# Patient Record
Sex: FEMALE | Race: BLACK OR AFRICAN AMERICAN | NOT HISPANIC OR LATINO | Employment: OTHER | URBAN - METROPOLITAN AREA
[De-identification: names, ages, dates, MRNs, and addresses within clinical notes are randomized per-mention and may not be internally consistent; named-entity substitution may affect disease eponyms.]

---

## 2017-01-12 ENCOUNTER — HOSPITAL ENCOUNTER (EMERGENCY)
Facility: CLINIC | Age: 67
Discharge: HOME | End: 2017-01-12
Attending: EMERGENCY MEDICINE | Admitting: EMERGENCY MEDICINE
Payer: MEDICARE

## 2017-01-12 ENCOUNTER — APPOINTMENT (OUTPATIENT)
Dept: CT IMAGING | Facility: CLINIC | Age: 67
End: 2017-01-12
Attending: EMERGENCY MEDICINE
Payer: MEDICARE

## 2017-01-12 ENCOUNTER — APPOINTMENT (OUTPATIENT)
Dept: GENERAL RADIOLOGY | Facility: CLINIC | Age: 67
End: 2017-01-12
Attending: EMERGENCY MEDICINE
Payer: MEDICARE

## 2017-01-12 VITALS
BODY MASS INDEX: 36.29 KG/M2 | SYSTOLIC BLOOD PRESSURE: 158 MMHG | HEIGHT: 59 IN | WEIGHT: 180 LBS | RESPIRATION RATE: 18 BRPM | HEART RATE: 91 BPM | DIASTOLIC BLOOD PRESSURE: 105 MMHG | OXYGEN SATURATION: 98 % | TEMPERATURE: 98.9 F

## 2017-01-12 DIAGNOSIS — R09.02 HYPOXIA: ICD-10-CM

## 2017-01-12 DIAGNOSIS — S73.101A SPRAIN OF RIGHT HIP, INITIAL ENCOUNTER: ICD-10-CM

## 2017-01-12 LAB
ANION GAP SERPL CALCULATED.3IONS-SCNC: 10 MMOL/L (ref 3–14)
BASOPHILS # BLD AUTO: 0 10E9/L (ref 0–0.2)
BASOPHILS NFR BLD AUTO: 0.3 %
BUN SERPL-MCNC: 19 MG/DL (ref 7–30)
CALCIUM SERPL-MCNC: 9.9 MG/DL (ref 8.5–10.1)
CHLORIDE SERPL-SCNC: 106 MMOL/L (ref 94–109)
CO2 SERPL-SCNC: 27 MMOL/L (ref 20–32)
CREAT BLD-MCNC: 0.9 MG/DL (ref 0.52–1.04)
CREAT SERPL-MCNC: 0.8 MG/DL (ref 0.52–1.04)
DIFFERENTIAL METHOD BLD: NORMAL
EOSINOPHIL # BLD AUTO: 0.1 10E9/L (ref 0–0.7)
EOSINOPHIL NFR BLD AUTO: 0.8 %
ERYTHROCYTE [DISTWIDTH] IN BLOOD BY AUTOMATED COUNT: 12.7 % (ref 10–15)
GFR SERPL CREATININE-BSD FRML MDRD: 62 ML/MIN/1.7M2
GFR SERPL CREATININE-BSD FRML MDRD: 71 ML/MIN/1.7M2
GLUCOSE SERPL-MCNC: 128 MG/DL (ref 70–99)
HCT VFR BLD AUTO: 43 % (ref 35–47)
HGB BLD-MCNC: 13.6 G/DL (ref 11.7–15.7)
IMM GRANULOCYTES # BLD: 0 10E9/L (ref 0–0.4)
IMM GRANULOCYTES NFR BLD: 0.4 %
LYMPHOCYTES # BLD AUTO: 2 10E9/L (ref 0.8–5.3)
LYMPHOCYTES NFR BLD AUTO: 20.2 %
MCH RBC QN AUTO: 29.6 PG (ref 26.5–33)
MCHC RBC AUTO-ENTMCNC: 31.6 G/DL (ref 31.5–36.5)
MCV RBC AUTO: 94 FL (ref 78–100)
MONOCYTES # BLD AUTO: 0.7 10E9/L (ref 0–1.3)
MONOCYTES NFR BLD AUTO: 6.8 %
NEUTROPHILS # BLD AUTO: 7.1 10E9/L (ref 1.6–8.3)
NEUTROPHILS NFR BLD AUTO: 71.5 %
NRBC # BLD AUTO: 0 10*3/UL
NRBC BLD AUTO-RTO: 0 /100
PLATELET # BLD AUTO: 210 10E9/L (ref 150–450)
POTASSIUM SERPL-SCNC: 4.4 MMOL/L (ref 3.4–5.3)
RBC # BLD AUTO: 4.59 10E12/L (ref 3.8–5.2)
SODIUM SERPL-SCNC: 143 MMOL/L (ref 133–144)
TROPONIN I BLD-MCNC: 0.01 UG/L (ref 0–0.1)
TROPONIN I SERPL-MCNC: NORMAL UG/L (ref 0–0.04)
WBC # BLD AUTO: 10 10E9/L (ref 4–11)

## 2017-01-12 PROCEDURE — 82565 ASSAY OF CREATININE: CPT

## 2017-01-12 PROCEDURE — 74177 CT ABD & PELVIS W/CONTRAST: CPT

## 2017-01-12 PROCEDURE — 71260 CT THORAX DX C+: CPT

## 2017-01-12 PROCEDURE — 85025 COMPLETE CBC W/AUTO DIFF WBC: CPT | Performed by: EMERGENCY MEDICINE

## 2017-01-12 PROCEDURE — 99285 EMERGENCY DEPT VISIT HI MDM: CPT | Mod: 25

## 2017-01-12 PROCEDURE — 84484 ASSAY OF TROPONIN QUANT: CPT | Performed by: EMERGENCY MEDICINE

## 2017-01-12 PROCEDURE — 25500064 ZZH RX 255 OP 636: Performed by: EMERGENCY MEDICINE

## 2017-01-12 PROCEDURE — 84484 ASSAY OF TROPONIN QUANT: CPT

## 2017-01-12 PROCEDURE — 25000125 ZZHC RX 250: Performed by: EMERGENCY MEDICINE

## 2017-01-12 PROCEDURE — 96374 THER/PROPH/DIAG INJ IV PUSH: CPT | Mod: 59

## 2017-01-12 PROCEDURE — 93005 ELECTROCARDIOGRAM TRACING: CPT

## 2017-01-12 PROCEDURE — 80048 BASIC METABOLIC PNL TOTAL CA: CPT | Performed by: EMERGENCY MEDICINE

## 2017-01-12 PROCEDURE — 25000128 H RX IP 250 OP 636: Performed by: EMERGENCY MEDICINE

## 2017-01-12 PROCEDURE — 73502 X-RAY EXAM HIP UNI 2-3 VIEWS: CPT

## 2017-01-12 RX ORDER — HYDROCODONE BITARTRATE AND ACETAMINOPHEN 5; 325 MG/1; MG/1
1-2 TABLET ORAL EVERY 4 HOURS PRN
Qty: 15 TABLET | Refills: 0 | Status: SHIPPED | OUTPATIENT
Start: 2017-01-12 | End: 2021-07-02

## 2017-01-12 RX ORDER — IOPAMIDOL 755 MG/ML
500 INJECTION, SOLUTION INTRAVASCULAR ONCE
Status: COMPLETED | OUTPATIENT
Start: 2017-01-12 | End: 2017-01-12

## 2017-01-12 RX ORDER — LIDOCAINE 40 MG/G
CREAM TOPICAL
Status: DISCONTINUED | OUTPATIENT
Start: 2017-01-12 | End: 2017-01-12 | Stop reason: HOSPADM

## 2017-01-12 RX ORDER — HYDROMORPHONE HYDROCHLORIDE 1 MG/ML
0.5 INJECTION, SOLUTION INTRAMUSCULAR; INTRAVENOUS; SUBCUTANEOUS
Status: DISCONTINUED | OUTPATIENT
Start: 2017-01-12 | End: 2017-01-12 | Stop reason: HOSPADM

## 2017-01-12 RX ORDER — IBUPROFEN 600 MG/1
600 TABLET, FILM COATED ORAL EVERY 6 HOURS PRN
Status: DISCONTINUED | OUTPATIENT
Start: 2017-01-12 | End: 2017-01-12 | Stop reason: HOSPADM

## 2017-01-12 RX ORDER — CYCLOBENZAPRINE HCL 10 MG
10 TABLET ORAL 3 TIMES DAILY PRN
Qty: 20 TABLET | Refills: 0 | Status: SHIPPED | OUTPATIENT
Start: 2017-01-12 | End: 2017-01-18

## 2017-01-12 RX ADMIN — SODIUM CHLORIDE 64 ML: 9 INJECTION, SOLUTION INTRAVENOUS at 19:06

## 2017-01-12 RX ADMIN — HYDROMORPHONE HYDROCHLORIDE 0.5 MG: 1 INJECTION, SOLUTION INTRAMUSCULAR; INTRAVENOUS; SUBCUTANEOUS at 18:26

## 2017-01-12 RX ADMIN — IOPAMIDOL 91 ML: 755 INJECTION, SOLUTION INTRAVENOUS at 19:05

## 2017-01-12 ASSESSMENT — ENCOUNTER SYMPTOMS
ABDOMINAL PAIN: 0
ARTHRALGIAS: 1
BACK PAIN: 0

## 2017-01-12 NOTE — ED AVS SNAPSHOT
[facility]    [address] [place] [address]    Phone:  [phone]    Fax:  [phone]                                       Pilo Quintana   MRN: [id number]    Department:  [facility]   Date of Visit:  1/12/2017           Patient Information     Date Of Birth          1950        Your diagnoses for this visit were:     Sprain of right hip, initial encounter     Hypoxia resolved       You were seen by Jermaine Davis MD.      Follow-up Information     Follow up with Chela Griffith MD In 2 days.    Specialty:  Internal Medicine    Contact information:    [facility]  [address] [place] [address] [phone]          Discharge Instructions         Hip Strain    You have a strain of the muscles around the hip joint. A muscle strain is a stretching or tearing of muscle fibers. This causes pain, especially when you move that muscle. There may also be some swelling and bruising.  Home care    Stay off the injured leg as much as possible until you can walk on it without pain. If you have a lot of pain with walking, crutches or a walker may be prescribed. These can be rented or purchased at many pharmacies and surgical or orthopedic supply stores. Follow your healthcare provider's advice regarding when to begin putting weight on that leg.    Apply an ice pack over the injured area for 15 to 20 minutes every 3 to 6 hours. You should do this for the first 24 to 48 hours. You can make an ice pack by filling a plastic bag that seals at the top with ice cubes and then wrapping it with a thin towel. Be careful not to injure your skin with the ice treatments. Ice should never be applied directly to skin. Continue the use of ice packs for relief of pain and swelling as needed. After 48 hours, apply heat (warm shower or warm bath) for 15 to 20 minutes several times a day, or alternate ice and heat.    You  may use over-the-counter pain medicine to control pain, unless another pain medicine was prescribed. If you have chronic liver or kidney disease or ever had a stomach ulcer or GI bleeding, talk with your healthcare provider before using these medicines.    If you play sports, you may resume these activities when you are able to hop and run on the injured leg without pain.  Follow-up care  Follow up with your healthcare provider, or as advised. If your symptoms do not begin to get better after a week, more tests may be needed.  If X-rays were taken, you will be told of any new findings that may affect your care.  When to seek medical advice  Call your healthcare provider right away if any of these occur:    Increased swelling or  bruising    Increased pain    Losing the ability to put weight on the injured side    7427-8737 [employer]. [address] [place], [place] [address]. All rights reserved. This information is not intended as a substitute for professional medical care. Always follow your healthcare professional's instructions.          24 Hour Appointment Hotline       To make an appointment at [facility], call [phone] ([phone]). If you don't have a family doctor or clinic, we will help you find one. [facility] clinics are conveniently located to serve the needs of you and your family.             Review of your medicines      START taking        Dose / Directions Last dose taken    cyclobenzaprine 10 MG tablet   Commonly known as:  FLEXERIL   Dose:  10 mg   Quantity:  20 tablet        Take 1 tablet (10 mg) by mouth 3 times daily as needed for muscle spasms   Refills:  0          Our records show that you are taking the medicines listed below. If these are incorrect, please call your family doctor or clinic.        Dose / Directions Last dose taken    ACTOS PO   Dose:  30 mg        Take 30 mg by mouth daily   Refills:  0        * albuterol (2.5 MG/3ML) 0.083% neb solution    Dose:  1 vial        Take 1 vial by nebulization every 4 hours as needed for shortness of breath / dyspnea or wheezing   Refills:  0        * albuterol 108 (90 BASE) MCG/ACT Inhaler   Commonly known as:  PROAIR HFA/PROVENTIL HFA/VENTOLIN HFA   Dose:  2 puff        Inhale 2 puffs into the lungs every 4 hours as needed for shortness of breath / dyspnea or wheezing   Refills:  0        ASPIRIN PO   Dose:  81 mg        Take 81 mg by mouth   Refills:  0        fluticasone 110 MCG/ACT Inhaler   Commonly known as:  FLOVENT HFA   Dose:  2 puff        Inhale 2 puffs into the lungs 2 times daily   Refills:  0        GLIPIZIDE PO   Dose:  2.5 mg        Take 2.5 mg by mouth daily   Refills:  0        HYDROcodone-acetaminophen 5-325 MG per tablet   Commonly known as:  NORCO   Dose:  1-2 tablet   Quantity:  15 tablet        Take 1-2 tablets by mouth every 4 hours as needed for moderate to severe pain   Refills:  0        ibuprofen 600 MG tablet   Commonly known as:  ADVIL/MOTRIN   Dose:  600 mg   Quantity:  30 tablet        Take 1 tablet (600 mg) by mouth every 6 hours as needed for moderate pain   Refills:  0        losartan-hydrochlorothiazide 100-25 MG per tablet   Commonly known as:  HYZAAR   Dose:  1 tablet        Take 1 tablet by mouth daily   Refills:  0        SULINDAC PO   Dose:  200 mg        Take 200 mg by mouth 2 times daily   Refills:  0        ZOCOR PO   Dose:  40 mg        Take 40 mg by mouth At Bedtime   Refills:  0        * Notice:  This list has 2 medication(s) that are the same as other medications prescribed for you. Read the directions carefully, and ask your doctor or other care provider to review them with you.            Prescriptions were sent or printed at these locations (2 Prescriptions)                   Other Prescriptions                Printed at Department/Unit printer (2 of 2)         HYDROcodone-acetaminophen (NORCO) 5-325 MG per tablet               cyclobenzaprine (FLEXERIL) 10 MG tablet                 Procedures and tests performed during your visit     Basic metabolic panel    CBC with platelets differential    CT Chest/Abdomen/Pelvis w Contrast    Cardiac Continuous Monitoring    Creatinine POCT    EKG 12-lead, tracing only    Peripheral IV catheter    Troponin I    Troponin POCT    XR Pelvis w Hip Right G/E 2 Views      Orders Needing Specimen Collection     None      Pending Results     Date and Time Order Name Status Description    1/12/2017 1723 CT Chest/Abdomen/Pelvis w Contrast Preliminary             Pending Culture Results     No orders found from 1/11/2017 to 1/13/2017.       Test Results from your hospital stay           1/12/2017  6:02 PM - Interface, Radiant Ib      Narrative     PELVIS AND HIP RIGHT TWO VIEWS 1/12/2017 4:54 PM     COMPARISON: None    HISTORY: Pain.        Impression     IMPRESSION: Evaluation is mildly limited by patient body habitus. No  definite fractures noted. Right hip joint alignment appears normal.    CLIFF DEVINE MD         1/12/2017  5:43 PM - Interface, Flexilab Results      Component Results     Component Value Ref Range & Units Status    WBC 10.0 4.0 - 11.0 10e9/L Final    RBC Count 4.59 3.8 - 5.2 10e12/L Final    Hemoglobin 13.6 11.7 - 15.7 g/dL Final    Hematocrit 43.0 35.0 - 47.0 % Final    MCV 94 78 - 100 fl Final    MCH 29.6 26.5 - 33.0 pg Final    MCHC 31.6 31.5 - 36.5 g/dL Final    RDW 12.7 10.0 - 15.0 % Final    Platelet Count 210 150 - 450 10e9/L Final    Diff Method Automated Method  Final    % Neutrophils 71.5 % Final    % Lymphocytes 20.2 % Final    % Monocytes 6.8 % Final    % Eosinophils 0.8 % Final    % Basophils 0.3 % Final    % Immature Granulocytes 0.4 % Final    Nucleated RBCs 0 0 /100 Final    Absolute Neutrophil 7.1 1.6 - 8.3 10e9/L Final    Absolute Lymphocytes 2.0 0.8 - 5.3 10e9/L Final    Absolute Monocytes 0.7 0.0 - 1.3 10e9/L Final    Absolute Eosinophils 0.1 0.0 - 0.7 10e9/L Final    Absolute Basophils 0.0 0.0 - 0.2 10e9/L Final     Abs Immature Granulocytes 0.0 0 - 0.4 10e9/L Final    Absolute Nucleated RBC 0.0  Final         1/12/2017  5:57 PM - Interface, Flexilab Results      Component Results     Component Value Ref Range & Units Status    Sodium 143 133 - 144 mmol/L Final    Potassium 4.4 3.4 - 5.3 mmol/L Final    Chloride 106 94 - 109 mmol/L Final    Carbon Dioxide 27 20 - 32 mmol/L Final    Anion Gap 10 3 - 14 mmol/L Final    Glucose 128 (H) 70 - 99 mg/dL Final    Urea Nitrogen 19 7 - 30 mg/dL Final    Creatinine 0.80 0.52 - 1.04 mg/dL Final    GFR Estimate 71 >60 mL/min/1.7m2 Final    Non  GFR Calc    GFR Estimate If Black 86 >60 mL/min/1.7m2 Final    African American GFR Calc    Calcium 9.9 8.5 - 10.1 mg/dL Final         1/12/2017  5:57 PM - Interface, Flexilab Results      Component Results     Component Value Ref Range & Units Status    Troponin I ES  0.000 - 0.045 ug/L Final    <0.015  The 99th percentile for upper reference range is 0.045 ug/L.  Troponin values in   the range of 0.045 - 0.120 ug/L may be associated with risks of adverse   clinical events.           1/12/2017  7:55 PM - Interface, Radiant Ib      Narrative     CT CHEST/ABDOMEN/PELVIS WITH CONTRAST   1/12/2017 7:16 PM     HISTORY: Hypoxia. Right hip pain.    TECHNIQUE: 91mL Isovue-370 IV were administered. After contrast  administration, volumetric helical sections were acquired from the  thoracic inlet to the ischial tuberosities. Coronal images were also  reconstructed. Radiation dose for this scan was reduced using  automated exposure control, adjustment of the mA and/or kV according  to patient size, or iterative reconstruction technique.    COMPARISON: Outside chest CT performed 6/26/2015. Outside CT of the  abdomen performed 10/15/2015.    FINDINGS:    Chest: Mild scarring and/or atelectasis at the left lung base.  Indeterminate 0.5 cm left lower lobe pulmonary nodule (series 3 image  33) is unchanged. No pleural or pericardial effusions. No  enlarged  lymph nodes are identified in the chest. Atherosclerotic calcification  of the thoracic aorta and coronary arteries. Small hiatal hernia.    Abdomen and Pelvis: The liver, gallbladder, spleen, adrenal glands,  and pancreas are unremarkable. Indeterminate mass in the lower pole of  the right kidney posteriorly measures 2.7 x 2.7 cm, increased in size  since the previous exam. The kidneys are otherwise unremarkable. No  hydronephrosis. Mild atherosclerotic aortoiliac calcification. No  bowel obstruction. Scattered colonic diverticula, without convincing  evidence for diverticulitis. Unremarkable appendix. No free fluid in  the pelvis. No enlarged lymph nodes are identified in the abdomen or  pelvis. Degenerative changes are noted in the lumbar spine. No  aggressive-appearing bone lesions. No cause for right hip pain is  identified.        Impression     IMPRESSION:   1. Indeterminate 2.7 cm right renal lesion has increased in size, and  neoplasm cannot be excluded.   2. Indeterminate 0.5 cm left lower lobe pulmonary nodule is unchanged  dating back to 6/5/2015.   3. Scattered colonic diverticulosis, without convincing evidence for  diverticulitis.                 1/12/2017  5:46 PM - Interface, Flexilab Results      Component Results     Component Value Ref Range & Units Status    Troponin I 0.01 0.00 - 0.10 ug/L Final               1/12/2017  6:01 PM - Interface, Flexilab Results      Component Results     Component Value Ref Range & Units Status    Creatinine 0.9 0.52 - 1.04 mg/dL Final    GFR Estimate 62 >60 mL/min/1.7m2 Final    GFR Estimate If Black 76 >60 mL/min/1.7m2 Final                Clinical Quality Measure: Blood Pressure Screening     Your blood pressure was checked while you were in the emergency department today. The last reading we obtained was  BP: (!) 159/99 mmHg . Please read the guidelines below about what these numbers mean and what you should do about them.  If your systolic blood  "pressure (the top number) is less than 120 and your diastolic blood pressure (the bottom number) is less than 80, then your blood pressure is normal. There is nothing more that you need to do about it.  If your systolic blood pressure (the top number) is 120-139 or your diastolic blood pressure (the bottom number) is 80-89, your blood pressure may be higher than it should be. You should have your blood pressure rechecked within a year by a primary care provider.  If your systolic blood pressure (the top number) is 140 or greater or your diastolic blood pressure (the bottom number) is 90 or greater, you may have high blood pressure. High blood pressure is treatable, but if left untreated over time it can put you at risk for heart attack, stroke, or kidney failure. You should have your blood pressure rechecked by a primary care provider within the next 4 weeks.  If your provider in the emergency department today gave you specific instructions to follow-up with your doctor or provider even sooner than that, you should follow that instruction and not wait for up to 4 weeks for your follow-up visit.        Thank you for choosing [facility]       Thank you for choosing [facility] for your care. Our goal is always to provide you with excellent care. Hearing back from our patients is one way we can continue to improve our services. Please take a few minutes to complete the written survey that you may receive in the mail after you visit with us. Thank you!        [employer] Information     [employer] lets you send messages to your doctor, view your test results, renew your prescriptions, schedule appointments and more. To sign up, go to [email or web address] . Click on \"Log in\" on the left side of the screen, which will take you to the Welcome page. Then click on \"Sign up Now\" on the right side of the page.     You will be asked to enter the access code listed below, as well as some personal information. Please follow the " directions to create your username and password.     Your access code is: [email or web address]  Expires: 2017  8:08 PM     Your access code will  in 90 days. If you need help or a new code, please call your [facility] clinic or [phone].        Care EveryWhere ID     This is your Care EveryWhere ID. This could be used by other organizations to access your [facility] medical records  [id number]        After Visit Summary       This is your record. Keep this with you and show to your community pharmacist(s) and doctor(s) at your next visit.

## 2017-01-12 NOTE — ED PROVIDER NOTES
History     Chief Complaint:  Hip Pain      HPI The history is obtained through interpretations provided by the patient's son.     Pilo Quintana is a 66 year old female, accompanied by her son and daughter-in-law, with a history of diabetes and hypertension who presents for evaluation of hip pain. The patient had a visit two days ago on 1/10/2017 with Chela Griffith at [facility] for a routine follow up on previously seen pulmonary nodules. There is no clinic note from that visit yet. They ordered a CT scan of the patient's chest that was actually completed yesterday on the 11th. The result reveals: 1. Stable limited CT of the chest including stable bibasilar nodules. If patient is considered high risk (smoker), additional 18-24 month follow-up is recommended. If patient is low risk, no further follow-up is recommended. She had labs including a sodium of 146, potassium 3.9, chloride 103, bicarb 32, hemoglobin A1c of 7.1, creatinine of 0.8, as well as routine lipid laps and urine chemistries. On the 11th after laying down for the CT, the patient started to develop pain in her right hip that is worse with any movement and weight bearing. She did not fall, strike her head, or otherwise have any trauma to her hip. Her pain has been constant since onset, and she currently rates it at a severity of 7/10. She has not had any fever, rash, abdominal pain, back pain, or any other symptoms in association with her current pain.     Allergies:  NKDA     Medications:    ibuprofen (ADVIL,MOTRIN) 600 MG tablet  ASPIRIN PO  albuterol (2.5 MG/3ML) 0.083% nebulizer solution  albuterol (PROAIR HFA, PROVENTIL HFA, VENTOLIN HFA) 108 (90 BASE) MCG/ACT inhaler  fluticasone (FLOVENT HFA) 110 MCG/ACT inhaler  GLIPIZIDE PO  losartan-hydrochlorothiazide (HYZAAR) 100-25 MG per tablet  Pioglitazone HCl (ACTOS PO)  Simvastatin (ZOCOR PO)  Diclofenac gel  Glucotrol    SULINDAC PO    Past Medical History:    Hypertension  Diabetes  "  Asthma  Postmenopausal bleeding  Thickened endometrium  Unspecified cerebral artery occlusion with cerebral infarction  Renal mass  Morbid obesity     Past Surgical History:    Dilation and curettage, operative hysteroscopy with morcellator, combined     Family History:    History reviewed. No pertinent family history.     Social History:  Tobacco use:    Never smoker  Alcohol use:    Negative  Marital status:       Accompanied to ED by:  Son and daughter-in-law     Review of Systems   Gastrointestinal: Negative for abdominal pain.   Musculoskeletal: Positive for arthralgias (right hip) and gait problem. Negative for back pain.   Skin: Negative for rash.   All other systems reviewed and are negative.    Physical Exam   First Vitals:  BP: 151/87 mmHg  Pulse: 91  Temp: 98.9  F (37.2  C)  Resp: 18  Height: 149.9 cm (4' 11\")  Weight: 81.647 kg (180 lb)  SpO2: 97 %      Physical Exam  Constitutional:  Appears well-developed and well-nourished. Alert. Conversant with her children.  Seems to understand English but doesn't speak it directly to me. Non toxic.  HENT:   Head: Atraumatic.   Nose: Nose normal.  Mouth/Throat: Oral mucosa is clear and moist. no trismus. Pharynx normal. Tonsils symmetric. No tonsillar enlargement, erythema, or exudate.  Eyes: Conjunctivae normal. EOM normal. Pupils equal, round, and reactive to light. No scleral icterus.   Neck: Normal range of motion. Neck supple. No tracheal deviation present.   Cardiovascular: Normal rate, regular rhythm. No gallop. No friction rub. No murmur heard. Symmetric radial and dorsalis pedis artery pulses   Pulmonary/Chest: Effort normal. No stridor. No respiratory distress. No wheezes. No rales. No rhonchi . No tenderness.   Subsequently nurses notified me that she was hypoxic with sats in the low 80s high 70s.  I reevaluated the patient she was still having no signs of respiratory distress.  Repeat pulse oximetry while I was in the room with good waveform " she'll sats % on room air.  Abdominal: Soft. Bowel sounds normal. No distension. No mass. No tenderness. No rebound. No guarding.   Musculoskeletal: upper extremities:Normal range of motion. No edema. No tenderness. No deformity  RLE: Tender over lateral iliac crest and lateral trochanter.  No tenderness over the inguinal ligament. No tenderness anteriorly over the quadriceps muscle.  No posterior tenderness over the buttocks, SI joint, sacrum or lumbar spine.  Range of motion the right hip is limited by pain to about 30 of flexion.  Pain limits abduction.  Normal flexion of the knee.  No swelling of the calf.  No femur, knee, leg, ankle, foot tenderness.  LLE:  No tenderness over the hip.  Normal range of motion in the hip and femur and thigh are nontender.  Knee normal.  Leg normal.  Ankles normal.  Foot normal.   Lymph: No cervical adenopathy.   Neurological: Alert and oriented to person, place, and time. Normal strength. 5 over 5 bilaterally in the upper extremities.  5 over 5 in both lower extremities.  She is able to ambulate, with a limp favoring her right leg. CN II-VII intact. No sensory deficit, including normal light touch sensation bilaterally in the  L3, L4, L5, S1 dermatomes. GCS eye subscore is 4. GCS verbal subscore is 5. GCS motor subscore is 6. Normal coordination   Skin: Skin is warm and dry. No rash noted. No pallor. Normal capillary refill.  Psychiatric:  Normal mood. Normal affect.       Emergency Department Course   ECG (17:16:12):  Indication: Screening for cardiovascular disease.   Rate 91 bpm. HI interval 136 ms. QRS duration 84 ms. QT/QTc 368/427 ms. P-R-T axes -11 -30 7.   Interpretation: Normal sinus rhythm, Left axis deviation, Abnormal ECG  Agree with computer interpretation. Yes   Interpreted at 1719 by Dr. Davis.      Imaging:  Radiographic findings were communicated with the patient and family who voiced understanding of the findings.    XR Pelvis w Hip, Right:  IMPRESSION:  Evaluation is mildly limited by patient body habitus. No definite fractures noted. Right hip joint alignment appears normal.  Per radiology.     CT Chest/Abdomen/Pelvis w Contrast:  IMPRESSION:   1. Indeterminate 2.7 cm right renal lesion has increased in size, and neoplasm cannot be excluded.   2. Indeterminate 0.5 cm left lower lobe pulmonary nodule is unchanged dating back to 6/5/2015.   3. Scattered colonic diverticulosis, without convincing evidence for diverticulitis.   Preliminary report per radiology.     Laboratory:  CBC: WNL (WBC 10.0, HGB 13.6, )  BMP: Glucose 128 high, o/w WNL (Creatinine 0.80)  Creatinine POCT 1744: Creatinine 0.9, GFR estimate 76  Troponin POCT 1728: 0.01     Troponin I 1720: <0.015    Interventions:  1826 Dilaudid 0.5 mg IV   The patient's symptoms were partially improved with parenteral narcotics.    Emergency Department Course:  Nursing notes and vitals reviewed.  1616: I performed an exam of the patient as documented above.    1715: I updated and reassessed the patient. She is laying in bed on 4 L oxygen.     2003: I updated and reassessed the patient.     I personally reviewed the laboratory results with the Patient and son and answered all related questions prior to discharge.      Findings and plan explained to the Patient and son. Patient discharged home with instructions regarding supportive care, medications, and reasons to return. The importance of close follow-up was reviewed. The patient was prescribed Flexeril and Norco.      Impression & Plan      Medical Decision Making:  Pilo Quintana is a pleasant 66 year old female who came to the ER today with her adult son who interpreted for her. She is here predominantly for pain in her right hip which began yesterday when she was trying to move onto a CT scanner at [facility] for a routine follow up CT for a lung nodule. Results of that CT scan show no evidence of growth compared to previous imaging. In terms of  her hip, differential would include hip sprain or contusion versus fracture of the proximal femur or the pelvic girdle. Clinical exam showed no obvious evidence for fracture or deformity but was quite tender and fracture could not be ruled out. Radiographs were negative but limited by body habitus. She had ongoing hip pain, so we did do CT scan of her abdomen/pelvis, which is negative for any fracture or bony mets. At this point, we suspect that it is likely a hip sprain. She is otherwise neurovascularly intact in the leg. No evidence for ischemia, lumbar radiculopathy. No exam evidence for shingles or cellulitis.     While she was here in the ER, her nurse noted her to be hypoxic on room air. She was briefly placed on nasal cannula. When I came to evaluate her, we took her off oxygen and she was satting in the high 90s on room air. Unclear whether this was an accurate measurement by the machine. The nurse said that she clearly had a good waveform that correlated with her pulse. Workup for this hypoxia was undertaken. At this point, we do not see any evidence for acute coronary syndrome. CT scan of her chest with contrast is negative for PE, pneumonia, pneumothorax, pulmonary edema. Her hypoxia was not related to pain meds. At this point, we have observed the patient for a couple more hours and she has not had any dysrhythmia or recurrence of hypoxia or any difficulty breathing. Etiology of this brief hypoxic spell is at this time not known. She was not apneic or hypoventilating or having any other signs of respiratory distress. I recommended outpatient follow up. Return to the ER if she does develop any chest pain, trouble breathing, or with worsening pain in her hip, redness or swelling of her leg, or any other concerns.     Diagnosis:    ICD-10-CM    1. Sprain of right hip, initial encounter S73.101A    2. Hypoxia R09.02     resolved     Disposition:  Discharged to home with Flexeril and Norco.     Discharge  Medications:  New Prescriptions    CYCLOBENZAPRINE (FLEXERIL) 10 MG TABLET    Take 1 tablet (10 mg) by mouth 3 times daily as needed for muscle spasms    HYDROCODONE-ACETAMINOPHEN (NORCO) 5-325 MG PER TABLET    Take 1-2 tablets by mouth every 4 hours as needed for moderate to severe pain       Ayden LEAHY, am serving as a scribe at 4:16 PM on 1/12/2017 to document services personally performed by Dr. Davis, based on my observations and the provider's statements to me.    [facility] EMERGENCY DEPARTMENT        Jermaine Davis MD  01/13/17 0044

## 2017-01-12 NOTE — ED NOTES
ABCs intact. Pt c/o R hip pain after MRI of chest. Pt has been more slow walking to pain. Denies injury. Pt called nurse line and was told to come to the ER for further evaluation. AxOx4, no slurred speech per family    Pt's home meds: see epic

## 2017-01-12 NOTE — ED AVS SNAPSHOT
[facility] Emergency Department    [address]    [facility] [address]    Phone:  [phone]    Fax:  [phone]                                       Pilo Quintana   MRN: [id number]    Department:  [facility] Emergency Department   Date of Visit:  1/12/2017           After Visit Summary Signature Page     I have received my discharge instructions, and my questions have been answered. I have discussed any challenges I see with this plan with the nurse or doctor.    ..........................................................................................................................................  Patient/Patient Representative Signature      ..........................................................................................................................................  Patient Representative Print Name and Relationship to Patient    ..................................................               ................................................  Date                                            Time    ..........................................................................................................................................  Reviewed by Signature/Title    ...................................................              ..............................................  Date                                                            Time

## 2017-01-13 LAB — INTERPRETATION ECG - MUSE: NORMAL

## 2017-01-13 NOTE — DISCHARGE INSTRUCTIONS
Hip Strain    You have a strain of the muscles around the hip joint. A muscle strain is a stretching or tearing of muscle fibers. This causes pain, especially when you move that muscle. There may also be some swelling and bruising.  Home care    Stay off the injured leg as much as possible until you can walk on it without pain. If you have a lot of pain with walking, crutches or a walker may be prescribed. These can be rented or purchased at many pharmacies and surgical or orthopedic supply stores. Follow your healthcare provider's advice regarding when to begin putting weight on that leg.    Apply an ice pack over the injured area for 15 to 20 minutes every 3 to 6 hours. You should do this for the first 24 to 48 hours. You can make an ice pack by filling a plastic bag that seals at the top with ice cubes and then wrapping it with a thin towel. Be careful not to injure your skin with the ice treatments. Ice should never be applied directly to skin. Continue the use of ice packs for relief of pain and swelling as needed. After 48 hours, apply heat (warm shower or warm bath) for 15 to 20 minutes several times a day, or alternate ice and heat.    You may use over-the-counter pain medicine to control pain, unless another pain medicine was prescribed. If you have chronic liver or kidney disease or ever had a stomach ulcer or GI bleeding, talk with your healthcare provider before using these medicines.    If you play sports, you may resume these activities when you are able to hop and run on the injured leg without pain.  Follow-up care  Follow up with your healthcare provider, or as advised. If your symptoms do not begin to get better after a week, more tests may be needed.  If X-rays were taken, you will be told of any new findings that may affect your care.  When to seek medical advice  Call your healthcare provider right away if any of these occur:    Increased swelling or  bruising    Increased pain    Losing the  ability to put weight on the injured side    [id number] [employer]. [address] [place] [address], [place] [address]. All rights reserved. This information is not intended as a substitute for professional medical care. Always follow your healthcare professional's instructions.

## 2017-03-01 DIAGNOSIS — N28.89 KIDNEY MASS: Primary | ICD-10-CM

## 2017-04-10 ENCOUNTER — PRE VISIT (OUTPATIENT)
Dept: UROLOGY | Facility: CLINIC | Age: 67
End: 2017-04-10

## 2017-04-14 ENCOUNTER — OFFICE VISIT (OUTPATIENT)
Dept: UROLOGY | Facility: CLINIC | Age: 67
End: 2017-04-14

## 2017-04-14 VITALS
HEIGHT: 59 IN | WEIGHT: 179 LBS | BODY MASS INDEX: 36.08 KG/M2 | DIASTOLIC BLOOD PRESSURE: 91 MMHG | HEART RATE: 68 BPM | SYSTOLIC BLOOD PRESSURE: 144 MMHG

## 2017-04-14 DIAGNOSIS — N28.89 RENAL MASS: Primary | ICD-10-CM

## 2017-04-14 ASSESSMENT — PAIN SCALES - GENERAL: PAINLEVEL: NO PAIN (0)

## 2017-04-14 NOTE — NURSING NOTE
Chief Complaint   Patient presents with     RECHECK     1 year return kidney mass    Mario Diop LPN

## 2017-04-14 NOTE — PROGRESS NOTES
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  I emphasized that we need follow-up in 2 year with renal ultrasound.  She is going back to [facility] and will do the follow-up there.  _________________________________________________________________    CHIEF COMPLAINT   It was my pleasure to see Pilo Quintana who is a 65 year old female for follow-up of renal oncocytoma.     HPI  She is doing well.  She denies flank pain or gross hematuria.    Right kidney mass size  10/15/15 30mm  8/13/14 22mm    Collected: 1/22/2016  Received: 1/22/2016  Reported: 1/25/2016 16:50  Ordering Phy(s): DHAVAL RUIZ    SPECIMEN(S):  Right renal mass biopsy, CT guided    FINAL DIAGNOSIS:  Right kidney, renal mass, CT guided core biopsy:  - Oncocytic renal neoplasm, favor oncocytoma      RADIOLOGIC IMAGING  Ultrasound Renal, 4/14/2017 2:34 PM      COMPARISON: CT 1/12/2017, 10/15/2015     HISTORY: Renal mass, CT biopsy 1/22/2016 found this mass to be  oncocytoma     FINDINGS:     Right kidney: Measures 7.3 cm in length. No significant change in the  echogenic right lower pole vascular mass measuring approximately 2.4 x  2 4 x 2.6 cm since CT 10/15/2015. No hydronephrosis.     Left kidney: Measures 9.2 cm in length. Parenchyma is of normal  thickness and echogenicity. No focal mass. No hydronephrosis.      Bladder: was not be well visualized         IMPRESSION:  No significant change in the 2.6 cm echogenic right lower pole  vascular mass since CT 10/15/2015.       CT CHEST/ABDOMEN/PELVIS WITH CONTRAST 1/12/2017 7:16 PM      HISTORY: Hypoxia. Right hip pain.     TECHNIQUE: 91mL Isovue-370 IV were administered. After contrast  administration, volumetric helical sections were acquired from the  thoracic inlet to the ischial tuberosities. Coronal images were also  reconstructed. Radiation dose for this scan was reduced using  automated exposure control, adjustment of the mA and/or kV according  to patient size, or iterative  reconstruction technique.     COMPARISON: Outside chest CT performed 6/26/2015. Outside CT of the  abdomen performed 10/15/2015.     FINDINGS:   Chest: Mild scarring and/or atelectasis at the left lung base.  Indeterminate 0.5 cm left lower lobe pulmonary nodule (series 3 image  33) is unchanged. No pleural or pericardial effusions. No enlarged  lymph nodes are identified in the chest. Atherosclerotic calcification  of the thoracic aorta and coronary arteries. Small hiatal hernia.     Abdomen and Pelvis: The liver, gallbladder, spleen, adrenal glands,  and pancreas are unremarkable. Indeterminate mass in the lower pole of  the right kidney posteriorly measures 2.7 x 2.7 cm, increased in size  since the previous exam. This lesion appears to enhance. The kidneys  are otherwise unremarkable. No hydronephrosis. Mild atherosclerotic  aortoiliac calcification. No bowel obstruction. Scattered colonic  diverticula, without convincing evidence for diverticulitis.  Unremarkable appendix. No free fluid in the pelvis. No enlarged lymph  nodes are identified in the abdomen or pelvis. Degenerative changes  are noted in the lumbar spine. No aggressive-appearing bone lesions.  No cause for right hip pain is identified.         IMPRESSION:   1. Indeterminate 2.7 cm right renal lesion has increased in size, and  neoplasm cannot be excluded.   2. Indeterminate 0.5 cm left lower lobe pulmonary nodule is unchanged  dating back to 6/5/2015.   3. Scattered colonic diverticulosis, without convincing evidence for  diverticulitis.     I reviewed the recent radiologic imaging and reports described above.      Patient Active Problem List    Diagnosis Date Noted     Renal mass      Priority: Medium     Morbid obesity (H)      Priority: Medium     Endometrial polyp 09/25/2014     Priority: Medium     PMB (postmenopausal bleeding) 09/16/2014     Priority: Medium     Thickened endometrium 09/16/2014     Priority: Medium     Past Medical History:    Diagnosis Date     Diabetes (H)      Hypertension      Morbid obesity (H)      PMB (postmenopausal bleeding) 9/16/2014     Renal mass      Thickened endometrium 9/16/2014     Uncomplicated asthma      Unspecified cerebral artery occlusion with cerebral infarction 1998    was in [place], no residual at present     Past Surgical History:   Procedure Laterality Date     DILATION AND CURETTAGE, OPERATIVE HYSTEROSCOPY WITH MORCELLATOR, COMBINED N/A 9/25/2014    Procedure: COMBINED DILATION AND CURETTAGE, OPERATIVE HYSTEROSCOPY WITH MORCELLATOR;  Surgeon: Silverio Christy MD;  Location:  OR     Current Outpatient Prescriptions   Medication Sig Dispense Refill     HYDROcodone-acetaminophen (NORCO) 5-325 MG per tablet Take 1-2 tablets by mouth every 4 hours as needed for moderate to severe pain 15 tablet 0     ibuprofen (ADVIL,MOTRIN) 600 MG tablet Take 1 tablet (600 mg) by mouth every 6 hours as needed for moderate pain 30 tablet 0     ASPIRIN PO Take 81 mg by mouth       albuterol (2.5 MG/3ML) 0.083% nebulizer solution Take 1 vial by nebulization every 4 hours as needed for shortness of breath / dyspnea or wheezing       albuterol (PROAIR HFA, PROVENTIL HFA, VENTOLIN HFA) 108 (90 BASE) MCG/ACT inhaler Inhale 2 puffs into the lungs every 4 hours as needed for shortness of breath / dyspnea or wheezing       fluticasone (FLOVENT HFA) 110 MCG/ACT inhaler Inhale 2 puffs into the lungs 2 times daily       GLIPIZIDE PO Take 2.5 mg by mouth daily       losartan-hydrochlorothiazide (HYZAAR) 100-25 MG per tablet Take 1 tablet by mouth daily       Pioglitazone HCl (ACTOS PO) Take 30 mg by mouth daily       Simvastatin (ZOCOR PO) Take 40 mg by mouth At Bedtime       SULINDAC PO Take 200 mg by mouth 2 times daily        SOCIAL HISTORY: She  reports that she has never smoked. She has never used smokeless tobacco. She reports that she does not drink alcohol or use illicit drugs.    PHYSICAL EXAM  Vitals:    04/14/17 1448   BP: (!)  "144/91   Pulse: 68   Weight: 81.2 kg (179 lb)   Height: 1.499 m (4' 11\")     Constitutional: Alert, no acute distress  Psychiatric: Normal mood and affect  Abdomen: soft non tender non distended       CC  Patient Care Team:  Chela Griffith MD as PCP - General (Internal Medicine)  Reza Mon MD as MD (Urology)  CHELA GRIFFITH    Copy to patient  FABIOLA FREEMAN  [address] [facility] [id number]      "

## 2017-04-14 NOTE — LETTER
4/14/2017       RE: Pilo Quintana  [address] [place] [address]     Dear Colleague,    Thank you for referring your patient, Pilo Quintana, to the [facility] UROLOGY AND INST FOR PROSTATE AND UROLOGIC CANCERS at [facility]. Please see a copy of my visit note below.    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  I emphasized that we need follow-up in 2 year with renal ultrasound.  She is going back to [facility] and will do the follow-up there.  _________________________________________________________________    CHIEF COMPLAINT   It was my pleasure to see Pilo Quintana who is a 65 year old female for follow-up of renal oncocytoma.     HPI  She is doing well.  She denies flank pain or gross hematuria.    Right kidney mass size  10/15/15 30mm  8/13/14 22mm    Collected: 1/22/2016  Received: 1/22/2016  Reported: 1/25/2016 16:50  Ordering Phy(s): DHAVAL RUIZ    SPECIMEN(S):  Right renal mass biopsy, CT guided    FINAL DIAGNOSIS:  Right kidney, renal mass, CT guided core biopsy:  - Oncocytic renal neoplasm, favor oncocytoma      RADIOLOGIC IMAGING  Ultrasound Renal, 4/14/2017 2:34 PM      COMPARISON: CT 1/12/2017, 10/15/2015     HISTORY: Renal mass, CT biopsy 1/22/2016 found this mass to be  oncocytoma     FINDINGS:     Right kidney: Measures 7.3 cm in length. No significant change in the  echogenic right lower pole vascular mass measuring approximately 2.4 x  2 4 x 2.6 cm since CT 10/15/2015. No hydronephrosis.     Left kidney: Measures 9.2 cm in length. Parenchyma is of normal  thickness and echogenicity. No focal mass. No hydronephrosis.      Bladder: was not be well visualized         IMPRESSION:  No significant change in the 2.6 cm echogenic right lower pole  vascular mass since CT 10/15/2015.       CT CHEST/ABDOMEN/PELVIS WITH CONTRAST 1/12/2017 7:16 PM      HISTORY: Hypoxia. Right hip pain.     TECHNIQUE: 91mL Isovue-370 IV were administered.  After contrast  administration, volumetric helical sections were acquired from the  thoracic inlet to the ischial tuberosities. Coronal images were also  reconstructed. Radiation dose for this scan was reduced using  automated exposure control, adjustment of the mA and/or kV according  to patient size, or iterative reconstruction technique.     COMPARISON: Outside chest CT performed 6/26/2015. Outside CT of the  abdomen performed 10/15/2015.     FINDINGS:   Chest: Mild scarring and/or atelectasis at the left lung base.  Indeterminate 0.5 cm left lower lobe pulmonary nodule (series 3 image  33) is unchanged. No pleural or pericardial effusions. No enlarged  lymph nodes are identified in the chest. Atherosclerotic calcification  of the thoracic aorta and coronary arteries. Small hiatal hernia.     Abdomen and Pelvis: The liver, gallbladder, spleen, adrenal glands,  and pancreas are unremarkable. Indeterminate mass in the lower pole of  the right kidney posteriorly measures 2.7 x 2.7 cm, increased in size  since the previous exam. This lesion appears to enhance. The kidneys  are otherwise unremarkable. No hydronephrosis. Mild atherosclerotic  aortoiliac calcification. No bowel obstruction. Scattered colonic  diverticula, without convincing evidence for diverticulitis.  Unremarkable appendix. No free fluid in the pelvis. No enlarged lymph  nodes are identified in the abdomen or pelvis. Degenerative changes  are noted in the lumbar spine. No aggressive-appearing bone lesions.  No cause for right hip pain is identified.         IMPRESSION:   1. Indeterminate 2.7 cm right renal lesion has increased in size, and  neoplasm cannot be excluded.   2. Indeterminate 0.5 cm left lower lobe pulmonary nodule is unchanged  dating back to 6/5/2015.   3. Scattered colonic diverticulosis, without convincing evidence for  diverticulitis.     I reviewed the recent radiologic imaging and reports described above.      Patient Active Problem  List    Diagnosis Date Noted     Renal mass      Priority: Medium     Morbid obesity (H)      Priority: Medium     Endometrial polyp 09/25/2014     Priority: Medium     PMB (postmenopausal bleeding) 09/16/2014     Priority: Medium     Thickened endometrium 09/16/2014     Priority: Medium     Past Medical History:   Diagnosis Date     Diabetes (H)      Hypertension      Morbid obesity (H)      PMB (postmenopausal bleeding) 9/16/2014     Renal mass      Thickened endometrium 9/16/2014     Uncomplicated asthma      Unspecified cerebral artery occlusion with cerebral infarction 1998    was in [place], no residual at present     Past Surgical History:   Procedure Laterality Date     DILATION AND CURETTAGE, OPERATIVE HYSTEROSCOPY WITH MORCELLATOR, COMBINED N/A 9/25/2014    Procedure: COMBINED DILATION AND CURETTAGE, OPERATIVE HYSTEROSCOPY WITH MORCELLATOR;  Surgeon: Silverio Christy MD;  Location:  OR     Current Outpatient Prescriptions   Medication Sig Dispense Refill     HYDROcodone-acetaminophen (NORCO) 5-325 MG per tablet Take 1-2 tablets by mouth every 4 hours as needed for moderate to severe pain 15 tablet 0     ibuprofen (ADVIL,MOTRIN) 600 MG tablet Take 1 tablet (600 mg) by mouth every 6 hours as needed for moderate pain 30 tablet 0     ASPIRIN PO Take 81 mg by mouth       albuterol (2.5 MG/3ML) 0.083% nebulizer solution Take 1 vial by nebulization every 4 hours as needed for shortness of breath / dyspnea or wheezing       albuterol (PROAIR HFA, PROVENTIL HFA, VENTOLIN HFA) 108 (90 BASE) MCG/ACT inhaler Inhale 2 puffs into the lungs every 4 hours as needed for shortness of breath / dyspnea or wheezing       fluticasone (FLOVENT HFA) 110 MCG/ACT inhaler Inhale 2 puffs into the lungs 2 times daily       GLIPIZIDE PO Take 2.5 mg by mouth daily       losartan-hydrochlorothiazide (HYZAAR) 100-25 MG per tablet Take 1 tablet by mouth daily       Pioglitazone HCl (ACTOS PO) Take 30 mg by mouth daily       Simvastatin  "(ZOCOR PO) Take 40 mg by mouth At Bedtime       SULINDAC PO Take 200 mg by mouth 2 times daily        SOCIAL HISTORY: She  reports that she has never smoked. She has never used smokeless tobacco. She reports that she does not drink alcohol or use illicit drugs.    PHYSICAL EXAM  Vitals:    04/14/17 1448   BP: (!) 144/91   Pulse: 68   Weight: 81.2 kg (179 lb)   Height: 1.499 m (4' 11\")     Constitutional: Alert, no acute distress  Psychiatric: Normal mood and affect  Abdomen: soft non tender non distended       CC  Patient Care Team:  Chela Griffith MD as PCP - General (Internal Medicine)  Reza Mon MD as MD (Urology)  CHELA GRIFFITH    Copy to patient  FABIOLA FREEMAN  [address] [facility] [id number]      "

## 2017-04-14 NOTE — MR AVS SNAPSHOT
"              After Visit Summary   2017    Pilo Quintana    MRN: [id number]           Patient Information     Date Of Birth          1950        Visit Information        Provider Department      2017 3:05 PM Reza Mon MD; [facility] Urologic Cancers        Today's Diagnoses     Renal mass    -  1       Follow-ups after your visit        Who to contact     Please call your clinic at [phone] to:    Ask questions about your health    Make or cancel appointments    Discuss your medicines    Learn about your test results    Speak to your doctor   If you have compliments or concerns about an experience at your clinic, or if you wish to file a complaint, please contact [facility] Patient Relations at [phone] or email us at [email or web address]         Additional Information About Your Visit        MyChart Information     [employer] is an electronic gateway that provides easy, online access to your medical records. With [employer], you can request a clinic appointment, read your test results, renew a prescription or communicate with your care team.     To sign up for [employer] visit the website at [email or web address]   You will be asked to enter the access code listed below, as well as some personal information. Please follow the directions to create your username and password.     Your access code is: [email or web address]  Expires: 2017  3:27 PM     Your access code will  in 90 days. If you need help or a new code, please contact your [facility] or call [phone] for assistance.        Care EveryWhere ID     This is your Care EveryWhere ID. This could be used by other organizations to access your [facility] medical records  [id number]        Your Vitals Were     Pulse Height BMI (Body Mass Index)             68 1.499 m (4' 11\") 36.15 kg/m2          Blood " Pressure from Last 3 Encounters:   04/14/17 (!) 144/91   01/12/17 (!) 158/105   07/13/16 (!) 142/93    Weight from Last 3 Encounters:   04/14/17 81.2 kg (179 lb)   01/12/17 81.6 kg (180 lb)   02/12/16 86 kg (189 lb 9.6 oz)              Today, you had the following     No orders found for display       Primary Care Provider Office Phone # Fax #    Chela Nicole Griffith [phone]       [facility] [address] [place] [address]        Thank you!     Thank you for choosing [facility] UROLOGY AND [facility] FOR PROSTATE AND UROLOGIC CANCERS  for your care. Our goal is always to provide you with excellent care. Hearing back from our patients is one way we can continue to improve our services. Please take a few minutes to complete the written survey that you may receive in the mail after your visit with us. Thank you!             Your Updated Medication List - Protect others around you: Learn how to safely use, store and throw away your medicines at www.disposemymeds.org.          This list is accurate as of: 4/14/17  3:27 PM.  Always use your most recent med list.                   Brand Name Dispense Instructions for use    ACTOS PO      Take 30 mg by mouth daily       * albuterol (2.5 MG/3ML) 0.083% neb solution      Take 1 vial by nebulization every 4 hours as needed for shortness of breath / dyspnea or wheezing       * albuterol 108 (90 BASE) MCG/ACT Inhaler    PROAIR HFA/PROVENTIL HFA/VENTOLIN HFA     Inhale 2 puffs into the lungs every 4 hours as needed for shortness of breath / dyspnea or wheezing       ASPIRIN PO      Take 81 mg by mouth       fluticasone 110 MCG/ACT Inhaler    FLOVENT HFA     Inhale 2 puffs into the lungs 2 times daily       GLIPIZIDE PO      Take 2.5 mg by mouth daily       HYDROcodone-acetaminophen 5-325 MG per tablet    NORCO    15 tablet    Take 1-2 tablets by mouth every 4 hours as needed for moderate to severe pain       ibuprofen 600 MG tablet    ADVIL/MOTRIN    30  tablet    Take 1 tablet (600 mg) by mouth every 6 hours as needed for moderate pain       losartan-hydrochlorothiazide 100-25 MG per tablet    HYZAAR     Take 1 tablet by mouth daily       SULINDAC PO      Take 200 mg by mouth 2 times daily       ZOCOR PO      Take 40 mg by mouth At Bedtime       * Notice:  This list has 2 medication(s) that are the same as other medications prescribed for you. Read the directions carefully, and ask your doctor or other care provider to review them with you.

## 2017-06-23 ENCOUNTER — HOSPITAL ENCOUNTER (EMERGENCY)
Facility: CLINIC | Age: 67
Discharge: HOME | End: 2017-06-23
Attending: EMERGENCY MEDICINE | Admitting: EMERGENCY MEDICINE
Payer: MEDICARE

## 2017-06-23 VITALS
RESPIRATION RATE: 16 BRPM | OXYGEN SATURATION: 97 % | SYSTOLIC BLOOD PRESSURE: 120 MMHG | WEIGHT: 179.01 LBS | DIASTOLIC BLOOD PRESSURE: 93 MMHG | TEMPERATURE: 98.8 F | HEART RATE: 79 BPM | BODY MASS INDEX: 36.16 KG/M2

## 2017-06-23 DIAGNOSIS — I10 ESSENTIAL HYPERTENSION: ICD-10-CM

## 2017-06-23 PROCEDURE — 99283 EMERGENCY DEPT VISIT LOW MDM: CPT

## 2017-06-23 PROCEDURE — 93005 ELECTROCARDIOGRAM TRACING: CPT

## 2017-06-23 NOTE — ED PROVIDER NOTES
CHIEF COMPLAINT:  Elevated blood pressure.      HISTORY OF PRESENT ILLNESS:  Pilo Quintana is a 59-year-old female who speaks [place] language interpreted by her son; the patient did understand some of the English.  The patient has had a history of elevated blood pressure, was on hydrochlorothiazide, however, had been noted per the family physician to have an elevated calcium and therefore has seen a specialist 3 days ago.  The blood work was approximately a week ago.  The specialists had switched her from hydrochlorothiazide to metoprolol and another blood pressure agent and when rechecking her blood pressure at the local machine in a store she had noted elevated blood pressures of 166/79 3 days ago, 189 systolic yesterday and 136 today.  This was in the danger zone and therefore they presented to the ER.  The patient denies any chest pain, shortness of breath, problems urinating, fevers, chills, does not drink alcohol.  No lightheaded weakness and therefore presented to the ER due to concern for blood pressure and potential complications.  The patient otherwise is asymptomatic.      MEDICATIONS:  Albuterol, aspirin, Flovent, glipizide, metoprolol, simvastatin     ALLERGIES:  No known drug allergies.      PAST MEDICAL HISTORY:  History of thickened endometrium renal mass, asthma, hypertension, diabetes.      SURGICAL HISTORY:  History of D&C.      SOCIAL HISTORY:  Negative for tobacco and alcohol use.      REVIEW OF SYSTEMS:  Unobtainable to patient due to language barrier; however, per her son is all negative.      PHYSICAL EXAMINATION:   GENERAL:  The patient is a pleasant, smiling 67-year-old female who is no respiratory distress.  She does understand some English and will follow commands.   VITAL SIGNS:  Temperature 98.8, heart rate 79, respirations 16, blood pressure 130/80, 95% on room air.     NEURO:  Extraocular motions are intact.  Pupils are round and reactive.  No facial droop, good strength in all  extremities.     HEAD/NECK:  Mucous membranes are moist.   LYMPHATICS:  No appreciable cervical adenopathy.   CARDIOVASCULAR:  Regular rate and rhythm.   RESPIRATORY:  Lungs clear, no wheezes, crackles.   GASTROINTESTINAL:  Abdomen soft, nontender, no pulsatile masses.   SKIN:  Cool, pink and dry.   HEMATOLOGIC:  Positive, no signs of any bleeding.   MUSCULOSKELETAL:  No musculoskeletal tenderness.   NEUROLOGIC:  The patient is not tearful.      LABORATORY AND DIAGNOSTICS:  An EKG was performed which demonstrated normal sinus rhythm, a left anterior fascicular block, nonspecific ST segments, no pathological ST elevation, segments limited to V2 and V3.      EMERGENCY DEPARTMENT COURSE AND DECISION MAKING:  I discussed with the family the fact that she is asymptomatic and her blood pressure is almost completely normal range that therapy is not required.  We discussed that she has recently had blood work done a month ago, we could always recheck those numbers.  However, the family felt comfortable holding off.  She is not complaining of chest pain, shortness of breath or any focal neurologic symptoms to suggest hypertensive emergency.  Blood pressure medication is taking some time to work, we discussed would be adjusted her primary care doctor and she was discharged home in good condition with strict instructions to return if problems.  I made no blood pressure medication adjustment.      PLAN:  Follow up with PMD in 3 days.  Return if any problems.      DIAGNOSIS:  Hypertension.         BASSAM HILL MD             D: 2017 11:21   T: 2017 11:42   MT: [id number]      Name:     FABIOLA FREEMAN   MRN:      [id number]        Account:      [id number]   :      1950           Visit Date:   2017      Document: [id number]       cc: Chela Griffith MD

## 2017-06-23 NOTE — ED NOTES
Patient arrives with family for evaluation of blood pressure. Family reports for the past week it has been going up. She has a follow up appt Tuesday but today BP was 136/89 this morning so didn't feel she could wait.

## 2017-06-23 NOTE — ED AVS SNAPSHOT
[facility] Emergency Department    [address] [place] [address]    Phone:  [phone]    Fax:  [phone]                                       Pilo Quintana   MRN: [id number]    Department:  [facility] Emergency Department   Date of Visit:  6/23/2017           Patient Information     Date Of Birth          1950        Your diagnoses for this visit were:     Essential hypertension        You were seen by Efe Mckay MD.      Follow-up Information     Follow up with Chela Griffith MD. Schedule an appointment as soon as possible for a visit in 3 days.    Specialty:  Internal Medicine    Contact information:    [facility]  [address] [place] [id number] [phone]          Discharge Instructions       Discharge Instructions  Hypertension - High Blood Pressure    During you visit to the Emergency Department, your blood pressure was higher than the recommended blood pressure.  This may be related to stress, pain, medication or other temporary conditions. In these cases, your blood pressure may return to normal on its own. If you have a history of high blood pressure, you may need to have your doctor adjust your medications. Sometimes, your high measurement here may indicate that you have developed high blood pressure that will stay high unless it is treated. Sudden very high blood pressure can cause problems, but usually high blood pressure causes problems over months to years.      Blood pressure is almost never lowered in the Emergency Department, because studies have shown that lowering blood pressure too quickly is much more dangerous than leaving it alone.    You need to follow up with your doctor in 1-3 days to get your blood pressure rechecked.     Return to the Emergency Department if you start to have:    A severe headache.    Chest pain.    Shortness of breath.    Weakness or numbness that affects one part of the  body.    Confusion.    Vision changes.    Significant swelling of legs and/or eyes.    A reaction to any medication started in the Emergency Department.    What can I do to help myself?    Avoid alcohol.    Take any blood pressure medicine that you are prescribed.    Get a good night s sleep.    Lower your salt intake.    Exercise.    Lose weight.    Manage stress.    If blood pressure medication was started in the Emergency Department:    The medicine may not have an immediate effect. The body and brain determine what blood pressure you have. The medicine s job is to retrain the body s  thermostat  to a lower blood pressure.    You will need to follow up with your doctor to see how this medicine is working for you.  If you were given a prescription for medicine here today, be sure to read all of the information (including the package insert) that comes with your prescription.  This will include important information about the medicine, its side effects, and any warnings that you need to know about.  The pharmacist who fills the prescription can provide more information and answer questions you may have about the medicine.  If you have questions or concerns that the pharmacist cannot address, please call or return to the Emergency Department.   Opioid Medication Information    Pain medications are among the most commonly prescribed medicines, so we are including this information for all our patients. If you did not receive pain medication or get a prescription for pain medicine, you can ignore it.     You may have been given a prescription for an opioid (narcotic) pain medicine and/or have received a pain medicine while here in the Emergency Department. These medicines can make you drowsy or impaired. You must not drive, operate dangerous equipment, or engage in any other dangerous activities while taking these medications. If you drive while taking these medications, you could be arrested for DUI, or driving under  the influence. Do not drink any alcohol while you are taking these medications.     Opioid pain medications can cause addiction. If you have a history of chemical dependency of any type, you are at a higher risk of becoming addicted to pain medications.  Only take these prescribed medications to treat your pain when all other options have been tried. Take it for as short a time and as few doses as possible. Store your pain pills in a secure place, as they are frequently stolen and provide a dangerous opportunity for children or visitors in your house to start abusing these powerful medications. We will not replace any lost or stolen medicine.  As soon as your pain is better, you should flush all your remaining medication.     Many prescription pain medications contain Tylenol  (acetaminophen), including Vicodin , Tylenol #3 , Norco , Lortab , and Percocet .  You should not take any extra pills of Tylenol  if you are using these prescription medications or you can get very sick.  Do not ever take more than 3000 mg of acetaminophen in any 24 hour period.    All opioids tend to cause constipation. Drink plenty of water and eat foods that have a lot of fiber, such as fruits, vegetables, prune juice, apple juice and high fiber cereal.  Take a laxative if you don t move your bowels at least every other day. Miralax , Milk of Magnesia, Colace , or Senna  can be used to keep you regular.      Remember that you can always come back to the Emergency Department if you are not able to see your regular doctor in the amount of time listed above, if you get any new symptoms, or if there is anything that worries you.        24 Hour Appointment Hotline       To make an appointment at any [facility], call [phone] ([phone]). If you don't have a family doctor or clinic, we will help you find one. [facility] are conveniently located to serve the needs of you and your family.             Review of your medicines       Our records show that you are taking the medicines listed below. If these are incorrect, please call your family doctor or clinic.        Dose / Directions Last dose taken    ACTOS PO   Dose:  30 mg        Take 30 mg by mouth daily   Refills:  0        * albuterol (2.5 MG/3ML) 0.083% neb solution   Dose:  1 vial        Take 1 vial by nebulization every 4 hours as needed for shortness of breath / dyspnea or wheezing   Refills:  0        * albuterol 108 (90 BASE) MCG/ACT Inhaler   Commonly known as:  PROAIR HFA/PROVENTIL HFA/VENTOLIN HFA   Dose:  2 puff        Inhale 2 puffs into the lungs every 4 hours as needed for shortness of breath / dyspnea or wheezing   Refills:  0        ASPIRIN PO   Dose:  81 mg        Take 81 mg by mouth   Refills:  0        fluticasone 110 MCG/ACT Inhaler   Commonly known as:  FLOVENT HFA   Dose:  2 puff        Inhale 2 puffs into the lungs 2 times daily   Refills:  0        GLIPIZIDE PO   Dose:  2.5 mg        Take 2.5 mg by mouth daily   Refills:  0        HYDROcodone-acetaminophen 5-325 MG per tablet   Commonly known as:  NORCO   Dose:  1-2 tablet   Quantity:  15 tablet        Take 1-2 tablets by mouth every 4 hours as needed for moderate to severe pain   Refills:  0        ibuprofen 600 MG tablet   Commonly known as:  ADVIL/MOTRIN   Dose:  600 mg   Quantity:  30 tablet        Take 1 tablet (600 mg) by mouth every 6 hours as needed for moderate pain   Refills:  0        METOPROLOL SUCCINATE ER PO   Dose:  5 mg        Take 5 mg by mouth   Refills:  0        SULINDAC PO   Dose:  200 mg        Take 200 mg by mouth 2 times daily   Refills:  0        ZOCOR PO   Dose:  40 mg        Take 40 mg by mouth At Bedtime   Refills:  0        * Notice:  This list has 2 medication(s) that are the same as other medications prescribed for you. Read the directions carefully, and ask your doctor or other care provider to review them with you.            Orders Needing Specimen Collection     None       Pending Results     No orders found from 6/21/2017 to 6/24/2017.            Pending Culture Results     No orders found from 6/21/2017 to 6/24/2017.            Pending Results Instructions     If you had any lab results that were not finalized at the time of your Discharge, you can call the ED Lab Result RN at [phone]. You will be contacted by this team for any positive Lab results or changes in treatment. The nurses are available 7 days a week from 10A to 6:30P.  You can leave a message 24 hours per day and they will return your call.        Test Results From Your Hospital Stay               Clinical Quality Measure: Blood Pressure Screening     Your blood pressure was checked while you were in the emergency department today. The last reading we obtained was  BP: (!) 120/93 . Please read the guidelines below about what these numbers mean and what you should do about them.  If your systolic blood pressure (the top number) is less than 120 and your diastolic blood pressure (the bottom number) is less than 80, then your blood pressure is normal. There is nothing more that you need to do about it.  If your systolic blood pressure (the top number) is 120-139 or your diastolic blood pressure (the bottom number) is 80-89, your blood pressure may be higher than it should be. You should have your blood pressure rechecked within a year by a primary care provider.  If your systolic blood pressure (the top number) is 140 or greater or your diastolic blood pressure (the bottom number) is 90 or greater, you may have high blood pressure. High blood pressure is treatable, but if left untreated over time it can put you at risk for heart attack, stroke, or kidney failure. You should have your blood pressure rechecked by a primary care provider within the next 4 weeks.  If your provider in the emergency department today gave you specific instructions to follow-up with your doctor or provider even sooner than that, you should  "follow that instruction and not wait for up to 4 weeks for your follow-up visit.        Thank you for choosing [facility]       Thank you for choosing [facility] for your care. Our goal is always to provide you with excellent care. Hearing back from our patients is one way we can continue to improve our services. Please take a few minutes to complete the written survey that you may receive in the mail after you visit with us. Thank you!        Project [employer] Information     [employer] lets you send messages to your doctor, view your test results, renew your prescriptions, schedule appointments and more. To sign up, go to [email or web address] . Click on \"Log in\" on the left side of the screen, which will take you to the Welcome page. Then click on \"Sign up Now\" on the right side of the page.     You will be asked to enter the access code listed below, as well as some personal information. Please follow the directions to create your username and password.     Your access code is: [email or web address]  Expires: 2017  3:27 PM     Your access code will  in 90 days. If you need help or a new code, please call your [facility] clinic or [phone].        Care EveryWhere ID     This is your Care EveryWhere ID. This could be used by other organizations to access your [facility] medical records  [id number]        Equal Access to Services     SANCHO PIERCE : Mark Diaz, waaxda luqadaha, qaybta kaalmada surendra, ranjan gordon . So [facility] [phone].    ATENCIÓN: Si habla español, tiene a aburto disposición servicios gratuitos de asistencia lingüística. Llame al [phone].    We comply with applicable federal civil rights laws and [place] laws. We do not discriminate on the basis of race, color, national origin, age, disability sex, sexual orientation or gender identity.            After Visit Summary       This is your record. Keep this with you and show to your community pharmacist(s) " and doctor(s) at your next visit.

## 2017-06-23 NOTE — ED AVS SNAPSHOT
[facility] Emergency Department    [address]    [facility] [address]    Phone:  [phone]    Fax:  [phone]                                       Pilo Quintana   MRN: [id number]    Department:  [facility] Emergency Department   Date of Visit:  6/23/2017           After Visit Summary Signature Page     I have received my discharge instructions, and my questions have been answered. I have discussed any challenges I see with this plan with the nurse or doctor.    ..........................................................................................................................................  Patient/Patient Representative Signature      ..........................................................................................................................................  Patient Representative Print Name and Relationship to Patient    ..................................................               ................................................  Date                                            Time    ..........................................................................................................................................  Reviewed by Signature/Title    ...................................................              ..............................................  Date                                                            Time

## 2017-06-24 LAB — INTERPRETATION ECG - MUSE: NORMAL

## 2019-11-10 ENCOUNTER — DOCUMENTATION ONLY (OUTPATIENT)
Dept: CARE COORDINATION | Facility: CLINIC | Age: 69
End: 2019-11-10

## 2020-01-07 ENCOUNTER — PRE VISIT (OUTPATIENT)
Dept: UROLOGY | Facility: CLINIC | Age: 70
End: 2020-01-07

## 2020-01-10 ENCOUNTER — OFFICE VISIT (OUTPATIENT)
Dept: UROLOGY | Facility: CLINIC | Age: 70
End: 2020-01-10
Payer: MEDICARE

## 2020-01-10 VITALS — SYSTOLIC BLOOD PRESSURE: 135 MMHG | DIASTOLIC BLOOD PRESSURE: 84 MMHG | HEART RATE: 72 BPM

## 2020-01-10 DIAGNOSIS — N28.89 RENAL MASS: Primary | ICD-10-CM

## 2020-01-10 PROBLEM — M81.0 AGE-RELATED OSTEOPOROSIS WITHOUT CURRENT PATHOLOGICAL FRACTURE: Status: ACTIVE | Noted: 2019-11-20

## 2020-01-10 RX ORDER — AMLODIPINE BESYLATE 10 MG/1
TABLET ORAL
COMMUNITY
Start: 2019-12-16 | End: 2021-07-11

## 2020-01-10 RX ORDER — LOSARTAN POTASSIUM 100 MG/1
100 TABLET ORAL DAILY
COMMUNITY
Start: 2019-10-02

## 2020-01-10 NOTE — PATIENT INSTRUCTIONS
Schedule appointment for ultrasound.    Follow up with Dr. Mon in 2 years.    It was a pleasure meeting with you today.  Thank you for allowing me and my team the privilege of caring for you today.  YOU are the reason we are here, and I truly hope we provided you with the excellent service you deserve.  Please let us know if there is anything else we can do for you so that we can be sure you are leaving completely satisfied with your care experience.        Cynthia Torres, CMA

## 2020-01-10 NOTE — PROGRESS NOTES
Urology Clinic    Reza Mon MD  Date of Service: 01/10/2020     Name: Pilo Quintana  MRN: [id number]  Age: 69 year old  : 1950  Referring provider: Referred Self     Assessment and Plan:  2.6 cm right renal mass, likely oncocytoma based on biopsy from 2016.   We discussed that there is about a 5% chance of incorrect diagnosis on biopsy. Even if the mass was malignant, we would likely monitor at the current size.     We will obtain renal ultrasound at their earliest convenience. We will contact them with the results.     Follow up in 2 years with renal ultrasound.     ______________________________________________________________________    HPI  Pilo Quintana is a 69 year old female who presents for follow up of renal oncocytoma.    Renal biopsy 2016: Oncocytic renal neoplasm, favor oncocytoma    Today she is doing well. No hematuria or weight loss. She uses water pills.     Review of Systems:   Pertinent items are noted in HPI or as below, remainder of complete ROS is negative.      Physical Exam:   /84   Pulse 72   Constitutional: Alert, no acute distress  Psychiatric: Normal mood and affect  Gastrointestinal: Abdomen soft, non-tender.  : Deferred    Laboratory:   I personally reviewed all applicable laboratory data and went over findings with patient  Significant for:    CBC RESULTS:  Recent Labs   Lab Test 17  1720 16  1200 16  1111 14  1130 14  1110   WBC 10.0 6.0  --   --  5.1   HGB 13.6 13.1 13.2 13.2 12.9    225 219  --  218     BMP RESULTS:  Recent Labs   Lab Test 17  1744 17  1720 16  1200 16  1111 14  1130   NA  --  143 141 142 140   POTASSIUM  --  4.4 3.6 4.0 4.4   CHLORIDE  --  106 101 105 104   CO2  --  27 34* 30 30   ANIONGAP  --  10 6 6 6   GLC  --  128* 288* 94 109*   BUN  --  19 20 24 17   CR  --  0.80 0.80 0.87 0.73   GFRESTIMATED 62 71 72 65 80   GFRESTBLACK 76 86 87 79  >90   GFR Calc     GLENIS  --  9.9 9.8 9.9 9.5     UA RESULTS:   Recent Labs   Lab Test 08/13/14  1100 08/13/14  0925   SG 1.013 Quantity not sufficient   URINEPH 7.5* Quantity not sufficient   NITRITE Negative Quantity not sufficient*   RBCU 6* Quantity not sufficient   WBCU 9* Quantity not sufficient     RADIOLOGIC IMAGING  I reviewed the recent radiologic imaging and reports described below.  MADONNA Mon      Results for orders placed or performed in visit on 04/14/17   Renal US    Narrative    EXAMINATION: Ultrasound Renal, 4/14/2017 2:34 PM     COMPARISON: CT 1/12/2017, 10/15/2015    HISTORY: Renal mass, CT biopsy 1/22/2016 found this mass to be  oncocytoma    FINDINGS:    Right kidney: Measures 7.3 cm in length. No significant change in the  echogenic right lower pole vascular mass measuring approximately 2.4 x  2 4 x 2.6 cm since CT 10/15/2015. No hydronephrosis.    Left kidney: Measures 9.2 cm in length. Parenchyma is of normal  thickness and echogenicity. No focal mass. No hydronephrosis.     Bladder: was not be well visualized      Impression    IMPRESSION:  No significant change in the 2.6 cm echogenic right lower pole  vascular mass since CT 10/15/2015.     I have personally reviewed the examination and initial interpretation  and I agree with the findings.    WOLF GLASS       Scribe Disclosure:  I, Brandie Bagley, am serving as a scribe to document services personally performed by Reza Mon MD at this visit, based upon the provider's statements to me. All documentation has been reviewed by the aforementioned provider prior to being entered into the official medical record.     Ramona Bagley served as the scribe for this patient's visit and documented my history and physical exam.  I performed the history and physical exam.  I have edited and agree with the note.  MARY Mon MD

## 2020-01-10 NOTE — LETTER
1/10/2020       RE: Pilo Quintana  [address]  [place] [address]     Dear Colleague,    Thank you for referring your patient, Pilo Quintana, to the [facility] UROLOGY AND INST FOR PROSTATE AND UROLOGIC CANCERS at [facility]. Please see a copy of my visit note below.      Urology Clinic    Reza Mon MD  Date of Service: 01/10/2020     Name: Pilo Quintana  MRN: [id number]  Age: 69 year old  : 1950  Referring provider: Referred Self     Assessment and Plan:  2.6 cm right renal mass, likely oncocytoma based on biopsy from 2016.   We discussed that there is about a 5% chance of incorrect diagnosis on biopsy. Even if the mass was malignant, we would likely monitor at the current size.     We will obtain renal ultrasound at their earliest convenience. We will contact them with the results.     Follow up in 2 years with renal ultrasound.     ______________________________________________________________________    HPI  Pilo Quintana is a 69 year old female who presents for follow up of renal oncocytoma.    Renal biopsy 2016: Oncocytic renal neoplasm, favor oncocytoma    Today she is doing well. No hematuria or weight loss. She uses water pills.     Review of Systems:   Pertinent items are noted in HPI or as below, remainder of complete ROS is negative.      Physical Exam:   /84   Pulse 72   Constitutional: Alert, no acute distress  Psychiatric: Normal mood and affect  Gastrointestinal: Abdomen soft, non-tender.  : Deferred    Laboratory:   I personally reviewed all applicable laboratory data and went over findings with patient  Significant for:    CBC RESULTS:  Recent Labs   Lab Test 17  1720 16  1200 16  1111 14  1130 14  1110   WBC 10.0 6.0  --   --  5.1   HGB 13.6 13.1 13.2 13.2 12.9    225 219  --  218     BMP RESULTS:  Recent Labs   Lab Test 17  1744 17  1720 16  1200  01/08/16  1111 09/25/14  1130   NA  --  143 141 142 140   POTASSIUM  --  4.4 3.6 4.0 4.4   CHLORIDE  --  106 101 105 104   CO2  --  27 34* 30 30   ANIONGAP  --  10 6 6 6   GLC  --  128* 288* 94 109*   BUN  --  19 20 24 17   CR  --  0.80 0.80 0.87 0.73   GFRESTIMATED 62 71 72 65 80   GFRESTBLACK 76 86 87 79 >90   GFR Calc     GLENIS  --  9.9 9.8 9.9 9.5     UA RESULTS:   Recent Labs   Lab Test 08/13/14  1100 08/13/14  0925   SG 1.013 Quantity not sufficient   URINEPH 7.5* Quantity not sufficient   NITRITE Negative Quantity not sufficient*   RBCU 6* Quantity not sufficient   WBCU 9* Quantity not sufficient     RADIOLOGIC IMAGING  I reviewed the recent radiologic imaging and reports described below.  MADONNA Mon      Results for orders placed or performed in visit on 04/14/17   Renal US    Narrative    EXAMINATION: Ultrasound Renal, 4/14/2017 2:34 PM     COMPARISON: CT 1/12/2017, 10/15/2015    HISTORY: Renal mass, CT biopsy 1/22/2016 found this mass to be  oncocytoma    FINDINGS:    Right kidney: Measures 7.3 cm in length. No significant change in the  echogenic right lower pole vascular mass measuring approximately 2.4 x  2 4 x 2.6 cm since CT 10/15/2015. No hydronephrosis.    Left kidney: Measures 9.2 cm in length. Parenchyma is of normal  thickness and echogenicity. No focal mass. No hydronephrosis.     Bladder: was not be well visualized      Impression    IMPRESSION:  No significant change in the 2.6 cm echogenic right lower pole  vascular mass since CT 10/15/2015.     I have personally reviewed the examination and initial interpretation  and I agree with the findings.    WOLF GLASS       Scribe Disclosure:  I, Brandie Bagley, am serving as a scribe to document services personally performed by Reza Mon MD at this visit, based upon the provider's statements to me. All documentation has been reviewed by the aforementioned provider prior to being entered into the official medical  record.     Ramona Kevyn served as the scribe for this patient's visit and documented my history and physical exam.  I performed the history and physical exam.  I have edited and agree with the note.  MARY Mon MD        Again, thank you for allowing me to participate in the care of your patient.      Sincerely,    Reza Mon MD

## 2020-01-10 NOTE — NURSING NOTE
Chief Complaint   Patient presents with     RECHECK     Renal mass follow up     Cynthia Torres, CMA

## 2020-01-14 ENCOUNTER — HOSPITAL ENCOUNTER (OUTPATIENT)
Dept: ULTRASOUND IMAGING | Facility: CLINIC | Age: 70
Discharge: HOME | End: 2020-01-14
Attending: UROLOGY | Admitting: UROLOGY
Payer: MEDICARE

## 2020-01-14 DIAGNOSIS — N28.89 RENAL MASS: ICD-10-CM

## 2020-01-14 PROCEDURE — 76770 US EXAM ABDO BACK WALL COMP: CPT

## 2020-09-10 ENCOUNTER — HOSPITAL ENCOUNTER (EMERGENCY)
Facility: CLINIC | Age: 70
Discharge: HOME | End: 2020-09-10
Attending: EMERGENCY MEDICINE | Admitting: EMERGENCY MEDICINE
Payer: COMMERCIAL

## 2020-09-10 ENCOUNTER — APPOINTMENT (OUTPATIENT)
Dept: GENERAL RADIOLOGY | Facility: CLINIC | Age: 70
End: 2020-09-10
Attending: EMERGENCY MEDICINE
Payer: COMMERCIAL

## 2020-09-10 VITALS
SYSTOLIC BLOOD PRESSURE: 164 MMHG | BODY MASS INDEX: 35.18 KG/M2 | DIASTOLIC BLOOD PRESSURE: 103 MMHG | OXYGEN SATURATION: 98 % | RESPIRATION RATE: 24 BRPM | HEART RATE: 73 BPM | TEMPERATURE: 98.5 F | WEIGHT: 174.16 LBS

## 2020-09-10 DIAGNOSIS — Z20.822 SUSPECTED COVID-19 VIRUS INFECTION: ICD-10-CM

## 2020-09-10 DIAGNOSIS — R05.9 COUGH: ICD-10-CM

## 2020-09-10 DIAGNOSIS — R09.02 HYPOXIA: ICD-10-CM

## 2020-09-10 LAB
ANION GAP SERPL CALCULATED.3IONS-SCNC: 3 MMOL/L (ref 3–14)
BASE EXCESS BLDV CALC-SCNC: 5 MMOL/L
BASOPHILS # BLD AUTO: 0 10E9/L (ref 0–0.2)
BASOPHILS NFR BLD AUTO: 0.1 %
BUN SERPL-MCNC: 15 MG/DL (ref 7–30)
CALCIUM SERPL-MCNC: 9.3 MG/DL (ref 8.5–10.1)
CHLORIDE SERPL-SCNC: 106 MMOL/L (ref 94–109)
CO2 SERPL-SCNC: 29 MMOL/L (ref 20–32)
CREAT SERPL-MCNC: 0.62 MG/DL (ref 0.52–1.04)
D DIMER PPP FEU-MCNC: 0.5 UG/ML FEU (ref 0–0.5)
DIFFERENTIAL METHOD BLD: NORMAL
EOSINOPHIL # BLD AUTO: 0.2 10E9/L (ref 0–0.7)
EOSINOPHIL NFR BLD AUTO: 2.5 %
ERYTHROCYTE [DISTWIDTH] IN BLOOD BY AUTOMATED COUNT: 13.2 % (ref 10–15)
GFR SERPL CREATININE-BSD FRML MDRD: >90 ML/MIN/{1.73_M2}
GLUCOSE SERPL-MCNC: 275 MG/DL (ref 70–99)
HCO3 BLDV-SCNC: 33 MMOL/L (ref 21–28)
HCT VFR BLD AUTO: 44.2 % (ref 35–47)
HGB BLD-MCNC: 14 G/DL (ref 11.7–15.7)
IMM GRANULOCYTES # BLD: 0 10E9/L (ref 0–0.4)
IMM GRANULOCYTES NFR BLD: 0.4 %
LACTATE BLD-SCNC: 1.3 MMOL/L (ref 0.7–2)
LYMPHOCYTES # BLD AUTO: 2.2 10E9/L (ref 0.8–5.3)
LYMPHOCYTES NFR BLD AUTO: 32.8 %
MCH RBC QN AUTO: 28.4 PG (ref 26.5–33)
MCHC RBC AUTO-ENTMCNC: 31.7 G/DL (ref 31.5–36.5)
MCV RBC AUTO: 90 FL (ref 78–100)
MONOCYTES # BLD AUTO: 0.4 10E9/L (ref 0–1.3)
MONOCYTES NFR BLD AUTO: 5.5 %
NEUTROPHILS # BLD AUTO: 4 10E9/L (ref 1.6–8.3)
NEUTROPHILS NFR BLD AUTO: 58.7 %
NRBC # BLD AUTO: 0 10*3/UL
NRBC BLD AUTO-RTO: 0 /100
O2/TOTAL GAS SETTING VFR VENT: ABNORMAL %
PCO2 BLDV: 64 MM HG (ref 40–50)
PH BLDV: 7.32 PH (ref 7.32–7.43)
PLATELET # BLD AUTO: 258 10E9/L (ref 150–450)
PO2 BLDV: 44 MM HG (ref 25–47)
POTASSIUM SERPL-SCNC: 3.9 MMOL/L (ref 3.4–5.3)
RBC # BLD AUTO: 4.93 10E12/L (ref 3.8–5.2)
SARS-COV-2 RNA SPEC QL NAA+PROBE: NORMAL
SODIUM SERPL-SCNC: 138 MMOL/L (ref 133–144)
SPECIMEN SOURCE: NORMAL
WBC # BLD AUTO: 6.8 10E9/L (ref 4–11)

## 2020-09-10 PROCEDURE — U0003 INFECTIOUS AGENT DETECTION BY NUCLEIC ACID (DNA OR RNA); SEVERE ACUTE RESPIRATORY SYNDROME CORONAVIRUS 2 (SARS-COV-2) (CORONAVIRUS DISEASE [COVID-19]), AMPLIFIED PROBE TECHNIQUE, MAKING USE OF HIGH THROUGHPUT TECHNOLOGIES AS DESCRIBED BY CMS-2020-01-R: HCPCS | Performed by: EMERGENCY MEDICINE

## 2020-09-10 PROCEDURE — 80048 BASIC METABOLIC PNL TOTAL CA: CPT | Performed by: EMERGENCY MEDICINE

## 2020-09-10 PROCEDURE — 87040 BLOOD CULTURE FOR BACTERIA: CPT | Mod: XS | Performed by: EMERGENCY MEDICINE

## 2020-09-10 PROCEDURE — 99285 EMERGENCY DEPT VISIT HI MDM: CPT | Mod: 25

## 2020-09-10 PROCEDURE — 85379 FIBRIN DEGRADATION QUANT: CPT | Performed by: EMERGENCY MEDICINE

## 2020-09-10 PROCEDURE — 71045 X-RAY EXAM CHEST 1 VIEW: CPT

## 2020-09-10 PROCEDURE — 83735 ASSAY OF MAGNESIUM: CPT | Performed by: EMERGENCY MEDICINE

## 2020-09-10 PROCEDURE — 82803 BLOOD GASES ANY COMBINATION: CPT | Performed by: EMERGENCY MEDICINE

## 2020-09-10 PROCEDURE — 93005 ELECTROCARDIOGRAM TRACING: CPT

## 2020-09-10 PROCEDURE — 85025 COMPLETE CBC W/AUTO DIFF WBC: CPT | Performed by: EMERGENCY MEDICINE

## 2020-09-10 PROCEDURE — 83605 ASSAY OF LACTIC ACID: CPT | Performed by: EMERGENCY MEDICINE

## 2020-09-10 PROCEDURE — C9803 HOPD COVID-19 SPEC COLLECT: HCPCS

## 2020-09-10 PROCEDURE — 36415 COLL VENOUS BLD VENIPUNCTURE: CPT | Performed by: EMERGENCY MEDICINE

## 2020-09-10 ASSESSMENT — ENCOUNTER SYMPTOMS
MYALGIAS: 1
SHORTNESS OF BREATH: 1
ARTHRALGIAS: 1
CHILLS: 1
COUGH: 1
VOMITING: 0
SORE THROAT: 0
NAUSEA: 0

## 2020-09-10 NOTE — ED NOTES
[facility]  ED Nurse Handoff Report    Pilo Quintana is a 70 year old female   ED Chief complaint: Shortness of Breath  . ED Diagnosis:   Final diagnoses:   Hypoxia   Suspected COVID-19 virus infection   Cough     Allergies: No Known Allergies    Code Status: Full Code  Activity level - Baseline/Home:  Independent. Activity Level - Current:   Stand by Assist. Lift room needed: No. Bariatric: No   Needed: son interpreting, language not available.  Isolation: No. Infection: Not Applicable.     Vital Signs:   Vitals:    09/10/20 1251 09/10/20 1330   BP: (!) 158/88 (!) 172/92   Pulse: 78 80   Resp: 24 29   Temp: 98.5  F (36.9  C)    TempSrc: Oral    SpO2: 92% 100%   Weight: 79 kg (174 lb 2.6 oz)        Cardiac Rhythm:  ,      Pain level:    Patient confused: No. Patient Falls Risk: Yes.   Elimination Status: Has voided   Patient Report - Initial Complaint: shortness of breath. Focused Assessment:     Pt arrives with son for SOB, cough, chills, and body aches for 2 days. Pt wheezy, appears SOB, SATs 92% on RA, down to 86% when coughing. Pt speaks rare west  launguage Nadja, son here translating.        Tests Performed: labs, imaging Abnormal Results:   Labs Ordered and Resulted from Time of ED Arrival Up to the Time of Departure from the ED   BLOOD GAS VENOUS - Abnormal; Notable for the following components:       Result Value    PCO2 Venous 64 (*)     Bicarbonate Venous 33 (*)     All other components within normal limits   BASIC METABOLIC PANEL - Abnormal; Notable for the following components:    Glucose 275 (*)     All other components within normal limits   CBC WITH PLATELETS DIFFERENTIAL   LACTIC ACID WHOLE BLOOD   COVID-19 VIRUS (CORONAVIRUS) BY PCR   D DIMER QUANTITATIVE   VITAL SIGNS   PULSE OXIMETRY NURSING   CARDIAC CONTINUOUS MONITORING   PERIPHERAL IV CATHETER   NOTIFY PHYSICIAN   BLOOD CULTURE   BLOOD CULTURE        Treatments provided: monitoring  Family Comments: son at  bedside  OBS brochure/video discussed/provided to patient:  N/A  ED Medications:   Medications   sodium chloride (PF) 0.9% PF flush 3 mL (has no administration in time range)   sodium chloride (PF) 0.9% PF flush 3 mL (has no administration in time range)     Drips infusing:  No  For the majority of the shift, the patient's behavior Green. Interventions performed were n/a.    Sepsis treatment initiated: No     Patient tested for COVID 19 prior to admission: YES  RECEIVING UNIT ED HANDOFF REVIEW    Above ED Nurse Handoff Report was reviewed: YES  Reviewed by: Georgia Steele, RN on September 10, 2020 at 5:26 PM   Did you vocera the ED RN:   ED Nurse Name/Phone Number: Jennifer Redman RN,   4:17 PM

## 2020-09-10 NOTE — ED NOTES
Pt signout note    Pending ddimer result to stratify PE risk.  DDimer not elevated. Pt had passed ambulation trial w oximeter. Continue plan discharge home.        ICD-10-CM    1. Hypoxia  R09.02 Symptomatic COVID-19 Virus (Coronavirus) by PCR     Blood culture     Blood culture     D dimer quantitative     SARS-CoV-2 COVID-19 Virus (Coronavirus) RT-PCR     SARS-CoV-2 COVID-19 Virus (Coronavirus) RT-PCR   2. Suspected COVID-19 virus infection  Z20.828    3. Cough  R05      Neftali Blum MD  [facility]     Neftali Blum MD  09/11/20 0037

## 2020-09-10 NOTE — ED PROVIDER NOTES
History     Chief Complaint:  Shortness of Breath      HPI   Pilo Quintana is a 70 year old female with a history of DM type II and HTN who presents for evaluation of shortness of breath.  Patient speaks Nadja and translation is provided by the son.  Patient reports an onset of dry cough with shortness of breath, chills, and body aches since yesterday.  She also reports sharp pain in her knees as well as a rash on her breast for which she is currently using a topical cream for.  She denies nausea, vomiting, congestion, and sore throat.       Allergies:  No known drug allergies    Medications:    Albuterol  Amlodipine  Aspirin  Fluticasone  Glipizide  Norco  Losartan  Metoprolol  Actos  Simvastatin  Sulindac     Past Medical History:    DM type 2  HTN  Morbid obesity  Osteoarthritis  PMB  Asthma  Stroke  HLD  Renal cyst  Thickened endometrium    Past Surgical History:    Dilation and Curettage    Family History:    History reviewed. No pertinent family history.     Social History:  Smoking status: never smoker  Alcohol use: no  Drug use: no  The patient presents to the emergency department with her son.  PCP: Chela Griffith  Marital Status:       Review of Systems   Constitutional: Positive for chills.   HENT: Negative for congestion and sore throat.    Respiratory: Positive for cough and shortness of breath.    Gastrointestinal: Negative for nausea and vomiting.   Musculoskeletal: Positive for arthralgias (Knee pain) and myalgias.   Skin: Positive for rash.   All other systems reviewed and are negative.      Physical Exam     Patient Vitals for the past 24 hrs:   BP Temp Temp src Pulse Resp SpO2 Weight   09/10/20 1600 -- -- -- 65 27 100 % --   09/10/20 1530 (!) 162/102 -- -- 63 24 100 % --   09/10/20 1500 (!) 158/144 -- -- 65 27 100 % --   09/10/20 1430 (!) 144/79 -- -- 67 16 92 % --   09/10/20 1330 (!) 172/92 -- -- 80 29 100 % --   09/10/20 1251 (!) 158/88 98.5  F (36.9  C) Oral 78 24 92 % 79  kg (174 lb 2.6 oz)     Physical Exam  Constitutional: Vital signs reviewed as above.   Head: No external signs of trauma noted.  Eyes: Pupils are equal, round, and reactive to light.   Neck: No JVD noted  Cardiovascular: Normal rate, regular rhythm and normal heart sounds.  No murmur heard. Equal B/L peripheral pulses.  Pulmonary/Chest: Effort normal and breath sounds normal. No respiratory distress. Patient has no wheezes. Patient has no rales.   Gastrointestinal: Soft. There is no tenderness.   Musculoskeletal/Extremities: No edema noted. Normal tone.  Neurological: Patient is alert and oriented to person, place, and time.   Skin: Skin is warm and dry. There is no diaphoresis noted.   Psychiatric: The patient appears calm.      Emergency Department Course   ECG:  ECG Taken at 13:17    Normal sinus rhythm  Left anterior fascicular block  Nonspecific T wave abnormality  Abnormal EKG  Rate: 73. AR: 136. QRS: 86. QTc: 440.  P-R-T Axes:   53   -50   37  No significant change noted from 6/23/2017  Interpreted by me at 1322 on 9/10/2020      Imaging:  Radiographic findings were communicated with the patient and family who voiced understanding of the findings.  XR Chest 1 view, portable:   IMPRESSION: No acute disease. as per radiology.      Laboratory:  CBC: WBC: 6.8, HGB: 14.0, PLT: 258  1516 Lactic acid: 1.3  Blood gas venous: pH 7.32 PCO2 64 (H) PO2 44 Bicarbonate 33 (H) Base Excess 5.0 FlO2 nasal 2%.      BMP: Glucose 275 (H), o/w WNL (Creatinine: 0.62)  Symptomatic COVID-19 PCR: Pending    Interventions:    Medications   sodium chloride (PF) 0.9% PF flush 3 mL (has no administration in time range)   sodium chloride (PF) 0.9% PF flush 3 mL (has no administration in time range)        Emergency Department Course:  Nursing notes and vitals reviewed. (5014) I performed an exam of the patient as documented above.     IV inserted. Medicine administered as documented above. Blood drawn. COVID swab obtained. This was sent  to the lab for further testing, results above.     The patient was sent for a xray while in the emergency department, findings above.     ED Course as of Sep 10 1639   Thu Sep 10, 2020   1613 Rechecked and updated patient on the iPad.  Her son is still here and interprets.  Patient states she feels better.  She has been on supplemental oxygen during her entire ED stay.  She recently did ambulate and had an initial hypoxic saturation but after a little bit of time that oxygen level back up into the mid 90s.      1616 D/W Dr. Villa. Ok for admission. D-dimer pending.      1625 Patient now states she does not want to stay in the hospital.      1634 D/W Dr. Villa - patient does not want to stay in the hospital.      1639 Signed out to Dr. Blum.            Impression & Plan      Medical Decision Making:  This 70-year-old female patient presents the ED due to shortness of breath and hypoxia.  Please see the HPI and exam for specifics.  Patient was noted to have a pulse ox of 86% when she was coughing.  She has been on supplemental oxygen during her ED stay and has remained normal.  She was taken off for an ambulation trial and seemed to do well on room air.  Chest x-ray imaging is normal.  Laboratory studies are notable for a blood gas with a slightly elevated PCO2 but a normal pH.  I initially had offered admission though the patient declines this.  D-dimer testing is pending because I would like to make sure there is no blood clot as a source of her hypoxia.  This will be followed by my partner, Kuwlant Cantor, for disposition pending dimer result.    Diagnosis:    ICD-10-CM    1. Hypoxia  R09.02    2. Suspected COVID-19 virus infection  Z20.828    3. Cough  R05        Disposition:  Signed out to Dr. Blum    Discharge Medications:  New Prescriptions    No medications on file       Lenin Sierra  9/10/2020   [facility] EMERGENCY DEPARTMENT  Scribe Disclosure:  I, Lenin Sierra, am serving as a scribe at 1:31  PM on 9/10/2020 to document services personally performed by Praful Dias DO based on my observations and the provider's statements to me.     Scribe Disclosure:  I, Monika Kc, am serving as a scribe at 3:17 PM on 9/10/2020 to document services personally performed by Praful Dias DO based on my observations and the provider's statements to me.         Praful Dias DO  09/10/20 6145

## 2020-09-10 NOTE — ED AVS SNAPSHOT
[facility] Emergency Department  [address]  [facility] [address]  Phone:  [phone]  Fax:  [phone]                                    Pilo Quintana   MRN: [id number]    Department:  [facility] Emergency Department   Date of Visit:  9/10/2020           After Visit Summary Signature Page    I have received my discharge instructions, and my questions have been answered. I have discussed any challenges I see with this plan with the nurse or doctor.    ..........................................................................................................................................  Patient/Patient Representative Signature      ..........................................................................................................................................  Patient Representative Print Name and Relationship to Patient    ..................................................               ................................................  Date                                   Time    ..........................................................................................................................................  Reviewed by Signature/Title    ...................................................              ..............................................  Date                                               Time          22EPIC Rev 08/18

## 2020-09-10 NOTE — ED TRIAGE NOTES
Pt arrives with son for SOB, cough, chills, and body aches for 2 days. Pt wheezy, appears SOB, SATs 92% on RA, down to 86% when coughing. Pt speaks rare west  launguage Nadja, son here translating.

## 2020-09-10 NOTE — DISCHARGE INSTRUCTIONS
Diagnosis: Shortness of breath, cough, low oxygen.  What do you do next:   Continue your home medications unless we have specifically changed them  Follow up as indicated below    When do you return: If you have worsening shortness of breath, lightheadedness, chest pain, fainting, or any other symptoms that concern you, please return to the ED for reevaluation.    Thank you for allowing us to care for you today.

## 2020-09-11 LAB
LABORATORY COMMENT REPORT: NORMAL
SARS-COV-2 RNA SPEC QL NAA+PROBE: NEGATIVE
SPECIMEN SOURCE: NORMAL

## 2020-09-16 LAB
BACTERIA SPEC CULT: NO GROWTH
BACTERIA SPEC CULT: NO GROWTH
Lab: NORMAL
SPECIMEN SOURCE: NORMAL
SPECIMEN SOURCE: NORMAL

## 2021-07-02 ENCOUNTER — ANCILLARY PROCEDURE (OUTPATIENT)
Dept: GENERAL RADIOLOGY | Facility: CLINIC | Age: 71
End: 2021-07-02
Attending: PHYSICIAN ASSISTANT
Payer: COMMERCIAL

## 2021-07-02 ENCOUNTER — OFFICE VISIT (OUTPATIENT)
Dept: URGENT CARE | Facility: URGENT CARE | Age: 71
End: 2021-07-02
Payer: COMMERCIAL

## 2021-07-02 ENCOUNTER — TELEPHONE (OUTPATIENT)
Dept: PEDIATRICS | Facility: CLINIC | Age: 71
End: 2021-07-02

## 2021-07-02 VITALS
BODY MASS INDEX: 32.11 KG/M2 | OXYGEN SATURATION: 96 % | WEIGHT: 159 LBS | TEMPERATURE: 98.1 F | DIASTOLIC BLOOD PRESSURE: 67 MMHG | SYSTOLIC BLOOD PRESSURE: 118 MMHG | HEART RATE: 77 BPM

## 2021-07-02 DIAGNOSIS — R05.9 COUGH: Primary | ICD-10-CM

## 2021-07-02 DIAGNOSIS — R05.9 COUGH: ICD-10-CM

## 2021-07-02 PROCEDURE — 71046 X-RAY EXAM CHEST 2 VIEWS: CPT | Performed by: RADIOLOGY

## 2021-07-02 PROCEDURE — 99213 OFFICE O/P EST LOW 20 MIN: CPT | Performed by: PHYSICIAN ASSISTANT

## 2021-07-02 NOTE — PROGRESS NOTES
Assessment & Plan     There are no diagnoses linked to this encounter.      No follow-ups on file.    Diagnosis and treatment plan was reviewed with patient and/or family.   We went over any labs or imaging. Discussed worsening symptoms or little to no relief despite treatment plan to follow-up with PCP or return to clinic.  Patient verbalizes understanding. All questions were addressed and answered.     Soco Zhu PA-C  [facility] [place]    CHIEF COMPLAINT:   Chief Complaint   Patient presents with     URI     Cough     dry cough x 1 day     Subjective     Pilo is a 71 year old female who presents to clinic today for evaluation ***      Past Medical History:   Diagnosis Date     Diabetes (H)      Hypertension      Morbid obesity (H)      Osteoarthritis 6/7/2003     PMB (postmenopausal bleeding) 9/16/2014     Renal mass      Thickened endometrium 9/16/2014     Uncomplicated asthma      Unspecified cerebral artery occlusion with cerebral infarction 1998    was in [place], no residual at present     Past Surgical History:   Procedure Laterality Date     DILATION AND CURETTAGE, OPERATIVE HYSTEROSCOPY WITH MORCELLATOR, COMBINED N/A 9/25/2014    Procedure: COMBINED DILATION AND CURETTAGE, OPERATIVE HYSTEROSCOPY WITH MORCELLATOR;  Surgeon: Silverio Christy MD;  Location:  OR     Social History     Tobacco Use     Smoking status: Never Smoker     Smokeless tobacco: Never Used   Substance Use Topics     Alcohol use: No     Current Outpatient Medications   Medication     albuterol (2.5 MG/3ML) 0.083% nebulizer solution     albuterol (PROAIR HFA, PROVENTIL HFA, VENTOLIN HFA) 108 (90 BASE) MCG/ACT inhaler     amLODIPine (NORVASC) 10 MG tablet     ASPIRIN PO     fluticasone (FLOVENT HFA) 110 MCG/ACT inhaler     GLIPIZIDE PO     ibuprofen (ADVIL,MOTRIN) 600 MG tablet     losartan (COZAAR) 100 MG tablet     METOPROLOL SUCCINATE ER PO     Pioglitazone HCl (ACTOS PO)     Simvastatin (ZOCOR PO)      SULINDAC PO     No current facility-administered medications for this visit.      No Known Allergies    10 point ROS of systems were all negative except for pertinent positives noted in my HPI.      Exam: ***  /67 (BP Location: Left arm, Cuff Size: Adult Large)   Pulse 77   Temp 98.1  F (36.7  C) (Tympanic)   Wt 72.1 kg (159 lb)   SpO2 94%   BMI 32.11 kg/m    Constitutional: healthy, alert and no distress  Head: Normocephalic, atraumatic.  Eyes: conjunctiva clear, no drainage  ENT: TMs clear and shiny anju, nasal mucosa pink and moist, throat without tonsillar hypertrophy or erythema  Neck: neck is supple, no cervical lymphadenopathy or nuchal rigidity  Cardiovascular: RRR  Respiratory: CTA bilaterally, no rhonchi or rales  Gastrointestinal: soft and nontender  Skin: no rashes  Neurologic: Speech clear, gait normal. Moves all extremities.    No results found for any visits on 07/02/21.

## 2021-07-02 NOTE — TELEPHONE ENCOUNTER
Received call from pt's son  He is returning a call from     Writer is unable to find documentation of call    Routing to  provider    Thank you  Jose Cleaning RN on 7/2/2021 at 2:16 PM

## 2021-07-02 NOTE — PROGRESS NOTES
Assessment & Plan     1. Cough  Suspect viral URI. No evidence of pneumonia on CXR and lung sounds are clear B/L. VSS. Encouraged fluids and rest. OK to use Mucinex DM for cough.   She continues to have cardiomegaly on chest XR, advised follow-up with PCP for evaluation.   Chest pain or Shortness of breath to ER.   - XR Chest 2 Views; Future      Return in about 1 week (around 7/9/2021) for PCP.    Diagnosis and treatment plan was reviewed with patient and/or family.   We went over any labs or imaging. Discussed worsening symptoms or little to no relief despite treatment plan to follow-up with PCP or return to clinic.  Patient verbalizes understanding. All questions were addressed and answered.     Soco Zhu PA-C  [facility] [place]    CHIEF COMPLAINT:   Chief Complaint   Patient presents with     URI     Cough     dry cough x 1 day     Subjective     Pilo is a 71 year old female who presents to clinic today for evaluation of cough. History is obtained through son who is the . Patient has had cough for one day. Cough is mainly dry, but she feels like phlegm is in her chest. Patient does not have chest pain, but discomfort in the chest when she is coughing. Denies having pleurisy or hemoptysis. She endorses having ankle pain, but has not had leg pain or swelling. Slight fever yesterday, which has resolved. UTD on COVID19 vaccination.      Past Medical History:   Diagnosis Date     Diabetes (H)      Hypertension      Morbid obesity (H)      Osteoarthritis 6/7/2003     PMB (postmenopausal bleeding) 9/16/2014     Renal mass      Thickened endometrium 9/16/2014     Uncomplicated asthma      Unspecified cerebral artery occlusion with cerebral infarction 1998    was in [place], no residual at present     Past Surgical History:   Procedure Laterality Date     DILATION AND CURETTAGE, OPERATIVE HYSTEROSCOPY WITH MORCELLATOR, COMBINED N/A 9/25/2014    Procedure: COMBINED DILATION AND  CURETTAGE, OPERATIVE HYSTEROSCOPY WITH MORCELLATOR;  Surgeon: Silverio Christy MD;  Location:  OR     Social History     Tobacco Use     Smoking status: Never Smoker     Smokeless tobacco: Never Used   Substance Use Topics     Alcohol use: No     Current Outpatient Medications   Medication     albuterol (2.5 MG/3ML) 0.083% nebulizer solution     albuterol (PROAIR HFA, PROVENTIL HFA, VENTOLIN HFA) 108 (90 BASE) MCG/ACT inhaler     amLODIPine (NORVASC) 10 MG tablet     ASPIRIN PO     fluticasone (FLOVENT HFA) 110 MCG/ACT inhaler     GLIPIZIDE PO     ibuprofen (ADVIL,MOTRIN) 600 MG tablet     losartan (COZAAR) 100 MG tablet     METOPROLOL SUCCINATE ER PO     Pioglitazone HCl (ACTOS PO)     Simvastatin (ZOCOR PO)     SULINDAC PO     No current facility-administered medications for this visit.      No Known Allergies    10 point ROS of systems were all negative except for pertinent positives noted in my HPI.      Exam:   /67 (BP Location: Left arm, Cuff Size: Adult Large)   Pulse 77   Temp 98.1  F (36.7  C) (Tympanic)   Wt 72.1 kg (159 lb)   SpO2 96%   BMI 32.11 kg/m    Constitutional: healthy, alert and no distress  Head: Normocephalic, atraumatic.  Eyes: conjunctiva clear, no drainage  ENT: TMs clear and shiny anju, nasal mucosa pink and moist, throat without tonsillar hypertrophy or erythema  Neck: neck is supple, no cervical lymphadenopathy or nuchal rigidity  Cardiovascular: RRR  Respiratory: CTA bilaterally, no rhonchi or rales  Skin: no rashes  Neurologic: Speech clear, gait normal. Moves all extremities.    Results for orders placed or performed in visit on 07/02/21   XR Chest 2 Views     Status: None    Narrative    CHEST TWO VIEWS  July 2, 2021 12:24 PM     HISTORY: 71-year-old woman with history of cough.       Impression    IMPRESSION: Heart size is enlarged. No pleural effusion, pneumothorax,  or abnormal area of consolidation. Minimal bibasilar atelectasis.    JOSR PALOMO MD          SYSTEM  ID:  [id number]

## 2021-07-02 NOTE — PATIENT INSTRUCTIONS
Push fluids and rest  OK to take Mucinex DM for cough  If development of fever, chills, coughing up blood or worsening symptoms please follow-up right away.  To ER for chest pain or difficulty breathing.   I will call you if the radiologist has a different reading of your XRay    Patient Education     Viral Upper Respiratory Illness (Adult)    You have a viral upper respiratory illness (URI), which is another term for the common cold. This illness is contagious during the first few days. It is spread through the air by coughing and sneezing. It may also be spread by direct contact (touching the sick person and then touching your own eyes, nose, or mouth). Frequent handwashing will decrease risk of spread. Most viral illnesses go away within 7 to 10 days with rest and simple home remedies. Sometimes the illness may last for several weeks. Antibiotics will not kill a virus, and they are generally not prescribed for this condition.  Home care    If symptoms are severe, rest at home for the first 2 to 3 days. When you resume activity, don't let yourself get too tired.    Don't smoke. If you need help stopping, talk with your healthcare provider.    Avoid being exposed to cigarette smoke (yours or others ).    You may use acetaminophen or ibuprofen to control pain and fever, unless another medicine was prescribed. If you have chronic liver or kidney disease, have ever had a stomach ulcer or gastrointestinal bleeding, or are taking blood-thinning medicines, talk with your healthcare provider before using these medicines. Aspirin should never be given to anyone under 18 years of age who is ill with a viral infection or fever. It may cause severe liver or brain damage.    Your appetite may be poor, so a light diet is fine. Stay well hydrated by drinking 6 to 8 glasses of fluids per day (water, soft drinks, juices, tea, or soup). Extra fluids will help loosen secretions in the nose and lungs.    Over-the-counter cold  medicines will not shorten the length of time you re sick, but they may be helpful for the following symptoms: cough, sore throat, and nasal and sinus congestion. If you take prescription medicines, ask your healthcare provider or pharmacist which over-the-counter medicines are safe to use. (Note: Don't use decongestants if you have high blood pressure.)  Follow-up care  Follow up with your healthcare provider, or as advised.  When to seek medical advice  Call your healthcare provider right away if any of these occur:    Cough with lots of colored sputum (mucus)    Severe headache; face, neck, or ear pain    Difficulty swallowing due to throat pain    Fever of 100.4 F (38 C) or higher, or as directed by your healthcare provider  Call 911  Call 911 if any of these occur:    Chest pain, shortness of breath, wheezing, or difficulty breathing    Coughing up blood    Very severe pain with swallowing, especially if it goes along with a muffled voice   [employer] last reviewed this educational content on 6/1/2018 2000-2021 [employer]. All rights reserved. This information is not intended as a substitute for professional medical care. Always follow your healthcare professional's instructions.

## 2021-07-11 ENCOUNTER — OFFICE VISIT (OUTPATIENT)
Dept: URGENT CARE | Facility: URGENT CARE | Age: 71
End: 2021-07-11
Payer: COMMERCIAL

## 2021-07-11 VITALS
SYSTOLIC BLOOD PRESSURE: 128 MMHG | WEIGHT: 168.8 LBS | DIASTOLIC BLOOD PRESSURE: 80 MMHG | TEMPERATURE: 96.8 F | BODY MASS INDEX: 34.09 KG/M2 | HEART RATE: 61 BPM | OXYGEN SATURATION: 96 %

## 2021-07-11 DIAGNOSIS — E11.69 TYPE 2 DIABETES MELLITUS WITH OTHER SPECIFIED COMPLICATION, WITHOUT LONG-TERM CURRENT USE OF INSULIN (H): ICD-10-CM

## 2021-07-11 DIAGNOSIS — R53.83 FATIGUE, UNSPECIFIED TYPE: Primary | ICD-10-CM

## 2021-07-11 DIAGNOSIS — G31.9 CEREBRAL DEGENERATION (H): ICD-10-CM

## 2021-07-11 PROCEDURE — 99214 OFFICE O/P EST MOD 30 MIN: CPT | Performed by: PHYSICIAN ASSISTANT

## 2021-07-11 RX ORDER — LOSARTAN POTASSIUM 50 MG/1
TABLET ORAL
COMMUNITY
Start: 2021-04-21 | End: 2021-07-11

## 2021-07-11 RX ORDER — GLIPIZIDE 10 MG/1
10 TABLET, FILM COATED, EXTENDED RELEASE ORAL DAILY
Status: ON HOLD | COMMUNITY
Start: 2021-03-15 | End: 2023-10-29

## 2021-07-11 RX ORDER — ACETAMINOPHEN 500 MG
500-1000 TABLET ORAL EVERY 8 HOURS PRN
COMMUNITY
Start: 2021-04-15

## 2021-07-11 RX ORDER — ALENDRONATE SODIUM 70 MG/1
70 TABLET ORAL
Status: ON HOLD | COMMUNITY
Start: 2021-05-11 | End: 2023-05-12

## 2021-07-11 RX ORDER — FUROSEMIDE 20 MG
20 TABLET ORAL DAILY
Status: ON HOLD | COMMUNITY
Start: 2021-05-08 | End: 2023-10-29

## 2021-07-11 RX ORDER — IBUPROFEN 200 MG
200-400 TABLET ORAL
Status: ON HOLD | COMMUNITY
Start: 2021-04-15 | End: 2023-05-12

## 2021-07-11 RX ORDER — AMLODIPINE BESYLATE 10 MG/1
TABLET ORAL
Status: ON HOLD | COMMUNITY
Start: 2020-12-14 | End: 2023-05-12

## 2021-07-11 RX ORDER — SIMVASTATIN 20 MG
TABLET ORAL
COMMUNITY
Start: 2021-04-21 | End: 2021-07-11

## 2021-07-11 RX ORDER — ACETAMINOPHEN 160 MG
50 TABLET,DISINTEGRATING ORAL DAILY
COMMUNITY
Start: 2021-05-08

## 2021-07-11 RX ORDER — ALBUTEROL SULFATE 90 UG/1
AEROSOL, METERED RESPIRATORY (INHALATION)
COMMUNITY
Start: 2021-05-08 | End: 2024-03-27

## 2021-07-11 NOTE — PATIENT INSTRUCTIONS
"  July 11, 2021 [facility] Visit:     After meeting with you today, I learned from you that you now suspect Pilo's sleeping pattern is normal (given the fact she has been sleeping during day and up at night). You have confirmed her blood sugar is normal (117 here today by your meter) and that she is currently not having any other acute illness symptoms.     We discussed the option of checking kidney, liver, complete blood count, urine and chest x-ray today.     I agree it is reasonable to hold off on this if you feel she has no symptoms and is actually sleeping a \"normal\" amount and is not showing any other signs of weakness or illness.     If anything changes, or if she develops fever, weakness or any signs of weakness/illness, I advise you follow-up immediately for evaluation (to likely include the above testing).       When to seek medical advice  Call your healthcare provider right away for any of the following:    Symptoms get worse    Symptoms don't start getting better within 2 days    Fever of 100.4  F (38  C) or higher, or as directed by your healthcare provider  Call 911  Call 911 for any of these:     Chest, arm, neck, jaw, or upper back pain    Trouble breathing    Numbness or weakness of the face, one arm, or one leg    Slurred speech, confusion, or trouble speaking, walking, or seeing    Blood in vomit or stool (black or red color)    Severe headache    Loss of consciousness    "

## 2021-07-11 NOTE — PROGRESS NOTES
"    ASSESSMENT/PLAN:    (R53.83) Fatigue, unspecified type  (primary encounter diagnosis)    MDM: Increased daytime sleepiness in a 71 year old female with significant past medical history, including  hypertension, hyperlipidemia and cerebral degeneration (please see below for full past medical history). Family has reportedly obtained additional new information they feel likely explains this (alteration of day/night sleep regimen). Due to her personal past medical history of multiple co-morbid conditions, I pursued full review of systems (no red flag signs or symptoms were discovered). Patient and family were offered, but poitely declined, further screening here today. Please see patient discharge summary below.     Plan:         July 11, 2021 [facility] Visit:     After meeting with you today, I learned from you that you now suspect Pilo's sleeping pattern is normal (given the fact she has been sleeping during day and up at night). You have confirmed her blood sugar is normal (117 here today by your meter) and that she is currently not having any other acute illness symptoms.     We discussed the option of checking kidney, liver, complete blood count, urine and chest x-ray today.     I agree it is reasonable to hold off on this if you feel she has no symptoms and is actually sleeping a \"normal\" amount and is not showing any other signs of weakness or illness.     If anything changes, or if she develops fever, weakness or any signs of weakness/illness, I advise you follow-up immediately for evaluation (to likely include the above testing).       When to seek medical advice  Call your healthcare provider right away for any of the following:    Symptoms get worse    Symptoms don't start getting better within 2 days    Fever of 100.4  F (38  C) or higher, or as directed by your healthcare provider  Call 911  Call 911 for any of these:     Chest, arm, neck, jaw, or upper back pain    Trouble " "breathing    Numbness or weakness of the face, one arm, or one leg    Slurred speech, confusion, or trouble speaking, walking, or seeing    Blood in vomit or stool (black or red color)    Severe headache    Loss of consciousness          (G31.9) Cerebral degeneration (H)      (E11.69) Type 2 diabetes mellitus with other specified complication, without long-term current use of insulin (H)      ------------------------------------------------------------------------------------------------------------------      SUBJECTIVE:    Of note: Patient is offered, but declines, a professional . Daughter in law states there are only 2 interpreters in [place] who speak [place]'s dialect and patient reportedly prefers family assistance.     Pilo Quintana is a 71 year old female, with a past medical history that includes diabetes, hypertension, hyperlipidemia and cerebral degeneration (please see below for full past medical history)  who presents to  today, accompanied by her daughter-in-law for family concern for evaluation of patient sleeping a lot more over past week.     HPI: Family members noted she has been \"sleeping most of the day\" over past week. They phoned triage nurse and were advised to come to urgent care for evaluation--prompting today's visit. After nurse triage call, daughter-in-law states they discovered patient has \"flipped\" her sleep wake schedule to match that of her son (who lives with her). Son works nights and sleeps during daytime. Patient has reportedly adopted same sleep/wake schedule. With this new information, family reportedly feels her daytime sleeping makes \"sense\".     Due to her personal past medical history of multiple co-morbid conditions, I pursued full review of systems (please see below)     Illness Exposure: No known Covid-19 or other known illness exposure. No household illness exposure     Covid-19 immunization status: Pfizer x 2    ROS:     CONSTITUTIONAL: No fever, " chills or severe fatigue (still able to feed self, dress self and do basic self cares)  EYES: No sudden onset severe eye pain, sudden loss of vision or sudden, severe, unexplained eye redness  ENT:  No nasal congestion, runny nose or sore throat. No new, sudden loss of taste and smell since onset of new illness symptoms.  RESP: No acute onset cough.. No severe shortness of breath (still able to do all self cares and walk around home without severe shortness of breath). No blue lips, fingers or toes. No coughing up of bright red blood.   CARDIAC:  No fainting. No sudden onset severe chest pain since onset of acute illness symptoms. No sudden onset chest pain with exertion over past several weeks or months.   No sudden onset of severe lower leg swelling   GI: No sudden onset nausea, vomiting or abdominal pain.    SKIN: No sudden onset body hives, rashes or blisters since onset of illness symptoms   MUS/SKEL:No new onset muscle aches or pains  NEURO: No sudden onset body aches or severe one sided body weakness or stroke-like symptoms   No sudden onset severe or unusual headaches since onset of illness symptoms. No severe neck pain or stiffness   RHEUM: No sudden onset hot, red, swollen joints  HEME: No use of chronic, persistent, prescription blood thinners. No personal history of DVT (deep vein blood clots) or PE (pulmonary embolism).   ENDO: Positive for diabetes. Daughter-in-law states they were able to do blood sugar testing while in our lobby today (with a reading of 117)       Past Medical History: Below reviewed.     Past Medical History:   Diagnosis Date     Diabetes (H)      Hypertension      Morbid obesity (H)      Osteoarthritis 6/7/2003     PMB (postmenopausal bleeding) 9/16/2014     Renal mass      Thickened endometrium 9/16/2014     Uncomplicated asthma      Unspecified cerebral artery occlusion with cerebral infarction 1998    was in [place], no residual at present     Patient Active Problem List    Diagnosis     PMB (postmenopausal bleeding)     Thickened endometrium     Endometrial polyp     Renal mass     Morbid obesity (H)     Age-related osteoporosis without current pathological fracture     Cerebral degeneration (H)     Cerebrovascular disease     Essential hypertension     Hyperlipidemia     Mental disorder     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    Social History     Tobacco Use     Smoking status: Never Smoker     Smokeless tobacco: Never Used   Substance Use Topics     Alcohol use: No     Drug use: No       Current Outpatient Medications   Medication     acetaminophen (TYLENOL) 500 MG tablet     albuterol (2.5 MG/3ML) 0.083% nebulizer solution     albuterol (PROAIR HFA/PROVENTIL HFA/VENTOLIN HFA) 108 (90 Base) MCG/ACT inhaler     alendronate (FOSAMAX) 70 MG tablet     amLODIPine (NORVASC) 10 MG tablet     ASPIRIN PO     Cholecalciferol (VITAMIN D3) 50 MCG (2000 UT) CAPS     fluticasone (FLOVENT HFA) 110 MCG/ACT inhaler     furosemide (LASIX) 20 MG tablet     glipiZIDE (GLUCOTROL XL) 5 MG 24 hr tablet     ibuprofen (ADVIL/MOTRIN) 200 MG tablet     losartan (COZAAR) 100 MG tablet     Pioglitazone HCl (ACTOS PO)     Simvastatin (ZOCOR PO)     SULINDAC PO     No current facility-administered medications for this visit.       No Known Allergies        OBJECTIVE:  /80   Pulse 61   Temp 96.8  F (36  C)   Wt 76.6 kg (168 lb 12.8 oz)   SpO2 96%   BMI 34.09 kg/m        General appearance: alert and no apparent distress  Skin color is uniform in color and without rash, hives or blisters    HEENT:   Conjunctiva not injected.  Sclera clear.  Nasal mucosa is unremarkable   Oropharyngeal exam is positive for mild, diffuse, erythema.  Uvula is midline. No plaque, exudate, lesions,  or ulcers.   NECK: Trachea is midline. Neck is supple with full range of motion demonstrated today. No severe  pain or stiffness with full range of motion. ROM. No adenopathy  CARDIAC:NORMAL - regular rate and rhythm without murmur.  RESP: No medical evidence of increased work of breathing at rest. Demonstrates ability to speak in full sentences without pause.No stridor. Clear to auscultation. No  rales, rhonchi, or wheezing. Still moving air well into all listening areas including bases bilaterally today.  ABDOMEN: Abdomen soft, non-tender. Bonormal. No masses, organomegaly  NEURO: Alert and oriented.  Normal speech and mentation.  CN II/XII grossly intact.  Gait within normal limits.   PSYCH:  No acute distress     LAB:     I offered to screen further to look for possible reasons of fatigue/sleepiness by doing  comprehensive metabolic panel, A1C, CBC w Diff, urinalysis and chest x-ray. Family and patient politely declined any of the above

## 2021-09-10 ENCOUNTER — OFFICE VISIT (OUTPATIENT)
Dept: URGENT CARE | Facility: URGENT CARE | Age: 71
End: 2021-09-10
Payer: COMMERCIAL

## 2021-09-10 VITALS
OXYGEN SATURATION: 98 % | DIASTOLIC BLOOD PRESSURE: 89 MMHG | SYSTOLIC BLOOD PRESSURE: 157 MMHG | TEMPERATURE: 98.1 F | BODY MASS INDEX: 34.28 KG/M2 | HEART RATE: 62 BPM | WEIGHT: 169.7 LBS

## 2021-09-10 DIAGNOSIS — H57.89 EYE SWELLING: Primary | ICD-10-CM

## 2021-09-10 DIAGNOSIS — R21 RASH: ICD-10-CM

## 2021-09-10 PROCEDURE — 99214 OFFICE O/P EST MOD 30 MIN: CPT | Performed by: STUDENT IN AN ORGANIZED HEALTH CARE EDUCATION/TRAINING PROGRAM

## 2021-09-10 RX ORDER — TRIAMCINOLONE ACETONIDE 1 MG/G
OINTMENT TOPICAL 2 TIMES DAILY
Qty: 80 G | Refills: 11 | Status: ON HOLD | OUTPATIENT
Start: 2021-09-10 | End: 2023-05-12

## 2021-09-10 RX ORDER — CEPHALEXIN 500 MG/1
500 CAPSULE ORAL EVERY 6 HOURS
Qty: 20 CAPSULE | Refills: 0 | Status: SHIPPED | OUTPATIENT
Start: 2021-09-10 | End: 2021-09-15

## 2021-09-10 NOTE — PROGRESS NOTES
SUBJECTIVE:  Pilo Quintana is an 71 year old female who presents for rash/swelling around L eye and rash on R arm.  Patient has been experiencing swelling and pain around L eye and a bit on R arm for 2 days.  Has not noticed any new blisters.  No significant redness.  No pain with eye movements and no new blurry vision.  No systemic symptoms such as fevers/chills or body aches, N/V/D.    PMH:   has a past medical history of Diabetes (H), Hypertension, Morbid obesity (H), Osteoarthritis (6/7/2003), PMB (postmenopausal bleeding) (9/16/2014), Renal mass, Thickened endometrium (9/16/2014), Uncomplicated asthma, and Unspecified cerebral artery occlusion with cerebral infarction (1998).  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     Multiple pulmonary nodules     Osteoarthritis     Renal oncocytoma of right kidney     Simple renal cyst     Social maladjustment     Type 2 diabetes mellitus (H)     Wheezing     Social History     Socioeconomic History     Marital status:      Spouse name: None     Number of children: None     Years of education: None     Highest education level: None   Occupational History     None   Tobacco Use     Smoking status: Never Smoker     Smokeless tobacco: Never Used   Substance and Sexual Activity     Alcohol use: No     Drug use: No     Sexual activity: None   Other Topics Concern     None   Social History Narrative     None     Social Determinants of Health     Financial Resource Strain:      Difficulty of Paying Living Expenses:    Food Insecurity:      Worried About Running Out of Food in the Last Year:      Ran Out of Food  in the Last Year:    Transportation Needs:      Lack of Transportation (Medical):      Lack of Transportation (Non-Medical):    Physical Activity:      Days of Exercise per Week:      Minutes of Exercise per Session:    Stress:      Feeling of Stress :    Social Connections:      Frequency of Communication with Friends and Family:      Frequency of Social Gatherings with Friends and Family:      Attends [employer] Services:      Active Member of Clubs or Organizations:      Attends Club or Organization Meetings:      Marital Status:    Intimate Partner Violence:      Fear of Current or Ex-Partner:      Emotionally Abused:      Physically Abused:      Sexually Abused:      No family history on file.    ALLERGIES:  Patient has no known allergies.    Current Outpatient Medications   Medication     acetaminophen (TYLENOL) 500 MG tablet     albuterol (2.5 MG/3ML) 0.083% nebulizer solution     albuterol (PROAIR HFA/PROVENTIL HFA/VENTOLIN HFA) 108 (90 Base) MCG/ACT inhaler     alendronate (FOSAMAX) 70 MG tablet     amLODIPine (NORVASC) 10 MG tablet     ASPIRIN PO     cephALEXin (KEFLEX) 500 MG capsule     Cholecalciferol (VITAMIN D3) 50 MCG (2000 UT) CAPS     fluticasone (FLOVENT HFA) 110 MCG/ACT inhaler     furosemide (LASIX) 20 MG tablet     glipiZIDE (GLUCOTROL XL) 5 MG 24 hr tablet     ibuprofen (ADVIL/MOTRIN) 200 MG tablet     ketotifen (ZADITOR) 0.025 % ophthalmic solution     losartan (COZAAR) 100 MG tablet     Pioglitazone HCl (ACTOS PO)     Simvastatin (ZOCOR PO)     SULINDAC PO     triamcinolone (KENALOG) 0.1 % external ointment     No current facility-administered medications for this visit.         ROS:  ROS is done and is negative for general/constitutional, eye, ENT, Respiratory, cardiovascular, GI, , Skin, musculoskeletal except as noted elsewhere.  All other review of systems negative except as noted elsewhere.    OBJECTIVE:  BP (!) 157/89   Pulse 62   Temp 98.1  F (36.7  C)   Wt 77 kg (169 lb 11.2 oz)    SpO2 98%   BMI 34.28 kg/m    GENERAL APPEARANCE: Alert, in no acute distress.  EYES: Conjunctivae clear.  EOM intact and not painful.  L eye has mild swelling surrounding and the skin around the orbit is tender.  No obvious new lesions or vesicles.  She has one lesion near the bridge of her nose that is baseline per daughter.  EARS: External ears normal.  NOSE: Normal, no drainage.  OROPHARYNX: MMM.  NECK: Supple, symmetrical.  RESP: no increased effort.  Cv: good capillary refill.  ABDOMEN: nondistended.  SKIN: No ulcers, lesions or rash except as above.  Small spot on R arm that is slightly tender with small scabbed over lesion.  MUSCULOSKELETAL: No gross deformities.  NEURO: No gross deficits, CN 2-12 grossly intact.    RESULTS  No results found for any visits on 09/10/21.  No results found for this or any previous visit (from the past 48 hour(s)).    ASSESSMENT/PLAN:  (H57.89) Eye swelling  (primary encounter diagnosis)  Comment: Patient's presentation seems consistent with preseptal cellulitis around her L eye.  Will treat with 5 days Keflex and recommended close follow-up with PCP.  No evidence of postseptal orbital cellulitis.  No indication for hospitalization at this time.  No vision changes or pain in the eye itself so doubt acute angle-closure glaucoma.  Plan: cephALEXin (KEFLEX) 500 MG capsule, ketotifen         (ZADITOR) 0.025 % ophthalmic solution          (R21) Rash  Comment: Will prescribe triamcinolone to help treat patient's rash and for refill.  Plan: triamcinolone (KENALOG) 0.1 % external ointment          PPE worn: N95, goggles.    See [facility] for orders, medications, letters, patient instructions    Efe Wright MD

## 2022-04-18 ENCOUNTER — OFFICE VISIT (OUTPATIENT)
Dept: URGENT CARE | Facility: URGENT CARE | Age: 72
End: 2022-04-18
Payer: COMMERCIAL

## 2022-04-18 VITALS
OXYGEN SATURATION: 98 % | BODY MASS INDEX: 32.72 KG/M2 | WEIGHT: 162 LBS | DIASTOLIC BLOOD PRESSURE: 88 MMHG | HEART RATE: 56 BPM | TEMPERATURE: 98.1 F | SYSTOLIC BLOOD PRESSURE: 168 MMHG

## 2022-04-18 DIAGNOSIS — I10 BENIGN ESSENTIAL HYPERTENSION: Primary | ICD-10-CM

## 2022-04-18 PROCEDURE — 99213 OFFICE O/P EST LOW 20 MIN: CPT | Performed by: FAMILY MEDICINE

## 2022-04-18 NOTE — PATIENT INSTRUCTIONS
If blood pressure > 190 or diastolic > 105 then seek medical attention      Continue to check blood pressure 2 to 3 times a day        Continue medications as prescribed      Keep appointment for April 26th with your Doctor

## 2022-04-18 NOTE — PROGRESS NOTES
Assessment & Plan     Benign essential hypertension    Blood pressure is elevated and not quite a goal but is acceptable at this time with low concern for hypertensive urgency/crisis/emergency. No evidence of stroke-like symptoms or end organ damage.     Absent of new sxs as described below.     Continue current regimen of metoprolol, cozaar, amlodipine.     Per previous records had been on thiazide diuretic but there was mention of elevated calcium levels.     See AVS summary for additional recommendations reviewed with patient during this visit.       Darrick Keene MD   [facility]    Delores Daigle is a 71 year old female who presents to clinic today for the following health issues:  Chief Complaint   Patient presents with     Urgent Care     High blood presser for the last few days  called [facility] and they told him to come to urgent care BP got up to 188      HPI    No headaches or visual changes    No body numbness or weakness  They did not come with a med list today. Believes they were recently placed on a new BP med. Son has been trying to reduce her salt intake over the last few months. Son recognizes amlodipine, losartan, and recently prescribed metoprolol    She does not get dizzy on her medications    Was advised last Friday to go to urgent care due to elevated BP she declined to go which leads them to this visit    At home BP has been between 150-177 systolic. This morning was 156 systolic later before clinic visit today 177    Accompanied by her son Smita    Has appt on 4/26/22     Her son organizes her medications using a bottle system after poor compliance with using pill box he feels confident that this has led to good adherence.     Patient Active Problem List    Diagnosis Date Noted     Age-related osteoporosis without current pathological fracture 11/20/2019     Priority: Medium     Lumbar spine, DXA 11/2019.       Renal mass      Priority: Medium     Morbid obesity (H)       Priority: Medium     Multiple pulmonary nodules 06/08/2015     Priority: Medium     Overview:   Stable on follow-up imaging. No further testing needed. Last scan 1/11/17.       Endometrial polyp 09/25/2014     Priority: Medium     PMB (postmenopausal bleeding) 09/16/2014     Priority: Medium     Thickened endometrium 09/16/2014     Priority: Medium     Renal oncocytoma of right kidney 08/14/2014     Priority: Medium     Biopsied at [facility] 1/2016.       Simple renal cyst 08/14/2014     Priority: Medium     Hyperlipidemia 03/22/2013     Priority: Medium     Wheezing 09/14/2012     Priority: Medium     Mental disorder 04/07/2006     Priority: Medium     Overview:   LW Onset:  [id number]       Cerebrovascular disease 04/05/2006     Priority: Medium     Overview:   LW Onset:  [id number]  CVA 1992; mild memory loss attributed to this.  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 01/04/2006     Priority: Medium     Mild memory loss following stroke       Type 2 diabetes mellitus (H) 09/16/2005     Priority: Medium     Overview:   LW Onset:  [id number]  Diagnosed 2005  ; DM Type2       Essential hypertension 06/07/2003     Priority: Medium     Overview:   LW Modifier:  s/p stroke  Hypertension       Osteoarthritis 06/07/2003     Priority: Medium     DJD         Current Outpatient Medications   Medication     acetaminophen (TYLENOL) 500 MG tablet     albuterol (2.5 MG/3ML) 0.083% nebulizer solution     albuterol (PROAIR HFA/PROVENTIL HFA/VENTOLIN HFA) 108 (90 Base) MCG/ACT inhaler     alendronate (FOSAMAX) 70 MG tablet     amLODIPine (NORVASC) 10 MG tablet     ASPIRIN PO     Cholecalciferol (VITAMIN D3) 50 MCG (2000 UT) CAPS     fluticasone (FLOVENT HFA) 110 MCG/ACT inhaler     furosemide (LASIX) 20 MG  tablet     glipiZIDE (GLUCOTROL XL) 5 MG 24 hr tablet     ibuprofen (ADVIL/MOTRIN) 200 MG tablet     ketotifen (ZADITOR) 0.025 % ophthalmic solution     losartan (COZAAR) 100 MG tablet     Pioglitazone HCl (ACTOS PO)     Simvastatin (ZOCOR PO)     SULINDAC PO     triamcinolone (KENALOG) 0.1 % external ointment     No current facility-administered medications for this visit.         Objective    BP (!) 168/88 (BP Location: Right arm, Patient Position: Sitting, Cuff Size: Adult Regular)   Pulse 56   Temp 98.1  F (36.7  C)   Wt 73.5 kg (162 lb)   SpO2 98%   BMI 32.72 kg/m    Physical Exam   CV: RRR no m/r/g  Pulm: clear bilaterally    No results found for any visits on 04/18/22.            The use of Dragon/My COI dictation services may have been used to construct the content in this note; any grammatical or spelling errors are non-intentional. Please contact the author of this note directly if you are in need of any clarification.

## 2022-10-08 ENCOUNTER — OFFICE VISIT (OUTPATIENT)
Dept: URGENT CARE | Facility: URGENT CARE | Age: 72
End: 2022-10-08
Payer: COMMERCIAL

## 2022-10-08 VITALS
SYSTOLIC BLOOD PRESSURE: 146 MMHG | HEART RATE: 64 BPM | DIASTOLIC BLOOD PRESSURE: 78 MMHG | OXYGEN SATURATION: 99 % | TEMPERATURE: 98.8 F

## 2022-10-08 DIAGNOSIS — R31.9 HEMATURIA, UNSPECIFIED TYPE: Primary | ICD-10-CM

## 2022-10-08 LAB
ALBUMIN UR-MCNC: 100 MG/DL
APPEARANCE UR: CLEAR
BILIRUB UR QL STRIP: NEGATIVE
COLOR UR AUTO: YELLOW
GLUCOSE UR STRIP-MCNC: 250 MG/DL
HGB UR QL STRIP: ABNORMAL
KETONES UR STRIP-MCNC: NEGATIVE MG/DL
LEUKOCYTE ESTERASE UR QL STRIP: NEGATIVE
NITRATE UR QL: NEGATIVE
PH UR STRIP: 6.5 [PH] (ref 5–7)
RBC #/AREA URNS AUTO: ABNORMAL /HPF
SP GR UR STRIP: 1.01 (ref 1–1.03)
SQUAMOUS #/AREA URNS AUTO: ABNORMAL /LPF
UROBILINOGEN UR STRIP-ACNC: 0.2 E.U./DL
WBC #/AREA URNS AUTO: ABNORMAL /HPF

## 2022-10-08 PROCEDURE — 81001 URINALYSIS AUTO W/SCOPE: CPT

## 2022-10-08 PROCEDURE — 99213 OFFICE O/P EST LOW 20 MIN: CPT | Performed by: INTERNAL MEDICINE

## 2022-10-08 RX ORDER — AZITHROMYCIN 250 MG/1
TABLET, FILM COATED ORAL
COMMUNITY
Start: 2022-10-04 | End: 2022-10-09

## 2022-10-08 NOTE — PROGRESS NOTES
Assessment & Plan     Hematuria, unspecified type  Spots of blood on toilet paper with an essentially negative UA for hematuria is indicative of minor external superficial bleeding.  Reassurance to the patient.  Monitor.    - UA reflex to Microscopic and Culture  - Urine Microscopic    Ernie Cornell MD  [facility] MARCELLO Daigle is a 72 year old accompanied by her son, presenting for the following health issues:  Urgent Care (Hematuria. No pain anywhere. )      HPI   Chief complaint of some blood after urinating. Recently seen for some chest discomfort and treated for pneumonia. Seen by her PCP recently.  The blood is noted as just being small spots on toilet paper after wiping.  She denies dysuria, urgency, frequency. Has been having some increased bowel frequency as a side effect of antibiotic.  Otherwise, at baseline in terms of her health.         Objective    BP (!) 146/78   Pulse 64   Temp 98.8  F (37.1  C) (Oral)   SpO2 99%   There is no height or weight on file to calculate BMI.  Physical Exam   GENERAL APPEARANCE: alert and no distress    Results for orders placed or performed in visit on 10/08/22 (from the past 24 hour(s))   UA reflex to Microscopic and Culture    Specimen: Urine, Midstream   Result Value Ref Range    Color Urine Yellow Colorless, Straw, Light Yellow, Yellow    Appearance Urine Clear Clear    Glucose Urine 250  (A) Negative mg/dL    Bilirubin Urine Negative Negative    Ketones Urine Negative Negative mg/dL    Specific Gravity Urine 1.015 1.003 - 1.035    Blood Urine Trace (A) Negative    pH Urine 6.5 5.0 - 7.0    Protein Albumin Urine 100  (A) Negative mg/dL    Urobilinogen Urine 0.2 0.2, 1.0 E.U./dL    Nitrite Urine Negative Negative    Leukocyte Esterase Urine Negative Negative   Urine Microscopic   Result Value Ref Range    RBC Urine 0-2 0-2 /HPF /HPF    WBC Urine 0-5 0-5 /HPF /HPF    Squamous Epithelials Urine Few (A) None Seen /LPF     Narrative    Urine Culture not indicated

## 2022-10-08 NOTE — PATIENT INSTRUCTIONS
Today's urine test is reassuring that we do not see significant amounts of blood in the urine.  The spotting of blood on the toilet paper is very common for an external irritation in the vaginal area.  This could even be related to the more frequent and loose bowel movements from antibiotics.       Monitor this for worsening. There is a good chance that it will clear up on its own.

## 2023-01-26 ENCOUNTER — ANCILLARY PROCEDURE (OUTPATIENT)
Dept: GENERAL RADIOLOGY | Facility: CLINIC | Age: 73
End: 2023-01-26
Attending: FAMILY MEDICINE
Payer: COMMERCIAL

## 2023-01-26 ENCOUNTER — OFFICE VISIT (OUTPATIENT)
Dept: URGENT CARE | Facility: URGENT CARE | Age: 73
End: 2023-01-26
Payer: COMMERCIAL

## 2023-01-26 VITALS
OXYGEN SATURATION: 95 % | TEMPERATURE: 99.9 F | RESPIRATION RATE: 16 BRPM | DIASTOLIC BLOOD PRESSURE: 96 MMHG | HEART RATE: 88 BPM | SYSTOLIC BLOOD PRESSURE: 176 MMHG

## 2023-01-26 DIAGNOSIS — J22 LOWER RESPIRATORY TRACT INFECTION: ICD-10-CM

## 2023-01-26 DIAGNOSIS — R05.1 ACUTE COUGH: Primary | ICD-10-CM

## 2023-01-26 DIAGNOSIS — R50.9 FEVER, UNSPECIFIED FEVER CAUSE: ICD-10-CM

## 2023-01-26 LAB
FLUAV AG SPEC QL IA: NEGATIVE
FLUBV AG SPEC QL IA: NEGATIVE

## 2023-01-26 PROCEDURE — 87804 INFLUENZA ASSAY W/OPTIC: CPT

## 2023-01-26 PROCEDURE — U0003 INFECTIOUS AGENT DETECTION BY NUCLEIC ACID (DNA OR RNA); SEVERE ACUTE RESPIRATORY SYNDROME CORONAVIRUS 2 (SARS-COV-2) (CORONAVIRUS DISEASE [COVID-19]), AMPLIFIED PROBE TECHNIQUE, MAKING USE OF HIGH THROUGHPUT TECHNOLOGIES AS DESCRIBED BY CMS-2020-01-R: HCPCS | Performed by: FAMILY MEDICINE

## 2023-01-26 PROCEDURE — U0005 INFEC AGEN DETEC AMPLI PROBE: HCPCS | Performed by: FAMILY MEDICINE

## 2023-01-26 PROCEDURE — 71046 X-RAY EXAM CHEST 2 VIEWS: CPT | Mod: TC | Performed by: RADIOLOGY

## 2023-01-26 PROCEDURE — 99214 OFFICE O/P EST MOD 30 MIN: CPT | Mod: CS | Performed by: FAMILY MEDICINE

## 2023-01-26 RX ORDER — AZITHROMYCIN 250 MG/1
TABLET, FILM COATED ORAL
Qty: 6 TABLET | Refills: 0 | Status: SHIPPED | OUTPATIENT
Start: 2023-01-26 | End: 2023-01-31

## 2023-01-26 RX ORDER — BENZONATATE 100 MG/1
100 CAPSULE ORAL 3 TIMES DAILY PRN
Qty: 30 CAPSULE | Refills: 0 | Status: ON HOLD | OUTPATIENT
Start: 2023-01-26 | End: 2023-05-12

## 2023-01-26 NOTE — PATIENT INSTRUCTIONS
Take full course of antibiotic -Zpak for bronchitis  Okay to take tessalon perles to help with cough  Continue with albuterol inhaler to help with cough, wheezing or shortness of breath    Okay for tylenol for discomfort    We will contact you if the COVID test is positive

## 2023-01-26 NOTE — PROGRESS NOTES
SUBJECTIVE:   Here with son who interpreted    Pilo Quintana is a 72 year old female presenting with a chief complaint of cough.  Had fever.  Endorsed SOB.  Uses albuterol inhaler and does help  Onset of symptoms was 3 day(s) ago.  Course of illness is worsening.    Severity moderate  Current and Associated symptoms: cough, fatigue, fever  Treatment measures tried include: inhaler, Fluids and Rest.  Predisposing factors include HX of asthma, DM, HTN.    Completed COVID vaccinations, boosted    No one else is sick    Past Medical History:   Diagnosis Date     Diabetes (H)      Hypertension      Morbid obesity (H)      Osteoarthritis 6/7/2003     PMB (postmenopausal bleeding) 9/16/2014     Renal mass      Thickened endometrium 9/16/2014     Uncomplicated asthma      Unspecified cerebral artery occlusion with cerebral infarction 1998    was in [place], no residual at present     Current Outpatient Medications   Medication Sig Dispense Refill     acetaminophen (TYLENOL) 500 MG tablet Take 1,000 mg by mouth       albuterol (2.5 MG/3ML) 0.083% nebulizer solution Take 1 vial by nebulization every 4 hours as needed for shortness of breath / dyspnea or wheezing       albuterol (PROAIR HFA/PROVENTIL HFA/VENTOLIN HFA) 108 (90 Base) MCG/ACT inhaler INHALE 2 PUFFS BY MOUTH EVERY 4 HOURS AS NEEDED FOR WHEEZING       alendronate (FOSAMAX) 70 MG tablet Take 70 mg by mouth       amLODIPine (NORVASC) 10 MG tablet Take 1 tablet by mouth once daily       ASPIRIN PO Take 81 mg by mouth       Cholecalciferol (VITAMIN D3) 50 MCG (2000 UT) CAPS TAKE 1 CAPSULE BY MOUTH ONCE DAILY       fluticasone (FLOVENT HFA) 110 MCG/ACT inhaler Inhale 2 puffs into the lungs 2 times daily       furosemide (LASIX) 20 MG tablet Take 20 mg by mouth daily       glipiZIDE (GLUCOTROL XL) 5 MG 24 hr tablet Take 5 mg by mouth daily       ibuprofen (ADVIL/MOTRIN) 200 MG tablet Take 200-400 mg by mouth       ketotifen (ZADITOR) 0.025 % ophthalmic solution  Place 1 drop into both eyes 2 times daily 10 mL 11     losartan (COZAAR) 100 MG tablet Take 100 mg by mouth       Pioglitazone HCl (ACTOS PO) Take 30 mg by mouth daily       Simvastatin (ZOCOR PO) Take 40 mg by mouth At Bedtime       SULINDAC PO Take 200 mg by mouth 2 times daily       triamcinolone (KENALOG) 0.1 % external ointment Apply topically 2 times daily 80 g 11     Social History     Tobacco Use     Smoking status: Never     Smokeless tobacco: Never   Substance Use Topics     Alcohol use: No       ROS:  Review of systems negative except as stated above.    OBJECTIVE:  BP (!) 176/96 (BP Location: Right arm)   Pulse 88   Temp 99.9  F (37.7  C) (Oral)   Resp 16   SpO2 95%   GENERAL APPEARANCE: healthy, alert and no distress  EYES: EOMI,  PERRL, conjunctiva clear  RESP: lungs coarse with few scattered rhonchi, no crackles or wheezes  CV: regular rates and rhythm  PSYCH: mentation appears normal and affect normal/bright    CXR - no acute infiltrate, no pleural effusion, no pneumothorax personally viewed by me      Results for orders placed or performed in visit on 01/26/23   Influenza A & B Antigen - Clinic Collect     Status: Normal    Specimen: Nasopharyngeal; Swab   Result Value Ref Range    Influenza A antigen Negative Negative    Influenza B antigen Negative Negative    Narrative    Test results must be correlated with clinical data. If necessary, results should be confirmed by a molecular assay or viral culture.       ASSESSMENT/PLAN:  (R05.1) Acute cough  (primary encounter diagnosis)  Plan: Symptomatic COVID-19 Virus (Coronavirus) by         PCR, Influenza A & B Antigen - Clinic Collect,         XR Chest 2 Views, benzonatate (TESSALON) 100 MG        capsule            (R50.9) Fever, unspecified fever cause  Plan: Symptomatic COVID-19 Virus (Coronavirus) by         PCR, Influenza A & B Antigen - Clinic Collect,         XR Chest 2 Views            (J22) Lower respiratory tract infection  Plan:  azithromycin (ZITHROMAX) 250 MG tablet            Reassurance given, patient is not in acute respiratory distress and discussed symptomatic treatment with tylenol, ibuprofen, plenty of fluids and rest.  RX tessalon perles given to help with cough, encourage to continue with albuterol inhaler use.  Due to high risk co-morbid medical diagnosis, empiric coverage for bacterial etiology for lower respiratory tract infection with RX Zpak given.  COVID screen obtained as symptoms overlap with COVID infection, quarantine while awaiting result.    Follow up with primary provider if no improvement of symptoms in 1-2 weeks    Duong Richmond MD  January 26, 2023 4:28 PM

## 2023-01-27 ENCOUNTER — TELEPHONE (OUTPATIENT)
Dept: NURSING | Facility: CLINIC | Age: 73
End: 2023-01-27
Payer: COMMERCIAL

## 2023-01-27 LAB — SARS-COV-2 RNA RESP QL NAA+PROBE: POSITIVE

## 2023-01-27 NOTE — TELEPHONE ENCOUNTER
Patient classified as COVID treatment eligible by Epic high risk algorithm:  Yes    Coronavirus (COVID-19) Notification    Reason for call  Notify of POSITIVE COVID-19 lab result, assess symptoms,  review [facility] recommendations    Lab Result   Lab test for 2019-nCoV rRt-PCR or SARS-COV-2 PCR  Oropharyngeal AND/OR nasopharyngeal swabs were POSITIVE for 2019-nCoV RNA [OR] SARS-COV-2 RNA (COVID-19) RNA     We have been unable to reach patient by phone at this time to notify of their Positive COVID-19 result.    Left voicemail message requesting a call back to [phone] [facility] for results. ( Services did not have Mano  so left voicemail in English)       A Positive COVID-19 letter will be sent via [employer] or the mail.    Tierra Friend

## 2023-02-03 ENCOUNTER — TELEPHONE (OUTPATIENT)
Dept: INTERNAL MEDICINE | Facility: CLINIC | Age: 73
End: 2023-02-03
Payer: COMMERCIAL

## 2023-02-03 NOTE — TELEPHONE ENCOUNTER
Received call from pt's son  He is returning a call    Advised to son that pt has Covid  Son states she is feeling much better    Jose Cleaning RN on 2/3/2023 at 12:56 PM

## 2023-04-04 ENCOUNTER — OFFICE VISIT (OUTPATIENT)
Dept: URGENT CARE | Facility: URGENT CARE | Age: 73
End: 2023-04-04
Payer: COMMERCIAL

## 2023-04-04 ENCOUNTER — ANCILLARY PROCEDURE (OUTPATIENT)
Dept: GENERAL RADIOLOGY | Facility: CLINIC | Age: 73
End: 2023-04-04
Attending: FAMILY MEDICINE
Payer: COMMERCIAL

## 2023-04-04 VITALS
TEMPERATURE: 98 F | OXYGEN SATURATION: 97 % | WEIGHT: 161.8 LBS | DIASTOLIC BLOOD PRESSURE: 84 MMHG | SYSTOLIC BLOOD PRESSURE: 142 MMHG | RESPIRATION RATE: 20 BRPM | BODY MASS INDEX: 32.68 KG/M2 | HEART RATE: 80 BPM

## 2023-04-04 DIAGNOSIS — I51.7 CARDIOMEGALY: ICD-10-CM

## 2023-04-04 DIAGNOSIS — R05.1 ACUTE COUGH: ICD-10-CM

## 2023-04-04 DIAGNOSIS — I10 HYPERTENSION, UNSPECIFIED TYPE: ICD-10-CM

## 2023-04-04 DIAGNOSIS — R05.1 ACUTE COUGH: Primary | ICD-10-CM

## 2023-04-04 DIAGNOSIS — J98.01 ACUTE BRONCHOSPASM: ICD-10-CM

## 2023-04-04 PROCEDURE — 99214 OFFICE O/P EST MOD 30 MIN: CPT | Mod: CS | Performed by: FAMILY MEDICINE

## 2023-04-04 PROCEDURE — 71046 X-RAY EXAM CHEST 2 VIEWS: CPT | Mod: TC | Performed by: RADIOLOGY

## 2023-04-04 PROCEDURE — U0003 INFECTIOUS AGENT DETECTION BY NUCLEIC ACID (DNA OR RNA); SEVERE ACUTE RESPIRATORY SYNDROME CORONAVIRUS 2 (SARS-COV-2) (CORONAVIRUS DISEASE [COVID-19]), AMPLIFIED PROBE TECHNIQUE, MAKING USE OF HIGH THROUGHPUT TECHNOLOGIES AS DESCRIBED BY CMS-2020-01-R: HCPCS | Performed by: FAMILY MEDICINE

## 2023-04-04 PROCEDURE — U0005 INFEC AGEN DETEC AMPLI PROBE: HCPCS | Performed by: FAMILY MEDICINE

## 2023-04-04 NOTE — PROGRESS NOTES
ICD-10-CM    1. Acute cough  R05.1 Symptomatic COVID-19 Virus (Coronavirus) by PCR Nose     XR Chest 2 Views     BNP-N terminal pro      2. Cardiomegaly  I51.7 BNP-N terminal pro     Basic metabolic panel  (Ca, Cl, CO2, Creat, Gluc, K, Na, BUN)     CBC with platelets      3. Acute bronchospasm  J98.01 BNP-N terminal pro      4. Hypertension, unspecified type  I10        ? Etiology of her cardiomegaly suggested on cxr. The echo from about 5 years ago was normal. bnp normal at that time. Will recheck bnp today along with bmp and cbc. rec follow up with her regular clinic to consider echo. Use the albuterol for the cough. No wheezing aon exam today but obviously winded with light exertion of going to the bathroom. The bp was near goal on recheck.   -------------------------------  Pilo Quintana with presents with 1/2 days symptoms including non productive cough, wheezing when she got up today. No fever nor other uri symptoms. They used lime and robitussin and symptoms bettter. The patient has a history of wheezing and using albutoerll regularly but did not the AM. cxr a few weeks ago suggested cardiomegaly. The patient has a history of diabetes, htn. She reports no chest pain.     The patient has a history of covid 3 months ago. Seen and given pred and zpack about a month ago. Doing well since.   Exposures--none      Current Outpatient Medications   Medication Sig Dispense Refill     acetaminophen (TYLENOL) 500 MG tablet Take 1,000 mg by mouth       albuterol (2.5 MG/3ML) 0.083% nebulizer solution Take 1 vial by nebulization every 4 hours as needed for shortness of breath / dyspnea or wheezing       albuterol (PROAIR HFA/PROVENTIL HFA/VENTOLIN HFA) 108 (90 Base) MCG/ACT inhaler INHALE 2 PUFFS BY MOUTH EVERY 4 HOURS AS NEEDED FOR WHEEZING       amLODIPine (NORVASC) 10 MG tablet Take 1 tablet by mouth once daily       ASPIRIN PO Take 81 mg by mouth       benzonatate (TESSALON) 100 MG capsule Take 1 capsule (100  mg) by mouth 3 times daily as needed for cough 30 capsule 0     Cholecalciferol (VITAMIN D3) 50 MCG (2000 UT) CAPS TAKE 1 CAPSULE BY MOUTH ONCE DAILY       fluticasone (FLOVENT HFA) 110 MCG/ACT inhaler Inhale 2 puffs into the lungs 2 times daily       furosemide (LASIX) 20 MG tablet Take 20 mg by mouth daily       glipiZIDE (GLUCOTROL XL) 5 MG 24 hr tablet Take 5 mg by mouth daily       ibuprofen (ADVIL/MOTRIN) 200 MG tablet Take 200-400 mg by mouth       ketotifen (ZADITOR) 0.025 % ophthalmic solution Place 1 drop into both eyes 2 times daily 10 mL 11     losartan (COZAAR) 100 MG tablet Take 100 mg by mouth       Pioglitazone HCl (ACTOS PO) Take 30 mg by mouth daily       Simvastatin (ZOCOR PO) Take 40 mg by mouth At Bedtime       SULINDAC PO Take 200 mg by mouth 2 times daily       triamcinolone (KENALOG) 0.1 % external ointment Apply topically 2 times daily 80 g 11     alendronate (FOSAMAX) 70 MG tablet Take 70 mg by mouth         ROS otherwise negative for resp., ID,  HEENT symptoms.    Objective: BP (!) 142/84   Pulse 80   Temp 98  F (36.7  C) (Tympanic)   Resp 20   Wt 73.4 kg (161 lb 12.8 oz)   SpO2 97%   BMI 32.68 kg/m    Exam:  GENERAL APPEARANCE: healthy, alert and no distress  EYES: Eyes grossly normal to inspection  HENT: nose and mouth without ulcers or lesions  NECK: no adenopathy, no asymmetry, masses, or scars and thyroid normal to palpation  RESP: lungs clear to auscultation - no rales, rhonchi or wheezes  CV: regular rates and rhythm, no murmur    Cxr. Per my interpretation. Cardiomegaly. No infiltrates.

## 2023-04-04 NOTE — PATIENT INSTRUCTIONS
I would recommend using the albuterol for the wheezing but she needs to follow up with her doctor where they might do a echo test on her heart.

## 2023-04-05 LAB — SARS-COV-2 RNA RESP QL NAA+PROBE: NEGATIVE

## 2023-04-22 ENCOUNTER — APPOINTMENT (OUTPATIENT)
Dept: GENERAL RADIOLOGY | Facility: CLINIC | Age: 73
End: 2023-04-22
Attending: EMERGENCY MEDICINE
Payer: COMMERCIAL

## 2023-04-22 ENCOUNTER — APPOINTMENT (OUTPATIENT)
Dept: CT IMAGING | Facility: CLINIC | Age: 73
End: 2023-04-22
Attending: EMERGENCY MEDICINE
Payer: COMMERCIAL

## 2023-04-22 ENCOUNTER — HOSPITAL ENCOUNTER (EMERGENCY)
Facility: CLINIC | Age: 73
Discharge: HOME | End: 2023-04-22
Attending: EMERGENCY MEDICINE | Admitting: EMERGENCY MEDICINE
Payer: COMMERCIAL

## 2023-04-22 VITALS
SYSTOLIC BLOOD PRESSURE: 147 MMHG | OXYGEN SATURATION: 96 % | RESPIRATION RATE: 30 BRPM | DIASTOLIC BLOOD PRESSURE: 81 MMHG | TEMPERATURE: 98.5 F | HEART RATE: 74 BPM

## 2023-04-22 DIAGNOSIS — S20.211A CONTUSION OF RIGHT CHEST WALL, INITIAL ENCOUNTER: ICD-10-CM

## 2023-04-22 DIAGNOSIS — S59.901A INJURY OF RIGHT ELBOW, INITIAL ENCOUNTER: ICD-10-CM
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
## 2023-04-22 LAB
ALBUMIN UR-MCNC: 70 MG/DL
ANION GAP SERPL CALCULATED.3IONS-SCNC: 12 MMOL/L (ref 7–15)
APPEARANCE UR: CLEAR
BASOPHILS # BLD AUTO: 0 10E3/UL (ref 0–0.2)
BASOPHILS NFR BLD AUTO: 0 %
BILIRUB UR QL STRIP: NEGATIVE
BUN SERPL-MCNC: 12.1 MG/DL (ref 8–23)
CALCIUM SERPL-MCNC: 9.8 MG/DL (ref 8.8–10.2)
CHLORIDE SERPL-SCNC: 95 MMOL/L (ref 98–107)
COLOR UR AUTO: YELLOW
CREAT SERPL-MCNC: 0.79 MG/DL (ref 0.51–0.95)
DEPRECATED HCO3 PLAS-SCNC: 31 MMOL/L (ref 22–29)
EOSINOPHIL # BLD AUTO: 0.1 10E3/UL (ref 0–0.7)
EOSINOPHIL NFR BLD AUTO: 1 %
ERYTHROCYTE [DISTWIDTH] IN BLOOD BY AUTOMATED COUNT: 12.4 % (ref 10–15)
FLUAV RNA SPEC QL NAA+PROBE: NEGATIVE
FLUBV RNA RESP QL NAA+PROBE: NEGATIVE
GFR SERPL CREATININE-BSD FRML MDRD: 79 ML/MIN/1.73M2
GLUCOSE SERPL-MCNC: 355 MG/DL (ref 70–99)
GLUCOSE UR STRIP-MCNC: >=1000 MG/DL
HCT VFR BLD AUTO: 44.6 % (ref 35–47)
HGB BLD-MCNC: 14.4 G/DL (ref 11.7–15.7)
HGB UR QL STRIP: NEGATIVE
HYALINE CASTS: 19 /LPF
IMM GRANULOCYTES # BLD: 0 10E3/UL
IMM GRANULOCYTES NFR BLD: 0 %
KETONES UR STRIP-MCNC: NEGATIVE MG/DL
LEUKOCYTE ESTERASE UR QL STRIP: ABNORMAL
LYMPHOCYTES # BLD AUTO: 1 10E3/UL (ref 0.8–5.3)
LYMPHOCYTES NFR BLD AUTO: 10 %
MCH RBC QN AUTO: 29.9 PG (ref 26.5–33)
MCHC RBC AUTO-ENTMCNC: 32.3 G/DL (ref 31.5–36.5)
MCV RBC AUTO: 93 FL (ref 78–100)
MONOCYTES # BLD AUTO: 0.5 10E3/UL (ref 0–1.3)
MONOCYTES NFR BLD AUTO: 5 %
MUCOUS THREADS #/AREA URNS LPF: PRESENT /LPF
NEUTROPHILS # BLD AUTO: 8.5 10E3/UL (ref 1.6–8.3)
NEUTROPHILS NFR BLD AUTO: 84 %
NITRATE UR QL: NEGATIVE
NRBC # BLD AUTO: 0 10E3/UL
NRBC BLD AUTO-RTO: 0 /100
PH UR STRIP: 5.5 [PH] (ref 5–7)
PLATELET # BLD AUTO: 234 10E3/UL (ref 150–450)
POTASSIUM SERPL-SCNC: 4.9 MMOL/L (ref 3.4–5.3)
RBC # BLD AUTO: 4.81 10E6/UL (ref 3.8–5.2)
RBC URINE: 2 /HPF
RSV RNA SPEC NAA+PROBE: NEGATIVE
SARS-COV-2 RNA RESP QL NAA+PROBE: NEGATIVE
SODIUM SERPL-SCNC: 138 MMOL/L (ref 136–145)
SP GR UR STRIP: 1.02 (ref 1–1.03)
SQUAMOUS EPITHELIAL: 4 /HPF
TROPONIN T SERPL HS-MCNC: 9 NG/L
UROBILINOGEN UR STRIP-MCNC: NORMAL MG/DL
WBC # BLD AUTO: 10.1 10E3/UL (ref 4–11)
WBC URINE: 4 /HPF

## 2023-04-22 PROCEDURE — 80048 BASIC METABOLIC PNL TOTAL CA: CPT | Performed by: EMERGENCY MEDICINE

## 2023-04-22 PROCEDURE — 99285 EMERGENCY DEPT VISIT HI MDM: CPT | Mod: CS,25

## 2023-04-22 PROCEDURE — 71045 X-RAY EXAM CHEST 1 VIEW: CPT

## 2023-04-22 PROCEDURE — 84484 ASSAY OF TROPONIN QUANT: CPT | Performed by: EMERGENCY MEDICINE

## 2023-04-22 PROCEDURE — C9803 HOPD COVID-19 SPEC COLLECT: HCPCS

## 2023-04-22 PROCEDURE — 87637 SARSCOV2&INF A&B&RSV AMP PRB: CPT | Performed by: EMERGENCY MEDICINE

## 2023-04-22 PROCEDURE — 36415 COLL VENOUS BLD VENIPUNCTURE: CPT | Performed by: EMERGENCY MEDICINE

## 2023-04-22 PROCEDURE — 73030 X-RAY EXAM OF SHOULDER: CPT | Mod: RT

## 2023-04-22 PROCEDURE — 250N000013 HC RX MED GY IP 250 OP 250 PS 637: Performed by: EMERGENCY MEDICINE

## 2023-04-22 PROCEDURE — 71250 CT THORAX DX C-: CPT

## 2023-04-22 PROCEDURE — 81001 URINALYSIS AUTO W/SCOPE: CPT | Performed by: EMERGENCY MEDICINE

## 2023-04-22 PROCEDURE — 85004 AUTOMATED DIFF WBC COUNT: CPT | Performed by: EMERGENCY MEDICINE

## 2023-04-22 PROCEDURE — 93005 ELECTROCARDIOGRAM TRACING: CPT

## 2023-04-22 PROCEDURE — 73080 X-RAY EXAM OF ELBOW: CPT | Mod: RT

## 2023-04-22 RX ORDER — ACETAMINOPHEN 325 MG/1
650 TABLET ORAL ONCE
Status: COMPLETED | OUTPATIENT
Start: 2023-04-22 | End: 2023-04-22

## 2023-04-22 RX ORDER — LIDOCAINE 4 G/G
1 PATCH TOPICAL ONCE
Status: DISCONTINUED | OUTPATIENT
Start: 2023-04-22 | End: 2023-04-23 | Stop reason: HOSPADM

## 2023-04-22 RX ORDER — HYDROMORPHONE HYDROCHLORIDE 1 MG/ML
0.25 INJECTION, SOLUTION INTRAMUSCULAR; INTRAVENOUS; SUBCUTANEOUS ONCE
Status: COMPLETED | OUTPATIENT
Start: 2023-04-22 | End: 2023-04-22

## 2023-04-22 RX ORDER — OXYCODONE HYDROCHLORIDE 5 MG/1
5 TABLET ORAL
Status: DISCONTINUED | OUTPATIENT
Start: 2023-04-22 | End: 2023-04-23 | Stop reason: HOSPADM

## 2023-04-22 RX ADMIN — ACETAMINOPHEN 650 MG: 325 TABLET ORAL at 19:09

## 2023-04-22 ASSESSMENT — ACTIVITIES OF DAILY LIVING (ADL)
ADLS_ACUITY_SCORE: 35

## 2023-04-22 ASSESSMENT — ENCOUNTER SYMPTOMS
WHEEZING: 1
ABDOMINAL PAIN: 0
MYALGIAS: 1
BACK PAIN: 0
SHORTNESS OF BREATH: 1
NECK PAIN: 0
COUGH: 0

## 2023-04-22 NOTE — ED PROVIDER NOTES
History     Chief Complaint:  Fall     The history is provided by a relative and the patient. No  was used (son translates for patient).      Pilo Quintana is a 72 year old female with a history of type 2 diabetes, hypertension, CVA, memory loss, and asthma who presents to the ED after a fall.     Patient's son states she slipped on her urine in the bathroom today and landed on the right side of her body. He reports this was an unwitnessed fall. Since the fall, patient has been having shortness of breath, wheezing, and pain on the right side of her body. He states patient has a history of asthma and uses an inhaler. Patient reports she did not hit her head and has no neck, abdominal, or spinal pain. He reports that patient walks with a walker, but since the fall hasn't been walking. He reports no cough or congestion for the patient in the last couple days. He also reports no use of anticoagulants for the patient. Son states patient has an upcoming appointment with her provider.     Son notes patient has chronic memory loss.     Independent Historian:   Son provides supplemental history.     Review of External Notes: 4/13/23 primary care visit note. Patient had recent cough thought to be from viral illness. On her CXR, her heart showed mild cardiomegaly so ECHO will be scheduled at later date. Her cough improved at that visit.      ROS:  Review of Systems   HENT: Negative for congestion.    Respiratory: Positive for shortness of breath and wheezing. Negative for cough.    Gastrointestinal: Negative for abdominal pain.   Musculoskeletal: Positive for myalgias (R side of body). Negative for back pain and neck pain.   All other systems reviewed and are negative.    Allergies:  No known drug allergies      Medications:    Fosamax  Norvasc  Aspirin 81 mg   Flovent inhaler  Glucotrol XL  Cozaar  Actos  Zocor  Sulindac  Vitamin D3  Toprol XL    Past Medical History:    Type 2 diabetes without use  of insulin   Hypertension   Morbid obesity    Osteoarthritis   PMB (postmenopausal bleeding)   Simple endometrial hyperplasia without atypia   Uncomplicated asthma   Unspecified cerebral artery occlusion with cerebral infarction   Vitamin D deficiency   Hyperlipidemia  Primary hyperparathyroidism  Osteoporosis   Multiple lung nodules  Renal oncocytoma of right kidney  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  DJD  Cataract   Carpal tunnel syndrome   Cerebral degeneration   Social maladjustment    Past Surgical History:    D&C with hysteroscopy and morcellator   Right renal biopsy    Social History:  Patient presents to the ED via private vehicle with her male relative  PCP: Chela Griffith     Physical Exam     Patient Vitals for the past 24 hrs:   BP Temp Temp src Pulse Resp SpO2   04/22/23 2208 -- -- -- -- -- 96 %   04/22/23 2207 -- -- -- -- -- 97 %   04/22/23 2206 -- -- -- -- -- 96 %   04/22/23 2205 -- -- -- -- -- 97 %   04/22/23 2204 -- -- -- -- -- 96 %   04/22/23 2203 -- -- -- -- -- 95 %   04/22/23 2202 -- -- -- -- -- 97 %   04/22/23 2201 -- -- -- -- -- 96 %   04/22/23 2130 -- -- -- -- -- 95 %   04/22/23 2127 -- -- -- -- -- 97 %   04/22/23 2126 (!) 147/81 -- -- 74 -- --   04/22/23 1711 (!) 162/78 98.5  F (36.9  C) Oral 73 30 95 %      Physical Exam  Gen: alert  HEENT: PERRL, oropharynx clear, no intraoral laceration or dental trauma, no mandibular tenderness, no trismus  Ears: TM's normal bilaterally  Neck: Full AROM, no paraspinous tenderness, no midline tenderness  CV: RRR, no murmurs, 2+ pulses in all extremities  Chest wall: no crepitus, no tenderness  Pulm: breath sounds equal, lungs clear, slight bilateral expiratory wheezing  Abd: Soft, no tenderness  Back: no thoracic midline tenderness, no lumbar midline tenderness, no paraspinous tenderness  RUE: right flank and ribs tenderness, full AROM  LUE: no tenderness, full AROM  RLE: no tenderness, full AROM  LLE: no tenderness, full AROM  Skin:  no laceration  Neuro: GCS 15, moves all extremities without focal weakness, sensation grossly intact over all distal extremities, no facial droop, PERRL, EOMI    Emergency Department Course   ECG  ECG taken at 1849, ECG read at 1919  Normal sinus rhythm with sinus arrhythmia   Left anterior fascicular block   Nonspecific ST and T wave abnormality   Rate 69 bpm. OK interval 128 ms. QRS duration 80 ms. QT/QTc 388/415 ms. P-R-T axes 33 -45 28.     Imaging:  Elbow XR, G/E 3 views, right   Final Result   IMPRESSION: No definite acute fracture is identified. There is normal joint alignment. Severe elbow joint degenerative changes. Question a small elbow joint effusion.      XR Shoulder Right G/E 3 Views   Final Result   IMPRESSION: No acute fracture or malalignment. There is normal glenohumeral joint spacing. Mild acromioclavicular joint degenerative changes.      CT Chest Abdomen Pelvis w/o Contrast   Final Result   IMPRESSION:    1.  No acute findings in the chest, abdomen or pelvis.   2.  Mild bronchial wall thickening, lung hyperinflation and excess dynamic airway collapse are all likely chronic findings.   3.  Mild generalized cardiac enlargement and severe calcified atheromatous plaque throughout the LAD and right coronary arteries.   4.  Enlargement central pulmonary arteries compatible with some degree of chronic pulmonary arterial hypertension.   5.  Cholelithiasis.   6.  Slight progression of benign indolent bilateral elastofibroma dorsi.      XR Chest Port 1 View   Final Result   IMPRESSION: Heart size magnified in AP projection with normal vascularity. Stable rounded opacity right parahilar region. No focal consolidation, pneumothorax nor pleural effusion.               Report per radiology    Laboratory:  Labs Ordered and Resulted from Time of ED Arrival to Time of ED Departure   BASIC METABOLIC PANEL - Abnormal       Result Value    Sodium 138      Potassium 4.9      Chloride 95 (*)     Carbon Dioxide (CO2)  31 (*)     Anion Gap 12      Urea Nitrogen 12.1      Creatinine 0.79      Calcium 9.8      Glucose 355 (*)     GFR Estimate 79     CBC WITH PLATELETS AND DIFFERENTIAL - Abnormal    WBC Count 10.1      RBC Count 4.81      Hemoglobin 14.4      Hematocrit 44.6      MCV 93      MCH 29.9      MCHC 32.3      RDW 12.4      Platelet Count 234      % Neutrophils 84      % Lymphocytes 10      % Monocytes 5      % Eosinophils 1      % Basophils 0      % Immature Granulocytes 0      NRBCs per 100 WBC 0      Absolute Neutrophils 8.5 (*)     Absolute Lymphocytes 1.0      Absolute Monocytes 0.5      Absolute Eosinophils 0.1      Absolute Basophils 0.0      Absolute Immature Granulocytes 0.0      Absolute NRBCs 0.0     ROUTINE UA WITH MICROSCOPIC - Abnormal    Color Urine Yellow      Appearance Urine Clear      Glucose Urine >=1000 (*)     Bilirubin Urine Negative      Ketones Urine Negative      Specific Gravity Urine 1.021      Blood Urine Negative      pH Urine 5.5      Protein Albumin Urine 70 (*)     Urobilinogen Urine Normal      Nitrite Urine Negative      Leukocyte Esterase Urine Small (*)     Mucus Urine Present (*)     RBC Urine 2      WBC Urine 4      Squamous Epithelials Urine 4 (*)     Hyaline Casts Urine 19 (*)    TROPONIN T, HIGH SENSITIVITY - Normal    Troponin T, High Sensitivity 9     INFLUENZA A/B, RSV, & SARS-COV2 PCR - Normal    Influenza A PCR Negative      Influenza B PCR Negative      RSV PCR Negative      SARS CoV2 PCR Negative        Emergency Department Course & Assessments:     Interventions:  Medications   HYDROmorphone (PF) (DILAUDID) injection 0.25 mg (0.25 mg Intravenous Not Given 4/22/23 1939)   acetaminophen (TYLENOL) tablet 650 mg (650 mg Oral $Given 4/22/23 1909)      Independent Interpretation (X-rays, CTs, rhythm strip):  No pneumothorax, rib fracture, or pleural effusion seen on CXR.     Assessments/Consultations/Discussion of Management or Tests:   ED Course as of 04/23/23 0033   Sat Apr 22,  2023 1711 I obtained history and examined the patient as noted above.    2142 I rechecked the patient and explained findings.      Social Determinants of Health affecting care:   None    Disposition:  The patient was discharged to home.     Impression & Plan      Medical Decision Making:  Chest wall contusion-chest x-ray negative.  Patient significant ongoing pain therefore CT chest abdomen pelvis was obtained.  This was fortunately negative.  Patient feeling improved after medications.  She is able to ambulate independently.  Denies hitting her head.  C-spine clinically cleared.    Patient also with right arm pain.  She notes pain mostly just distal to the elbow.  She has good range of motion of the elbow.  Possible minimal effusion on the elbow x-ray.  Considered occult radial head fracture.  Discussed this with patient.  She is able to use her walker while here.  I offered sling to use as needed.  Follow-up with primary care in 1 week.    Patient with some mild wheezing upon arrival.  She notes that this is common for her and took her own inhaler with resolution of her symptoms.  She has not had any recent fever cough congestion or other symptoms of illness.  COVID-negative UA negative.    Diagnosis:    ICD-10-CM    1. Fall, initial encounter  W19.XXXA       2. Contusion of right chest wall, initial encounter  S20.211A       3. Injury of right elbow, initial encounter  S59.901A          Scribe Disclosure:  I, Tomeka Don, am serving as a scribe at 5:29 PM on 4/22/2023 to document services personally performed by Lubna Carver MD based on my observations and the provider's statements to me.     4/22/2023   Lubna Carver MD Trussell, Kristi Jo Schneider, MD  04/23/23 2883

## 2023-04-22 NOTE — ED TRIAGE NOTES
Patient presents to the ED with shortness of breath following a fall. Son reports patient slipped and fell in the bathroom, landing on the right side. Denies head injury. States that since the fall patient has been short of breath with wheezing. Denies a history of asthma.

## 2023-04-22 NOTE — LETTER
April 22, 2023      To Whom It May Concern:      Please excuse Balwinder today.  He was in the emergency department with a family member who needed assistance.    Sincerely,        Lubna Carver MD

## 2023-04-23 NOTE — DISCHARGE INSTRUCTIONS
Take tylenol 1000mg 4x per day.  Do not take more than 4000mg tylenol in 24 hours.  Use salonpas over the counter lidocaine patches to the right chest wall as directed as needed.  Wear sling as needed to right arm.  Recheck right arm primary care in 1 week    Discharge Instructions  Chest Injury    You have been seen today because of a chest injury.  You may have contusion (bruise) of the chest or a rib fracture (broken bone).  Rib fractures can be hard to see on x-ray, so we cannot always be sure whether your rib is broken or bruised. Fortunately, the treatment of these injuries is usually the same, and includes pain control and preventing complications.    Generally, every Emergency Department visit should have a follow-up clinic visit with either a primary or a specialty clinic/provider. Please follow-up as instructed by your emergency provider today.    Return to the Emergency Department if:  You become short of breath.  You develop a fever over 101.5 F.  You pass out or become very weak or pale.  You have abdominal (belly) pain that is new or increasing.  You cough up blood.  You have new symptoms or anything that worries you.    Follow-up with your provider:  As directed by your provider today.  If you are not improved in two weeks.  If you need more pain medicine, since we do not refill pain pills through the Emergency Department.    Home care instructions:  Chest injuries can be painful.  You may take an over-the-counter pain medication such as Tylenol  (acetaminophen), Advil  (ibuprofen), Motrin  (ibuprofen) or Aleve  (naproxen).  Applying ice packs to the painful area can help your pain.   Holding a pillow against your chest can help with pain when you need to move or cough.  You may need to rest and avoid lifting particularly in the first few days after your injury.  Prevention of pneumonia (lung infection) is also a part of managing chest injuries.  Because it can hurt to take deep breaths, you could  develop collapsed areas of lung that can develop infection.  To prevent this, you need to take ten very deep breaths every hour while you are awake. Sometimes you will be given a device called an incentive spirometer to help with this. You also need to make yourself cough every hour.  Rib belts or binders are not generally recommended, since they may increase the risk of pneumonia. If you do use one, use it for only short periods of time.   If you were given a prescription for medicine here today, be sure to read all of the information (including the package insert) that comes with your prescription.  This will include important information about the medicine, its side effects, and any warnings that you need to know about.  The pharmacist who fills the prescription can provide more information and answer questions you may have about the medicine.  If you have questions or concerns that the pharmacist cannot address, please call or return to the Emergency Department.   Remember that you can always come back to the Emergency Department if you are not able to see your regular provider in the amount of time listed above, if you get any new symptoms, or if there is anything that worries you.

## 2023-04-24 LAB
ATRIAL RATE - MUSE: 69 BPM
DIASTOLIC BLOOD PRESSURE - MUSE: NORMAL MMHG
INTERPRETATION ECG - MUSE: NORMAL
P AXIS - MUSE: 33 DEGREES
PR INTERVAL - MUSE: 128 MS
QRS DURATION - MUSE: 80 MS
QT - MUSE: 388 MS
QTC - MUSE: 415 MS
R AXIS - MUSE: -45 DEGREES
SYSTOLIC BLOOD PRESSURE - MUSE: NORMAL MMHG
T AXIS - MUSE: 28 DEGREES
VENTRICULAR RATE- MUSE: 69 BPM

## 2023-05-11 ENCOUNTER — APPOINTMENT (OUTPATIENT)
Dept: CT IMAGING | Facility: CLINIC | Age: 73
DRG: 065 | End: 2023-05-11
Attending: PHYSICIAN ASSISTANT
Payer: COMMERCIAL

## 2023-05-11 ENCOUNTER — HOSPITAL ENCOUNTER (INPATIENT)
Facility: CLINIC | Age: 73
LOS: 4 days | Discharge: HOME HEALTH | DRG: 065 | End: 2023-05-15
Attending: INTERNAL MEDICINE | Admitting: INTERNAL MEDICINE
Payer: COMMERCIAL

## 2023-05-11 ENCOUNTER — HOSPITAL ENCOUNTER (EMERGENCY)
Facility: CLINIC | Age: 73
Discharge: OTHER INSTITUTION | End: 2023-05-11
Attending: EMERGENCY MEDICINE | Admitting: EMERGENCY MEDICINE
Payer: COMMERCIAL

## 2023-05-11 ENCOUNTER — APPOINTMENT (OUTPATIENT)
Dept: CT IMAGING | Facility: CLINIC | Age: 73
End: 2023-05-11
Attending: EMERGENCY MEDICINE
Payer: COMMERCIAL

## 2023-05-11 ENCOUNTER — APPOINTMENT (OUTPATIENT)
Dept: MRI IMAGING | Facility: CLINIC | Age: 73
End: 2023-05-11
Attending: NURSE PRACTITIONER
Payer: COMMERCIAL

## 2023-05-11 VITALS
HEART RATE: 67 BPM | TEMPERATURE: 97.4 F | OXYGEN SATURATION: 97 % | SYSTOLIC BLOOD PRESSURE: 148 MMHG | RESPIRATION RATE: 18 BRPM | DIASTOLIC BLOOD PRESSURE: 72 MMHG

## 2023-05-11 DIAGNOSIS — R53.1 RIGHT SIDED WEAKNESS: ICD-10-CM

## 2023-05-11 DIAGNOSIS — I61.3 PONTINE HEMORRHAGE (H): ICD-10-CM

## 2023-05-11 DIAGNOSIS — I67.1 CEREBRAL ANEURYSM, NONRUPTURED: ICD-10-CM

## 2023-05-11 DIAGNOSIS — I61.9 RIGHT-SIDED NONTRAUMATIC INTRACEREBRAL HEMORRHAGE, UNSPECIFIED CEREBRAL LOCATION (H): Primary | ICD-10-CM

## 2023-05-11 LAB
ANION GAP SERPL CALCULATED.3IONS-SCNC: 10 MMOL/L (ref 7–15)
APTT PPP: 25 SECONDS (ref 22–38)
BUN SERPL-MCNC: 12.8 MG/DL (ref 8–23)
CALCIUM SERPL-MCNC: 9.9 MG/DL (ref 8.8–10.2)
CHLORIDE SERPL-SCNC: 100 MMOL/L (ref 98–107)
CREAT SERPL-MCNC: 0.73 MG/DL (ref 0.51–0.95)
DEPRECATED HCO3 PLAS-SCNC: 32 MMOL/L (ref 22–29)
ERYTHROCYTE [DISTWIDTH] IN BLOOD BY AUTOMATED COUNT: 12.5 % (ref 10–15)
GFR SERPL CREATININE-BSD FRML MDRD: 86 ML/MIN/1.73M2
GLUCOSE BLDC GLUCOMTR-MCNC: 106 MG/DL (ref 70–99)
GLUCOSE BLDC GLUCOMTR-MCNC: 118 MG/DL (ref 70–99)
GLUCOSE BLDC GLUCOMTR-MCNC: 280 MG/DL (ref 70–99)
GLUCOSE SERPL-MCNC: 271 MG/DL (ref 70–99)
HBA1C MFR BLD: 7.8 %
HCT VFR BLD AUTO: 43.5 % (ref 35–47)
HGB BLD-MCNC: 13.7 G/DL (ref 11.7–15.7)
HOLD SPECIMEN: NORMAL
INR PPP: 1 (ref 0.85–1.15)
MCH RBC QN AUTO: 29.4 PG (ref 26.5–33)
MCHC RBC AUTO-ENTMCNC: 31.5 G/DL (ref 31.5–36.5)
MCV RBC AUTO: 93 FL (ref 78–100)
PLATELET # BLD AUTO: 238 10E3/UL (ref 150–450)
POTASSIUM SERPL-SCNC: 4.5 MMOL/L (ref 3.4–5.3)
RADIOLOGIST FLAGS: ABNORMAL
RBC # BLD AUTO: 4.66 10E6/UL (ref 3.8–5.2)
SODIUM SERPL-SCNC: 142 MMOL/L (ref 136–145)
TROPONIN T SERPL HS-MCNC: 10 NG/L
TROPONIN T SERPL HS-MCNC: 15 NG/L
WBC # BLD AUTO: 7.9 10E3/UL (ref 4–11)

## 2023-05-11 PROCEDURE — 258N000003 HC RX IP 258 OP 636: Performed by: EMERGENCY MEDICINE

## 2023-05-11 PROCEDURE — 96376 TX/PRO/DX INJ SAME DRUG ADON: CPT

## 2023-05-11 PROCEDURE — 250N000011 HC RX IP 250 OP 636: Performed by: EMERGENCY MEDICINE

## 2023-05-11 PROCEDURE — 84484 ASSAY OF TROPONIN QUANT: CPT | Performed by: EMERGENCY MEDICINE

## 2023-05-11 PROCEDURE — 82962 GLUCOSE BLOOD TEST: CPT

## 2023-05-11 PROCEDURE — 83036 HEMOGLOBIN GLYCOSYLATED A1C: CPT | Performed by: INTERNAL MEDICINE

## 2023-05-11 PROCEDURE — 96375 TX/PRO/DX INJ NEW DRUG ADDON: CPT

## 2023-05-11 PROCEDURE — 36415 COLL VENOUS BLD VENIPUNCTURE: CPT | Performed by: EMERGENCY MEDICINE

## 2023-05-11 PROCEDURE — 99292 CRITICAL CARE ADDL 30 MIN: CPT

## 2023-05-11 PROCEDURE — 96361 HYDRATE IV INFUSION ADD-ON: CPT

## 2023-05-11 PROCEDURE — A9585 GADOBUTROL INJECTION: HCPCS | Performed by: EMERGENCY MEDICINE

## 2023-05-11 PROCEDURE — 70450 CT HEAD/BRAIN W/O DYE: CPT | Mod: XE

## 2023-05-11 PROCEDURE — 36415 COLL VENOUS BLD VENIPUNCTURE: CPT | Performed by: INTERNAL MEDICINE

## 2023-05-11 PROCEDURE — 96374 THER/PROPH/DIAG INJ IV PUSH: CPT | Mod: 59

## 2023-05-11 PROCEDURE — 70450 CT HEAD/BRAIN W/O DYE: CPT

## 2023-05-11 PROCEDURE — 70553 MRI BRAIN STEM W/O & W/DYE: CPT

## 2023-05-11 PROCEDURE — 70496 CT ANGIOGRAPHY HEAD: CPT

## 2023-05-11 PROCEDURE — 70498 CT ANGIOGRAPHY NECK: CPT

## 2023-05-11 PROCEDURE — 99223 1ST HOSP IP/OBS HIGH 75: CPT | Performed by: PHYSICIAN ASSISTANT

## 2023-05-11 PROCEDURE — 84484 ASSAY OF TROPONIN QUANT: CPT | Performed by: PHYSICIAN ASSISTANT

## 2023-05-11 PROCEDURE — G0425 INPT/ED TELECONSULT30: HCPCS | Mod: G0 | Performed by: NURSE PRACTITIONER

## 2023-05-11 PROCEDURE — 80048 BASIC METABOLIC PNL TOTAL CA: CPT | Performed by: EMERGENCY MEDICINE

## 2023-05-11 PROCEDURE — 96365 THER/PROPH/DIAG IV INF INIT: CPT

## 2023-05-11 PROCEDURE — 255N000002 HC RX 255 OP 636: Performed by: EMERGENCY MEDICINE

## 2023-05-11 PROCEDURE — 93005 ELECTROCARDIOGRAM TRACING: CPT

## 2023-05-11 PROCEDURE — 99291 CRITICAL CARE FIRST HOUR: CPT | Mod: 25

## 2023-05-11 PROCEDURE — 200N000001 HC R&B ICU

## 2023-05-11 PROCEDURE — 85610 PROTHROMBIN TIME: CPT | Performed by: EMERGENCY MEDICINE

## 2023-05-11 PROCEDURE — 85730 THROMBOPLASTIN TIME PARTIAL: CPT | Performed by: EMERGENCY MEDICINE

## 2023-05-11 PROCEDURE — 99222 1ST HOSP IP/OBS MODERATE 55: CPT | Performed by: PHYSICIAN ASSISTANT

## 2023-05-11 PROCEDURE — 85027 COMPLETE CBC AUTOMATED: CPT | Performed by: EMERGENCY MEDICINE

## 2023-05-11 RX ORDER — NICOTINE POLACRILEX 4 MG
15-30 LOZENGE BUCCAL
Status: DISCONTINUED | OUTPATIENT
Start: 2023-05-11 | End: 2023-05-11

## 2023-05-11 RX ORDER — PROCHLORPERAZINE MALEATE 5 MG
5 TABLET ORAL EVERY 6 HOURS PRN
Status: DISCONTINUED | OUTPATIENT
Start: 2023-05-11 | End: 2023-05-15 | Stop reason: HOSPADM

## 2023-05-11 RX ORDER — DEXTROSE MONOHYDRATE 25 G/50ML
25-50 INJECTION, SOLUTION INTRAVENOUS
Status: DISCONTINUED | OUTPATIENT
Start: 2023-05-11 | End: 2023-05-11

## 2023-05-11 RX ORDER — IOPAMIDOL 755 MG/ML
500 INJECTION, SOLUTION INTRAVASCULAR ONCE
Status: COMPLETED | OUTPATIENT
Start: 2023-05-11 | End: 2023-05-11

## 2023-05-11 RX ORDER — ONDANSETRON 4 MG/1
4 TABLET, ORALLY DISINTEGRATING ORAL EVERY 6 HOURS PRN
Status: DISCONTINUED | OUTPATIENT
Start: 2023-05-11 | End: 2023-05-15 | Stop reason: HOSPADM

## 2023-05-11 RX ORDER — POLYETHYLENE GLYCOL 3350 17 G/17G
17 POWDER, FOR SOLUTION ORAL DAILY PRN
Status: DISCONTINUED | OUTPATIENT
Start: 2023-05-11 | End: 2023-05-15 | Stop reason: HOSPADM

## 2023-05-11 RX ORDER — NICOTINE POLACRILEX 4 MG
15-30 LOZENGE BUCCAL
Status: DISCONTINUED | OUTPATIENT
Start: 2023-05-11 | End: 2023-05-12

## 2023-05-11 RX ORDER — HYDRALAZINE HYDROCHLORIDE 20 MG/ML
10-20 INJECTION INTRAMUSCULAR; INTRAVENOUS
Status: DISCONTINUED | OUTPATIENT
Start: 2023-05-11 | End: 2023-05-11

## 2023-05-11 RX ORDER — GADOBUTROL 604.72 MG/ML
10 INJECTION INTRAVENOUS ONCE
Status: COMPLETED | OUTPATIENT
Start: 2023-05-11 | End: 2023-05-11

## 2023-05-11 RX ORDER — ONDANSETRON 2 MG/ML
4 INJECTION INTRAMUSCULAR; INTRAVENOUS EVERY 6 HOURS PRN
Status: DISCONTINUED | OUTPATIENT
Start: 2023-05-11 | End: 2023-05-15 | Stop reason: HOSPADM

## 2023-05-11 RX ORDER — PROCHLORPERAZINE 25 MG
12.5 SUPPOSITORY, RECTAL RECTAL EVERY 12 HOURS PRN
Status: DISCONTINUED | OUTPATIENT
Start: 2023-05-11 | End: 2023-05-15 | Stop reason: HOSPADM

## 2023-05-11 RX ORDER — HYDRALAZINE HYDROCHLORIDE 20 MG/ML
10-20 INJECTION INTRAMUSCULAR; INTRAVENOUS
Status: DISCONTINUED | OUTPATIENT
Start: 2023-05-11 | End: 2023-05-15 | Stop reason: HOSPADM

## 2023-05-11 RX ORDER — AMOXICILLIN 250 MG
1 CAPSULE ORAL 2 TIMES DAILY PRN
Status: DISCONTINUED | OUTPATIENT
Start: 2023-05-11 | End: 2023-05-15 | Stop reason: HOSPADM

## 2023-05-11 RX ORDER — HYDRALAZINE HYDROCHLORIDE 20 MG/ML
10 INJECTION INTRAMUSCULAR; INTRAVENOUS ONCE
Status: COMPLETED | OUTPATIENT
Start: 2023-05-11 | End: 2023-05-11

## 2023-05-11 RX ORDER — GADOBUTROL 604.72 MG/ML
7 INJECTION INTRAVENOUS ONCE
Status: DISCONTINUED | OUTPATIENT
Start: 2023-05-11 | End: 2023-05-11 | Stop reason: HOSPADM

## 2023-05-11 RX ORDER — LABETALOL HYDROCHLORIDE 5 MG/ML
10-20 INJECTION, SOLUTION INTRAVENOUS EVERY 10 MIN PRN
Status: DISCONTINUED | OUTPATIENT
Start: 2023-05-11 | End: 2023-05-11

## 2023-05-11 RX ORDER — LABETALOL HYDROCHLORIDE 5 MG/ML
10-20 INJECTION, SOLUTION INTRAVENOUS EVERY 10 MIN PRN
Status: DISCONTINUED | OUTPATIENT
Start: 2023-05-11 | End: 2023-05-15 | Stop reason: HOSPADM

## 2023-05-11 RX ORDER — DEXTROSE MONOHYDRATE 25 G/50ML
25-50 INJECTION, SOLUTION INTRAVENOUS
Status: DISCONTINUED | OUTPATIENT
Start: 2023-05-11 | End: 2023-05-12

## 2023-05-11 RX ORDER — ALBUTEROL SULFATE 0.83 MG/ML
2.5 SOLUTION RESPIRATORY (INHALATION)
Status: DISCONTINUED | OUTPATIENT
Start: 2023-05-11 | End: 2023-05-15 | Stop reason: HOSPADM

## 2023-05-11 RX ORDER — HYDRALAZINE HYDROCHLORIDE 20 MG/ML
20 INJECTION INTRAMUSCULAR; INTRAVENOUS ONCE
Status: COMPLETED | OUTPATIENT
Start: 2023-05-11 | End: 2023-05-11

## 2023-05-11 RX ORDER — AMOXICILLIN 250 MG
2 CAPSULE ORAL 2 TIMES DAILY PRN
Status: DISCONTINUED | OUTPATIENT
Start: 2023-05-11 | End: 2023-05-15 | Stop reason: HOSPADM

## 2023-05-11 RX ADMIN — GADOBUTROL 10 ML: 604.72 INJECTION INTRAVENOUS at 12:52

## 2023-05-11 RX ADMIN — HYDRALAZINE HYDROCHLORIDE 20 MG: 20 INJECTION INTRAMUSCULAR; INTRAVENOUS at 12:22

## 2023-05-11 RX ADMIN — HYDRALAZINE HYDROCHLORIDE 10 MG: 20 INJECTION INTRAMUSCULAR; INTRAVENOUS at 11:51

## 2023-05-11 RX ADMIN — NICARDIPINE HYDROCHLORIDE 0.5 MG/HR: 0.2 INJECTION INTRAVENOUS at 13:48

## 2023-05-11 RX ADMIN — IOPAMIDOL 75 ML: 755 INJECTION, SOLUTION INTRAVENOUS at 10:47

## 2023-05-11 RX ADMIN — SODIUM CHLORIDE 100 ML: 9 INJECTION, SOLUTION INTRAVENOUS at 10:47

## 2023-05-11 ASSESSMENT — ACTIVITIES OF DAILY LIVING (ADL)
ADLS_ACUITY_SCORE: 43
ADLS_ACUITY_SCORE: 43
ADLS_ACUITY_SCORE: 35
ADLS_ACUITY_SCORE: 43
ADLS_ACUITY_SCORE: 35
ADLS_ACUITY_SCORE: 35

## 2023-05-11 ASSESSMENT — ENCOUNTER SYMPTOMS
WEAKNESS: 1
SPEECH DIFFICULTY: 1
FATIGUE: 1

## 2023-05-11 NOTE — LETTER
May 11, 2023      To Whom It May Concern:      Pilo Quintana was seen in our Emergency Department today, 05/11/23. Please excuse her son, Balwinder Menezes  I expect her condition to improve over the next *** days.  She may return to work/school when improved.    Sincerely,        Shaggy oWod MD

## 2023-05-11 NOTE — PLAN OF CARE
Pt arrived via EMS from [facility] @ 1730.  Nicardipine infusing. Pt alert, following commands. Slight weakness to R side. C/O mild headache. Language barrier makes assessments difficult, son arrived at bedside to help translate as there was no  who translates pt's language. Purewick in place, voiding spontaneously. Plan for CT now. Q1h neuro checks. Monitor BP.

## 2023-05-11 NOTE — CONSULTS
"      [facility]    Stroke Consult Note    Reason for Consult: Stroke Code     Chief Complaint: Fatigue      HPI  Pilo Quintana is a 72 year old female with past medical history significant for HTN, DM, obesity, and prior stroke (no records, occurred in [place]). She was brought to the ED for evaluation of right sided weakness. Her son reports that he last saw her well around 0800, prior to her going to shower. She seemed to be taking a long time, and her daughter-in-law went to check on her. She found her hunched over, with her hands on the tub, unable to lift her legs out. Son reports that the right leg was weaker than the left and her face seemed \"puffy\". Her son also notes that for the past 1-2 months, she has been talking slower and having difficulty with pronunciation. She has significant memory impairment.     On examination, she has weakness to bilateral lower extremities. Her son acted as  and notes no new speech problems. She appeared to have difficulty following his commands. CTH with concern for pontine hemorrhage, not a candidate for TNK.     Imaging Findings  CTH IMPRESSION:  1. New small hyperdense lesion in the anterior central aspect of the  casimiro is not entirely specific, but is most concerning for a small area  of acute parenchymal hemorrhage. MRI of the brain without and with  contrast is recommended for further evaluation.  2. Otherwise unchanged chronic findings, as described. Please see the  body of the report for details.     CTA HEAD:  1. No high-grade stenosis or large vessel occlusion involving the  major intracranial arteries.  2. Mild atherosclerosis involving the carotid siphons.  3. Small (approximately 1.5-2 mm) anterior superiorly directed focal  outpouching of the right middle cerebral artery bifurcation,  concerning for a possible small saccular aneurysm. Recommend  follow-up.     CTA NECK:  1. Mild bilateral carotid bifurcation atherosclerosis " "without  significant stenosis.  2. Patent cervical vertebral arteries without significant stenosis or  evidence for dissection    Intravenous Thrombolysis  Not given due to:   - active bleeding  - minor/isolated/quickly resolving symptoms    Endovascular Treatment  Not initiated due to absence of proximal vessel occlusion    Impression   Pontine hyperdensity concerning for hemorrhage  Right sided weakness, improving. Reported history of prior stroke with right sided deficits. Unclear if this is secondary to new pathology vs recrudescence.     Recommendations  - MRI brain w/wo  - Given concern for hemorrhage, goal SBP <140    Further recommendations pending imaging.     ADDENDUM:     MRI brain confirms acute/subacute pontine hemorrhage and several chronic microhemorrhages.     - Neurosurgery aware  - Transfer to [facility] ICU for q1 hour neurochecks   - SBP <140  - Repeat CTH in 6 hours     Marielle Espino, CNP  Vascular Neurology    To page me or covering stroke neurology team member, click here: [facility]  Choose \"On Call\" tab at top, then select \"NEUROLOGY/ALL SITES\" from middle drop-down box, press Enter, then look for \"stroke\" or \"telestroke\" for your site.    ______________________________________________________    Clinically Significant Risk Factors Present on Admission                # Drug Induced Platelet Defect: home medication list includes an antiplatelet medication   # Hypertension: Noted on problem list               Past Medical History   Past Medical History:   Diagnosis Date     Diabetes (H)      Hypertension      Morbid obesity (H)      Osteoarthritis 6/7/2003     PMB (postmenopausal bleeding) 9/16/2014     Renal mass      Thickened endometrium 9/16/2014     Uncomplicated asthma      Unspecified cerebral artery occlusion with cerebral infarction 1998    was in [place], no residual at present     Past Surgical History   Past Surgical History:   Procedure Laterality Date     DILATION AND CURETTAGE, " OPERATIVE HYSTEROSCOPY WITH MORCELLATOR, COMBINED N/A 9/25/2014    Procedure: COMBINED DILATION AND CURETTAGE, OPERATIVE HYSTEROSCOPY WITH MORCELLATOR;  Surgeon: Silverio Christy MD;  Location:  OR     Medications   Home Meds  Prior to Admission medications    Medication Sig Start Date End Date Taking? Authorizing Provider   acetaminophen (TYLENOL) 500 MG tablet Take 1,000 mg by mouth 4/15/21   Reported, Patient   albuterol (2.5 MG/3ML) 0.083% nebulizer solution Take 1 vial by nebulization every 4 hours as needed for shortness of breath / dyspnea or wheezing    Reported, Patient   albuterol (PROAIR HFA/PROVENTIL HFA/VENTOLIN HFA) 108 (90 Base) MCG/ACT inhaler INHALE 2 PUFFS BY MOUTH EVERY 4 HOURS AS NEEDED FOR WHEEZING 5/8/21   Reported, Patient   alendronate (FOSAMAX) 70 MG tablet Take 70 mg by mouth 5/11/21 5/11/22  Reported, Patient   amLODIPine (NORVASC) 10 MG tablet Take 1 tablet by mouth once daily 12/14/20   Reported, Patient   ASPIRIN PO Take 81 mg by mouth    Reported, Patient   benzonatate (TESSALON) 100 MG capsule Take 1 capsule (100 mg) by mouth 3 times daily as needed for cough 1/26/23   Duong Richmond MD   Cholecalciferol (VITAMIN D3) 50 MCG (2000 UT) CAPS TAKE 1 CAPSULE BY MOUTH ONCE DAILY 5/8/21   Reported, Patient   fluticasone (FLOVENT HFA) 110 MCG/ACT inhaler Inhale 2 puffs into the lungs 2 times daily    Reported, Patient   furosemide (LASIX) 20 MG tablet Take 20 mg by mouth daily 5/8/21   Reported, Patient   glipiZIDE (GLUCOTROL XL) 5 MG 24 hr tablet Take 5 mg by mouth daily 3/15/21   Reported, Patient   ibuprofen (ADVIL/MOTRIN) 200 MG tablet Take 200-400 mg by mouth 4/15/21   Reported, Patient   ketotifen (ZADITOR) 0.025 % ophthalmic solution Place 1 drop into both eyes 2 times daily 9/10/21   Pipo Wright MD   losartan (COZAAR) 100 MG tablet Take 100 mg by mouth 10/2/19   Reported, Patient   Pioglitazone HCl (ACTOS PO) Take 30 mg by mouth daily    Reported, Patient    Simvastatin (ZOCOR PO) Take 40 mg by mouth At Bedtime    Reported, Patient   SULINDAC PO Take 200 mg by mouth 2 times daily    Reported, Patient   triamcinolone (KENALOG) 0.1 % external ointment Apply topically 2 times daily 9/10/21   Pipo Wright MD       Scheduled Meds      Infusion Meds      PRN Meds      Allergies   No Known Allergies  Family History   No family history on file.  Social History   Social History     Tobacco Use     Smoking status: Never     Smokeless tobacco: Never   Substance Use Topics     Alcohol use: No     Drug use: No       Review of Systems   Review of systems not obtained due to patient factors - language barrier       PHYSICAL EXAMINATION  Temp:  [97.4  F (36.3  C)] 97.4  F (36.3  C)  Pulse:  [64-72] 72  Resp:  [22] 22  BP: (150-173)/() 173/101  SpO2:  [97 %-98 %] 97 %     Neuro Exam  Mental Status:  alert, speech normal per son, not able to name month/age (normal per son)  Cranial Nerves:  EOMI with normal smooth pursuit, facial sensation intact and symmetric (tested by nurse), facial movements symmetric, hearing not formally tested but intact to conversation, no dysarthria, tongue protrusion midline, no obvious VF deficit  Motor:  no abnormal movements, difficulty following for examination, no obvious drift in arms, both legs drift to bed  Reflexes:  unable to test (telestroke)  Sensory:  light touch sensation intact and symmetric throughout upper and lower extremities (assessed by nurse)  Coordination:  no obvious ataxia  Station/Gait:  unable to test due to telestroke    Dysphagia Screen  Per Nursing    Stroke Scales    NIHSS  1a. Level of Consciousness 0-->Alert, keenly responsive   1b. LOC Questions 2-->Answers neither question correctly   1c. LOC Commands 1-->Performs one task correctly   2.   Best Gaze 0-->Normal   3.   Visual 0-->No visual loss   4.   Facial Palsy 0-->Normal symmetrical movements   5a. Motor Arm, Left 0-->No drift, limb holds 90  (or 45) degrees for full 10 secs   5b. Motor Arm, Right 0-->No drift, limb holds 90 (or 45) degrees for full 10 secs   6a. Motor Leg, Left 2-->Some effort against gravity, leg falls to bed by 5 secs, but has some effort against gravity   6b. Motor Leg, right 2-->Some effort against gravity, leg falls to bed by 5 secs, but has some effort against gravity   7.   Limb Ataxia 0-->Absent   8.   Sensory 0-->Normal, no sensory loss   9.   Best Language 0-->No aphasia, normal   10. Dysarthria 0-->Normal   11. Extinction and Inattention  0-->No abnormality   Total 7 (05/11/23 1130)       Imaging  I personally reviewed all imaging; relevant findings per HPI.     Lab Results Data   CBC  Recent Labs   Lab 05/11/23  1047   WBC 7.9   RBC 4.66   HGB 13.7   HCT 43.5        Basic Metabolic Panel    Recent Labs   Lab 05/11/23  1047 05/11/23  1027     --    POTASSIUM 4.5  --    CHLORIDE 100  --    CO2 32*  --    BUN 12.8  --    CR 0.73  --    * 280*   GLENIS 9.9  --      Liver Panel  No results for input(s): PROTTOTAL, ALBUMIN, BILITOTAL, ALKPHOS, AST, ALT, BILIDIRECT in the last 168 hours.  INR    Recent Labs   Lab Test 05/11/23  1047 01/22/16  0958   INR 1.00 1.1      Lipid Profile  No lab results found.  A1C  No lab results found.  Troponin    Recent Labs   Lab 05/11/23  1047   CTROPT 10          Stroke Code Data Data   Stroke Code Data  (for stroke code with tele)  Stroke code activated 05/11/23   1036   First stroke provider response 05/11/23   1039   Video start time 05/11/23   1110   Video end time 05/11/23   1130   Last known normal 05/11/23   0800   Time of discovery  (or onset of symptoms)  05/11/23   0900   Head CT read by Stroke Neuro Dr/Provider 05/11/23   1052   Was stroke code de-escalated? No               Telestroke Service Details  Type of service telemedicine diagnostic assessment of acute neurological changes   Reason telemedicine is appropriate patient requires assessment with a specialist for  diagnosis and treatment of neurological symptoms   Mode of transmission secure interactive audio and video communication per [employer]   Originating site (patient location) [facility]    Distant site (provider location) [facility]       I personally examined and evaluated the patient today. At the time of my evaluation and management the patient was in critical condition today due to acute neurologic deficits. I personally managed imaging review, examination. Key decisions made today included advanced imaging. I spent a total of 100 minutes providing critical care services, evaluating the patient, directing care and reviewing laboratory values and radiologic reports.

## 2023-05-11 NOTE — H&P
[facility]  History and Physical - Hospitalist Service       Date of Admission:  5/11/2023  PRIMARY CARE PROVIDER:    Chela Griffith    Assessment & Plan   Pilo Quintana is a 72 year old female admitted on 5/11/2023.    Past medical history significant for HTN, HLP, DM2, Obesity, Uncomplicated asthma, Known lung nodules, History of CVA who was transferred to [facility] due to ICH within the ventral casimiro (corresponding with an acute or subacute hemorrhage and numerous chronic microhemorrhages.      Patient presented to [facility] ED with right sided weakness.  Patient's son reported he noted the patient was in her normal state of health when he returned from work this morning at 7:30AM.  Patient then went to the bathroom (~ 8:05AM) to shower and was noted to be in there for an extended period of time and when asked she would say she was fine.  Family members went into the bathroom to check on her at 8:50AM and she was found leaning over the tub and her right arm over the tub.  She was noted to have a right sided facial droop.  Family mentioned the patient has had some speech difficulties for the past 2 months.      Work-up in the ED included a BMP that revealed a CO2 of 32, glucose of 271 otherwise within normal limits.  CBC with PLT were unremarkable.  INR, PTT and Troponin were all within normal limits.  Head CT w/o contrast revealed a new small hyperdense lesion in the anterior central aspect of the casimiro(concerning for a small area of acute parenchymal hemorrhage and recommending MRI).  Head/Neck CTA revealed mild atherosclerosis involving the carotid siphons and small anterior superiorly directed focal outpouching of the right MCA bifurcation (concerning for small saccular aneurysm).  Stroke Neurology was consulted and advised proceeding with brain MRI and to keep SBP < 140.      Brain MRI was completed and revealed acute/subacute pontine hemorrhage and several chronic  microhemorrhages.  Neurosurgery was contacted and recommended transfer to [facility].      ED notes, Neurology consult note amd Neurosurgery notes were reviewed.      Acute to subacute ICH  - Stroke Neurology consult requested.    --In cotnact with Stroke Neurology (Carolina Castaneda MD)  - Neurosurgery consult requested.    - Neuro checks and vital signs every hour.    - Goral SBP LESS THAN 140.   --Nicardipine infusion started in the ED and continued.    - Repeat head CT ordered.    - Trend Troponin.    - Monitor on telemetry.    - NPO.    - PT/OT/SLP consult requested.    - Hold PTA ASA 81 mg/d.      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    - Neurosurgery consult requested.      HTN  - Holding PTA lasix 20 mg/d, losartan 100 mg/d and amlodipine 10 mg/d.  Resume when diet can be safely advanced.    - Nicardipine infusion.      HLP  - Hold PTA simvastatin 40 mg at bedtime.  Resume when diet can be safely advanced.    - Lipid panel in the morning.      DM2  Hyperglycemia  - Hold PTA glipizide 5 mg/d and Actos 30 mg/d.    - Medium intensity sliding scale.    - Glucose checks every 4 hours while NPO.    - Hypoglycemic protocol.  - Check A1c.      Obesity  Increase in all-cause morbidity and mortality.   - Follow up with PCP regarding ongoing management.       Uncomplicated asthma  - Hold PTA PRN albuterol inhalers.    - PRN Albuterol nebs available.      Known lung nodules  - Follow up with PCP.      History of CVA  - Hold PTA ASA 81 mg/d.    - PTA medications held due to NPO status.      Clinically Significant Risk Factors Present on Admission                  # Hypertension: Noted on problem list                    Diet: NPO for Medical/Clinical Reasons Except for: No Exceptions  DVT Prophylaxis: Pneumatic Compression Devices  Morrison Catheter: Not present  Lines: None     Cardiac Monitoring: None  Code Status: FULL CODE; confirmed with the patient and her son.            Disposition Plan   Inpatient status.  Anticipate greater than 2 evening hospitalization while undergoing Neuro work-up for ICH.       The patient's care was discussed with the Bedside Nurse, Patient and Patient's Family.    The patient has been discussed with Dr. Hanna, who agrees with the assessment and plan at this time.    Jonny Whipple PA-C  [facility]  Securely message with the Vocera Web Console (learn more here)  Text page via [facility] Paging/Directory  ______________________________________________________________________    Chief Complaint   Transfer to [facility] due to ICH within the ventral casimiro (corresponding with an acute or subacute hemorrhage and numerous chronic microhemorrhages.      History is obtained from the patient and EMR.      History of Present Illness   Pilo Quintana is a 72 year old female with a past medical history significant for HTN, HLP, DM2, Obesity, Uncomplicated asthma, Known lung nodules, History of CVA who was transferred to [facility] due to ICH within the ventral casimiro (corresponding with an acute or subacute hemorrhage and numerous chronic microhemorrhages.      Patient presented to [facility] ED with right sided weakness.  Patient's son reported he noted the patient was in her normal state of health when he returned from work this morning at 7:30AM.  Patient then went to the bathroom (~ 8:05AM) to shower and was noted to be in there for an extended period of time and when asked she would say she was fine.  Family members went into the bathroom to check on her at 8:50AM and she was found leaning over the tub and her right arm over the tub.  She was noted to have a right sided facial droop.  Family mentioned the patient has had some speech difficulties for the past 2 months.      Work-up in the ED included a BMP that revealed a CO2 of 32, glucose of 271 otherwise within normal limits.  CBC with PLT were unremarkable.  INR,  PTT and Troponin were all within normal limits.  Head CT w/o contrast revealed a new small hyperdense lesion in the anterior central aspect of the casimiro(concerning for a small area of acute parenchymal hemorrhage and recommending MRI).  Head/Neck CTA revealed mild atherosclerosis involving the carotid siphons and small anterior superiorly directed focal outpouching of the right MCA bifurcation (concerning for small saccular aneurysm).  Stroke Neurology was consulted and advised proceeding with brain MRI and to keep SBP < 140.      Brain MRI was completed and revealed acute/subacute pontine hemorrhage and several chronic microhemorrhages.  Neurosurgery was contacted and recommended transfer to [facility].     Patient was seen in her hospital room.  Patient speaks Alli (unfortuantely not a language that is available through our  services).  The patient's son arrived and acted as an .  We reviewed events that occurred in the morning and led to her presentation to [facility] ED.      Patient was found slumped over the edge of the sink.  She was assisted out of the bathroom by family and was noted to have right sided deficits (noted dragging right leg).  She was seated in the kitchen and ate watermelon and took her morning meds.  She was noted to cough while eating which is unusual.      The son notes that for the last 2 months or so the patient's speech has been slower and has had some changes.      Patient resides in a house with her son, daughter-in-law and grandchildren.  She does not smoke, consume alcohol or use illicit drugs.  She does not use CPAP or supplemental O2.  She does ambulate with the use of a walker.      We discussed CODE STATUS and is FULL CODE.      Past Medical History    I have reviewed this patient's medical history and updated it with pertinent information if needed.   Past Medical History:   Diagnosis Date     Diabetes (H)      Hypertension      Morbid obesity (H)       Osteoarthritis 6/7/2003     PMB (postmenopausal bleeding) 9/16/2014     Renal mass      Thickened endometrium 9/16/2014     Uncomplicated asthma      Unspecified cerebral artery occlusion with cerebral infarction 1998    was in [place], no residual at present   HTN, HLP, DM2, Obesity, Uncomplicated asthma, Known lung nodules, History of CVA    Prior to Admission Medications   Prior to Admission Medications   Prescriptions Last Dose Informant Patient Reported? Taking?   ASPIRIN PO  Daughter Yes No   Sig: Take 81 mg by mouth   Cholecalciferol (VITAMIN D3) 50 MCG (2000 UT) CAPS   Yes No   Sig: TAKE 1 CAPSULE BY MOUTH ONCE DAILY   Pioglitazone HCl (ACTOS PO)  Daughter Yes No   Sig: Take 30 mg by mouth daily   SULINDAC PO  Daughter Yes No   Sig: Take 200 mg by mouth 2 times daily   Simvastatin (ZOCOR PO)  Daughter Yes No   Sig: Take 40 mg by mouth At Bedtime   acetaminophen (TYLENOL) 500 MG tablet   Yes No   Sig: Take 1,000 mg by mouth   albuterol (2.5 MG/3ML) 0.083% nebulizer solution  Daughter Yes No   Sig: Take 1 vial by nebulization every 4 hours as needed for shortness of breath / dyspnea or wheezing   albuterol (PROAIR HFA/PROVENTIL HFA/VENTOLIN HFA) 108 (90 Base) MCG/ACT inhaler   Yes No   Sig: INHALE 2 PUFFS BY MOUTH EVERY 4 HOURS AS NEEDED FOR WHEEZING   alendronate (FOSAMAX) 70 MG tablet   Yes No   Sig: Take 70 mg by mouth   amLODIPine (NORVASC) 10 MG tablet   Yes No   Sig: Take 1 tablet by mouth once daily   benzonatate (TESSALON) 100 MG capsule   No No   Sig: Take 1 capsule (100 mg) by mouth 3 times daily as needed for cough   fluticasone (FLOVENT HFA) 110 MCG/ACT inhaler  Daughter Yes No   Sig: Inhale 2 puffs into the lungs 2 times daily   furosemide (LASIX) 20 MG tablet   Yes No   Sig: Take 20 mg by mouth daily   glipiZIDE (GLUCOTROL XL) 5 MG 24 hr tablet   Yes No   Sig: Take 5 mg by mouth daily   ibuprofen (ADVIL/MOTRIN) 200 MG tablet   Yes No   Sig: Take 200-400 mg by mouth   ketotifen (ZADITOR) 0.025 %  ophthalmic solution   No No   Sig: Place 1 drop into both eyes 2 times daily   losartan (COZAAR) 100 MG tablet   Yes No   Sig: Take 100 mg by mouth   triamcinolone (KENALOG) 0.1 % external ointment   No No   Sig: Apply topically 2 times daily      Facility-Administered Medications: None     Allergies   No Known Allergies    Physical Exam   /67   Pulse 71   Resp 16   SpO2 96%     Constitutional: Awake, alert, cooperative, no apparent distress.    ENT: Normocephalic, without obvious abnormality, atraumatic, oral pharynx with moist mucus membranes, tonsils without erythema or exudates.  Eyes pupils are equal, round and reactive to light; extra occular movements intact.  Normal sclera.    Neck: Supple, symmetrical, trachea midline, no adenopathy.  Pulmonary: No increased work of breathing, fair air exchange, clear to auscultation bilaterally, no crackles or wheezing.  Cardiovascular: Regular rate and rhythm, normal S1 and S2, no S3 or S4, and no murmur noted.  GI: Normal bowel sounds, soft, non-distended, non-tender.  Obese.  Skin/Integumen: Visualized skin appeared clear.  Neuro: Slight right sided facial droop otherwise CN II-XII grossly intact.  Upper and lower extremities strength, coordination and sensation intact bilaterally.    Psych:  Normal affect.  Extremities: No lower extremity edema noted, and calves are non-tender to palpation bilaterally.    : Morrison catheter in place with urine in the bag.      Medical Decision Making       Please see A&P for additional details of medical decision making.  Greater than 75 MINUTES SPENT BY ME on the date of service doing chart review, history, exam, documentation & further activities per the note.         Data   Data reviewed today: I reviewed all medications, new labs and imaging results over the last 24 hours. I personally reviewed no images or EKG's today.      I have personally reviewed the following data over the past 24 hrs:    7.9  \   13.7   / 238      142 100 12.8 /  118 (H)   4.5 32 (H) 0.73 \       Trop: 10 BNP: N/A       INR:  1.00 PTT:  25   D-dimer:  N/A Fibrinogen:  N/A       Imaging results reviewed over the past 24 hrs:   Recent Results (from the past 24 hour(s))   CT Head w/o Contrast   Result Value    Radiologist flags Possible small acute pontine hemorrhage. MRI (AA)    Narrative    CT SCAN OF THE HEAD WITHOUT CONTRAST   5/11/2023 10:51 AM     HISTORY: Right-sided weakness, previous stroke.    TECHNIQUE:  Axial images of the head and coronal reformations without  IV contrast material. Radiation dose for this scan was reduced using  automated exposure control, adjustment of the mA and/or kV according  to patient size, or iterative reconstruction technique.    COMPARISON: CT of the head dated 7/13/2016.    FINDINGS: New small focus of parenchymal hyperdensity in the anterior  central aspect of the casimiro measuring approximately 7-8 mm (series 3  image 8, series 5 image 37). This is not entirely specific, but raises  concern for a possible focus of acute parenchymal hemorrhage. Other  possibilities such as hyperdense parenchymal nodule or vascular lesion  such as a cavernoma are not entirely excluded.    Mild presumed ex vacuo dilatation of the ventricular system appears  similar to the prior examination. Mild to moderate generalized brain  parenchymal volume loss, as before. Moderate to severe extent of  predominantly confluent nonspecific hypoattenuation throughout the  cerebral white matter as well as a lesser degree of patchy  hypoattenuation in the casimiro, presumably due to chronic small vessel  ischemic disease. No significant mass effect/herniation.    The visualized aspects of the paranasal sinuses are clear. Unchanged  chronic soft tissue opacification of the left mastoid and middle ear  cavities, which could be inflammatory. The right mastoid and middle  ear cavities appear clear. The calvarium appears intact.      Impression    IMPRESSION:  1.  New small hyperdense lesion in the anterior central aspect of the  casimiro is not entirely specific, but is concerning for a small area of  acute parenchymal hemorrhage. MRI of the brain without and with  contrast is recommended for further evaluation.  2. Otherwise unchanged chronic findings, as described. Please see the  body of the report for details.    [Critical Result: Possible small acute pontine hemorrhage. MRI  recommended for further characterization.]    Finding was identified on 5/11/2023 10:53 AM.     Dr. Dias was contacted by Dr. Perez on 5/11/2023 11:04 AM and  verbalized understanding of the critical result.     JENNIFER PEREZ MD         SYSTEM ID:  [id number]   CTA Head Neck w Contrast    Narrative    CT ANGIOGRAM OF THE HEAD AND NECK WITH CONTRAST May 11, 2023 10:56 AM     HISTORY: Right-sided weakness. Previous Stroke on right side. Symptoms  since 0900 hours.     TECHNIQUE: CT angiography with an injection of 75mL [id number]-[id number] IV with  scans through the head and neck. Images were transferred to a separate  3-D workstation where multiplanar reformations and 3-D images were  created. Estimates of carotid stenoses are made relative to the distal  internal carotid artery diameters except as noted. Radiation dose for  this scan was reduced using automated exposure control, adjustment of  the mA and/or kV according to patient size, or iterative  reconstruction technique.    COMPARISON: CT head same day.     CT ANGIOGRAM HEAD FINDINGS: There is nonflow-limiting atherosclerosis  involving the carotid siphons bilaterally. No high-grade stenosis or  large vessel occlusion involving the major proximal branches of the  anterior cerebral or middle cerebral arteries. There is a small  anterior superiorly directed focal outpouching at the right middle  cerebral artery M1-M2 bifurcation (series 5 image 306, series 9 image  41) measuring 1.5-2 mm from base to apex, concerning for a possible  small saccular  aneurysm.    The bilateral vertebral arteries are patent. The basilar artery is  patent. The proximal aspects of the posterior cerebral arteries are  patent.    CT ANGIOGRAM NECK FINDINGS: Common origin of the brachiocephalic and  left common carotid arteries, a normal variant. No stenosis at the  origins of the great vessels. Mild atherosclerosis in the aortic arch.      Right carotid artery: The right common and internal carotid arteries  are patent. Mild atherosclerotic disease at the carotid bifurcation  and proximal internal carotid artery without significant stenosis by  NASCET criteria. Tortuous internal carotid artery.    Left carotid artery: The left common and internal carotid arteries are  patent. Mild atherosclerotic disease at the carotid bifurcation and  proximal internal carotid artery without significant stenosis by  NASCET criteria. Tortuous internal carotid artery.    Vertebral arteries: Vertebral arteries are patent without evidence of  dissection. Dominant left and slightly smaller right vertebral  arteries. No significant stenosis.     Other findings: Left-sided calcified palatine tonsillolith.      Impression    IMPRESSION:    CTA HEAD:  1. No high-grade stenosis or large vessel occlusion involving the  major intracranial arteries.  2. Mild atherosclerosis involving the carotid siphons.  3. Small (approximately 1.5-2 mm) anterior superiorly directed focal  outpouching of the right middle cerebral artery bifurcation,  concerning for a possible small saccular aneurysm. Recommend  follow-up.    CTA NECK:  1. Mild bilateral carotid bifurcation atherosclerosis without  significant stenosis.  2. Patent cervical vertebral arteries without significant stenosis or  evidence for dissection.    Findings from the noncontrast head CT and CTA head and neck were  discussed with Dr. Dias by myself at  11:04 AM on 5/11/2023.    JENNIFER WEAVER MD         SYSTEM ID:  [id number]   MR Brain w/o & w Contrast     Narrative    MRI OF THE BRAIN WITHOUT AND WITH CONTRAST  5/11/2023 1:23 PM     HISTORY: Concern for pontine hemorrhage .    COMPARISON: Head CT 5/11/2023.    TECHNIQUE: Axial diffusion-weighted with ADC map, T2-weighted with fat  saturation, T1-weighted and turboFLAIR and coronal T1-weighted images  of the brain were obtained without intravenous contrast.  Following 7  mL Gadavist IV,  axial turboFLAIR and coronal T1-weighted images of  the brain were obtained.     FINDINGS:   INTRACRANIAL CONTENTS: No acute or subacute infarct. Chronic lacunar  infarctions within the basal ganglia and casimiro. Corresponding with the  area of high attenuation within the ventral casimiro on the previous head  CT, there is blooming susceptibility signal loss, consistent with  hemorrhage. No significant surrounding parenchymal edema on T2 imaging  or abnormal enhancement. There are additional scattered  microhemorrhages throughout bilateral cerebral hemispheres and right  cerebellum. Confluent T2 FLAIR hyperintensity throughout the cerebral  white matter consistent with advanced chronic microvascular ischemic  change. Advanced generalized parenchymal volume loss.    On postcontrast imaging, there is focal enhancement within the right  basal ganglia associated with lacunar infarction. This may reflect  enhancement of subacute infarct. No other areas of abnormal  enhancement.    SELLA: No significant abnormality accounting for technique.    OSSEOUS STRUCTURES/SOFT TISSUES: No aggressive osseous lesion  involving the calvarium, skull base, or visualized upper cervical  spine. The major intracranial vascular flow voids are maintained.    ORBITS: No significant abnormality accounting for technique.    SINUSES/MASTOIDS: No significant paranasal sinus mucosal disease. No  significant middle ear or mastoid effusion.       Impression    IMPRESSION:  1.  Focal susceptibility signal loss within the ventral casimiro  corresponding with an acute or subacute  hemorrhage on CT. No  surrounding parenchymal edema.  2.  Numerous chronic microhemorrhages within the cerebral hemispheres,  casimiro, and right cerebellum.  3.  Advanced chronic microvascular ischemic change and generalized  volume loss.  4.  Focal enhancement within the right basal ganglia likely relating  to subacute to chronic infarction.    LISA BARRIOS MD         SYSTEM ID:  [id number]

## 2023-05-11 NOTE — ED TRIAGE NOTES
Patient was in the restroom leaning against the shower according to son and appeared to have right sided weakness.  Patient has had previous stroke on the right side.  This occurred at 0900

## 2023-05-11 NOTE — LETTER
May 11, 2023      To Whom It May Concern:      Pilo Quintana was seen in our Emergency Department today, 05/11/23. Please excuse her son, Balwinder Menezes, from work the next 1-3 days to be with his mother in the hospital.     Sincerely,        Shaggy Wood MD

## 2023-05-11 NOTE — CONSULTS
"  [facility]    Stroke Telephone Note    I was called by Luis Manuel Hanna on 05/11/23 regarding patient Pilo Quintana. The patient is a 72 year old female who presents with a PMhx of HTN, DM, and prior stroke who presents with right sided weakness. She was LKN at 0800 prior to getting a shower. She was found hunched over with right sided weakness. Has a anterior casimiro hemorrhage.     Imaging Findings   IMPRESSION:  1.  Focal susceptibility signal loss within the ventral casimiro  corresponding with an acute or subacute hemorrhage on CT. No  surrounding parenchymal edema.  2.  Numerous chronic microhemorrhages within the cerebral hemispheres,  casimiro, and right cerebellum.  3.  Advanced chronic microvascular ischemic change and generalized  volume loss.  4.  Focal enhancement within the right basal ganglia likely relating  to subacute to chronic infarction.    Impression  Non-traumatic intracerebral hemorrhage of casimiro-     Recommendations   Acute Hemorrhagic Stroke Recommendations  - Neurochecks and Vital Signs every Q1h  - Systolic BP Goal: < 140  - Head of bed elevated  - Telemetry, EKG  - Imaging: repeat head Ct now  - Bedside Glucose Monitoring  - A1c, Troponin x 3  - PT/OT/SLP  - Stroke Education  - Euthermia, Euglycemia  - stat head Ct for any neuro changes  - NSYG consult, appreciate recs        My recommendations are based on the information provided over the phone by Pilo Quintana's in-person providers. They are not intended to replace the clinical judgment of her in-person providers. I was not requested to personally see or examine the patient at this time.    The Stroke Staff is Dr. Ortega  .    Carolina Castaneda MD  Vascular Neurology Fellow    To page me or covering stroke neurology team member, click here: [email or web address]  Choose \"On Call\" tab at top, then select \"NEUROLOGY/ALL SITES\" from middle drop-down box, press Enter, then look for \"stroke\" or \"telestroke\" for your " site.

## 2023-05-11 NOTE — ED NOTES
Consulted with provider and charge regarding pt BP. Will give additional dose of hydralazine (higher dose than previous) and start nicardipine when pt is back from MRI scan. Pt unable to have proper monitoring in MRI for nicardipine drip.

## 2023-05-11 NOTE — ED NOTES
Report given to LEIGH Fontanez at [facility]. Pt unable to sign. Called son for telephone consent for transfer. Son explained pt is unable to read or write and consented to pt transfer.

## 2023-05-11 NOTE — PROGRESS NOTES
[facility] Neurosurgery  Telephone Note    Contacted by Dr. Wood at [facility] ED.     72F with history of prior stroke in 1992, presented with sudden onset right facial droop and right sided weakness. Family noticed symptoms while patient was in the shower around 9am today. She was brought to the ED for further evaluation. Patient also had a fall several weeks ago and speech issues for about 5 weeks. Per Dr. Wood, facial droop has improved, but patient has continued right sided weakness. Aspirin listed in PTA med list.     Imaging shows a new small hyperdense lesion in the anterior central aspect of the casimiro, concerning for a small area of acute parenchymal hemorrhage. MRI brain in process.     CT SCAN OF THE HEAD WITHOUT CONTRAST   5/11/2023 10:51 AM   IMPRESSION:  1. New small hyperdense lesion in the anterior central aspect of the  casimiro is not entirely specific, but is concerning for a small area of  acute parenchymal hemorrhage. MRI of the brain without and with  contrast is recommended for further evaluation.  2. Otherwise unchanged chronic findings, as described. Please see the  body of the report for details.    Plan:   - Transfer to [facility] or higher level care   - Admission through Hospitalist team   - Repeat head CT at 6 hours    - Hold any blood thinners   - SBP less than 140  - Continue neuro checks   - Neurology following, appreciate assistance     Discussed with Dr. Ambreen Lewis, CNP  [facility] Neurosurgery  [address] [address]  Tel [phone]  Pager [phone]

## 2023-05-11 NOTE — ED NOTES
Called pt family member to update on departure from ED to [facility] ICU. Called [facility] ICU to give ETA of arrival. Gave report to EMS.

## 2023-05-11 NOTE — ED PROVIDER NOTES
History     Chief Complaint:  Weakness    The history is provided by the patient (the son). A  was used (the son).      Pt speaks [place] language (dialect from [facility]     Pilo Quintana is a 72 year old female with a history of type 2 diabetes, remote stroke, and hypertension who presents with her son for right sided weakness. Son reports that he got home around 0730 from work and greeted the patient, and she was in her normal state. Patient then went to the bathroom to shower around 0805. She was in the bathroom for a while, even though her son would call and check up on her outside the door, which she would reply she was fine. Then around 0850, patient's son had his wife check up on his mother in the bathroom, which his wife found patient's leaning over the tub and her right arm over the tub. Patient also had a right sided facial droop. Son also reported that for the last two months, patient has had speech difficulties.     Independent Historian:   Son provides additional history as above. Also reports facial droop seems improved.    Review of External Notes: Patient was seen here on 04/22/2023 for a fall.  Reviewed pcp note from 5/1/23. No mention of speech changes were noted.     ROS:  Review of Systems   Constitutional: Positive for fatigue.   Neurological: Positive for speech difficulty and weakness.        (+) right sided facial droop   All other systems reviewed and are negative.    Allergies:  Patient denies having any allergies.    Medications:    albuterol   alendronate  amLODIPine  Aspirin   benzonatate   Cholecalciferol  fluticasone   furosemide   glipiZIDE   ketotifen  losartan   Pioglitazone   Simvastatin   Sulindac    Past Medical History:    Type 2 diabetes without use of insulin   Hypertension     Morbid obesity              Osteoarthritis     PMB (postmenopausal bleeding)         Simple endometrial hyperplasia without atypia            Uncomplicated asthma              Unspecified cerebral artery occlusion with cerebral infarction            Vitamin D deficiency   Hyperlipidemia  Primary hyperparathyroidism  Osteoporosis   Multiple lung nodules  Renal oncocytoma of right kidney  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    Social History:  Patient presents with her son who interprets for her. From [facility]. Speaks Mano.   PCP: Chela Griffith     Physical Exam     Patient Vitals for the past 24 hrs:   BP Temp Temp src Pulse Resp SpO2   05/11/23 1230 (!) 158/79 -- -- 65 20 97 %   05/11/23 1225 (!) 142/91 -- -- 58 18 --   05/11/23 1200 (!) 191/108 -- -- 61 29 --   05/11/23 1141 (!) 174/91 -- -- 54 12 --   05/11/23 1055 (!) 173/101 -- -- 72 -- 97 %   05/11/23 1018 (!) 150/92 -- -- -- -- --   05/11/23 1016 -- 97.4  F (36.3  C) Temporal 64 22 98 %      Physical Exam  Nursing note and vitals reviewed.  HENT:   Mouth/Throat: Moist mucous membranes.   Eyes: EOMI, nonicteric sclera  Cardiovascular: Normal rate, regular rhythm, no murmurs, rubs, or gallops  Pulmonary/Chest: Effort normal and breath sounds normal. No respiratory distress. No wheezes. No rales.   Abdominal: Soft. Nontender, nondistended, no guarding or rigidity.   Musculoskeletal: Normal range of motion.   Neurological: Alert.     CN's II-XII intact. PERRL    EOMI without nystagmus.      Sensation intact to light touch. Mild pronator drift on right.     Bilateral lower extremity weakness with difficulty keeping both legs elevated off bed.   Skin: Skin is warm and dry. No rash noted.     Emergency Department Course     Imaging:  MR Brain w/o & w Contrast   Final Result   IMPRESSION:   1.  Focal susceptibility signal loss within the ventral casimiro   corresponding with an acute or  subacute hemorrhage on CT. No   surrounding parenchymal edema.   2.  Numerous chronic microhemorrhages within the cerebral hemispheres,   casimiro, and right cerebellum.   3.  Advanced chronic microvascular ischemic change and generalized   volume loss.   4.  Focal enhancement within the right basal ganglia likely relating   to subacute to chronic infarction.      LISA BARRIOS MD            SYSTEM ID:  [id number]      CTA Head Neck w Contrast   Final Result   IMPRESSION:      CTA HEAD:   1. No high-grade stenosis or large vessel occlusion involving the   major intracranial arteries.   2. Mild atherosclerosis involving the carotid siphons.   3. Small (approximately 1.5-2 mm) anterior superiorly directed focal   outpouching of the right middle cerebral artery bifurcation,   concerning for a possible small saccular aneurysm. Recommend   follow-up.      CTA NECK:   1. Mild bilateral carotid bifurcation atherosclerosis without   significant stenosis.   2. Patent cervical vertebral arteries without significant stenosis or   evidence for dissection.      Findings from the noncontrast head CT and CTA head and neck were   discussed with Dr. Dias by myself at  11:04 AM on 5/11/2023.      JENNIFER PEREZ MD            SYSTEM ID:  [id number]      CT Head w/o Contrast   Final Result   Abnormal   IMPRESSION:   1. New small hyperdense lesion in the anterior central aspect of the   casimiro is not entirely specific, but is concerning for a small area of   acute parenchymal hemorrhage. MRI of the brain without and with   contrast is recommended for further evaluation.   2. Otherwise unchanged chronic findings, as described. Please see the   body of the report for details.      [Critical Result: Possible small acute pontine hemorrhage. MRI   recommended for further characterization.]      Finding was identified on 5/11/2023 10:53 AM.       Dr. Dias was contacted by Dr. Perez on 5/11/2023 11:04 AM and   verbalized understanding of the  critical result.       JENNIFER WEAVER MD            SYSTEM ID:  [id number]         Report per radiology    Laboratory:  Labs Ordered and Resulted from Time of ED Arrival to Time of ED Departure   GLUCOSE BY METER - Abnormal       Result Value    GLUCOSE BY METER POCT 280 (*)    BASIC METABOLIC PANEL - Abnormal    Sodium 142      Potassium 4.5      Chloride 100      Carbon Dioxide (CO2) 32 (*)     Anion Gap 10      Urea Nitrogen 12.8      Creatinine 0.73      Calcium 9.9      Glucose 271 (*)     GFR Estimate 86     CBC WITH PLATELETS - Normal    WBC Count 7.9      RBC Count 4.66      Hemoglobin 13.7      Hematocrit 43.5      MCV 93      MCH 29.4      MCHC 31.5      RDW 12.5      Platelet Count 238     PARTIAL THROMBOPLASTIN TIME - Normal    aPTT 25     INR - Normal    INR 1.00     TROPONIN T, HIGH SENSITIVITY - Normal    Troponin T, High Sensitivity 10     GLUCOSE MONITOR NURSING POCT      Emergency Department Course & Assessments:     Interventions:  Medications   niCARdipine 40 mg in 200 mL NS (CARDENE) infusion (has no administration in time range)   gadobutrol (GADAVIST) injection 7 mL (has no administration in time range)   0.9% sodium chloride BOLUS (0 mLs Intravenous Stopped 5/11/23 1151)   iopamidol (ISOVUE-370) solution 500 mL (75 mLs Intravenous $Given 5/11/23 1047)   hydrALAZINE (APRESOLINE) injection 10 mg (10 mg Intravenous $Given 5/11/23 1151)   hydrALAZINE (APRESOLINE) injection 20 mg (20 mg Intravenous $Given 5/11/23 1222)   gadobutrol (GADAVIST) injection 10 mL (10 mLs Intravenous $Given 5/11/23 1252)      Assessments:  1030 I met with patient after reviewing notes, past charts, and vitals.    Independent Interpretation (X-rays, CTs, rhythm strip):      Consultations/Discussion of Management or Tests:  1041 I consulted with Marielle Espino NP from Stroke Neuro.  1325 I consulted with Estrella Lewis NP from Neuro Surgery.  1343 I consulted with Marielle Espino NP from Stroke Neuro.    Social Determinants  of Health affecting care:   None    Disposition:  The patient was transferred to [facility] via EMS. Dr. Hanna accepted the patient for transfer.     Impression & Plan        Medical Decision Making:  Patient presents with chief complaint right-sided weakness.  Tier 1 code stroke activated based on pronator drift, and report of right-sided facial droop, as well as inability to walk.  Vital signs notable for hypertension.  CT/CTA of the head and neck suggestive of possible pontine hemorrhage.  This was confirmed on follow-up MRI.  Attempted to treat hypertension with as needed hydralazine, but ultimately a nicardipine drip needed to be initiated with good blood pressure control.  Discussed with neurology as well as neurosurgery.  Patient will need repeat head CT about 6 hours after the MRI, as well as every hour neurochecks and neuro critical care.  Bed available at [facility] and I spoke with hospitalist, Dr. Hanna, who accepts patient for transfer.  Patient and son are in agreement with the plan.  All questions answered.    Critical care time: 35 minutes excluding procedures    Diagnosis:    ICD-10-CM    1. 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 Right sided weakness  R53.1            Scribe Disclosure:  Rachna LEAHY, am serving as a scribe at 10:39 AM on 5/11/2023 to document services personally performed by Shaggy Wood MD based on my observations and the provider's statements to me.         Shaggy Wood MD  05/11/23 7057

## 2023-05-12 ENCOUNTER — APPOINTMENT (OUTPATIENT)
Dept: PHYSICAL THERAPY | Facility: CLINIC | Age: 73
DRG: 065 | End: 2023-05-12
Attending: INTERNAL MEDICINE
Payer: COMMERCIAL

## 2023-05-12 ENCOUNTER — APPOINTMENT (OUTPATIENT)
Dept: GENERAL RADIOLOGY | Facility: CLINIC | Age: 73
DRG: 065 | End: 2023-05-12
Attending: INTERNAL MEDICINE
Payer: COMMERCIAL

## 2023-05-12 ENCOUNTER — APPOINTMENT (OUTPATIENT)
Dept: SPEECH THERAPY | Facility: CLINIC | Age: 73
DRG: 065 | End: 2023-05-12
Attending: INTERNAL MEDICINE
Payer: COMMERCIAL

## 2023-05-12 ENCOUNTER — APPOINTMENT (OUTPATIENT)
Dept: OCCUPATIONAL THERAPY | Facility: CLINIC | Age: 73
DRG: 065 | End: 2023-05-12
Attending: INTERNAL MEDICINE
Payer: COMMERCIAL

## 2023-05-12 LAB
ATRIAL RATE - MUSE: 68 BPM
CHOLEST SERPL-MCNC: 179 MG/DL
CRP SERPL-MCNC: 33.32 MG/L
DIASTOLIC BLOOD PRESSURE - MUSE: NORMAL MMHG
ERYTHROCYTE [SEDIMENTATION RATE] IN BLOOD BY WESTERGREN METHOD: 29 MM/HR (ref 0–30)
GLUCOSE BLDC GLUCOMTR-MCNC: 105 MG/DL (ref 70–99)
GLUCOSE BLDC GLUCOMTR-MCNC: 110 MG/DL (ref 70–99)
GLUCOSE BLDC GLUCOMTR-MCNC: 113 MG/DL (ref 70–99)
GLUCOSE BLDC GLUCOMTR-MCNC: 149 MG/DL (ref 70–99)
GLUCOSE BLDC GLUCOMTR-MCNC: 177 MG/DL (ref 70–99)
GLUCOSE BLDC GLUCOMTR-MCNC: 269 MG/DL (ref 70–99)
HDLC SERPL-MCNC: 52 MG/DL
HOLD SPECIMEN: NORMAL
INTERPRETATION ECG - MUSE: NORMAL
LDLC SERPL CALC-MCNC: 101 MG/DL
NONHDLC SERPL-MCNC: 127 MG/DL
P AXIS - MUSE: 45 DEGREES
PR INTERVAL - MUSE: 136 MS
QRS DURATION - MUSE: 82 MS
QT - MUSE: 416 MS
QTC - MUSE: 442 MS
R AXIS - MUSE: -27 DEGREES
SYSTOLIC BLOOD PRESSURE - MUSE: NORMAL MMHG
T AXIS - MUSE: 30 DEGREES
TRIGL SERPL-MCNC: 132 MG/DL
VENTRICULAR RATE- MUSE: 68 BPM

## 2023-05-12 PROCEDURE — 92610 EVALUATE SWALLOWING FUNCTION: CPT | Mod: GN

## 2023-05-12 PROCEDURE — 97535 SELF CARE MNGMENT TRAINING: CPT | Mod: GO

## 2023-05-12 PROCEDURE — 97530 THERAPEUTIC ACTIVITIES: CPT | Mod: GP

## 2023-05-12 PROCEDURE — 92611 MOTION FLUOROSCOPY/SWALLOW: CPT | Mod: GN

## 2023-05-12 PROCEDURE — 250N000011 HC RX IP 250 OP 636: Performed by: STUDENT IN AN ORGANIZED HEALTH CARE EDUCATION/TRAINING PROGRAM

## 2023-05-12 PROCEDURE — 80061 LIPID PANEL: CPT | Performed by: PHYSICIAN ASSISTANT

## 2023-05-12 PROCEDURE — 86140 C-REACTIVE PROTEIN: CPT | Performed by: PSYCHIATRY & NEUROLOGY

## 2023-05-12 PROCEDURE — 250N000013 HC RX MED GY IP 250 OP 250 PS 637: Performed by: PHYSICIAN ASSISTANT

## 2023-05-12 PROCEDURE — 99291 CRITICAL CARE FIRST HOUR: CPT | Mod: FS | Performed by: PHYSICIAN ASSISTANT

## 2023-05-12 PROCEDURE — 250N000011 HC RX IP 250 OP 636: Performed by: PHYSICIAN ASSISTANT

## 2023-05-12 PROCEDURE — 87040 BLOOD CULTURE FOR BACTERIA: CPT | Performed by: PSYCHIATRY & NEUROLOGY

## 2023-05-12 PROCEDURE — 36415 COLL VENOUS BLD VENIPUNCTURE: CPT | Performed by: PSYCHIATRY & NEUROLOGY

## 2023-05-12 PROCEDURE — 74230 X-RAY XM SWLNG FUNCJ C+: CPT

## 2023-05-12 PROCEDURE — 99292 CRITICAL CARE ADDL 30 MIN: CPT | Performed by: PHYSICIAN ASSISTANT

## 2023-05-12 PROCEDURE — 97162 PT EVAL MOD COMPLEX 30 MIN: CPT | Mod: GP

## 2023-05-12 PROCEDURE — 99233 SBSQ HOSP IP/OBS HIGH 50: CPT | Performed by: STUDENT IN AN ORGANIZED HEALTH CARE EDUCATION/TRAINING PROGRAM

## 2023-05-12 PROCEDURE — 250N000013 HC RX MED GY IP 250 OP 250 PS 637: Performed by: STUDENT IN AN ORGANIZED HEALTH CARE EDUCATION/TRAINING PROGRAM

## 2023-05-12 PROCEDURE — 36415 COLL VENOUS BLD VENIPUNCTURE: CPT | Performed by: PHYSICIAN ASSISTANT

## 2023-05-12 PROCEDURE — 85652 RBC SED RATE AUTOMATED: CPT | Performed by: PSYCHIATRY & NEUROLOGY

## 2023-05-12 PROCEDURE — 120N000013 HC R&B IMCU

## 2023-05-12 PROCEDURE — 82962 GLUCOSE BLOOD TEST: CPT

## 2023-05-12 PROCEDURE — 97166 OT EVAL MOD COMPLEX 45 MIN: CPT | Mod: GO

## 2023-05-12 RX ORDER — TRIAMCINOLONE ACETONIDE 1 MG/G
OINTMENT TOPICAL 2 TIMES DAILY PRN
COMMUNITY

## 2023-05-12 RX ORDER — AMLODIPINE BESYLATE 10 MG/1
10 TABLET ORAL DAILY
Status: DISCONTINUED | OUTPATIENT
Start: 2023-05-12 | End: 2023-05-12

## 2023-05-12 RX ORDER — SIMVASTATIN 20 MG
20 TABLET ORAL AT BEDTIME
Status: ON HOLD | COMMUNITY
End: 2023-05-15

## 2023-05-12 RX ORDER — FLUTICASONE PROPIONATE 44 UG/1
2 AEROSOL, METERED RESPIRATORY (INHALATION) 2 TIMES DAILY
COMMUNITY
End: 2023-10-23 | Stop reason: ALTCHOICE

## 2023-05-12 RX ORDER — NICOTINE POLACRILEX 4 MG
15-30 LOZENGE BUCCAL
Status: DISCONTINUED | OUTPATIENT
Start: 2023-05-12 | End: 2023-05-15 | Stop reason: HOSPADM

## 2023-05-12 RX ORDER — LOSARTAN POTASSIUM 50 MG/1
100 TABLET ORAL DAILY
Status: DISCONTINUED | OUTPATIENT
Start: 2023-05-12 | End: 2023-05-13

## 2023-05-12 RX ORDER — DEXTROSE MONOHYDRATE 25 G/50ML
25-50 INJECTION, SOLUTION INTRAVENOUS
Status: DISCONTINUED | OUTPATIENT
Start: 2023-05-12 | End: 2023-05-15 | Stop reason: HOSPADM

## 2023-05-12 RX ORDER — NITROGLYCERIN 0.4 MG/1
0.4 TABLET SUBLINGUAL EVERY 5 MIN PRN
Status: DISCONTINUED | OUTPATIENT
Start: 2023-05-12 | End: 2023-05-15 | Stop reason: HOSPADM

## 2023-05-12 RX ORDER — ATORVASTATIN CALCIUM 40 MG/1
40 TABLET, FILM COATED ORAL EVERY EVENING
Status: DISCONTINUED | OUTPATIENT
Start: 2023-05-12 | End: 2023-05-15 | Stop reason: HOSPADM

## 2023-05-12 RX ORDER — METOPROLOL SUCCINATE 50 MG/1
50 TABLET, EXTENDED RELEASE ORAL DAILY
COMMUNITY

## 2023-05-12 RX ORDER — VITAMIN B COMPLEX
25 TABLET ORAL DAILY
COMMUNITY
End: 2023-10-23

## 2023-05-12 RX ORDER — SIMVASTATIN 40 MG
40 TABLET ORAL AT BEDTIME
Status: DISCONTINUED | OUTPATIENT
Start: 2023-05-12 | End: 2023-05-12

## 2023-05-12 RX ORDER — LIDOCAINE 40 MG/G
CREAM TOPICAL
Status: DISCONTINUED | OUTPATIENT
Start: 2023-05-12 | End: 2023-05-15 | Stop reason: HOSPADM

## 2023-05-12 RX ADMIN — ATORVASTATIN CALCIUM 40 MG: 40 TABLET, FILM COATED ORAL at 21:03

## 2023-05-12 RX ADMIN — HYDRALAZINE HYDROCHLORIDE 10 MG: 20 INJECTION INTRAMUSCULAR; INTRAVENOUS at 09:22

## 2023-05-12 RX ADMIN — LOSARTAN POTASSIUM 100 MG: 50 TABLET, FILM COATED ORAL at 18:22

## 2023-05-12 RX ADMIN — HYDRALAZINE HYDROCHLORIDE 10 MG: 20 INJECTION INTRAMUSCULAR; INTRAVENOUS at 17:07

## 2023-05-12 RX ADMIN — HYDRALAZINE HYDROCHLORIDE 10 MG: 20 INJECTION INTRAMUSCULAR; INTRAVENOUS at 04:59

## 2023-05-12 ASSESSMENT — ACTIVITIES OF DAILY LIVING (ADL)
ADLS_ACUITY_SCORE: 53
ADLS_ACUITY_SCORE: 47
ADLS_ACUITY_SCORE: 53
ADLS_ACUITY_SCORE: 43
ADLS_ACUITY_SCORE: 53
ADLS_ACUITY_SCORE: 43

## 2023-05-12 NOTE — PLAN OF CARE
Goal Outcome Evaluation:  Alert. Oriented to self and to hospital. Slight R facial droop. BLE weakness. BUE equally strong. Follows most commands. NIH = 8. Denies pain. Elevated BP, received prn hydralazine x1 and PO antihypertensive medication restarted this evening. Fair appetite on soft and bite sized diet with thin liquids, eating wit assistance. Up to chair with assist of 2, belt, walker. Speaks some English, son also assisting with language barrier. Family at bedside. Scoring green on aggression screening tool.

## 2023-05-12 NOTE — PROGRESS NOTES
"Clinical Swallow Evaluation (CSE):     05/12/23 0901   Appointment Info   Signing Clinician's Name / Credentials (SLP) Tanya Ocasio MS CCC-SLP   General Information   Onset of Illness/Injury or Date of Surgery 05/11/23   Referring Physician Dr. Hanna   Patient/Family Therapy Goal Statement (SLP) To eat/drink   Pertinent History of Current Problem   Per H&P \"Past medical history significant for HTN, HLP, DM2, Obesity, Uncomplicated asthma, Known lung nodules, History of CVA who was transferred to [facility] due to ICH within the ventral casimiro (corresponding with an acute or subacute hemorrhage and numerous chronic microhemorrhages.\"     General Observations   Pt fully alert, upright. Son present and assisting in interpretation as language not available via  services. Son reports pt with some word finding difficulties. Son reports significant improvemnets in R sided symmetry/strength today compared to yesterday --- pt was able to brush teeth, self-feed (cup, tsp) using R UE.         Present   (Son, see above)   Pain Assessment   Patient Currently in Pain No   Type of Evaluation   Type of Evaluation Swallow Evaluation   Oral Motor   Oral Musculature anomalies present   Structural Abnormalities none present   Mucosal Quality good   Dentition (Oral Motor)   Dentition (Oral Motor) natural dentition;some missing teeth   Facial Symmetry (Oral Motor)   Facial Symmetry (Oral Motor) right side impairment  (very minimal)   Comment, Facial Symmetry (Oral Motor) Pt reports no numbness or tingling today, ? son describing tingling yesterday   Lip Function (Oral Motor)   Lip Range of Motion (Oral Motor) WNL   Tongue Function (Oral Motor)   Tongue ROM (Oral Motor) lateralization is impaired  (minimal reduced R)   Jaw Function (Oral Motor)   Jaw Function (Oral Motor) WNL   Cough/Swallow/Gag Reflex (Oral Motor)   Soft Palate/Velum (Oral Motor) WNL   Volitional Throat Clear/Cough (Oral Motor) WNL "   Volitional Swallow (Oral Motor) WNL   Vocal Quality/Secretion Management (Oral Motor)   Vocal Quality (Oral Motor) WNL   Secretion Management (Oral Motor)   (periodic congested cough, per son cough for ~1.5 - 2 weeks)   General Swallowing Observations   Past History of Dysphagia Son reports pt coughing directly following all PO yesterday that was trialed before hospital. At baseline regular/thin.   Respiratory Support (General Swallowing Observations) none   Current Diet/Method of Nutritional Intake (General Swallowing Observations, NIS) NPO   Swallowing Evaluation Clinical swallow evaluation   Clinical Swallow Evaluation   Feeding Assistance set up only required   Additional evaluation(s) completed today Yes;Recommended   Rationale for completing additional evaluation casimiro = high risk for pharyngeal dysphagia, apsiration; signs/sx potential aspiration at bedside   Clinical Swallow Evaluation Textures Trialed thin liquids;mildly thick liquids;pureed   Clinical Swallow Eval: Thin Liquid Texture Trial   Mode of Presentation, Thin Liquids cup;self-fed;spoon   Volume of Liquid or Food Presented ice chips x5, cup sips x3   Oral Phase of Swallow premature pharyngeal entry   Pharyngeal Phase of Swallow coughing/choking   Diagnostic Statement WFL tolerance ice chips, cough on 3/3 cup sips   Clinical Swallow Eval: Mildly Thick Liquids   Mode of Presentation cup;self-fed   Volume Presented x6 sips   Oral Phase premature pharyngeal entry   Pharyngeal Phase coughing/choking   Diagnostic Statement delayed cough on 2/6 sips   Clinical Swallow Evaluation: Puree Solid Texture Trial   Mode of Presentation, Puree spoon;self-fed   Volume of Puree Presented x3 bites   Oral Phase, Puree WFL   Pharyngeal Phase, Puree intact   Diagnostic Statement no overt signs/sx aspiration noted   Esophageal Phase of Swallow   Patient reports or presents with symptoms of esophageal dysphagia No   Swallowing Recommendations   Diet Consistency  Recommendations NPO;ice chips only  (until VFSS)   Medication Administration Recommendations, Swallowing (SLP) if essential meds prior to VFSS: rec crush with puree   Instrumental Assessment Recommendations VFSS (videofluoroscopic swallowing study)   General Therapy Interventions   Planned Therapy Interventions Dysphagia Treatment   Clinical Impression   Criteria for Skilled Therapeutic Interventions Met (SLP Eval) Yes, treatment indicated   SLP Diagnosis minimal oral, suspected pharyngeal dysphagia   Risks & Benefits of therapy have been explained evaluation/treatment results reviewed;care plan/treatment goals reviewed;risks/benefits reviewed;current/potential barriers reviewed;participants voiced agreement with care plan;participants included;patient;son   Clinical Impression Comments   Clinical swallow evaluation completed. Oromotor eval notable for very minimal R sided asymmetry/weakness, improving significantly since prior day per son. Clinical trials of ice chips, thin liquids, mildly thick liquids, puree solids completed. WFL labial seal and oral containment noted. WFL mastication with ice chips. No oral residuals noted across observed consistencies. No percievable delay in pharyngeal swallow with notable laryngeal elevation to palpation. Potential signs/sx aspiration (cough on 3/3 thin liquid cup sips, 2/6 mildly thick liquid cup sips) - given casimiro involvement, higher chance for pharyngeal dysphagia, aspiration (+silent aspiration) risk. Educated pt/son on same, recommendations for VFSS and they verbalized agreement with further assessment until PO initiation, scheduled for 1300.     SLP Total Evaluation Time   Eval: oral/pharyngeal swallow function, clinical swallow Minutes (56560) 26   SLP Goals   Therapy Frequency (SLP Eval) daily   SLP Predicted Duration/Target Date for Goal Attainment 05/19/23   SLP Goals Swallow   SLP: Safely tolerate diet without signs/symptoms of aspiration Regular diet;Thin  liquids;With use of swallow precautions;Independently   SLP Discharge Planning   SLP Plan VFSS   SLP Discharge Recommendation   (TBD pending VFSS results)   SLP Rationale for DC Rec TBD pending VFSS results   SLP Brief overview of current status  NPO except for ice chips until VFSS at 1300   Total Session Time   Total Session Time (sum of timed and untimed services) 26

## 2023-05-12 NOTE — PROGRESS NOTES
[facility]    Neurosurgery  Daily Note    Assessment & Plan   72-year-old right-handed female with a past medical history of cerebral artery occlusion and cerebral infarction, type 2 diabetes, hypertension, morbid obesity, osteoporosis, endometrial hyperplasia, asthma, hyperlipidemia, hyperparathyroidism, osteoporosis, multiple lung nodules, renal oncocytoma of right kidney, cataracts, carpal tunnel syndrome and cerebral degeneration, who presented to [facility] Emergency Room 5/11/23 with right-sided weakness with imaging findings revealing a pontine hemorrhage.     AM ROUNDS- rpt imaging stable. Neuro intact.    Plan:  -rpt imaging per neurology   -no surgical intervention recommended  -our team will sign off with plans for follow up in 1 month with head CT   -Dr. Crook has reviewed all history, imaging and in agreement with plans   -page or call with questions     Mare CHAVEZ [facility] Neurosurgery  [address] [address]    Tel [phone]  Pager [phone]    Principal Problem:    ICH (intracerebral hemorrhage) (H)      Mare Cabrera PA-C    Interval History   Stable     Physical Exam       BP: 130/59 Pulse: (!) 49   Resp: 26 SpO2: 100 % O2 Device: Nasal cannula Oxygen Delivery: 2 LPM  Vitals:    05/12/23 0600   Weight: 164 lb 14.5 oz (74.8 kg)     Vital Signs with Ranges  Pulse:  [48-84] 49  Resp:  [11-48] 26  BP: ()/() 130/59  SpO2:  [85 %-100 %] 100 %  I/O last 3 completed shifts:  In: 76.29 [I.V.:76.29]  Out: 250 [Urine:250]    Awake, alert, oriented  II-XII grossly intact  MOON symmetrically   Negative clonus   Negative drift       Medications     niCARdipine Stopped (05/12/23 0040)        insulin aspart  1-6 Units Subcutaneous Q4H       Plans discussed with Dr. Crook who was in agreement with plans    Mare CHAVEZ [facility] Neurosurgery  [address]  [address] [address]    Tel [phone]  Pager [phone]

## 2023-05-12 NOTE — PROGRESS NOTES
[facility]    Medicine Progress Note - Hospitalist Service    Date of Admission:  5/11/2023    Assessment & Plan   Pilo Quintana is a 72 year old female admitted on 5/11/2023.     Past medical history significant for HTN, HLP, DM2, Obesity, Uncomplicated asthma, Known lung nodules, History of CVA who was transferred to [facility] due to ICH within the ventral casimiro (corresponding with an acute or subacute hemorrhage and numerous chronic microhemorrhages.       Patient presented to [facility] ED with right sided weakness.  Patient's son reported he noted the patient was in her normal state of health when he returned from work this morning at 7:30AM.  Patient then went to the bathroom (~ 8:05AM) to shower and was noted to be in there for an extended period of time and when asked she would say she was fine.  Family members went into the bathroom to check on her at 8:50AM and she was found leaning over the tub and her right arm over the tub.  She was noted to have a right sided facial droop.  Family mentioned the patient has had some speech difficulties for the past 2 months.       Work-up in the ED included a BMP that revealed a CO2 of 32, glucose of 271 otherwise within normal limits.  CBC with PLT were unremarkable.  INR, PTT and Troponin were all within normal limits.  Head CT w/o contrast revealed a new small hyperdense lesion in the anterior central aspect of the casimiro(concerning for a small area of acute parenchymal hemorrhage and recommending MRI).  Head/Neck CTA revealed mild atherosclerosis involving the carotid siphons and small anterior superiorly directed focal outpouching of the right MCA bifurcation (concerning for small saccular aneurysm).  Stroke Neurology was consulted and advised proceeding with brain MRI and to keep SBP < 140.       Brain MRI was completed and revealed acute/subacute pontine hemorrhage and several chronic microhemorrhages.  Neurosurgery was contacted  and recommended transfer to [facility].       Acute to subacute ICH  - Stroke Neurology consult appreciated  - Neurosurgery consult requested.    - Neuro checks and vital signs every hour.    - Goral SBP LESS THAN 140.  - resume PTA antihypertensives  - Monitor on telemetry.    - PT/OT/SLP consult requested.    - Hold PTA ASA 81 mg/d.    - transfer to neuro floor     Incidental possible small saccular aneurysm  - Stroke Neurology consult requested.    - Neurosurgery consult requested.       HTN  - Holding PTA lasix 20 mg/d  - Nicardipine infusion titrated off  - resume PTA amlodipine and losartan       HLP  - Resume PTA simvastatin 40 mg at bedtime       DM2  Hyperglycemia  - Hold PTA glipizide 5 mg/d and Actos 30 mg/d.    - Medium intensity sliding scale.    - Hypoglycemic protocol.  - A1c 7.8% on 5/11/23     Obesity  Increase in all-cause morbidity and mortality.   - Follow up with PCP regarding ongoing management.       Uncomplicated asthma  - Hold PTA PRN albuterol inhalers.    - PRN Albuterol nebs available.       Known lung nodules  - Follow up with PCP.       History of CVA  - Hold PTA ASA 81 mg/d.         Diet: Combination Diet Soft and Bite Sized Diet (level 6); Thin Liquids (level 0)    DVT Prophylaxis: Pneumatic Compression Devices  Morrison Catheter: Not present  Lines: None     Cardiac Monitoring: ACTIVE order. Indication: See H&P  Code Status: Full Code      Clinically Significant Risk Factors Present on Admission                # Drug Induced Platelet Defect: home medication list includes an antiplatelet medication   # Hypertension: Noted on problem list     # DMII: A1C = 7.8 % (Ref range: <5.7 %) within past 6 months             Disposition Plan     Expected Discharge Date: 05/13/2023                  Austin Ferrara MD  Hospitalist Service  [facility]  Securely message with [employer] (more info)  Text page via [facility] [employer] Paging/Directory    ______________________________________________________________________    Interval History   Feels well. No new deficits. Son at bedside and acting as interpretor.     Physical Exam   Vital Signs:     BP: 130/59 Pulse: (!) 49   Resp: 26 SpO2: 100 % O2 Device: Nasal cannula Oxygen Delivery: 2 LPM  Weight: 164 lbs 14.47 oz    Constitutional: Awake, alert, cooperative, no apparent distress  Respiratory: Clear to auscultation bilaterally, no crackles or wheezing  Cardiovascular: Regular rate and rhythm, normal S1 and S2, and no murmur noted  GI: Normal bowel sounds, soft, non-distended, non-tender  Skin/Integumen: No rashes, no cyanosis, no edema  Other:       Medical Decision Making       MANAGEMENT DISCUSSED with the following over the past 24 hours: neurology, NSX, RN   NOTE(S)/MEDICAL RECORDS REVIEWED over the past 24 hours: H&P, ED note, neuro consult, nsx consult  Tests ORDERED & REVIEWED in the past 24 hours:  - CMP  - CBC  - Coags/INR  - A1c  Tests personally interpreted in the past 24 hours:  - CHEST CT showing CVA  - BRAIN MRI showing CVA  SUPPLEMENTAL HISTORY, in addition to the patient's history, over the past 24 hours obtained from:   - child  Medical complexity over the past 24 hours:  - Intensive monitoring for MEDICATION TOXICITY      Data   ------------------------- PAST 24 HR DATA REVIEWED -----------------------------------------------    I have personally reviewed the following data over the past 24 hrs:    Trop: 15 (H) BNP: N/A       TSH: N/A T4: N/A A1C: 7.8 (H)       Imaging results reviewed over the past 24 hrs:   Recent Results (from the past 24 hour(s))   CT Head w/o Contrast    Narrative    EXAM: CT HEAD W/O CONTRAST  LOCATION: [facility]  DATE/TIME: 5/11/2023 6:54 PM CDT    INDICATION: Reassessment of acute ICH  COMPARISON: CT head same day  TECHNIQUE: Routine CT Head without IV contrast. Multiplanar reformats. Dose reduction techniques were  used.    FINDINGS:    INTRACRANIAL CONTENTS: No acute transcortical infarct. Accounting for differences in patient positioning and slice plane, the 5-6 mm hyperdense focus within the ventral casimiro is not significantly changed. No additional new acute intracranial hemorrhage.   No mass effect. Subarachnoid cisterns are patent. Patchy and confluent white matter hypodensities are, while nonspecific, most compatible with chronic microvascular ischemic changes. Unchanged proportional prominence of the ventricles and sulci is   compatible with diffuse cerebral volume loss.    VISUALIZED ORBITS/SINUSES/MASTOIDS: No visible intraorbital abnormality. Paranasal sinuses are clear. Similar left tympanomastoid opacification.    BONES/SOFT TISSUES: No acute abnormality.      Impression    IMPRESSION:  1.  No significant change in size of 5-6 mm hyperdense focus in the ventral casimiro compatible with hemorrhage since comparison same day CT head.

## 2023-05-12 NOTE — CONSULTS
"      [facility]    Neurocritical Care Consult Note    Reason for Consult:  IPH    Chief Complaint: R sided weakness     HPI  Pilo Quintana is a 72 year old female with pertinent past medical history of HTN, HLD, DM2, pulmonary nodules, significant memory decline (more rapidly since November 2022), prior stroke (in [facility] in 1995 or 1996- does not recall if she had an MRI or CT scan but did have imaging performed and was told there was a stroke, does not remember ever being told of prior brain bleeding but possibly told there were \"some spots in the brain\", reportedly no residual symptoms. On PTA Simvastatin 40 mg daily and ASA 81 mg daily.    She presented to the [facility] ED 5/11/23 due to R sided weakness beginning that day and reported slowed speech and difficulty pronouncing words x 1-2 months. CTH concerning for pontine IPH and several chronic microhemorrhages. Transferred to [facility] ICU for close BP control and neuro checks. Weaned off nicardipine gtt overnight.    Reported that no [employer]  available within our system. Her son was able to help translate. SBP >170s at home for quite a while per her son. Describe event as she was showering and took longer than usual so he asked his girlfriend to go check on her. She was leaning over the tub and had been unable to lift R leg. Her face appeared puffy and red and she was diaphoretic. He attempted to help her walk to the bedroom but she was dragging the R leg. He tried to give her something to eat/drink but she began coughing hard. He was able to push her in the walker seat to the car and drove to the ED.    R sided weakness today has significantly improved.    -Smoking screen: never  -Sleep Apnea screen: denies snoring    Stroke Evaluation Summarized    MRI/Head CT MRI:   1.  Focal susceptibility signal loss within the ventral casimiro  corresponding with an acute or subacute hemorrhage on CT. No  surrounding parenchymal " edema.  2.  Numerous chronic microhemorrhages within the cerebral hemispheres,  casimiro, and right cerebellum.  3.  Advanced chronic microvascular ischemic change and generalized  volume loss.  4.  Focal enhancement within the right basal ganglia likely relating  to subacute to chronic infarction.  Repeat CTH: stable  CTH:concerning for pontine IPH and several chronic microhemorrhages   Intracranial Vasculature CTA head:  1.5-2 mm R MCA sacc aneur, mild  carotid siphon athero   Cervical Vasculature CTA neck: mild b/l carotid bifurc athero no sten     Echocardiogram TTE: pending   EKG/Telemetry SR with sinus arrhythmia, nonspecific ST abnormality   Other Testing Not Applicable     LDL  5/12/2023: 101 mg/dL   A1C  5/11/2023: 7.8 %   Troponin 5/11/2023: 15 ng/L       Impression  Ventral casimiro IPH and several chronic microhemorrhages (both cortical and deep), spontaneous, suspected 2/2 uncontrolled hypertension and/or Cerebral amyloid angiopathy (CAA) in setting of progressive memory decline    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, suspected asymptomatic    Recommendations  -Neurosurgery consulted, no plans for surgical intervention  -Neuro checks and vitals every 2 hours okay with transferring out of ICU if able to do every 2 hours on neuro floor  - Inpatient SBP goal <140, titrate scheduled antihypertensives PRN  -avoid antiplatelets/anticoagulants, given her chronic microhemorrhages and potential of CAA, no obvious prior ischemic infarct on imaging and unclear history if her prior stroke was ischemic or hemorrhagic, from stroke perspective there is hesitancy with restarting ASA, would recommend discussion with her cardiologist if there is cardiac indication for ASA then that would be felt appropriate to continue  -would avoid ibuprofen/other NSAIDs at home  -would recommend switching PTA simvastatin to Lipitor 40 mg daily  -elevate HOB >30 degrees  -telemetry  -PT/OT/SPT  -Euthermia, euglycemia, eunatremia  -Stroke  "Education  -Stroke Class per [facility]    Stroke prevention:  -follow-up with PCP for titration to goal LDL 40-70, <40 increases risk of Intracranial hemorrhage  -goal HgbA1c <7% for stroke prevention, needs tighter control of diabetes, follow-up closely with PCP  -long term outpatient blood pressure goal <130/80, recommend home monitoring twice daily in AM and PM, keep log and bring to PCP follow-up    Discussed with vascular neurology attending, Dr. Castillo    Patient Follow-up    -Follow-up with PCP in 1-2 weeks  -Follow-up with neurology team in 6-8 weeks (ordered)  -Follow-up with neuro IR for R MCA aneurysm (not yet ordered)    Thank you for this consult. We will continue to follow.     China Cat PA-C  Vascular Neurology    To page me or covering stroke neurology team member, click here: [facility]  Choose \"On Call\" tab at top, then select \"NEUROLOGY/ALL SITES\" from middle drop-down box, press Enter, then look for \"stroke\" or \"telestroke\" for your site.    _____________________________________________________    Clinically Significant Risk Factors Present on Admission                # Drug Induced Platelet Defect: home medication list includes an antiplatelet medication   # Hypertension: Noted on problem list     # DMII: A1C = 7.8 % (Ref range: <5.7 %) within past 6 months           Past Medical History   Past Medical History:   Diagnosis Date     Diabetes (H)      Hypertension      Morbid obesity (H)      Osteoarthritis 6/7/2003     PMB (postmenopausal bleeding) 9/16/2014     Renal mass      Thickened endometrium 9/16/2014     Uncomplicated asthma      Unspecified cerebral artery occlusion with cerebral infarction 1998    was in [place], no residual at present     Past Surgical History   Past Surgical History:   Procedure Laterality Date     DILATION AND CURETTAGE, OPERATIVE HYSTEROSCOPY WITH MORCELLATOR, COMBINED N/A 9/25/2014    Procedure: COMBINED DILATION AND CURETTAGE, OPERATIVE " HYSTEROSCOPY WITH MORCELLATOR;  Surgeon: Silverio Christy MD;  Location:  OR     Medications   Home Meds  Prior to Admission medications    Medication Sig Start Date End Date Taking? Authorizing Provider   acetaminophen (TYLENOL) 500 MG tablet Take 1,000 mg by mouth 4/15/21   Reported, Patient   albuterol (2.5 MG/3ML) 0.083% nebulizer solution Take 1 vial by nebulization every 4 hours as needed for shortness of breath / dyspnea or wheezing    Reported, Patient   albuterol (PROAIR HFA/PROVENTIL HFA/VENTOLIN HFA) 108 (90 Base) MCG/ACT inhaler INHALE 2 PUFFS BY MOUTH EVERY 4 HOURS AS NEEDED FOR WHEEZING 5/8/21   Reported, Patient   alendronate (FOSAMAX) 70 MG tablet Take 70 mg by mouth 5/11/21 5/11/22  Reported, Patient   amLODIPine (NORVASC) 10 MG tablet Take 1 tablet by mouth once daily 12/14/20   Reported, Patient   ASPIRIN PO Take 81 mg by mouth    Reported, Patient   benzonatate (TESSALON) 100 MG capsule Take 1 capsule (100 mg) by mouth 3 times daily as needed for cough 1/26/23   Duong Richmond MD   Cholecalciferol (VITAMIN D3) 50 MCG (2000 UT) CAPS TAKE 1 CAPSULE BY MOUTH ONCE DAILY 5/8/21   Reported, Patient   fluticasone (FLOVENT HFA) 110 MCG/ACT inhaler Inhale 2 puffs into the lungs 2 times daily    Reported, Patient   furosemide (LASIX) 20 MG tablet Take 20 mg by mouth daily 5/8/21   Reported, Patient   glipiZIDE (GLUCOTROL XL) 5 MG 24 hr tablet Take 5 mg by mouth daily 3/15/21   Reported, Patient   ibuprofen (ADVIL/MOTRIN) 200 MG tablet Take 200-400 mg by mouth 4/15/21   Reported, Patient   ketotifen (ZADITOR) 0.025 % ophthalmic solution Place 1 drop into both eyes 2 times daily 9/10/21   Pipo Wright MD   losartan (COZAAR) 100 MG tablet Take 100 mg by mouth 10/2/19   Reported, Patient   Pioglitazone HCl (ACTOS PO) Take 30 mg by mouth daily    Reported, Patient   Simvastatin (ZOCOR PO) Take 40 mg by mouth At Bedtime    Reported, Patient   SULINDAC PO Take 200 mg by mouth 2 times daily     Reported, Patient   triamcinolone (KENALOG) 0.1 % external ointment Apply topically 2 times daily 9/10/21   Pipo Wright MD       Scheduled Meds    amLODIPine  10 mg Oral Daily     insulin aspart  1-7 Units Subcutaneous TID AC     insulin aspart  1-5 Units Subcutaneous At Bedtime     ketotifen  1 drop Both Eyes BID     losartan  100 mg Oral Daily     simvastatin  40 mg Oral At Bedtime     sodium chloride (PF)  3 mL Intracatheter Q8H       Infusion Meds      PRN Meds  albuterol, glucose **OR** dextrose **OR** glucagon, hydrALAZINE, labetalol, lidocaine 4%, lidocaine (buffered or not buffered), nitroGLYcerin, ondansetron **OR** ondansetron, polyethylene glycol, prochlorperazine **OR** prochlorperazine **OR** prochlorperazine, senna-docusate **OR** senna-docusate, sodium chloride (PF)    Allergies   No Known Allergies  Family History   No family history on file.  Social History   Social History     Tobacco Use     Smoking status: Never     Smokeless tobacco: Never   Substance Use Topics     Alcohol use: No     Drug use: No       Review of Systems   The 10 point Review of Systems is negative other than noted in the HPI or here.        PHYSICAL EXAMINATION   Temp:  [98  F (36.7  C)] 98  F (36.7  C)  Pulse:  [48-84] 75  Resp:  [11-48] 24  BP: ()/() 137/84  SpO2:  [85 %-100 %] 95 %    General Exam  General:  Patient sitting in recliner without any acute distress    HEENT:  normocephalic/atraumatic  Pulmonary:  no respiratory distress     Neuro Exam  Mental Status:  alert, oriented to her nickname only, not her age (although son clarifies that she is actually older than her documented [place] age), does not know the date which is also normal for her, follows commands, speech clear and fluent, naming and repetition normal  Cranial Nerves:  visual fields intact, PERRL, EOMI with normal smooth pursuit, facial sensation intact and symmetric, mild R facial droop, hearing not formally tested but  intact to conversation, no dysarthria, tongue protrusion midline  Motor:  No effort against gravity to b/l LE, no drift to b/l UE but decreased  strength on the R compared to L  Reflexes:  toes down-going  Sensory:  light touch sensation intact and symmetric throughout upper and lower extremities, no extinction on double simultaneous stimulation   Coordination:  normal finger-to-nose b/l, not able to perform heel-to-shin bilaterally due to weakness dysmetria  Station/Gait:  deferred    Dysphagia Screen  Dysarthria or facial droop present - Maintain NPO, consult SLP    Stroke Scales    NIHSS  1a. Level of Consciousness 0-->Alert, keenly responsive   1b. LOC Questions 2-->Answers neither question correctly   1c. LOC Commands 0-->Performs both tasks correctly   2.   Best Gaze 0-->Normal   3.   Visual 0-->No visual loss   4.   Facial Palsy (S) 1-->Minor paralysis (flattened nasolabial fold, asymmetry on smiling) (R)   5a. Motor Arm, Left 0-->No drift, limb holds 90 (or 45) degrees for full 10 secs   5b. Motor Arm, Right 0-->No drift, limb holds 90 (or 45) degrees for full 10 secs   6a. Motor Leg, Left 3-->No effort against gravity, leg falls to bed immediately   6b. Motor Leg, right 3-->No effort against gravity, leg falls to bed immediately   7.   Limb Ataxia 0-->Absent   8.   Sensory 0-->Normal, no sensory loss   9.   Best Language 0-->No aphasia, normal   10. Dysarthria 0-->Normal   11. Extinction and Inattention  0-->No abnormality   Total 9 (05/12/23 1628)       Modified Efraín Score (Pre-morbid)  (S) 4 (significant memory decline, requires help with ADLs and cooking and occasional walker use) - (S) Moderately severe disability.  Unable to attend to own bodily needs without assistance or unable to walk unassisted. (significant memory decline, requires help with ADLs and cooking and occasional walker use)     Imaging  I personally reviewed all imaging; relevant findings per HPI.    Labs Data   CBC  Recent Labs    Lab 05/11/23  1047   WBC 7.9   RBC 4.66   HGB 13.7   HCT 43.5        Basic Metabolic Panel   Recent Labs   Lab 05/12/23  1420 05/12/23  0830 05/12/23  0559 05/11/23  1730 05/11/23  1047   NA  --   --   --   --  142   POTASSIUM  --   --   --   --  4.5   CHLORIDE  --   --   --   --  100   CO2  --   --   --   --  32*   BUN  --   --   --   --  12.8   CR  --   --   --   --  0.73   * 105* 113*   < > 271*   GLENIS  --   --   --   --  9.9    < > = values in this interval not displayed.     Liver Panel  No results for input(s): PROTTOTAL, ALBUMIN, BILITOTAL, ALKPHOS, AST, ALT, BILIDIRECT in the last 168 hours.  INR    Recent Labs   Lab Test 05/11/23  1047 01/22/16  0958   INR 1.00 1.1      Lipid Profile    Recent Labs   Lab Test 05/12/23  0512   CHOL 179   HDL 52   *   TRIG 132     A1C    Recent Labs   Lab Test 05/11/23 2006   A1C 7.8*     Troponin    Recent Labs   Lab 05/11/23 2006 05/11/23  1047   CTROPT 15* 10          Stroke Consult Data Data   This was a non-emergent, non-telestroke consult.  I personally examined and evaluated the patient today. At the time of my evaluation and management the patient was in critical condition today due to [facility]. I personally managed review of chart, medical record, meds, imaging, history, exam, and discussion with attending regarding plan and documentation. I spent a total of 70 minutes providing critical care services, evaluating the patient, directing care and reviewing laboratory values and radiologic reports.

## 2023-05-12 NOTE — PLAN OF CARE
Shift 0700-pt transfer to [facility]: VSS ex. HTN. Neuro: Orientation fluctuates from disoriented to time to A/O x 4; follows simple commands inconsistently--neuro assessments difficult and/or JOLIE certain neuro checks/questions at times w/out interpretor due to language barrier. Pulm: Lungs: clear/diminished in upper lobes bilaterally and diminished in bases bilaterally, on RA. GI/: BS present, no BM; Void: WDL output. Diet: level 6 w/ thin liquids. Access: PIV. Denies pain. Activity: Up to chair w/ assist x 2 w/ gait belt and walker. Family updated at bedside throughout shift    AM Shift:  - Video swallow completed--diet advanced  - Up to chair and commode today--worked w/ PT/OT  - Neuro checks changed to Q2  - Pt transferred to [facility]

## 2023-05-12 NOTE — PROGRESS NOTES
Neuro: No changes in neuro status, Q1 hour checks, please see flowsheet. Neuro assesment made difficult due to language barrier, no  available for patient's language  CV: SR/SB Nicardipine off 0040, PRN Hydralazine given once for systolics above 140  Resp: Coarse lungs, cough, placed on 2L NC while sleeping due to desaturation in to high 70s low 80s  GI/: Voided once in bedside commode, no BM  Skin: No notable skin issues  Pain/Gtts: No drips  Family: Son bedside at beginning of shift, able to translate.

## 2023-05-12 NOTE — PHARMACY-ADMISSION MEDICATION HISTORY
Pharmacist Admission Medication History    Admission medication history is complete. The information provided in this note is only as accurate as the sources available at the time of the update.    Medication reconciliation/reorder completed by provider prior to medication history? Yes    Information Source(s): Family member, Prescription bottles and CareEverywhere/SureScripts via in-person   -reviewed Sure[employer]s   -son at bedside had photos of pill bottles (see pertinent information)     Pertinent Information: Son had photos of all pills except for amlodipine. Photos included prescription bottle for 2000 units vitamin D and OTC product of 1000 units vitamin D. Son verified patient has albuterol and Flovent inhalers and PRN triamcinolone as well. Son thinks patient is taking amlodipine, despite no fill history in the last 12 months. Son states patient is taking 7 pills per day. Based on PTA med list (and son stating patient is taking both vitamin D3 prudcts), total of scheduled PO medications below is 8, not 7.     Changes made to PTA medication list:    Added: metoprolol     Deleted: albuterol neb PRN (no fill history, son states patient using albuterol inhaler only), fosamax 70 mg weekly (no fill history, son verifies patient not taking), aspirin 81 mg daily (son verifies patient not taking), benzonatate 100 mg TID PRN (son verifies patient only taking steroid inhaler for cough), ibuprofen PRN (son states patient taking acetaminophen only), ketotifen 0.025% eye drop 1 drop both eyes BID (no fill history, son verifies patient not taking any Rx eye drops), pioglitazone 30 mg daily (no fill history, son verifies patient not taking), sulindac 200 mg BID (son states patient taking acetaminophen only)    Changed: acetaminophen to PRN, vitamin D 2000 unit and 1000 units per day/ entries, flovent 110 mcg/act --> 44 mcg/act (per fill history), added frequency to losartan dosing, simvastatin 40 mg --> 20 mg at  bedtime, triamcinolone to PRN     ADDENDUM: removed amlodipine from PTA med list; RN called to inform writer that patient's son thinks outpatient provider stopped amlodipine. Care Everywhere notes from 3/2023 indicated dose may have been reduced at that time from 10 mg to 5 mg due to edema, but did not find mention of discontinuing; however, lack of fill history is consistent with patient not taking amlodipine.     Medication Affordability:  Not including over the counter (OTC) medications, was there a time in the past 3 months when you did not take your medications as prescribed because of cost?: No    Allergies reviewed with patient and updates made in EHR: yes    Medication History Completed By: Kelly Shah [facility] 5/12/2023 5:40 PM    Medication Sig Last Dose Taking? Auth Provider   acetaminophen (TYLENOL) 500 MG tablet Take 500-1,000 mg by mouth every 8 hours as needed for mild pain Unknown at PRN Yes Reported, Patient   albuterol (PROAIR HFA/PROVENTIL HFA/VENTOLIN HFA) 108 (90 Base) MCG/ACT inhaler INHALE 2 PUFFS BY MOUTH EVERY 4 HOURS AS NEEDED FOR WHEEZING Unknown at PRN Yes Reported, Patient   Cholecalciferol (VITAMIN D3) 50 MCG (2000 UT) CAPS Take 50 mcg by mouth daily 2000 unit dose is prescribed; takes Rx product in addition to OTC 25 mcg/1000 unit dose Unknown at takes in AM Yes Reported, Patient   fluticasone (FLOVENT HFA) 44 MCG/ACT inhaler Inhale 2 puffs into the lungs 2 times daily Past Week at Unknown time Yes Unknown, Entered By History   furosemide (LASIX) 20 MG tablet Take 20 mg by mouth daily Unknown at takes in AM Yes Reported, Patient   glipiZIDE (GLUCOTROL XL) 5 MG 24 hr tablet Take 5 mg by mouth daily Unknown at takes in AM Yes Reported, Patient   losartan (COZAAR) 100 MG tablet Take 100 mg by mouth daily Unknown at takes in AM Yes Reported, Patient   metoprolol succinate ER (TOPROL XL) 50 MG 24 hr tablet Take 50 mg by mouth daily Unknown at takes in AM Yes Unknown, Entered By History    simvastatin (ZOCOR) 20 MG tablet Take 20 mg by mouth At Bedtime Unknown at takes in PM Yes Unknown, Entered By History   triamcinolone (KENALOG) 0.1 % external ointment Apply topically 2 times daily as needed for irritation Unknown at PRN Yes Unknown, Entered By History   Vitamin D3 (CHOLECALCIFEROL) 25 mcg (1000 units) tablet Take 25 mcg by mouth daily Takes OTC product in addition to prescribed 50 mcg/2000 unit dose Unknown at takes in AM Yes Unknown, Entered By History

## 2023-05-12 NOTE — PROGRESS NOTES
"Video Fluoroscopic Swallow Study (VFSS)     05/12/23 0911   Appointment Info   Signing Clinician's Name / Credentials (SLP) Tanya Ocasio MS CCC-SLP   General Information   Onset of Illness/Injury or Date of Surgery 05/11/23   Referring Physician Dr. Hanna   Patient/Family Therapy Goal Statement (SLP) To eat/drink   Pertinent History of Current Problem Per H&P \"Past medical history significant for HTN, HLP, DM2, Obesity, Uncomplicated asthma, Known lung nodules, History of CVA who was transferred to [facility] due to ICH within the ventral casimiro (corresponding with an acute or subacute hemorrhage and numerous chronic microhemorrhages.\"   General Observations Pt upright in chair for VFSS, son went home and wasn't there to assist with interpretation. Pt able to follow simple 1 step directions in English.   Type of Evaluation   Type of Evaluation Swallow Evaluation   General Swallowing Observations   Swallowing Evaluation Videofluoroscopic swallow study (VFSS)   VFSS Evaluation   Radiologist Dr. Perez   Views Taken left lateral   Physical Location of Procedure [facility], Radiology Dept, Fluoroscopy Suite   VFSS Textures Trialed thin liquids;pureed;solid foods   VFSS Eval: Thin Liquid Texture Trial   Mode of Presentation, Thin Liquid spoon;cup;straw   Order of Presentation 1, 2, 3, 6   Preparatory Phase poor bolus control   Oral Phase, Thin Liquid premature pharyngeal entry   Bolus Location When Swallow Triggered pyriforms   Pharyngeal Phase, Thin Liquid impaired hyolaryngeal excursion;impaired epiglottic movement;impaired tongue base retraction   Rosenbek's Penetration Aspiration Scale: Thin Liquid Trial Results 2 - contrast enters airway, remains above the vocal cords, no residue remains (penetration)   Diagnostic Statement flash laryngeal penetration (functional) with cued consecutive sips of thin liquids, no pen/asp with single sips   VFSS Evaluation: Puree Solid Texture Trial   Mode " of Presentation, Puree spoon   Order of Presentation 4   Preparatory Phase WFL   Oral Phase, Puree WFL   Bolus Location When Swallow Triggered valleculae   Pharyngeal Phase, Puree impaired hyolaryngel excursion;impaired epiglottic movement;impaired tongue base retraction   Rosenbek's Penetration Aspiration Scale: Puree Food Trial Results 1 - no aspiration, contrast does not enter airway   Diagnostic Statement piecemeal swallows, mild reduced BOTR = mild BOT residuals.   VFSS Evaluation: Solid Food Texture Trial   Mode of Presentation, Solid self-fed   Order of Presentation 5   Preparatory Phase prolonged bolus preparation   Oral Phase, Solid WFL   Bolus Location When Swallow Triggered valleculae   Pharyngeal Phase, Solid impaired hyolaryngel excursion;impaired epiglottic movement;impaired tongue base retraction   Rosenbek's Penetration Aspiration Scale: Solid Food Trial Results 1 - no aspiration, contrast does not enter airway   Diagnostic Statement prolonged mastication, piecemeal swallows, mild reduced BOTR = mild BOT residuals.   Esophageal Phase of Swallow   Patient reports or presents with symptoms of esophageal dysphagia No   Swallowing Recommendations   Diet Consistency Recommendations soft & bite-sized (level 6);thin liquids (level 0)   Supervision Level for Intake close supervision needed   Swallowing Maneuver Recommendations alternate food and liquid intake  (every 3-4 bites)   Monitoring/Assistance Required (Eating/Swallowing) check mouth frequently for oral residue/pocketing;cue for finger/lingual sweep if oral pocketing present;stop eating activities when fatigue is present;monitor for cough or change in vocal quality with intake   Recommended Feeding/Eating Techniques (Swallow Eval) maintain upright sitting position for eating;maintain upright posture during/after eating for 30 minutes;minimize distractions during oral intake   Medication Administration Recommendations, Swallowing (SLP) whole as  tolerated   General Therapy Interventions   Planned Therapy Interventions Dysphagia Treatment   Clinical Impression   Criteria for Skilled Therapeutic Interventions Met (SLP Eval) Yes, treatment indicated   SLP Diagnosis mild oropharyngeal dysphagia   Risks & Benefits of therapy have been explained evaluation/treatment results reviewed;care plan/treatment goals reviewed;participants voiced agreement with care plan;participants included;patient   Clinical Impression Comments Video fluoroscopic swallow study completed with thin liquids, puree solids, regular solids. Pt currently presents with mild oropharyngeal dysphagia with mild deficits in the following areas: prolonged mastication (few missing back teeth), piecemeal swallows with solids, premature bolus spillage to the pyriform sinuses with thin liquids, mild reduced base of tongue retraction, mild reduced hyolaryngeal elevation/excursion and reduced epiglottic inversion (with notable posterior pharyngel wall constact). Mild oral/base of tongue residuals with solids; extra swallows + liquid wash effective. Flash laryngeal penetration with cued consecutive sips of thin liquids (judged functional), no other penetration or aspiration across study. Of note, throat clearing and cough x1 during study not related to oropharyngeal swallow mechanism or aspiration.   SLP Total Evaluation Time   Evaluation, videofluoroscopic eval of swallow function Minutes (81665) 12   SLP Goals   Therapy Frequency (SLP Eval) 5 times/wk   SLP Predicted Duration/Target Date for Goal Attainment 05/19/23   SLP Goals Swallow   SLP: Safely tolerate diet without signs/symptoms of aspiration Regular diet;Thin liquids;With use of swallow precautions;Independently   SLP Discharge Planning   SLP Plan meal f/u   SLP Discharge Recommendation home with assist   SLP Rationale for DC Rec anticipate pt will progress quickly to baseline diet, consider HH vs OP SLP for communication deficits if pt does not  return to baseline   SLP Brief overview of current status  Soft/bite sized solids, thin liquids when upright, liquid wash every 3-4 bites for oral/pharyngeal clearance   Total Session Time   Total Session Time (sum of timed and untimed services) 12

## 2023-05-12 NOTE — PROGRESS NOTES
05/12/23 0925   Appointment Info   Signing Clinician's Name / Credentials (OT) Radha Dwyer, OTR/L       Present yes  (son interprets)   Living Environment   People in Home child(mahi), adult   Current Living Arrangements house   Home Accessibility stairs within home   Number of Stairs, Within Home, Primary   (pt has chair lift)   Transportation Anticipated family or friend will provide   Living Environment Comments Pt lives in home w/ son and his family. Pt has chair lift, stays mainly on upper floor. She has a tub shower. Son or his wife is home w/ pt all the time.   Self-Care   Usual Activity Tolerance good   Current Activity Tolerance moderate   Equipment Currently Used at Home walker, standard;shower chair;commode chair  (chair lift)   Fall history within last six months yes   Number of times patient has fallen within last six months 2   Activity/Exercise/Self-Care Comment Pt has had more help recently for dressing/bathing since fall 1 month ago. Prior to that was independent w/ ADL tasks. She typically walks in her home w/out AD but does use a FWW in the community   Instrumental Activities of Daily Living (IADL)   IADL Comments Son manages IADL tasks including home mgmt and meds. Pt does not drive   General Information   Onset of Illness/Injury or Date of Surgery 05/11/23   Referring Physician Luis Manuel Hanna MD   Patient/Family Therapy Goal Statement (OT) get stronger   Additional Occupational Profile Info/Pertinent History of Current Problem The patient is a 72-year-old right-handed female with a past medical history of cerebral artery occlusion and cerebral infarction, type 2 diabetes, hypertension, morbid obesity, osteoporosis, endometrial hyperplasia, asthma, hyperlipidemia, hyperparathyroidism, osteoporosis, multiple lung nodules, renal oncocytoma of right kidney, cataracts, carpal tunnel syndrome and cerebral degeneration, who presented to [facility] this  morning with right-sided weakness. Head CT and brain MRI were performed, showing a small hyperdense lesion in the anterior central aspect of the casimiro. MRI of the brain showed acute or subacute hemorrhage in the casimiro   Existing Precautions/Restrictions fall   Cognitive Status Examination   Orientation Status person;place   Follows Commands follows one-step commands;over 90% accuracy   Memory Deficit moderate deficit  (per son pt is forgetful at baseline)   Cognitive Status Comments Per son, pt has been more forgetful recently (past month). Today she is very pleasant and able to follow commands consistently but is oriented to self and hospital only. Will continue to monitor   Visual Perception   Visual Impairment/Limitations blurry vision  (Pt reports some blurry vision this AM but does not have her glasses. Denies change in vision in past few days.)   Visual Field Deficit   (appears intact)   Visual Motor Impairment   (appears intact)   Sensory   Sensory Quick Adds sensation intact   Pain Assessment   Patient Currently in Pain No   Range of Motion Comprehensive   General Range of Motion no range of motion deficits identified   Strength Comprehensive (MMT)   Comment, General Manual Muscle Testing (MMT) Assessment RUE weaker than L, 4/5.   Coordination   Upper Extremity Coordination Right UE impaired   Fine Motor Coordination slightly impaired d/t weakness. tip to tip and serial opposition delayed   Bed Mobility   Bed Mobility supine-sit   Supine-Sit [place] (Bed Mobility) moderate assist (50% patient effort);verbal cues   Transfers   Transfers sit-stand transfer   Sit-Stand Transfer   Sit-Stand [place] (Transfers) minimum assist (75% patient effort);2 person assist;verbal cues   Assistive Device (Sit-Stand Transfers) walker, standard   Balance   Balance Comments Pt needing Min A while ambulating w/ FWW, slighlty unsteady d/t R weakness   Activities of Daily Living   BADL Assessment/Intervention bathing;upper  body dressing;lower body dressing;grooming;toileting   Bathing Assessment/Intervention   [place] Level (Bathing) moderate assist (50% patient effort)   Comment, (Bathing) per clinical judgement   Upper Body Dressing Assessment/Training   Comment, (Upper Body Dressing) per clinical judgement   [place] Level (Upper Body Dressing) minimum assist (75% patient effort)   Lower Body Dressing Assessment/Training   [place] Level (Lower Body Dressing) dependent (less than 25% patient effort)   Grooming Assessment/Training   Position (Grooming) supported sitting   [place] Level (Grooming) set up   Toileting   Comment, (Toileting) per clinical judgement   [place] Level (Toileting) moderate assist (50% patient effort)   Clinical Impression   Criteria for Skilled Therapeutic Interventions Met (OT) Yes, treatment indicated   OT Diagnosis decreased I/ADL independence   OT Problem List-Impairments impacting ADL problems related to;activity tolerance impaired;balance;cognition;coordination;mobility;range of motion (ROM);strength   Assessment of Occupational Performance 3-5 Performance Deficits   Identified Performance Deficits decreased independence w/ functional mobility, dressing, bathing, toileting   Planned Therapy Interventions (OT) ADL retraining;cognition;neuromuscular re-education;motor coordination training;fine motor coordination training;ROM;strengthening;transfer training;home program guidelines;progressive activity/exercise;risk factor education   Clinical Decision Making Complexity (OT) moderate complexity   Anticipated Equipment Needs Upon Discharge (OT)   (TBD)   Risk & Benefits of therapy have been explained evaluation/treatment results reviewed;care plan/treatment goals reviewed;current/potential barriers reviewed;risks/benefits reviewed;participants voiced agreement with care plan;participants included;patient;son   OT Total Evaluation Time   OT Eval, Moderate Complexity Minutes (29992) 12    OT Goals   Therapy Frequency (OT) Daily   OT Predicted Duration/Target Date for Goal Attainment 05/19/23   OT Goals Upper Body Dressing;Hygiene/Grooming;Lower Body Dressing;Toilet Transfer/Toileting   OT: Hygiene/Grooming modified independent;while standing   OT: Upper Body Dressing Modified independent;including set-up/clothing retrieval   OT: Lower Body Dressing Modified independent;including set-up/clothing retrieval   OT: Toilet Transfer/Toileting Modified independent;toilet transfer;cleaning and garment management;using adaptive equipment   Interventions   Interventions Quick Adds Self-Care/Home Management   Self-Care/Home Management   Self-Care/Home Mgmt/ADL, Compensatory, Meal Prep Minutes (76069) 15   Symptoms Noted During/After Treatment (Meal Preparation/Planning Training) none   Treatment Detail/Skilled Intervention Pt in bed upon arrival, pleasant and agreeable to OT. Son present and interpreting throughout session, very supportive. Pt denies pain this AM, only oriented to self and hospital. Pt needing Mod Ax1 to come to EOB. Once positioned appropriately able to mainatain balance w/ CGA. Pt needing Total A to don socks, unable to reach far enough forward to don herself. Pt stood from EOB w/ Min Ax2 and FWW (bed height high). Pt walked ~8ft to chair w/ Min Ax1, FWW and VC. Some unsteadiness noted d/t R weakness. Pt sat in chair, needs Max Ax2 to scoot back hips. Provided pt w/ wash cloth and she was able to wash face w/ SBA. Encouraged her to try to use RUE for task. Educated pt and son on continued use of RUE to promote NMR and they are agreeable. Pt left up in chair w/ all need met, alarm on and RN updated. VSS throughout on RA.   OT Discharge Planning   OT Plan toileting, standing g/h, dressing, RUE strengthening/NMR   OT Discharge Recommendation (DC Rec) [facility] patient will benefit from intensive, interdisciplinary therapy.  Anticipate will be able to tolerate 3 hours of  therapy per day   OT Rationale for DC Rec Pt functioning below baseline, limited by decreased strength, cognitive deficits and impaired balance. Would benefit from intensive therapy at ARU to progress ADL independence prior to returning home. Pending progress w/ IP therapy while hospitalized, she may be safe to d/c straight home w/ HH therapies as family very involved and supportive.   OT Brief overview of current status Min Ax1 short distance to chair, AOx2 but following commands, RUE slightly weaker than L   Total Session Time   Timed Code Treatment Minutes 15   Total Session Time (sum of timed and untimed services) 27

## 2023-05-12 NOTE — PROGRESS NOTES
05/12/23 1400   Appointment Info   Signing Clinician's Name / Credentials (PT) Hannah Bonilla, PT, DPT       Present no   Language Family not present   Living Environment   People in Home child(mahi), adult   Current Living Arrangements house   Home Accessibility stairs within home  (Stair lift)   Transportation Anticipated family or friend will provide   Living Environment Comments Patient lives in a house with her son and his family.  There is a stair lift to access the upper floor of the home, where patient mainly stays.   Self-Care   Usual Activity Tolerance good   Current Activity Tolerance moderate   Equipment Currently Used at Home walker, rolling;shower chair   Fall history within last six months yes   Number of times patient has fallen within last six months 2   Activity/Exercise/Self-Care Comment Pt has had more help recently for dressing/bathing since fall 1 month ago. Prior to that was independent w/ ADL tasks. She typically walks in her home w/out AD but does use a FWW in the community.   General Information   Onset of Illness/Injury or Date of Surgery 05/11/23   Referring Physician Luis Manuel Hanna MD   Patient/Family Therapy Goals Statement (PT) Patient would like to return to son's home.   Pertinent History of Current Problem (include personal factors and/or comorbidities that impact the POC) 72-year-old right-handed female with a past medical history of cerebral artery occlusion and cerebral infarction, type 2 diabetes, hypertension, morbid obesity, osteoporosis, endometrial hyperplasia, asthma, hyperlipidemia, hyperparathyroidism, osteoporosis, multiple lung nodules, renal oncocytoma of right kidney, cataracts, carpal tunnel syndrome and cerebral degeneration, who presented to [facility] Emergency Room 5/11/23 with right-sided weakness with imaging findings revealing a pontine hemorrhage.   Existing Precautions/Restrictions fall   Cognition   Affect/Mental Status (Cognition)  WFL   Orientation Status (Cognition) unable/difficult to assess  (Language barrier)   Follows Commands (Cognition) follows one-step commands;75-90% accuracy;delayed response/completion;increased processing time needed;repetition of directions required   Pain Assessment   Patient Currently in Pain No   Integumentary/Edema   Integumentary/Edema Comments Age-related skin changes   Posture    Posture Forward head position;Protracted shoulders   Range of Motion (ROM)   Range of Motion ROM is WFL   Strength (Manual Muscle Testing)   Strength (Manual Muscle Testing) Able to perform R SLR;Able to perform L SLR;Deficits observed during functional mobility   Strength Comments Right-sided weakness   Bed Mobility   Comment, (Bed Mobility) Not assessed.   Transfers   Comment, (Transfers) Patient performs sit <> stand with minAx2.   Gait/Stairs (Locomotion)   Comment, (Gait/Stairs) Patient ambulates 5' with FWW and minAx1.   Balance   Balance Comments Impaired dynamic balance   Sensory Examination   Sensory Perception patient reports no sensory changes   Clinical Impression   Criteria for Skilled Therapeutic Intervention Yes, treatment indicated   PT Diagnosis (PT) Impaired functional mobility   Influenced by the following impairments Right-sided weakness, generalized weakness and deconditioning, impaired balance   Functional limitations due to impairments Impaired independence with bed mobility, transfers, and gait   Clinical Presentation (PT Evaluation Complexity) Evolving/Changing   Clinical Presentation Rationale Clinical judgement, PMH, social support   Clinical Decision Making (Complexity) moderate complexity   Planned Therapy Interventions (PT) balance training;bed mobility training;gait training;home exercise program;neuromuscular re-education;patient/family education;postural re-education;strengthening;transfer training;progressive activity/exercise   Anticipated Equipment Needs at Discharge (PT)   (Patient owns a FWW.)    Risk & Benefits of therapy have been explained evaluation/treatment results reviewed;care plan/treatment goals reviewed;risks/benefits reviewed;participants voiced agreement with care plan;participants included;patient   PT Total Evaluation Time   PT Eval, Moderate Complexity Minutes (92532) 8   Physical Therapy Goals   PT Frequency Daily   PT Predicted Duration/Target Date for Goal Attainment 05/17/23   PT Goals Bed Mobility;Transfers;Gait   PT: Bed Mobility Independent;Supine to/from sit;Rolling   PT: Transfers Supervision/stand-by assist;Sit to/from stand;Bed to/from chair;Assistive device   PT: Gait Supervision/stand-by assist;100 feet;Rolling walker   Interventions   Interventions Quick Adds Therapeutic Activity   Therapeutic Activity   Therapeutic Activities: dynamic activities to improve functional performance Minutes (42958) 31   Symptoms Noted During/After Treatment None   Treatment Detail/Skilled Intervention Patient greeted seated in combilizer, returning from video swallow study.  RN approproving session and assisting.  Patient engaged in B LE ankle pumps and LAQs to promote distal fluid return, no family to  so patient benefitting from visual demonstration.  Patient dependently transferred from combilizer to recliner chair with Ax2 via ceiling lift.  Once seated in chair, patient instructed to scoot forward in chair to bring feet to floor.  Patient able to scoot independently but needing increased time.  Patient completes sit > stand from chair with minAx2, repeated cues for safe hand placement as patient wanting to pull with hands from walker.  Patient cued for upright posture in standing.  Patient ambulates 15ft in room with FWW and CGA-minAx1 then requesting to sit on commode.  Patient needing increased time of commode, frequent re-adjustments with B UE on armrests.  Patient with difficulty sitting deep enough on commode, would benefit from shorter commode height.  Patient standing from  commode with Andra.  She returns to recliner chair at end of session, needing cues to not abandon walker when backing up to sitting surface.  Patient left with call light in reach and chair alarm activated.  Vitals stable and RN in room on PT departure.   PT Discharge Planning   PT Plan Progress gait with walker (wc follow), repeated sit <> stands   PT Discharge Recommendation (DC Rec) [facility] patient will benefit from intensive, interdisciplinary therapy.  Anticipate will be able to tolerate 3 hours of therapy per day;home with assist;home with home care physical therapy   PT Rationale for DC Rec Patient presents significant below her PLOF, currently needing Ax1-2 for all mobility and limited to ~15' gait with FWW.  Patient lives with her son and his family.  Recommend ARU to progress safety and independence with functional mobility prior to discharge home.  With continued IP PT, patient may progress to SBA/supervision for transfers and ambulation and be appropriate to return home with family assisting as needed and rolling walker.  HHPT would be indicated to address remaining strength, balance, and endurance deficits.   Total Session Time   Timed Code Treatment Minutes 31   Total Session Time (sum of timed and untimed services) 39

## 2023-05-12 NOTE — CONSULTS
Consult Date: 05/11/2023    IMPRESSION:  A 72-year-old female with imaging findings revealing a pontine hemorrhage.    PLAN:  From the neurosurgical standpoint, we do not plan for any operative neurosurgical intervention. Recommend plan per Neurology.  Neurosurgery will follow.    TYPE OF VISIT:  The neurosurgical service was consulted to see the patient for pontine hyperdensity concerning for hemorrhage.    HISTORY OF PRESENT ILLNESS:  The patient is a 72-year-old right-handed female with a past medical history of cerebral artery occlusion and cerebral infarction, type 2 diabetes, hypertension, morbid obesity, osteoporosis, endometrial hyperplasia, asthma, hyperlipidemia, hyperparathyroidism, osteoporosis, multiple lung nodules, renal oncocytoma of right kidney, cataracts, carpal tunnel syndrome and cerebral degeneration, who presented to [facility] Emergency Room this morning with right-sided weakness.  Her son states that she complained of a headache at home and then this morning when she went into the shower, when she took quite some time to come out, he became concerned and went to check on her.  When he went into the bathroom, she was leaning to the right and then all morning he would sit her down and she would continue to lean to the right.  They thought that she had a right facial droop.  She presented to [facility] Emergency Room, where head CT and brain MRI were performed, showing a small hyperdense lesion in the anterior central aspect of the casimiro.  MRI of the brain showed acute or subacute hemorrhage in the casimiro, and she was transferred to [facility].  Through the use of her son as an , she denies any complaints.  She states her headache has resolved.  She denies any deficits, nausea, vomiting, weakness, or other complaints.    PAST MEDICAL HISTORY:  Diabetes type 2, hypertension, morbid obesity, osteoarthritis, postmenopausal bleeding, endometrial hyperplasia, asthma, cerebral artery  occlusion and cerebral infarction, vitamin D deficiency, hyperlipidemia, hyperparathyroidism, osteoporosis, multiple lung nodules, renal oncocytoma, memory loss, cerebral degeneration, carpal tunnel syndrome and cataracts.    PAST SURGICAL HISTORY:  Right renal biopsy and D and C.    FAMILY HISTORY:  She is here with her son.  She has had 14 children, 8 of them are still living.  She is from [facility] [facility].  They speak Mano.  She lives with her son.  She is right-handed.  She denies cigarette or alcohol use.    MEDICATIONS:  Reviewed per the electronic medical record, which does not list any anticoagulation.    ALLERGIES:  NO KNOWN DRUG ALLERGIES.    PHYSICAL EXAM:  VITAL SIGNS:  Temperature is 97.4, blood pressure is 122/25, pulse is 70, and the respiratory rate is 27.  GENERAL:  She is awake and alert, in no acute distress, and she is pleasant during the exam.  On musculoskeletal testing, she moves all 4 extremities well.  She has a very mild right pronator drift, but otherwise her strength is intact to the upper and lower extremities.  Her sensation is intact.  Her extraocular movements are intact, and her Vinton coma scale is 15 with the use of the  for speech.    IMAGING STUDIES:  Included a head CT without contrast showing a small hyperdense lesion in the anterior central aspect of the casimiro concerning for a small area of acute parenchymal hemorrhage.  CT of the head and neck, no high-grade stenosis is appreciated.  Small anterior superiorly directed focal outpouching of the right middle cerebral artery bifurcation concerning for possible small saccular aneurysm.  MRI of the brain with and without contrast shows focal susceptibility signal loss within the ventral casimiro, corresponding with an acute or subacute hemorrhage on CT.  Numerous chronic hemorrhages within the cerebral hemispheres, casimiro, and right cerebellum.  Repeat head CT this evening at 7:00 p.m. shows no significant change in the 5-6 mm  hyperdense focus in the ventral casimiro compatible with hemorrhage.    LABS:  INR is 1.0, PTT is 25, white count is 7.9, platelets are 238.      TOTAL TIME SPENT WITH THE PATIENT:  70 minutes, including 50 minutes of counseling and coordination of care.  Discussed with Dr. Crook.    Dictated by KUMAR LANTIGUA        D: 2023   T: 2023   MTSonya cabello    Name:     FABIOLA FREEMAN  MRN:      [id number]        Account:      [id number]   :      1950           Consult Date: 2023     Document: [id number]

## 2023-05-13 ENCOUNTER — APPOINTMENT (OUTPATIENT)
Dept: SPEECH THERAPY | Facility: CLINIC | Age: 73
DRG: 065 | End: 2023-05-13
Attending: INTERNAL MEDICINE
Payer: COMMERCIAL

## 2023-05-13 ENCOUNTER — APPOINTMENT (OUTPATIENT)
Dept: OCCUPATIONAL THERAPY | Facility: CLINIC | Age: 73
DRG: 065 | End: 2023-05-13
Attending: INTERNAL MEDICINE
Payer: COMMERCIAL

## 2023-05-13 LAB
ERYTHROCYTE [DISTWIDTH] IN BLOOD BY AUTOMATED COUNT: 13.1 % (ref 10–15)
GLUCOSE BLDC GLUCOMTR-MCNC: 161 MG/DL (ref 70–99)
GLUCOSE BLDC GLUCOMTR-MCNC: 181 MG/DL (ref 70–99)
GLUCOSE BLDC GLUCOMTR-MCNC: 220 MG/DL (ref 70–99)
GLUCOSE BLDC GLUCOMTR-MCNC: 245 MG/DL (ref 70–99)
GLUCOSE BLDC GLUCOMTR-MCNC: 255 MG/DL (ref 70–99)
HCT VFR BLD AUTO: 48.6 % (ref 35–47)
HGB BLD-MCNC: 15.4 G/DL (ref 11.7–15.7)
MCH RBC QN AUTO: 29.4 PG (ref 26.5–33)
MCHC RBC AUTO-ENTMCNC: 31.7 G/DL (ref 31.5–36.5)
MCV RBC AUTO: 93 FL (ref 78–100)
PLATELET # BLD AUTO: 203 10E3/UL (ref 150–450)
RBC # BLD AUTO: 5.24 10E6/UL (ref 3.8–5.2)
WBC # BLD AUTO: 7 10E3/UL (ref 4–11)

## 2023-05-13 PROCEDURE — 250N000013 HC RX MED GY IP 250 OP 250 PS 637: Performed by: PHYSICIAN ASSISTANT

## 2023-05-13 PROCEDURE — 36415 COLL VENOUS BLD VENIPUNCTURE: CPT | Performed by: PHYSICIAN ASSISTANT

## 2023-05-13 PROCEDURE — 85027 COMPLETE CBC AUTOMATED: CPT | Performed by: PHYSICIAN ASSISTANT

## 2023-05-13 PROCEDURE — 99232 SBSQ HOSP IP/OBS MODERATE 35: CPT | Performed by: PHYSICIAN ASSISTANT

## 2023-05-13 PROCEDURE — 99232 SBSQ HOSP IP/OBS MODERATE 35: CPT | Performed by: STUDENT IN AN ORGANIZED HEALTH CARE EDUCATION/TRAINING PROGRAM

## 2023-05-13 PROCEDURE — 97530 THERAPEUTIC ACTIVITIES: CPT | Mod: GO

## 2023-05-13 PROCEDURE — 92526 ORAL FUNCTION THERAPY: CPT | Mod: GN

## 2023-05-13 PROCEDURE — 250N000013 HC RX MED GY IP 250 OP 250 PS 637: Performed by: STUDENT IN AN ORGANIZED HEALTH CARE EDUCATION/TRAINING PROGRAM

## 2023-05-13 PROCEDURE — 120N000001 HC R&B MED SURG/OB

## 2023-05-13 RX ORDER — SIMVASTATIN 20 MG
20 TABLET ORAL AT BEDTIME
Status: DISCONTINUED | OUTPATIENT
Start: 2023-05-13 | End: 2023-05-13

## 2023-05-13 RX ORDER — LOSARTAN POTASSIUM 50 MG/1
50 TABLET ORAL DAILY
Status: DISCONTINUED | OUTPATIENT
Start: 2023-05-14 | End: 2023-05-14

## 2023-05-13 RX ORDER — METOPROLOL SUCCINATE 50 MG/1
50 TABLET, EXTENDED RELEASE ORAL DAILY
Status: DISCONTINUED | OUTPATIENT
Start: 2023-05-13 | End: 2023-05-15 | Stop reason: HOSPADM

## 2023-05-13 RX ADMIN — LOSARTAN POTASSIUM 100 MG: 50 TABLET, FILM COATED ORAL at 09:41

## 2023-05-13 RX ADMIN — METOPROLOL SUCCINATE 50 MG: 50 TABLET, EXTENDED RELEASE ORAL at 08:55

## 2023-05-13 RX ADMIN — FLUTICASONE FUROATE 1 PUFF: 100 POWDER RESPIRATORY (INHALATION) at 09:04

## 2023-05-13 RX ADMIN — ATORVASTATIN CALCIUM 40 MG: 40 TABLET, FILM COATED ORAL at 20:10

## 2023-05-13 ASSESSMENT — ACTIVITIES OF DAILY LIVING (ADL)
ADLS_ACUITY_SCORE: 53
ADLS_ACUITY_SCORE: 49
ADLS_ACUITY_SCORE: 53
ADLS_ACUITY_SCORE: 49
ADLS_ACUITY_SCORE: 53

## 2023-05-13 NOTE — PROGRESS NOTES
"      [facility]    Stroke Progress Note    Interval EventsNo acute events overnight. She still has some mild weakness and sensory deficit to the R side. Memory deficits at baseline. TTE pending.     HPI Summary  Pilo Quintana is a 72 year old female with pertinent past medical history of poorly controlled HTN (SBP >170 for a while at home), HLD, DM2, pulmonary nodules, significant memory decline (more rapidly since November 2022), prior stroke (in [facility] in 1995 or 1996- does not recall if she had an MRI or CT scan but did have imaging performed and was told there was a stroke, does not remember ever being told of prior brain bleeding but told there were \"some spots in the brain\", reportedly no residual symptoms PTA. On PTA Simvastatin 40 mg daily and ASA 81 mg daily.    She presented to the [facility] ED 5/11/23 due to R sided weakness and dysphagia beginning that day (see HPI for further details) and reported slowed speech and difficulty pronouncing words x 1-2 months. CTH concerning for pontine IPH and several chronic microhemorrhages. Transferred to [facility] ICU for close BP control and neuro checks.     -Smoking screen: never  -Sleep Apnea screen: denies snoring    Stroke Evaluation Summarized     MRI/Head CT MRI:   1.  Focal susceptibility signal loss within the ventral casimiro  corresponding with an acute or subacute hemorrhage on CT. No  surrounding parenchymal edema.  2.  Numerous chronic microhemorrhages within the cerebral hemispheres,  casimiro, and right cerebellum.  3.  Advanced chronic microvascular ischemic change and generalized  volume loss.  4.  Focal enhancement within the right basal ganglia likely relating  to subacute to chronic infarction.  Repeat CTH: stable  CTH:concerning for pontine IPH and several chronic microhemorrhages   Intracranial Vasculature CTA head:  1.5-2 mm R MCA sacc aneur, mild  carotid siphon athero   Cervical Vasculature CTA neck: mild b/l carotid " bifurc athero no sten      Echocardiogram TTE: pending   EKG/Telemetry SR with sinus arrhythmia, nonspecific ST abnormality   Other Testing CRP:33.32  ESR: 29  Blood cultures: pending      LDL  5/12/2023: 101 mg/dL   A1C  5/11/2023: 7.8 %   Troponin 5/11/2023: 15 ng/L        Impression   Ventral casimiro IPH and several chronic microhemorrhages (both cortical and deep), spontaneous, suspected 2/2 uncontrolled hypertension and/or Cerebral amyloid angiopathy (CAA) in setting of progressive memory decline, blood cultures pending but low suspicion of endocarditis    Subacute-chronic R basal ganglia ischemic stroke, suspected secondary to small vessel ischemic disease     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, suspected asymptomatic    Plan  -Neurosurgery consulted, no plans for surgical intervention  -Neuro checks and vitals every 4 hours  -TTE pending  - Inpatient SBP goal <140, titrate scheduled PO antihypertensives   -avoid antiplatelets/anticoagulants for now, given her chronic microhemorrhages and potential of CAA, no obvious prior ischemic infarct on imaging and unclear history if her prior stroke was ischemic or hemorrhagic, from stroke perspective there is hesitancy with restarting ASA, would recommend discussion with her cardiologist (noted in chart she has cardiology appointment scheduled for 5/18/23) if there is cardiac indication for ASA then it would be felt appropriate to continue  -would avoid ibuprofen/other NSAIDs at home  -Lipitor 40 mg daily  -elevate HOB >30 degrees  -telemetry  -PT/OT/SPT  -Euthermia, euglycemia, eunatremia  -Stroke Education  -Stroke Class per [facility] ([facility])     Stroke prevention:  -follow-up with PCP for titration to goal LDL 40-70, <40 increases risk of Intracranial hemorrhage  -goal HgbA1c <7% for stroke prevention, needs tighter control of diabetes, follow-up closely with PCP  -long term outpatient blood pressure goal <130/80, recommend home monitoring twice daily in AM and  "PM, keep log and bring to PCP follow-up     Discussed with vascular neurology attending, Dr. Castillo     Patient Follow-up    -Follow-up with PCP in 1-2 weeks  -Follow-up with neurology team in 6-8 weeks (ordered)  -Follow-up with neuro IR for R MCA aneurysm (ordered)  -f/u with neurosurgery as recommended (they recommended repeating CTH in 1 month)    We will continue to follow for results of TTE and blood cultures before making final recommendations.    China Cat PA-C  Vascular Neurology    To page me or covering stroke neurology team member, click here: [facility]  Choose \"On Call\" tab at top, then select \"NEUROLOGY/ALL SITES\" from middle drop-down box, press Enter, then look for \"stroke\" or \"telestroke\" for your site.    ______________________________________________________    Clinically Significant Risk Factors                  # Hypertension: Noted on problem list       # DMII: A1C = 7.8 % (Ref range: <5.7 %) within past 6 months, PRESENT ON ADMISSION             Medications   Scheduled Meds    atorvastatin  40 mg Oral QPM     fluticasone  1 puff Inhalation Daily     insulin aspart  1-7 Units Subcutaneous TID AC     insulin aspart  1-5 Units Subcutaneous At Bedtime     losartan  100 mg Oral Daily     metoprolol succinate ER  50 mg Oral Daily     sodium chloride (PF)  3 mL Intracatheter Q8H       Infusion Meds      PRN Meds  albuterol, glucose **OR** dextrose **OR** glucagon, hydrALAZINE, labetalol, lidocaine 4%, lidocaine (buffered or not buffered), nitroGLYcerin, ondansetron **OR** ondansetron, polyethylene glycol, prochlorperazine **OR** prochlorperazine **OR** prochlorperazine, senna-docusate **OR** senna-docusate, sodium chloride (PF)       PHYSICAL EXAMINATION  Temp:  [98  F (36.7  C)-98.5  F (36.9  C)] 98.5  F (36.9  C)  Pulse:  [68-97] 74  Resp:  [13-38] 18  BP: ()/() 115/69  SpO2:  [93 %-98 %] 95 %     General Exam  General:  Patient sitting in recliner without any acute distress  "   HEENT:  normocephalic/atraumatic  Pulmonary:  no respiratory distress      Neuro Exam  Mental Status:  alert, oriented to her nickname only, not her age (although son clarifies that she is actually older than her documented [place] age), does not know the date which is also normal for her, follows commands, speech clear and fluent, naming and repetition normal  Cranial Nerves:  visual fields intact, PERRL, EOMI with normal smooth pursuit, facial sensation intact and symmetric, mild R facial droop, hearing not formally tested but intact to conversation, no dysarthria, tongue protrusion midline  Motor:  some effort against gravity to b/l LE, no drift to b/l UE but slight decreased  strength on the R compared to L  Reflexes:  toes down-going  Sensory:  mild decreased sensation to R side compared to Left (face/arm/leg), no extinction on double simultaneous stimulation   Coordination:  normal finger-to-nose b/l, not able to perform heel-to-shin bilaterally due to weakness dysmetria  Station/Gait:  deferred    Stroke Scales    NIHSS  1a. Level of Consciousness 0-->Alert, keenly responsive   1b. LOC Questions 2-->Answers neither question correctly   1c. LOC Commands 0-->Performs both tasks correctly   2.   Best Gaze 0-->Normal   3.   Visual 0-->No visual loss   4.   Facial Palsy (S) 1-->Minor paralysis (flattened nasolabial fold, asymmetry on smiling) (R)   5a. Motor Arm, Left 0-->No drift, limb holds 90 (or 45) degrees for full 10 secs   5b. Motor Arm, Right (S) 0-->No drift, limb holds 90 (or 45) degrees for full 10 secs (mild R hand decreased  strength)   6a. Motor Leg, Left 2-->Some effort against gravity, leg falls to bed by 5 secs, but has some effort against gravity   6b. Motor Leg, right 2-->Some effort against gravity, leg falls to bed by 5 secs, but has some effort against gravity   7.   Limb Ataxia 0-->Absent   8.   Sensory 1-->Mild-to-moderate sensory loss, patient feels pinprick is less sharp or is dull  on the affected side, or there is a loss of superficial pain with pinprick, but patient is aware of being touched   9.   Best Language 0-->No aphasia, normal   10. Dysarthria 0-->Normal   11. Extinction and Inattention  0-->No abnormality   Total 8 (05/13/23 1102)       Modified Le[place]an[place] Score (Pre-morbid)  (S) 4 (significant memory decline, requires help with ADLs and cooking and occasional walker use) - (S) Moderately severe disability.  Unable to attend to own bodily needs without assistance or unable to walk unassisted. (significant memory decline, requires help with ADLs and cooking and occasional walker use)  Modified Le[place] Score (Discharge)  4 - Moderately severe disability.  Unable to attend to own bodily needs without assistance or unable to walk unassisted.    Imaging  I personally reviewed all imaging; relevant findings per HPI.     Lab Results Data   CBC  Recent Labs   Lab 05/11/23  1047   WBC 7.9   RBC 4.66   HGB 13.7   HCT 43.5        Basic Metabolic Panel    Recent Labs   Lab 05/13/23  0731 05/13/23  0154 05/12/23  2121 05/11/23  1730 05/11/23  1047   NA  --   --   --   --  142   POTASSIUM  --   --   --   --  4.5   CHLORIDE  --   --   --   --  100   CO2  --   --   --   --  32*   BUN  --   --   --   --  12.8   CR  --   --   --   --  0.73   * 181* 269*   < > 271*   GLENIS  --   --   --   --  9.9    < > = values in this interval not displayed.     Liver Panel  No results for input(s): PROTTOTAL, ALBUMIN, BILITOTAL, ALKPHOS, AST, ALT, BILIDIRECT in the last 168 hours.  INR    Recent Labs   Lab Test 05/11/23  1047 01/22/16  0958   INR 1.00 1.1      Lipid Profile    Recent Labs   Lab Test 05/12/23  0512   CHOL 179   HDL 52   *   TRIG 132     A1C    Recent Labs   Lab Test 05/11/23 2006   A1C 7.8*     Troponin    Recent Labs   Lab 05/11/23 2006 05/11/23  1047   CTROPT 15* 10          Data   I have personally spent a total of 45 minutes providing care today, time spent in reviewing medical  records and reviewing tests, examining the patient and obtaining history, coordination of care, and discussion with the patient and/or family regarding diagnostic results, prognosis, symptom management, risks and benefits of management options, and development of plan of care. Greater than 50% was spent in counseling and coordination of care.

## 2023-05-13 NOTE — PROGRESS NOTES
"Pt here with acute/subacute ICH. A&O to self and \"hospital\". Difficult to assess d/t language barrier, son helpful with interpretation. Follows most simple commands. Neuros with generalized weakness, slight R facial droop. VSS on RA, SBP <140. Tele NSR. Soft/bite sized diet, thin liquids. Takes pills whole. Up with Ax2 GBW. Purewick placed at bedtime. Denies pain. Pt scoring green on the Aggression Stop Light Tool. Plan for echo, follow BC. Discharge to ARU pending workup.    "

## 2023-05-13 NOTE — PLAN OF CARE
Reason for Admission: pontine hemorrhage and chronic microhemorrhages    Cognitive/Mentation: A/Ox JOLIE  Neuros/CMS: Intact ex R droop, R weakness, confused, inconsistent with commands   VS: stable. BP under 140  Tele: SR.  GI: BS active, passing flatus. Continent.  : Incontinent. Purewick overnight  Pulmonary: LS diminished.  Pain: denies.   Skin: scattered bruising   Activity: Assist x 2 with GB and walker.  Diet: soft and bite sized with thin liquids. Takes pills whole.     Therapies recs: ARU  Discharge: pending    Aggression Stoplight Tool: green    End of shift summary: pt very hard to assess. Speaks [place]o but do not have that language on jabber. Pt son is interpretor when here. Needs echo.

## 2023-05-13 NOTE — PROGRESS NOTES
[facility]    Medicine Progress Note - Hospitalist Service    Date of Admission:  5/11/2023    Assessment & Plan   Pilo Quintana is a 72 year old female admitted on 5/11/2023.     Past medical history significant for HTN, HLP, DM2, Obesity, Uncomplicated asthma, Known lung nodules, History of CVA who was transferred to [facility] due to ICH within the ventral casimiro (corresponding with an acute or subacute hemorrhage and numerous chronic microhemorrhages.       Patient presented to [facility] ED with right sided weakness.  Patient's son reported he noted the patient was in her normal state of health when he returned from work this morning at 7:30AM.  Patient then went to the bathroom (~ 8:05AM) to shower and was noted to be in there for an extended period of time and when asked she would say she was fine.  Family members went into the bathroom to check on her at 8:50AM and she was found leaning over the tub and her right arm over the tub.  She was noted to have a right sided facial droop.  Family mentioned the patient has had some speech difficulties for the past 2 months.       Work-up in the ED included a BMP that revealed a CO2 of 32, glucose of 271 otherwise within normal limits.  CBC with PLT were unremarkable.  INR, PTT and Troponin were all within normal limits.  Head CT w/o contrast revealed a new small hyperdense lesion in the anterior central aspect of the casimiro(concerning for a small area of acute parenchymal hemorrhage and recommending MRI).  Head/Neck CTA revealed mild atherosclerosis involving the carotid siphons and small anterior superiorly directed focal outpouching of the right MCA bifurcation (concerning for small saccular aneurysm).  Stroke Neurology was consulted and advised proceeding with brain MRI and to keep SBP < 140.       Brain MRI was completed and revealed acute/subacute pontine hemorrhage and several chronic microhemorrhages.  Neurosurgery was contacted  and recommended transfer to [facility].       Acute to subacute ICH  - Stroke Neurology consult appreciated  - Neurosurgery consult requested   - Neuro checks and vital signs every hour   - Goal SBP LESS THAN 140  - resume PTA antihypertensives  - Monitor on telemetry    - PT/OT/SLP consult requested    - Hold PTA ASA 81 mg/d    - transfer to neuro floor     Incidental possible small saccular aneurysm  - Stroke Neurology consult requested.    - Neurosurgery consult requested.       HTN  - Holding PTA lasix 20 mg/d  - Nicardipine infusion titrated off  - resume PTA amlodipine and losartan       HLP  - Resume PTA simvastatin 40 mg at bedtime       DM2  Hyperglycemia  - Hold PTA glipizide 5 mg/d and Actos 30 mg/d.    - Medium intensity sliding scale.    - Hypoglycemic protocol.  - A1c 7.8% on 5/11/23     Obesity  Increase in all-cause morbidity and mortality.   - Follow up with PCP regarding ongoing management.       Uncomplicated asthma  - Hold PTA PRN albuterol inhalers.    - PRN Albuterol nebs available.       Known lung nodules  - Follow up with PCP.       History of CVA  - Hold PTA ASA 81 mg/d.         Diet: Combination Diet Soft and Bite Sized Diet (level 6); Thin Liquids (level 0)    DVT Prophylaxis: Pneumatic Compression Devices  Morrison Catheter: Not present  Lines: None     Cardiac Monitoring: ACTIVE order. Indication: Stroke, acute (48 hours)  Code Status: Full Code      Clinically Significant Risk Factors                  # Hypertension: Noted on problem list       # DMII: A1C = 7.8 % (Ref range: <5.7 %) within past 6 months, PRESENT ON ADMISSION           Disposition Plan      Expected Discharge Date: 05/15/2023                  Austin Ferrara MD  Hospitalist Service  [facility]  Securely message with [employer] (more info)  Text page via [employer] Paging/Directory   ______________________________________________________________________    Interval History   Feels well. Has no  complaints.  Discussed with son and neuro. Plan ARU in the next few days after TTE.    Physical Exam   Vital Signs: Temp: 97.7  F (36.5  C) Temp src: Axillary BP: 133/82 Pulse: 70   Resp: 23 SpO2: 96 % O2 Device: None (Room air)    Weight: 164 lbs 14.47 oz    Constitutional: Awake, alert, cooperative, no apparent distress  Respiratory: Clear to auscultation bilaterally, no crackles or wheezing  Cardiovascular: Regular rate and rhythm, normal S1 and S2, and no murmur noted  GI: Normal bowel sounds, soft, non-distended, non-tender  Skin/Integumen: No rashes, no cyanosis, no edema  Other:       Medical Decision Making       35 MINUTES SPENT BY ME on the date of service doing chart review, history, exam, documentation & further activities per the note.      Data   ------------------------- PAST 24 HR DATA REVIEWED -----------------------------------------------    I have personally reviewed the following data over the past 24 hrs:    Procal: N/A CRP: 33.32 (H) Lactic Acid: N/A         Imaging results reviewed over the past 24 hrs:   Recent Results (from the past 24 hour(s))   XR Video Swallow with SLP or OT    Narrative    VIDEO SWALLOWING EVALUATION   5/12/2023 1:19 PM     HISTORY: Dysphagia, pontine hemorrhage.    COMPARISON: None.    FLUOROSCOPY TIME: 1.2 minutes.  SPOT IMAGES OR CINE RUNS: 6      Impression    IMPRESSION:  Thin: Premature spillage to the piriform sinuses. Flash penetration  observed with sequential swallows. Otherwise, no penetration or  aspiration.    Mildly thick: Not administered.    Moderately thick: Not administered.    Pudding: No penetration or aspiration. Minimal pharyngeal residue.    Semisolid: Not administered.    Solid: No penetration or aspiration. Minimal pharyngeal residue.    This study only includes the cervical esophagus. Please see separate  report from speech pathology for additional details.    JENNIFER WEAVER MD         SYSTEM ID:  [id number]

## 2023-05-14 ENCOUNTER — APPOINTMENT (OUTPATIENT)
Dept: PHYSICAL THERAPY | Facility: CLINIC | Age: 73
DRG: 065 | End: 2023-05-14
Attending: INTERNAL MEDICINE
Payer: COMMERCIAL

## 2023-05-14 ENCOUNTER — APPOINTMENT (OUTPATIENT)
Dept: OCCUPATIONAL THERAPY | Facility: CLINIC | Age: 73
DRG: 065 | End: 2023-05-14
Attending: INTERNAL MEDICINE
Payer: COMMERCIAL

## 2023-05-14 ENCOUNTER — APPOINTMENT (OUTPATIENT)
Dept: CARDIOLOGY | Facility: CLINIC | Age: 73
DRG: 065 | End: 2023-05-14
Attending: STUDENT IN AN ORGANIZED HEALTH CARE EDUCATION/TRAINING PROGRAM
Payer: COMMERCIAL

## 2023-05-14 LAB
GLUCOSE BLDC GLUCOMTR-MCNC: 148 MG/DL (ref 70–99)
GLUCOSE BLDC GLUCOMTR-MCNC: 181 MG/DL (ref 70–99)
GLUCOSE BLDC GLUCOMTR-MCNC: 190 MG/DL (ref 70–99)
GLUCOSE BLDC GLUCOMTR-MCNC: 193 MG/DL (ref 70–99)
GLUCOSE BLDC GLUCOMTR-MCNC: 221 MG/DL (ref 70–99)
GLUCOSE BLDC GLUCOMTR-MCNC: 356 MG/DL (ref 70–99)
LVEF ECHO: NORMAL

## 2023-05-14 PROCEDURE — 93306 TTE W/DOPPLER COMPLETE: CPT

## 2023-05-14 PROCEDURE — 93306 TTE W/DOPPLER COMPLETE: CPT | Mod: 26 | Performed by: INTERNAL MEDICINE

## 2023-05-14 PROCEDURE — 120N000001 HC R&B MED SURG/OB

## 2023-05-14 PROCEDURE — 250N000013 HC RX MED GY IP 250 OP 250 PS 637: Performed by: PHYSICIAN ASSISTANT

## 2023-05-14 PROCEDURE — 97530 THERAPEUTIC ACTIVITIES: CPT | Mod: GP

## 2023-05-14 PROCEDURE — 99232 SBSQ HOSP IP/OBS MODERATE 35: CPT | Performed by: STUDENT IN AN ORGANIZED HEALTH CARE EDUCATION/TRAINING PROGRAM

## 2023-05-14 PROCEDURE — 97116 GAIT TRAINING THERAPY: CPT | Mod: GP

## 2023-05-14 PROCEDURE — 250N000013 HC RX MED GY IP 250 OP 250 PS 637: Performed by: STUDENT IN AN ORGANIZED HEALTH CARE EDUCATION/TRAINING PROGRAM

## 2023-05-14 PROCEDURE — 99233 SBSQ HOSP IP/OBS HIGH 50: CPT | Performed by: PHYSICIAN ASSISTANT

## 2023-05-14 PROCEDURE — 97535 SELF CARE MNGMENT TRAINING: CPT | Mod: GO | Performed by: OCCUPATIONAL THERAPIST

## 2023-05-14 RX ORDER — LOSARTAN POTASSIUM 100 MG/1
100 TABLET ORAL DAILY
Status: DISCONTINUED | OUTPATIENT
Start: 2023-05-15 | End: 2023-05-15 | Stop reason: HOSPADM

## 2023-05-14 RX ORDER — ASPIRIN 81 MG/1
81 TABLET ORAL DAILY
Status: DISCONTINUED | OUTPATIENT
Start: 2023-05-14 | End: 2023-05-15 | Stop reason: HOSPADM

## 2023-05-14 RX ADMIN — FLUTICASONE FUROATE 1 PUFF: 100 POWDER RESPIRATORY (INHALATION) at 09:31

## 2023-05-14 RX ADMIN — METOPROLOL SUCCINATE 50 MG: 50 TABLET, EXTENDED RELEASE ORAL at 09:28

## 2023-05-14 RX ADMIN — ASPIRIN 81 MG: 81 TABLET, COATED ORAL at 14:39

## 2023-05-14 RX ADMIN — LOSARTAN POTASSIUM 50 MG: 50 TABLET, FILM COATED ORAL at 09:28

## 2023-05-14 RX ADMIN — ATORVASTATIN CALCIUM 40 MG: 40 TABLET, FILM COATED ORAL at 21:04

## 2023-05-14 ASSESSMENT — ACTIVITIES OF DAILY LIVING (ADL)
ADLS_ACUITY_SCORE: 43
ADLS_ACUITY_SCORE: 47
ADLS_ACUITY_SCORE: 47
ADLS_ACUITY_SCORE: 49
ADLS_ACUITY_SCORE: 47
ADLS_ACUITY_SCORE: 47
ADLS_ACUITY_SCORE: 49
ADLS_ACUITY_SCORE: 47
ADLS_ACUITY_SCORE: 49

## 2023-05-14 NOTE — PLAN OF CARE
Pt A/O to self. Calm and cooperative, redirectable and follows commands. Generalized weakness. Pt weak, but MOON. HR NSR, SBP WDL, Afebrile. Purewick for Noc, +b/s. Denies pain.Up with 2, belt, walker to bathroom. no BM.

## 2023-05-14 NOTE — PROGRESS NOTES
"      [facility]    Stroke Progress Note    Interval EventsNo acute events overnight. She has improvement in her R sided weakness today. TTE completed (results below).    HPI Summary  Pilo Quintana is a 72 year old female with pertinent past medical history of poorly controlled HTN (SBP >170 for a while at home), HLD, DM2, pulmonary nodules, significant memory decline (more rapidly since November 2022), prior stroke (in [facility] in 1995 or 1996- does not recall if she had an MRI or CT scan but did have imaging performed and was told there was a stroke, does not remember ever being told of prior brain bleeding but told there were \"some spots in the brain\", reportedly no residual symptoms PTA. On PTA Simvastatin 40 mg daily and ASA 81 mg daily. Significant memory decline, mRS 4.    She presented to the [facility] ED 5/11/23 due to R sided weakness and dysphagia beginning that day (see HPI for further details) and reported slowed speech and difficulty pronouncing words x 1-2 months. CTH concerning for pontine IPH and several chronic microhemorrhages. Transferred to [facility] ICU for close BP control and neuro checks. Transferred out of ICU 5/12/23.    -Smoking screen: never  -Sleep Apnea screen: denies snoring    Stroke Evaluation Summarized     MRI/Head CT MRI:   1.  Focal susceptibility signal loss within the ventral casimiro  corresponding with an acute or subacute hemorrhage on CT. No  surrounding parenchymal edema.  2.  Numerous chronic microhemorrhages within the cerebral hemispheres,  casimiro, and right cerebellum.  3.  Advanced chronic microvascular ischemic change and generalized  volume loss.  4.  Focal enhancement within the right basal ganglia likely relating to subacute to chronic infarction.  Repeat CTH: stable  CTH:concerning for pontine IPH and several chronic microhemorrhages   Intracranial Vasculature CTA head:  1.5-2 mm R MCA sacc aneur, mild  carotid siphon athero   Cervical " Vasculature CTA neck: mild b/l carotid bifurc athero no sten      Echocardiogram TTE: mod-severe LVH, EF 60-65%, mild LVOT obstruction, no wma, RV norm, LA moderately dilated, RA normal, no color doppler evidence of atrial shunt, mild-moderate mitral annular calcification, mild MR, SR   EKG/Telemetry SR with sinus arrhythmia, nonspecific ST abnormality   Other Testing CRP: 33.32  ESR: 29  Blood cultures: NGTD      LDL  5/12/2023: 101 mg/dL   A1C  5/11/2023: 7.8 %   Troponin 5/11/2023: 15 ng/L        Impression   Ventral casimiro IPH and several chronic microhemorrhages (both cortical and deep), spontaneous, suspected 2/2 uncontrolled hypertension and/or Cerebral amyloid angiopathy (CAA) in setting of progressive memory decline, blood cultures pending but low suspicion of endocarditis    Subacute-chronic R basal ganglia ischemic stroke, suspected secondary to small vessel ischemic disease     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, suspected asymptomatic    Plan  -Neurosurgery consulted, no plans for surgical intervention  -Neuro checks and vitals every 4 hours  -7T MRI vessel wall imaging at [facility] in 1 month to help further identify etiology of bleed (ordered)  - Inpatient SBP goal <140, titrate scheduled PO antihypertensives   -given concerns of mitral annular calcification, LVH, and LA dilation on TTE, recommend restarting ASA 81 mg daily today, repeat CTH in AM to ensure stable  -would avoid ibuprofen/other NSAIDs at home  -Lipitor 40 mg daily  -elevate HOB >30 degrees  -telemetry, 30 day heart monitor at discharge to evaluate for Afib (ordered)  -PT/OT/SPT  -Euthermia, euglycemia, eunatremia  -Stroke Education  -Stroke Class per [facility]     Stroke prevention:  -follow-up with PCP for titration to goal LDL 40-70, <40 increases risk of Intracranial hemorrhage  -goal HgbA1c <7% for stroke prevention, needs tighter control of diabetes, follow-up closely with PCP  -long term outpatient blood pressure goal  "<130/80, recommend home monitoring twice daily in AM and PM, keep log and bring to PCP follow-up     Discussed with vascular neurology attending, Dr. Castillo     Patient Follow-up    -Follow-up with PCP in 1-2 weeks  -7T MRI vessel wall imaging at [facility] in 1 month (ordered)  -Follow-up with neurology team in 6-8 weeks (ordered)   -Follow-up with neuro IR for R MCA aneurysm (ordered)  -f/u with neurosurgery as recommended (they recommended repeating CTH in 1 month)    We will follow peripherally for results of CTH in AM. If stable then we will sign off. Please contact us with any concerns.    China Cat PA-C  Vascular Neurology    To page me or covering stroke neurology team member, click here: [facility]COM  Choose \"On Call\" tab at top, then select \"NEUROLOGY/ALL SITES\" from middle drop-down box, press Enter, then look for \"stroke\" or \"telestroke\" for your site.    ______________________________________________________    Clinically Significant Risk Factors                  # Hypertension: Noted on problem list       # DMII: A1C = 7.8 % (Ref range: <5.7 %) within past 6 months, PRESENT ON ADMISSION             Medications   Scheduled Meds    aspirin  81 mg Oral Daily     atorvastatin  40 mg Oral QPM     fluticasone  1 puff Inhalation Daily     insulin aspart  1-7 Units Subcutaneous TID AC     insulin aspart  1-5 Units Subcutaneous At Bedtime     losartan  50 mg Oral Daily     metoprolol succinate ER  50 mg Oral Daily     sodium chloride (PF)  3 mL Intracatheter Q8H       Infusion Meds      PRN Meds  albuterol, glucose **OR** dextrose **OR** glucagon, hydrALAZINE, labetalol, lidocaine 4%, lidocaine (buffered or not buffered), nitroGLYcerin, ondansetron **OR** ondansetron, polyethylene glycol, prochlorperazine **OR** prochlorperazine **OR** prochlorperazine, senna-docusate **OR** senna-docusate, sodium chloride (PF)       PHYSICAL EXAMINATION  Temp:  [97.4  F (36.3  C)-98.2  F (36.8  C)] 98.2  F (36.8  C)  Pulse:  " [66-77] 66  Resp:  [16-22] 22  BP: ()/(62-73) 155/73  SpO2:  [94 %-100 %] 100 %     General Exam  General:  Patient sitting in recliner without any acute distress    HEENT:  normocephalic/atraumatic  Pulmonary:  no respiratory distress      Neuro Exam  Mental Status:  alert, not oriented to her name today, nor her age (although son clarifies that she is actually older than her documented [place] age), does not know the date which is also normal for her, does not know she is in a hospital, follows simple commands, speech clear and fluent though interpretation, naming and repetition normal  Cranial Nerves:  visual fields intact, PERRL, EOMI with normal smooth pursuit, facial sensation intact and symmetric, no appreciated facial droop today, hearing not formally tested but intact to conversation, no dysarthria, tongue protrusion midline  Motor:  some effort against gravity to b/l LE, no drift to b/l UE and no appreciated decreased  strength on the R today  Reflexes:  toes down-going  Sensory:  light touch sensation reported intact and symmetric b/l, no extinction on double simultaneous stimulation   Coordination:  normal finger-to-nose b/l, not able to perform heel-to-shin bilaterally due to weakness   Station/Gait:  deferred    Stroke Scales    NIHSS  1a. Level of Consciousness 0-->Alert, keenly responsive   1b. LOC Questions 2-->Answers neither question correctly   1c. LOC Commands 0-->Performs both tasks correctly   2.   Best Gaze 0-->Normal   3.   Visual 0-->No visual loss   4.   Facial Palsy 0-->Normal symmetrical movements   5a. Motor Arm, Left 0-->No drift, limb holds 90 (or 45) degrees for full 10 secs   5b. Motor Arm, Right 0-->No drift, limb holds 90 (or 45) degrees for full 10 secs   6a. Motor Leg, Left 2-->Some effort against gravity, leg falls to bed by 5 secs, but has some effort against gravity   6b. Motor Leg, right 2-->Some effort against gravity, leg falls to bed by 5 secs, but has some effort  against gravity   7.   Limb Ataxia 0-->Absent   8.   Sensory 0-->Normal, no sensory loss   9.   Best Language 0-->No aphasia, normal   10. Dysarthria 0-->Normal   11. Extinction and Inattention  0-->No abnormality   Total 6 (05/14/23 1407)       Modified Efraín Score (Pre-morbid)  (S) 4 (significant memory decline, requires help with ADLs and cooking and occasional walker use) - (S) Moderately severe disability.  Unable to attend to own bodily needs without assistance or unable to walk unassisted. (significant memory decline, requires help with ADLs and cooking and occasional walker use)  Modified Efraín Score (Discharge)  4 - Moderately severe disability.  Unable to attend to own bodily needs without assistance or unable to walk unassisted.    Imaging  I personally reviewed all imaging; relevant findings per HPI.     Lab Results Data   CBC  Recent Labs   Lab 05/13/23  1100 05/11/23  1047   WBC 7.0 7.9   RBC 5.24* 4.66   HGB 15.4 13.7   HCT 48.6* 43.5    238     Basic Metabolic Panel    Recent Labs   Lab 05/14/23  0854 05/14/23  0556 05/14/23  0200 05/11/23  1730 05/11/23  1047   NA  --   --   --   --  142   POTASSIUM  --   --   --   --  4.5   CHLORIDE  --   --   --   --  100   CO2  --   --   --   --  32*   BUN  --   --   --   --  12.8   CR  --   --   --   --  0.73   * 181* 148*   < > 271*   GLENIS  --   --   --   --  9.9    < > = values in this interval not displayed.     Liver Panel  No results for input(s): PROTTOTAL, ALBUMIN, BILITOTAL, ALKPHOS, AST, ALT, BILIDIRECT in the last 168 hours.  INR    Recent Labs   Lab Test 05/11/23  1047 01/22/16  0958   INR 1.00 1.1      Lipid Profile    Recent Labs   Lab Test 05/12/23  0512   CHOL 179   HDL 52   *   TRIG 132     A1C    Recent Labs   Lab Test 05/11/23  2006   A1C 7.8*     Troponin    Recent Labs   Lab 05/11/23 2006 05/11/23  1047   CTROPT 15* 10          Data   I have personally spent a total of 35 minutes providing care today, time spent in  reviewing medical records and reviewing tests, examining the patient and obtaining history, coordination of care, and discussion with the patient and/or family regarding diagnostic results, prognosis, symptom management, risks and benefits of management options, and development of plan of care. Greater than 50% was spent in counseling and coordination of care.

## 2023-05-14 NOTE — PLAN OF CARE
Goal Outcome Evaluation:  Alert, oriented to self and at times to place. Calm and cooperative. Follows most commands. Generalized weakness. Mild R facial droop. Decreased sensation RUE and RLE per pt, reacts to touch on both sides. Low BP, asymptomatic and MD notified. Denies pain. Tele reading NSR. Up with 2, belt, walker to chair and commode. Continent of bladder, no BM. Fair appetite on soft and bite sized diet with thin liquids. Scoring green on aggression screening tool.

## 2023-05-15 ENCOUNTER — APPOINTMENT (OUTPATIENT)
Dept: CARDIOLOGY | Facility: CLINIC | Age: 73
DRG: 065 | End: 2023-05-15
Attending: PHYSICIAN ASSISTANT
Payer: COMMERCIAL

## 2023-05-15 ENCOUNTER — APPOINTMENT (OUTPATIENT)
Dept: CT IMAGING | Facility: CLINIC | Age: 73
DRG: 065 | End: 2023-05-15
Attending: PHYSICIAN ASSISTANT
Payer: COMMERCIAL

## 2023-05-15 ENCOUNTER — APPOINTMENT (OUTPATIENT)
Dept: PHYSICAL THERAPY | Facility: CLINIC | Age: 73
DRG: 065 | End: 2023-05-15
Attending: INTERNAL MEDICINE
Payer: COMMERCIAL

## 2023-05-15 ENCOUNTER — APPOINTMENT (OUTPATIENT)
Dept: OCCUPATIONAL THERAPY | Facility: CLINIC | Age: 73
DRG: 065 | End: 2023-05-15
Attending: INTERNAL MEDICINE
Payer: COMMERCIAL

## 2023-05-15 VITALS
SYSTOLIC BLOOD PRESSURE: 140 MMHG | OXYGEN SATURATION: 96 % | RESPIRATION RATE: 18 BRPM | TEMPERATURE: 97.7 F | WEIGHT: 164.9 LBS | HEART RATE: 68 BPM | BODY MASS INDEX: 33.31 KG/M2 | DIASTOLIC BLOOD PRESSURE: 73 MMHG

## 2023-05-15 LAB
GLUCOSE BLDC GLUCOMTR-MCNC: 130 MG/DL (ref 70–99)
GLUCOSE BLDC GLUCOMTR-MCNC: 175 MG/DL (ref 70–99)
GLUCOSE BLDC GLUCOMTR-MCNC: 257 MG/DL (ref 70–99)

## 2023-05-15 PROCEDURE — 97530 THERAPEUTIC ACTIVITIES: CPT | Mod: GP

## 2023-05-15 PROCEDURE — 250N000013 HC RX MED GY IP 250 OP 250 PS 637: Performed by: PHYSICIAN ASSISTANT

## 2023-05-15 PROCEDURE — 99239 HOSP IP/OBS DSCHRG MGMT >30: CPT | Performed by: STUDENT IN AN ORGANIZED HEALTH CARE EDUCATION/TRAINING PROGRAM

## 2023-05-15 PROCEDURE — 93272 ECG/REVIEW INTERPRET ONLY: CPT | Performed by: INTERNAL MEDICINE

## 2023-05-15 PROCEDURE — 70450 CT HEAD/BRAIN W/O DYE: CPT

## 2023-05-15 PROCEDURE — 97535 SELF CARE MNGMENT TRAINING: CPT | Mod: GO

## 2023-05-15 PROCEDURE — 250N000013 HC RX MED GY IP 250 OP 250 PS 637: Performed by: STUDENT IN AN ORGANIZED HEALTH CARE EDUCATION/TRAINING PROGRAM

## 2023-05-15 PROCEDURE — 250N000011 HC RX IP 250 OP 636: Performed by: STUDENT IN AN ORGANIZED HEALTH CARE EDUCATION/TRAINING PROGRAM

## 2023-05-15 PROCEDURE — 93270 REMOTE 30 DAY ECG REV/REPORT: CPT

## 2023-05-15 PROCEDURE — 97116 GAIT TRAINING THERAPY: CPT | Mod: GP

## 2023-05-15 RX ORDER — ATORVASTATIN CALCIUM 40 MG/1
40 TABLET, FILM COATED ORAL EVERY EVENING
Qty: 30 TABLET | Refills: 4 | Status: SHIPPED | OUTPATIENT
Start: 2023-05-15

## 2023-05-15 RX ADMIN — FLUTICASONE FUROATE 1 PUFF: 100 POWDER RESPIRATORY (INHALATION) at 09:27

## 2023-05-15 RX ADMIN — METOPROLOL SUCCINATE 50 MG: 50 TABLET, EXTENDED RELEASE ORAL at 09:27

## 2023-05-15 RX ADMIN — ASPIRIN 81 MG: 81 TABLET, COATED ORAL at 09:27

## 2023-05-15 RX ADMIN — LOSARTAN POTASSIUM 100 MG: 100 TABLET, FILM COATED ORAL at 09:27

## 2023-05-15 RX ADMIN — HYDRALAZINE HYDROCHLORIDE 10 MG: 20 INJECTION INTRAMUSCULAR; INTRAVENOUS at 04:56

## 2023-05-15 ASSESSMENT — ACTIVITIES OF DAILY LIVING (ADL)
ADLS_ACUITY_SCORE: 43
ADLS_ACUITY_SCORE: 46
ADLS_ACUITY_SCORE: 43
ADLS_ACUITY_SCORE: 46
ADLS_ACUITY_SCORE: 43
ADLS_ACUITY_SCORE: 46

## 2023-05-15 NOTE — PLAN OF CARE
Goal Outcome Evaluation:    Patient presented to [facility] with rt sided weakness, transferred here for ICH within the ventral casimiro (corresponding with an acute or subacute hemorrhage and numerous chronic microhemorrhages.    Orientation/Cognitive: A&0 to self, can have simple conversation and respond to simple questions  Mobility Level/Assist Equipment: AX1GB/W  Fall Risk (Y/N): Yes  Behavior Concerns: none  Pain Management: denies pain  Tele/VS/O2: Tele discharge, VSS on RA, prn hydralazine given for B.P , goal B.P is systolic < 140  ABNL Lab/BG: none this shift  Diet: soft bite size, carb count, thin liquids  Bowel/Bladder: incontinent at times, ambulates to bathroom, family requested pure wick at night, pt was confused about use and was holding her urine, pure wick removed  Skin Concerns: none, mepilex protective on coccyx  Drains/Devices: PIV s./l on rt  Tests/Procedures for next shift: CT-pt at CT at change of shift  Anticipated DC date & active delays: TBD

## 2023-05-15 NOTE — PROGRESS NOTES
She was started on ASA 81 mg daily yesterday. Repeat CTH this AM is stable. Her symptoms improved significantly over the weekend and family feels they are able to provide the care she needs so disposition planned for returning home. No further stroke work-up needed so we will sign off. Please contact us with any concerns.

## 2023-05-15 NOTE — PLAN OF CARE
Physical Therapy Discharge Summary    Reason for therapy discharge:    Discharged to home with home therapy.    Progress towards therapy goal(s). See goals on Care Plan in [facility] electronic health record for goal details.  Goals partially met.  Barriers to achieving goals:   discharge from facility.    Therapy recommendation(s):    Continued therapy is recommended.  Rationale/Recommendations:  in order to progress strength, activity tolerance, increase independence and safety with functional mobility including transfers and household distance ambulation. .

## 2023-05-15 NOTE — DISCHARGE SUMMARY
[facility]  Hospitalist Discharge Summary      Date of Admission:  5/11/2023  Date of Discharge:  5/15/2023  Discharging Provider: Austin Ferrara MD  Discharge Service: Hospitalist Service    Discharge Diagnoses   As below    Clinically Significant Risk Factors     # DMII: A1C = 7.8 % (Ref range: <5.7 %) within past 6 months       Follow-ups Needed After Discharge   Follow-up Appointments     Follow-up and recommended labs and tests       Your Neurosurgical follow up appointments have been recommended 1 month   with head CT prior. You may call [phone] to make, confirm or change   your follow-up Neurosurgery appointment dates and/or times.         Follow-up and recommended labs and tests       Follow up with primary care provider, Chela Griffith, within 7 days   for hospital follow- up.  No follow up labs or test are needed.    Follow up with neurosurgery as scheduled    Follow up with neurology as scheduled.             Unresulted Labs Ordered in the Past 30 Days of this Admission     Date and Time Order Name Status Description    5/12/2023  7:15 PM Blood Culture Hand, Right Preliminary     5/12/2023  7:15 PM Blood Culture Hand, Left Preliminary       These results will be followed up by hospitalist discharge pool.     Discharge Disposition   Discharged to home  Condition at discharge: Stable    Hospital Course   Pilo Quintana is a 72 year old female admitted on 5/11/2023.     Past medical history significant for HTN, HLP, DM2, Obesity, Uncomplicated asthma, Known lung nodules, History of CVA who was transferred to [facility] due to ICH within the ventral casimiro. She was managed initially in ICU. After 24 hours of stability she was transfer to neurology floor. There aspirin was resumed, CT head at 24 hours post initiation was stable. She progressed with therapies and was cleared for discharge home with home cares. She will discharge with neuro follow up as  outlined below and home care RN/PT/OT.        Acute to subacute ICH  Incidental possible small saccular aneurysm  * TTE 5/14 shows LV EF 65%, mild LVOT obstruction, mod-severe eccentric LV hypertrophy  * Head CT w/o contrast revealed a new small hyperdense lesion in the anterior central aspect of the casimiro(concerning for a small area of acute parenchymal hemorrhage and recommending MRI).  Head/Neck CTA revealed mild atherosclerosis involving the carotid siphons and small anterior superiorly directed focal outpouching of the right MCA bifurcation (concerning for small saccular aneurysm).  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    - A1c 7.8% on 5/11/23     Obesity  Increase in all-cause morbidity and mortality.   - Follow up with PCP regarding ongoing management.       Uncomplicated asthma  - continue PTA PRN albuterol inhalers.       Known lung nodules  - Follow up with PCP.       History of CVA  - continue PTA ASA 81 mg/d as above       Consultations This Hospital Stay   SPEECH LANGUAGE PATH ADULT IP CONSULT  PHYSICAL THERAPY ADULT IP CONSULT  OCCUPATIONAL THERAPY ADULT IP CONSULT  NEUROLOGY CRITICAL CARE ADULT IP CONSULT  NEUROSURGERY IP CONSULT  NEUROLOGY CRITICAL CARE ADULT IP CONSULT  CARE MANAGEMENT / SOCIAL WORK IP CONSULT  NEUROLOGY CRITICAL CARE  ADULT IP CONSULT  NEUROSURGERY IP CONSULT    Code Status   Full Code    Time Spent on this Encounter   I, Austin Ferrara MD, personally saw the patient today and spent greater than 30 minutes discharging this patient.       Austin Ferrara MD  [facility] NEUROSCIENCE [facility]  [address] [place] [address]  Phone: [phone]  ______________________________________________________________________    Physical Exam   Vital Signs: Temp: 97.7  F (36.5  C) Temp src: Oral BP: (!) 140/73 Pulse: 68   Resp: 18 SpO2: 96 % O2 Device: None (Room air)    Weight: 164 lbs 14.47 oz  Constitutional: Awake, alert, cooperative, no apparent distress  Respiratory: Clear to auscultation bilaterally, no crackles or wheezing  Cardiovascular: Regular rate and rhythm, normal S1 and S2, and no murmur noted  GI: Normal bowel sounds, soft, non-distended, non-tender  Skin/Integumen: No rashes, no cyanosis, no edema       Primary Care Physician   Chela Griffith    Discharge Orders      CT Head w/o contrast*     MRA Brain ([facility]) wo Contrast     MR Brain w/o & w Contrast     Neurosurgery Referral      Home Care Referral      Follow-up and recommended labs and tests     Your Neurosurgical follow up appointments have been recommended 1 month with head CT prior. You may call [phone] to make, confirm or change your follow-up Neurosurgery appointment dates and/or times.     Reason for your hospital stay    Acute intracranial hemorrhage     Follow-up and recommended labs and tests     Follow up with primary care provider, Chela Griffith, within 7 days for hospital follow- up.  No follow up labs or test are needed.    Follow up with neurosurgery as scheduled    Follow up with neurology as scheduled.     Activity    Your activity upon discharge: activity as tolerated     Diet    Follow this diet upon discharge: Orders Placed This Encounter      Combination Diet Soft and Bite Sized Diet (level 6);  Thin Liquids (level 0)     Stroke Hospital Follow Up (for neurologist use only)    [facility] will call you to coordinate care as prescribed by your provider. If you don t hear from a representative within 2 business days, please call [phone].         Significant Results and Procedures   Most Recent 3 CBC's:Recent Labs   Lab Test 05/13/23  1100 05/11/23  1047 04/22/23  1806   WBC 7.0 7.9 10.1   HGB 15.4 13.7 14.4   MCV 93 93 93    238 234     Most Recent 3 BMP's:Recent Labs   Lab Test 05/15/23  1151 05/15/23  0810 05/15/23  0158 05/11/23  1730 05/11/23  1047 05/11/23  1027 04/22/23  1806 09/10/20  1454   0000   NA  --   --   --   --  142  --  138 138  --    POTASSIUM  --   --   --   --  4.5  --  4.9 3.9  --    CHLORIDE  --   --   --   --  100  --  95* 106  --    CO2  --   --   --   --  32*  --  31* 29  --    BUN  --   --   --   --  12.8  --  12.1 15  --    CR  --   --   --   --  0.73  --  0.79 0.62  --    ANIONGAP  --   --   --   --  10  --  12 3  --    GLENIS  --   --   --   --  9.9  --  9.8 9.3  --    * 175* 130*   < > 271*   < > 355* 275*   < >    < > = values in this interval not displayed.   ,   Results for orders placed or performed during the hospital encounter of 05/11/23   CT Head w/o Contrast    Narrative    EXAM: CT HEAD W/O CONTRAST  LOCATION: [facility]  DATE/TIME: 5/11/2023 6:54 PM CDT    INDICATION: Reassessment of acute ICH  COMPARISON: CT head same day  TECHNIQUE: Routine CT Head without IV contrast. Multiplanar reformats. Dose reduction techniques were used.    FINDINGS:    INTRACRANIAL CONTENTS: No acute transcortical infarct. Accounting for differences in patient positioning and slice plane, the 5-6 mm hyperdense focus within the ventral casimiro is not significantly changed. No additional new acute intracranial hemorrhage.   No mass effect. Subarachnoid cisterns are patent. Patchy and confluent white matter hypodensities are, while nonspecific, most  compatible with chronic microvascular ischemic changes. Unchanged proportional prominence of the ventricles and sulci is   compatible with diffuse cerebral volume loss.    VISUALIZED ORBITS/SINUSES/MASTOIDS: No visible intraorbital abnormality. Paranasal sinuses are clear. Similar left tympanomastoid opacification.    BONES/SOFT TISSUES: No acute abnormality.      Impression    IMPRESSION:  1.  No significant change in size of 5-6 mm hyperdense focus in the ventral casimiro compatible with hemorrhage since comparison same day CT head.       XR Video Swallow with SLP or OT    Narrative    VIDEO SWALLOWING EVALUATION   5/12/2023 1:19 PM     HISTORY: Dysphagia, pontine hemorrhage.    COMPARISON: None.    FLUOROSCOPY TIME: 1.2 minutes.  SPOT IMAGES OR CINE RUNS: 6      Impression    IMPRESSION:  Thin: Premature spillage to the piriform sinuses. Flash penetration  observed with sequential swallows. Otherwise, no penetration or  aspiration.    Mildly thick: Not administered.    Moderately thick: Not administered.    Pudding: No penetration or aspiration. Minimal pharyngeal residue.    Semisolid: Not administered.    Solid: No penetration or aspiration. Minimal pharyngeal residue.    This study only includes the cervical esophagus. Please see separate  report from speech pathology for additional details.    JENNIFER WEAVER MD         SYSTEM ID:  [id number]   CT Head w/o Contrast    Narrative    CT SCAN OF THE HEAD WITHOUT CONTRAST May 15, 2023 7:02 AM     HISTORY: Evaluate for bleed stability following initiation of ASA.    TECHNIQUE: Axial images of the head and coronal reformations without  IV contrast material. Radiation dose for this scan was reduced using  automated exposure control, adjustment of the mA and/or kV according  to patient size, or iterative reconstruction technique.    COMPARISON: Several prior comparisons, most recent head CT 5/11/2023.    FINDINGS: Hyperdense 0.6 cm hemorrhage in the ventral casimiro is  not  significantly changed since 2023. Subtle hyperdense focus also  more posteriorly in the casimiro is slightly more conspicuous however this  may be due to differences in technique. Focal hypodensity in the mid  casimiro is unchanged and may be due to previous infarct. No new  intracranial hemorrhage appreciated. No significant midline shift or  herniation. Extensive periventricular white matter hypodensities which  are nonspecific, but most likely related to chronic microvascular  ischemic disease. Chronic lacunar infarct in the anterior left basal  ganglia. Moderate diffuse parenchymal volume loss. Ventricular size is  unchanged without evidence of hydrocephalus.    Left mastoid effusion.      Impression    IMPRESSION: Subcentimeter hemorrhage in the casimiro is not significantly  changed since 2023. No new intracranial hemorrhage appreciated.       CYNTHIA CALLE MD         SYSTEM ID:  [id number]   Echocardiogram Complete     Value    LVEF  60-65%    Narrative    [id number]  [id number]  [id number]  [id number]^WILBER^MAINE^DALIA     [facility]  Echocardiography Laboratory  [address] [place] [address]     Name: FABIOLA FREEMAN  MRN: [id number]  : 1950  Study Date: 2023 11:12 AM  Age: 73 yrs  Gender: Female  Patient Location: [facility]  Reason For Study: Cerebrovascular Incident  Ordering Physician: MAINE MERINO  Referring Physician: AMBER YOON  Performed By: Giancarlo Dumas     BSA: 1.7 m2  Height: 59 in  Weight: 164 lb  HR: 62  BP: 130/59 mmHg  ______________________________________________________________________________  Procedure  Complete Portable Echo Adult.  ______________________________________________________________________________  Interpretation Summary     Normal systolic LVF. EF 65+. Normal RV.  Mild LVOT obstruction.There is moderate to severe eccentric left ventricular  hypertrophy.     The visual ejection fraction is 60-65%.  The left atrium  is moderately dilated.  There is mild to moderate mitral annular calcification.  There is mild (1+) mitral regurgitation.  Moderate (46-55mmHg) pulmonary hypertension is present.  ______________________________________________________________________________  Left Ventricle  There is moderate to severe eccentric left ventricular hypertrophy. Left  ventricular systolic function is normal. The visual ejection fraction is 60-  65%. A mild left ventricular outflow tract (LVOT) obstruction is present. No  regional wall motion abnormalities noted.     Right Ventricle  The right ventricle is normal in structure, function and size.     Atria  The left atrium is moderately dilated. Right atrial size is normal. There is  no color Doppler evidence of an atrial shunt.     Mitral Valve  There is mild to moderate mitral annular calcification. There is mild (1+)  mitral regurgitation.     Tricuspid Valve  The tricuspid valve is normal in structure and function. There is trace  tricuspid regurgitation. The right ventricular systolic pressure is  approximated at 45.3 mmHg plus the right atrial pressure. Moderate (46-55mmHg)  pulmonary hypertension is present.     Aortic Valve  The aortic valve is normal in structure and function.     Pulmonic Valve  The pulmonic valve is not well visualized. There is trace pulmonic valvular  regurgitation.     Vessels  Normal size aorta. Normal size ascending aorta.     Pericardium  There is no pericardial effusion.     Rhythm  Sinus rhythm was noted.  ______________________________________________________________________________  MMode/2D Measurements & Calculations  IVSd: 2.8 cm     LVIDd: 3.1 cm  LVIDs: 2.2 cm  LVPWd: 1.2 cm  FS: 29.4 %  LV mass(C)d: 264.7 grams  LV mass(C)dI: 156.2 grams/m2  Ao root diam: 3.2 cm  LA dimension: 2.8 cm  asc Aorta Diam: 3.4 cm  LA/Ao: 0.88  LVOT diam: 2.0 cm  LVOT area: 3.3 cm2  LA Volume (BP): 71.2 ml  LA Volume Index (BP): 42.1 ml/m2  RWT: 0.78  TAPSE: 1.7 cm      Doppler Measurements & Calculations  MV E max orville: 53.6 cm/sec  MV A max orville: 121.3 cm/sec  MV E/A: 0.44  MV max P.5 mmHg  MV mean P.4 mmHg  MV V2 VTI: 35.4 cm  MV dec slope: 169.0 cm/sec2  MV dec time: 0.32 sec  PA acc time: 0.11 sec  TR max orville: 336.6 cm/sec  TR max P.3 mmHg  E/E' av.0  Lateral E/e': 10.3  Medial E/e': 13.7  RV S Orville: 14.3 cm/sec     ______________________________________________________________________________  Report approved by: Michelle Richey 2023 12:46 PM               Discharge Medications   Current Discharge Medication List      START taking these medications    Details   aspirin (ASA) 81 MG EC tablet Take 1 tablet (81 mg) by mouth daily  Qty: 30 tablet, Refills: 11    Associated Diagnoses: Right-sided nontraumatic intracerebral hemorrhage, unspecified cerebral location (H)      atorvastatin (LIPITOR) 40 MG tablet Take 1 tablet (40 mg) by mouth every evening  Qty: 30 tablet, Refills: 4    Associated Diagnoses: Right-sided nontraumatic intracerebral hemorrhage, unspecified cerebral location (H)         CONTINUE these medications which have NOT CHANGED    Details   acetaminophen (TYLENOL) 500 MG tablet Take 500-1,000 mg by mouth every 8 hours as needed for mild pain      albuterol (PROAIR HFA/PROVENTIL HFA/VENTOLIN HFA) 108 (90 Base) MCG/ACT inhaler INHALE 2 PUFFS BY MOUTH EVERY 4 HOURS AS NEEDED FOR WHEEZING      Cholecalciferol (VITAMIN D3) 50 MCG (2000 UT) CAPS Take 50 mcg by mouth daily 2000 unit dose is prescribed; takes Rx product in addition to OTC 25 mcg/1000 unit dose      fluticasone (FLOVENT HFA) 44 MCG/ACT inhaler Inhale 2 puffs into the lungs 2 times daily      furosemide (LASIX) 20 MG tablet Take 20 mg by mouth daily      glipiZIDE (GLUCOTROL XL) 5 MG 24 hr tablet Take 5 mg by mouth daily      losartan (COZAAR) 100 MG tablet Take 100 mg by mouth daily      metoprolol succinate ER (TOPROL XL) 50 MG 24 hr tablet Take 50 mg by mouth daily       triamcinolone (KENALOG) 0.1 % external ointment Apply topically 2 times daily as needed for irritation      Vitamin D3 (CHOLECALCIFEROL) 25 mcg (1000 units) tablet Take 25 mcg by mouth daily Takes OTC product in addition to prescribed 50 mcg/2000 unit dose         STOP taking these medications       simvastatin (ZOCOR) 20 MG tablet Comments:   Reason for Stopping:             Allergies   No Known Allergies

## 2023-05-15 NOTE — CONSULTS
Care Management Initial Consult    General Information  Assessment completed with: Patient, Family, son Balwinder  Type of CM/SW Visit: Offer D/C Planning    Primary Care Provider verified and updated as needed: Yes   Readmission within the last 30 days:        Reason for Consult: discharge planning  Advance Care Planning:            Communication Assessment  Patient's communication style: spoken language (English or Bilingual)             Cognitive  Cognitive/Neuro/Behavioral: .WDL except  Level of Consciousness: alert  Arousal Level: arouses to voice, opens eyes spontaneously  Orientation: disoriented to, time, situation, place  Mood/Behavior: calm, cooperative  Best Language: 0 - No aphasia  Speech: clear    Living Environment:   People in home: child(mahi), adult     Current living Arrangements: house      Able to return to prior arrangements: yes       Family/Social Support:  Care provided by:    Provides care for:    Marital Status: Single             Description of Support System:           Current Resources:   Patient receiving home care services: No     Community Resources:    Equipment currently used at home: grab bar, toilet, grab bar, tub/shower, other (see comments)  Supplies currently used at home:      Employment/Financial:  Employment Status: retired        Financial Concerns:             Does the patient's insurance plan have a 3 day qualifying hospital stay waiver?  No    Lifestyle & Psychosocial Needs:  Social Determinants of Health     Tobacco Use: Low Risk  (4/4/2023)    Patient History      Smoking Tobacco Use: Never      Smokeless Tobacco Use: Never      Passive Exposure: Not on file   Alcohol Use: Not on file   Financial Resource Strain: Not on file   Food Insecurity: Not on file   Transportation Needs: Not on file   Physical Activity: Not on file   Stress: Not on file   Social Connections: Not on file   Intimate Partner Violence: Not on file   Depression: Not on file   Housing Stability: Not on  file       Functional Status:  Prior to admission patient needed assistance:              Mental Health Status:          Chemical Dependency Status:                Values/Beliefs:  Spiritual, Cultural Beliefs, Zoroastrian Practices, Values that affect care:                 Additional Information:  Met with patient and son Balwinder at bedside to discuss role in discharge planning. Pt speaks a little english, declines interpreters. Son speaks english and interprets for Pt as needed.  Pt lives at home w/ son and daughter in law.  No services in the home.  Son works overnight to be home with patient during the day.  Notes he has been trying to get more help for his mom in the home.  Notes indicate PCP was trying to get HHC but could not find accepting agency.  Pt is working to try to get someone to come in to help with showers/bathing.  Son does not want to pursue PCA at this point as he does not feel his mom would be comfortable with that.  Son/wife assist as needed. House has grab bars and lift chair.  Reviewed recommendation for home with HHC.  Pt/son in agreement with this.  Discussed how CC could have difficulty securing HHC given insurance ([facility]).  Son understands and if not able to get HHC he will work to try to get his mom to OP PT (though notes would prefer HHC as mobility out is difficult)    Referral sent via DOD/[employer] to [facility] via process.     CC to follow for acceptance of HHC  Son will make Follow-up appointments.   Son to transport at discharge.     Care Management Discharge Note    Discharge Date: 05/15/2023       Discharge Disposition: Home, Home Care    Discharge Services:      Discharge DME:      Discharge Transportation: family or friend will provide    Private pay costs discussed: Not applicable    Does the patient's insurance plan have a 3 day qualifying hospital stay waiver?  No    PAS Confirmation Code:    Patient/family educated on Medicare website which has current facility and service quality  ratings:      Education Provided on the Discharge Plan:    Persons Notified of Discharge Plans: Pt/bed  Patient/Family in Agreement with the Plan: yes    Handoff Referral Completed: No    Additional Information:  Pt accepted by [facility].  Per MD Pt is discharge today.  discharge orders faxed via DOD.  Updated Leigh at [facility] intake that patient was discharge today and to call son too coordinate.      Information added to AVS  Bedside RN to review AVS.               Ashly Sun RN

## 2023-05-15 NOTE — PLAN OF CARE
"Speech Language Therapy Discharge Summary    Reason for therapy discharge:    Discharged to home with home therapy.    Progress towards therapy goal(s). See goals on Care Plan in [facility] electronic health record for goal details.  Goals not met.  Barriers to achieving goals:   discharge from facility.    Therapy recommendation(s):    Continued therapy is recommended.  Rationale/Recommendations:  SLP for dysphagia management, ADAT. At time of discharge \"Soft/bite sized solids, thin liquids when upright, liquid wash every 3-4 bites for oral/pharyngeal clearance\".     *Pt not seen by discharging therapist on this date, note written based on previous treating therapist's notes and recommendations           "

## 2023-05-15 NOTE — PLAN OF CARE
Goal Outcome Evaluation:  Alert, oriented to self only. Slight R facial droop. Generalized weakness. Fair appetite on soft and bite sized diet with thin liquids. Takes pills whole with water. VSS except for SBP elevated this evening above 140. No pain. Up with 1, belt, walker. Ambulated in hallway. Continent of bladder. Calm and cooperative. Son at bedside. Scoring green on aggression screening tool.

## 2023-05-15 NOTE — PROGRESS NOTES
Patient discharged at 3:59 PM to home with home care.  IV was discontinued. Pain at time of discharge was 0/10. Belongings returned to patient.  Discharge instructions and medications reviewed with patient and son.  Patient and son verbalized understanding and all questions were answered.  Prescriptions given to patient.  At time of discharge, patient condition was stable and left the unit via WC escorted by JEAN.

## 2023-05-16 ENCOUNTER — PATIENT OUTREACH (OUTPATIENT)
Dept: CARE COORDINATION | Facility: CLINIC | Age: 73
End: 2023-05-16
Payer: COMMERCIAL

## 2023-05-16 NOTE — PROGRESS NOTES
[facility] Care Resource Center    Background: Transitional Care Management program identified per system criteria and reviewed by [facility] Resource Center team for possible outreach.    Assessment: Upon chart review, CCR Team member will not proceed with patient outreach related to this episode of Transitional Care Management program due to reason below:    Per chart review patient speaks language Man[place].  RN attempted to call language line but this is not a language offered.  Unable to obtain . No consent on file to speak to family.  Unable to outreach to patient due to inability to obtain an .     Plan: Transitional Care Management episode addressed appropriately per reason noted above.      Libertad Starr RN  [facility]    *Connected Care Resource Team does NOT follow patient ongoing. Referrals are identified based on internal discharge reports and the outreach is to ensure patient has an understanding of their discharge instructions.

## 2023-05-16 NOTE — PLAN OF CARE
Occupational Therapy Discharge Summary    Reason for therapy discharge:    Discharged to home with home therapy.    Progress towards therapy goal(s). See goals on Care Plan in [facility] electronic health record for goal details.  Goals partially met.  Barriers to achieving goals:   discharge from facility.    Therapy recommendation(s):    Continued therapy is recommended.  Rationale/Recommendations:  HHOT to progress saftey and independence with I/ADLs in the home environment. .

## 2023-05-18 LAB
BACTERIA BLD CULT: NO GROWTH
BACTERIA BLD CULT: NO GROWTH

## 2023-05-22 ENCOUNTER — APPOINTMENT (OUTPATIENT)
Dept: MRI IMAGING | Facility: CLINIC | Age: 73
End: 2023-05-22
Attending: EMERGENCY MEDICINE
Payer: COMMERCIAL

## 2023-05-22 ENCOUNTER — APPOINTMENT (OUTPATIENT)
Dept: CT IMAGING | Facility: CLINIC | Age: 73
End: 2023-05-22
Attending: EMERGENCY MEDICINE
Payer: COMMERCIAL

## 2023-05-22 ENCOUNTER — HOSPITAL ENCOUNTER (EMERGENCY)
Facility: CLINIC | Age: 73
Discharge: HOME | End: 2023-05-22
Attending: EMERGENCY MEDICINE | Admitting: EMERGENCY MEDICINE
Payer: COMMERCIAL

## 2023-05-22 VITALS
DIASTOLIC BLOOD PRESSURE: 112 MMHG | HEART RATE: 56 BPM | TEMPERATURE: 97.5 F | SYSTOLIC BLOOD PRESSURE: 173 MMHG | WEIGHT: 164 LBS | OXYGEN SATURATION: 98 % | BODY MASS INDEX: 33.12 KG/M2 | RESPIRATION RATE: 22 BRPM

## 2023-05-22 DIAGNOSIS — I10 ESSENTIAL HYPERTENSION: ICD-10-CM

## 2023-05-22 DIAGNOSIS — H53.8 BLURRED VISION: ICD-10-CM

## 2023-05-22 DIAGNOSIS — R51.9 ACUTE NONINTRACTABLE HEADACHE, UNSPECIFIED HEADACHE TYPE: ICD-10-CM

## 2023-05-22 LAB
ANION GAP SERPL CALCULATED.3IONS-SCNC: 8 MMOL/L (ref 7–15)
APTT PPP: 28 SECONDS (ref 22–38)
BASOPHILS # BLD AUTO: 0 10E3/UL (ref 0–0.2)
BASOPHILS NFR BLD AUTO: 0 %
BUN SERPL-MCNC: 18.7 MG/DL (ref 8–23)
CALCIUM SERPL-MCNC: 10.1 MG/DL (ref 8.8–10.2)
CHLORIDE SERPL-SCNC: 103 MMOL/L (ref 98–107)
CREAT SERPL-MCNC: 0.76 MG/DL (ref 0.51–0.95)
DEPRECATED HCO3 PLAS-SCNC: 31 MMOL/L (ref 22–29)
EOSINOPHIL # BLD AUTO: 0.2 10E3/UL (ref 0–0.7)
EOSINOPHIL NFR BLD AUTO: 3 %
ERYTHROCYTE [DISTWIDTH] IN BLOOD BY AUTOMATED COUNT: 12.5 % (ref 10–15)
GFR SERPL CREATININE-BSD FRML MDRD: 82 ML/MIN/1.73M2
GLUCOSE SERPL-MCNC: 116 MG/DL (ref 70–99)
HCT VFR BLD AUTO: 43.2 % (ref 35–47)
HGB BLD-MCNC: 13.6 G/DL (ref 11.7–15.7)
IMM GRANULOCYTES # BLD: 0 10E3/UL
IMM GRANULOCYTES NFR BLD: 0 %
INR PPP: 1.09 (ref 0.85–1.15)
LYMPHOCYTES # BLD AUTO: 2 10E3/UL (ref 0.8–5.3)
LYMPHOCYTES NFR BLD AUTO: 26 %
MCH RBC QN AUTO: 29.3 PG (ref 26.5–33)
MCHC RBC AUTO-ENTMCNC: 31.5 G/DL (ref 31.5–36.5)
MCV RBC AUTO: 93 FL (ref 78–100)
MONOCYTES # BLD AUTO: 0.6 10E3/UL (ref 0–1.3)
MONOCYTES NFR BLD AUTO: 7 %
NEUTROPHILS # BLD AUTO: 4.8 10E3/UL (ref 1.6–8.3)
NEUTROPHILS NFR BLD AUTO: 64 %
NRBC # BLD AUTO: 0 10E3/UL
NRBC BLD AUTO-RTO: 0 /100
PLATELET # BLD AUTO: 236 10E3/UL (ref 150–450)
POTASSIUM SERPL-SCNC: 4 MMOL/L (ref 3.4–5.3)
RBC # BLD AUTO: 4.64 10E6/UL (ref 3.8–5.2)
SODIUM SERPL-SCNC: 142 MMOL/L (ref 136–145)
TROPONIN T SERPL HS-MCNC: 10 NG/L
WBC # BLD AUTO: 7.5 10E3/UL (ref 4–11)

## 2023-05-22 PROCEDURE — 70553 MRI BRAIN STEM W/O & W/DYE: CPT

## 2023-05-22 PROCEDURE — 93005 ELECTROCARDIOGRAM TRACING: CPT | Mod: RTG

## 2023-05-22 PROCEDURE — 250N000011 HC RX IP 250 OP 636: Performed by: EMERGENCY MEDICINE

## 2023-05-22 PROCEDURE — 70498 CT ANGIOGRAPHY NECK: CPT

## 2023-05-22 PROCEDURE — A9585 GADOBUTROL INJECTION: HCPCS | Performed by: EMERGENCY MEDICINE

## 2023-05-22 PROCEDURE — 99285 EMERGENCY DEPT VISIT HI MDM: CPT | Mod: 25

## 2023-05-22 PROCEDURE — 85730 THROMBOPLASTIN TIME PARTIAL: CPT | Performed by: EMERGENCY MEDICINE

## 2023-05-22 PROCEDURE — 85610 PROTHROMBIN TIME: CPT | Performed by: EMERGENCY MEDICINE

## 2023-05-22 PROCEDURE — 85025 COMPLETE CBC W/AUTO DIFF WBC: CPT | Performed by: EMERGENCY MEDICINE

## 2023-05-22 PROCEDURE — 36415 COLL VENOUS BLD VENIPUNCTURE: CPT | Performed by: EMERGENCY MEDICINE

## 2023-05-22 PROCEDURE — 255N000002 HC RX 255 OP 636: Performed by: EMERGENCY MEDICINE

## 2023-05-22 PROCEDURE — 70450 CT HEAD/BRAIN W/O DYE: CPT

## 2023-05-22 PROCEDURE — 70496 CT ANGIOGRAPHY HEAD: CPT

## 2023-05-22 PROCEDURE — 250N000009 HC RX 250: Performed by: EMERGENCY MEDICINE

## 2023-05-22 PROCEDURE — 80048 BASIC METABOLIC PNL TOTAL CA: CPT | Performed by: EMERGENCY MEDICINE

## 2023-05-22 PROCEDURE — 84484 ASSAY OF TROPONIN QUANT: CPT | Performed by: EMERGENCY MEDICINE

## 2023-05-22 RX ORDER — ACETAMINOPHEN 500 MG
1000 TABLET ORAL ONCE
Status: DISCONTINUED | OUTPATIENT
Start: 2023-05-22 | End: 2023-05-23 | Stop reason: HOSPADM

## 2023-05-22 RX ORDER — IOPAMIDOL 755 MG/ML
500 INJECTION, SOLUTION INTRAVASCULAR ONCE
Status: COMPLETED | OUTPATIENT
Start: 2023-05-22 | End: 2023-05-22

## 2023-05-22 RX ORDER — GADOBUTROL 604.72 MG/ML
7 INJECTION INTRAVENOUS ONCE
Status: COMPLETED | OUTPATIENT
Start: 2023-05-22 | End: 2023-05-22

## 2023-05-22 RX ADMIN — IOPAMIDOL 75 ML: 755 INJECTION, SOLUTION INTRAVENOUS at 18:36

## 2023-05-22 RX ADMIN — GADOBUTROL 7 ML: 604.72 INJECTION INTRAVENOUS at 21:37

## 2023-05-22 RX ADMIN — SODIUM CHLORIDE 80 ML: 9 INJECTION, SOLUTION INTRAVENOUS at 18:36

## 2023-05-22 ASSESSMENT — ACTIVITIES OF DAILY LIVING (ADL)
ADLS_ACUITY_SCORE: 35
ADLS_ACUITY_SCORE: 35

## 2023-05-22 NOTE — CONSULTS
[facility]    Stroke Telephone Note    I was called by  on 05/22/23 regarding patient Pilo Quintana. The patient is a 73 year old female who was hospitalized recently and discharge Monday for acute stroke, noted sharp right sided head pain at 1700 today. She also has some blurry vision and double vision.   She has some residual right face droop and right sided deficits. She had some transient blurred vision and diplopia. Her vision changes have resolved but has right sided headache and has her bilateral lower extremity weakness and RUE ataxia and RUE pronator drift with CN 7 palsy.     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 Yes 05/22/23 1901          Imaging Findings                                                                       IMPRESSION:  1.  Interval decrease in density of the known parenchymal hemorrhage in the ventral casimiro near the midline, now with a subacute appearance.     2.  No acute intracranial hemorrhage or superimposed acute intracranial abnormality.     3.  Unchanged ventricular size and configuration. Ventricular prominence appears disproportionate to the background mild diffuse parenchymal volume loss. In the appropriate clinical setting, a component of normal pressure hydrocephalus could be   considered.       Intravenous Thrombolysis  Not given due to:   - history of intracranial hemorrhage    Endovascular Treatment  Not initiated due to absence of proximal vessel occlusion    Impression  # Headache- presents with headache and diplopia- symptoms started at 1700, unclear if her vision changes were binocular or  "monocular. If binocular then would due to a central process that could be a TIA or unmasking of her prior ashley stroke. The treatment would remain the same if her symptoms were TIA vs unmasking. She is currently on aspirin and Lipitor.     #Prior Ashley hemorrhage- due to HTN vs CAA     Recommendations       MRI brain WO coronal DWI thin cuts  If no acute stroke on MRI and no binocular diplopia then no further work-up needed    My recommendations are based on the information provided over the phone by Pilo Quintana's in-person providers. They are not intended to replace the clinical judgment of her in-person providers. I was not requested to personally see or examine the patient at this time.    The Stroke Staff is Dr. Mckeon.    Carolina Castaneda MD  Vascular Neurology Fellow    To page me or covering stroke neurology team member, click here: AMCOM  Choose \"On Call\" tab at top, then select \"NEUROLOGY/ALL SITES\" from middle drop-down box, press Enter, then look for \"stroke\" or \"telestroke\" for your site.        "

## 2023-05-22 NOTE — ED TRIAGE NOTES
Pt had recent stroke, hospitalized in ICU and discharged last Monday. Pt reports onset of sharp pains on the right side of head around 1700 today. Pt states pain is still there but not as severe as time of onset. Denies worst HA of life. Pt also endorses some mild blurry vision and double vision. Pt has right sided weakness from recent stroke. ABCs intact.

## 2023-05-23 ENCOUNTER — PRE VISIT (OUTPATIENT)
Dept: NEUROSURGERY | Facility: CLINIC | Age: 73
End: 2023-05-23

## 2023-05-23 ENCOUNTER — OFFICE VISIT (OUTPATIENT)
Dept: NEUROSURGERY | Facility: CLINIC | Age: 73
End: 2023-05-23
Attending: PHYSICIAN ASSISTANT
Payer: COMMERCIAL

## 2023-05-23 VITALS
BODY MASS INDEX: 33.1 KG/M2 | DIASTOLIC BLOOD PRESSURE: 102 MMHG | HEART RATE: 52 BPM | SYSTOLIC BLOOD PRESSURE: 193 MMHG | OXYGEN SATURATION: 94 % | WEIGHT: 163.9 LBS

## 2023-05-23 DIAGNOSIS — E66.01 MORBID OBESITY (H): ICD-10-CM

## 2023-05-23 DIAGNOSIS — E21.0 PRIMARY HYPERPARATHYROIDISM (H): Primary | ICD-10-CM

## 2023-05-23 DIAGNOSIS — G31.9 CEREBRAL DEGENERATION (H): ICD-10-CM

## 2023-05-23 DIAGNOSIS — E11.69 TYPE 2 DIABETES MELLITUS WITH OTHER SPECIFIED COMPLICATION, WITHOUT LONG-TERM CURRENT USE OF INSULIN (H): ICD-10-CM

## 2023-05-23 DIAGNOSIS — I67.1 CEREBRAL ANEURYSM, NONRUPTURED: ICD-10-CM

## 2023-05-23 LAB
ATRIAL RATE - MUSE: 57 BPM
DIASTOLIC BLOOD PRESSURE - MUSE: NORMAL MMHG
INTERPRETATION ECG - MUSE: NORMAL
P AXIS - MUSE: -3 DEGREES
PR INTERVAL - MUSE: 142 MS
QRS DURATION - MUSE: 88 MS
QT - MUSE: 420 MS
QTC - MUSE: 408 MS
R AXIS - MUSE: -13 DEGREES
SYSTOLIC BLOOD PRESSURE - MUSE: NORMAL MMHG
T AXIS - MUSE: 39 DEGREES
VENTRICULAR RATE- MUSE: 57 BPM

## 2023-05-23 PROCEDURE — 99203 OFFICE O/P NEW LOW 30 MIN: CPT | Mod: GC | Performed by: RADIOLOGY

## 2023-05-23 ASSESSMENT — PAIN SCALES - GENERAL: PAINLEVEL: NO PAIN (0)

## 2023-05-23 NOTE — PROGRESS NOTES
"[facility] NEUROSURGERY CLINIC [address]  3RD FLOOR  [facility] [address]  Phone: [phone]  Fax: [phone]    Neuro-interventional clinic progress note:    Reason for clinic visit: Right MCA aneurysm      History of present Illness: Pilo Quintana is a \"73\" year old (more likely mid 80s per family) female patient with history of HTN, DMII, and stroke who presents to Neuro IR clinic for evaluation of a 1x2 mm right MCA bifurcation aneurysm. This was incidentally diagnosed in the process of evaluation for right hemiparesis and new diagnosis of a pontine hemorrhage on 5/11/23. She also presented to the ED last night for sharp left parietal headache, and imaging was stable. She was quite hypertensive there and again in clinic this morning, despite taking her BP meds as prescribed. Her son says she gets worked up, worrying about her medical condition and the care she now requires, and that due to her forgetfulness, her multiple children who care for him, but primarily he, performs q30 minute checks on her and reminds her to relax and let them care for her.      Past Medical History:  Past Medical History:   Diagnosis Date     Diabetes (H)      Hypertension      Morbid obesity (H)      Osteoarthritis 6/7/2003     PMB (postmenopausal bleeding) 9/16/2014     Renal mass      Thickened endometrium 9/16/2014     Uncomplicated asthma      Unspecified cerebral artery occlusion with cerebral infarction 1998    was in [place], no residual at present       Past Surgical History:  Past Surgical History:   Procedure Laterality Date     DILATION AND CURETTAGE, OPERATIVE HYSTEROSCOPY WITH MORCELLATOR, COMBINED N/A 9/25/2014    Procedure: COMBINED DILATION AND CURETTAGE, OPERATIVE HYSTEROSCOPY WITH MORCELLATOR;  Surgeon: Silverio Christy MD;  Location:  OR       Social History:  Social History     Socioeconomic History     " Marital status:      Spouse name: Not on file     Number of children: 8     Years of education: 0     Highest education level: Not on file   Occupational History     Not on file   Tobacco Use     Smoking status: Never     Smokeless tobacco: Never   Vaping Use     Vaping status: Not on file   Substance and Sexual Activity     Alcohol use: No     Drug use: No     Sexual activity: Not on file   Other Topics Concern     Not on file   Social History Narrative     Not on file     Social Determinants of Health     Financial Resource Strain: Not on file   Food Insecurity: Not on file   Transportation Needs: Not on file   Physical Activity: Not on file   Stress: Not on file   Social Connections: Not on file   Intimate Partner Violence: Not on file   Housing Stability: Not on file       Family History:  No family history on file.    Home Medications:  Current Outpatient Medications   Medication     albuterol (PROAIR HFA/PROVENTIL HFA/VENTOLIN HFA) 108 (90 Base) MCG/ACT inhaler     aspirin (ASA) 81 MG EC tablet     atorvastatin (LIPITOR) 40 MG tablet     fluticasone (FLOVENT HFA) 44 MCG/ACT inhaler     furosemide (LASIX) 20 MG tablet     glipiZIDE (GLUCOTROL XL) 5 MG 24 hr tablet     losartan (COZAAR) 100 MG tablet     metoprolol succinate ER (TOPROL XL) 50 MG 24 hr tablet     triamcinolone (KENALOG) 0.1 % external ointment     Vitamin D3 (CHOLECALCIFEROL) 25 mcg (1000 units) tablet     acetaminophen (TYLENOL) 500 MG tablet     Cholecalciferol (VITAMIN D3) 50 MCG (2000 UT) CAPS     No current facility-administered medications for this visit.       Allergies:  No Known Allergies    Physical Examination:  BP (!) 193/102 (BP Location: Right arm, Patient Position: Sitting, Cuff Size: Adult Regular)   Pulse 52   Wt 74.3 kg (163 lb 14.4 oz)   SpO2 94%   BMI 33.10 kg/m    General: Awake, alert, no acute distress  CVS: Regular rate & rhythm  RS: CTAB  Abdomen: Soft, non-tender  Neurological:  Awake, alert, inattentive, does  no respond verbally, but nods to her son's statements in a Andorran dialect.  Cranial Nerves: PERRLA, VFF, EOMI, Facial sensations intact, Face symmetric, hearing normal B/L, palate elevates to midline, shoulder shrug strong B/L, tongue movements intact   Motor: Tone normal. Strength 5/5 throughout  Sensations: Intact B/L to light touch  Cerebellar signs: FTN/HST intact B/L  Gait: Slow, hesitant, with 4WW    Laboratory findings:  Reviewed    Imaging findings:  MRI, MRA, CTAs from 5/11 and 5/22 reviewed    Impression and Plan:  Right MCA aneurysm- CTA head in 1 year  Goal normotension, <130/80 or lower if tolerated for overall reduced cardiovascular risk. PCP to titrate enteral meds to achieve.      Patient seen and discussed with the attending, Dr. Lucia.    Chiquis Mcintosh MD  Endovascular Surgical Neuroradiology Fellow, PGY-6  [phone]

## 2023-05-23 NOTE — LETTER
"5/23/2023       RE: Pilo Quintana  [address] [place] [address]       Dear Colleague,    Thank you for referring your patient, Pilo Quintana, to the [facility] NEUROSURGERY CLINIC [place] at [facility]. Please see a copy of my visit note below.                                                                         [facility] NEUROSURGERY CLINIC [place]  [address]  3RD FLOOR  [facility] [address]  Phone: [phone]  Fax: [phone]    Neuro-interventional clinic progress note:    Reason for clinic visit: Right MCA aneurysm      History of present Illness: Pilo Quintana is a \"73\" year old (more likely mid 80s per family) female patient with history of HTN, DMII, and stroke who presents to Neuro IR clinic for evaluation of a 1x2 mm right MCA bifurcation aneurysm. This was incidentally diagnosed in the process of evaluation for right hemiparesis and new diagnosis of a pontine hemorrhage on 5/11/23. She also presented to the ED last night for sharp left parietal headache, and imaging was stable. She was quite hypertensive there and again in clinic this morning, despite taking her BP meds as prescribed. Her son says she gets worked up, worrying about her medical condition and the care she now requires, and that due to her forgetfulness, her multiple children who care for him, but primarily he, performs q30 minute checks on her and reminds her to relax and let them care for her.      Past Medical History:  Past Medical History:   Diagnosis Date    Diabetes (H)     Hypertension     Morbid obesity (H)     Osteoarthritis 6/7/2003    PMB (postmenopausal bleeding) 9/16/2014    Renal mass     Thickened endometrium 9/16/2014    Uncomplicated asthma     Unspecified cerebral artery occlusion with cerebral infarction 1998    was in [place], no residual at present       Past Surgical History:  Past Surgical History:   Procedure " Laterality Date    DILATION AND CURETTAGE, OPERATIVE HYSTEROSCOPY WITH MORCELLATOR, COMBINED N/A 9/25/2014    Procedure: COMBINED DILATION AND CURETTAGE, OPERATIVE HYSTEROSCOPY WITH MORCELLATOR;  Surgeon: Silverio Christy MD;  Location:  OR       Social History:  Social History     Socioeconomic History    Marital status:      Spouse name: Not on file    Number of children: 8    Years of education: 0    Highest education level: Not on file   Occupational History    Not on file   Tobacco Use    Smoking status: Never    Smokeless tobacco: Never   Vaping Use    Vaping status: Not on file   Substance and Sexual Activity    Alcohol use: No    Drug use: No    Sexual activity: Not on file   Other Topics Concern    Not on file   Social History Narrative    Not on file     Social Determinants of Health     Financial Resource Strain: Not on file   Food Insecurity: Not on file   Transportation Needs: Not on file   Physical Activity: Not on file   Stress: Not on file   Social Connections: Not on file   Intimate Partner Violence: Not on file   Housing Stability: Not on file       Family History:  No family history on file.    Home Medications:  Current Outpatient Medications   Medication    albuterol (PROAIR HFA/PROVENTIL HFA/VENTOLIN HFA) 108 (90 Base) MCG/ACT inhaler    aspirin (ASA) 81 MG EC tablet    atorvastatin (LIPITOR) 40 MG tablet    fluticasone (FLOVENT HFA) 44 MCG/ACT inhaler    furosemide (LASIX) 20 MG tablet    glipiZIDE (GLUCOTROL XL) 5 MG 24 hr tablet    losartan (COZAAR) 100 MG tablet    metoprolol succinate ER (TOPROL XL) 50 MG 24 hr tablet    triamcinolone (KENALOG) 0.1 % external ointment    Vitamin D3 (CHOLECALCIFEROL) 25 mcg (1000 units) tablet    acetaminophen (TYLENOL) 500 MG tablet    Cholecalciferol (VITAMIN D3) 50 MCG (2000 UT) CAPS     No current facility-administered medications for this visit.       Allergies:  No Known Allergies    Physical Examination:  BP (!) 193/102 (BP Location: Right  arm, Patient Position: Sitting, Cuff Size: Adult Regular)   Pulse 52   Wt 74.3 kg (163 lb 14.4 oz)   SpO2 94%   BMI 33.10 kg/m    General: Awake, alert, no acute distress  CVS: Regular rate & rhythm  RS: CTAB  Abdomen: Soft, non-tender  Neurological:  Awake, alert, inattentive, does no respond verbally, but nods to her son's statements in a Turkish dialect.  Cranial Nerves: PERRLA, VFF, EOMI, Facial sensations intact, Face symmetric, hearing normal B/L, palate elevates to midline, shoulder shrug strong B/L, tongue movements intact   Motor: Tone normal. Strength 5/5 throughout  Sensations: Intact B/L to light touch  Cerebellar signs: FTN/HST intact B/L  Gait: Slow, hesitant, with 4WW    Laboratory findings:  Reviewed    Imaging findings:  MRI, MRA, CTAs from 5/11 and 5/22 reviewed    Impression and Plan:  Right MCA aneurysm- CTA head in 1 year  Goal normotension, <130/80 or lower if tolerated for overall reduced cardiovascular risk. PCP to titrate enteral meds to achieve.      Patient seen and discussed with the attending, Dr. Lucia.      I have personally seen and evaluated the patient on May 23, 2023  and I agree with the note by Dr. Mcintosh                 On May 25, 2023          Again, thank you for allowing me to participate in the care of your patient.      Sincerely,    Gio Lucia MD

## 2023-05-23 NOTE — DISCHARGE INSTRUCTIONS
Discharge Instructions  Hypertension - High Blood Pressure    During you visit to the Emergency Department, your blood pressure was higher than the recommended blood pressure.  This may be related to stress, pain, medication or other temporary conditions. In these cases, your blood pressure may return to normal on its own. If you have a history of high blood pressure, you may need to have your provider adjust your medications. Sometimes, your high measurement here may indicate that you have developed high blood pressure that will stay high unless it is treated. As a general rule, high blood pressure causes problems over years rather than days, weeks, or months. So, while it is important to treat blood pressure, it is rarely important to treat blood pressure immediately. Occasionally we will begin a medication in the Emergency Department; more often we will recommend close follow-up for medications with a primary doctor/clinic.    Generally, every Emergency Department visit should have a follow-up clinic visit with either a primary or a specialty clinic/provider. Please follow-up as instructed by your emergency provider today.    Return to the Emergency Department if you start to have:  A severe headache.  Chest pain.  Shortness of breath.  Weakness or numbness that affects one part of the body.  Confusion.  Vision changes.  Significant swelling of legs and/or eyes.  A reaction to any medication started in the Emergency Department.    What can I do to help myself?  Avoid alcohol.  Take any blood pressure medicine that you are prescribed.  Get a good night s sleep.  Lower your salt intake.  Exercise.  Lose weight.  Manage stress.  See your doctor regularly    If blood pressure medication was started in the Emergency Department:  The medicine may not have an immediate effect. The body and brain determine what blood pressure you have. The medicine s job is to retrain the body s  thermostat  to a lower blood  pressure.  You will need to follow up with your provider to see how this medicine is working for you.  If you were given a prescription for medicine here today, be sure to read all of the information (including the package insert) that comes with your prescription.  This will include important information about the medicine, its side effects, and any warnings that you need to know about.  The pharmacist who fills the prescription can provide more information and answer questions you may have about the medicine.  If you have questions or concerns that the pharmacist cannot address, please call or return to the Emergency Department.   Remember that you can always come back to the Emergency Department if you are not able to see your regular provider in the amount of time listed above, if you get any new symptoms, or if there is anything that worries you.      Discharge Instructions  Headache    You were seen today for a headache. Headaches may be caused by many different things such as muscle tension, sinus inflammation, anxiety and stress, having too little sleep, too much alcohol, some medical conditions or injury. You may have a migraine, which is caused by changes in the blood vessels in your head.  At this time your provider does not find that your headache is a sign of anything dangerous or life-threatening.  However, sometimes the signs of serious illness do not show up right away.      Generally, every Emergency Department visit should have a follow-up clinic visit with either a primary or a specialty clinic/provider. Please follow-up as instructed by your emergency provider today.    Return to the Emergency Department if:  You get a new fever of 100.4 F or higher.  Your headache gets much worse.  You get a stiff neck with your headache.  You get a new headache that is significantly different or worse than headaches you have had before.  You are vomiting (throwing up) and cannot keep food or water down.  You  have blurry or double vision or other problems with your eyes.  You have a new weakness on one side of your body.  You have difficulty with balance which is new.  You or your family thinks you are confused.  You have a seizure.    What can I do to help myself?  Pain medications - You may take a pain medication such as Tylenol  (acetaminophen), Advil , Motrin  (ibuprofen) or Aleve  (naproxen).  Take a pain reliever as soon as you notice symptoms.  Starting medications as soon as you start to have symptoms may lessen the amount of pain you have.  Relaxing in a quiet, dark room may help.  Get enough sleep and eat meals regularly.  You may need to watch for certain foods or other things which may trigger your headaches.  Keeping a journal of your headaches and possible triggers may help you and your primary provider to identify things which you should avoid which may be causing your headaches.  If you were given a prescription for medicine here today, be sure to read all of the information (including the package insert) that comes with your prescription.  This will include important information about the medicine, its side effects, and any warnings that you need to know about.  The pharmacist who fills the prescription can provide more information and answer questions you may have about the medicine.  If you have questions or concerns that the pharmacist cannot address, please call or return to the Emergency Department.   Remember that you can always come back to the Emergency Department if you are not able to see your regular provider in the amount of time listed above, if you get any new symptoms, or if there is anything that worries you.

## 2023-05-23 NOTE — PATIENT INSTRUCTIONS
Follow-up with your primary care provider as soon as possible regarding elevated blood pressure. We will send your primary care provider a message as well.     Follow-up with Dr. Lucia in 1 year with a CTA prior to your appointment.    Stroke & Endovascular RN Care Coordinators:    Ksenia Jeong, RN, BSN  Milly Fry, RN, CNRN, SCRN    If you have any questions please contact the RN Care Coordinators at [phone], option 1.     After business hours call the  at [phone] and have the Neuro-Interventional Fellow paged.    Thank you for choosing [facility] for your health care needs.

## 2023-05-25 NOTE — PROGRESS NOTES
I have personally seen and evaluated the patient on May 23, 2023  and I agree with the note by Dr. Mcintosh                 On May 25, 2023

## 2023-05-26 NOTE — ED PROVIDER NOTES
History     Chief Complaint:  Headache    HPI Son is assisting with interpretation for patient    Pilo Quintana is a 73 year old female presents with headache and blurred vision.  Patient was recently hospitalized and discharged for an intracranial hemorrhage.  She reports mild baseline right-sided weakness since that event.  Review of the EMR reports mild right upper extremity weakness and right cranial nerve VII weakness.  Patient was in her normal state of health up until 1700 today.  She developed the gradual onset of sharp right-sided headache.  She had a brief moment which she describes as less than 10 seconds of blurred vision and diplopia.  She is uncertain whether this was monocular or binocular.  She reports that the vision changes have completely resolved but has mild residual headache.  She denies fever, eye pain, vomiting or novel numbness/weakness.  Son believes that she is back at her baseline other than the complaints of the headache.      Independent Historian:    Son as above    Review of External Notes:  Reviewed discharge summary from 5/15/2023 in which patient was noted to have mild right upper extremity weakness and right cranial nerve VII weakness.    Medications:    acetaminophen (TYLENOL) 500 MG tablet  albuterol (PROAIR HFA/PROVENTIL HFA/VENTOLIN HFA) 108 (90 Base) MCG/ACT inhaler  aspirin (ASA) 81 MG EC tablet  atorvastatin (LIPITOR) 40 MG tablet  Cholecalciferol (VITAMIN D3) 50 MCG (2000 UT) CAPS  fluticasone (FLOVENT HFA) 44 MCG/ACT inhaler  furosemide (LASIX) 20 MG tablet  glipiZIDE (GLUCOTROL XL) 5 MG 24 hr tablet  losartan (COZAAR) 100 MG tablet  metoprolol succinate ER (TOPROL XL) 50 MG 24 hr tablet  triamcinolone (KENALOG) 0.1 % external ointment  Vitamin D3 (CHOLECALCIFEROL) 25 mcg (1000 units) tablet        Past Medical History:    Past Medical History:   Diagnosis Date     Diabetes (H)      Hypertension      Morbid obesity (H)      Osteoarthritis 6/7/2003     PMB  (postmenopausal bleeding) 9/16/2014     Renal mass      Thickened endometrium 9/16/2014     Uncomplicated asthma      Unspecified cerebral artery occlusion with cerebral infarction 1998       Past Surgical History:    Past Surgical History:   Procedure Laterality Date     DILATION AND CURETTAGE, OPERATIVE HYSTEROSCOPY WITH MORCELLATOR, COMBINED N/A 9/25/2014    Procedure: COMBINED DILATION AND CURETTAGE, OPERATIVE HYSTEROSCOPY WITH MORCELLATOR;  Surgeon: Silverio Christy MD;  Location: [facility] OR          Physical Exam   No data found.     Physical Exam    HEENT:   Temporal arteries are non-tender.      Oropharynx is moist, without lesions or trismus.  Eyes:   PERRL.  EOMs intact.      Visual fields intact    No corneal clouding.   NECK:   Supple, no meningismus.       Negative Brudzinski's sign.  CV:    Regular rate and rhythm.    No murmurs, rubs or gallops.  PULM:   Clear to auscultation bilateral.      No respiratory distress.      No stridor or wheezing.  ABD:  Soft, non-tender, non-distended.      No rebound or guarding.  MSK:    No gross deformity to all four extremities.      No significant joint effusions.  LYMPH:  No cervical lymphadenopathy.  NEURO:  A & O x 3    Mild right CN VII weakness otherwise CN II-XII intact, speech is clear with no aphasia.      Finger to nose within normal limits.  No pronator drift.      Strength is 4+/5 in RUE otherwise 5/5 in LUE and lower extremities.  Sensation is intact.      Normal muscular tone, no tremor.  SKIN:   Warm, dry and intact.    PSYCH:   Mood is good and affect is appropriate.      Emergency Department Course     Imaging:  MR Brain w/o & w Contrast   Final Result   IMPRESSION:   1.  Motion degraded exam. No definite acute intracranial process.   2.  Redemonstrated subacute infarct within the ventral casimiro with numerous chronic microhemorrhages in the bilateral cerebral hemispheres, casimiro, and right cerebellum. Findings are nonspecific and may reflect hypertensive  microhemorrhages and/or cerebral    amyloid angiopathy.   3.  Advanced volume loss and chronic microvascular ischemic disease. Ventriculomegaly is disproportionate to the degree of sulcal widening, which can be seen with normal pressure hydrocephalus.   4.  Redemonstrated enhancement in the right basal ganglia, related to a subacute to chronic infarct.      CTA Head Neck with Contrast   Final Result   IMPRESSION:    HEAD CTA:   1.  No high-grade intracranial stenosis.      2.  1.5 to 2 mm laterally projecting small saccular aneurysm arising from the right MCA bifurcation is unchanged. No new aneurysm elsewhere.      NECK CTA:   1.  No high-grade stenosis of the neck vessels by NASCET criteria.      Findings and impression discussed with Dr. Marroquin by phone at 1909 hours on 05/22/2023.      CT Head w/o Contrast   Final Result   IMPRESSION:   1.  Interval decrease in density of the known parenchymal hemorrhage in the ventral casimiro near the midline, now with a subacute appearance.      2.  No acute intracranial hemorrhage or superimposed acute intracranial abnormality.      3.  Unchanged ventricular size and configuration. Ventricular prominence appears disproportionate to the background mild diffuse parenchymal volume loss. In the appropriate clinical setting, a component of normal pressure hydrocephalus could be    considered.      4.  Unchanged chronic ischemic changes as above.        Report per radiology    Laboratory:  Labs Ordered and Resulted from Time of ED Arrival to Time of ED Departure   BASIC METABOLIC PANEL - Abnormal       Result Value    Sodium 142      Potassium 4.0      Chloride 103      Carbon Dioxide (CO2) 31 (*)     Anion Gap 8      Urea Nitrogen 18.7      Creatinine 0.76      Calcium 10.1      Glucose 116 (*)     GFR Estimate 82     INR - Normal    INR 1.09     PARTIAL THROMBOPLASTIN TIME - Normal    aPTT 28     TROPONIN T, HIGH SENSITIVITY - Normal    Troponin T, High Sensitivity 10     CBC WITH  PLATELETS AND DIFFERENTIAL    WBC Count 7.5      RBC Count 4.64      Hemoglobin 13.6      Hematocrit 43.2      MCV 93      MCH 29.3      MCHC 31.5      RDW 12.5      Platelet Count 236      % Neutrophils 64      % Lymphocytes 26      % Monocytes 7      % Eosinophils 3      % Basophils 0      % Immature Granulocytes 0      NRBCs per 100 WBC 0      Absolute Neutrophils 4.8      Absolute Lymphocytes 2.0      Absolute Monocytes 0.6      Absolute Eosinophils 0.2      Absolute Basophils 0.0      Absolute Immature Granulocytes 0.0      Absolute NRBCs 0.0          Emergency Department Course & Assessments:    Interventions:  Medications   iopamidol (ISOVUE-370) solution 500 mL (75 mLs Intravenous $Given 23)   CT scan flush (80 mLs Intravenous $Given 23)   gadobutrol (GADAVIST) injection 7 mL (7 mLs Intravenous $Given 23)          Independent Interpretation (X-rays, CTs, rhythm strip):  I independently reviewed noncontrast head CT in which there is no large intracranial hemorrhage    Consultations/Discussion of Management or Tests:  Stroke neurology    Social Determinants of Health affecting care:  None    Disposition:  The patient was discharged to home.     Impression & Plan        Medical Decision Makin-year-old female who was recently hospitalized for intracranial hemorrhage presents with sharp right-sided headache and transient blurred vision/diplopia for less than 20 seconds.  Vision has returned to baseline.  Noncontrast head CT and CTA of the head and neck were unremarkable for acute pathology.  MRI was performed to rule out atypical stroke which is negative for acute pathology.  Given the very brief duration of vision changes, I do not believe this represents TIA.  Given duration of symptoms and timing of CT scan as well as lack of intracranial aneurysm on CTA, no indication for lumbar puncture to evaluate for occult subarachnoid hemorrhage.  This may simply represent migraine  headache.  No sinister pathology noted.  Patient safe for discharge home and will closely follow-up with PCP including blood pressure recheck.  Diagnosis:    ICD-10-CM    1. Acute nonintractable headache, unspecified headache type  R51.9       2. Blurred vision  H53.8       3. Essential hypertension  I10            Discharge Medications:  Discharge Medication List as of 5/22/2023 10:47 PM         Cirilo Street MD, MD  05/25/23 [id number]

## 2023-09-12 ENCOUNTER — APPOINTMENT (OUTPATIENT)
Dept: CT IMAGING | Facility: CLINIC | Age: 73
End: 2023-09-12
Attending: EMERGENCY MEDICINE
Payer: COMMERCIAL

## 2023-09-12 ENCOUNTER — HOSPITAL ENCOUNTER (EMERGENCY)
Facility: CLINIC | Age: 73
Discharge: HOME | End: 2023-09-12
Admitting: EMERGENCY MEDICINE
Payer: COMMERCIAL

## 2023-09-12 VITALS
SYSTOLIC BLOOD PRESSURE: 158 MMHG | RESPIRATION RATE: 24 BRPM | HEART RATE: 60 BPM | DIASTOLIC BLOOD PRESSURE: 93 MMHG | OXYGEN SATURATION: 96 % | TEMPERATURE: 96.9 F

## 2023-09-12 LAB
ANION GAP SERPL CALCULATED.3IONS-SCNC: 8 MMOL/L (ref 7–15)
ATRIAL RATE - MUSE: 65 BPM
BASOPHILS # BLD AUTO: 0 10E3/UL (ref 0–0.2)
BASOPHILS NFR BLD AUTO: 0 %
BUN SERPL-MCNC: 14.7 MG/DL (ref 8–23)
CALCIUM SERPL-MCNC: 9.8 MG/DL (ref 8.8–10.2)
CHLORIDE SERPL-SCNC: 98 MMOL/L (ref 98–107)
CREAT SERPL-MCNC: 0.79 MG/DL (ref 0.51–0.95)
DEPRECATED HCO3 PLAS-SCNC: 32 MMOL/L (ref 22–29)
DIASTOLIC BLOOD PRESSURE - MUSE: NORMAL MMHG
EGFRCR SERPLBLD CKD-EPI 2021: 78 ML/MIN/1.73M2
EOSINOPHIL # BLD AUTO: 0.1 10E3/UL (ref 0–0.7)
EOSINOPHIL NFR BLD AUTO: 2 %
ERYTHROCYTE [DISTWIDTH] IN BLOOD BY AUTOMATED COUNT: 12.8 % (ref 10–15)
FLUAV RNA SPEC QL NAA+PROBE: NEGATIVE
FLUBV RNA RESP QL NAA+PROBE: NEGATIVE
GLUCOSE SERPL-MCNC: 498 MG/DL (ref 70–99)
HCT VFR BLD AUTO: 46.4 % (ref 35–47)
HGB BLD-MCNC: 14.4 G/DL (ref 11.7–15.7)
HOLD SPECIMEN: NORMAL
HOLD SPECIMEN: NORMAL
IMM GRANULOCYTES # BLD: 0 10E3/UL
IMM GRANULOCYTES NFR BLD: 0 %
INTERPRETATION ECG - MUSE: NORMAL
LYMPHOCYTES # BLD AUTO: 1.4 10E3/UL (ref 0.8–5.3)
LYMPHOCYTES NFR BLD AUTO: 25 %
MCH RBC QN AUTO: 29 PG (ref 26.5–33)
MCHC RBC AUTO-ENTMCNC: 31 G/DL (ref 31.5–36.5)
MCV RBC AUTO: 94 FL (ref 78–100)
MONOCYTES # BLD AUTO: 0.3 10E3/UL (ref 0–1.3)
MONOCYTES NFR BLD AUTO: 6 %
NEUTROPHILS # BLD AUTO: 3.7 10E3/UL (ref 1.6–8.3)
NEUTROPHILS NFR BLD AUTO: 67 %
NRBC # BLD AUTO: 0 10E3/UL
NRBC BLD AUTO-RTO: 0 /100
P AXIS - MUSE: 2 DEGREES
PLATELET # BLD AUTO: 222 10E3/UL (ref 150–450)
POTASSIUM SERPL-SCNC: 4.1 MMOL/L (ref 3.4–5.3)
PR INTERVAL - MUSE: 136 MS
QRS DURATION - MUSE: 86 MS
QT - MUSE: 398 MS
QTC - MUSE: 413 MS
R AXIS - MUSE: -36 DEGREES
RBC # BLD AUTO: 4.96 10E6/UL (ref 3.8–5.2)
RSV RNA SPEC NAA+PROBE: NEGATIVE
SARS-COV-2 RNA RESP QL NAA+PROBE: NEGATIVE
SODIUM SERPL-SCNC: 138 MMOL/L (ref 136–145)
SYSTOLIC BLOOD PRESSURE - MUSE: NORMAL MMHG
T AXIS - MUSE: 15 DEGREES
TROPONIN T SERPL HS-MCNC: 8 NG/L
VENTRICULAR RATE- MUSE: 65 BPM
WBC # BLD AUTO: 5.5 10E3/UL (ref 4–11)

## 2023-09-12 PROCEDURE — 250N000011 HC RX IP 250 OP 636: Performed by: EMERGENCY MEDICINE

## 2023-09-12 PROCEDURE — 99281 EMR DPT VST MAYX REQ PHY/QHP: CPT

## 2023-09-12 PROCEDURE — 36415 COLL VENOUS BLD VENIPUNCTURE: CPT | Performed by: EMERGENCY MEDICINE

## 2023-09-12 PROCEDURE — 250N000009 HC RX 250: Performed by: EMERGENCY MEDICINE

## 2023-09-12 PROCEDURE — 71275 CT ANGIOGRAPHY CHEST: CPT

## 2023-09-12 PROCEDURE — 85025 COMPLETE CBC W/AUTO DIFF WBC: CPT | Performed by: EMERGENCY MEDICINE

## 2023-09-12 PROCEDURE — 80048 BASIC METABOLIC PNL TOTAL CA: CPT | Performed by: EMERGENCY MEDICINE

## 2023-09-12 PROCEDURE — 87637 SARSCOV2&INF A&B&RSV AMP PRB: CPT | Performed by: EMERGENCY MEDICINE

## 2023-09-12 PROCEDURE — 93005 ELECTROCARDIOGRAM TRACING: CPT

## 2023-09-12 PROCEDURE — 84484 ASSAY OF TROPONIN QUANT: CPT | Performed by: EMERGENCY MEDICINE

## 2023-09-12 RX ORDER — IOPAMIDOL 755 MG/ML
500 INJECTION, SOLUTION INTRAVASCULAR ONCE
Status: COMPLETED | OUTPATIENT
Start: 2023-09-12 | End: 2023-09-12

## 2023-09-12 RX ADMIN — IOPAMIDOL 68 ML: 755 INJECTION, SOLUTION INTRAVENOUS at 14:22

## 2023-09-12 RX ADMIN — SODIUM CHLORIDE 100 ML: 9 INJECTION, SOLUTION INTRAVENOUS at 14:22

## 2023-09-12 ASSESSMENT — ACTIVITIES OF DAILY LIVING (ADL)
ADLS_ACUITY_SCORE: 35
ADLS_ACUITY_SCORE: 35

## 2023-09-12 NOTE — ED TRIAGE NOTES
Pt presents with right sided chest pain and SOB as well as low oxygen at home.Per son it was 85-90% around 10:30. He then rechecked it 20 minutes ago and it was 96-99%. Symptoms started this morning. Had a cough overnight.

## 2023-09-12 NOTE — ED NOTES
Tele-PIT/Intake Evaluation      Video-Visit Details    Type of service:  Video Visit    Video Start Time (time video started): 1:52 PM  Video End Time (time video stopped): 1:57 PM   Originating Location (pt. Location):  [facility]  Distant Location (provider location):  [facility]  Mode of Communication:  Video Conference via [employer]  Patient verbally consented to [employer] televisit.    History:  74yo F presents with 4 day hx of shortness of breath. Has been seen by primary. Had negative CXR. Developed right-sided chest pain this morning while getting ready to leave the house. O2 sat was 85-87% this AM. Had cough overnight last night, but not today.     Exam:    Patient Vitals for the past 24 hrs:   BP Temp Pulse Resp SpO2   09/12/23 1159 (!) 158/93 96.9  F (36.1  C) 60 24 96 %     General: sitting up in triage chair. Son at bedside doing all the speaking.   CV: Rate normal based on triage vitals.   Resp: Speaking without difficulty. No visual respiratory distress.   Skin: Visible skin on video without evident lesions.   Neuro: Speech clear without dysarthria. Face symmetric.      Appropriate interventions for symptom management were initiated if applicable.  Appropriate diagnostic tests were initiated if indicated.      I briefly evaluated the patient and developed an initial plan of care. I discussed this plan and explained that this brief interaction does not constitute a full evaluation. Patient/family understands that they should wait to be fully evaluated and discuss any test results with another clinician prior to leaving the hospital.       Shaggy Wood MD  09/12/23 [id number]

## 2023-09-18 ENCOUNTER — HOSPITAL ENCOUNTER (EMERGENCY)
Facility: CLINIC | Age: 73
Discharge: HOME | End: 2023-09-18
Attending: STUDENT IN AN ORGANIZED HEALTH CARE EDUCATION/TRAINING PROGRAM | Admitting: STUDENT IN AN ORGANIZED HEALTH CARE EDUCATION/TRAINING PROGRAM
Payer: COMMERCIAL

## 2023-09-18 ENCOUNTER — APPOINTMENT (OUTPATIENT)
Dept: CT IMAGING | Facility: CLINIC | Age: 73
End: 2023-09-18
Attending: EMERGENCY MEDICINE
Payer: COMMERCIAL

## 2023-09-18 ENCOUNTER — APPOINTMENT (OUTPATIENT)
Dept: ULTRASOUND IMAGING | Facility: CLINIC | Age: 73
End: 2023-09-18
Attending: EMERGENCY MEDICINE
Payer: COMMERCIAL

## 2023-09-18 VITALS
SYSTOLIC BLOOD PRESSURE: 150 MMHG | BODY MASS INDEX: 32.25 KG/M2 | HEIGHT: 59 IN | TEMPERATURE: 98.8 F | DIASTOLIC BLOOD PRESSURE: 78 MMHG | WEIGHT: 160 LBS | OXYGEN SATURATION: 99 % | HEART RATE: 62 BPM | RESPIRATION RATE: 18 BRPM

## 2023-09-18 DIAGNOSIS — E04.1 THYROID NODULE: ICD-10-CM

## 2023-09-18 DIAGNOSIS — M79.621 PAIN OF RIGHT UPPER ARM: ICD-10-CM

## 2023-09-18 LAB
ALBUMIN SERPL BCG-MCNC: 4.1 G/DL (ref 3.5–5.2)
ALP SERPL-CCNC: 107 U/L (ref 35–104)
ALT SERPL W P-5'-P-CCNC: 26 U/L (ref 0–50)
ANION GAP SERPL CALCULATED.3IONS-SCNC: 11 MMOL/L (ref 7–15)
AST SERPL W P-5'-P-CCNC: 28 U/L (ref 0–45)
BASOPHILS # BLD AUTO: 0 10E3/UL (ref 0–0.2)
BASOPHILS NFR BLD AUTO: 1 %
BILIRUB SERPL-MCNC: 0.4 MG/DL
BUN SERPL-MCNC: 11 MG/DL (ref 8–23)
CALCIUM SERPL-MCNC: 10.3 MG/DL (ref 8.8–10.2)
CHLORIDE SERPL-SCNC: 99 MMOL/L (ref 98–107)
CREAT SERPL-MCNC: 0.51 MG/DL (ref 0.51–0.95)
DEPRECATED HCO3 PLAS-SCNC: 31 MMOL/L (ref 22–29)
EGFRCR SERPLBLD CKD-EPI 2021: >90 ML/MIN/1.73M2
EOSINOPHIL # BLD AUTO: 0.3 10E3/UL (ref 0–0.7)
EOSINOPHIL NFR BLD AUTO: 3 %
ERYTHROCYTE [DISTWIDTH] IN BLOOD BY AUTOMATED COUNT: 12.7 % (ref 10–15)
GLUCOSE SERPL-MCNC: 126 MG/DL (ref 70–99)
HCT VFR BLD AUTO: 46.2 % (ref 35–47)
HGB BLD-MCNC: 15 G/DL (ref 11.7–15.7)
IMM GRANULOCYTES # BLD: 0.2 10E3/UL
IMM GRANULOCYTES NFR BLD: 2 %
LYMPHOCYTES # BLD AUTO: 2.8 10E3/UL (ref 0.8–5.3)
LYMPHOCYTES NFR BLD AUTO: 34 %
MCH RBC QN AUTO: 29.1 PG (ref 26.5–33)
MCHC RBC AUTO-ENTMCNC: 32.5 G/DL (ref 31.5–36.5)
MCV RBC AUTO: 90 FL (ref 78–100)
MONOCYTES # BLD AUTO: 0.9 10E3/UL (ref 0–1.3)
MONOCYTES NFR BLD AUTO: 11 %
NEUTROPHILS # BLD AUTO: 4.1 10E3/UL (ref 1.6–8.3)
NEUTROPHILS NFR BLD AUTO: 49 %
NRBC # BLD AUTO: 0 10E3/UL
NRBC BLD AUTO-RTO: 0 /100
NT-PROBNP SERPL-MCNC: 91 PG/ML (ref 0–900)
PLATELET # BLD AUTO: 227 10E3/UL (ref 150–450)
POTASSIUM SERPL-SCNC: 5 MMOL/L (ref 3.4–5.3)
PROT SERPL-MCNC: 7.4 G/DL (ref 6.4–8.3)
RBC # BLD AUTO: 5.15 10E6/UL (ref 3.8–5.2)
SODIUM SERPL-SCNC: 141 MMOL/L (ref 136–145)
TROPONIN T SERPL HS-MCNC: 8 NG/L
WBC # BLD AUTO: 8.3 10E3/UL (ref 4–11)

## 2023-09-18 PROCEDURE — 250N000013 HC RX MED GY IP 250 OP 250 PS 637: Performed by: STUDENT IN AN ORGANIZED HEALTH CARE EDUCATION/TRAINING PROGRAM

## 2023-09-18 PROCEDURE — 99284 EMERGENCY DEPT VISIT MOD MDM: CPT | Mod: 25

## 2023-09-18 PROCEDURE — 84484 ASSAY OF TROPONIN QUANT: CPT | Performed by: EMERGENCY MEDICINE

## 2023-09-18 PROCEDURE — 72125 CT NECK SPINE W/O DYE: CPT

## 2023-09-18 PROCEDURE — 80053 COMPREHEN METABOLIC PANEL: CPT | Performed by: EMERGENCY MEDICINE

## 2023-09-18 PROCEDURE — 36416 COLLJ CAPILLARY BLOOD SPEC: CPT | Performed by: EMERGENCY MEDICINE

## 2023-09-18 PROCEDURE — 83880 ASSAY OF NATRIURETIC PEPTIDE: CPT | Performed by: EMERGENCY MEDICINE

## 2023-09-18 PROCEDURE — 85025 COMPLETE CBC W/AUTO DIFF WBC: CPT | Performed by: EMERGENCY MEDICINE

## 2023-09-18 PROCEDURE — 93971 EXTREMITY STUDY: CPT | Mod: RT

## 2023-09-18 RX ORDER — ACETAMINOPHEN 325 MG/1
975 TABLET ORAL ONCE
Status: COMPLETED | OUTPATIENT
Start: 2023-09-18 | End: 2023-09-18

## 2023-09-18 RX ADMIN — ACETAMINOPHEN 975 MG: 325 TABLET, FILM COATED ORAL at 16:42

## 2023-09-18 ASSESSMENT — ACTIVITIES OF DAILY LIVING (ADL)
ADLS_ACUITY_SCORE: 35
ADLS_ACUITY_SCORE: 35
ADLS_ACUITY_SCORE: 33

## 2023-09-18 NOTE — ED TRIAGE NOTES
Pt also seems to be short of breath, but she was seen in ED about a week ago for some shortness of breath and pain in chest. Per the son, Hanh, it is better. Pt speaks memie, son is interp. At pts request.     Triage Assessment       Row Name 09/18/23 1004       Triage Assessment (Adult)    Airway WDL WDL       Respiratory WDL    Respiratory WDL WDL       Skin Circulation/Temperature WDL    Skin Circulation/Temperature WDL WDL       Cardiac WDL    Cardiac WDL WDL       Peripheral/Neurovascular WDL    Peripheral Neurovascular WDL WDL       Cognitive/Neuro/Behavioral WDL    Cognitive/Neuro/Behavioral WDL WDL

## 2023-09-18 NOTE — ED NOTES
Rapid Assessment Note    History:   Pilo Quintana is a 73 year old female who presents with constant sharp pain in her right shoulder onset this morning. Pilo's son denies neck trauma, recent falls, or heavy lifting. He explains that she was seen here for a stroke approximately five months ago, which affected her right side. He states that she began physical therapy one month ago. He also states that she visited the ED four days ago for chest pain and shortness of breath. He states that her oxygen saturation fluctuates regularly. Baseline R sided weakness from [facility]    Exam:   General:  Alert, interactive  Cardiovascular:  Well perfused  Lungs:  No respiratory distress, no accessory muscle use  Neuro:  R baseline weakness  Skin:  Warm, dry  Psych:  Normal affect      Plan of Care:   I evaluated the patient and developed an initial plan of care. I discussed this plan and explained that I, or one of my partners, would be returning to complete the evaluation.     I, Rosanne Lucia, am serving as a scribe to document services personally performed by Duncan Christy MD, based on my observations and the provider's statements to me.    9/18/2023  [facility]    aluated and discuss any test results with another clinician prior to leaving the hospital.     Duncan Christy MD  09/18/23 [id number]

## 2023-09-18 NOTE — ED TRIAGE NOTES
Pt had a stroke 5 months ago, has right sided deficit from that. Today, c/o pain in right shoulder/arm pain.     Triage Assessment       Row Name 09/18/23 1004       Triage Assessment (Adult)    Airway WDL WDL       Respiratory WDL    Respiratory WDL WDL       Skin Circulation/Temperature WDL    Skin Circulation/Temperature WDL WDL       Cardiac WDL    Cardiac WDL WDL       Peripheral/Neurovascular WDL    Peripheral Neurovascular WDL WDL       Cognitive/Neuro/Behavioral WDL    Cognitive/Neuro/Behavioral WDL WDL

## 2023-09-18 NOTE — ED PROVIDER NOTES
History     Chief Complaint:  Arm Pain     The history is provided by the patient and a relative. The history is limited by a language barrier (M[place], a [place] dialect). A  was used (The Son).     Pilo Quintana is a 73 year old female with a history of hypertension, stroke, type 2 diabetes mellitus, and morbid obesity who presents with right arm pain. She was previously seen at this ED 5 days ago for chest pain and shortness of breath, and had imaging done at the time which came back negative for PE (see below).  However, please note that she left prior to a full evaluation.  Her symptoms initially resolved afterwards. Then earlier this morning, she started having right upper arm pain, over her tricep and bicep. Her son called a nursing hotline, and was advised to go to the ED. She still has some pain at bedside, but it has improved when compared to this morning. She has some weakness in the same arm, but her son notes that she has had a right sided deficit with right arm and leg weakness since her stroke a couple months ago. She is attending PT. Her son denies fever, and he checks her temperature daily.     Son also notes that her actual age is significantly older than her age on her chart, as when she immigrated, they did not hold her prostate.  He estimates that she is in her mid 80s rather than her mid 70s.     CT Chest Pulmonary Embolism w/ Contrast from [facility] on 9/12/2023:    IMPRESSION:  1.  No evidence of pulmonary embolus.  2.  Enlarged central pulmonary arteries may indicate pulmonary  arterial hypertension.  3.  No acute findings or significant change.      Independent Historian:   The Son    Review of External Notes:   I reviewed the discharge note from 5/25 following pontine hemorrhage.      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   Renal oncocytoma of R kidney   Type 2 DM   Primary hyperparathyroidism   DJD     Past Surgical History:    D & C    R renal bx   B/L cataract extraction     Physical Exam   Patient Vitals for the past 24 hrs:   BP Temp Temp src Pulse Resp SpO2 Height Weight   09/18/23 1709 (!) 150/78 -- -- 62 18 99 % -- --   09/18/23 1650 -- 98.8  F (37.1  C) Oral -- -- -- -- --   09/18/23 1605 -- -- -- -- -- 94 % -- --   09/18/23 1600 (!) 152/84 -- -- 54 -- 95 % -- --   09/18/23 1558 (!) 155/116 -- -- -- -- 93 % -- --   09/18/23 1557 -- -- -- -- -- 92 % -- --   09/18/23 1556 -- -- -- -- -- 95 % -- --   09/18/23 1550 (!) 155/116 -- -- -- -- -- -- --   09/18/23 1002 (!) 164/118 97.2  F (36.2  C) Temporal 61 20 93 % 1.499 m (4' 11\") 72.6 kg (160 lb)        Physical Exam  Vitals: Reviewed, as above.  Notable for hypertension.  General: Alert and oriented Resting on bed.  Skin: Warm and well-perfused. No rashes, lesions, or erythema.   HEENT:   Head: Normocephalic, atraumatic. Facial features symmetric.   Eyes: Conjunctiva pink, sclera white. EOMs grossly intact.   Ears: Auricles without lesion, erythema, or edema.   Nose: Symmetric with no discharge.  Mouth and throat: Lips are moist. Buccal mucosa is pink and moist without lesions. Oropharyngeal mucosa is pink and moist with no erythema, edema, or exudate. Uvula is midline.  Neck: Supple with no lymphadenopathy. Full ROM.   Pulmonary: Chest wall expansion symmetric with no increased work of breathing. Lungs clear to auscultation bilaterally.  No wheezing.  Cardiovascular: Heart RRR with no murmurs. 2+ radial pulses bilaterally. No peripheral edema.  Abdominal: No hernias or distension. Bowel sounds present and physiologic. Abdomen is soft and nontender to light and deep " palpation in all 4 quadrants with no guarding or rebound. No masses or organomegaly.   Musculoskeletal: Moves all extremities spontaneously. No midline spinal tenderness. Right shoulder is nontender to palpation.  Full range of motion of the right upper extremity.  Pain with internal rotation of the right shoulder.    Neuro: Patient is alert and oriented to person place time.  Speech fluent with normal cognition.  RUE strength 5/5: , wrist flexion/extension  4/5: Elbow flexion/extension, shoulder flexion/extension  LUE strength 5/5: , elbow flexion/extension, wrist flexion/extension, shoulder flexion/extension  Psych: Affect appropriate.  Answers questions appropriately. Patient appears calm.      Emergency Department Course   Imaging:  Cervical spine CT w/o contrast  Final Result  IMPRESSION:  1. No evidence of fracture.  2. Mild degenerative change.  3. No high-grade stenoses.  GIN BURGER MD     US Upper Extremity Venous Duplex Right  Final Result  IMPRESSION:   1. Negative for right upper extremity deep vein thrombosis.   2. Small right shoulder joint versus bursal effusion.  MARIO ELLISON MD      Report per radiology    Laboratory:  Labs Ordered and Resulted from Time of ED Arrival to Time of ED Departure   COMPREHENSIVE METABOLIC PANEL - Abnormal       Result Value    Sodium 141      Potassium 5.0      Chloride 99      Carbon Dioxide (CO2) 31 (*)     Anion Gap 11      Urea Nitrogen 11.0      Creatinine 0.51      Calcium 10.3 (*)     Glucose 126 (*)     Alkaline Phosphatase 107 (*)     AST 28      ALT 26      Protein Total 7.4      Albumin 4.1      Bilirubin Total 0.4      GFR Estimate >90     TROPONIN T, HIGH SENSITIVITY - Normal    Troponin T, High Sensitivity 8     NT PROBNP INPATIENT - Normal    N terminal Pro BNP Inpatient 91     CBC WITH PLATELETS AND DIFFERENTIAL    WBC Count 8.3      RBC Count 5.15      Hemoglobin 15.0      Hematocrit 46.2      MCV 90      MCH 29.1      MCHC 32.5      RDW  12.7      Platelet Count 227      % Neutrophils 49      % Lymphocytes 34      % Monocytes 11      % Eosinophils 3      % Basophils 1      % Immature Granulocytes 2      NRBCs per 100 WBC 0      Absolute Neutrophils 4.1      Absolute Lymphocytes 2.8      Absolute Monocytes 0.9      Absolute Eosinophils 0.3      Absolute Basophils 0.0      Absolute Immature Granulocytes 0.2      Absolute NRBCs 0.0        Emergency Department Course & Assessments:    Interventions:  Medications   acetaminophen (TYLENOL) tablet 975 mg (975 mg Oral $Given 9/18/23 1642)        Assessments:  1510 I obtained history and examined the patient as noted above.  1649 I rechecked the patient and explained findings.  Patient was resting comfortably, eating a bag of chips.  Ranging her right arm spontaneously.    Independent Interpretation (X-rays, CTs, rhythm strip):  None    Consultations/Discussion of Management or Tests:  None     Social Determinants of Health affecting care:   None    Disposition:  The patient was discharged to home.     Impression & Plan    Medical Decision Making:  Pilo Quintana is a 73 year old female with a history of hypertension, stroke, type 2 diabetes mellitus, and morbid obesity who presents with right arm pain. Please see HPI and exam for details.  Differential includes ACS equivalent, musculoskeletal strain, DVT, fracture, dislocation, radicular pain, spinal epidural abscess, septic joint among others.  Patient has a reassuring physical exam with stable vitals.  She is afebrile, with no signs of acute infection.  CT PE study from 6 days ago was reviewed, which is negative for PE.  This does reveal signs that could be consistent with pulmonary hypertension.  However, the patient is not exhibiting any symptoms of heart failure, and she is not hypoxic or in respiratory distress.  BNP is negative.  High-sensitivity troponin is 8, and after greater than 6 hours of symptoms, the patient can be ruled out for  myocardial ischemia.  She also does not have any chest pain or exertional symptoms to suggest ACS.  Ultrasound obtained from triage is negative for DVT.  CT of the C-spine obtained from triage is also negative for acute pathology.  This does incidentally reveal a thyroid nodule. I advised the patient of this incidental finding, and I instructed her to follow-up with her primary care provider for consideration of an ultrasound.  Pain in her right upper arm over her tricep and bicep is reproducible on exam with motions of her right arm, and I strongly suspect a musculoskeletal etiology of her symptoms today.  At this time, however, there is no emergent etiology identified for her symptoms, and she certainly is suitable for continued outpatient follow-up with her PCP with consideration of PT.  Her son appears very reliable to obtain an appointment for follow-up.  Return precautions were discussed in detail, including difficulty breathing or swallowing, chest pain, shortness of breath, fevers, intractable pain, or other new concerns.  Patient and her son are comfortable with the plan.       Diagnosis:    ICD-10-CM    1. Pain of right upper arm  M79.621       2. Thyroid nodule  E04.1     seen on CT 9/18/2023             Scribe Disclosure:  I, Can Mock, am serving as a scribe at 3:18 PM on 9/18/2023 to document services personally performed by Shari Mercedes PA-C based on my observations and the provider's statements to me.   9/18/2023   Shari Mercedes PA-C Sells, Jenna, PA-C  09/18/23 [id number]

## 2023-10-23 ENCOUNTER — APPOINTMENT (OUTPATIENT)
Dept: GENERAL RADIOLOGY | Facility: CLINIC | Age: 73
DRG: 190 | End: 2023-10-23
Attending: EMERGENCY MEDICINE
Payer: COMMERCIAL

## 2023-10-23 ENCOUNTER — HOSPITAL ENCOUNTER (INPATIENT)
Facility: CLINIC | Age: 73
LOS: 6 days | Discharge: HOME HEALTH | DRG: 190 | End: 2023-10-29
Attending: EMERGENCY MEDICINE | Admitting: INTERNAL MEDICINE
Payer: COMMERCIAL

## 2023-10-23 DIAGNOSIS — R09.02 HYPOXIA: ICD-10-CM

## 2023-10-23 DIAGNOSIS — R06.02 SHORTNESS OF BREATH: ICD-10-CM

## 2023-10-23 LAB
ANION GAP SERPL CALCULATED.3IONS-SCNC: 10 MMOL/L (ref 7–15)
BASE EXCESS BLDV CALC-SCNC: 6.9 MMOL/L (ref -7.7–1.9)
BASO+EOS+MONOS # BLD AUTO: ABNORMAL 10*3/UL
BASO+EOS+MONOS NFR BLD AUTO: ABNORMAL %
BASOPHILS # BLD AUTO: 0 10E3/UL (ref 0–0.2)
BASOPHILS NFR BLD AUTO: 0 %
BUN SERPL-MCNC: 16.9 MG/DL (ref 8–23)
CALCIUM SERPL-MCNC: 10 MG/DL (ref 8.8–10.2)
CHLORIDE SERPL-SCNC: 102 MMOL/L (ref 98–107)
CREAT SERPL-MCNC: 0.6 MG/DL (ref 0.51–0.95)
DEPRECATED HCO3 PLAS-SCNC: 32 MMOL/L (ref 22–29)
EGFRCR SERPLBLD CKD-EPI 2021: >90 ML/MIN/1.73M2
EOSINOPHIL # BLD AUTO: 0.2 10E3/UL (ref 0–0.7)
EOSINOPHIL NFR BLD AUTO: 3 %
ERYTHROCYTE [DISTWIDTH] IN BLOOD BY AUTOMATED COUNT: 13.3 % (ref 10–15)
FLUAV RNA SPEC QL NAA+PROBE: NEGATIVE
FLUBV RNA RESP QL NAA+PROBE: NEGATIVE
GLUCOSE BLDC GLUCOMTR-MCNC: 182 MG/DL (ref 70–99)
GLUCOSE BLDC GLUCOMTR-MCNC: 309 MG/DL (ref 70–99)
GLUCOSE BLDC GLUCOMTR-MCNC: 385 MG/DL (ref 70–99)
GLUCOSE SERPL-MCNC: 115 MG/DL (ref 70–99)
HBA1C MFR BLD: 9 %
HCO3 BLDV-SCNC: 36 MMOL/L (ref 21–28)
HCT VFR BLD AUTO: 44.3 % (ref 35–47)
HGB BLD-MCNC: 13.8 G/DL (ref 11.7–15.7)
HOLD SPECIMEN: NORMAL
HOLD SPECIMEN: NORMAL
IMM GRANULOCYTES # BLD: 0 10E3/UL
IMM GRANULOCYTES NFR BLD: 0 %
LYMPHOCYTES # BLD AUTO: 2.2 10E3/UL (ref 0.8–5.3)
LYMPHOCYTES NFR BLD AUTO: 30 %
MAGNESIUM SERPL-MCNC: 1.6 MG/DL (ref 1.7–2.3)
MCH RBC QN AUTO: 29.6 PG (ref 26.5–33)
MCHC RBC AUTO-ENTMCNC: 31.2 G/DL (ref 31.5–36.5)
MCV RBC AUTO: 95 FL (ref 78–100)
MONOCYTES # BLD AUTO: 0.4 10E3/UL (ref 0–1.3)
MONOCYTES NFR BLD AUTO: 6 %
NEUTROPHILS # BLD AUTO: 4.3 10E3/UL (ref 1.6–8.3)
NEUTROPHILS NFR BLD AUTO: 61 %
NRBC # BLD AUTO: 0 10E3/UL
NRBC BLD AUTO-RTO: 0 /100
NT-PROBNP SERPL-MCNC: 151 PG/ML (ref 0–900)
O2/TOTAL GAS SETTING VFR VENT: 10 %
PCO2 BLDV: 73 MM HG (ref 40–50)
PH BLDV: 7.3 [PH] (ref 7.32–7.43)
PLATELET # BLD AUTO: 268 10E3/UL (ref 150–450)
PO2 BLDV: 40 MM HG (ref 25–47)
POTASSIUM SERPL-SCNC: 4.1 MMOL/L (ref 3.4–5.3)
RBC # BLD AUTO: 4.67 10E6/UL (ref 3.8–5.2)
RSV RNA SPEC NAA+PROBE: NEGATIVE
SARS-COV-2 RNA RESP QL NAA+PROBE: NEGATIVE
SODIUM SERPL-SCNC: 144 MMOL/L (ref 135–145)
TROPONIN T SERPL HS-MCNC: 15 NG/L
WBC # BLD AUTO: 7.2 10E3/UL (ref 4–11)

## 2023-10-23 PROCEDURE — 250N000009 HC RX 250: Performed by: NURSE PRACTITIONER

## 2023-10-23 PROCEDURE — 71045 X-RAY EXAM CHEST 1 VIEW: CPT

## 2023-10-23 PROCEDURE — 84484 ASSAY OF TROPONIN QUANT: CPT | Performed by: EMERGENCY MEDICINE

## 2023-10-23 PROCEDURE — 96375 TX/PRO/DX INJ NEW DRUG ADDON: CPT

## 2023-10-23 PROCEDURE — 99223 1ST HOSP IP/OBS HIGH 75: CPT | Mod: AI | Performed by: NURSE PRACTITIONER

## 2023-10-23 PROCEDURE — 85025 COMPLETE CBC W/AUTO DIFF WBC: CPT | Performed by: EMERGENCY MEDICINE

## 2023-10-23 PROCEDURE — 83735 ASSAY OF MAGNESIUM: CPT | Performed by: NURSE PRACTITIONER

## 2023-10-23 PROCEDURE — 250N000013 HC RX MED GY IP 250 OP 250 PS 637: Performed by: NURSE PRACTITIONER

## 2023-10-23 PROCEDURE — 250N000012 HC RX MED GY IP 250 OP 636 PS 637: Performed by: NURSE PRACTITIONER

## 2023-10-23 PROCEDURE — 120N000001 HC R&B MED SURG/OB

## 2023-10-23 PROCEDURE — 250N000011 HC RX IP 250 OP 636: Mod: JZ | Performed by: EMERGENCY MEDICINE

## 2023-10-23 PROCEDURE — 87637 SARSCOV2&INF A&B&RSV AMP PRB: CPT | Performed by: EMERGENCY MEDICINE

## 2023-10-23 PROCEDURE — 82962 GLUCOSE BLOOD TEST: CPT

## 2023-10-23 PROCEDURE — 250N000009 HC RX 250

## 2023-10-23 PROCEDURE — 96365 THER/PROPH/DIAG IV INF INIT: CPT | Mod: 59

## 2023-10-23 PROCEDURE — 93005 ELECTROCARDIOGRAM TRACING: CPT

## 2023-10-23 PROCEDURE — 36415 COLL VENOUS BLD VENIPUNCTURE: CPT | Performed by: EMERGENCY MEDICINE

## 2023-10-23 PROCEDURE — 80048 BASIC METABOLIC PNL TOTAL CA: CPT | Performed by: EMERGENCY MEDICINE

## 2023-10-23 PROCEDURE — 999N000157 HC STATISTIC RCP TIME EA 10 MIN

## 2023-10-23 PROCEDURE — 94640 AIRWAY INHALATION TREATMENT: CPT

## 2023-10-23 PROCEDURE — 82803 BLOOD GASES ANY COMBINATION: CPT | Performed by: EMERGENCY MEDICINE

## 2023-10-23 PROCEDURE — 93308 TTE F-UP OR LMTD: CPT

## 2023-10-23 PROCEDURE — 94640 AIRWAY INHALATION TREATMENT: CPT | Mod: 76

## 2023-10-23 PROCEDURE — 99285 EMERGENCY DEPT VISIT HI MDM: CPT | Mod: 25

## 2023-10-23 PROCEDURE — 999N000156 HC STATISTIC RCP CONSULT EA 30 MIN

## 2023-10-23 PROCEDURE — 250N000009 HC RX 250: Performed by: EMERGENCY MEDICINE

## 2023-10-23 PROCEDURE — 250N000011 HC RX IP 250 OP 636: Performed by: NURSE PRACTITIONER

## 2023-10-23 PROCEDURE — 83036 HEMOGLOBIN GLYCOSYLATED A1C: CPT | Performed by: NURSE PRACTITIONER

## 2023-10-23 PROCEDURE — 83880 ASSAY OF NATRIURETIC PEPTIDE: CPT | Performed by: EMERGENCY MEDICINE

## 2023-10-23 RX ORDER — IPRATROPIUM BROMIDE AND ALBUTEROL SULFATE 2.5; .5 MG/3ML; MG/3ML
SOLUTION RESPIRATORY (INHALATION)
Status: COMPLETED
Start: 2023-10-23 | End: 2023-10-23

## 2023-10-23 RX ORDER — FUROSEMIDE 20 MG
20 TABLET ORAL DAILY
Status: DISCONTINUED | OUTPATIENT
Start: 2023-10-23 | End: 2023-10-26

## 2023-10-23 RX ORDER — METHYLPREDNISOLONE SODIUM SUCCINATE 40 MG/ML
40 INJECTION, POWDER, LYOPHILIZED, FOR SOLUTION INTRAMUSCULAR; INTRAVENOUS EVERY 8 HOURS
Qty: 4 ML | Refills: 0 | Status: COMPLETED | OUTPATIENT
Start: 2023-10-23 | End: 2023-10-24

## 2023-10-23 RX ORDER — DEXTROSE MONOHYDRATE 25 G/50ML
25-50 INJECTION, SOLUTION INTRAVENOUS
Status: DISCONTINUED | OUTPATIENT
Start: 2023-10-23 | End: 2023-10-29 | Stop reason: HOSPADM

## 2023-10-23 RX ORDER — ASPIRIN 81 MG/1
81 TABLET ORAL DAILY
Status: DISCONTINUED | OUTPATIENT
Start: 2023-10-23 | End: 2023-10-29 | Stop reason: HOSPADM

## 2023-10-23 RX ORDER — IPRATROPIUM BROMIDE AND ALBUTEROL SULFATE 2.5; .5 MG/3ML; MG/3ML
3 SOLUTION RESPIRATORY (INHALATION) ONCE
Status: COMPLETED | OUTPATIENT
Start: 2023-10-23 | End: 2023-10-23

## 2023-10-23 RX ORDER — METHYLPREDNISOLONE SODIUM SUCCINATE 125 MG/2ML
125 INJECTION, POWDER, LYOPHILIZED, FOR SOLUTION INTRAMUSCULAR; INTRAVENOUS ONCE
Status: COMPLETED | OUTPATIENT
Start: 2023-10-23 | End: 2023-10-23

## 2023-10-23 RX ORDER — ACETAMINOPHEN 325 MG/1
650 TABLET ORAL EVERY 6 HOURS PRN
Status: DISCONTINUED | OUTPATIENT
Start: 2023-10-23 | End: 2023-10-29 | Stop reason: HOSPADM

## 2023-10-23 RX ORDER — FLUTICASONE FUROATE AND VILANTEROL 200; 25 UG/1; UG/1
1 POWDER RESPIRATORY (INHALATION) DAILY
Status: DISCONTINUED | OUTPATIENT
Start: 2023-10-23 | End: 2023-10-29 | Stop reason: HOSPADM

## 2023-10-23 RX ORDER — ATORVASTATIN CALCIUM 40 MG/1
40 TABLET, FILM COATED ORAL EVERY EVENING
Status: DISCONTINUED | OUTPATIENT
Start: 2023-10-23 | End: 2023-10-29 | Stop reason: HOSPADM

## 2023-10-23 RX ORDER — METFORMIN HCL 500 MG
500 TABLET, EXTENDED RELEASE 24 HR ORAL 2 TIMES DAILY WITH MEALS
Status: DISCONTINUED | OUTPATIENT
Start: 2023-10-23 | End: 2023-10-29 | Stop reason: HOSPADM

## 2023-10-23 RX ORDER — MAGNESIUM SULFATE HEPTAHYDRATE 40 MG/ML
2 INJECTION, SOLUTION INTRAVENOUS ONCE
Status: COMPLETED | OUTPATIENT
Start: 2023-10-23 | End: 2023-10-23

## 2023-10-23 RX ORDER — METOPROLOL SUCCINATE 50 MG/1
50 TABLET, EXTENDED RELEASE ORAL DAILY
Status: DISCONTINUED | OUTPATIENT
Start: 2023-10-23 | End: 2023-10-29 | Stop reason: HOSPADM

## 2023-10-23 RX ORDER — PROCHLORPERAZINE MALEATE 5 MG
5 TABLET ORAL EVERY 6 HOURS PRN
Status: DISCONTINUED | OUTPATIENT
Start: 2023-10-23 | End: 2023-10-29 | Stop reason: HOSPADM

## 2023-10-23 RX ORDER — PREDNISONE 20 MG/1
40 TABLET ORAL
Status: DISCONTINUED | OUTPATIENT
Start: 2023-10-25 | End: 2023-10-29 | Stop reason: HOSPADM

## 2023-10-23 RX ORDER — AMOXICILLIN 250 MG
2 CAPSULE ORAL 2 TIMES DAILY PRN
Status: DISCONTINUED | OUTPATIENT
Start: 2023-10-23 | End: 2023-10-29 | Stop reason: HOSPADM

## 2023-10-23 RX ORDER — NICOTINE POLACRILEX 4 MG
15-30 LOZENGE BUCCAL
Status: DISCONTINUED | OUTPATIENT
Start: 2023-10-23 | End: 2023-10-29 | Stop reason: HOSPADM

## 2023-10-23 RX ORDER — AMOXICILLIN 250 MG
1 CAPSULE ORAL 2 TIMES DAILY PRN
Status: DISCONTINUED | OUTPATIENT
Start: 2023-10-23 | End: 2023-10-29 | Stop reason: HOSPADM

## 2023-10-23 RX ORDER — AMLODIPINE BESYLATE 5 MG/1
5 TABLET ORAL DAILY
COMMUNITY
Start: 2023-09-21

## 2023-10-23 RX ORDER — IPRATROPIUM BROMIDE AND ALBUTEROL SULFATE 2.5; .5 MG/3ML; MG/3ML
3 SOLUTION RESPIRATORY (INHALATION)
Status: DISCONTINUED | OUTPATIENT
Start: 2023-10-23 | End: 2023-10-26

## 2023-10-23 RX ORDER — BUDESONIDE AND FORMOTEROL FUMARATE DIHYDRATE 160; 4.5 UG/1; UG/1
2 AEROSOL RESPIRATORY (INHALATION)
Status: ON HOLD | COMMUNITY
End: 2023-10-29

## 2023-10-23 RX ORDER — ONDANSETRON 4 MG/1
4 TABLET, ORALLY DISINTEGRATING ORAL EVERY 6 HOURS PRN
Status: DISCONTINUED | OUTPATIENT
Start: 2023-10-23 | End: 2023-10-29 | Stop reason: HOSPADM

## 2023-10-23 RX ORDER — GLIPIZIDE 10 MG/1
10 TABLET, FILM COATED, EXTENDED RELEASE ORAL DAILY
Status: DISCONTINUED | OUTPATIENT
Start: 2023-10-24 | End: 2023-10-29 | Stop reason: HOSPADM

## 2023-10-23 RX ORDER — ACETAMINOPHEN 650 MG/1
650 SUPPOSITORY RECTAL EVERY 6 HOURS PRN
Status: DISCONTINUED | OUTPATIENT
Start: 2023-10-23 | End: 2023-10-29 | Stop reason: HOSPADM

## 2023-10-23 RX ORDER — ALBUTEROL SULFATE 0.83 MG/ML
1 SOLUTION RESPIRATORY (INHALATION) EVERY 6 HOURS PRN
COMMUNITY
Start: 2023-10-16 | End: 2024-06-30

## 2023-10-23 RX ORDER — LOSARTAN POTASSIUM 100 MG/1
100 TABLET ORAL DAILY
Status: DISCONTINUED | OUTPATIENT
Start: 2023-10-23 | End: 2023-10-29 | Stop reason: HOSPADM

## 2023-10-23 RX ORDER — METFORMIN HCL 500 MG
500 TABLET, EXTENDED RELEASE 24 HR ORAL 2 TIMES DAILY WITH MEALS
COMMUNITY
Start: 2023-10-02 | End: 2024-10-01

## 2023-10-23 RX ORDER — AMLODIPINE BESYLATE 2.5 MG/1
2.5 TABLET ORAL DAILY
Status: DISCONTINUED | OUTPATIENT
Start: 2023-10-23 | End: 2023-10-27

## 2023-10-23 RX ORDER — PROCHLORPERAZINE 25 MG
12.5 SUPPOSITORY, RECTAL RECTAL EVERY 12 HOURS PRN
Status: DISCONTINUED | OUTPATIENT
Start: 2023-10-23 | End: 2023-10-29 | Stop reason: HOSPADM

## 2023-10-23 RX ORDER — ONDANSETRON 2 MG/ML
4 INJECTION INTRAMUSCULAR; INTRAVENOUS EVERY 6 HOURS PRN
Status: DISCONTINUED | OUTPATIENT
Start: 2023-10-23 | End: 2023-10-29 | Stop reason: HOSPADM

## 2023-10-23 RX ORDER — ALBUTEROL SULFATE 0.83 MG/ML
3 SOLUTION RESPIRATORY (INHALATION)
Status: DISCONTINUED | OUTPATIENT
Start: 2023-10-23 | End: 2023-10-29 | Stop reason: HOSPADM

## 2023-10-23 RX ADMIN — AMLODIPINE BESYLATE 2.5 MG: 2.5 TABLET ORAL at 17:36

## 2023-10-23 RX ADMIN — IPRATROPIUM BROMIDE AND ALBUTEROL SULFATE 3 ML: .5; 3 SOLUTION RESPIRATORY (INHALATION) at 08:02

## 2023-10-23 RX ADMIN — IPRATROPIUM BROMIDE AND ALBUTEROL SULFATE 3 ML: .5; 3 SOLUTION RESPIRATORY (INHALATION) at 20:15

## 2023-10-23 RX ADMIN — IPRATROPIUM BROMIDE AND ALBUTEROL SULFATE 3 ML: .5; 3 SOLUTION RESPIRATORY (INHALATION) at 07:57

## 2023-10-23 RX ADMIN — IPRATROPIUM BROMIDE AND ALBUTEROL SULFATE 3 ML: 2.5; .5 SOLUTION RESPIRATORY (INHALATION) at 07:57

## 2023-10-23 RX ADMIN — IPRATROPIUM BROMIDE AND ALBUTEROL SULFATE 3 ML: .5; 3 SOLUTION RESPIRATORY (INHALATION) at 08:13

## 2023-10-23 RX ADMIN — INSULIN ASPART 5 UNITS: 100 INJECTION, SOLUTION INTRAVENOUS; SUBCUTANEOUS at 19:44

## 2023-10-23 RX ADMIN — FLUTICASONE FUROATE AND VILANTEROL TRIFENATATE 1 PUFF: 200; 25 POWDER RESPIRATORY (INHALATION) at 17:36

## 2023-10-23 RX ADMIN — IPRATROPIUM BROMIDE AND ALBUTEROL SULFATE 3 ML: .5; 3 SOLUTION RESPIRATORY (INHALATION) at 13:01

## 2023-10-23 RX ADMIN — MAGNESIUM SULFATE HEPTAHYDRATE 2 G: 2 INJECTION, SOLUTION INTRAVENOUS at 08:21

## 2023-10-23 RX ADMIN — ATORVASTATIN CALCIUM 40 MG: 40 TABLET, FILM COATED ORAL at 19:42

## 2023-10-23 RX ADMIN — LOSARTAN POTASSIUM 100 MG: 100 TABLET, FILM COATED ORAL at 17:36

## 2023-10-23 RX ADMIN — FUROSEMIDE 20 MG: 20 TABLET ORAL at 17:36

## 2023-10-23 RX ADMIN — IPRATROPIUM BROMIDE AND ALBUTEROL SULFATE 3 ML: .5; 3 SOLUTION RESPIRATORY (INHALATION) at 16:15

## 2023-10-23 RX ADMIN — METOPROLOL SUCCINATE 50 MG: 50 TABLET, EXTENDED RELEASE ORAL at 17:36

## 2023-10-23 RX ADMIN — METFORMIN HYDROCHLORIDE 500 MG: 500 TABLET, EXTENDED RELEASE ORAL at 17:36

## 2023-10-23 RX ADMIN — METHYLPREDNISOLONE SODIUM SUCCINATE 125 MG: 125 INJECTION, POWDER, FOR SOLUTION INTRAMUSCULAR; INTRAVENOUS at 08:14

## 2023-10-23 RX ADMIN — ASPIRIN 81 MG CHEWABLE TABLET 81 MG: 81 TABLET CHEWABLE at 17:36

## 2023-10-23 RX ADMIN — METHYLPREDNISOLONE SODIUM SUCCINATE 40 MG: 40 INJECTION, POWDER, FOR SOLUTION INTRAMUSCULAR; INTRAVENOUS at 17:36

## 2023-10-23 ASSESSMENT — ACTIVITIES OF DAILY LIVING (ADL)
ADLS_ACUITY_SCORE: 37
ADLS_ACUITY_SCORE: 35
ADLS_ACUITY_SCORE: 28
ADLS_ACUITY_SCORE: 37
ADLS_ACUITY_SCORE: 37
ADLS_ACUITY_SCORE: 28
ADLS_ACUITY_SCORE: 37
ADLS_ACUITY_SCORE: 28

## 2023-10-23 NOTE — H&P
[facility]    History and Physical - Hospitalist Service       Date of Admission:  10/23/2023    Assessment & Plan      Pilo Quintana is a 73 year old female admitted on 10/23/2023 with history of dementia, HTN, type II diabetes mellitus, CVA, cardiomegaly, and asthma who presents to the ED with shortness of breath and hypoxia. History is given by the patient's son, Balwinder, as she is non-English speaking. Son reports that she has been short of breath the last few days and he has been checking her O2 sats the last few mornings, finding them to be in the high 80s and low 90s. He then noticed that the patient was wheezing after she woke up this morning. He checked her O2 sats and found it to be 71% at this time. En route to the ED her O2 sats were fluctuating around 84-85%. She has been using her inhaler without significant relief. He also says she developed a cough yesterday, which he gave her Robitussin for. She has been complaining of chest pain as well, though her son says her complaints are not always accurate due to her history of dementia. Son denies fevers, headache, nausea, vomiting, or constipation. The patient does live with her son.     Workup in the ED revealed elevated BP as well as hypoxia, with SPO2 values in the 70s and mid 80s concerning for reactive airway disease/asthma exacerbation. Workup was negative for influenza/RSV/COVID. CBC, BMP, troponin, and BNP were also unremarkable. VBG showed slight respiratory acidosis with metabolic compensation with a pH of 7.30 and PCO2 of 73 . Echo, chest x-ray, and EKG unremarkable. Patient was given DuoNeb therapy x3, Solu-Medrol, and magnesium and placed on supplemental oxygen weaned down to 3 L per oxy mask.    Acute mixed hypoxic/hypercarbic respiratory failure  Asthma exacerbation with hypoxia   -- Patient does not have formal diagnosis of asthma, but has been seen by PCP for wheezing. She typically uses Flovent BID, albuterol  nebulizer a couple of times per day, and albuterol inhaler as needed. This morning, the wheezing and shortness of breath was worse than her baseline, with no improvement with use of her inhaler. Per son, patient's family history is positive for asthma in one daughter.   -- She does have clinical signs of asthma; recommend PFTs at some point to establish formal diagnosis.  -- On presentation to the ED SP02 values were in the 70s and mid 80s, has now improved to 99%  -- Continue 3L O2 nasal cannula, weaning as tolerated   -- DuoNeb q4hr  -- Albuterol nebulizer q2hr PRN  -- Methylprednisolone IM 40mg q8hr for 1-2 days, transitioning to prednisone 40mg daily.  May benefit from a taper.     Type II diabetes mellitus with albuminuria  -- Appears to be well controlled, patient follows with her PCP who she saw earlier this month  -- Most recent A1C on 08/10/2023 = 8.1.  Correctional scale coverage.   -- Continue glipizide XL 10mg daily  -- Continue metformin XR 500mg twice daily   -- Repeat CBC and BMP in the morning     HTN  -- Continue Losartan 100mg daily  -- Continue metoprolol 50mg daily  -- Continue furosemide 20mg daily  -- Continue amlodipine 2.5mg daily     Hyperlipidemia  -- Continue atorvastatin     Hx of CVA with residual right-sided weakness  Dementia  Language barrier (son translates)  -- Hospitalized on 05/11/2023 following intracranial hemorrhage of the ventral casimiro. During workup, there was an incidental finding of a 1x2mm right MCA bifurcation aneurysm. Patient followed up with neurology for this on 05/23/2023 and plans to repeat CTA head in 1 year.   -- Continue aspirin 81mg daily         Diet: Moderate Consistent Carb (60 g CHO per Meal) Diet  DVT Prophylaxis: Pneumatic Compression Devices  Morrison Catheter: Not present  Lines: None     Cardiac Monitoring: ACTIVE order. Indication: acute respiratory failure.  Code Status: Full CodeFull code    Clinically Significant Risk Factors Present on Admission         "    # Hypomagnesemia: Lowest Mg = 1.6 mg/dL in last 2 days, will replace as needed     # Drug Induced Platelet Defect: home medication list includes an antiplatelet medication   # Hypertension: Noted on problem list   # Acute Respiratory Failure: Documented O2 saturation < 91%.  Continue supplemental oxygen as needed    # DMII: A1C = 9.0 % (Ref range: <5.7 %) within past 6 months               Disposition Plan   Per clinical course     Expected Discharge Date: 10/24/2023                  CHAO Jones CNP   Hospitalist Service  [facility]  Securely message with [employer] (more info)  Text page via [facility] Paging/Directory     ______________________________________________________________________    \"I was present with the student who participated in the service and in the documentation of the note. I have verified the history and personally performed the physical exam and medical decision-making. I agree with the assessment and plan of care as documented in the note.\"     Improved but still with bronchospasticity.   HTN -- didn't take morning medications.   No tripod or significant increased WOB but does remain hypoxic.     BP (!) 144/102   Pulse 72   Temp 98.1  F (36.7  C) (Oral)   Resp 14   SpO2 97%    Axox3.  FC.  Family translates (speaks a language that originates from [place]).  BBS.  Mild expiratory wheezing.   RRR. S1S2.  SNTND NABS.  MOON. No significant edema  Axox3 per family. Follow commands.    MDM: as noted.  CBC and BMP reassuring.  VBG with mild respiratory acidosis with metabolic compensation.  BNP WNL  HSTnT WNL    A/P: As above.   Plan: As above.    CHAO Jon CNP, APRN CNP      Chief Complaint   Shortness of Breath    History is obtained from the patient's son (Balwinder) as she is non-English speaking.    History of Present Illness   Pilo Quintana is a 73 year old female with history of dementia, HTN, type II " diabetes mellitus, CVA, cardiomegaly, and asthma who presents to the ED with shortness of breath and hypoxia. History is given by the patient's son, Balwinder, as she is non-English speaking. Son reports that she has been short of breath the last few days and he has been checking her O2 sats the last few mornings, finding them to be in the high 80s and low 90s. He then noticed that the patient was wheezing after she woke up this morning. He checked her O2 sats and found it to be 71% at this time. En route to the ED her O2 sats were fluctuating around 84-85%. She has been using her inhaler without significant relief and last used her inhaler prior to arrival this morning. He also says she developed a cough yesterday, which he gave her Robitussin for. She has been complaining of chest pain as well, though her son says her complaints are not always accurate due to her history of dementia. Son denies fevers, headache, nausea, vomiting, or constipation. The patient does live with her son. Of note, patient's son, daughter-in-law, and grandson have had a cough recently.        Past Medical History    Past Medical History:   Diagnosis Date    Diabetes (H)     Hypertension     Morbid obesity (H)     Osteoarthritis 6/7/2003    PMB (postmenopausal bleeding) 9/16/2014    Renal mass     Thickened endometrium 9/16/2014    Uncomplicated asthma     Unspecified cerebral artery occlusion with cerebral infarction 1998    was in [place], no residual at present       Past Surgical History   Past Surgical History:   Procedure Laterality Date    DILATION AND CURETTAGE, OPERATIVE HYSTEROSCOPY WITH MORCELLATOR, COMBINED N/A 9/25/2014    Procedure: COMBINED DILATION AND CURETTAGE, OPERATIVE HYSTEROSCOPY WITH MORCELLATOR;  Surgeon: Silverio Christy MD;  Location:  OR       Prior to Admission Medications   Prior to Admission Medications   Prescriptions Last Dose Informant Patient Reported? Taking?   Cholecalciferol (VITAMIN D3) 50 MCG (2000 UT)  CAPS 10/22/2023 at AM Son Yes Yes   Sig: Take 50 mcg by mouth daily   acetaminophen (TYLENOL) 500 MG tablet Unknown Son Yes Yes   Sig: Take 500-1,000 mg by mouth every 8 hours as needed for mild pain   albuterol (PROAIR HFA/PROVENTIL HFA/VENTOLIN HFA) 108 (90 Base) MCG/ACT inhaler 10/23/2023 at x1 Son Yes Yes   Sig: INHALE 2 PUFFS BY MOUTH EVERY 4 HOURS AS NEEDED FOR WHEEZING   albuterol (PROVENTIL) (2.5 MG/3ML) 0.083% neb solution 10/23/2023 at x1  Yes Yes   Sig: Take 1 vial by nebulization every 6 hours as needed for shortness of breath, wheezing or cough   amLODIPine (NORVASC) 2.5 MG tablet 10/22/2023 at AM  Yes Yes   Sig: Take 1 tablet by mouth daily   aspirin (ASA) 81 MG EC tablet 10/22/2023 at AM  No Yes   Sig: Take 1 tablet (81 mg) by mouth daily   atorvastatin (LIPITOR) 40 MG tablet 10/22/2023 at PM  No Yes   Sig: Take 1 tablet (40 mg) by mouth every evening   budesonide-formoterol (SYMBICORT) 160-4.5 MCG/ACT Inhaler 10/23/2023 at 2 puffs  Yes Yes   Sig: Inhale 2 puffs into the lungs two times daily   furosemide (LASIX) 20 MG tablet 10/22/2023 at AM Son Yes Yes   Sig: Take 20 mg by mouth daily   glipiZIDE (GLUCOTROL XL) 10 MG 24 hr tablet 10/22/2023 at AM Son Yes Yes   Sig: Take 10 mg by mouth daily   losartan (COZAAR) 100 MG tablet 10/22/2023 at AM Son Yes Yes   Sig: Take 100 mg by mouth daily   metFORMIN (GLUCOPHAGE XR) 500 MG 24 hr tablet 10/22/2023 at AM  Yes Yes   Sig: Take 500 mg by mouth 2 times daily (with meals)   metoprolol succinate ER (TOPROL XL) 50 MG 24 hr tablet 10/22/2023 at AM Son Yes Yes   Sig: Take 50 mg by mouth daily   triamcinolone (KENALOG) 0.1 % external ointment Unknown Son Yes Yes   Sig: Apply topically 2 times daily as needed for irritation      Facility-Administered Medications: None        Review of Systems    The 10 point Review of Systems is negative other than noted in the HPI or here.     Social History   I have reviewed this patient's social history and updated it with  pertinent information if needed.  Social History     Tobacco Use    Smoking status: Never    Smokeless tobacco: Never   Substance Use Topics    Alcohol use: No    Drug use: No       Family History         Allergies   No Known Allergies     Physical Exam   Vital Signs: Temp: 98.1  F (36.7  C) Temp src: Oral BP: (!) 144/102 Pulse: 72   Resp: 14 SpO2: 97 % O2 Device: Oxymask Oxygen Delivery: 3 LPM  Weight: 0 lbs 0 oz    GEN:   Alert, oriented x 3, appears comfortable, NAD.  NECK:   Supple ,no mass or thyromegaly   HEENT:  Normocephalic/atraumatic, no scleral icterus, no nasal discharge, mouth moist.  CV:   Regular rate and rhythm, no murmur or JVD.  S1 + S2 noted, no S3 or S4.  LUNGS:   Clear to auscultation bilaterally without rales/rhonchi/wheezing/retractions.  Symmetric chest rise on inhalation noted.  ABD:  Active bowel sounds, soft, non-distended.  Tender to palpation over the midline lower abdomen. No rebound/guarding/rigidity.  EXT:  No edema.  No cyanosis.  No joint synovitis noted.  SKIN:   Dry to touch, no exanthems noted in the visualized areas.  Neurologic: Grossly intact,non focal.   Neuropsychiatric:  General: normal, calm and normal eye contact  Level of consciousness: alert / normal  Affect: normal  Orientation: oriented to self, place, time and situation     Medical Decision Making       60 MINUTES SPENT BY ME on the date of service doing chart review, history, exam, documentation & further activities per the note.      Data   Imaging results reviewed over the past 24 hrs:   Recent Results (from the past 24 hour(s))   POC US ECHO LIMITED    Impression     Limited Bedside ED Cardiac Ultrasound Procedure Note:    PROCEDURE: PERFORMED BY: Dr. Mario Orozco MD  INDICATIONS/SYMPTOM:  Shortness of Breath  PROBE: Cardiac phased array probe  BODY LOCATION: Chest  FINDINGS:   The ultrasound was performed utilizing the parasternal long axis and apical 4 chamber views.  Cardiac contractility:   Present  Gross estimation of cardiac kinesis: normal  Pericardial Effusion:  None  RV:LV ratio: LV > RV    INTERPRETATION:    Chamber size and motion were grossly normal with LV > RV, normal cardiac kinesis.  No pericardial effusion was found.    IMAGE DOCUMENTATION: Images were archived to PACs system.         XR Chest Port 1 View    Narrative    CHEST ONE VIEW  10/23/2023 8:08 AM     HISTORY: Shortness of breath.    COMPARISON: April 22, 2023      Impression    IMPRESSION: No acute disease.    JENNIFER LAYNE MD         SYSTEM ID:  [id number]     ECG  ECG taken at 0847, ECG read at 0850  Normal sinus rhythm  Left axis deviation   Nonspecific ST and T wave abnormality  Abnormal ECG   No significant change as compared to prior, dated 9/12/23.  Rate 69 bpm. DE interval 134 ms. QRS duration 80 ms. QT/QTc 414/443 ms. P-R-T axes 15 -33 15.     Most Recent 3 CBC's:  Recent Labs   Lab Test 10/23/23  0806 09/18/23  1537 09/12/23  1221   WBC 7.2 8.3 5.5   HGB 13.8 15.0 14.4   MCV 95 90 94    227 222     Most Recent 3 BMP's:  Recent Labs   Lab Test 10/23/23  1139 10/23/23  0806 09/18/23  1537 09/12/23  1221   NA  --  144 141 138   POTASSIUM  --  4.1 5.0 4.1   CHLORIDE  --  102 99 98   CO2  --  32* 31* 32*   BUN  --  16.9 11.0 14.7   CR  --  0.60 0.51 0.79   ANIONGAP  --  10 11 8   GLENIS  --  10.0 10.3* 9.8   * 115* 126* 498*     Most Recent 3 BNP's:  Recent Labs   Lab Test 10/23/23  0806 09/18/23  1537   NTBNPI 151 91     Most Recent Hemoglobin A1c:  Recent Labs   Lab Test 10/23/23  0806   A1C 9.0*     Influenza A and B = negative  RSV = negative  COVID = negative    Troponin T = 15    Blood gas venous  pH = 7.30  pCO2 = 73  P02 = 40  HCO3 = 36

## 2023-10-23 NOTE — ED NOTES
[facility]  ED Nurse Handoff Report    ED Chief complaint: Shortness of Breath  . ED Diagnosis:   Final diagnoses:   Shortness of breath   Hypoxia       Allergies: No Known Allergies    Code Status: Full Code    Activity level - Baseline/Home:  assist of 1.  Activity Level - Current:   assist of 1.   Lift room needed: No.   Bariatric: No   Needed: Yes   Isolation: No.   Infection: Not Applicable.     Respiratory status: Nasal cannula    Vital Signs (within 30 minutes):   Vitals:    10/23/23 0845 10/23/23 0850 10/23/23 0900 10/23/23 0914   BP: (!) 169/94  (!) 156/107    Pulse: 70 69 65 70   Resp: (!) 41 26  30   Temp:       TempSrc:       SpO2: 100% 100% 100% 100%       Cardiac Rhythm:  ,      Pain level:    Patient confused: No.   Patient Falls Risk: nonskid shoes/slippers when out of bed and patient and family education.   Elimination Status: Has voided     Patient Report - Initial Complaint: SOB, Hypoxia   Focused Assessment: HPI   Pilo Quintana is a 73 year old female with history of dementia, HTN, type II diabetes mellitus, CVA, cardiomegaly, and asthma who presents to the ED with shortness of breath and hypoxia. Patient's son reports that she has been short of breath the last few days and he has been checking her O2 sats the last few mornings, finding them to be in the high 80s and low 90s. He then noticed that the patient was wheezing after she woke up this morning. He checked her O2 sats and found it to be 71% at this time. En route to the ED her O2 sats were fluctuating around 84-85%. She has been using her inhaler without significant relief and last had her inhaler prior to arrival this morning. He also says she developed a cough yesterday, which she has been given Robitussin for. She has been complaining of chest pain as well, though her son says her complaints are not always accurate due to her history of dementia. No fevers. Patient did not take any at home COVID tests  prior to arrival. Of note, patient's son, daughter-in-law, and grandson have also had a cough recently.     Abnormal Results:   Labs Ordered and Resulted from Time of ED Arrival to Time of ED Departure   BASIC METABOLIC PANEL - Abnormal       Result Value    Sodium 144      Potassium 4.1      Chloride 102      Carbon Dioxide (CO2) 32 (*)     Anion Gap 10      Urea Nitrogen 16.9      Creatinine 0.60      GFR Estimate >90      Calcium 10.0      Glucose 115 (*)    TROPONIN T, HIGH SENSITIVITY - Abnormal    Troponin T, High Sensitivity 15 (*)    BLOOD GAS VENOUS - Abnormal    pH Venous 7.30 (*)     pCO2 Venous 73 (*)     pO2 Venous 40      Bicarbonate Venous 36 (*)     Base Excess/Deficit 6.9 (*)     FIO2 10     CBC WITH PLATELETS AND DIFFERENTIAL - Abnormal    WBC Count 7.2      RBC Count 4.67      Hemoglobin 13.8      Hematocrit 44.3      MCV 95      MCH 29.6      MCHC 31.2 (*)     RDW 13.3      Platelet Count 268      % Neutrophils 61      % Lymphocytes 30      % Monocytes 6      Mids % (Monos, Eos, Basos)        % Eosinophils 3      % Basophils 0      % Immature Granulocytes 0      NRBCs per 100 WBC 0      Absolute Neutrophils 4.3      Absolute Lymphocytes 2.2      Absolute Monocytes 0.4      Mids Abs (Monos, Eos, Basos)        Absolute Eosinophils 0.2      Absolute Basophils 0.0      Absolute Immature Granulocytes 0.0      Absolute NRBCs 0.0     NT PROBNP INPATIENT - Normal    N terminal Pro BNP Inpatient 151     INFLUENZA A/B, RSV, & SARS-COV2 PCR - Normal    Influenza A PCR Negative      Influenza B PCR Negative      RSV PCR Negative      SARS CoV2 PCR Negative          XR Chest Port 1 View   Final Result   IMPRESSION: No acute disease.      JENNIFER LAYNE MD            SYSTEM ID:  [id number]      POC US ECHO LIMITED   Final Result       Limited Bedside ED Cardiac Ultrasound Procedure Note:      PROCEDURE: PERFORMED BY: Dr. Mario Orozco MD   INDICATIONS/SYMPTOM:  Shortness of Breath   PROBE: Cardiac phased  array probe   BODY LOCATION: Chest   FINDINGS:    The ultrasound was performed utilizing the parasternal long axis and apical 4 chamber views.   Cardiac contractility:  Present   Gross estimation of cardiac kinesis: normal   Pericardial Effusion:  None   RV:LV ratio: LV > RV      INTERPRETATION:    Chamber size and motion were grossly normal with LV > RV, normal cardiac kinesis.  No pericardial effusion was found.     IMAGE DOCUMENTATION: Images were archived to PACs system.                  Treatments provided: Duo-Neb X3, Magnesium, Solumedrol.   Family Comments: Son at bedside.   OBS brochure/video discussed/provided to patient:  N/A  ED Medications:   Medications   ipratropium - albuterol 0.5 mg/2.5 mg/3 mL (DUONEB) neb solution 3 mL (3 mLs Nebulization $Given 10/23/23 0813)   ipratropium - albuterol 0.5 mg/2.5 mg/3 mL (DUONEB) neb solution 3 mL (3 mLs Nebulization $Given 10/23/23 0802)   ipratropium - albuterol 0.5 mg/2.5 mg/3 mL (DUONEB) neb solution 3 mL (3 mLs Nebulization $Given 10/23/23 0757)   methylPREDNISolone sodium succinate (solu-MEDROL) injection 125 mg (125 mg Intravenous $Given 10/23/23 0814)   magnesium sulfate 2 g in 50 mL sterile water intermittent infusion (0 g Intravenous Stopped 10/23/23 0919)       Drips infusing:  No  For the majority of the shift this patient was Green.   Interventions performed were N/A.    Sepsis treatment initiated: No    Cares/treatment/interventions/medications to be completed following ED care: Maintain O2 sat >90%. Wean to RA as tolerated. Resume home medications. RT treatments.     ED Nurse Name: Beth Cruz RN  9:21 AM   RECEIVING UNIT ED HANDOFF REVIEW    Above ED Nurse Handoff Report was reviewed: Yes  Reviewed by: Rosana Tran RN on October 23, 2023 at 1:33 PM

## 2023-10-23 NOTE — PHARMACY-ADMISSION MEDICATION HISTORY
Pharmacist Admission Medication History    Admission medication history is complete. The information provided in this note is only as accurate as the sources available at the time of the update.    Information Source(s): Family member, Clinic records, and CareEverywhere/SureScripts via in-person (son)    Pertinent Information: None    Changes made to PTA medication list:  Added: Albuterol neb, Amlodipine, Metformin, Symbicort  Deleted: Flovent  Changed: None      Allergies reviewed with patient and updates made in EHR: yes    Medication History Completed By: Lv Nelson RP 10/23/2023 10:14 AM    PTA Med List   Medication Sig Last Dose    acetaminophen (TYLENOL) 500 MG tablet Take 500-1,000 mg by mouth every 8 hours as needed for mild pain Unknown    albuterol (PROAIR HFA/PROVENTIL HFA/VENTOLIN HFA) 108 (90 Base) MCG/ACT inhaler INHALE 2 PUFFS BY MOUTH EVERY 4 HOURS AS NEEDED FOR WHEEZING 10/23/2023 at x1    albuterol (PROVENTIL) (2.5 MG/3ML) 0.083% neb solution Take 1 vial by nebulization every 6 hours as needed for shortness of breath, wheezing or cough 10/23/2023 at x1    amLODIPine (NORVASC) 2.5 MG tablet Take 1 tablet by mouth daily 10/22/2023 at AM    aspirin (ASA) 81 MG EC tablet Take 1 tablet (81 mg) by mouth daily 10/22/2023 at AM    atorvastatin (LIPITOR) 40 MG tablet Take 1 tablet (40 mg) by mouth every evening 10/22/2023 at PM    budesonide-formoterol (SYMBICORT) 160-4.5 MCG/ACT Inhaler Inhale 2 puffs into the lungs two times daily 10/23/2023 at 2 puffs    Cholecalciferol (VITAMIN D3) 50 MCG (2000 UT) CAPS Take 50 mcg by mouth daily 10/22/2023 at AM    furosemide (LASIX) 20 MG tablet Take 20 mg by mouth daily 10/22/2023 at AM    glipiZIDE (GLUCOTROL XL) 10 MG 24 hr tablet Take 10 mg by mouth daily 10/22/2023 at AM    losartan (COZAAR) 100 MG tablet Take 100 mg by mouth daily 10/22/2023 at AM    metFORMIN (GLUCOPHAGE XR) 500 MG 24 hr tablet Take 500 mg by mouth 2 times daily (with meals) 10/22/2023  at AM    metoprolol succinate ER (TOPROL XL) 50 MG 24 hr tablet Take 50 mg by mouth daily 10/22/2023 at AM    triamcinolone (KENALOG) 0.1 % external ointment Apply topically 2 times daily as needed for irritation Unknown

## 2023-10-23 NOTE — ED PROVIDER NOTES
History     Chief Complaint:  Shortness of Breath     HPI   Pilo Quintana is a 73 year old female with history of dementia, HTN, type II diabetes mellitus, CVA, cardiomegaly, and asthma who presents to the ED with shortness of breath and hypoxia. Patient's son reports that she has been short of breath the last few days and he has been checking her O2 sats the last few mornings, finding them to be in the high 80s and low 90s. He then noticed that the patient was wheezing after she woke up this morning. He checked her O2 sats and found it to be 71% at this time. En route to the ED her O2 sats were fluctuating around 84-85%. She has been using her inhaler without significant relief and last had her inhaler prior to arrival this morning. He also says she developed a cough yesterday, which she has been given Robitussin for. She has been complaining of chest pain as well, though her son says her complaints are not always accurate due to her history of dementia. No fevers. Patient did not take any at home COVID tests prior to arrival. Of note, patient's son, daughter-in-law, and grandson have also had a cough recently.     Independent Historian:   Son - They report additional history as noted above.    Review of External Notes:   I reviewed a nurse triage telephone call from this morning. The patient's son called and said he noticed O2 sats of 80-83%. They were recommended to present to the ED at this time.      I also reviewed an office visit note from 9/21/23 where the patient was evaluated for wheezing and placed on azithromycin and prednisone.     Medications:    Pro-air  Aspirin 81 mg  Lipitor   Flovent  Lasix   Glucotrol   Cozaar  Toprol   Zocor   Norvasc   Glucophage   Symbicort   Proventil   Cozaar     Past Medical History:    Type II diabetes mellitus   HTN  Morbid obesity   OA  Thickened endometrium   Uncomplicated asthma   CVA  Age-related osteoporosis   Cerebral degeneration   Mild memory loss following  organic brain damage  Multiple pulmonary nodules   Renal oncocytoma of right kidney   Intracerebral hemorrhage   Primary hyperparathyroidism   Thyroid nodule   Dementia   Cardiomegaly   Hemiplegia of right dominant side as late effect of cerebral infarction   Social maladjustment  DJD  Carpal tunnel syndrome     Past Surgical History:    D & C, operative hysteroscopy with morcellator, combined    Cataract removal, bilateral     Physical Exam   Patient Vitals for the past 24 hrs:   BP Temp Temp src Pulse Resp SpO2   10/23/23 1016 -- -- -- 72 14 97 %   10/23/23 1008 -- -- -- 66 19 98 %   10/23/23 1000 (!) 144/102 -- -- 70 (!) 51 98 %   10/23/23 0930 (!) 148/88 -- -- 64 -- 100 %   10/23/23 0914 -- -- -- 70 30 100 %   10/23/23 0900 (!) 156/107 -- -- 65 -- 100 %   10/23/23 0850 -- -- -- 69 26 100 %   10/23/23 0845 (!) 169/94 -- -- 70 (!) 41 100 %   10/23/23 0836 (!) 168/97 -- -- 68 -- 100 %   10/23/23 0825 -- -- -- 66 -- 100 %   10/23/23 0810 (!) 120/104 -- -- 68 (!) 37 97 %   10/23/23 0757 (!) 183/95 -- -- 68 -- 99 %   10/23/23 0756 -- -- -- -- -- (!) 74 %   10/23/23 0755 -- -- -- -- -- (!) 75 %   10/23/23 0748 (!) 157/91 98.1  F (36.7  C) Oral 73 20 91 %        Physical Exam  General:              Well-nourished              Speaking in full sentences  Eyes:              Conjunctiva without injection or scleral icterus  ENT:              Moist mucous membranes              Nares patent              Pinnae normal  Neck:              Full ROM              No stiffness appreciated  Resp:              Decreased air movement throughout              Expiratory wheezing noted  CV:                    Normal rate, regular rhythm              S1 and S2 present              No murmur, gallop or rub  GI:              BS present              Abdomen soft without distention              Non-tender to light and deep palpation              No guarding or rebound tenderness  Skin:              Warm, dry, well perfused              No  rashes or open wounds on exposed skin  MSK:              Moves all extremities              No focal deformities or swelling  Neuro:              Alert              Answers questions appropriately              Moves all extremities equally  Psych:              Normal affect, normal mood    Emergency Department Course   ECG  ECG taken at 0847, ECG read at 0850  Normal sinus rhythm  Left axis deviation   Nonspecific ST and T wave abnormality  Abnormal ECG   No significant change as compared to prior, dated 9/12/23.  Rate 69 bpm. NH interval 134 ms. QRS duration 80 ms. QT/QTc 414/443 ms. P-R-T axes 15 -33 15.     Imaging:  XR Chest Port 1 View   Final Result   IMPRESSION: No acute disease.      JENNIFER LAYNE MD            SYSTEM ID:  [id number]      POC US ECHO LIMITED   Final Result       Limited Bedside ED Cardiac Ultrasound Procedure Note:      PROCEDURE: PERFORMED BY: Dr. Mario Orozco MD   INDICATIONS/SYMPTOM:  Shortness of Breath   PROBE: Cardiac phased array probe   BODY LOCATION: Chest   FINDINGS:    The ultrasound was performed utilizing the parasternal long axis and apical 4 chamber views.   Cardiac contractility:  Present   Gross estimation of cardiac kinesis: normal   Pericardial Effusion:  None   RV:LV ratio: LV > RV      INTERPRETATION:    Chamber size and motion were grossly normal with LV > RV, normal cardiac kinesis.  No pericardial effusion was found.     IMAGE DOCUMENTATION: Images were archived to PACs system.                     Laboratory:  Labs Ordered and Resulted from Time of ED Arrival to Time of ED Departure   BASIC METABOLIC PANEL - Abnormal       Result Value    Sodium 144      Potassium 4.1      Chloride 102      Carbon Dioxide (CO2) 32 (*)     Anion Gap 10      Urea Nitrogen 16.9      Creatinine 0.60      GFR Estimate >90      Calcium 10.0      Glucose 115 (*)    TROPONIN T, HIGH SENSITIVITY - Abnormal    Troponin T, High Sensitivity 15 (*)    BLOOD GAS VENOUS - Abnormal    pH Venous  7.30 (*)     pCO2 Venous 73 (*)     pO2 Venous 40      Bicarbonate Venous 36 (*)     Base Excess/Deficit 6.9 (*)     FIO2 10     CBC WITH PLATELETS AND DIFFERENTIAL - Abnormal    WBC Count 7.2      RBC Count 4.67      Hemoglobin 13.8      Hematocrit 44.3      MCV 95      MCH 29.6      MCHC 31.2 (*)     RDW 13.3      Platelet Count 268      % Neutrophils 61      % Lymphocytes 30      % Monocytes 6      Mids % (Monos, Eos, Basos)        % Eosinophils 3      % Basophils 0      % Immature Granulocytes 0      NRBCs per 100 WBC 0      Absolute Neutrophils 4.3      Absolute Lymphocytes 2.2      Absolute Monocytes 0.4      Mids Abs (Monos, Eos, Basos)        Absolute Eosinophils 0.2      Absolute Basophils 0.0      Absolute Immature Granulocytes 0.0      Absolute NRBCs 0.0     MAGNESIUM - Abnormal    Magnesium 1.6 (*)    NT PROBNP INPATIENT - Normal    N terminal Pro BNP Inpatient 151     INFLUENZA A/B, RSV, & SARS-COV2 PCR - Normal    Influenza A PCR Negative      Influenza B PCR Negative      RSV PCR Negative      SARS CoV2 PCR Negative     GLUCOSE MONITOR NURSING POCT   HEMOGLOBIN A1C   GLUCOSE MONITOR NURSING POCT   GLUCOSE MONITOR NURSING POCT   GLUCOSE MONITOR NURSING POCT      Emergency Department Course & Assessments:    Interventions:  Medications   ipratropium - albuterol 0.5 mg/2.5 mg/3 mL (DUONEB) neb solution 3 mL (3 mLs Nebulization $Given 10/23/23 0813)   ipratropium - albuterol 0.5 mg/2.5 mg/3 mL (DUONEB) neb solution 3 mL (3 mLs Nebulization $Given 10/23/23 0802)   ipratropium - albuterol 0.5 mg/2.5 mg/3 mL (DUONEB) neb solution 3 mL (3 mLs Nebulization $Given 10/23/23 0757)   methylPREDNISolone sodium succinate (solu-MEDROL) injection 125 mg (125 mg Intravenous $Given 10/23/23 0814)   magnesium sulfate 2 g in 50 mL sterile water intermittent infusion (0 g Intravenous Stopped 10/23/23 0919)        Assessments:  0752 I obtained history and examined the patient as noted above.  0800 Beside ultrasound  performed.   0840 I rechecked the patient and explained findings. She is feeling much better and looks well.     Independent Interpretation (X-rays, CTs, rhythm strip):  I independently reviewed the patient's chest X-ray and see no acute infiltrates.     Consultations/Discussion of Management or Tests:  0908 I spoke with Parag Gonzalez NP accepting for Dr. Ramírez, hospitalist.     Social Determinants of Health affecting care:   None    Disposition:  The patient was admitted to the hospital under the care of Dr. Ramírez.     Impression & Plan    CMS Diagnoses: None    Medical Decision Making:  Pilo Quintana is a 73-year-old female with a PMH significant for previous CVA, as well as asthma, presenting to the ED accompanied by son for evaluation of shortness of breath.  VS on presentation reveal elevated BP as well as hypoxia, with SPO2 values in the 70s and mid 80s.  Her current evaluation is concerning for reactive airway disease/exam exacerbation.  I did utilize bedside ultrasound, which is negative for pericardial effusion or RV dilatation.  She was immediately provided DuoNeb therapy x3, Solu-Medrol, and magnesium and placed on supplemental oxygen.  Her work of breathing on reassessment is markedly improved and she was able to be weaned down to 3 L per oxy mask.  Chest x-ray is negative for pneumothorax or acute infiltrate.  I considered pulmonary embolism, though given objective pulmonary findings and response to bronchodilator therapy, feel this to be unlikely.  Her laboratory evaluation reveals mild respiratory acidosis with a VBG demonstrating pH of 7.30 and PCO2 of 73.  EKG demonstrates sinus rhythm, TWI in V2, unchanged compared with previous.  High-sensitivity troponin also mildly elevated at 15, though suspicion for ACS based on current presentation and evaluation felt to be unlikely related to ACS, and suspect mild troponin elevation secondary to type II demand ischemia.  This can be followed as inpatient..   Given patient's hypoxia on presentation, we will plan hospital admission for further supportive treatment and care.  Son updated at bedside and in agreement with outlined plan of care.  Questions answered prior to admission.       Diagnosis:    ICD-10-CM    1. Shortness of breath  R06.02       2. Hypoxia  R09.02          Scribe Disclosure:  ARMOND, Lucretia Ogalado, am serving as a scribe at 8:28 AM on 10/23/2023 to document services personally performed by Mario Orozco MD based on my observations and the provider's statements to me.   10/23/2023   Mario Orozco MD Roach, Brian Donald, MD  10/23/23 1128       Mario Orozco MD  10/23/23 0878

## 2023-10-23 NOTE — PROGRESS NOTES
"[facility]     Date: 10/23/23  Admission Dx: Hypoxia and Asthma  Pulmonary History: Asthma  Home Nebulizer/MDI Use: Albuterol neb Q6prn, Alb MDI q4prn, Symbicort BID  Home Oxygen: none on file  Acuity Level (RCAT flow sheet): Level 3  Aerosol Therapy initiated: Duoneb Q 4W/A and Albuterol Q2prn  Pulmonary Hygiene initiated: deep breathing and good cough techniques.  Volume Expansion initiated: IS  Current Oxygen Requirements: 2L NC  Current SpO2: 97%  Re-evaluation date: 10/26/23    Patient Education: Education was performed with the patient in regards to indications/benefits and possible side effects of bronchodilators. Will continue to do education with patient.     See \"RT Assessments\" flow sheet for patient assessment scoring and Acuity Level Details.     Fabiola Molina, RT, RT  10/23/2023 6:15 PM          "

## 2023-10-24 LAB
ANION GAP SERPL CALCULATED.3IONS-SCNC: 11 MMOL/L (ref 7–15)
ATRIAL RATE - MUSE: 69 BPM
BUN SERPL-MCNC: 31 MG/DL (ref 8–23)
CALCIUM SERPL-MCNC: 9.8 MG/DL (ref 8.8–10.2)
CHLORIDE SERPL-SCNC: 102 MMOL/L (ref 98–107)
CREAT SERPL-MCNC: 0.61 MG/DL (ref 0.51–0.95)
DEPRECATED HCO3 PLAS-SCNC: 30 MMOL/L (ref 22–29)
DIASTOLIC BLOOD PRESSURE - MUSE: NORMAL MMHG
EGFRCR SERPLBLD CKD-EPI 2021: >90 ML/MIN/1.73M2
ERYTHROCYTE [DISTWIDTH] IN BLOOD BY AUTOMATED COUNT: 13.2 % (ref 10–15)
GLUCOSE BLDC GLUCOMTR-MCNC: 258 MG/DL (ref 70–99)
GLUCOSE BLDC GLUCOMTR-MCNC: 265 MG/DL (ref 70–99)
GLUCOSE BLDC GLUCOMTR-MCNC: 272 MG/DL (ref 70–99)
GLUCOSE BLDC GLUCOMTR-MCNC: 276 MG/DL (ref 70–99)
GLUCOSE BLDC GLUCOMTR-MCNC: 334 MG/DL (ref 70–99)
GLUCOSE SERPL-MCNC: 273 MG/DL (ref 70–99)
HCT VFR BLD AUTO: 41.8 % (ref 35–47)
HGB BLD-MCNC: 13.2 G/DL (ref 11.7–15.7)
INTERPRETATION ECG - MUSE: NORMAL
MAGNESIUM SERPL-MCNC: 2 MG/DL (ref 1.7–2.3)
MAGNESIUM SERPL-MCNC: 2.1 MG/DL (ref 1.7–2.3)
MCH RBC QN AUTO: 29.3 PG (ref 26.5–33)
MCHC RBC AUTO-ENTMCNC: 31.6 G/DL (ref 31.5–36.5)
MCV RBC AUTO: 93 FL (ref 78–100)
P AXIS - MUSE: 15 DEGREES
PLATELET # BLD AUTO: 258 10E3/UL (ref 150–450)
POTASSIUM SERPL-SCNC: 4.2 MMOL/L (ref 3.4–5.3)
POTASSIUM SERPL-SCNC: 4.2 MMOL/L (ref 3.4–5.3)
PR INTERVAL - MUSE: 134 MS
QRS DURATION - MUSE: 80 MS
QT - MUSE: 414 MS
QTC - MUSE: 443 MS
R AXIS - MUSE: -33 DEGREES
RBC # BLD AUTO: 4.51 10E6/UL (ref 3.8–5.2)
SODIUM SERPL-SCNC: 143 MMOL/L (ref 135–145)
SYSTOLIC BLOOD PRESSURE - MUSE: NORMAL MMHG
T AXIS - MUSE: 15 DEGREES
VENTRICULAR RATE- MUSE: 69 BPM
WBC # BLD AUTO: 5.3 10E3/UL (ref 4–11)

## 2023-10-24 PROCEDURE — 36415 COLL VENOUS BLD VENIPUNCTURE: CPT | Performed by: HOSPITALIST

## 2023-10-24 PROCEDURE — 83735 ASSAY OF MAGNESIUM: CPT | Performed by: HOSPITALIST

## 2023-10-24 PROCEDURE — 94640 AIRWAY INHALATION TREATMENT: CPT

## 2023-10-24 PROCEDURE — 999N000157 HC STATISTIC RCP TIME EA 10 MIN

## 2023-10-24 PROCEDURE — 250N000009 HC RX 250: Performed by: NURSE PRACTITIONER

## 2023-10-24 PROCEDURE — 85027 COMPLETE CBC AUTOMATED: CPT | Performed by: NURSE PRACTITIONER

## 2023-10-24 PROCEDURE — 120N000001 HC R&B MED SURG/OB

## 2023-10-24 PROCEDURE — 83735 ASSAY OF MAGNESIUM: CPT | Performed by: STUDENT IN AN ORGANIZED HEALTH CARE EDUCATION/TRAINING PROGRAM

## 2023-10-24 PROCEDURE — 250N000011 HC RX IP 250 OP 636: Performed by: NURSE PRACTITIONER

## 2023-10-24 PROCEDURE — 36415 COLL VENOUS BLD VENIPUNCTURE: CPT | Performed by: NURSE PRACTITIONER

## 2023-10-24 PROCEDURE — 36415 COLL VENOUS BLD VENIPUNCTURE: CPT | Performed by: STUDENT IN AN ORGANIZED HEALTH CARE EDUCATION/TRAINING PROGRAM

## 2023-10-24 PROCEDURE — 94640 AIRWAY INHALATION TREATMENT: CPT | Mod: 76

## 2023-10-24 PROCEDURE — 84132 ASSAY OF SERUM POTASSIUM: CPT | Performed by: STUDENT IN AN ORGANIZED HEALTH CARE EDUCATION/TRAINING PROGRAM

## 2023-10-24 PROCEDURE — 80048 BASIC METABOLIC PNL TOTAL CA: CPT | Performed by: NURSE PRACTITIONER

## 2023-10-24 PROCEDURE — 99233 SBSQ HOSP IP/OBS HIGH 50: CPT | Performed by: HOSPITALIST

## 2023-10-24 PROCEDURE — 250N000013 HC RX MED GY IP 250 OP 250 PS 637: Performed by: NURSE PRACTITIONER

## 2023-10-24 RX ADMIN — METHYLPREDNISOLONE SODIUM SUCCINATE 40 MG: 40 INJECTION, POWDER, FOR SOLUTION INTRAMUSCULAR; INTRAVENOUS at 16:50

## 2023-10-24 RX ADMIN — METHYLPREDNISOLONE SODIUM SUCCINATE 40 MG: 40 INJECTION, POWDER, FOR SOLUTION INTRAMUSCULAR; INTRAVENOUS at 08:59

## 2023-10-24 RX ADMIN — METFORMIN HYDROCHLORIDE 500 MG: 500 TABLET, EXTENDED RELEASE ORAL at 08:58

## 2023-10-24 RX ADMIN — IPRATROPIUM BROMIDE AND ALBUTEROL SULFATE 3 ML: .5; 3 SOLUTION RESPIRATORY (INHALATION) at 11:46

## 2023-10-24 RX ADMIN — IPRATROPIUM BROMIDE AND ALBUTEROL SULFATE 3 ML: .5; 3 SOLUTION RESPIRATORY (INHALATION) at 08:04

## 2023-10-24 RX ADMIN — METHYLPREDNISOLONE SODIUM SUCCINATE 40 MG: 40 INJECTION, POWDER, FOR SOLUTION INTRAMUSCULAR; INTRAVENOUS at 00:50

## 2023-10-24 RX ADMIN — IPRATROPIUM BROMIDE AND ALBUTEROL SULFATE 3 ML: .5; 3 SOLUTION RESPIRATORY (INHALATION) at 16:08

## 2023-10-24 RX ADMIN — IPRATROPIUM BROMIDE AND ALBUTEROL SULFATE 3 ML: .5; 3 SOLUTION RESPIRATORY (INHALATION) at 19:27

## 2023-10-24 RX ADMIN — ATORVASTATIN CALCIUM 40 MG: 40 TABLET, FILM COATED ORAL at 20:22

## 2023-10-24 RX ADMIN — INSULIN ASPART 3 UNITS: 100 INJECTION, SOLUTION INTRAVENOUS; SUBCUTANEOUS at 09:00

## 2023-10-24 RX ADMIN — FLUTICASONE FUROATE AND VILANTEROL TRIFENATATE 1 PUFF: 200; 25 POWDER RESPIRATORY (INHALATION) at 08:04

## 2023-10-24 RX ADMIN — INSULIN ASPART 4 UNITS: 100 INJECTION, SOLUTION INTRAVENOUS; SUBCUTANEOUS at 14:31

## 2023-10-24 RX ADMIN — LOSARTAN POTASSIUM 100 MG: 100 TABLET, FILM COATED ORAL at 08:58

## 2023-10-24 RX ADMIN — ASPIRIN 81 MG CHEWABLE TABLET 81 MG: 81 TABLET CHEWABLE at 08:59

## 2023-10-24 RX ADMIN — METFORMIN HYDROCHLORIDE 500 MG: 500 TABLET, EXTENDED RELEASE ORAL at 18:47

## 2023-10-24 RX ADMIN — GLIPIZIDE 10 MG: 10 TABLET, EXTENDED RELEASE ORAL at 08:58

## 2023-10-24 RX ADMIN — FUROSEMIDE 20 MG: 20 TABLET ORAL at 08:59

## 2023-10-24 RX ADMIN — AMLODIPINE BESYLATE 2.5 MG: 2.5 TABLET ORAL at 08:59

## 2023-10-24 RX ADMIN — METOPROLOL SUCCINATE 50 MG: 50 TABLET, EXTENDED RELEASE ORAL at 08:59

## 2023-10-24 ASSESSMENT — ACTIVITIES OF DAILY LIVING (ADL)
ADLS_ACUITY_SCORE: 28
ADLS_ACUITY_SCORE: 30
ADLS_ACUITY_SCORE: 28
ADLS_ACUITY_SCORE: 36
ADLS_ACUITY_SCORE: 28
ADLS_ACUITY_SCORE: 36
ADLS_ACUITY_SCORE: 28
ADLS_ACUITY_SCORE: 28
ADLS_ACUITY_SCORE: 36
ADLS_ACUITY_SCORE: 28

## 2023-10-24 NOTE — PROVIDER NOTIFICATION
Paged cross cover, tele called to report pt having new episodes of junctional rhythm periodically

## 2023-10-24 NOTE — PLAN OF CARE
Goal Outcome Evaluation:      Plan of Care Reviewed With: patient        Vitals: /69 (BP Location: Right arm)   Pulse 69   Temp 97.7  F (36.5  C) (Oral)   Resp 20   Ht 1.524 m (5')   Wt 73.8 kg (162 lb 11.2 oz)   SpO2 95%   BMI 31.78 kg/m    Cardiac: hypertensive   Telemetry: SR, occasional bigemini PVCs  Respiratory: WNL - weaned O2 today   Neuro: A/Ox4 - Mano speaking - son to translate   GI/: WNL  Skin: WNL  LDAs: peripheral - SL  Diet: Mod carb  Activity: 1 assist, GB/walker   Pain: denies   Plan: monitor O2, encourage activity, ACHS checks

## 2023-10-24 NOTE — PLAN OF CARE
Goal Outcome Evaluation:    Pertinent assessments: Admit to unit, family at bedside translating. PT Mano speaking, unable to find on  services. Denies pain or nausea. On 2L NC. 's, sliding scale given. Ate well, up to bathroom with SBA walker. Purewick in place at night.     Major Shift Events: NA    Treatment Plan: Pain and symptom management. Wean O2.    Bedside Nurse: Rosana Tran RN

## 2023-10-24 NOTE — PROGRESS NOTES
[facility]    Hospitalist Progress Note    Date of Service (when I saw the patient): 10/24/2023  Provider:  Mars Ribera MD   Text Page  7am - 6PM       Assessment & Plan   Pilo Quintana is a 73 year old female admitted on 10/23/2023 with history of dementia, HTN, type II diabetes mellitus, CVA, cardiomegaly, and asthma who presents to the ED with shortness of breath and hypoxia. History is given by the patient's son, Balwinder, as she is non-English speaking. Son reports that she has been short of breath the last few days and he has been checking her O2 sats the last few mornings, finding them to be in the high 80s and low 90s. He then noticed that the patient was wheezing after she woke up this morning. He checked her O2 sats and found it to be 71% at this time. En route to the ED her O2 sats were fluctuating around 84-85%. She has been using her inhaler without significant relief. He also says she developed a cough yesterday, which he gave her Robitussin for. She has been complaining of chest pain as well, though her son says her complaints are not always accurate due to her history of dementia. Son denies fevers, headache, nausea, vomiting, or constipation. The patient does live with her son.      Workup in the ED revealed elevated BP as well as hypoxia, with SPO2 values in the 70s and mid 80s concerning for reactive airway disease/asthma exacerbation. Workup was negative for influenza/RSV/COVID. CBC, BMP, troponin, and BNP were also unremarkable. VBG showed slight respiratory acidosis with metabolic compensation with a pH of 7.30 and PCO2 of 73 . Echo, chest x-ray, and EKG unremarkable. Patient was given DuoNeb therapy x3, Solu-Medrol, and magnesium and placed on supplemental oxygen weaned down to 3 L per oxy mask.     Acute mixed hypoxic/hypercarbic respiratory failure  Asthma exacerbation with hypoxia   -- Patient does not have formal diagnosis of asthma, but has been seen by PCP for  wheezing. She typically uses Flovent BID, albuterol nebulizer a couple of times per day, and albuterol inhaler as needed. This morning, the wheezing and shortness of breath was worse than her baseline, with no improvement with use of her inhaler. Per son, patient's family history is positive for asthma in one daughter.   -- She does have clinical signs of asthma; recommend PFTs outpatient at some point to establish formal diagnosis.  -- On presentation to the ED SP02 values were in the 70s and mid 80s,  improved to 99%, weaned off, now RA  -- DuoNeb q4hr  -- Albuterol nebulizer q2hr PRN  -- Transitioning to prednisone taper.      Type II diabetes mellitus with albuminuria  -- Appears to be well controlled, patient follows with her PCP who she saw earlier this month  -- Most recent A1C on 08/10/2023 = 8.1.  Correctional scale coverage.   -- Continue glipizide XL 10mg daily  -- Continue metformin XR 500mg twice daily   -- Repeat CBC and BMP in the morning      HTN  -- Continue Losartan 100mg daily  -- Continue metoprolol 50mg daily  -- Continue furosemide 20mg daily  -- Continue amlodipine 2.5mg daily      Hyperlipidemia  -- Continue atorvastatin      Hx of CVA with residual right-sided weakness  Dementia  Language barrier (son translates)  -- Hospitalized on 05/11/2023 following intracranial hemorrhage of the ventral casimiro. During workup, there was an incidental finding of a 1x2mm right MCA bifurcation aneurysm. Patient followed up with neurology for this on 05/23/2023 and plans to repeat CTA head in 1 year.   -- Continue aspirin 81mg daily        Clinically Significant Risk Factors Present on Admission []Expand by Default          # Hypomagnesemia: Lowest Mg = 1.6 mg/dL in last 2 days, will replace as needed     # Drug Induced Platelet Defect: home medication list includes an antiplatelet medication   # Hypertension: Noted on problem list   # Acute Respiratory Failure: Documented O2 saturation < 91%.  Continue  supplemental oxygen as needed    # DMII: A1C = 9.0 % (Ref range: <5.7 %) within past 6 months                Diet: Moderate Consistent Carb (60 g CHO per Meal) Diet     Morrison Catheter: Not present  Lines: None     Cardiac Monitoring: ACTIVE order. Indication: acute respiratory failure.       DVT Prophylaxis: Pneumatic Compression Devices  Code Status: Full Code    Disposition: Expected discharge in 24 - 48 hours once stability allows.    Interval History   Since last night patient is having improvement, oxygen the visit is decreased is not significantly, at the moment of my visit that she is going out of and breathing room air for a trial.  We will continue close follow-up, she may resume oxygen if she fails.  She in general feels better, she is not an English speaker but understand and can answer with monosyllables and short phrases.    -Data reviewed today: I reviewed all new labs and imaging results over the last 24 hours. I personally reviewed the EKG tracing showing NSR .    Physical Exam   Temp: 97.7  F (36.5  C) Temp src: Oral BP: 138/69 Pulse: 69   Resp: 20 SpO2: 95 % O2 Device: None (Room air) Oxygen Delivery: 2 LPM  Vitals:    10/23/23 1425 10/24/23 0623   Weight: 73 kg (160 lb 15 oz) 73.8 kg (162 lb 11.2 oz)     Vital Signs with Ranges  Temp:  [97.7  F (36.5  C)-98  F (36.7  C)] 97.7  F (36.5  C)  Pulse:  [54-73] 69  Resp:  [18-20] 20  BP: (138-159)/(65-76) 138/69  SpO2:  [90 %-98 %] 95 %  I/O last 3 completed shifts:  In: -   Out: 250 [Urine:250]    GEN:  Alert, cooperative, appears comfortable, NAD.  HEENT:  Normocephalic/atraumatic, no scleral icterus, no nasal discharge, mouth moist.  CV:  Regular rate and rhythm, no murmur or JVD.  S1 + S2 noted, no S3 or S4.  LUNGS:  Clear to auscultation bilaterally without rales/rhonchi/wheezing/retractions.  Symmetric chest rise on inhalation noted.  ABD:  Active bowel sounds, soft, non-tender/non-distended.  No rebound/guarding/rigidity.  EXT:  No edema or  cyanosis.  No joint synovitis noted.  SKIN:  Dry to touch, no exanthems noted in the visualized areas.       Medications      amLODIPine  2.5 mg Oral Daily    aspirin  81 mg Oral Daily    atorvastatin  40 mg Oral QPM    fluticasone-vilanterol  1 puff Inhalation Daily    furosemide  20 mg Oral Daily    glipiZIDE  10 mg Oral Daily    insulin aspart  1-7 Units Subcutaneous TID AC    insulin aspart  1-5 Units Subcutaneous At Bedtime    ipratropium - albuterol 0.5 mg/2.5 mg/3 mL  3 mL Nebulization Q4H While awake    losartan  100 mg Oral Daily    metFORMIN  500 mg Oral BID w/meals    methylPREDNISolone  40 mg Intravenous Q8H    Followed by    [START ON 10/25/2023] predniSONE  40 mg Oral Daily with breakfast    metoprolol succinate ER  50 mg Oral Daily       Data   Recent Labs   Lab 10/24/23  1431 10/24/23  0900 10/24/23  0801 10/23/23  1139 10/23/23  0806   WBC  --   --  5.3  --  7.2   HGB  --   --  13.2  --  13.8   MCV  --   --  93  --  95   PLT  --   --  258  --  268   NA  --   --  143  --  144   POTASSIUM  --   --  4.2  --  4.1   CHLORIDE  --   --  102  --  102   CO2  --   --  30*  --  32*   BUN  --   --  31.0*  --  16.9   CR  --   --  0.61  --  0.60   ANIONGAP  --   --  11  --  10   GLENIS  --   --  9.8  --  10.0   * 258* 273*   < > 115*    < > = values in this interval not displayed.       No results found for this or any previous visit (from the past 24 hour(s)).      Securely message with the Vocera Web Console (learn more here)  Text page via [employer] Paging/Directory        Disclaimer: This note consists of symbols derived from keyboarding, dictation and/or voice recognition software. As a result, there may be errors in the script that have gone undetected. Please consider this when interpreting information found in this chart.

## 2023-10-25 ENCOUNTER — APPOINTMENT (OUTPATIENT)
Dept: CT IMAGING | Facility: CLINIC | Age: 73
DRG: 190 | End: 2023-10-25
Attending: INTERNAL MEDICINE
Payer: COMMERCIAL

## 2023-10-25 LAB
BASE EXCESS BLDV CALC-SCNC: 3.1 MMOL/L (ref -7.7–1.9)
GLUCOSE BLDC GLUCOMTR-MCNC: 130 MG/DL (ref 70–99)
GLUCOSE BLDC GLUCOMTR-MCNC: 191 MG/DL (ref 70–99)
GLUCOSE BLDC GLUCOMTR-MCNC: 192 MG/DL (ref 70–99)
GLUCOSE BLDC GLUCOMTR-MCNC: 260 MG/DL (ref 70–99)
GLUCOSE BLDC GLUCOMTR-MCNC: 344 MG/DL (ref 70–99)
HCO3 BLDV-SCNC: 32 MMOL/L (ref 21–28)
MAGNESIUM SERPL-MCNC: 2.2 MG/DL (ref 1.7–2.3)
O2/TOTAL GAS SETTING VFR VENT: 2 %
PCO2 BLDV: 68 MM HG (ref 40–50)
PH BLDV: 7.28 [PH] (ref 7.32–7.43)
PO2 BLDV: 22 MM HG (ref 25–47)
POTASSIUM SERPL-SCNC: 4.8 MMOL/L (ref 3.4–5.3)

## 2023-10-25 PROCEDURE — 84132 ASSAY OF SERUM POTASSIUM: CPT | Performed by: HOSPITALIST

## 2023-10-25 PROCEDURE — 250N000013 HC RX MED GY IP 250 OP 250 PS 637: Performed by: NURSE PRACTITIONER

## 2023-10-25 PROCEDURE — 999N000157 HC STATISTIC RCP TIME EA 10 MIN

## 2023-10-25 PROCEDURE — 94640 AIRWAY INHALATION TREATMENT: CPT

## 2023-10-25 PROCEDURE — 83735 ASSAY OF MAGNESIUM: CPT | Performed by: HOSPITALIST

## 2023-10-25 PROCEDURE — 250N000012 HC RX MED GY IP 250 OP 636 PS 637: Performed by: INTERNAL MEDICINE

## 2023-10-25 PROCEDURE — 250N000011 HC RX IP 250 OP 636: Performed by: INTERNAL MEDICINE

## 2023-10-25 PROCEDURE — 82803 BLOOD GASES ANY COMBINATION: CPT | Performed by: INTERNAL MEDICINE

## 2023-10-25 PROCEDURE — 250N000011 HC RX IP 250 OP 636: Mod: JZ | Performed by: INTERNAL MEDICINE

## 2023-10-25 PROCEDURE — 71275 CT ANGIOGRAPHY CHEST: CPT

## 2023-10-25 PROCEDURE — 250N000012 HC RX MED GY IP 250 OP 636 PS 637: Performed by: NURSE PRACTITIONER

## 2023-10-25 PROCEDURE — 250N000009 HC RX 250: Performed by: INTERNAL MEDICINE

## 2023-10-25 PROCEDURE — 120N000001 HC R&B MED SURG/OB

## 2023-10-25 PROCEDURE — 36415 COLL VENOUS BLD VENIPUNCTURE: CPT | Performed by: HOSPITALIST

## 2023-10-25 PROCEDURE — 36415 COLL VENOUS BLD VENIPUNCTURE: CPT | Performed by: INTERNAL MEDICINE

## 2023-10-25 PROCEDURE — 94640 AIRWAY INHALATION TREATMENT: CPT | Mod: 76

## 2023-10-25 PROCEDURE — 250N000009 HC RX 250: Performed by: NURSE PRACTITIONER

## 2023-10-25 RX ORDER — FUROSEMIDE 10 MG/ML
40 INJECTION INTRAMUSCULAR; INTRAVENOUS ONCE
Status: COMPLETED | OUTPATIENT
Start: 2023-10-25 | End: 2023-10-25

## 2023-10-25 RX ORDER — IOPAMIDOL 755 MG/ML
500 INJECTION, SOLUTION INTRAVASCULAR ONCE
Status: COMPLETED | OUTPATIENT
Start: 2023-10-25 | End: 2023-10-25

## 2023-10-25 RX ADMIN — IPRATROPIUM BROMIDE AND ALBUTEROL SULFATE 3 ML: .5; 3 SOLUTION RESPIRATORY (INHALATION) at 19:33

## 2023-10-25 RX ADMIN — ATORVASTATIN CALCIUM 40 MG: 40 TABLET, FILM COATED ORAL at 21:04

## 2023-10-25 RX ADMIN — METFORMIN HYDROCHLORIDE 500 MG: 500 TABLET, EXTENDED RELEASE ORAL at 17:19

## 2023-10-25 RX ADMIN — FUROSEMIDE 40 MG: 10 INJECTION, SOLUTION INTRAMUSCULAR; INTRAVENOUS at 17:15

## 2023-10-25 RX ADMIN — INSULIN ASPART 5 UNITS: 100 INJECTION, SOLUTION INTRAVENOUS; SUBCUTANEOUS at 18:04

## 2023-10-25 RX ADMIN — INSULIN GLARGINE 10 UNITS: 100 INJECTION, SOLUTION SUBCUTANEOUS at 22:32

## 2023-10-25 RX ADMIN — IPRATROPIUM BROMIDE AND ALBUTEROL SULFATE 3 ML: .5; 3 SOLUTION RESPIRATORY (INHALATION) at 15:56

## 2023-10-25 RX ADMIN — PREDNISONE 40 MG: 20 TABLET ORAL at 09:12

## 2023-10-25 RX ADMIN — GLIPIZIDE 10 MG: 10 TABLET, EXTENDED RELEASE ORAL at 09:12

## 2023-10-25 RX ADMIN — SODIUM CHLORIDE 88 ML: 9 INJECTION, SOLUTION INTRAVENOUS at 11:19

## 2023-10-25 RX ADMIN — METFORMIN HYDROCHLORIDE 500 MG: 500 TABLET, EXTENDED RELEASE ORAL at 09:12

## 2023-10-25 RX ADMIN — IOPAMIDOL 67 ML: 755 INJECTION, SOLUTION INTRAVENOUS at 11:19

## 2023-10-25 RX ADMIN — IPRATROPIUM BROMIDE AND ALBUTEROL SULFATE 3 ML: .5; 3 SOLUTION RESPIRATORY (INHALATION) at 11:43

## 2023-10-25 RX ADMIN — ASPIRIN 81 MG CHEWABLE TABLET 81 MG: 81 TABLET CHEWABLE at 09:12

## 2023-10-25 RX ADMIN — METOPROLOL SUCCINATE 50 MG: 50 TABLET, EXTENDED RELEASE ORAL at 09:12

## 2023-10-25 RX ADMIN — FUROSEMIDE 20 MG: 20 TABLET ORAL at 09:12

## 2023-10-25 RX ADMIN — LOSARTAN POTASSIUM 100 MG: 100 TABLET, FILM COATED ORAL at 09:12

## 2023-10-25 RX ADMIN — AMLODIPINE BESYLATE 2.5 MG: 2.5 TABLET ORAL at 09:12

## 2023-10-25 RX ADMIN — INSULIN ASPART 2 UNITS: 100 INJECTION, SOLUTION INTRAVENOUS; SUBCUTANEOUS at 13:13

## 2023-10-25 RX ADMIN — FLUTICASONE FUROATE AND VILANTEROL TRIFENATATE 1 PUFF: 200; 25 POWDER RESPIRATORY (INHALATION) at 07:41

## 2023-10-25 RX ADMIN — IPRATROPIUM BROMIDE AND ALBUTEROL SULFATE 3 ML: .5; 3 SOLUTION RESPIRATORY (INHALATION) at 07:41

## 2023-10-25 ASSESSMENT — ACTIVITIES OF DAILY LIVING (ADL)
ADLS_ACUITY_SCORE: 33
ADLS_ACUITY_SCORE: 36
ADLS_ACUITY_SCORE: 33
ADLS_ACUITY_SCORE: 36
ADLS_ACUITY_SCORE: 33
ADLS_ACUITY_SCORE: 35
ADLS_ACUITY_SCORE: 36
ADLS_ACUITY_SCORE: 36
ADLS_ACUITY_SCORE: 35
ADLS_ACUITY_SCORE: 36

## 2023-10-25 NOTE — PLAN OF CARE
Goal Outcome Evaluation:      Plan of Care Reviewed With: patient, child    Overall Patient Progress: no changeOverall Patient Progress: no change     Pertinent assessments: Assumed cares [phone]. VSS on RA with cont. Pulse ox until bedtime, desatting to upper 80s in sleep, placed on 2L NC. Pt alert, minimal English, Mano speaking. Son at bedside to interpret.Pt is Alert to self, knows she is in hospital but confused about details and time.  Denies SOB, pain. PIV saline locked. Up A1 with walker/gb to BR x1. Incontient x1. Purewick in place for overnight. Mod carb diet, well tolerated with good appetite. /265. Tele SR but tele tech called with new junctional rhythm episodes, cross cover paged, no new orders.     Major Shift Events: junction rhythm    Treatment Plan: Symptom management. Wean O2. Encourage activity. Monitor BG.    Bedside Nurse: REGLA GRAVES RN

## 2023-10-25 NOTE — PLAN OF CARE
Goal Outcome Evaluation:  Plan of Care Reviewed With: patient, child    Pertinent assessments: Alert but disoriented to time & place. Forgetful per son. Had 10 sec of PSVT HR of 180 per tele tech. MD notified.  Chest CT ordered. O2 titrated down to 1L. sats 96-97%. SOB. Dyspenia on exertion. LS diminished.  Denies  pain or discomfort.  On Mod carb diet & tolerating well. Had BM x1 this shift. BG was 130 and 191. Coverage given.  Lantus added at HS. Assist x2 with gait belt & walker. PIV is SL. Tele D/C'd.  Son at bedside   Major Shift Events: Chest CT scan, Tele D/C'd & Lantus at HS   Treatment Plan: Wean oxygen as able, Prednisone, Encourage activity, Blood glucose monitoring

## 2023-10-25 NOTE — PROGRESS NOTES
[facility] [place]  Hospitalist Progress Note  Armando Osorio M.D., M.B.A.   10/25/2023    Reason for Stay/active problem list    Acute hypoxic and hypercapnic respiratory failure  Suspect chronic respiratory failure due to pulmonary hypertension  Shortness of breath and hypoxia secondary to suspected asthma exacerbation         Assessment and Plan:        Summary of Stay: Pilo Quintana is a 73 year old female admitted on 10/23/2023 with history of dementia, HTN, type II diabetes mellitus, CVA, cardiomegaly, and asthma who presents to the ED with shortness of breath and hypoxia. Son reports that she has been short of breath the last few days and he has been checking her O2 sats the last few mornings, finding them to be in the high 80s and low 90s. He then noticed that the patient was wheezing after she woke up this morning. He checked her O2 sats and found it to be 71% at this time. En route to the ED her O2 sats were fluctuating around 84-85%. She has been using her inhaler without significant relief.      Workup in the ED revealed elevated BP as well as hypoxia, with SPO2 values in the 70s and mid 80s concerning for reactive airway disease/asthma exacerbation. Workup was negative for influenza/RSV/COVID. CBC, BMP, troponin, and BNP were also unremarkable. VBG showed slight respiratory acidosis with metabolic compensation with a pH of 7.30 and PCO2 of 73 . Echo, chest x-ray, and EKG unremarkable. Patient was given DuoNeb therapy x3, Solu-Medrol, and magnesium and placed on supplemental oxygen weaned down to 3 L per oxy mask.      Problem List with Assessment and Plan:    Acute mixed hypoxic/hypercarbic respiratory failure  Asthma exacerbation with hypoxia   -- Patient does not have formal diagnosis of asthma, but has been seen by PCP for wheezing. She typically uses Flovent BID, albuterol nebulizer a couple of times per day, and albuterol inhaler as needed.   -- She does have clinical signs of  asthma; recommend PFTs outpatient at some point to establish formal diagnosis.  -- On presentation to the ED SP02 values were in the 70s and mid 80s,  improved to 99%,   -- Patient was admitted and treated with scheduled DuoNeb, as needed albuterol nebulizer, supplemental oxygen and steroid.  -- Currently she is requiring 1 L of supplemental oxygen.  CT scan of the chest showed no evidence of acute infiltrate but evidence of pulmonary hypertension.  Suspect her presentation is combination of asthma and pulm hypertension.  Continue supportive care, supplemental oxygen, steroid, monitor vitals.       Type II diabetes mellitus with albuminuria  -- Appears to be well controlled, patient follows with her PCP who she saw earlier this month  -- Most recent A1C on 08/10/2023 = 8.1.    -- Continued  glipizide XL 10mg daily  -- Continued metformin XR 500mg twice daily   -- Treated with sliding scale insulin.  -- Currently blood glucose remained uncontrolled.  Will initiate 10 units of Lantus at bedside, monitor blood glucose.  Continue above meds       HTN  -- Continued  Losartan 100mg daily  -- Continued metoprolol 50mg daily  -- Continued furosemide 20mg daily  -- Continued amlodipine 2.5mg daily      Hyperlipidemia  -- Continued atorvastatin      Hx of CVA with residual right-sided weakness  Dementia  Language barrier (son translates)  -- Hospitalized on 05/11/2023 following intracranial hemorrhage of the ventral casimiro. During workup, there was an incidental finding of a 1x2mm right MCA bifurcation aneurysm. Patient followed up with neurology for this on 05/23/2023 and plans to repeat CTA head in 1 year.   -- Continued aspirin 81mg daily      Moderate pulm hypertension: Continued supplemental oxygen, home medications     Clinically Significant Risk Factors Present on Admission         # Hypomagnesemia: Lowest Mg = 1.6 mg/dL in last 2 days, will replace as needed     # Drug Induced Platelet Defect: home medication list  includes an antiplatelet medication   # Hypertension: Noted on problem list   # Acute Respiratory Failure: Documented O2 saturation < 91%.  Continue supplemental oxygen as needed    # DMII: A1C = 9.0 % (Ref range: <5.7 %) within past 6 months                  Plan for today:  Chest CT obtained  Supplemental oxygen, wean off as able      VTE Prophylaxis: Pneumatic Compression Devices  Code Status: Full Code  Diet: Moderate Consistent Carb (60 g CHO per Meal) Diet    Morrison Catheter: Not present    Family updated today: Yes      Disposition: May discharge in the next 2 days pending improvement and resolution of hypoxia        Interval History (Subjective):        Patient is seen and examined by me today and medical record reviewed.Overnight events noted and care discussed with nursing staff.  Patient care assumed by me this morning.  Medical records reviewed and CT scan of the chest obtained due to increased shortness of breath and concern for underlying pulmonary infection or PE.  No fever or chills.  She is on 1 L of supplemental oxygen.                  Physical Exam:        Last Vital Signs:  BP (!) 143/85 (BP Location: Left arm)   Pulse 63   Temp 97.7  F (36.5  C) (Oral)   Resp 16   Ht 1.524 m (5')   Wt 73.8 kg (162 lb 11.2 oz)   SpO2 92%   BMI 31.78 kg/m      I/O last 3 completed shifts:  In: 720 [P.O.:720]  Out: -     Wt Readings from Last 5 Encounters:   10/24/23 73.8 kg (162 lb 11.2 oz)   09/18/23 72.6 kg (160 lb)   05/23/23 74.3 kg (163 lb 14.4 oz)   05/22/23 74.4 kg (164 lb)   05/12/23 74.8 kg (164 lb 14.5 oz)        Constitutional: Awake, alert, cooperative, no apparent distress     Respiratory: Clear to auscultation bilaterally, no crackles or wheezing   Cardiovascular: Regular rate and rhythm, normal S1 and S2, and no murmur noted   Abdomen: Normal bowel sounds, soft, non-distended, non-tender   Skin: No new rashes, no cyanosis, dry to touch   Neuro: Alert with  no new focal weakness   Extremities:  "No edema   Other(s):        All other systems: Negative          Medications:        All current medications were reviewed with changes reflected in problem list.         Data:      All new lab and imaging data was reviewed.      Data reviewed today: I reviewed all new labs and imaging results over the last 24 hours. I personally reviewed       Recent Labs   Lab 10/24/23  0801 10/23/23  0806   WBC 5.3 7.2   HGB 13.2 13.8   HCT 41.8 44.3   MCV 93 95    268     No results for input(s): \"CULT\" in the last 168 hours.  Recent Labs   Lab 10/25/23  1157 10/25/23  0803 10/25/23  0737 10/25/23  0216 10/24/23  2115 10/24/23  1932 10/24/23  1431 10/24/23  1058 10/24/23  0900 10/24/23  0801 10/23/23  1139 10/23/23  0806   NA  --   --   --   --   --   --   --   --   --  143  --  144   POTASSIUM  --   --  4.8  --   --  4.2  --   --   --  4.2  --  4.1   CHLORIDE  --   --   --   --   --   --   --   --   --  102  --  102   CO2  --   --   --   --   --   --   --   --   --  30*  --  32*   ANIONGAP  --   --   --   --   --   --   --   --   --  11  --  10   * 130*  --  192*   < >  --    < >  --    < > 273*   < > 115*   BUN  --   --   --   --   --   --   --   --   --  31.0*  --  16.9   CR  --   --   --   --   --   --   --   --   --  0.61  --  0.60   GFRESTIMATED  --   --   --   --   --   --   --   --   --  >90  --  >90   GLENIS  --   --   --   --   --   --   --   --   --  9.8  --  10.0   MAG  --   --  2.2  --   --  2.0  --  2.1  --   --   --  1.6*    < > = values in this interval not displayed.       Recent Labs   Lab 10/25/23  1157 10/25/23  0803 10/25/23  0216 10/24/23  2115 10/24/23  1845   * 130* 192* 265* 272*       No results for input(s): \"INR\" in the last 168 hours.      No results for input(s): \"TROPONIN\", \"TROPI\", \"TROPR\" in the last 168 hours.    Invalid input(s): \"TROP\", \"TROPONINIES\"    Recent Results (from the past 48 hour(s))   CT Chest Pulmonary Embolism w Contrast    Narrative    CT CHEST PULMONARY " EMBOLISM WITH CONTRAST 10/25/2023 11:33 AM    CLINICAL HISTORY: Shortness of breath. Female sex; Not pregnant; No  prior imaging in the last 24 hours; Pulmonary Embolism Rule-Out  Criteria (PERC) score > 0; Revised [place] Score (RGS) not >= 11; No  D-dimer result available; D-dimer not ordered.    TECHNIQUE: CT angiogram chest during arterial phase injection IV  contrast. 2D and 3D MIP reconstructions were performed by the CT  technologist. Dose reduction techniques were used.   CONTRAST: 67mL Isovue-370    COMPARISON: 9/12/2023    FINDINGS:  ANGIOGRAM CHEST: Central pulmonary arteries are enlarged, similar to  previous. No evidence of pulmonary embolus. Thoracic aorta is negative  for dissection.    LUNGS AND PLEURA: Discoid atelectasis in the left lung base. Lungs are  otherwise clear. No pleural effusion.    MEDIASTINUM/AXILLAE: Generalized cardiomegaly. Moderate coronary  artery calcification. No lymphadenopathy.    UPPER ABDOMEN: Normal.    MUSCULOSKELETAL: Normal.      Impression    IMPRESSION:  1.  No evidence of pulmonary embolus.  2.  Enlarged central pulmonary arteries are unchanged from previous  and may represent pulmonary arterial hypertension.        COVID Status:  COVID-19 PCR Results          1/26/2023    14:22 4/4/2023    12:02 4/22/2023    20:05 9/12/2023    12:05 10/23/2023    08:04   COVID-19 PCR Results   SARS CoV2 PCR Positive  Negative  Negative  Negative  Negative      COVID-19 Antibody Results, Testing for Immunity           No data to display                 Disclaimer: This note consists of symbols derived from keyboarding, dictation and/or voice recognition software. As a result, there may be errors in the script that have gone undetected. Please consider this when interpreting information found in this chart.

## 2023-10-25 NOTE — PLAN OF CARE
End of Shift Summary  For vital signs and complete assessments, please see documentation flowsheets.     Pertinent assessments: Afebrile. Pt requiring 2L oxygen, sating in mid 90s. LS diminished. BS hypoactive. Pt denies pain and nausea. Mod CHO diet, BG was 192. Ax1 with Belt and Walker. PIV - SL. Tele monitoring - SB, HR 50s. Confused but alert, alarm on bed for safety. Slept on/off.    Major Shift Events: none     Treatment Plan: Wean oxygen as able, Prednisone, Encourage activity, Hopeful discharge home soon.

## 2023-10-26 LAB
ALLEN'S TEST: YES
BASE EXCESS BLDA CALC-SCNC: 5.6 MMOL/L (ref -9–1.8)
GLUCOSE BLDC GLUCOMTR-MCNC: 100 MG/DL (ref 70–99)
GLUCOSE BLDC GLUCOMTR-MCNC: 113 MG/DL (ref 70–99)
GLUCOSE BLDC GLUCOMTR-MCNC: 114 MG/DL (ref 70–99)
GLUCOSE BLDC GLUCOMTR-MCNC: 141 MG/DL (ref 70–99)
GLUCOSE BLDC GLUCOMTR-MCNC: 71 MG/DL (ref 70–99)
GLUCOSE BLDC GLUCOMTR-MCNC: 80 MG/DL (ref 70–99)
HCO3 BLD-SCNC: 33 MMOL/L (ref 21–28)
MAGNESIUM SERPL-MCNC: 1.9 MG/DL (ref 1.7–2.3)
O2/TOTAL GAS SETTING VFR VENT: 25 %
OXYHGB MFR BLD: 92 % (ref 92–100)
PCO2 BLD: 58 MM HG (ref 35–45)
PH BLD: 7.36 [PH] (ref 7.35–7.45)
PO2 BLD: 66 MM HG (ref 80–105)
POTASSIUM SERPL-SCNC: 4.2 MMOL/L (ref 3.4–5.3)

## 2023-10-26 PROCEDURE — 999N000156 HC STATISTIC RCP CONSULT EA 30 MIN

## 2023-10-26 PROCEDURE — 250N000009 HC RX 250: Performed by: INTERNAL MEDICINE

## 2023-10-26 PROCEDURE — 250N000013 HC RX MED GY IP 250 OP 250 PS 637: Performed by: NURSE PRACTITIONER

## 2023-10-26 PROCEDURE — 36600 WITHDRAWAL OF ARTERIAL BLOOD: CPT

## 2023-10-26 PROCEDURE — 82805 BLOOD GASES W/O2 SATURATION: CPT | Performed by: INTERNAL MEDICINE

## 2023-10-26 PROCEDURE — 84132 ASSAY OF SERUM POTASSIUM: CPT | Performed by: INTERNAL MEDICINE

## 2023-10-26 PROCEDURE — 36415 COLL VENOUS BLD VENIPUNCTURE: CPT | Performed by: INTERNAL MEDICINE

## 2023-10-26 PROCEDURE — 250N000012 HC RX MED GY IP 250 OP 636 PS 637: Performed by: NURSE PRACTITIONER

## 2023-10-26 PROCEDURE — 94640 AIRWAY INHALATION TREATMENT: CPT | Mod: 76

## 2023-10-26 PROCEDURE — 999N000157 HC STATISTIC RCP TIME EA 10 MIN

## 2023-10-26 PROCEDURE — 94660 CPAP INITIATION&MGMT: CPT

## 2023-10-26 PROCEDURE — 94640 AIRWAY INHALATION TREATMENT: CPT

## 2023-10-26 PROCEDURE — 120N000001 HC R&B MED SURG/OB

## 2023-10-26 PROCEDURE — 250N000011 HC RX IP 250 OP 636: Mod: JZ | Performed by: INTERNAL MEDICINE

## 2023-10-26 PROCEDURE — 83735 ASSAY OF MAGNESIUM: CPT | Performed by: INTERNAL MEDICINE

## 2023-10-26 PROCEDURE — 99233 SBSQ HOSP IP/OBS HIGH 50: CPT | Performed by: INTERNAL MEDICINE

## 2023-10-26 RX ORDER — FUROSEMIDE 10 MG/ML
20 INJECTION INTRAMUSCULAR; INTRAVENOUS ONCE
Status: COMPLETED | OUTPATIENT
Start: 2023-10-26 | End: 2023-10-26

## 2023-10-26 RX ORDER — FUROSEMIDE 40 MG
40 TABLET ORAL DAILY
Status: DISCONTINUED | OUTPATIENT
Start: 2023-10-27 | End: 2023-10-29 | Stop reason: HOSPADM

## 2023-10-26 RX ORDER — CARBOXYMETHYLCELLULOSE SODIUM 5 MG/ML
1 SOLUTION/ DROPS OPHTHALMIC
Status: DISCONTINUED | OUTPATIENT
Start: 2023-10-26 | End: 2023-10-29 | Stop reason: HOSPADM

## 2023-10-26 RX ORDER — IPRATROPIUM BROMIDE AND ALBUTEROL SULFATE 2.5; .5 MG/3ML; MG/3ML
3 SOLUTION RESPIRATORY (INHALATION)
Status: DISCONTINUED | OUTPATIENT
Start: 2023-10-26 | End: 2023-10-29 | Stop reason: HOSPADM

## 2023-10-26 RX ADMIN — IPRATROPIUM BROMIDE AND ALBUTEROL SULFATE 3 ML: .5; 3 SOLUTION RESPIRATORY (INHALATION) at 19:39

## 2023-10-26 RX ADMIN — IPRATROPIUM BROMIDE AND ALBUTEROL SULFATE 3 ML: .5; 3 SOLUTION RESPIRATORY (INHALATION) at 07:25

## 2023-10-26 RX ADMIN — IPRATROPIUM BROMIDE AND ALBUTEROL SULFATE 3 ML: .5; 3 SOLUTION RESPIRATORY (INHALATION) at 11:26

## 2023-10-26 RX ADMIN — FUROSEMIDE 20 MG: 20 TABLET ORAL at 08:46

## 2023-10-26 RX ADMIN — FLUTICASONE FUROATE AND VILANTEROL TRIFENATATE 1 PUFF: 200; 25 POWDER RESPIRATORY (INHALATION) at 07:25

## 2023-10-26 RX ADMIN — ASPIRIN 81 MG CHEWABLE TABLET 81 MG: 81 TABLET CHEWABLE at 08:46

## 2023-10-26 RX ADMIN — GLIPIZIDE 10 MG: 10 TABLET, EXTENDED RELEASE ORAL at 08:46

## 2023-10-26 RX ADMIN — IPRATROPIUM BROMIDE AND ALBUTEROL SULFATE 3 ML: .5; 3 SOLUTION RESPIRATORY (INHALATION) at 16:34

## 2023-10-26 RX ADMIN — PREDNISONE 40 MG: 20 TABLET ORAL at 08:46

## 2023-10-26 RX ADMIN — LOSARTAN POTASSIUM 100 MG: 100 TABLET, FILM COATED ORAL at 08:46

## 2023-10-26 RX ADMIN — METFORMIN HYDROCHLORIDE 500 MG: 500 TABLET, EXTENDED RELEASE ORAL at 08:46

## 2023-10-26 RX ADMIN — METOPROLOL SUCCINATE 50 MG: 50 TABLET, EXTENDED RELEASE ORAL at 08:46

## 2023-10-26 RX ADMIN — FUROSEMIDE 20 MG: 10 INJECTION, SOLUTION INTRAMUSCULAR; INTRAVENOUS at 12:22

## 2023-10-26 RX ADMIN — AMLODIPINE BESYLATE 2.5 MG: 2.5 TABLET ORAL at 08:46

## 2023-10-26 ASSESSMENT — ACTIVITIES OF DAILY LIVING (ADL)
ADLS_ACUITY_SCORE: 31
ADLS_ACUITY_SCORE: 31
ADLS_ACUITY_SCORE: 29
ADLS_ACUITY_SCORE: 34
ADLS_ACUITY_SCORE: 36
ADLS_ACUITY_SCORE: 36
ADLS_ACUITY_SCORE: 29
ADLS_ACUITY_SCORE: 31
ADLS_ACUITY_SCORE: 36
ADLS_ACUITY_SCORE: 29
ADLS_ACUITY_SCORE: 36
ADLS_ACUITY_SCORE: 36

## 2023-10-26 NOTE — PROGRESS NOTES
"[facility]AT     Date: 10/26/23  Admission Dx: Possible Asthma  Pulmonary History Question of asthma  Home Nebulizer/MDI Use: MDI and neb as needed  Home Oxygen: None  Acuity Level (RCAT flow sheet): 4  Aerosol Therapy initiated: Duoneb QID      Pulmonary Hygiene initiated: NA      Volume Expansion initiated: IS      Current Oxygen Requirements: 2L NC  Current SpO2: 99%    Re-evaluation date: 10/29/23    Patient Education: Completed      See \"RT Assessments\" flow sheet for patient assessment scoring and Acuity Level Details.     Pipo Worthington, RT on 10/26/2023 at 1:51 AM      "

## 2023-10-26 NOTE — PLAN OF CARE
Goal Outcome Evaluation:  Plan of Care Reviewed With: patient, child    Pertinent assessments:  Alert but disoriented to time & place per son. VSS. On BIBAP. Afebrile. Breathing is shallow. LS diminished with  intermittent wheezes. Denies pain. Placed on NPO. BG were 114 & 100. No coverage needed. Assist x2 with gait belt and walker to [facility]. PIV  is SL . IV lasix given. Blood arterial gas done. External catheter in place.    Major Shift Events:  IV lasix, BiBAP, blood arterial gas, NPO    Treatment Plan: Encourage activity, Prednisone, diuretic, BiBAP, supplemental  oxygen, Discharge TBD.

## 2023-10-26 NOTE — PLAN OF CARE
End of Shift Summary  For vital signs and complete assessments, please see documentation flowsheets.     Pertinent assessments: Afebrile. Pt continues to require 2L oxygen, sating in mid 90s. LS diminished with expiratory wheezes noted. Pt denies pain and nausea. Mod CHO diet, BG was 80, gave juice with improvement. Ax1 with Belt and Walker to BS. PIV - SL Confused, alarm on bed for safety. Slept well.    Major Shift Events: none     Treatment Plan: Encourage activity, Prednisone, Continue to wean oxygen, Discharge TBD.

## 2023-10-26 NOTE — PROGRESS NOTES
Respiratory Therapy Note        ABG was drawn by myself from her right radial artery at 12:54.  Pressure was held until bleeding stopped.  No complications noted.  ABG was drawn on BIPAP 12/5 25%.  SpO2 at time of draw was 94%.    October 26, 2023 12:59 PM  Bret Villa

## 2023-10-26 NOTE — PROGRESS NOTES
[facility]  Hospitalist Progress Note  Armando Osorio M.D., M.B.A.   10/26/2023    Reason for Stay/active problem list    Acute hypoxic and hypercapnic respiratory failure  Suspect chronic respiratory failure due to pulmonary hypertension  Shortness of breath and hypoxia secondary to suspected asthma exacerbation         Assessment and Plan:        Summary of Stay: Pilo Quintana is a 73 year old female admitted on 10/23/2023 with history of dementia, HTN, type II diabetes mellitus, CVA, cardiomegaly, and asthma who presents to the ED with shortness of breath and hypoxia. Son reports that she has been short of breath the last few days and he has been checking her O2 sats the last few mornings, finding them to be in the high 80s and low 90s. He then noticed that the patient was wheezing after she woke up this morning. He checked her O2 sats and found it to be 71% at this time. En route to the ED her O2 sats were fluctuating around 84-85%. She has been using her inhaler without significant relief.      Workup in the ED revealed elevated BP as well as hypoxia, with SPO2 values in the 70s and mid 80s concerning for reactive airway disease/asthma exacerbation. Workup was negative for influenza/RSV/COVID. CBC, BMP, troponin, and BNP were also unremarkable. VBG showed slight respiratory acidosis with metabolic compensation with a pH of 7.30 and PCO2 of 73 . Echo, chest x-ray, and EKG unremarkable. Patient was given DuoNeb therapy x3, Solu-Medrol, and magnesium and placed on supplemental oxygen weaned down to 3 L per oxy mask.      Problem List with Assessment and Plan:    Acute mixed hypoxic/hypercarbic respiratory failure  Asthma exacerbation with hypoxia   -- Patient does not have formal diagnosis of asthma, but has been seen by PCP for wheezing. She typically uses Flovent BID, albuterol nebulizer a couple of times per day, and albuterol inhaler as needed.   -- She does have clinical signs of  asthma; recommend PFTs outpatient at some point to establish formal diagnosis.  -- On presentation to the ED SP02 values were in the 70s and mid 80s,  improved to 99%,   -- Patient was admitted and treated with scheduled DuoNeb, as needed albuterol nebulizer, supplemental oxygen and steroid.  -- Currently she is requiring 2 L of supplemental oxygen.  CT scan of the chest showed no evidence of acute infiltrate but evidence of pulmonary hypertension.  Suspect her presentation is combination of asthma and pulm hypertension.  Echocardiogram showed evidence of moderate pulmonary hypertension with preserved EF.  Mild outflow obstruction noted as well.  Patient has increased work of breathing and respiratory acidosis on VBG.  Will treat with BiPAP as needed for increased work of breathing.  We will get pulmonary consult to assist with further recommendations.  Patient may need obstructive sleep apnea evaluation as an outpatient    Type II diabetes mellitus with albuminuria  -- Appears to be well controlled, patient follows with her PCP who she saw earlier this month  -- Most recent A1C on 08/10/2023 = 8.1.    -- Continued  glipizide XL 10mg daily  -- Continued metformin XR 500mg twice daily   -- Treated with sliding scale insulin.  -- Currently blood glucose remained controlled.  Will continue 10 units of Lantus , monitor blood glucose.  Continue above meds       HTN  -- Continued  Losartan 100mg daily  -- Continued metoprolol 50mg daily  -- Continued furosemide 20mg daily  -- Continued amlodipine 2.5mg daily    --Currently blood pressure is not well controlled.  Continue appropriate medication.  Hydralazine as needed for systolic blood pressure over 180.  We will increase furosemide to 40 mg a day    Hyperlipidemia  -- Continued atorvastatin      Hx of CVA with residual right-sided weakness  Dementia  Language barrier (son translates)  -- Hospitalized on 05/11/2023 following intracranial hemorrhage of the ventral casimiro.  During workup, there was an incidental finding of a 1x2mm right MCA bifurcation aneurysm. Patient followed up with neurology for this on 05/23/2023 and plans to repeat CTA head in 1 year.   -- Continued aspirin 81mg daily      Moderate pulm hypertension: Continued supplemental oxygen, home medications     Clinically Significant Risk Factors Present on Admission         # Hypomagnesemia: Lowest Mg = 1.6 mg/dL in last 2 days, will replace as needed     # Drug Induced Platelet Defect: home medication list includes an antiplatelet medication   # Hypertension: Noted on problem list   # Acute Respiratory Failure: Documented O2 saturation < 91%.  Continue supplemental oxygen as needed    # DMII: A1C = 9.0 % (Ref range: <5.7 %) within past 6 months                  Plan for today:  Chest CT obtained  Supplemental oxygen, wean off as able      VTE Prophylaxis: Pneumatic Compression Devices  Code Status: Full Code  Diet: NPO for Medical/Clinical Reasons Except for: Meds, Ice Chips    Morrison Catheter: Not present    Family updated today: Yes      Disposition: May discharge in the next 2 days pending improvement and resolution of hypoxia        Interval History (Subjective):        Patient is seen and examined by me today and medical record reviewed.Overnight events noted and care discussed with nursing staff.Has been short of breath and increased of WOB.   No fever .   I spoke with son and his wife.                     Physical Exam:        Last Vital Signs:  BP (!) 177/89 (BP Location: Left arm)   Pulse 67   Temp 97.7  F (36.5  C) (Oral)   Resp 20   Ht 1.524 m (5')   Wt 74.1 kg (163 lb 5.8 oz)   SpO2 98%   BMI 31.90 kg/m      I/O last 3 completed shifts:  In: 544 [P.O.:544]  Out: 900 [Urine:900]    Wt Readings from Last 5 Encounters:   10/26/23 74.1 kg (163 lb 5.8 oz)   09/18/23 72.6 kg (160 lb)   05/23/23 74.3 kg (163 lb 14.4 oz)   05/22/23 74.4 kg (164 lb)   05/12/23 74.8 kg (164 lb 14.5 oz)        Constitutional:  "Awake, alert, cooperative, no apparent distress     Respiratory: Clear to auscultation bilaterally, no crackles or wheezing   Cardiovascular: Regular rate and rhythm, normal S1 and S2, and no murmur noted   Abdomen: Normal bowel sounds, soft, non-distended, non-tender   Skin: No new rashes, no cyanosis, dry to touch   Neuro: Alert with  no new focal weakness   Extremities: No edema   Other(s):        All other systems: Negative          Medications:        All current medications were reviewed with changes reflected in problem list.         Data:      All new lab and imaging data was reviewed.      Data reviewed today: I reviewed all new labs and imaging results over the last 24 hours. I personally reviewed       Recent Labs   Lab 10/24/23  0801 10/23/23  0806   WBC 5.3 7.2   HGB 13.2 13.8   HCT 41.8 44.3   MCV 93 95    268     No results for input(s): \"CULT\" in the last 168 hours.  Recent Labs   Lab 10/26/23  0753 10/26/23  0650 10/26/23  0605 10/26/23  0409 10/25/23  0803 10/25/23  0737 10/24/23  2115 10/24/23  1932 10/24/23  0900 10/24/23  0801 10/23/23  1139 10/23/23  0806   NA  --   --   --   --   --   --   --   --   --  143  --  144   POTASSIUM  --  4.2  --   --   --  4.8  --  4.2  --  4.2  --  4.1   CHLORIDE  --   --   --   --   --   --   --   --   --  102  --  102   CO2  --   --   --   --   --   --   --   --   --  30*  --  32*   ANIONGAP  --   --   --   --   --   --   --   --   --  11  --  10   *  --  71 80   < >  --    < >  --    < > 273*   < > 115*   BUN  --   --   --   --   --   --   --   --   --  31.0*  --  16.9   CR  --   --   --   --   --   --   --   --   --  0.61  --  0.60   GFRESTIMATED  --   --   --   --   --   --   --   --   --  >90  --  >90   GLENIS  --   --   --   --   --   --   --   --   --  9.8  --  10.0   MAG  --  1.9  --   --   --  2.2  --  2.0   < >  --   --  1.6*    < > = values in this interval not displayed.       Recent Labs   Lab 10/26/23  0753 10/26/23  0605 10/26/23  0409 " "10/25/23  2106 10/25/23  1803   * 71 80 260* 344*       No results for input(s): \"INR\" in the last 168 hours.      No results for input(s): \"TROPONIN\", \"TROPI\", \"TROPR\" in the last 168 hours.    Invalid input(s): \"TROP\", \"TROPONINIES\"    Recent Results (from the past 48 hour(s))   CT Chest Pulmonary Embolism w Contrast    Narrative    CT CHEST PULMONARY EMBOLISM WITH CONTRAST 10/25/2023 11:33 AM    CLINICAL HISTORY: Shortness of breath. Female sex; Not pregnant; No  prior imaging in the last 24 hours; Pulmonary Embolism Rule-Out  Criteria (PERC) score > 0; Revised [employer] Score (RGS) not >= 11; No  D-dimer result available; D-dimer not ordered.    TECHNIQUE: CT angiogram chest during arterial phase injection IV  contrast. 2D and 3D MIP reconstructions were performed by the CT  technologist. Dose reduction techniques were used.   CONTRAST: 67mL Isovue-370    COMPARISON: 9/12/2023    FINDINGS:  ANGIOGRAM CHEST: Central pulmonary arteries are enlarged, similar to  previous. No evidence of pulmonary embolus. Thoracic aorta is negative  for dissection.    LUNGS AND PLEURA: Discoid atelectasis in the left lung base. Lungs are  otherwise clear. No pleural effusion.    MEDIASTINUM/AXILLAE: Generalized cardiomegaly. Moderate coronary  artery calcification. No lymphadenopathy.    UPPER ABDOMEN: Normal.    MUSCULOSKELETAL: Normal.      Impression    IMPRESSION:  1.  No evidence of pulmonary embolus.  2.  Enlarged central pulmonary arteries are unchanged from previous  and may represent pulmonary arterial hypertension.        COVID Status:  COVID-19 PCR Results          1/26/2023    14:22 4/4/2023    12:02 4/22/2023    20:05 9/12/2023    12:05 10/23/2023    08:04   COVID-19 PCR Results   SARS CoV2 PCR Positive  Negative  Negative  Negative  Negative      COVID-19 Antibody Results, Testing for Immunity           No data to display                 Disclaimer: This note consists of symbols derived from keyboarding, dictation " and/or voice recognition software. As a result, there may be errors in the script that have gone undetected. Please consider this when interpreting information found in this chart.

## 2023-10-26 NOTE — PROGRESS NOTES
Respiratory Therapy Note        A BiPAP of  12/5 @ 25% was applied to the pt via medium full face mask per MD order.  It seems Pt may have had increased WOB,and dropping SpO2.  Found her on 1 Lpm NC SpO2 93%.  Pulse ox signal is not consistantly strong.  VBG from yesterday showed 7.28/68/22/32.  The bridge of the nose looks good and remains intact. Pt is tolerating it well. Will continue to monitor and assess the pt's current respiratory status and needs.    October 26, 2023 12:03 PM  Bret Villa, RT

## 2023-10-26 NOTE — PLAN OF CARE
Assessments:   Alert but disoriented to time & place. O2 drops to high 80s while sleeping, bumped to 4L then eventually weaned to 2Lpm/nc, sats on high 90s. Intermittent wheeze and crackles, dyspnea on exertion with slight increase work of breathing, MD notified. LS diminished. Denies pain.  and 260. Ax1-2 with gait belt & walker.    Major Shift Events:   >IV lasix x1 -  external catheter and bedside commode placed for overnight.  >VBG lab draw- MD notified    Treatment Plan: Wean oxygen as able, Prednisone, Encourage activity, Hopeful discharge home soon.    Bedside Nurse: Ramana Patino RN

## 2023-10-27 LAB
ALBUMIN SERPL BCG-MCNC: 3.7 G/DL (ref 3.5–5.2)
ALP SERPL-CCNC: 68 U/L (ref 35–104)
ALT SERPL W P-5'-P-CCNC: 18 U/L (ref 0–50)
ANION GAP SERPL CALCULATED.3IONS-SCNC: 8 MMOL/L (ref 7–15)
AST SERPL W P-5'-P-CCNC: 22 U/L (ref 0–45)
BASE EXCESS BLDV CALC-SCNC: 10.1 MMOL/L (ref -7.7–1.9)
BILIRUB SERPL-MCNC: 0.3 MG/DL
BUN SERPL-MCNC: 34.9 MG/DL (ref 8–23)
CALCIUM SERPL-MCNC: 9.9 MG/DL (ref 8.8–10.2)
CHLORIDE SERPL-SCNC: 103 MMOL/L (ref 98–107)
CREAT SERPL-MCNC: 0.68 MG/DL (ref 0.51–0.95)
DEPRECATED HCO3 PLAS-SCNC: 35 MMOL/L (ref 22–29)
EGFRCR SERPLBLD CKD-EPI 2021: >90 ML/MIN/1.73M2
ERYTHROCYTE [DISTWIDTH] IN BLOOD BY AUTOMATED COUNT: 13.7 % (ref 10–15)
GLUCOSE BLDC GLUCOMTR-MCNC: 133 MG/DL (ref 70–99)
GLUCOSE BLDC GLUCOMTR-MCNC: 193 MG/DL (ref 70–99)
GLUCOSE BLDC GLUCOMTR-MCNC: 257 MG/DL (ref 70–99)
GLUCOSE BLDC GLUCOMTR-MCNC: 350 MG/DL (ref 70–99)
GLUCOSE BLDC GLUCOMTR-MCNC: 53 MG/DL (ref 70–99)
GLUCOSE BLDC GLUCOMTR-MCNC: 57 MG/DL (ref 70–99)
GLUCOSE BLDC GLUCOMTR-MCNC: 99 MG/DL (ref 70–99)
GLUCOSE SERPL-MCNC: 55 MG/DL (ref 70–99)
HCO3 BLDV-SCNC: 39 MMOL/L (ref 21–28)
HCT VFR BLD AUTO: 41.1 % (ref 35–47)
HGB BLD-MCNC: 12.8 G/DL (ref 11.7–15.7)
MAGNESIUM SERPL-MCNC: 1.9 MG/DL (ref 1.7–2.3)
MCH RBC QN AUTO: 29.3 PG (ref 26.5–33)
MCHC RBC AUTO-ENTMCNC: 31.1 G/DL (ref 31.5–36.5)
MCV RBC AUTO: 94 FL (ref 78–100)
O2/TOTAL GAS SETTING VFR VENT: 30 %
PCO2 BLDV: 71 MM HG (ref 40–50)
PH BLDV: 7.35 [PH] (ref 7.32–7.43)
PLATELET # BLD AUTO: 257 10E3/UL (ref 150–450)
PO2 BLDV: 33 MM HG (ref 25–47)
POTASSIUM SERPL-SCNC: 3.6 MMOL/L (ref 3.4–5.3)
PROT SERPL-MCNC: 6.3 G/DL (ref 6.4–8.3)
RBC # BLD AUTO: 4.37 10E6/UL (ref 3.8–5.2)
SODIUM SERPL-SCNC: 146 MMOL/L (ref 135–145)
WBC # BLD AUTO: 6.9 10E3/UL (ref 4–11)

## 2023-10-27 PROCEDURE — 83735 ASSAY OF MAGNESIUM: CPT | Performed by: INTERNAL MEDICINE

## 2023-10-27 PROCEDURE — 80053 COMPREHEN METABOLIC PANEL: CPT | Performed by: INTERNAL MEDICINE

## 2023-10-27 PROCEDURE — 250N000013 HC RX MED GY IP 250 OP 250 PS 637: Performed by: NURSE PRACTITIONER

## 2023-10-27 PROCEDURE — 999N000157 HC STATISTIC RCP TIME EA 10 MIN

## 2023-10-27 PROCEDURE — 82803 BLOOD GASES ANY COMBINATION: CPT | Performed by: INTERNAL MEDICINE

## 2023-10-27 PROCEDURE — 120N000001 HC R&B MED SURG/OB

## 2023-10-27 PROCEDURE — 250N000009 HC RX 250: Performed by: INTERNAL MEDICINE

## 2023-10-27 PROCEDURE — 36415 COLL VENOUS BLD VENIPUNCTURE: CPT | Performed by: INTERNAL MEDICINE

## 2023-10-27 PROCEDURE — 85027 COMPLETE CBC AUTOMATED: CPT | Performed by: INTERNAL MEDICINE

## 2023-10-27 PROCEDURE — 99222 1ST HOSP IP/OBS MODERATE 55: CPT | Performed by: INTERNAL MEDICINE

## 2023-10-27 PROCEDURE — 258N000001 HC RX 258: Performed by: NURSE PRACTITIONER

## 2023-10-27 PROCEDURE — 94660 CPAP INITIATION&MGMT: CPT

## 2023-10-27 PROCEDURE — 94640 AIRWAY INHALATION TREATMENT: CPT | Mod: 76

## 2023-10-27 PROCEDURE — 250N000012 HC RX MED GY IP 250 OP 636 PS 637: Performed by: NURSE PRACTITIONER

## 2023-10-27 PROCEDURE — 94640 AIRWAY INHALATION TREATMENT: CPT

## 2023-10-27 PROCEDURE — 99233 SBSQ HOSP IP/OBS HIGH 50: CPT | Performed by: INTERNAL MEDICINE

## 2023-10-27 PROCEDURE — 5A09357 ASSISTANCE WITH RESPIRATORY VENTILATION, LESS THAN 24 CONSECUTIVE HOURS, CONTINUOUS POSITIVE AIRWAY PRESSURE: ICD-10-PCS | Performed by: INTERNAL MEDICINE

## 2023-10-27 PROCEDURE — 250N000013 HC RX MED GY IP 250 OP 250 PS 637: Performed by: INTERNAL MEDICINE

## 2023-10-27 RX ORDER — AMLODIPINE BESYLATE 5 MG/1
5 TABLET ORAL DAILY
Status: DISCONTINUED | OUTPATIENT
Start: 2023-10-28 | End: 2023-10-29 | Stop reason: HOSPADM

## 2023-10-27 RX ORDER — AMLODIPINE BESYLATE 2.5 MG/1
2.5 TABLET ORAL ONCE
Status: COMPLETED | OUTPATIENT
Start: 2023-10-27 | End: 2023-10-27

## 2023-10-27 RX ORDER — BUDESONIDE AND FORMOTEROL FUMARATE DIHYDRATE 160; 4.5 UG/1; UG/1
AEROSOL RESPIRATORY (INHALATION)
Qty: 20.4 G | Refills: 11 | Status: CANCELLED | OUTPATIENT
Start: 2023-10-27

## 2023-10-27 RX ADMIN — METOPROLOL SUCCINATE 50 MG: 50 TABLET, EXTENDED RELEASE ORAL at 08:48

## 2023-10-27 RX ADMIN — IPRATROPIUM BROMIDE AND ALBUTEROL SULFATE 3 ML: .5; 3 SOLUTION RESPIRATORY (INHALATION) at 19:18

## 2023-10-27 RX ADMIN — IPRATROPIUM BROMIDE AND ALBUTEROL SULFATE 3 ML: .5; 3 SOLUTION RESPIRATORY (INHALATION) at 11:39

## 2023-10-27 RX ADMIN — AMLODIPINE BESYLATE 2.5 MG: 2.5 TABLET ORAL at 12:31

## 2023-10-27 RX ADMIN — FUROSEMIDE 40 MG: 40 TABLET ORAL at 08:48

## 2023-10-27 RX ADMIN — ATORVASTATIN CALCIUM 40 MG: 40 TABLET, FILM COATED ORAL at 20:11

## 2023-10-27 RX ADMIN — IPRATROPIUM BROMIDE AND ALBUTEROL SULFATE 3 ML: .5; 3 SOLUTION RESPIRATORY (INHALATION) at 15:35

## 2023-10-27 RX ADMIN — FLUTICASONE FUROATE AND VILANTEROL TRIFENATATE 1 PUFF: 200; 25 POWDER RESPIRATORY (INHALATION) at 07:37

## 2023-10-27 RX ADMIN — PREDNISONE 40 MG: 20 TABLET ORAL at 08:48

## 2023-10-27 RX ADMIN — METFORMIN HYDROCHLORIDE 500 MG: 500 TABLET, EXTENDED RELEASE ORAL at 17:54

## 2023-10-27 RX ADMIN — DEXTROSE MONOHYDRATE 50 ML: 25 INJECTION, SOLUTION INTRAVENOUS at 02:01

## 2023-10-27 RX ADMIN — ASPIRIN 81 MG CHEWABLE TABLET 81 MG: 81 TABLET CHEWABLE at 08:48

## 2023-10-27 RX ADMIN — LOSARTAN POTASSIUM 100 MG: 100 TABLET, FILM COATED ORAL at 08:49

## 2023-10-27 RX ADMIN — IPRATROPIUM BROMIDE AND ALBUTEROL SULFATE 3 ML: .5; 3 SOLUTION RESPIRATORY (INHALATION) at 07:37

## 2023-10-27 RX ADMIN — INSULIN ASPART 5 UNITS: 100 INJECTION, SOLUTION INTRAVENOUS; SUBCUTANEOUS at 17:54

## 2023-10-27 RX ADMIN — DEXTROSE MONOHYDRATE 25 ML: 25 INJECTION, SOLUTION INTRAVENOUS at 08:22

## 2023-10-27 RX ADMIN — AMLODIPINE BESYLATE 2.5 MG: 2.5 TABLET ORAL at 08:49

## 2023-10-27 ASSESSMENT — ENCOUNTER SYMPTOMS
WHEEZING: 1
CHILLS: 0
WEIGHT LOSS: 0
FEVER: 0
COUGH: 1
SPUTUM PRODUCTION: 1
HEMOPTYSIS: 0
SHORTNESS OF BREATH: 1

## 2023-10-27 ASSESSMENT — ACTIVITIES OF DAILY LIVING (ADL)
ADLS_ACUITY_SCORE: 40
DEPENDENT_IADLS:: CLEANING;COOKING;LAUNDRY;SHOPPING;MEAL PREPARATION;MEDICATION MANAGEMENT;MONEY MANAGEMENT;TRANSPORTATION
ADLS_ACUITY_SCORE: 40

## 2023-10-27 NOTE — PROGRESS NOTES
[facility]  Hospitalist Progress Note  Armando Osorio M.D., M.B.A.   10/27/2023    Reason for Stay/active problem list    Acute hypoxic and hypercapnic respiratory failure  Suspect chronic respiratory failure due to pulmonary hypertension  Shortness of breath and hypoxia secondary to suspected asthma exacerbation         Assessment and Plan:        Summary of Stay: Pilo Quintana is a 73 year old female admitted on 10/23/2023 with history of dementia, HTN, type II diabetes mellitus, CVA, cardiomegaly, and asthma who presents to the ED with shortness of breath and hypoxia. Son reports that she has been short of breath the last few days and he has been checking her O2 sats the last few mornings, finding them to be in the high 80s and low 90s. He then noticed that the patient was wheezing after she woke up this morning. He checked her O2 sats and found it to be 71% at this time. En route to the ED her O2 sats were fluctuating around 84-85%. She has been using her inhaler without significant relief.      Workup in the ED revealed elevated BP as well as hypoxia, with SPO2 values in the 70s and mid 80s concerning for reactive airway disease/asthma exacerbation. Workup was negative for influenza/RSV/COVID. CBC, BMP, troponin, and BNP were also unremarkable. VBG showed slight respiratory acidosis with metabolic compensation with a pH of 7.30 and PCO2 of 73 . Echo, chest x-ray, and EKG unremarkable. Patient was given DuoNeb therapy x3, Solu-Medrol, and magnesium and placed on supplemental oxygen weaned down to 3 L per oxy mask.      Problem List with Assessment and Plan:    Acute mixed hypoxic/hypercarbic respiratory failure  Asthma exacerbation with hypoxia   -- Patient does not have formal diagnosis of asthma, but has been seen by PCP for wheezing. She typically uses Flovent BID, albuterol nebulizer a couple of times per day, and albuterol inhaler as needed.   -- She does have clinical signs of  asthma; recommend PFTs outpatient at some point to establish formal diagnosis.  -- On presentation to the ED SP02 values were in the 70s and mid 80s,  improved to 99%,   -- Patient was admitted and treated with scheduled DuoNeb, as needed albuterol nebulizer, supplemental oxygen and steroid.  -- Currently she is requiring 2 L of supplemental oxygen.  CT scan of the chest showed no evidence of acute infiltrate but evidence of pulmonary hypertension.  Suspect her presentation is combination of asthma , MAGALIS and pulm hypertension.  Echocardiogram showed evidence of moderate pulmonary hypertension with preserved EF.  Mild outflow obstruction noted as well.  Patient has increased work of breathing and respiratory acidosis on VBG.  She was treated with BiPAP yesterday and her gases improved.  She is off BiPAP now, on 2 L of oxygen.  Pulmonary consult pending for further assessment and recommendation        Type II diabetes mellitus with albuminuria  -- Appears to be well controlled, patient follows with her PCP who she saw earlier this month  -- Most recent A1C on 08/10/2023 = 8.1.    -- Continued  glipizide XL 10mg daily  -- Continued metformin XR 500mg twice daily   -- Treated with sliding scale insulin.  -- She was started on 10 units of Lantus at bedtime and her blood sugar was low earlier this morning.  We will discontinue Lantus.  Continue sliding scale insulin, monitor blood sugar      HTN  -- Continued  Losartan 100mg daily  -- Continued metoprolol 50mg daily  -- Continued furosemide at 40 mg daily  -- Continued amlodipine at 5 mg daily    -- Furosemide and amlodipine increased.  Continue to monitor    Hyperlipidemia  -- Continued atorvastatin      Hx of CVA with residual right-sided weakness  Dementia  Language barrier (son translates)  -- Hospitalized on 05/11/2023 following intracranial hemorrhage of the ventral casimiro. During workup, there was an incidental finding of a 1x2mm right MCA bifurcation aneurysm.  Patient followed up with neurology for this on 05/23/2023 and plans to repeat CTA head in 1 year.   -- Continued aspirin 81mg daily      Moderate pulm hypertension: Continued supplemental oxygen, home medications     Clinically Significant Risk Factors Present on Admission         # Hypomagnesemia: Lowest Mg = 1.6 mg/dL in last 2 days, will replace as needed     # Drug Induced Platelet Defect: home medication list includes an antiplatelet medication   # Hypertension: Noted on problem list   # Acute Respiratory Failure: Documented O2 saturation < 91%.  Continue supplemental oxygen as needed    # DMII: A1C = 9.0 % (Ref range: <5.7 %) within past 6 months                  VTE Prophylaxis: Pneumatic Compression Devices  Code Status: Full Code  Diet: NPO for Medical/Clinical Reasons Except for: Meds, Ice Chips    Morrison Catheter: Not present    Family updated today: Yes      Disposition: Patient is improving.  May discharge over the weekend.  Pulmonary input is pending      Interval History (Subjective):        Patient is seen and examined by me today and medical record reviewed.Overnight events noted and care discussed with nursing staff.shortness of breath improved.  She was on BiPAP overnight and currently off BiPAP and on 2 L of oxygen.  Denies any chest pain shortness of breath.  Multiple family members at bedside.                  Physical Exam:        Last Vital Signs:  BP (!) 149/68 (BP Location: Left arm)   Pulse 66   Temp 97.7  F (36.5  C) (Oral)   Resp 18   Ht 1.524 m (5')   Wt 76.2 kg (167 lb 15.9 oz)   SpO2 95%   BMI 32.81 kg/m      I/O last 3 completed shifts:  In: -   Out: 500 [Urine:500]    Wt Readings from Last 5 Encounters:   10/27/23 76.2 kg (167 lb 15.9 oz)   09/18/23 72.6 kg (160 lb)   05/23/23 74.3 kg (163 lb 14.4 oz)   05/22/23 74.4 kg (164 lb)   05/12/23 74.8 kg (164 lb 14.5 oz)        Constitutional: Awake, alert, cooperative, no apparent distress     Respiratory: Clear to auscultation  "bilaterally, no crackles or wheezing   Cardiovascular: Regular rate and rhythm, normal S1 and S2, and no murmur noted   Abdomen: Normal bowel sounds, soft, non-distended, non-tender   Skin: No new rashes, no cyanosis, dry to touch   Neuro: Alert with  no new focal weakness   Extremities: No edema   Other(s):        All other systems: Negative          Medications:        All current medications were reviewed with changes reflected in problem list.         Data:      All new lab and imaging data was reviewed.      Data reviewed today: I reviewed all new labs and imaging results over the last 24 hours. I personally reviewed       Recent Labs   Lab 10/27/23  0743 10/24/23  0801 10/23/23  0806   WBC 6.9 5.3 7.2   HGB 12.8 13.2 13.8   HCT 41.1 41.8 44.3   MCV 94 93 95    258 268     No results for input(s): \"CULT\" in the last 168 hours.  Recent Labs   Lab 10/27/23  0843 10/27/23  0755 10/27/23  0743 10/26/23  0753 10/26/23  0650 10/25/23  0803 10/25/23  0737 10/24/23  0900 10/24/23  0801 10/23/23  1139 10/23/23  0806   NA  --   --  146*  --   --   --   --   --  143  --  144   POTASSIUM  --   --  3.6  --  4.2  --  4.8   < > 4.2  --  4.1   CHLORIDE  --   --  103  --   --   --   --   --  102  --  102   CO2  --   --  35*  --   --   --   --   --  30*  --  32*   ANIONGAP  --   --  8  --   --   --   --   --  11  --  10   * 53* 55*   < >  --    < >  --    < > 273*   < > 115*   BUN  --   --  34.9*  --   --   --   --   --  31.0*  --  16.9   CR  --   --  0.68  --   --   --   --   --  0.61  --  0.60   GFRESTIMATED  --   --  >90  --   --   --   --   --  >90  --  >90   GLENIS  --   --  9.9  --   --   --   --   --  9.8  --  10.0   MAG  --   --  1.9  --  1.9  --  2.2   < >  --   --  1.6*   PROTTOTAL  --   --  6.3*  --   --   --   --   --   --   --   --    ALBUMIN  --   --  3.7  --   --   --   --   --   --   --   --    BILITOTAL  --   --  0.3  --   --   --   --   --   --   --   --    ALKPHOS  --   --  68  --   --   --   --   " "--   --   --   --    AST  --   --  22  --   --   --   --   --   --   --   --    ALT  --   --  18  --   --   --   --   --   --   --   --     < > = values in this interval not displayed.       Recent Labs   Lab 10/27/23  0843 10/27/23  0755 10/27/23  0743 10/27/23  0221 10/27/23  0159   * 53* 55* 193* 57*       No results for input(s): \"INR\" in the last 168 hours.      No results for input(s): \"TROPONIN\", \"TROPI\", \"TROPR\" in the last 168 hours.    Invalid input(s): \"TROP\", \"TROPONINIES\"    Recent Results (from the past 48 hour(s))   CT Chest Pulmonary Embolism w Contrast    Narrative    CT CHEST PULMONARY EMBOLISM WITH CONTRAST 10/25/2023 11:33 AM    CLINICAL HISTORY: Shortness of breath. Female sex; Not pregnant; No  prior imaging in the last 24 hours; Pulmonary Embolism Rule-Out  Criteria (PERC) score > 0; Revised Culebra Score (RGS) not >= 11; No  D-dimer result available; D-dimer not ordered.    TECHNIQUE: CT angiogram chest during arterial phase injection IV  contrast. 2D and 3D MIP reconstructions were performed by the CT  technologist. Dose reduction techniques were used.   CONTRAST: 67mL Isovue-370    COMPARISON: 9/12/2023    FINDINGS:  ANGIOGRAM CHEST: Central pulmonary arteries are enlarged, similar to  previous. No evidence of pulmonary embolus. Thoracic aorta is negative  for dissection.    LUNGS AND PLEURA: Discoid atelectasis in the left lung base. Lungs are  otherwise clear. No pleural effusion.    MEDIASTINUM/AXILLAE: Generalized cardiomegaly. Moderate coronary  artery calcification. No lymphadenopathy.    UPPER ABDOMEN: Normal.    MUSCULOSKELETAL: Normal.      Impression    IMPRESSION:  1.  No evidence of pulmonary embolus.  2.  Enlarged central pulmonary arteries are unchanged from previous  and may represent pulmonary arterial hypertension.        COVID Status:  COVID-19 PCR Results          1/26/2023    14:22 4/4/2023    12:02 4/22/2023    20:05 9/12/2023    12:05 10/23/2023    08:04 "   COVID-19 PCR Results   SARS CoV2 PCR Positive  Negative  Negative  Negative  Negative      COVID-19 Antibody Results, Testing for Immunity           No data to display                 Disclaimer: This note consists of symbols derived from keyboarding, dictation and/or voice recognition software. As a result, there may be errors in the script that have gone undetected. Please consider this when interpreting information found in this chart.

## 2023-10-27 NOTE — PLAN OF CARE
To Do:  End of Shift Summary  For vital signs and complete assessments, please see documentation flowsheets.     Pertinent assessments: Alert but disoriented to time & place per son. VSS. On BIBAP. RT trialled Pt. to 2L NC or 2 hours. Afebrile. LS diminished. Denies pain. NPO. Last . No coverage needed. Assist x2 with gait belt and walker to [facility]. PIV - SL. Purewick in place.     Major Shift Events: NC 2L for 2 hours with sats above 91%.     Treatment Plan: Encourage activity, Prednisone, diuretic, BiBAP, supplemental  oxygen, Discharge TBD.    Bedside: Alvarado Weldon RN

## 2023-10-27 NOTE — PLAN OF CARE
End of Shift Summary  For vital signs and complete assessments, please see documentation flowsheets.     Pertinent assessments: Afebrile. Pt wore BiPAP during the night, did have to increase oxygen need. LS diminished. BS hypoactive. Pt denies pain and nausea. NPO with ice/meds. Ax2 with Belt and Walker to BSC, using purewick tonight. PIV - SL. Tele monitoring - SR. Confused, son at bedside; alarm on bed for safety. Slept well.     Major Shift Events: 0200 BG was 57. Have 50 mg of D50, recheck was 193.     Treatment Plan: Wean oxygen as able, Encourage activity, Discharge TBD.

## 2023-10-27 NOTE — PROGRESS NOTES
Patient was placed on BiPAP 12/6 25% for the night. Patient tolerating well. Pt was given break from [id number] with no signs of increased WOB, maintaining saturations. Placed on BiPAP for sleep. RT to continue to follow throughout hospital stay.    Jon Sofia, RT on 10/26/2023 at 11:22 PM

## 2023-10-27 NOTE — CONSULTS
Care Management Initial Consult    General Information  Assessment completed with: Patient, Children, Son Balwinder  Type of CM/SW Visit: Initial Assessment    Primary Care Provider verified and updated as needed: Yes   Readmission within the last 30 days: no previous admission in last 30 days      Reason for Consult: discharge planning  Advance Care Planning:            Communication Assessment  Patient's communication style: spoken language (non-English)    Hearing Difficulty or Deaf: no   Wear Glasses or Blind: no    Cognitive  Cognitive/Neuro/Behavioral: .WDL except  Level of Consciousness: confused, alert  Arousal Level: opens eyes spontaneously  Orientation: time, situation, disoriented x 4  Mood/Behavior: calm, cooperative  Best Language: 0 - No aphasia  Speech: clear    Living Environment:   People in home: child(mahi), adult     Current living Arrangements: house      Able to return to prior arrangements: yes       Family/Social Support:  Care provided by: self, child(mahi)  Provides care for: no one, unable/limited ability to care for self  Marital Status:   Children          Description of Support System: Supportive, Involved    Support Assessment: Adequate family and caregiver support    Current Resources:   Patient receiving home care services: No     Community Resources: None  Equipment currently used at home: walker, rolling  Supplies currently used at home: Incontinence Supplies    Employment/Financial:  Employment Status:          Financial Concerns:             Does the patient's insurance plan have a 3 day qualifying hospital stay waiver?  Yes     Which insurance plan 3 day waiver is available? Alternative insurance waiver    Will the waiver be used for post-acute placement? No    Lifestyle & Psychosocial Needs:  Social Determinants of Health     Food Insecurity: Not on file   Depression: Not at risk (5/23/2023)    PHQ-2     PHQ-2 Score: 0   Housing Stability: Not on file   Tobacco Use: Low Risk  " (5/23/2023)    Patient History     Smoking Tobacco Use: Never     Smokeless Tobacco Use: Never     Passive Exposure: Not on file   Financial Resource Strain: Not on file   Alcohol Use: Not on file   Transportation Needs: Not on file   Physical Activity: Not on file   Interpersonal Safety: Not on file   Stress: Not on file   Social Connections: Not on file       Functional Status:  Prior to admission patient needed assistance:   Dependent ADLs:: Incontinence, Transfers, Grooming, Eating, Dressing, Bathing  Dependent IADLs:: Cleaning, Cooking, Laundry, Shopping, Meal Preparation, Medication Management, Money Management, Transportation  Assesssment of Functional Status: Not at baseline with ADL Functioning    Mental Health Status:          Chemical Dependency Status:                Values/Beliefs:  Spiritual, Cultural Beliefs, Jain Practices, Values that affect care:                 Additional Information:    Pt admitted with Shortness of Breath/Hypoxia, noted to have unplanned readmission risk of 21%.  SW met with pt/son at the bedside due to elevated risk score. Pt son reports that pt lives at home with him and is total cares at baseline. Pt son reports that he had quit his job to care for pt full time. Pt has a lift chair in the home and a WC. Pt son reports that the plan is to discharge home and explains that he \"would do anything for his mom\". Pt son reports that pt does not use O2 at baseline or a BIPAP. Pt son reports that they have everything that they need at home but will need assist with WC transport home. Pt son is aware and agreeable to the cost of [facility] WC. Pt son reports 0 stairs to enter the home. Pt confirms the home address as [address] [place] [address] [place].     SCOTT Osorio, GENEVIEVE  Inpatient Care Coordination  Emergency Room /Karuna Heredia [facility]     "

## 2023-10-27 NOTE — PLAN OF CARE
Goal Outcome Evaluation:      Plan of Care Reviewed With: patient           To Do:  End of Shift Summary  For vital signs and complete assessments, please see documentation flowsheets.     Pertinent assessments: pt oriented to self, VSS, Ls dim, on 2L 02 @96%, denies pain, nausea and SOB, BS audible, no BM, on MOD carb diet,tolerating well,using bedside commode with assist 1x. son at the bedside. Tele discontinue   Major Shift Events: BG was 53 @ 755, gave dextrose 25 ml recheck was 133     Treatment Plan: Wean oxygen as able, Encourage activity, Discharge TBD.

## 2023-10-28 ENCOUNTER — APPOINTMENT (OUTPATIENT)
Dept: PHYSICAL THERAPY | Facility: CLINIC | Age: 73
DRG: 190 | End: 2023-10-28
Attending: INTERNAL MEDICINE
Payer: COMMERCIAL

## 2023-10-28 LAB
ANION GAP SERPL CALCULATED.3IONS-SCNC: 10 MMOL/L (ref 7–15)
BUN SERPL-MCNC: 45.2 MG/DL (ref 8–23)
CALCIUM SERPL-MCNC: 10 MG/DL (ref 8.8–10.2)
CHLORIDE SERPL-SCNC: 102 MMOL/L (ref 98–107)
CREAT SERPL-MCNC: 0.82 MG/DL (ref 0.51–0.95)
DEPRECATED HCO3 PLAS-SCNC: 32 MMOL/L (ref 22–29)
EGFRCR SERPLBLD CKD-EPI 2021: 75 ML/MIN/1.73M2
GLUCOSE BLDC GLUCOMTR-MCNC: 126 MG/DL (ref 70–99)
GLUCOSE BLDC GLUCOMTR-MCNC: 132 MG/DL (ref 70–99)
GLUCOSE BLDC GLUCOMTR-MCNC: 278 MG/DL (ref 70–99)
GLUCOSE BLDC GLUCOMTR-MCNC: 304 MG/DL (ref 70–99)
GLUCOSE BLDC GLUCOMTR-MCNC: 326 MG/DL (ref 70–99)
GLUCOSE BLDC GLUCOMTR-MCNC: 460 MG/DL (ref 70–99)
GLUCOSE BLDC GLUCOMTR-MCNC: 79 MG/DL (ref 70–99)
GLUCOSE SERPL-MCNC: 124 MG/DL (ref 70–99)
MAGNESIUM SERPL-MCNC: 1.8 MG/DL (ref 1.7–2.3)
POTASSIUM SERPL-SCNC: 3.9 MMOL/L (ref 3.4–5.3)
SODIUM SERPL-SCNC: 144 MMOL/L (ref 135–145)

## 2023-10-28 PROCEDURE — 94660 CPAP INITIATION&MGMT: CPT

## 2023-10-28 PROCEDURE — 250N000013 HC RX MED GY IP 250 OP 250 PS 637: Performed by: INTERNAL MEDICINE

## 2023-10-28 PROCEDURE — 36415 COLL VENOUS BLD VENIPUNCTURE: CPT | Performed by: INTERNAL MEDICINE

## 2023-10-28 PROCEDURE — 97161 PT EVAL LOW COMPLEX 20 MIN: CPT | Mod: GP

## 2023-10-28 PROCEDURE — 97530 THERAPEUTIC ACTIVITIES: CPT | Mod: GP

## 2023-10-28 PROCEDURE — 97116 GAIT TRAINING THERAPY: CPT | Mod: GP

## 2023-10-28 PROCEDURE — 80048 BASIC METABOLIC PNL TOTAL CA: CPT | Performed by: INTERNAL MEDICINE

## 2023-10-28 PROCEDURE — 250N000013 HC RX MED GY IP 250 OP 250 PS 637: Performed by: NURSE PRACTITIONER

## 2023-10-28 PROCEDURE — 250N000012 HC RX MED GY IP 250 OP 636 PS 637: Performed by: NURSE PRACTITIONER

## 2023-10-28 PROCEDURE — 250N000009 HC RX 250: Performed by: INTERNAL MEDICINE

## 2023-10-28 PROCEDURE — 99233 SBSQ HOSP IP/OBS HIGH 50: CPT | Performed by: INTERNAL MEDICINE

## 2023-10-28 PROCEDURE — 94640 AIRWAY INHALATION TREATMENT: CPT | Mod: 76

## 2023-10-28 PROCEDURE — 999N000157 HC STATISTIC RCP TIME EA 10 MIN

## 2023-10-28 PROCEDURE — 999N000111 HC STATISTIC OT IP EVAL DEFER: Performed by: REHABILITATION PRACTITIONER

## 2023-10-28 PROCEDURE — 120N000001 HC R&B MED SURG/OB

## 2023-10-28 PROCEDURE — 83735 ASSAY OF MAGNESIUM: CPT | Performed by: INTERNAL MEDICINE

## 2023-10-28 RX ADMIN — METOPROLOL SUCCINATE 50 MG: 50 TABLET, EXTENDED RELEASE ORAL at 09:10

## 2023-10-28 RX ADMIN — FUROSEMIDE 40 MG: 40 TABLET ORAL at 09:10

## 2023-10-28 RX ADMIN — GLIPIZIDE 10 MG: 10 TABLET, EXTENDED RELEASE ORAL at 09:10

## 2023-10-28 RX ADMIN — IPRATROPIUM BROMIDE AND ALBUTEROL SULFATE 3 ML: .5; 3 SOLUTION RESPIRATORY (INHALATION) at 11:16

## 2023-10-28 RX ADMIN — PREDNISONE 40 MG: 20 TABLET ORAL at 09:09

## 2023-10-28 RX ADMIN — AMLODIPINE BESYLATE 5 MG: 5 TABLET ORAL at 09:09

## 2023-10-28 RX ADMIN — IPRATROPIUM BROMIDE AND ALBUTEROL SULFATE 3 ML: .5; 3 SOLUTION RESPIRATORY (INHALATION) at 07:38

## 2023-10-28 RX ADMIN — METFORMIN HYDROCHLORIDE 500 MG: 500 TABLET, EXTENDED RELEASE ORAL at 18:38

## 2023-10-28 RX ADMIN — ASPIRIN 81 MG CHEWABLE TABLET 81 MG: 81 TABLET CHEWABLE at 09:09

## 2023-10-28 RX ADMIN — IPRATROPIUM BROMIDE AND ALBUTEROL SULFATE 3 ML: .5; 3 SOLUTION RESPIRATORY (INHALATION) at 19:15

## 2023-10-28 RX ADMIN — LOSARTAN POTASSIUM 100 MG: 100 TABLET, FILM COATED ORAL at 09:09

## 2023-10-28 RX ADMIN — METFORMIN HYDROCHLORIDE 500 MG: 500 TABLET, EXTENDED RELEASE ORAL at 09:10

## 2023-10-28 RX ADMIN — INSULIN ASPART 7 UNITS: 100 INJECTION, SOLUTION INTRAVENOUS; SUBCUTANEOUS at 18:23

## 2023-10-28 RX ADMIN — ATORVASTATIN CALCIUM 40 MG: 40 TABLET, FILM COATED ORAL at 21:52

## 2023-10-28 RX ADMIN — INSULIN ASPART 4 UNITS: 100 INJECTION, SOLUTION INTRAVENOUS; SUBCUTANEOUS at 13:38

## 2023-10-28 RX ADMIN — ACETAMINOPHEN 650 MG: 325 TABLET, FILM COATED ORAL at 11:44

## 2023-10-28 ASSESSMENT — ACTIVITIES OF DAILY LIVING (ADL)
ADLS_ACUITY_SCORE: 40
ADLS_ACUITY_SCORE: 40
ADLS_ACUITY_SCORE: 39
ADLS_ACUITY_SCORE: 42
ADLS_ACUITY_SCORE: 40
ADLS_ACUITY_SCORE: 39
ADLS_ACUITY_SCORE: 40
ADLS_ACUITY_SCORE: 44
ADLS_ACUITY_SCORE: 40
ADLS_ACUITY_SCORE: 39
ADLS_ACUITY_SCORE: 40
ADLS_ACUITY_SCORE: 44

## 2023-10-28 NOTE — CONSULTS
Pulmonary Consult  Pilo Quintana MRN: [id number]  1950  Date of Admission:10/23/2023  Primary care provider: Chela Griffith  ___________________________________    Pilo Quintana MRN# [id number]   YOB: 1950 Age: 73 year old   Date of Admission: 10/23/2023     Reason for consult: acute on chronic resp failure , pulm htn ? MAGALIS          Assessment and Recommendations:     ## Probable asthma with acute exacerbation  ## Probable pulmonary hypertension  ## Acute hypoxic respiratory failure  ## Possible MAGALIS    History of episodes of wheezing and coughing previously treated with steroids and prednisone.  She is a never smoker.  Had been using Flovent inhaler, then at some point was changed to Symbicort.  Was seen in outpatient clinic on 9/21 for persistent coughing and wheezing.  Treated with prednisone and azithromycin with dramatic improvement, however symptoms returned after she completed these medications.  Her history is consistent with asthma, however she has not had PFTs to definitively confirm this.    Additionally she reports worsening lower extremity edema prior to admission though her BNP was within normal limits.    The patient and the son report that her breathing is frequently worse at night which could be consistent with asthma, but is also concerning for sleep disordered breathing, particularly in the setting of possible pulmonary hypertension and with her body habitus.    Echocardiogram from May revealed moderate pulmonary hypertension, and multiple CT scans have revealed enlarged pulmonary arteries consistent with pulm hypertension.    In summary I believe her current respiratory exacerbation is multifactorial from asthma exacerbation in the setting of probable pulmonary hypertension and possible pulmonary edema.    In our system pulmonary hypertension is managed by cardiology.  I recommend discussing her case with their  "service to determine if inpatient evaluation is warranted or if this is something that can be followed up as an outpatient.      -Given the improvement in her oxygenation and current lack of wheezing, could try either discontinuing steroids altogether or decreasing to 20 mg/day  -Continue BiPAP overnight  -Discuss pulmonary hypertension with cardiology  -Referral to sleep medicine for MAGALIS evaluation on discharge  -Referral to establish care with pulmonology on discharge    -At discharge change Symbicort order to follow Smart guidelines (2 puffs twice a day plus an additional 1 puff per hour as needed for dyspnea and wheezing up to a maximum of 12 puffs/day)    I have pended this order in the discharge orders, but if this does not show up please order it by searching \"smart\" and selecting \"160 Smart therapy age 12+\"    Mohsen Sweet M.D.  Pulmonary & Critical Care  Pager: Click Here to page      I spent 70 minutes dedicated to this care so far today excluding procedures, including review of medical records, review of imaging (results & images), time with patient and time in documentation.      Pulmonary will continue to follow. We are in house at [facility] on Monday, Wednesday, and Friday. For assistance on other days, please page the on-call pulmonologist through [facility] or the .             HPI:     Pilo Quintana is a 73 year old female with HO diabetes, hypertension, morbid obesity, CVA, possible asthma, and probable pulmonary hypertension admitted 10/23/2023 for dyspnea/hypoxia.    She was seen by her primary care provider on 9/21 for persistent wheezing despite using her Flovent.  She was treated with a Z-Francisco and prednisone 40 mg x 5 days with good results, however approximately a week after completing this therapy the wheezing returned.  This slowly progressed with occasional productive cough until the day of admission when the patient's son noted her to be having worsening breathing and checked her " oxygen saturations.  Her sats were in the low 70s prompting him to present to emergency care.  In the ER she was noted to have wheezing and hypoxia and was admitted for treatment with nebulized medications and prednisone.  She also noted that her legs had swollen prior to admission.    Since being here she feels that she has gotten much better, her wheezing has resolved and she only rarely coughs.           Past Medical History:     Past Medical History:   Diagnosis Date    Diabetes (H)     Hypertension     Morbid obesity (H)     Osteoarthritis 6/7/2003    PMB (postmenopausal bleeding) 9/16/2014    Renal mass     Thickened endometrium 9/16/2014    Uncomplicated asthma     Unspecified cerebral artery occlusion with cerebral infarction 1998    was in [place], no residual at present              Past Surgical History:      Past Surgical History:   Procedure Laterality Date    DILATION AND CURETTAGE, OPERATIVE HYSTEROSCOPY WITH MORCELLATOR, COMBINED N/A 9/25/2014    Procedure: COMBINED DILATION AND CURETTAGE, OPERATIVE HYSTEROSCOPY WITH MORCELLATOR;  Surgeon: Silverio Christy MD;  Location:  OR              Social History:     Social History     Socioeconomic History    Marital status:      Spouse name: Not on file    Number of children: 8    Years of education: 0    Highest education level: Not on file   Occupational History    Not on file   Tobacco Use    Smoking status: Never    Smokeless tobacco: Never   Substance and Sexual Activity    Alcohol use: No    Drug use: No    Sexual activity: Not on file   Other Topics Concern    Not on file   Social History Narrative    Not on file     Social Determinants of Health     Financial Resource Strain: Not on file   Food Insecurity: Not on file   Transportation Needs: Not on file   Physical Activity: Not on file   Stress: Not on file   Social Connections: Not on file   Interpersonal Safety: Not on file   Housing Stability: Not on file               Family History:    Multiple family numbers with asthma including daughter who reportedly  from an asthma attack         Immunizations:     Immunization History   Administered Date(s) Administered    COVID-19 Bivalent 18+ (Moderna) 2022    COVID-19 MONOVALENT 12+ (Pfizer) 2021, 2021, 2021    COVID-19 Monovalent 18+ (Moderna) 04/15/2022    Influenza Vaccine 65+ (FLUAD) 2020, 10/07/2021, 2022, 2023              Allergies:   No Known Allergies             Medications:     Current Facility-Administered Medications   Medication    acetaminophen (TYLENOL) tablet 650 mg    Or    acetaminophen (TYLENOL) Suppository 650 mg    albuterol (PROVENTIL) neb solution 2.5 mg    [START ON 10/28/2023] amLODIPine (NORVASC) tablet 5 mg    aspirin EC tablet 81 mg    atorvastatin (LIPITOR) tablet 40 mg    carboxymethylcellulose PF (REFRESH PLUS) 0.5 % ophthalmic solution 1 drop    glucose gel 15-30 g    Or    dextrose 50 % injection 25-50 mL    Or    glucagon injection 1 mg    fluticasone-vilanterol (BREO ELLIPTA) 200-25 MCG/ACT inhaler 1 puff    furosemide (LASIX) tablet 40 mg    glipiZIDE (GLUCOTROL XL) 24 hr tablet 10 mg    insulin aspart (NovoLOG) injection (RAPID ACTING)    insulin aspart (NovoLOG) injection (RAPID ACTING)    ipratropium - albuterol 0.5 mg/2.5 mg/3 mL (DUONEB) neb solution 3 mL    losartan (COZAAR) tablet 100 mg    melatonin tablet 1 mg    metFORMIN (GLUCOPHAGE XR) 24 hr tablet 500 mg    metoprolol succinate ER (TOPROL XL) 24 hr tablet 50 mg    No lozenges or gum should be given while patient on BIPAP/AVAPS/AVAPS AE    ondansetron (ZOFRAN ODT) ODT tab 4 mg    Or    ondansetron (ZOFRAN) injection 4 mg    Patient may continue current oral medications    predniSONE (DELTASONE) tablet 40 mg    prochlorperazine (COMPAZINE) injection 5 mg    Or    prochlorperazine (COMPAZINE) tablet 5 mg    Or    prochlorperazine (COMPAZINE) suppository 12.5 mg    senna-docusate (SENOKOT-S/PERICOLACE) 8.6-50 MG  per tablet 1 tablet    Or    senna-docusate (SENOKOT-S/PERICOLACE) 8.6-50 MG per tablet 2 tablet               Review of Systems:     Review of Systems   Constitutional:  Negative for chills, fever, malaise/fatigue and weight loss.   Respiratory:  Positive for cough, sputum production, shortness of breath and wheezing. Negative for hemoptysis.    Cardiovascular:  Positive for leg swelling.              Exam:   /67   Pulse 63   Temp 97.7  F (36.5  C) (Oral)   Resp 20   Ht 1.524 m (5')   Wt 76.2 kg (167 lb 15.9 oz)   SpO2 96%   BMI 32.81 kg/m      Vitals:    10/24/23 0623 10/26/23 0639 10/27/23 0535   Weight: 73.8 kg (162 lb 11.2 oz) 74.1 kg (163 lb 5.8 oz) 76.2 kg (167 lb 15.9 oz)         Physical Exam  Vitals and nursing note reviewed.   Constitutional:       Appearance: Normal appearance. She is obese.   Cardiovascular:      Rate and Rhythm: Normal rate and regular rhythm.      Heart sounds: Murmur heard.   Pulmonary:      Effort: Pulmonary effort is normal.      Breath sounds: Normal breath sounds. No wheezing, rhonchi or rales.   Musculoskeletal:      Right lower leg: No edema.      Left lower leg: No edema.   Neurological:      Mental Status: She is alert.                Data:   ROUTINE ICU LABS (Last four results)  CMP  Recent Labs   Lab 10/27/23  1735 10/27/23  1142 10/27/23  0843 10/27/23  0755 10/27/23  0743 10/26/23  0753 10/26/23  0650 10/25/23  0803 10/25/23  0737 10/24/23  2115 10/24/23  1932 10/24/23  0900 10/24/23  0801 10/23/23  1139 10/23/23  0806   NA  --   --   --   --  146*  --   --   --   --   --   --   --  143  --  144   POTASSIUM  --   --   --   --  3.6  --  4.2  --  4.8  --  4.2  --  4.2  --  4.1   CHLORIDE  --   --   --   --  103  --   --   --   --   --   --   --  102  --  102   CO2  --   --   --   --  35*  --   --   --   --   --   --   --  30*  --  32*   ANIONGAP  --   --   --   --  8  --   --   --   --   --   --   --  11  --  10   * 99 133* 53* 55*   < >  --    < >  --    " < >  --    < > 273*   < > 115*   BUN  --   --   --   --  34.9*  --   --   --   --   --   --   --  31.0*  --  16.9   CR  --   --   --   --  0.68  --   --   --   --   --   --   --  0.61  --  0.60   GFRESTIMATED  --   --   --   --  >90  --   --   --   --   --   --   --  >90  --  >90   GLENIS  --   --   --   --  9.9  --   --   --   --   --   --   --  9.8  --  10.0   MAG  --   --   --   --  1.9  --  1.9  --  2.2  --  2.0   < >  --   --  1.6*   PROTTOTAL  --   --   --   --  6.3*  --   --   --   --   --   --   --   --   --   --    ALBUMIN  --   --   --   --  3.7  --   --   --   --   --   --   --   --   --   --    BILITOTAL  --   --   --   --  0.3  --   --   --   --   --   --   --   --   --   --    ALKPHOS  --   --   --   --  68  --   --   --   --   --   --   --   --   --   --    AST  --   --   --   --  22  --   --   --   --   --   --   --   --   --   --    ALT  --   --   --   --  18  --   --   --   --   --   --   --   --   --   --     < > = values in this interval not displayed.     CBC  Recent Labs   Lab 10/27/23  0743 10/24/23  0801 10/23/23  0806   WBC 6.9 5.3 7.2   RBC 4.37 4.51 4.67   HGB 12.8 13.2 13.8   HCT 41.1 41.8 44.3   MCV 94 93 95   MCH 29.3 29.3 29.6   MCHC 31.1* 31.6 31.2*   RDW 13.7 13.2 13.3    258 268     INFLAMMATIONNo lab results found in last 7 days.    Invalid input(s): \"ESR\"    INRNo lab results found in last 7 days.  Arterial Blood Gas  Recent Labs   Lab 10/27/23  0743 10/26/23  1258 10/25/23  1714 10/23/23  0806   PH  --  7.36  --   --    PCO2  --  58*  --   --    PO2  --  66*  --   --    HCO3  --  33*  --   --    O2PER 30 25 2 10       ALL CULTURESNo results for input(s): \"CULT\" in the last 168 hours.           Imaging:     CT chest 10/25/2023  1.  No evidence of pulmonary embolus.  2.  Enlarged central pulmonary arteries are unchanged from previous  and may represent pulmonary arterial hypertension.     Echo 5/11/2023  Normal systolic LVF. EF 65+. Normal RV.  Mild LVOT obstruction.There is " moderate to severe eccentric left ventricular  hypertrophy.     The visual ejection fraction is 60-65%.  The left atrium is moderately dilated.  There is mild to moderate mitral annular calcification.  There is mild (1+) mitral regurgitation.  Moderate (46-55mmHg) pulmonary hypertension is present.      The above note was dictated using voice recognition software and may include typographical errors. Please contact the author for any clarifications.

## 2023-10-28 NOTE — PROGRESS NOTES
"OT: Orders received. Chart reviewed and discussed with care team.  OT not indicated as pt at baseline has assistance with all ADLs and IADLs per PT, pt uses walker for short distance gait, otherwise manual w/c with assitance to propel. Pt owns commode, uses either bathroom near bedroom of commode. Per PT note \"Pt at baseline has assistance from family (son and daughter-in-law) all ADLs and IADLs. Lives in house, chair lift for stairs. Pt is below baseline, decreased strength noted and needing Ax1-2 for mobility on this date. Son reports feeling this is managable by family within the home setting. Recommend ongoing HH PT given ongoing weakness. (Pt states they were going to OP PT, but HH PT may be more appropriate given level of weakness and decreased activity tolerance currently)\". Defer discharge recommendations to care team and strengthening to IP PT.  Will complete orders.    "

## 2023-10-28 NOTE — PROVIDER NOTIFICATION
[facility]ia [employer] [facility] Patient  this evening, administered 7units of Novolog, please advise if further action needed     1842 Provider Response: Wait to see what repeat glucose is on next scheduled check

## 2023-10-28 NOTE — PROGRESS NOTES
[facility]  Hospitalist Progress Note  Armando Osorio M.D., M.B.A.   10/28/2023    Reason for Stay/active problem list    Acute hypoxic and hypercapnic respiratory failure  Suspect chronic respiratory failure due to pulmonary hypertension  Shortness of breath and hypoxia secondary to suspected asthma exacerbation         Assessment and Plan:        Summary of Stay: Pilo Quintana is a 73 year old female admitted on 10/23/2023 with history of dementia, HTN, type II diabetes mellitus, CVA, cardiomegaly, and asthma who presents to the ED with shortness of breath and hypoxia. Son reports that she has been short of breath the last few days and he has been checking her O2 sats the last few mornings, finding them to be in the high 80s and low 90s. He then noticed that the patient was wheezing after she woke up this morning. He checked her O2 sats and found it to be 71% at this time. En route to the ED her O2 sats were fluctuating around 84-85%. She has been using her inhaler without significant relief.      Workup in the ED revealed elevated BP as well as hypoxia, with SPO2 values in the 70s and mid 80s concerning for reactive airway disease/asthma exacerbation. Workup was negative for influenza/RSV/COVID. CBC, BMP, troponin, and BNP were also unremarkable. VBG showed slight respiratory acidosis with metabolic compensation with a pH of 7.30 and PCO2 of 73 . Echo, chest x-ray, and EKG unremarkable. Patient was given DuoNeb therapy x3, Solu-Medrol, and magnesium and placed on supplemental oxygen weaned down to 3 L per oxy mask.      Problem List with Assessment and Plan:    Acute mixed hypoxic/hypercarbic respiratory failure  Asthma exacerbation with hypoxia   -- Patient does not have formal diagnosis of asthma, but has been seen by PCP for wheezing. She typically uses Flovent BID, albuterol nebulizer a couple of times per day, and albuterol inhaler as needed.   -- She does have clinical signs of  asthma; recommend PFTs outpatient at some point to establish formal diagnosis.  -- On presentation to the ED SP02 values were in the 70s and mid 80s,  improved to 99%,   -- Patient was admitted and treated with scheduled DuoNeb, as needed albuterol nebulizer, supplemental oxygen and steroid.  -- Currently she is requiring 2 L of supplemental oxygen.  CT scan of the chest showed no evidence of acute infiltrate but evidence of pulmonary hypertension.  Suspect her presentation is combination of asthma , MAGALIS and pulm hypertension.  Echocardiogram showed evidence of moderate pulmonary hypertension with preserved EF.  Mild outflow obstruction noted as well.  Patient has increased work of breathing and respiratory acidosis on VBG.  She was treated with BiPAP with improvement of her symptoms and gases.  Patient required BiPAP overnight and currently on 2 L of oxygen.  Pulmonary medicine input appreciated.  Plan to stay off BiPAP tonight for possible discharge home with or without oxygen tomorrow    Type II diabetes mellitus with albuminuria  -- Appears to be well controlled, patient follows with her PCP who she saw earlier this month  -- Most recent A1C on 08/10/2023 = 8.1.    -- Continued  glipizide XL 10mg daily  -- Continued metformin XR 500mg twice daily   -- Treated with sliding scale insulin.  -- She was started on 10 units of Lantus at bedtime and her blood sugar was low and Lantus was discontinued.  Continue sliding scale insulin for now, continue metformin if needs titration on discharge    HTN  -- Continued  Losartan 100mg daily  -- Continued metoprolol 50mg daily  -- Continued furosemide at 40 mg daily  -- Continued amlodipine at 5 mg daily    -- Furosemide and amlodipine increased.  Continue to monitor    Hyperlipidemia  -- Continued atorvastatin      Hx of CVA with residual right-sided weakness  Dementia  Language barrier (son translates)  -- Hospitalized on 05/11/2023 following intracranial hemorrhage of the  ventral casimiro. During workup, there was an incidental finding of a 1x2mm right MCA bifurcation aneurysm. Patient followed up with neurology for this on 05/23/2023 and plans to repeat CTA head in 1 year.   -- Continued aspirin 81mg daily      Moderate pulm hypertension: Continued supplemental oxygen, home medications     Clinically Significant Risk Factors Present on Admission         # Hypomagnesemia: Lowest Mg = 1.6 mg/dL in last 2 days, will replace as needed     # Drug Induced Platelet Defect: home medication list includes an antiplatelet medication   # Hypertension: Noted on problem list   # Acute Respiratory Failure: Documented O2 saturation < 91%.  Continue supplemental oxygen as needed    # DMII: A1C = 9.0 % (Ref range: <5.7 %) within past 6 months                  VTE Prophylaxis: Pneumatic Compression Devices  Code Status: Full Code  Diet: Moderate Consistent Carb (60 g CHO per Meal) Diet    Morrison Catheter: Not present    Family updated today: Yes      Disposition: May discharge home tomorrow if she remains stable.  She denies home oxygen need evaluation in the morning.  No BiPAP tonight      Interval History (Subjective):        Patient is seen and examined by me today and medical record reviewed.Overnight events noted and care discussed with nursing staff.shortness of breath improved.  She required BiPAP overnight which is most likely due to her sleep apnea.  She is doing well this morning without any complaint.  Spoke with bedside nurse and patient's son                Physical Exam:        Last Vital Signs:  /65 (BP Location: Left arm)   Pulse 65   Temp 98  F (36.7  C) (Oral)   Resp 18   Ht 1.524 m (5')   Wt 74.3 kg (163 lb 11.2 oz)   SpO2 100%   BMI 31.97 kg/m      No intake/output data recorded.    Wt Readings from Last 5 Encounters:   10/28/23 74.3 kg (163 lb 11.2 oz)   09/18/23 72.6 kg (160 lb)   05/23/23 74.3 kg (163 lb 14.4 oz)   05/22/23 74.4 kg (164 lb)   05/12/23 74.8 kg (164 lb  "14.5 oz)        Constitutional: Awake, alert, cooperative, no apparent distress     Respiratory: Clear to auscultation bilaterally, no crackles or wheezing   Cardiovascular: Regular rate and rhythm, normal S1 and S2, and no murmur noted   Abdomen: Normal bowel sounds, soft, non-distended, non-tender   Skin: No new rashes, no cyanosis, dry to touch   Neuro: Alert with  no new focal weakness   Extremities: No edema   Other(s):        All other systems: Negative          Medications:        All current medications were reviewed with changes reflected in problem list.         Data:      All new lab and imaging data was reviewed.      Data reviewed today: I reviewed all new labs and imaging results over the last 24 hours. I personally reviewed       Recent Labs   Lab 10/27/23  0743 10/24/23  0801 10/23/23  0806   WBC 6.9 5.3 7.2   HGB 12.8 13.2 13.8   HCT 41.1 41.8 44.3   MCV 94 93 95    258 268     No results for input(s): \"CULT\" in the last 168 hours.  Recent Labs   Lab 10/28/23  1331 10/28/23  1149 10/28/23  0724 10/27/23  0755 10/27/23  0743 10/26/23  0753 10/26/23  0650 10/24/23  0900 10/24/23  0801   NA  --   --  144  --  146*  --   --   --  143   POTASSIUM  --   --  3.9  --  3.6  --  4.2   < > 4.2   CHLORIDE  --   --  102  --  103  --   --   --  102   CO2  --   --  32*  --  35*  --   --   --  30*   ANIONGAP  --   --  10  --  8  --   --   --  11   * 278* 124*   < > 55*   < >  --    < > 273*   BUN  --   --  45.2*  --  34.9*  --   --   --  31.0*   CR  --   --  0.82  --  0.68  --   --   --  0.61   GFRESTIMATED  --   --  75  --  >90  --   --   --  >90   GLENIS  --   --  10.0  --  9.9  --   --   --  9.8   MAG  --   --  1.8  --  1.9  --  1.9   < >  --    PROTTOTAL  --   --   --   --  6.3*  --   --   --   --    ALBUMIN  --   --   --   --  3.7  --   --   --   --    BILITOTAL  --   --   --   --  0.3  --   --   --   --    ALKPHOS  --   --   --   --  68  --   --   --   --    AST  --   --   --   --  22  --   --   -- " "  --    ALT  --   --   --   --  18  --   --   --   --     < > = values in this interval not displayed.       Recent Labs   Lab 10/28/23  1331 10/28/23  1149 10/28/23  0724 10/28/23  0708 10/28/23  0538   * 278* 124* 132* 79       No results for input(s): \"INR\" in the last 168 hours.      No results for input(s): \"TROPONIN\", \"TROPI\", \"TROPR\" in the last 168 hours.    Invalid input(s): \"TROP\", \"TROPONINIES\"    Recent Results (from the past 48 hour(s))   CT Chest Pulmonary Embolism w Contrast    Narrative    CT CHEST PULMONARY EMBOLISM WITH CONTRAST 10/25/2023 11:33 AM    CLINICAL HISTORY: Shortness of breath. Female sex; Not pregnant; No  prior imaging in the last 24 hours; Pulmonary Embolism Rule-Out  Criteria (PERC) score > 0; Revised Barnwell Score (RGS) not >= 11; No  D-dimer result available; D-dimer not ordered.    TECHNIQUE: CT angiogram chest during arterial phase injection IV  contrast. 2D and 3D MIP reconstructions were performed by the CT  technologist. Dose reduction techniques were used.   CONTRAST: 67mL Isovue-370    COMPARISON: 9/12/2023    FINDINGS:  ANGIOGRAM CHEST: Central pulmonary arteries are enlarged, similar to  previous. No evidence of pulmonary embolus. Thoracic aorta is negative  for dissection.    LUNGS AND PLEURA: Discoid atelectasis in the left lung base. Lungs are  otherwise clear. No pleural effusion.    MEDIASTINUM/AXILLAE: Generalized cardiomegaly. Moderate coronary  artery calcification. No lymphadenopathy.    UPPER ABDOMEN: Normal.    MUSCULOSKELETAL: Normal.      Impression    IMPRESSION:  1.  No evidence of pulmonary embolus.  2.  Enlarged central pulmonary arteries are unchanged from previous  and may represent pulmonary arterial hypertension.        COVID Status:  COVID-19 PCR Results          1/26/2023    14:22 4/4/2023    12:02 4/22/2023    20:05 9/12/2023    12:05 10/23/2023    08:04   COVID-19 PCR Results   SARS CoV2 PCR Positive  Negative  Negative  Negative  Negative  "     COVID-19 Antibody Results, Testing for Immunity           No data to display                 Disclaimer: This note consists of symbols derived from keyboarding, dictation and/or voice recognition software. As a result, there may be errors in the script that have gone undetected. Please consider this when interpreting information found in this chart.

## 2023-10-28 NOTE — PROGRESS NOTES
"   10/28/23 1003   Appointment Info   Signing Clinician's Name / Credentials (PT) Palomomanuel Alaniz DPT   Living Environment   People in Home child(mahi), adult   Current Living Arrangements house   Home Accessibility no concerns  (chair lift for all stairs.)   Living Environment Comments Pt lives in house with son and son's wife. Pt has assistance with all ADLs and IADLs, uses walker for short distance gait, otherwise w/c rides.   Self-Care   Usual Activity Tolerance fair   Current Activity Tolerance fair   Equipment Currently Used at Home walker, rolling;wheelchair, manual   Fall history within last six months no   Activity/Exercise/Self-Care Comment Pt at baseline has assistance with all ADLs and IADLs, uses walker for short distance gait, otherwise manual w/c with assitance to propel. Pt owns commode, uses either bathroom near bedroom of commode.   General Information   Onset of Illness/Injury or Date of Surgery 10/23/23   Referring Physician Armando Osorio MD   Patient/Family Therapy Goals Statement (PT) return home.   Pertinent History of Current Problem (include personal factors and/or comorbidities that impact the POC) Pt is 72 yo female who, per chart, \" admitted on 10/23/2023 with history of dementia, HTN, type II diabetes mellitus, CVA, cardiomegaly, and asthma who presents to the ED with shortness of breath and hypoxia. Son reports that she has been short of breath the last few days and he has been checking her O2 sats the last few mornings, finding them to be in the high 80s and low 90s. He then noticed that the patient was wheezing after she woke up this morning. He checked her O2 sats and found it to be 71% at this time. En route to the ED her O2 sats were fluctuating around 84-85%. She has been using her inhaler without significant relief. \"   Existing Precautions/Restrictions fall   General Observations Pt on 2L of O2 via NC upon PT arrival.   Cognition   Affect/Mental Status (Cognition) WFL "   Orientation Status (Cognition) unable/difficult to assess  (language barrier.)   Pain Assessment   Patient Currently in Pain No   Posture    Posture Forward head position   Range of Motion (ROM)   Range of Motion ROM is WFL   Strength (Manual Muscle Testing)   Strength (Manual Muscle Testing) Deficits observed during functional mobility   Bed Mobility   Bed Mobility supine-sit   Comment, (Bed Mobility) modA, son providing pt/mother for assistance.   Transfers   Transfers sit-stand transfer   Comment, (Transfers) FWW and modAx2, pt with UEs on walker to stand.   Gait/Stairs (Locomotion)   Comment, (Gait/Stairs) Pt ambulated 10' with FWW and CGAx2, then CGAx1, very slow gait speed with decreased R step length.   Balance   Balance Comments requires walker and Ax1-2 for mobility, no overt LOB during session, very slow moving   Sensory Examination   Sensory Perception WFL   Clinical Impression   Criteria for Skilled Therapeutic Intervention Yes, treatment indicated   PT Diagnosis (PT) impaired functional mobility   Influenced by the following impairments decreased strength, activity tolerance.   Functional limitations due to impairments difficulty with bed mobility, transfers, and ambulation   Clinical Presentation (PT Evaluation Complexity) stable   Clinical Presentation Rationale clinical judgement   Clinical Decision Making (Complexity) low complexity   Planned Therapy Interventions (PT) balance training;bed mobility training;gait training;home exercise program;neuromuscular re-education;ROM (range of motion);stair training;strengthening;stretching;transfer training;progressive activity/exercise   Risk & Benefits of therapy have been explained evaluation/treatment results reviewed;care plan/treatment goals reviewed;risks/benefits reviewed;current/potential barriers reviewed;participants voiced agreement with care plan;participants included;patient   PT Total Evaluation Time   PT Eval, Low Complexity Minutes (52015)  10   Physical Therapy Goals   PT Frequency Daily   PT Predicted Duration/Target Date for Goal Attainment 11/04/23   PT Goals Bed Mobility;Transfers;Gait   PT: Bed Mobility Minimal assist;Supine to/from sit   PT: Transfers Moderate assist;Sit to/from stand;Assistive device   PT: Gait Minimal assist;Rolling walker;50 feet   PT Discharge Planning   PT Plan progress IND with bed mob, repeat STS for increased IND. gait in room (increase dist as able).   PT Discharge Recommendation (DC Rec) home with assist;home with home care physical therapy   PT Rationale for DC Rec Pt at baseline has assistance from family (son and daughter-in-law) all ADLs and IADLs. Lives in house, chair lift for stairs. Pt is below baseline, decreased strength noted and needing Ax1-2 for mobility on this date. Son reports feeling this is managable by family within the home setting. Recommend ongoing HH PT given ongoing weakness. (Pt states they were going to OP PT, but HH PT may be more appropriate given level of weakness and decreased activity tolerance currently).   PT Brief overview of current status Ax1-2 for all mob, gait FWW ~20'   PT Equipment Needed at Discharge walker, rolling  (walker owned, in pt's room already)   Total Session Time   Total Session Time (sum of timed and untimed services) 10

## 2023-10-28 NOTE — PROGRESS NOTES
Patient was placed on BiPAP 12/6 25% for the night. Patient tolerating well. No signs of skin breakdown. RT to continue to follow throughout hospital stay.    Jon Sofia, RT

## 2023-10-28 NOTE — PLAN OF CARE
/76  Pulse 61   Temp 97.6  F  Resp 18   SpO2 98%     Patient is alert but confused due to dementia. Non english speaking, son stayed with patient all night. Patient is assist of 2 with gait belt and walker with transfers, has a pure wick in place, able to swallow well. On BiPAP during the night. Patient didn't urinate at night, bladder scan showed 250 mL. Plan is for patient to go home with son at discharge. Patient is full cares but son is ready to take care of her.

## 2023-10-28 NOTE — CARE PLAN
To Do:  End of Shift Summary  For vital signs and complete assessments, please see documentation flowsheets.     Pertinent assessments: Pt A&O, non-english speaking. VS stable. Patient c/o pain in Right shoulder. Pain managed with PRN tylenol. CMS: Patient denies any numbness or tingling. Ambulation: Assist of 2 with gait belt and walker. Tolerates moderate carb diet. O2 at 1L via nasal canula. Plan: Possible discharge on Monday to home with son. Check labs in AM.     Son at bedside.      Major Shift Events: none    Treatment Plan: Bipap to be off tonight. See Provider's note.   Wound Care

## 2023-10-28 NOTE — PLAN OF CARE
To Do:  End of Shift Summary  For vital signs and complete assessments, please see documentation flowsheets.     Pertinent assessments: Pt oriented to self, on 2L O2. Denies pain. Afebrile this shift. Tolerating mod carb diet well. VSS. Assist of 2 with walker, gb to pivot to bedside commode. PIV in L arm is SL.  and 257. BiPAP placed for the night. Son at bedside.    Major Shift Events: none  Treatment Plan: Pulmonology, SW, Respiratory therapy, wean oxygen as able  Bedside Nurse: Eli Felton RN     Goal Outcome Evaluation:      Plan of Care Reviewed With: patient    Overall Patient Progress: no changeOverall Patient Progress: no change

## 2023-10-29 VITALS
HEART RATE: 61 BPM | DIASTOLIC BLOOD PRESSURE: 73 MMHG | TEMPERATURE: 97.7 F | RESPIRATION RATE: 16 BRPM | OXYGEN SATURATION: 97 % | BODY MASS INDEX: 32.68 KG/M2 | SYSTOLIC BLOOD PRESSURE: 117 MMHG | HEIGHT: 60 IN | WEIGHT: 166.45 LBS

## 2023-10-29 LAB
ANION GAP SERPL CALCULATED.3IONS-SCNC: 9 MMOL/L (ref 7–15)
BASE EXCESS BLDV CALC-SCNC: 6.5 MMOL/L (ref -7.7–1.9)
BUN SERPL-MCNC: 47 MG/DL (ref 8–23)
CA-I BLD-MCNC: 2.8 MG/DL (ref 4.4–5.2)
CALCIUM SERPL-MCNC: 10.3 MG/DL (ref 8.8–10.2)
CHLORIDE SERPL-SCNC: 103 MMOL/L (ref 98–107)
CREAT SERPL-MCNC: 0.81 MG/DL (ref 0.51–0.95)
DEPRECATED HCO3 PLAS-SCNC: 33 MMOL/L (ref 22–29)
EGFRCR SERPLBLD CKD-EPI 2021: 76 ML/MIN/1.73M2
ERYTHROCYTE [DISTWIDTH] IN BLOOD BY AUTOMATED COUNT: 14 % (ref 10–15)
GLUCOSE BLDC GLUCOMTR-MCNC: 120 MG/DL (ref 70–99)
GLUCOSE BLDC GLUCOMTR-MCNC: 134 MG/DL (ref 70–99)
GLUCOSE BLDC GLUCOMTR-MCNC: 346 MG/DL (ref 70–99)
GLUCOSE BLDC GLUCOMTR-MCNC: 44 MG/DL (ref 70–99)
GLUCOSE BLDC GLUCOMTR-MCNC: 45 MG/DL (ref 70–99)
GLUCOSE BLDC GLUCOMTR-MCNC: 71 MG/DL (ref 70–99)
GLUCOSE BLDC GLUCOMTR-MCNC: 99 MG/DL (ref 70–99)
GLUCOSE SERPL-MCNC: 44 MG/DL (ref 70–99)
HCO3 BLDV-SCNC: 36 MMOL/L (ref 21–28)
HCT VFR BLD AUTO: 41.6 % (ref 35–47)
HGB BLD-MCNC: 12.9 G/DL (ref 11.7–15.7)
MAGNESIUM SERPL-MCNC: 1.9 MG/DL (ref 1.7–2.3)
MCH RBC QN AUTO: 29.1 PG (ref 26.5–33)
MCHC RBC AUTO-ENTMCNC: 31 G/DL (ref 31.5–36.5)
MCV RBC AUTO: 94 FL (ref 78–100)
O2/TOTAL GAS SETTING VFR VENT: 30 %
PCO2 BLDV: 77 MM HG (ref 40–50)
PH BLDV: 7.28 [PH] (ref 7.32–7.43)
PLATELET # BLD AUTO: 262 10E3/UL (ref 150–450)
PO2 BLDV: 55 MM HG (ref 25–47)
POTASSIUM SERPL-SCNC: 4 MMOL/L (ref 3.4–5.3)
RBC # BLD AUTO: 4.43 10E6/UL (ref 3.8–5.2)
SODIUM SERPL-SCNC: 145 MMOL/L (ref 135–145)
WBC # BLD AUTO: 7.8 10E3/UL (ref 4–11)

## 2023-10-29 PROCEDURE — 250N000012 HC RX MED GY IP 250 OP 636 PS 637: Performed by: NURSE PRACTITIONER

## 2023-10-29 PROCEDURE — 94640 AIRWAY INHALATION TREATMENT: CPT

## 2023-10-29 PROCEDURE — 80048 BASIC METABOLIC PNL TOTAL CA: CPT | Performed by: INTERNAL MEDICINE

## 2023-10-29 PROCEDURE — 250N000009 HC RX 250: Performed by: INTERNAL MEDICINE

## 2023-10-29 PROCEDURE — 99239 HOSP IP/OBS DSCHRG MGMT >30: CPT | Performed by: INTERNAL MEDICINE

## 2023-10-29 PROCEDURE — 85014 HEMATOCRIT: CPT | Performed by: INTERNAL MEDICINE

## 2023-10-29 PROCEDURE — 250N000013 HC RX MED GY IP 250 OP 250 PS 637: Performed by: INTERNAL MEDICINE

## 2023-10-29 PROCEDURE — 250N000013 HC RX MED GY IP 250 OP 250 PS 637: Performed by: NURSE PRACTITIONER

## 2023-10-29 PROCEDURE — 83735 ASSAY OF MAGNESIUM: CPT | Performed by: INTERNAL MEDICINE

## 2023-10-29 PROCEDURE — 82803 BLOOD GASES ANY COMBINATION: CPT | Performed by: INTERNAL MEDICINE

## 2023-10-29 PROCEDURE — 94640 AIRWAY INHALATION TREATMENT: CPT | Mod: 76

## 2023-10-29 PROCEDURE — 36415 COLL VENOUS BLD VENIPUNCTURE: CPT | Performed by: INTERNAL MEDICINE

## 2023-10-29 PROCEDURE — 999N000157 HC STATISTIC RCP TIME EA 10 MIN

## 2023-10-29 RX ORDER — BUDESONIDE AND FORMOTEROL FUMARATE DIHYDRATE 160; 4.5 UG/1; UG/1
AEROSOL RESPIRATORY (INHALATION)
Qty: 20.4 G | Refills: 11 | Status: SHIPPED | OUTPATIENT
Start: 2023-10-29 | End: 2024-07-07

## 2023-10-29 RX ORDER — PREDNISONE 10 MG/1
TABLET ORAL
Qty: 20 TABLET | Refills: 0 | Status: SHIPPED | OUTPATIENT
Start: 2023-10-29 | End: 2024-02-01

## 2023-10-29 RX ORDER — FUROSEMIDE 40 MG
40 TABLET ORAL DAILY
COMMUNITY
Start: 2023-10-29

## 2023-10-29 RX ADMIN — DEXTROSE 15 G: 15 GEL ORAL at 08:42

## 2023-10-29 RX ADMIN — AMLODIPINE BESYLATE 5 MG: 5 TABLET ORAL at 08:18

## 2023-10-29 RX ADMIN — LOSARTAN POTASSIUM 100 MG: 100 TABLET, FILM COATED ORAL at 08:18

## 2023-10-29 RX ADMIN — FLUTICASONE FUROATE AND VILANTEROL TRIFENATATE 1 PUFF: 200; 25 POWDER RESPIRATORY (INHALATION) at 08:31

## 2023-10-29 RX ADMIN — ASPIRIN 81 MG CHEWABLE TABLET 81 MG: 81 TABLET CHEWABLE at 08:18

## 2023-10-29 RX ADMIN — FUROSEMIDE 40 MG: 40 TABLET ORAL at 08:18

## 2023-10-29 RX ADMIN — IPRATROPIUM BROMIDE AND ALBUTEROL SULFATE 3 ML: .5; 3 SOLUTION RESPIRATORY (INHALATION) at 07:46

## 2023-10-29 RX ADMIN — IPRATROPIUM BROMIDE AND ALBUTEROL SULFATE 3 ML: .5; 3 SOLUTION RESPIRATORY (INHALATION) at 11:38

## 2023-10-29 RX ADMIN — INSULIN ASPART 5 UNITS: 100 INJECTION, SOLUTION INTRAVENOUS; SUBCUTANEOUS at 13:28

## 2023-10-29 RX ADMIN — PREDNISONE 40 MG: 20 TABLET ORAL at 08:18

## 2023-10-29 RX ADMIN — METOPROLOL SUCCINATE 50 MG: 50 TABLET, EXTENDED RELEASE ORAL at 08:18

## 2023-10-29 ASSESSMENT — ACTIVITIES OF DAILY LIVING (ADL)
ADLS_ACUITY_SCORE: 39
ADLS_ACUITY_SCORE: 38
ADLS_ACUITY_SCORE: 39
ADLS_ACUITY_SCORE: 38
ADLS_ACUITY_SCORE: 39
ADLS_ACUITY_SCORE: 38
ADLS_ACUITY_SCORE: 39

## 2023-10-29 NOTE — PROGRESS NOTES
Pt Alert and orientated. Son at bedside. Pt does not speak english. Home O2 was delivered to pt room. Went over discharge instructions and O2 machine with pt Son. Ready for discharge.

## 2023-10-29 NOTE — PROGRESS NOTES
Vitals: Patient need 02 at rest and ambulation, all other vitals wnl   Neuro: Hx:dementia, appears at baseline   GI: no Bm for writer  : incontinent of urine   Activity: x2 with walker and gb  Diet: diabetic diet, tolerating well   Pain: denies   Plan: discharge pending home 02 delivery

## 2023-10-29 NOTE — DISCHARGE SUMMARY
Physician Discharge Summary           [facility]  Hospitalist Discharge Summary-[facility]    Name: Pilo Quintana    MRN: [id number]     YOB: 1950    Age: 73 year old                                                     Primary care provider: Chela Griffith    Admit date:  10/23/2023    Discharge date and time: 10/29/2023    Discharge Physician: Armando Osorio M.D., M.B.A.       Primary Discharge Diagnosis      Acute hypoxic and hypercapnic respiratory failure  Suspect chronic respiratory failure due to pulmonary hypertension, MAGALIS and underlying Asthma/COPD   Shortness of breath and hypoxia secondary to suspected asthma /COPD exacerbation     Secondary Diagnosis /chronic medical conditions     Past Medical History:   Diagnosis Date    Diabetes (H)     Hypertension     Morbid obesity (H)     Osteoarthritis 6/7/2003    PMB (postmenopausal bleeding) 9/16/2014    Renal mass     Thickened endometrium 9/16/2014    Uncomplicated asthma     Unspecified cerebral artery occlusion with cerebral infarction 1998    was in [place], no residual at present     Past Surgical History:  Past Surgical History:   Procedure Laterality Date    DILATION AND CURETTAGE, OPERATIVE HYSTEROSCOPY WITH MORCELLATOR, COMBINED N/A 9/25/2014    Procedure: COMBINED DILATION AND CURETTAGE, OPERATIVE HYSTEROSCOPY WITH MORCELLATOR;  Surgeon: Silverio Christy MD;  Location:  OR           Brief Summary of Hospital stay :       Please refer to  Admission H&P note  and subsequent progress notes in EMR for full details of patient care.    Reason for Hospitalization(C/C,HPI and brief patient summary):sob       Significant findings(Primary diagnosis )Procedures and treatments provided(Hospital course ,consults, procedures):Please see below for details     Pilo Quintana is a 73 year old female admitted on 10/23/2023 with history of dementia, HTN, type II diabetes mellitus, CVA, cardiomegaly, and asthma who presents to  the ED with shortness of breath and hypoxia. Son reports that she has been short of breath the last few days and he has been checking her O2 sats the last few mornings, finding them to be in the high 80s and low 90s. He then noticed that the patient was wheezing after she woke up this morning. He checked her O2 sats and found it to be 71% at this time. En route to the ED her O2 sats were fluctuating around 84-85%. She has been using her inhaler without significant relief.      Workup in the ED revealed elevated BP as well as hypoxia, with SPO2 values in the 70s and mid 80s concerning for reactive airway disease/asthma exacerbation. Workup was negative for influenza/RSV/COVID. CBC, BMP, troponin, and BNP were also unremarkable. VBG showed slight respiratory acidosis with metabolic compensation with a pH of 7.30 and PCO2 of 73 . Echo, chest x-ray, and EKG unremarkable. Patient was given DuoNeb therapy x3, Solu-Medrol, and magnesium and placed on supplemental oxygen weaned down to 3 L per oxy mask.      Problem list (medical problems addressed during hospital stay):    Acute mixed hypoxic/hypercarbic respiratory failure  Asthma exacerbation with hypoxia   -- Patient does not have formal diagnosis of asthma, but has been seen by PCP for wheezing. She typically uses Flovent BID, albuterol nebulizer a couple of times per day, and albuterol inhaler as needed.   -- She does have clinical signs of asthma; recommend PFTs outpatient at some point to establish formal diagnosis.  -- On presentation to the ED SP02 values were in the 70s and mid 80s,  improved to 99%,   -- Patient was admitted and treated with scheduled DuoNeb, as needed albuterol nebulizer, supplemental oxygen and steroid.  -- Currently she is requiring 2 L of supplemental oxygen.  CT scan of the chest showed no evidence of acute infiltrate but evidence of pulmonary hypertension.  Suspect her presentation is combination of asthma , MAGALIS and pulm hypertension.   Echocardiogram showed evidence of moderate pulmonary hypertension with preserved EF.  Mild outflow obstruction noted as well.  Patient has increased work of breathing and respiratory acidosis on VBG.  She was treated with BiPAP with improvement of her symptoms and gases.    -- She was seen by pulmonary medicine and was recommended follow out patient for sleep study and follow up   -- she required prescription of home oxygen      Type II diabetes mellitus with albuminuria, Hypoglycemia episodes   -- Appears to be well controlled, patient follows with her PCP who she saw earlier this month  -- Most recent A1C on 08/10/2023 = 8.1.    -- Continued  glipizide XL 10mg daily  -- Continued metformin XR 500mg twice daily   -- Treated with sliding scale insulin.  -- She was started on 10 units of Lantus at bedtime and her blood sugar was low and Lantus was discontinued.  She was on  sliding scale insulin    --Due to risk of hypoglycemia , she glipizide was discontinued and plan to continue metformin and follow with PCP     HTN  -- Continued  Losartan 100mg daily  -- Continued metoprolol 50mg daily  -- Continued furosemide at 40 mg daily  -- Continued amlodipine at 5 mg daily    -- Furosemide and amlodipine increased.      Hyperlipidemia  -- Continued atorvastatin      Hx of CVA with residual right-sided weakness  Dementia  Language barrier (son translates)  -- Hospitalized on 05/11/2023 following intracranial hemorrhage of the ventral casimiro. During workup, there was an incidental finding of a 1x2mm right MCA bifurcation aneurysm. Patient followed up with neurology for this on 05/23/2023 and plans to repeat CTA head in 1 year.   -- Continued aspirin 81mg daily      Moderate pulm hypertension: Continued supplemental oxygen, home medications     Clinically Significant Risk Factors Present on Admission          # Hypomagnesemia: Lowest Mg = 1.6 mg/dL in last 2 days, will replace as needed     # Drug Induced Platelet Defect: home  medication list includes an antiplatelet medication   # Hypertension: Noted on problem list   # Acute Respiratory Failure: Documented O2 saturation < 91%.  Continue supplemental oxygen as needed    # DMII: A1C = 9.0 % (Ref range: <5.7 %) within past 6 months                            Consultations during hospital stay:       RESPIRATORY CARE IP CONSULT  RESPIRATORY CARE IP CONSULT  PULMONARY IP CONSULT  CARE MANAGEMENT / SOCIAL WORK IP CONSULT  PHYSICAL THERAPY ADULT IP CONSULT  OCCUPATIONAL THERAPY ADULT IP CONSULT  OCCUPATIONAL THERAPY ADULT IP CONSULT  SOCIAL WORK IP CONSULT      Patient discharge Condition:     stable    BP (!) 142/73 (BP Location: Left arm)   Pulse 61   Temp 97.1  F (36.2  C) (Axillary)   Resp 18   Ht 1.524 m (5')   Wt 75.5 kg (166 lb 7.2 oz)   SpO2 95%   BMI 32.51 kg/m         Discharge Instructions:       Patient/family instructions: Written discharge instruction given to patient/family    Discharge Medications:       Review of your medicines        START taking        Dose / Directions   predniSONE 10 MG tablet  Commonly known as: DELTASONE      4 tabs daily for 2 days, then 3 tabs daily for 2 days, then 2 tabs daily for 2 days, then 1 tab daily for 2 days, then stop  Quantity: 20 tablet  Refills: 0            CONTINUE these medicines which may have CHANGED, or have new prescriptions. If we are uncertain of the size of tablets/capsules you have at home, strength may be listed as something that might have changed.        Dose / Directions   budesonide-formoterol 160-4.5 MCG/ACT Inhaler  Commonly known as: SYMBICORT  This may have changed:   how much to take  how to take this  when to take this  additional instructions      Inhale 2 puffs once daily plus 1-2 puffs as needed. May use up to 12 puffs per day.  Quantity: 20.4 g  Refills: 11     furosemide 20 MG tablet  Commonly known as: LASIX  This may have changed: how much to take      Dose: 40 mg  Take 2 tablets (40 mg) by mouth  daily  Refills: 0            CONTINUE these medicines which have NOT CHANGED        Dose / Directions   acetaminophen 500 MG tablet  Commonly known as: TYLENOL      Dose: 500-1,000 mg  Take 500-1,000 mg by mouth every 8 hours as needed for mild pain  Refills: 0     * albuterol 108 (90 Base) MCG/ACT inhaler  Commonly known as: PROAIR HFA/PROVENTIL HFA/VENTOLIN HFA      INHALE 2 PUFFS BY MOUTH EVERY 4 HOURS AS NEEDED FOR WHEEZING  Refills: 0     * albuterol (2.5 MG/3ML) 0.083% neb solution  Commonly known as: PROVENTIL      Dose: 1 vial  Take 1 vial by nebulization every 6 hours as needed for shortness of breath, wheezing or cough  Refills: 0     amLODIPine 2.5 MG tablet  Commonly known as: NORVASC      Dose: 1 tablet  Take 1 tablet by mouth daily  Refills: 0     aspirin 81 MG EC tablet  Commonly known as: ASA  Used for: Right-sided nontraumatic intracerebral hemorrhage, unspecified cerebral location (H)      Dose: 81 mg  Take 1 tablet (81 mg) by mouth daily  Quantity: 30 tablet  Refills: 11     atorvastatin 40 MG tablet  Commonly known as: LIPITOR  Used for: Right-sided nontraumatic intracerebral hemorrhage, unspecified cerebral location (H)      Dose: 40 mg  Take 1 tablet (40 mg) by mouth every evening  Quantity: 30 tablet  Refills: 4     losartan 100 MG tablet  Commonly known as: COZAAR      Dose: 100 mg  Take 100 mg by mouth daily  Refills: 0     metFORMIN 500 MG 24 hr tablet  Commonly known as: GLUCOPHAGE XR      Dose: 500 mg  Take 500 mg by mouth 2 times daily (with meals)  Refills: 0     metoprolol succinate ER 50 MG 24 hr tablet  Commonly known as: TOPROL XL      Dose: 50 mg  Take 50 mg by mouth daily  Refills: 0     triamcinolone 0.1 % external ointment  Commonly known as: KENALOG      Apply topically 2 times daily as needed for irritation  Refills: 0     vitamin D3 50 MCG (2000 UT) Caps      Dose: 50 mcg  Take 50 mcg by mouth daily  Refills: 0           * This list has 2 medication(s) that are the same as  other medications prescribed for you. Read the directions carefully, and ask your doctor or other care provider to review them with you.                STOP taking      glipiZIDE 10 MG 24 hr tablet  Commonly known as: GLUCOTROL XL                  Where to get your medicines        These medications were sent to [id number] - [place], [place] - [address]  [address], [place] [address]      Phone: [phone]   budesonide-formoterol 160-4.5 MCG/ACT Inhaler  predniSONE 10 MG tablet          Discharge diet:Orders Placed This Encounter      Moderate Consistent Carb (60 g CHO per Meal) Diet      Diet    diabetic diet      Discharge activity:Activity as tolerated      Discharge follow-up:    Follow up with primary care provider in 7 days or earlier if symptoms return or gets worse.    Follow up with consultant as instructed  with pulmonary medicine  and cardiology       Other instructions:    We discussed with patient/family about detail discharge instructions as well as discharge medications above including potential risks,side effects and benefits.Patient/family understood benefits and potential serious side effects of taking these medications and need to follow up with PCP if the patient develops complications.  Patient is also advised to see a doctor immediately for severe symptoms.        Major procedure performed/  Significant Diagnostic Studies:       Results for orders placed or performed during the hospital encounter of 10/23/23   XR Chest Port 1 View    Narrative    CHEST ONE VIEW  10/23/2023 8:08 AM     HISTORY: Shortness of breath.    COMPARISON: April 22, 2023      Impression    IMPRESSION: No acute disease.    JENNIFER LAYNE MD         SYSTEM ID:  [id number]   POC US ECHO LIMITED    Impression     Limited Bedside ED Cardiac Ultrasound Procedure Note:    PROCEDURE: PERFORMED BY: Dr. Mario Orozco MD  INDICATIONS/SYMPTOM:  Shortness of Breath  PROBE: Cardiac phased array  probe  BODY LOCATION: Chest  FINDINGS:   The ultrasound was performed utilizing the parasternal long axis and apical 4 chamber views.  Cardiac contractility:  Present  Gross estimation of cardiac kinesis: normal  Pericardial Effusion:  None  RV:LV ratio: LV > RV    INTERPRETATION:    Chamber size and motion were grossly normal with LV > RV, normal cardiac kinesis.  No pericardial effusion was found.    IMAGE DOCUMENTATION: Images were archived to PACs system.         CT Chest Pulmonary Embolism w Contrast    Narrative    CT CHEST PULMONARY EMBOLISM WITH CONTRAST 10/25/2023 11:33 AM    CLINICAL HISTORY: Shortness of breath. Female sex; Not pregnant; No  prior imaging in the last 24 hours; Pulmonary Embolism Rule-Out  Criteria (PERC) score > 0; Revised Grainger Score (RGS) not >= 11; No  D-dimer result available; D-dimer not ordered.    TECHNIQUE: CT angiogram chest during arterial phase injection IV  contrast. 2D and 3D MIP reconstructions were performed by the CT  technologist. Dose reduction techniques were used.   CONTRAST: 67mL Isovue-370    COMPARISON: 9/12/2023    FINDINGS:  ANGIOGRAM CHEST: Central pulmonary arteries are enlarged, similar to  previous. No evidence of pulmonary embolus. Thoracic aorta is negative  for dissection.    LUNGS AND PLEURA: Discoid atelectasis in the left lung base. Lungs are  otherwise clear. No pleural effusion.    MEDIASTINUM/AXILLAE: Generalized cardiomegaly. Moderate coronary  artery calcification. No lymphadenopathy.    UPPER ABDOMEN: Normal.    MUSCULOSKELETAL: Normal.      Impression    IMPRESSION:  1.  No evidence of pulmonary embolus.  2.  Enlarged central pulmonary arteries are unchanged from previous  and may represent pulmonary arterial hypertension.     BRENT PEREZ MD         SYSTEM ID:  [id number]       Recent Labs   Lab 10/29/23  0637 10/27/23  0743 10/24/23  0801   WBC 7.8 6.9 5.3   HGB 12.9 12.8 13.2   HCT 41.6 41.1 41.8   MCV 94 94 93    257 258     No  "results for input(s): \"CULT\" in the last 168 hours.  Recent Labs   Lab 10/29/23  1250 10/29/23  0913 10/29/23  0855 10/29/23  0806 10/29/23  0637 10/28/23  1149 10/28/23  0724 10/27/23  0755 10/27/23  0743   NA  --   --   --   --  145  --  144  --  146*   POTASSIUM  --   --   --   --  4.0  --  3.9  --  3.6   CHLORIDE  --   --   --   --  103  --  102  --  103   CO2  --   --   --   --  33*  --  32*  --  35*   ANIONGAP  --   --   --   --  9  --  10  --  8   * 120* 99   < > 44*   < > 124*   < > 55*   BUN  --   --   --   --  47.0*  --  45.2*  --  34.9*   CR  --   --   --   --  0.81  --  0.82  --  0.68   GFRESTIMATED  --   --   --   --  76  --  75  --  >90   GLENIS  --   --   --   --  10.3*  --  10.0  --  9.9   MAG  --   --   --   --  1.9  --  1.8  --  1.9   PROTTOTAL  --   --   --   --   --   --   --   --  6.3*   ALBUMIN  --   --   --   --   --   --   --   --  3.7   BILITOTAL  --   --   --   --   --   --   --   --  0.3   ALKPHOS  --   --   --   --   --   --   --   --  68   AST  --   --   --   --   --   --   --   --  22   ALT  --   --   --   --   --   --   --   --  18    < > = values in this interval not displayed.       Recent Labs   Lab 10/29/23  1250 10/29/23  0913 10/29/23  0855 10/29/23  0839 10/29/23  0823   * 120* 99 71 45*         Pending Results:       Unresulted Labs Ordered in the Past 30 Days of this Admission       No orders found from 9/23/2023 to 10/24/2023.               Patient Allergies:       No Known Allergies      Disposition:     Disposition: home    Discharge needs: home care service          I saw and evaluated the patient on day of discharge and  discharge instructions reviewed  and  all the patient's questions and concerns addressed. Over 30 minutes spent on discharge and coordination of discharge process for this patient.      Disclaimer: This note consists of symbols derived from keyboarding, dictation and/or voice recognition software. As a result, there may be errors in the script " that have gone undetected. Please consider this when interpreting information found in this chart

## 2023-10-29 NOTE — PROGRESS NOTES
Patient has been assessed for Home Oxygen needs. Oxygen readings:    *Pulse oximetry (SpO2) = 85% on room air at rest while awake.    *SpO2 improved to 95% on 1 liters/minute at rest.    *SpO2 = 86% on room air during activity/with exercise.    *SpO2 improved to 95% on 2 liters/minute during activity/with exercise.

## 2023-10-29 NOTE — PROGRESS NOTES
Patient was seen and examined   Discharge summary to follow   She needs home oxygen     Patient has been assessed for Home Oxygen needs. Oxygen readings:    *Pulse oximetry (SpO2) = 85% on room air at rest while awake.    *SpO2 improved to 95% on 1 liters/minute at rest.    *SpO2 = 86% on room air during activity/with exercise.    *SpO2 improved to 95% on 2 liters/minute during activity/with exercise.

## 2023-10-29 NOTE — PROGRESS NOTES
Care Management Discharge Note    Discharge Date: 10/29/2023       Discharge Disposition: Home, Home care    Discharge Services: None    Discharge DME:      Discharge Transportation: agency    Private pay costs discussed: Not applicable    PAS Confirmation Code:    Patient/family educated on Medicare website which has current facility and service quality ratings: no    Education Provided on the Discharge Plan:  yes  Persons Notified of Discharge Plans: son  Patient/Family in Agreement with the Plan:yes      Handoff Referral Completed: Yes    Additional Information:  Medically ready for discharge today. PT/OT recommendations are for home therapies. Discussed with patients bety Britt and they would like referrals sent for PT/OT/RN. [facility]. Explained we may not have an accepting agency before she discharges today but they will be notified if we are able to secure an agency.  Will monitor for messages regarding acceptance.  Plan to discharge home with family and new home oxygen.    Addendum:   [facility] has accepted patient for RN/PT/OT. AVS updated with contact information.    Dixie Camacho RN BSN OCN  Care Coordinator  [facility]  [phone]

## 2023-10-29 NOTE — PROGRESS NOTES
I certify that this patient, Pilo Quintana has been under my care (or a nurse practitioner or physican's assistant working with me). This is the face-to-face encounter for oxygen medical necessity.      At the time of this encounter supplemental oxygen is reasonable and necessary and is expected to improve the patient's condition in a home setting.       Patient has continued oxygen desaturation due to Chronic Respiratory Failure with Hypoxia J96.11.    If portability is ordered, is the patient mobile within the home? yes          Patient was seen and examined   Discharge summary to follow   She needs home oxygen      Patient has been assessed for Home Oxygen needs. Oxygen readings:     *Pulse oximetry (SpO2) = 85% on room air at rest while awake.     *SpO2 improved to 95% on 1 liters/minute at rest.     *SpO2 = 86% on room air during activity/with exercise.     *SpO2 improved to 95% on 2 liters/minute during activity/with exercise.

## 2023-10-29 NOTE — PROGRESS NOTES
Patient received nebs as ordered. Per MD note and son at bedside, patient to stay off of BiPAP tonight on oxygen. BiPAP still in room if needed. RT to be notified if BiPAP is required.     Pt remained off BiPAP all night.

## 2023-10-29 NOTE — PLAN OF CARE
Goal Outcome Evaluation:         Pertinent assessments: Pt A&O, non-english speaking. VS stable. Denies pain. CMS: Patient denies any numbness or tingling. Ambulation: Assist of 2 with gait belt and walker. Tolerates moderate carb diet. BG was 304, 134 overnight. Plan: Possible discharge on Monday to home with son.      Major Shift Events: none    Treatment Plan: Bipap to be off night per Provider's note. Plan to ween off of oxygen currently on 1 L NC. VBG's to be drawn 10/29/23 AM.

## 2023-10-29 NOTE — DISCHARGE INSTRUCTIONS
Your home care referral was sent to [facility]  If you haven't heard from them within the next 24-48 hours,  Please call them at [phone]

## 2023-10-29 NOTE — PROGRESS NOTES
Patients blood sugar was 44 with labs this am. 1 full glass of apple juice given. Blood sugar 45 on recheck. 1 full glass of OJ given. Blood sugar 71 on recheck. Gel given.

## 2023-10-30 ENCOUNTER — PATIENT OUTREACH (OUTPATIENT)
Dept: CARE COORDINATION | Facility: CLINIC | Age: 73
End: 2023-10-30
Payer: COMMERCIAL

## 2023-10-30 NOTE — PROGRESS NOTES
[facility]    Background: Transitional Care Management program identified per system criteria and reviewed by [facility] team for possible outreach.    Assessment: Upon chart review, [facility] Team member will not proceed with patient outreach related to this episode of Transitional Care Management program due to reason below:    Upon review of CareEverywhere-   Patient has a follow up appointment with an appropriate provider for hospital discharge through [facility]    Plan: Transitional Care Management episode addressed appropriately per reason noted above.      Jeannette Sevilla RN  [facility], [facility]    *Connected Care Resource Team does NOT follow patient ongoing. Referrals are identified based on internal discharge reports and the outreach is to ensure patient has an understanding of their discharge instructions.

## 2023-10-30 NOTE — PLAN OF CARE
"Physical Therapy Discharge Summary    Reason for therapy discharge:    Discharged to home with home therapy.    Progress towards therapy goal(s). See goals on Care Plan in [facility] electronic health record for goal details.  Goals partially met.  Barriers to achieving goals:   discharge from facility.    Therapy recommendation(s):    Continued therapy is recommended.  Rationale/Recommendations:  Per prior PT recommendation, \"Pt at baseline has assistance from family (son and daughter-in-law) all ADLs and IADLs. Lives in house, chair lift for stairs. Pt is below baseline, decreased strength noted and needing Ax1-2 for mobility on this date. Son reports feeling this is managable by family within the home setting. Recommend ongoing HH PT given ongoing weakness. (Pt states they were going to OP PT, but HH PT may be more appropriate given level of weakness and decreased activity tolerance currently).\".      "

## 2023-11-05 ENCOUNTER — APPOINTMENT (OUTPATIENT)
Dept: GENERAL RADIOLOGY | Facility: CLINIC | Age: 73
End: 2023-11-05
Attending: EMERGENCY MEDICINE
Payer: COMMERCIAL

## 2023-11-05 ENCOUNTER — HOSPITAL ENCOUNTER (EMERGENCY)
Facility: CLINIC | Age: 73
Discharge: HOME | End: 2023-11-05
Attending: EMERGENCY MEDICINE | Admitting: EMERGENCY MEDICINE
Payer: COMMERCIAL

## 2023-11-05 VITALS
SYSTOLIC BLOOD PRESSURE: 142 MMHG | OXYGEN SATURATION: 99 % | TEMPERATURE: 100 F | HEART RATE: 64 BPM | RESPIRATION RATE: 17 BRPM | DIASTOLIC BLOOD PRESSURE: 95 MMHG

## 2023-11-05 DIAGNOSIS — R00.2 PALPITATIONS: ICD-10-CM

## 2023-11-05 LAB
ANION GAP SERPL CALCULATED.3IONS-SCNC: 11 MMOL/L (ref 7–15)
BASE EXCESS BLDV CALC-SCNC: 15.3 MMOL/L (ref -7.7–1.9)
BASOPHILS # BLD AUTO: 0 10E3/UL (ref 0–0.2)
BASOPHILS NFR BLD AUTO: 0 %
BUN SERPL-MCNC: 17.9 MG/DL (ref 8–23)
CALCIUM SERPL-MCNC: 9.9 MG/DL (ref 8.8–10.2)
CHLORIDE SERPL-SCNC: 90 MMOL/L (ref 98–107)
CREAT SERPL-MCNC: 0.63 MG/DL (ref 0.51–0.95)
D DIMER PPP FEU-MCNC: 0.49 UG/ML FEU (ref 0–0.5)
DEPRECATED HCO3 PLAS-SCNC: 38 MMOL/L (ref 22–29)
EGFRCR SERPLBLD CKD-EPI 2021: >90 ML/MIN/1.73M2
EOSINOPHIL # BLD AUTO: 0 10E3/UL (ref 0–0.7)
EOSINOPHIL NFR BLD AUTO: 0 %
ERYTHROCYTE [DISTWIDTH] IN BLOOD BY AUTOMATED COUNT: 13.2 % (ref 10–15)
FLUAV RNA SPEC QL NAA+PROBE: NEGATIVE
FLUBV RNA RESP QL NAA+PROBE: NEGATIVE
GLUCOSE SERPL-MCNC: 375 MG/DL (ref 70–99)
HCO3 BLDV-SCNC: 44 MMOL/L (ref 21–28)
HCT VFR BLD AUTO: 44.2 % (ref 35–47)
HGB BLD-MCNC: 14.1 G/DL (ref 11.7–15.7)
HOLD SPECIMEN: NORMAL
IMM GRANULOCYTES # BLD: 0 10E3/UL
IMM GRANULOCYTES NFR BLD: 0 %
LYMPHOCYTES # BLD AUTO: 1 10E3/UL (ref 0.8–5.3)
LYMPHOCYTES NFR BLD AUTO: 10 %
MCH RBC QN AUTO: 29.5 PG (ref 26.5–33)
MCHC RBC AUTO-ENTMCNC: 31.9 G/DL (ref 31.5–36.5)
MCV RBC AUTO: 93 FL (ref 78–100)
MONOCYTES # BLD AUTO: 0.3 10E3/UL (ref 0–1.3)
MONOCYTES NFR BLD AUTO: 3 %
NEUTROPHILS # BLD AUTO: 8.3 10E3/UL (ref 1.6–8.3)
NEUTROPHILS NFR BLD AUTO: 87 %
NRBC # BLD AUTO: 0 10E3/UL
NRBC BLD AUTO-RTO: 0 /100
O2/TOTAL GAS SETTING VFR VENT: 2 %
OXYHGB MFR BLDV: 56 % (ref 70–75)
PCO2 BLDV: 69 MM HG (ref 40–50)
PH BLDV: 7.41 [PH] (ref 7.32–7.43)
PLATELET # BLD AUTO: 219 10E3/UL (ref 150–450)
PO2 BLDV: 30 MM HG (ref 25–47)
POTASSIUM SERPL-SCNC: 4.2 MMOL/L (ref 3.4–5.3)
RBC # BLD AUTO: 4.78 10E6/UL (ref 3.8–5.2)
RSV RNA SPEC NAA+PROBE: NEGATIVE
SARS-COV-2 RNA RESP QL NAA+PROBE: NEGATIVE
SODIUM SERPL-SCNC: 139 MMOL/L (ref 135–145)
TROPONIN T SERPL HS-MCNC: 12 NG/L
WBC # BLD AUTO: 9.6 10E3/UL (ref 4–11)

## 2023-11-05 PROCEDURE — 85379 FIBRIN DEGRADATION QUANT: CPT | Performed by: EMERGENCY MEDICINE

## 2023-11-05 PROCEDURE — 93005 ELECTROCARDIOGRAM TRACING: CPT

## 2023-11-05 PROCEDURE — 84484 ASSAY OF TROPONIN QUANT: CPT | Performed by: EMERGENCY MEDICINE

## 2023-11-05 PROCEDURE — 71046 X-RAY EXAM CHEST 2 VIEWS: CPT

## 2023-11-05 PROCEDURE — 82805 BLOOD GASES W/O2 SATURATION: CPT | Performed by: EMERGENCY MEDICINE

## 2023-11-05 PROCEDURE — 99285 EMERGENCY DEPT VISIT HI MDM: CPT | Mod: 25

## 2023-11-05 PROCEDURE — 36415 COLL VENOUS BLD VENIPUNCTURE: CPT | Performed by: EMERGENCY MEDICINE

## 2023-11-05 PROCEDURE — 87637 SARSCOV2&INF A&B&RSV AMP PRB: CPT | Performed by: EMERGENCY MEDICINE

## 2023-11-05 PROCEDURE — 80048 BASIC METABOLIC PNL TOTAL CA: CPT | Performed by: EMERGENCY MEDICINE

## 2023-11-05 PROCEDURE — 85025 COMPLETE CBC W/AUTO DIFF WBC: CPT | Performed by: EMERGENCY MEDICINE

## 2023-11-05 ASSESSMENT — ACTIVITIES OF DAILY LIVING (ADL)
ADLS_ACUITY_SCORE: 35
ADLS_ACUITY_SCORE: 35

## 2023-11-05 NOTE — ED PROVIDER NOTES
History     Chief Complaint:  Chest Pain       HPI     Pilo Quintana is a 73 year old female presents with palpitations.  Patient's son is interpreting for the patient.  Patient was in her normal state of health today up until around noon when she developed a sense that her heart was racing.  She denied any chest pain in opposition to triage nurse note.  She felt mildly short of breath but has baseline dyspnea.  She has been on 2 L O2 since hospital discharge.  She was discharged from the hospital on 10/29/2023 for respiratory problems.  She was not on oxygen prior to this admission.      Independent Historian:   Son reports that she otherwise appears well today and has not been visibly short of breath.    Review of External Notes:   I reviewed the discharge summary from 10/29. COPD/asthma exacerbation. Improved with nebs, steroids, and oxygen.    Medications:    Albuterol  Proventil  Norvasc  ASA  Lipitor  Symbicort  Lasix   Cozaar  Glucophage  Toprol  Deltasone    Past Medical History:    Diabetes  Hypertension  Morbid obesity  Osteoarthritis  PMB  Renal mass  Thickened endometrium  Uncomplicated asthma  Cerebral artery occlusion    Past Surgical History:    Dilation and curettage, operative hysteroscopy combined    Physical Exam   Patient Vitals for the past 24 hrs:   BP Temp Temp src Pulse Resp SpO2   11/05/23 1830 (!) 142/95 -- -- 64 17 99 %   11/05/23 1700 (!) 148/98 -- -- -- -- 99 %   11/05/23 1600 (!) 155/101 100  F (37.8  C) Oral 79 20 100 %        Physical Exam      HEENT:    Oropharynx is moist  Eyes:    Conjunctiva normal  Neck:     Supple, no meningismus.     CV:     Regular rate and rhythm.      No murmurs, rubs or gallops.       No unilateral leg swelling.       2+ radial pulses bilateral.    PULM:    Clear to auscultation bilateral.       No respiratory distress.      Good air exchange.     No rales or wheezing.     No stridor.  ABD:    Soft, non-tender, non-distended.       No pulsatile masses.        No rebound, guarding or rigidity.  MSK:     No gross deformity to all four extremities.   LYMPH:   No cervical lymphadenopathy.  NEURO:   Alert. Good muscle tone, no atrophy.  Skin:    Warm, dry and intact.    Psych:    Mood is good and affect is appropriate.      Emergency Department Course   ECG  ECG results from 11/05/23   EKG 12-lead, tracing only     Value    Systolic Blood Pressure     Diastolic Blood Pressure     Ventricular Rate 76    Atrial Rate 76    SC Interval 130    QRS Duration 86        QTc 420    P Axis 22    R AXIS -31    T Axis 16    Interpretation ECG      Sinus rhythm with occasional Premature ventricular complexes  Left axis deviation  Abnormal ECG  When compared with ECG of 23-OCT-2023 08:47,  Premature ventricular complexes are now Present  Interpreted by Cirilo Marroquin MD at 1659.         Imaging:  Chest XR,  PA & LAT   Final Result   IMPRESSION: Patient rotation of the right. Cardiac enlargement. Normal pulmonary vascularity. No pulmonary infiltrates. Aortic calcification. Degenerative changes in the spine and shoulders.             Laboratory:  Labs Ordered and Resulted from Time of ED Arrival to Time of ED Departure   BASIC METABOLIC PANEL - Abnormal       Result Value    Sodium 139      Potassium 4.2      Chloride 90 (*)     Carbon Dioxide (CO2) 38 (*)     Anion Gap 11      Urea Nitrogen 17.9      Creatinine 0.63      GFR Estimate >90      Calcium 9.9      Glucose 375 (*)    BLOOD GAS VENOUS WITH OXYHEMOGLOBIN - Abnormal    pH Venous 7.41      pCO2 Venous 69 (*)     pO2 Venous 30      Bicarbonate Venous 44 (*)     FIO2 2      Oxyhemoglobin Venous 56 (*)     Base Excess/Deficit 15.3 (*)    TROPONIN T, HIGH SENSITIVITY - Normal    Troponin T, High Sensitivity 12     D DIMER QUANTITATIVE - Normal    D-Dimer Quantitative 0.49     INFLUENZA A/B, RSV, & SARS-COV2 PCR - Normal    Influenza A PCR Negative      Influenza B PCR Negative      RSV PCR Negative      SARS CoV2 PCR  Negative     CBC WITH PLATELETS AND DIFFERENTIAL    WBC Count 9.6      RBC Count 4.78      Hemoglobin 14.1      Hematocrit 44.2      MCV 93      MCH 29.5      MCHC 31.9      RDW 13.2      Platelet Count 219      % Neutrophils 87      % Lymphocytes 10      % Monocytes 3      % Eosinophils 0      % Basophils 0      % Immature Granulocytes 0      NRBCs per 100 WBC 0      Absolute Neutrophils 8.3      Absolute Lymphocytes 1.0      Absolute Monocytes 0.3      Absolute Eosinophils 0.0      Absolute Basophils 0.0      Absolute Immature Granulocytes 0.0      Absolute NRBCs 0.0          Emergency Department Course & Assessments:         Interventions:  Medications - No data to display     Assessments:   I obtained history and examined the patient as noted above.   I rechecked and updated the patient.   The patient is comfortable with plan for discharge.    Independent Interpretation (X-rays, CTs, rhythm strip):  I independently reviewed chest x-ray which shows no pneumothorax, focal pneumonia or pulmonary edema    Consultations/Discussion of Management or Tests:  None        Social Determinants of Health affecting care:   None    Disposition:  The patient was discharged to home.     Impression & Plan      Medical Decision Makin-year-old female presents with primary complaints of palpitations.  EKG is without dysrhythmia although did have PVCs.  She was evaluated for atypical ischemia.  No ischemic changes on EKG.  Troponin within normal limits.  No indication for serial enzymes based on duration of symptoms.  No evidence of acute bronchospasm.  Low suspicion for PE.  D-dimer within normal limits thus no indication for CT scan of the chest.  Viral testing negative as she had a mildly elevated temperature 100.0.  Chest x-ray unrevealing.  It is unclear whether the palpitations were related to the PVCs.  Low suspicion for transient dysrhythmia as patient reported palpitations during evaluation in which there is  no dysrhythmia on cardiac monitor EKG.  Patient safer discharge home with close follow-up with PCP.    Diagnosis:    ICD-10-CM    1. Palpitations  R00.2            Discharge Medications:  New Prescriptions    No medications on file          Scribe Disclosure:  I, Angélica Tran, am serving as a scribe at 4:54 PM on 11/5/2023 to document services personally performed by Cirilo Marroquin MD based on my observations and the provider's statements to me.   11/5/2023   Cirilo Marroquin MD Matthews, Jeremiah R, MD  11/05/23 2053

## 2023-11-05 NOTE — ED TRIAGE NOTES
Patient C/O chest pain with occasional SOB starting this morning. Feels like her heart is racing in her chest. Patient discharged from hospital a week ago for breathing related difficulties. Patient resting in chair, VSS. ABCs intact. Son at patients side for translations.      Triage Assessment (Adult)       Row Name 11/05/23 1602          Triage Assessment    Airway WDL WDL        Respiratory WDL    Respiratory WDL X  occasional SOB        Skin Circulation/Temperature WDL    Skin Circulation/Temperature WDL WDL        Cardiac WDL    Cardiac WDL WDL        Peripheral/Neurovascular WDL    Peripheral Neurovascular WDL WDL        Cognitive/Neuro/Behavioral WDL    Cognitive/Neuro/Behavioral WDL WDL

## 2023-11-06 LAB
ATRIAL RATE - MUSE: 76 BPM
DIASTOLIC BLOOD PRESSURE - MUSE: NORMAL MMHG
INTERPRETATION ECG - MUSE: NORMAL
P AXIS - MUSE: 22 DEGREES
PR INTERVAL - MUSE: 130 MS
QRS DURATION - MUSE: 86 MS
QT - MUSE: 374 MS
QTC - MUSE: 420 MS
R AXIS - MUSE: -31 DEGREES
SYSTOLIC BLOOD PRESSURE - MUSE: NORMAL MMHG
T AXIS - MUSE: 16 DEGREES
VENTRICULAR RATE- MUSE: 76 BPM

## 2023-12-05 ENCOUNTER — APPOINTMENT (OUTPATIENT)
Dept: GENERAL RADIOLOGY | Facility: CLINIC | Age: 73
End: 2023-12-05
Attending: EMERGENCY MEDICINE
Payer: COMMERCIAL

## 2023-12-05 ENCOUNTER — HOSPITAL ENCOUNTER (EMERGENCY)
Facility: CLINIC | Age: 73
Discharge: HOME | End: 2023-12-06
Attending: EMERGENCY MEDICINE | Admitting: EMERGENCY MEDICINE
Payer: COMMERCIAL

## 2023-12-05 DIAGNOSIS — R55 SYNCOPE, UNSPECIFIED SYNCOPE TYPE: ICD-10-CM

## 2023-12-05 LAB
ALBUMIN SERPL BCG-MCNC: 3.6 G/DL (ref 3.5–5.2)
ALP SERPL-CCNC: 79 U/L (ref 40–150)
ALT SERPL W P-5'-P-CCNC: 22 U/L (ref 0–50)
ANION GAP SERPL CALCULATED.3IONS-SCNC: 12 MMOL/L (ref 7–15)
AST SERPL W P-5'-P-CCNC: 30 U/L (ref 0–45)
AST SERPL W P-5'-P-CCNC: ABNORMAL U/L
BASOPHILS # BLD AUTO: 0 10E3/UL (ref 0–0.2)
BASOPHILS NFR BLD AUTO: 0 %
BILIRUB SERPL-MCNC: 0.2 MG/DL
BUN SERPL-MCNC: 19.7 MG/DL (ref 8–23)
CALCIUM SERPL-MCNC: 9.7 MG/DL (ref 8.8–10.2)
CHLORIDE SERPL-SCNC: 94 MMOL/L (ref 98–107)
CREAT SERPL-MCNC: 0.71 MG/DL (ref 0.51–0.95)
DEPRECATED HCO3 PLAS-SCNC: 33 MMOL/L (ref 22–29)
EGFRCR SERPLBLD CKD-EPI 2021: 89 ML/MIN/1.73M2
EOSINOPHIL # BLD AUTO: 0.1 10E3/UL (ref 0–0.7)
EOSINOPHIL NFR BLD AUTO: 1 %
ERYTHROCYTE [DISTWIDTH] IN BLOOD BY AUTOMATED COUNT: 13.4 % (ref 10–15)
GLUCOSE SERPL-MCNC: 385 MG/DL (ref 70–99)
HCT VFR BLD AUTO: 38 % (ref 35–47)
HGB BLD-MCNC: 11.8 G/DL (ref 11.7–15.7)
HOLD SPECIMEN: NORMAL
IMM GRANULOCYTES # BLD: 0 10E3/UL
IMM GRANULOCYTES NFR BLD: 0 %
LYMPHOCYTES # BLD AUTO: 0.7 10E3/UL (ref 0.8–5.3)
LYMPHOCYTES NFR BLD AUTO: 10 %
MCH RBC QN AUTO: 29.1 PG (ref 26.5–33)
MCHC RBC AUTO-ENTMCNC: 31.1 G/DL (ref 31.5–36.5)
MCV RBC AUTO: 94 FL (ref 78–100)
MONOCYTES # BLD AUTO: 0.5 10E3/UL (ref 0–1.3)
MONOCYTES NFR BLD AUTO: 7 %
NEUTROPHILS # BLD AUTO: 5.9 10E3/UL (ref 1.6–8.3)
NEUTROPHILS NFR BLD AUTO: 82 %
NRBC # BLD AUTO: 0 10E3/UL
NRBC BLD AUTO-RTO: 0 /100
NT-PROBNP SERPL-MCNC: 175 PG/ML (ref 0–900)
PLATELET # BLD AUTO: 225 10E3/UL (ref 150–450)
POTASSIUM SERPL-SCNC: 5.6 MMOL/L (ref 3.4–5.3)
PROT SERPL-MCNC: 6.9 G/DL (ref 6.4–8.3)
RBC # BLD AUTO: 4.06 10E6/UL (ref 3.8–5.2)
SODIUM SERPL-SCNC: 139 MMOL/L (ref 135–145)
WBC # BLD AUTO: 7.3 10E3/UL (ref 4–11)

## 2023-12-05 PROCEDURE — 96360 HYDRATION IV INFUSION INIT: CPT

## 2023-12-05 PROCEDURE — 85025 COMPLETE CBC W/AUTO DIFF WBC: CPT | Performed by: EMERGENCY MEDICINE

## 2023-12-05 PROCEDURE — 71046 X-RAY EXAM CHEST 2 VIEWS: CPT

## 2023-12-05 PROCEDURE — 36415 COLL VENOUS BLD VENIPUNCTURE: CPT | Performed by: EMERGENCY MEDICINE

## 2023-12-05 PROCEDURE — 99285 EMERGENCY DEPT VISIT HI MDM: CPT | Mod: 25

## 2023-12-05 PROCEDURE — 93005 ELECTROCARDIOGRAM TRACING: CPT

## 2023-12-05 PROCEDURE — 83880 ASSAY OF NATRIURETIC PEPTIDE: CPT | Performed by: EMERGENCY MEDICINE

## 2023-12-05 PROCEDURE — 84155 ASSAY OF PROTEIN SERUM: CPT | Performed by: EMERGENCY MEDICINE

## 2023-12-05 PROCEDURE — 96361 HYDRATE IV INFUSION ADD-ON: CPT

## 2023-12-05 PROCEDURE — 258N000003 HC RX IP 258 OP 636: Performed by: EMERGENCY MEDICINE

## 2023-12-05 RX ADMIN — SODIUM CHLORIDE 1000 ML: 9 INJECTION, SOLUTION INTRAVENOUS at 22:45

## 2023-12-05 ASSESSMENT — ACTIVITIES OF DAILY LIVING (ADL)
ADLS_ACUITY_SCORE: 35
ADLS_ACUITY_SCORE: 35

## 2023-12-06 VITALS
DIASTOLIC BLOOD PRESSURE: 76 MMHG | TEMPERATURE: 99.5 F | RESPIRATION RATE: 24 BRPM | OXYGEN SATURATION: 98 % | SYSTOLIC BLOOD PRESSURE: 134 MMHG | HEART RATE: 72 BPM

## 2023-12-06 LAB
ALBUMIN UR-MCNC: NEGATIVE MG/DL
ANION GAP SERPL CALCULATED.3IONS-SCNC: 11 MMOL/L (ref 7–15)
ANION GAP SERPL CALCULATED.3IONS-SCNC: 12 MMOL/L (ref 7–15)
APPEARANCE UR: CLEAR
ATRIAL RATE - MUSE: 90 BPM
BILIRUB UR QL STRIP: NEGATIVE
BUN SERPL-MCNC: 15.2 MG/DL (ref 8–23)
BUN SERPL-MCNC: 19.7 MG/DL (ref 8–23)
CALCIUM SERPL-MCNC: 9.4 MG/DL (ref 8.8–10.2)
CALCIUM SERPL-MCNC: 9.7 MG/DL (ref 8.8–10.2)
CHLORIDE SERPL-SCNC: 94 MMOL/L (ref 98–107)
CHLORIDE SERPL-SCNC: 99 MMOL/L (ref 98–107)
COLOR UR AUTO: ABNORMAL
CREAT SERPL-MCNC: 0.56 MG/DL (ref 0.51–0.95)
CREAT SERPL-MCNC: 0.71 MG/DL (ref 0.51–0.95)
DEPRECATED HCO3 PLAS-SCNC: 30 MMOL/L (ref 22–29)
DEPRECATED HCO3 PLAS-SCNC: 33 MMOL/L (ref 22–29)
DIASTOLIC BLOOD PRESSURE - MUSE: NORMAL MMHG
EGFRCR SERPLBLD CKD-EPI 2021: 89 ML/MIN/1.73M2
EGFRCR SERPLBLD CKD-EPI 2021: >90 ML/MIN/1.73M2
GLUCOSE SERPL-MCNC: 208 MG/DL (ref 70–99)
GLUCOSE SERPL-MCNC: 385 MG/DL (ref 70–99)
GLUCOSE UR STRIP-MCNC: 200 MG/DL
HGB UR QL STRIP: NEGATIVE
HYALINE CASTS: 6 /LPF
INTERPRETATION ECG - MUSE: NORMAL
KETONES UR STRIP-MCNC: NEGATIVE MG/DL
LEUKOCYTE ESTERASE UR QL STRIP: NEGATIVE
MUCOUS THREADS #/AREA URNS LPF: PRESENT /LPF
NITRATE UR QL: NEGATIVE
P AXIS - MUSE: 28 DEGREES
PH UR STRIP: 5 [PH] (ref 5–7)
POTASSIUM SERPL-SCNC: 4.8 MMOL/L (ref 3.4–5.3)
POTASSIUM SERPL-SCNC: 5.6 MMOL/L (ref 3.4–5.3)
PR INTERVAL - MUSE: 130 MS
QRS DURATION - MUSE: 72 MS
QT - MUSE: 336 MS
QTC - MUSE: 411 MS
R AXIS - MUSE: -20 DEGREES
RBC URINE: <1 /HPF
SODIUM SERPL-SCNC: 139 MMOL/L (ref 135–145)
SODIUM SERPL-SCNC: 140 MMOL/L (ref 135–145)
SP GR UR STRIP: 1.02 (ref 1–1.03)
SQUAMOUS EPITHELIAL: <1 /HPF
SYSTOLIC BLOOD PRESSURE - MUSE: NORMAL MMHG
T AXIS - MUSE: 43 DEGREES
TROPONIN T SERPL HS-MCNC: 31 NG/L
TROPONIN T SERPL HS-MCNC: 32 NG/L
UROBILINOGEN UR STRIP-MCNC: NORMAL MG/DL
VENTRICULAR RATE- MUSE: 90 BPM
WBC URINE: 1 /HPF

## 2023-12-06 PROCEDURE — 84484 ASSAY OF TROPONIN QUANT: CPT | Mod: 91 | Performed by: EMERGENCY MEDICINE

## 2023-12-06 PROCEDURE — 36415 COLL VENOUS BLD VENIPUNCTURE: CPT | Performed by: EMERGENCY MEDICINE

## 2023-12-06 PROCEDURE — 81001 URINALYSIS AUTO W/SCOPE: CPT | Performed by: EMERGENCY MEDICINE

## 2023-12-06 PROCEDURE — 80048 BASIC METABOLIC PNL TOTAL CA: CPT | Performed by: EMERGENCY MEDICINE

## 2023-12-06 PROCEDURE — 84484 ASSAY OF TROPONIN QUANT: CPT | Performed by: EMERGENCY MEDICINE

## 2023-12-06 ASSESSMENT — ACTIVITIES OF DAILY LIVING (ADL)
ADLS_ACUITY_SCORE: 35

## 2023-12-06 NOTE — ED TRIAGE NOTES
Pt BIBA from home after a syncopal episode. Pt got up to go to the bathroom and became lightheaded, weak, and collapsed. Her son was able to catch her a lower her to the floor. Pt denies LOC. According to her son the pt has not been drinking fluids the last two days. Her lasix dosage was also recently increased.  for EMS. VSS.     Triage Assessment (Adult)       Row Name 12/05/23 1954          Triage Assessment    Airway WDL WDL        Respiratory WDL    Respiratory WDL WDL        Skin Circulation/Temperature WDL    Skin Circulation/Temperature WDL WDL        Cardiac WDL    Cardiac WDL WDL        Peripheral/Neurovascular WDL    Peripheral Neurovascular WDL WDL

## 2023-12-06 NOTE — ED NOTES
Pt up to BSC with assist of 2 people.  Pt had BM and voided.  Pt back to bed with heavy assist of 2.  Straight cath U/A obtained, pt resting in bed, call light in reach.  Son present in room.

## 2023-12-06 NOTE — ED PROVIDER NOTES
History     Chief Complaint:  Syncope       The history is provided by the patient and a relative (Son). A  was used (Son).      Pilo Quintana is a 73 year old female with a history of hypertension, diabetes, and stroke who presents to the ED via EMS for evaluation after a syncopal episode. Patient's son reports that the patient was walking from her chair to the bathroom when she became weak, bend over, and then collapsed. The patient's son caught her so that the patient didn't hit the ground. The patient's daughter in law called 911. Alongside this, patient been eating okay recently, but hasn't been drinking. At dinner tonight, patient ate well, but she didn't drink anything. Additionally, Patient has had a dry cough. At home, she is normally on 2 L of oxygen. Patient hasn't had vomiting, diarrhea, or abdominal pain. Additionally, no fever has been noted as her son takes her temperature most mornings. Patient doesn't have a history of UTIs. Patient hasn't had any recent episodes of low blood sugar. Outside of this, patient has a history of strokes and complains about her right side. Patient gets out of her house with her son, who is accompanying her.     Independent Historian:   Son - They report majority of history    Medications:    acetaminophen (TYLENOL) 500 MG tablet  albuterol (PROAIR HFA/PROVENTIL HFA/VENTOLIN HFA) 108 (90 Base) MCG/ACT inhaler  albuterol (PROVENTIL) (2.5 MG/3ML) 0.083% neb solution  amLODIPine (NORVASC) 2.5 MG tablet  aspirin (ASA) 81 MG EC tablet  atorvastatin (LIPITOR) 40 MG tablet  budesonide-formoterol (SYMBICORT) 160-4.5 MCG/ACT Inhaler  Cholecalciferol (VITAMIN D3) 50 MCG (2000 UT) CAPS  furosemide (LASIX) 20 MG tablet  losartan (COZAAR) 100 MG tablet  metFORMIN (GLUCOPHAGE XR) 500 MG 24 hr tablet  metoprolol succinate ER (TOPROL XL) 50 MG 24 hr tablet  predniSONE (DELTASONE) 10 MG tablet  triamcinolone (KENALOG) 0.1 % external ointment      Past Medical  History:    Past Medical History:   Diagnosis Date    Diabetes (H)     Hypertension     Morbid obesity (H)     Osteoarthritis 6/7/2003    PMB (postmenopausal bleeding) 9/16/2014    Renal mass     Thickened endometrium 9/16/2014    Uncomplicated asthma     Unspecified cerebral artery occlusion with cerebral infarction 1998     Past Surgical History:    Past Surgical History:   Procedure Laterality Date    DILATION AND CURETTAGE, OPERATIVE HYSTEROSCOPY WITH MORCELLATOR, COMBINED N/A 9/25/2014    Procedure: COMBINED DILATION AND CURETTAGE, OPERATIVE HYSTEROSCOPY WITH MORCELLATOR;  Surgeon: Silverio hCristy MD;  Location:  OR     Physical Exam   Patient Vitals for the past 24 hrs:   BP Temp Temp src Pulse Resp SpO2   12/06/23 0315 122/66 -- -- 76 22 98 %   12/05/23 2230 (!) 129/108 -- -- 83 26 96 %   12/05/23 2200 129/84 -- -- 84 (!) 6 92 %   12/05/23 2115 -- -- -- -- -- 99 %   12/05/23 2100 127/81 -- -- 87 22 --   12/05/23 2045 133/81 -- -- 90 12 92 %   12/05/23 2000 -- -- -- -- -- 100 %   12/05/23 1959 -- -- -- -- -- 99 %   12/05/23 1958 130/70 -- -- 89 18 99 %   12/05/23 1953 -- 99.5  F (37.5  C) Oral -- -- --     Physical Exam  General: Patient is awake, alert  Head: The scalp, face, and head appear normal  Eyes: The pupils are equal, round, and reactive to light. Conjunctivae and sclerae are normal  ENT: External acoustic canals are normal. The oropharynx is normal without erythema. Uvula is in the midline  Neck: Normal range of motion.   CV: Regular rate and rhythm.   Resp: Nasal cannula in place.  Patient on 2 L of nasal cannula chronically.  Faint wheezing bilaterally.  No respiratory distress.   GI: Abdomen is soft, no rigidity, guarding, or rebound. No distension. No tenderness to palpation in any quadrant.    MS: Normal tone. Joints grossly normal without effusions. No asymmetric leg swelling, calf or thigh tenderness.    Skin: No rash or lesions noted. Normal capillary refill noted  Neuro: Speech is normal  and fluent. Face is symmetric.  Chronic right-sided weakness from previous stroke.  Psych:  Normal affect.  Appropriate interactions.    Emergency Department Course   ECG  ECG taken at 2035, ECG read at 2228  Sinus rhythm  Normal ECG   Premature ventricular complexes are no longer present as compared to prior, dated 11/5/2023.  Rate 90 bpm. MS interval 130 ms. QRS duration 72 ms. QT/QTc 336/411 ms. P-R-T axes 28 -20 43.     Imaging:  XR Chest 2 Views   Final Result   IMPRESSION: Mild chronic left basilar airspace opacity likely atelectasis/scarring. No new consolidation. No pleural effusion or pneumothorax. Enlarged cardiac silhouette, stable. Normal pulmonary vascularity.        Laboratory:  Labs Ordered and Resulted from Time of ED Arrival to Time of ED Departure   COMPREHENSIVE METABOLIC PANEL - Abnormal       Result Value    Sodium 139      Potassium 5.6 (*)     Carbon Dioxide (CO2) 33 (*)     Anion Gap 12      Urea Nitrogen 19.7      Creatinine 0.71      GFR Estimate 89      Calcium 9.7      Chloride 94 (*)     Glucose 385 (*)     Alkaline Phosphatase 79      AST        ALT 22      Protein Total 6.9      Albumin 3.6      Bilirubin Total 0.2     ROUTINE UA WITH MICROSCOPIC REFLEX TO CULTURE - Abnormal    Color Urine Light Yellow      Appearance Urine Clear      Glucose Urine 200 (*)     Bilirubin Urine Negative      Ketones Urine Negative      Specific Gravity Urine 1.016      Blood Urine Negative      pH Urine 5.0      Protein Albumin Urine Negative      Urobilinogen Urine Normal      Nitrite Urine Negative      Leukocyte Esterase Urine Negative      Mucus Urine Present (*)     RBC Urine <1      WBC Urine 1      Squamous Epithelials Urine <1      Hyaline Casts Urine 6 (*)    CBC WITH PLATELETS AND DIFFERENTIAL - Abnormal    WBC Count 7.3      RBC Count 4.06      Hemoglobin 11.8      Hematocrit 38.0      MCV 94      MCH 29.1      MCHC 31.1 (*)     RDW 13.4      Platelet Count 225      % Neutrophils 82      %  Lymphocytes 10      % Monocytes 7      % Eosinophils 1      % Basophils 0      % Immature Granulocytes 0      NRBCs per 100 WBC 0      Absolute Neutrophils 5.9      Absolute Lymphocytes 0.7 (*)     Absolute Monocytes 0.5      Absolute Eosinophils 0.1      Absolute Basophils 0.0      Absolute Immature Granulocytes 0.0      Absolute NRBCs 0.0     TROPONIN T, HIGH SENSITIVITY - Abnormal    Troponin T, High Sensitivity 32 (*)    BASIC METABOLIC PANEL - Abnormal    Sodium 139      Potassium 5.6 (*)     Chloride 94 (*)     Carbon Dioxide (CO2) 33 (*)     Anion Gap 12      Urea Nitrogen 19.7      Creatinine 0.71      GFR Estimate 89      Calcium 9.7      Glucose 385 (*)    BASIC METABOLIC PANEL - Abnormal    Sodium 140      Potassium 4.8      Chloride 99      Carbon Dioxide (CO2) 30 (*)     Anion Gap 11      Urea Nitrogen 15.2      Creatinine 0.56      GFR Estimate >90      Calcium 9.4      Glucose 208 (*)    TROPONIN T, HIGH SENSITIVITY - Abnormal    Troponin T, High Sensitivity 31 (*)    NT PROBNP INPATIENT - Normal    N terminal Pro BNP Inpatient 175     AST - Normal    AST 30       Emergency Department Course & Assessments:     Interventions:  Medications   sodium chloride 0.9% BOLUS 1,000 mL (0 mLs Intravenous Stopped 12/6/23 0253)      Independent Interpretation (X-rays, CTs, rhythm strip):  Chest x-ray shows evidence of atelectasis but no lobar pneumonia.    Consultations/Discussion of Management or Tests:  ED Course as of 12/06/23 0606   Tue Dec 05, 2023   2228 I obtained history and examined the patient as noted above.       Social Determinants of Health affecting care:   None    Disposition:  The patient was discharged to home.     Impression & Plan    CMS Diagnoses: None    Medical Decision Making:  Patient is a 73-year-old woman with past medical history of type 2 diabetes, previous stroke with residual right-sided weakness, asthma, chronic respiratory failure currently on 2 L of nasal cannula and memory loss  who presents to the emergency department after a syncopal episode.  No significant trauma as the patient caught the patient before she had the ground.  She is otherwise been doing well recently without any significant nausea, vomiting, diarrhea or decreased p.o. intake.  EKG does not show any acute signs of ischemia.  Troponin was mildly elevated but did not display significant rise on repeat.  Given her risk factors and elevated troponin I discussed hospital observation stay for further workup of her syncope.  However patient's son politely declined and would prefer to take care of her at home and have her follow-up closely with her primary care team.  There is no evidence of significant infection or electrolyte abnormality on workup.  The remainder of her workup was reassuring.  Will be discharged home to the care of her son.  She can always return to the emergency department with any new or worsening symptoms.    Diagnosis:    ICD-10-CM    1. Syncope, unspecified syncope type  R55            Scribe Disclosure:  I, Javy Coleman, am serving as a scribe at 1:12 AM on 12/6/2023 to document services personally performed by Efe Kenney MD based on my observations and the provider's statements to me.   12/5/2023   Efe Kenney MD, Christopher Joseph, MD  12/06/23 0608

## 2023-12-06 NOTE — ED NOTES
Report received, assumed care of patient.  Patient resting in bed with son at bedside to translate. Patient denies pain or complaints at present.  Repeat blood work drawn and sent by lab, awaiting result and MD valenzuela.

## 2023-12-16 ENCOUNTER — HOSPITAL ENCOUNTER (EMERGENCY)
Facility: CLINIC | Age: 73
Discharge: HOME | End: 2023-12-16
Attending: EMERGENCY MEDICINE | Admitting: EMERGENCY MEDICINE
Payer: COMMERCIAL

## 2023-12-16 ENCOUNTER — APPOINTMENT (OUTPATIENT)
Dept: GENERAL RADIOLOGY | Facility: CLINIC | Age: 73
End: 2023-12-16
Attending: EMERGENCY MEDICINE
Payer: COMMERCIAL

## 2023-12-16 VITALS
DIASTOLIC BLOOD PRESSURE: 83 MMHG | RESPIRATION RATE: 16 BRPM | SYSTOLIC BLOOD PRESSURE: 136 MMHG | OXYGEN SATURATION: 99 % | HEART RATE: 59 BPM | TEMPERATURE: 97.2 F

## 2023-12-16 DIAGNOSIS — R60.0 BILATERAL LOWER EXTREMITY EDEMA: ICD-10-CM

## 2023-12-16 DIAGNOSIS — R06.02 SHORTNESS OF BREATH: ICD-10-CM

## 2023-12-16 LAB
ALBUMIN SERPL BCG-MCNC: 3.8 G/DL (ref 3.5–5.2)
ALBUMIN SERPL BCG-MCNC: 3.8 G/DL (ref 3.5–5.2)
ALP SERPL-CCNC: 78 U/L (ref 40–150)
ALP SERPL-CCNC: 80 U/L (ref 40–150)
ALT SERPL W P-5'-P-CCNC: 19 U/L (ref 0–50)
ALT SERPL W P-5'-P-CCNC: 20 U/L (ref 0–50)
ANION GAP SERPL CALCULATED.3IONS-SCNC: 11 MMOL/L (ref 7–15)
AST SERPL W P-5'-P-CCNC: 21 U/L (ref 0–45)
AST SERPL W P-5'-P-CCNC: 21 U/L (ref 0–45)
BASOPHILS # BLD AUTO: 0 10E3/UL (ref 0–0.2)
BASOPHILS NFR BLD AUTO: 0 %
BILIRUB DIRECT SERPL-MCNC: <0.2 MG/DL (ref 0–0.3)
BILIRUB DIRECT SERPL-MCNC: <0.2 MG/DL (ref 0–0.3)
BILIRUB SERPL-MCNC: 0.3 MG/DL
BILIRUB SERPL-MCNC: 0.3 MG/DL
BUN SERPL-MCNC: 16.5 MG/DL (ref 8–23)
CALCIUM SERPL-MCNC: 9.9 MG/DL (ref 8.8–10.2)
CHLORIDE SERPL-SCNC: 95 MMOL/L (ref 98–107)
CREAT SERPL-MCNC: 0.74 MG/DL (ref 0.51–0.95)
DEPRECATED HCO3 PLAS-SCNC: 34 MMOL/L (ref 22–29)
EGFRCR SERPLBLD CKD-EPI 2021: 84 ML/MIN/1.73M2
EOSINOPHIL # BLD AUTO: 0.2 10E3/UL (ref 0–0.7)
EOSINOPHIL NFR BLD AUTO: 2 %
ERYTHROCYTE [DISTWIDTH] IN BLOOD BY AUTOMATED COUNT: 13.7 % (ref 10–15)
FLUAV RNA SPEC QL NAA+PROBE: NEGATIVE
FLUBV RNA RESP QL NAA+PROBE: NEGATIVE
GLUCOSE SERPL-MCNC: 380 MG/DL (ref 70–99)
HCT VFR BLD AUTO: 36.8 % (ref 35–47)
HGB BLD-MCNC: 11.5 G/DL (ref 11.7–15.7)
HOLD SPECIMEN: NORMAL
HOLD SPECIMEN: NORMAL
IMM GRANULOCYTES # BLD: 0 10E3/UL
IMM GRANULOCYTES NFR BLD: 0 %
LYMPHOCYTES # BLD AUTO: 1.2 10E3/UL (ref 0.8–5.3)
LYMPHOCYTES NFR BLD AUTO: 17 %
MCH RBC QN AUTO: 29.2 PG (ref 26.5–33)
MCHC RBC AUTO-ENTMCNC: 31.3 G/DL (ref 31.5–36.5)
MCV RBC AUTO: 93 FL (ref 78–100)
MONOCYTES # BLD AUTO: 0.4 10E3/UL (ref 0–1.3)
MONOCYTES NFR BLD AUTO: 5 %
NEUTROPHILS # BLD AUTO: 5.5 10E3/UL (ref 1.6–8.3)
NEUTROPHILS NFR BLD AUTO: 76 %
NRBC # BLD AUTO: 0 10E3/UL
NRBC BLD AUTO-RTO: 0 /100
NT-PROBNP SERPL-MCNC: 67 PG/ML (ref 0–900)
PLATELET # BLD AUTO: 278 10E3/UL (ref 150–450)
POTASSIUM SERPL-SCNC: 4.1 MMOL/L (ref 3.4–5.3)
PROT SERPL-MCNC: 6.5 G/DL (ref 6.4–8.3)
PROT SERPL-MCNC: 7 G/DL (ref 6.4–8.3)
RBC # BLD AUTO: 3.94 10E6/UL (ref 3.8–5.2)
RSV RNA SPEC NAA+PROBE: NEGATIVE
SARS-COV-2 RNA RESP QL NAA+PROBE: NEGATIVE
SODIUM SERPL-SCNC: 140 MMOL/L (ref 135–145)
TROPONIN T SERPL HS-MCNC: 22 NG/L
WBC # BLD AUTO: 7.4 10E3/UL (ref 4–11)

## 2023-12-16 PROCEDURE — 84484 ASSAY OF TROPONIN QUANT: CPT | Performed by: EMERGENCY MEDICINE

## 2023-12-16 PROCEDURE — 85025 COMPLETE CBC W/AUTO DIFF WBC: CPT | Performed by: EMERGENCY MEDICINE

## 2023-12-16 PROCEDURE — 93005 ELECTROCARDIOGRAM TRACING: CPT

## 2023-12-16 PROCEDURE — 36415 COLL VENOUS BLD VENIPUNCTURE: CPT | Performed by: EMERGENCY MEDICINE

## 2023-12-16 PROCEDURE — 87637 SARSCOV2&INF A&B&RSV AMP PRB: CPT | Performed by: EMERGENCY MEDICINE

## 2023-12-16 PROCEDURE — 96374 THER/PROPH/DIAG INJ IV PUSH: CPT

## 2023-12-16 PROCEDURE — 71046 X-RAY EXAM CHEST 2 VIEWS: CPT

## 2023-12-16 PROCEDURE — 83880 ASSAY OF NATRIURETIC PEPTIDE: CPT | Performed by: EMERGENCY MEDICINE

## 2023-12-16 PROCEDURE — 80048 BASIC METABOLIC PNL TOTAL CA: CPT | Performed by: EMERGENCY MEDICINE

## 2023-12-16 PROCEDURE — 82248 BILIRUBIN DIRECT: CPT | Performed by: EMERGENCY MEDICINE

## 2023-12-16 PROCEDURE — 250N000011 HC RX IP 250 OP 636: Mod: JZ | Performed by: EMERGENCY MEDICINE

## 2023-12-16 PROCEDURE — 99285 EMERGENCY DEPT VISIT HI MDM: CPT | Mod: 25

## 2023-12-16 RX ORDER — FUROSEMIDE 20 MG
80 TABLET ORAL EVERY MORNING
Qty: 14 TABLET | Refills: 0 | Status: SHIPPED | OUTPATIENT
Start: 2023-12-16 | End: 2024-04-04

## 2023-12-16 RX ORDER — FUROSEMIDE 10 MG/ML
60 INJECTION INTRAMUSCULAR; INTRAVENOUS ONCE
Status: COMPLETED | OUTPATIENT
Start: 2023-12-16 | End: 2023-12-16

## 2023-12-16 RX ADMIN — FUROSEMIDE 60 MG: 10 INJECTION, SOLUTION INTRAMUSCULAR; INTRAVENOUS at 18:14

## 2023-12-16 ASSESSMENT — ACTIVITIES OF DAILY LIVING (ADL)
ADLS_ACUITY_SCORE: 35
ADLS_ACUITY_SCORE: 35

## 2023-12-16 NOTE — ED PROVIDER NOTES
History     Chief Complaint:  Leg Swelling and Chest Pain     The history is provided by the patient and a relative. The history is limited by a language barrier. A  was used (Son).     Rare dialect, family interprets.     Pilo Quintana is a 73 year old female with history of hypertension, type 2 diabetes, hyperlipidemia, and stroke who presents to the ED via EMS with her son, daughter-in-law, and grandson for evaluation of leg swelling and chest pain. The patient's daughter in law reports that the patient has had persistent bilateral leg swelling. She has had leg swelling before since she is on a water pill, but it is normally on and off.  However now seems to be getting worse recently.  She also states that the patient has had a cough and coughs throughout the night. Patient has not had a COVID test recently, but she did get her vaccine about a week ago. Son states that he normally gives the patient tylenol and baby aspirin in the mornings. Son and daughter in law report that the patient has been eating good and having normal bowel movement, but they have noticed a decrease in urination and decreased movement around the house. Patient states that she has had right sided pain including chest pain ever since her stroke. Recently evaluated for this and followed up with her primary.  Son notes that the patient has a blister on her back that she believes is due to wearing tight pants. Patient is on O2 at home. Denies fever, runny nose, vomiting, or diarrhea.    Independent Historian:    Son and daughter-in-law provide majority of the above history, clarifying with patient as well    Review of External Notes:  I reviewed 12/5 ED note. Syncopal observed in ED. Discussed observation in hospital but family wanted to take her home.      Medications:    Albuterol  Norvasc  Aspirin 81 mg  Lipitor   Symbicort   Lasix  Cozaar  Metformin  Toprol  Prednisone  Zocor  Glucotrol   Hyzaar   Actos     Past  Medical History:    Asthma   Type 2 diabetes   Endometrial polyp   Cerebral degeneration  Cerebrovascular disease  Hyperlipidemia   Mental disorder  Mild memory loss  Multiple pulmonary nodules  Renal oncocytoma of right kidney  Social maladjustment  Intracerebral hemorrhage  Hyperparathyroidism   Thyroid nodule  Hypoxia   Hypertension  Obesity  Osteoarthritis  Renal mass  Thickened endometrium  Cerebral artery occlusion with cerebral infarction  Dementia   Degenerative joint disease  Simple endometrial hyperplasia without atypia   Sleep apnea  Heart failure   Hemiplegia of right dominant side   Stroke   Carpal tunnel syndrome     Past Surgical History:    Dilation and curettage, operative hysteroscopy with morcellator, combined  Right renal biopsy   Bilateral cataract removal     Physical Exam   Patient Vitals for the past 24 hrs:   BP Temp Temp src Pulse Resp SpO2   12/16/23 1929 -- -- -- -- -- 99 %   12/16/23 1928 136/83 -- -- 59 -- --   12/16/23 1830 129/80 -- -- 66 -- 100 %   12/16/23 1815 (!) 128/92 -- -- 70 -- 100 %   12/16/23 1800 116/75 -- -- 62 -- 100 %   12/16/23 1745 115/82 -- -- 60 -- 100 %   12/16/23 1730 118/80 -- -- 71 -- 100 %   12/16/23 1715 121/85 -- -- 66 -- 100 %   12/16/23 1700 114/71 -- -- 68 -- 99 %   12/16/23 1456 121/43 97.2  F (36.2  C) Temporal 79 16 96 %      Physical Exam  Eyes:  Sclera white; Pupils are equal and round  ENT:    External ears and nares normal  CV:  Rate as above with regular rhythm   Resp:  Diffuse crackles throughout lower half of lungs heard anteriorly and posteriorly    Non-labored, no retractions or accessory muscle use  GI:  Abdomen is soft, non-tender, non-distended  MS:  Moves all extremities, pitting edema to bilateral knees  Skin:  Warm and dry, blister on left lower back, no surrounding erythema  Neuro:  Alert, speaking with son, cooperative with exam    Emergency Department Course   ECG  ECG results from 12/16/23   EKG 12-lead, tracing only     Value     Systolic Blood Pressure     Diastolic Blood Pressure     Ventricular Rate 73    Atrial Rate 73    KS Interval 136    QRS Duration 92        QTc 442    P Axis 40    R AXIS -32    T Axis 38    Interpretation ECG      Sinus rhythm  Left axis deviation  Abnormal ECG  When compared with ECG of 05-DEC-2023 20:35,  No significant change was found   Interpreted by me @ 1731      Imaging:  Chest XR,  PA & LAT   Final Result   IMPRESSION: Minimal basilar atelectasis or scarring. Remaining lungs are clear. Mildly enlarged cardiac silhouette. No pulmonary edema.           Report per radiology    Laboratory:  Labs Ordered and Resulted from Time of ED Arrival to Time of ED Departure   BASIC METABOLIC PANEL - Abnormal       Result Value    Sodium 140      Potassium 4.1      Chloride 95 (*)     Carbon Dioxide (CO2) 34 (*)     Anion Gap 11      Urea Nitrogen 16.5      Creatinine 0.74      GFR Estimate 84      Calcium 9.9      Glucose 380 (*)    TROPONIN T, HIGH SENSITIVITY - Abnormal    Troponin T, High Sensitivity 22 (*)    CBC WITH PLATELETS AND DIFFERENTIAL - Abnormal    WBC Count 7.4      RBC Count 3.94      Hemoglobin 11.5 (*)     Hematocrit 36.8      MCV 93      MCH 29.2      MCHC 31.3 (*)     RDW 13.7      Platelet Count 278      % Neutrophils 76      % Lymphocytes 17      % Monocytes 5      % Eosinophils 2      % Basophils 0      % Immature Granulocytes 0      NRBCs per 100 WBC 0      Absolute Neutrophils 5.5      Absolute Lymphocytes 1.2      Absolute Monocytes 0.4      Absolute Eosinophils 0.2      Absolute Basophils 0.0      Absolute Immature Granulocytes 0.0      Absolute NRBCs 0.0     NT PROBNP INPATIENT - Normal    N terminal Pro BNP Inpatient 67     INFLUENZA A/B, RSV, & SARS-COV2 PCR - Normal    Influenza A PCR Negative      Influenza B PCR Negative      RSV PCR Negative      SARS CoV2 PCR Negative     HEPATIC FUNCTION PANEL - Normal    Protein Total 7.0      Albumin 3.8      Bilirubin Total 0.3      Alkaline  Phosphatase 78      AST 21      ALT 19      Bilirubin Direct <0.20     HEPATIC FUNCTION PANEL - Normal    Protein Total 6.5      Albumin 3.8      Bilirubin Total 0.3      Alkaline Phosphatase 80      AST 21      ALT 20      Bilirubin Direct <0.20        Emergency Department Course & Assessments:     Interventions:  Medications   furosemide (LASIX) injection 60 mg (60 mg Intravenous $Given 12/16/23 1814)      Independent Interpretation (X-rays, CTs, rhythm strip):  I independently reviewed the chest XR and see no pleural effusion.     Assessments/Consultations/Discussion of Management or Tests:  ED Course as of 12/16/23 2111   Sat Dec 16, 2023   1720 I obtained history and examined the patient as noted above.      Social Determinants of Health affecting care:  Lack of appropriate formal      Disposition:  The patient was discharged to home.     Impression & Plan    Medical Decision Making:  ECG demonstrates no ischemic changes, pre-excitation, pericarditis or dysrhythmia.  Bilateral edema with pulmonary crackles most concerning for volume overload.  IV furosemide was ordered.  PE is not clinically suspected and was not evaluated for.  High-sensitivity troponin is in a range similar to the ones during her past workup and was not trended as this is a chronic symptom.  On chart review frequently has hyperglycemia.  There is no evidence of associated DKA today based on the lack of an anion gap.  Rapid viral testing was negative.  BNP is not elevated and chest x-ray does not show pulmonary edema or pleural effusion.  Labs are without evidence for hepatic or renal failure.  She has not been in any respiratory distress in the room.  There has been no hypoxia while on her baseline oxygen.  She had an echo earlier this year in May most notable for pulmonary hypertension.  Discussed potential role of admission for diuresis.  First they would like to try increasing outpatient diuretics which is reasonable as well.   They were able to follow-up after their last ED visit and I recommended primary care follow-up again.  Return immediately if symptoms are worsening.    Diagnosis:    ICD-10-CM    1. Bilateral lower extremity edema  R60.0       2. Shortness of breath  R06.02          Discharge Medications:  Discharge Medication List as of 12/16/2023  7:22 PM        START taking these medications    Details   !! furosemide (LASIX) 20 MG tablet Take 4 tablets (80 mg) by mouth every morning, Disp-14 tablet, R-0, E-Prescribe       !! - Potential duplicate medications found. Please discuss with provider.        Scribe Disclosure:  ILEN, am serving as a scribe at 5:35 PM on 12/16/2023 to document services personally performed by Emi Hernandez MD based on my observations and the provider's statements to me.    12/16/2023   Emi Hernandez MD Gosen, Christine Leigh, MD  12/17/23 0058

## 2023-12-16 NOTE — ED TRIAGE NOTES
Patient brought in by caregiver for multiple symptoms.  She reports right leg swelling, chest pain, and SOB.  Per caregiver reports 2L of oxygen at baseline.  He reports oxygen dropped slightly with ambulation today, to 91%.  History of HTN, DM, CVA.  ABCs intact, A&Ox4.     Triage Assessment (Adult)       Row Name 12/16/23 6465          Triage Assessment    Airway WDL WDL        Respiratory WDL    Respiratory WDL X        Skin Circulation/Temperature WDL    Skin Circulation/Temperature WDL WDL        Cardiac WDL    Cardiac WDL X;chest pain        Peripheral/Neurovascular WDL    Peripheral Neurovascular WDL WDL        Cognitive/Neuro/Behavioral WDL    Cognitive/Neuro/Behavioral WDL WDL

## 2023-12-18 LAB
ATRIAL RATE - MUSE: 73 BPM
DIASTOLIC BLOOD PRESSURE - MUSE: NORMAL MMHG
INTERPRETATION ECG - MUSE: NORMAL
P AXIS - MUSE: 40 DEGREES
PR INTERVAL - MUSE: 136 MS
QRS DURATION - MUSE: 92 MS
QT - MUSE: 402 MS
QTC - MUSE: 442 MS
R AXIS - MUSE: -32 DEGREES
SYSTOLIC BLOOD PRESSURE - MUSE: NORMAL MMHG
T AXIS - MUSE: 38 DEGREES
VENTRICULAR RATE- MUSE: 73 BPM

## 2024-02-01 ENCOUNTER — HOSPITAL ENCOUNTER (EMERGENCY)
Facility: CLINIC | Age: 74
Discharge: HOME | End: 2024-02-01
Attending: EMERGENCY MEDICINE | Admitting: EMERGENCY MEDICINE
Payer: COMMERCIAL

## 2024-02-01 ENCOUNTER — APPOINTMENT (OUTPATIENT)
Dept: GENERAL RADIOLOGY | Facility: CLINIC | Age: 74
End: 2024-02-01
Attending: EMERGENCY MEDICINE
Payer: COMMERCIAL

## 2024-02-01 VITALS
OXYGEN SATURATION: 99 % | RESPIRATION RATE: 20 BRPM | TEMPERATURE: 98.1 F | SYSTOLIC BLOOD PRESSURE: 133 MMHG | HEART RATE: 84 BPM | DIASTOLIC BLOOD PRESSURE: 94 MMHG

## 2024-02-01 DIAGNOSIS — J44.1 COPD WITH ACUTE EXACERBATION (H): ICD-10-CM

## 2024-02-01 DIAGNOSIS — J06.9 UPPER RESPIRATORY TRACT INFECTION, UNSPECIFIED TYPE: ICD-10-CM

## 2024-02-01 LAB
ANION GAP SERPL CALCULATED.3IONS-SCNC: 10 MMOL/L (ref 7–15)
ATRIAL RATE - MUSE: 76 BPM
BASOPHILS # BLD AUTO: 0 10E3/UL (ref 0–0.2)
BASOPHILS NFR BLD AUTO: 0 %
BUN SERPL-MCNC: 12.6 MG/DL (ref 8–23)
CALCIUM SERPL-MCNC: 9.9 MG/DL (ref 8.8–10.2)
CHLORIDE SERPL-SCNC: 96 MMOL/L (ref 98–107)
CREAT SERPL-MCNC: 0.61 MG/DL (ref 0.51–0.95)
DEPRECATED HCO3 PLAS-SCNC: 35 MMOL/L (ref 22–29)
DIASTOLIC BLOOD PRESSURE - MUSE: NORMAL MMHG
EGFRCR SERPLBLD CKD-EPI 2021: >90 ML/MIN/1.73M2
EOSINOPHIL # BLD AUTO: 0.2 10E3/UL (ref 0–0.7)
EOSINOPHIL NFR BLD AUTO: 2 %
ERYTHROCYTE [DISTWIDTH] IN BLOOD BY AUTOMATED COUNT: 13.2 % (ref 10–15)
FLUAV RNA SPEC QL NAA+PROBE: NEGATIVE
FLUBV RNA RESP QL NAA+PROBE: NEGATIVE
GLUCOSE SERPL-MCNC: 160 MG/DL (ref 70–99)
HCT VFR BLD AUTO: 38.6 % (ref 35–47)
HGB BLD-MCNC: 11.9 G/DL (ref 11.7–15.7)
IMM GRANULOCYTES # BLD: 0 10E3/UL
IMM GRANULOCYTES NFR BLD: 0 %
INTERPRETATION ECG - MUSE: NORMAL
LYMPHOCYTES # BLD AUTO: 1.7 10E3/UL (ref 0.8–5.3)
LYMPHOCYTES NFR BLD AUTO: 24 %
MCH RBC QN AUTO: 29.5 PG (ref 26.5–33)
MCHC RBC AUTO-ENTMCNC: 30.8 G/DL (ref 31.5–36.5)
MCV RBC AUTO: 96 FL (ref 78–100)
MONOCYTES # BLD AUTO: 0.5 10E3/UL (ref 0–1.3)
MONOCYTES NFR BLD AUTO: 7 %
NEUTROPHILS # BLD AUTO: 4.8 10E3/UL (ref 1.6–8.3)
NEUTROPHILS NFR BLD AUTO: 67 %
NRBC # BLD AUTO: 0 10E3/UL
NRBC BLD AUTO-RTO: 0 /100
P AXIS - MUSE: 13 DEGREES
PLATELET # BLD AUTO: 212 10E3/UL (ref 150–450)
POTASSIUM SERPL-SCNC: 4.1 MMOL/L (ref 3.4–5.3)
PR INTERVAL - MUSE: 150 MS
QRS DURATION - MUSE: 84 MS
QT - MUSE: 382 MS
QTC - MUSE: 429 MS
R AXIS - MUSE: -34 DEGREES
RBC # BLD AUTO: 4.03 10E6/UL (ref 3.8–5.2)
RSV RNA SPEC NAA+PROBE: NEGATIVE
SARS-COV-2 RNA RESP QL NAA+PROBE: NEGATIVE
SODIUM SERPL-SCNC: 141 MMOL/L (ref 135–145)
SYSTOLIC BLOOD PRESSURE - MUSE: NORMAL MMHG
T AXIS - MUSE: 6 DEGREES
VENTRICULAR RATE- MUSE: 76 BPM
WBC # BLD AUTO: 7.2 10E3/UL (ref 4–11)

## 2024-02-01 PROCEDURE — 80048 BASIC METABOLIC PNL TOTAL CA: CPT | Performed by: EMERGENCY MEDICINE

## 2024-02-01 PROCEDURE — 85025 COMPLETE CBC W/AUTO DIFF WBC: CPT | Performed by: EMERGENCY MEDICINE

## 2024-02-01 PROCEDURE — 99285 EMERGENCY DEPT VISIT HI MDM: CPT | Mod: 25

## 2024-02-01 PROCEDURE — 94640 AIRWAY INHALATION TREATMENT: CPT

## 2024-02-01 PROCEDURE — 71046 X-RAY EXAM CHEST 2 VIEWS: CPT

## 2024-02-01 PROCEDURE — 36415 COLL VENOUS BLD VENIPUNCTURE: CPT | Performed by: EMERGENCY MEDICINE

## 2024-02-01 PROCEDURE — 93005 ELECTROCARDIOGRAM TRACING: CPT

## 2024-02-01 PROCEDURE — 87637 SARSCOV2&INF A&B&RSV AMP PRB: CPT | Performed by: EMERGENCY MEDICINE

## 2024-02-01 PROCEDURE — 250N000009 HC RX 250: Performed by: EMERGENCY MEDICINE

## 2024-02-01 RX ORDER — PREDNISONE 20 MG/1
TABLET ORAL
Qty: 10 TABLET | Refills: 0 | Status: SHIPPED | OUTPATIENT
Start: 2024-02-01 | End: 2024-04-04

## 2024-02-01 RX ORDER — IPRATROPIUM BROMIDE AND ALBUTEROL SULFATE 2.5; .5 MG/3ML; MG/3ML
3 SOLUTION RESPIRATORY (INHALATION) ONCE
Status: COMPLETED | OUTPATIENT
Start: 2024-02-01 | End: 2024-02-01

## 2024-02-01 RX ORDER — AZITHROMYCIN 250 MG/1
TABLET, FILM COATED ORAL
Qty: 6 TABLET | Refills: 0 | Status: SHIPPED | OUTPATIENT
Start: 2024-02-01 | End: 2024-02-06

## 2024-02-01 RX ADMIN — IPRATROPIUM BROMIDE AND ALBUTEROL SULFATE 3 ML: .5; 3 SOLUTION RESPIRATORY (INHALATION) at 10:13

## 2024-02-01 ASSESSMENT — ACTIVITIES OF DAILY LIVING (ADL): ADLS_ACUITY_SCORE: 35

## 2024-02-01 NOTE — ED PROVIDER NOTES
History     Chief Complaint:  Shortness of Breath       A  was used.      Pilo Quintana is a 73 year old female with a past medical history of asthma, diabetes, hyperlipidemia, HTN, obesity, heart failure, stroke, and dementia who presents with shortness of breath. The patient developed a heavy cough, shortness of breath, and wheezing yesterday. This morning the patient developed some chills, body aches, and a headache. The patient is having some pain in her abdomen and has had some diarrhea the past few days. Patient denies any abnormal leg swelling. The patient ate and drank well yesterday but did not eat or drink well this morning. The patients whole house is sick right now with coughs and a runny nose. Patient is on 2L of oxygen at baseline and this has not changed since getting sick.      Independent Historian:   Family present and provides additional history.    Review of External Notes:   Discharge summary reviewed from October 29, 2023 when the patient was treated for hypoxia in the setting of suspected pulmonary hypertension, sleep apnea, and asthma/COPD.      Medications:    Albuterol  Norvasc  Aspirin 81 mg  Lipitor   Symbicort   Lasix  Cozaar  Metformin  Toprol  Prednisone  Zocor  Glucotrol   Hyzaar   Actos      Past Medical History:    Asthma   Type 2 diabetes   Endometrial polyp   Cerebral degeneration  Cerebrovascular disease  Hyperlipidemia   Multiple pulmonary nodules  Renal oncocytoma of right kidney  Intracerebral hemorrhage  Hyperparathyroidism   Thyroid nodule  Hypoxia   Hypertension  Obesity  Osteoarthritis  Renal mass  Thickened endometrium  Cerebral artery occlusion with cerebral infarction  Dementia   Degenerative joint disease  Simple endometrial hyperplasia   Sleep apnea  Heart failure   Hemiplegia of right dominant side   Stroke   Carpal tunnel syndrome      Past Surgical History:    Dilation and curettage, operative hysteroscopy with morcellator, combined  Right  renal biopsy   Bilateral cataract removal     Physical Exam   Patient Vitals for the past 24 hrs:   BP Temp Temp src Pulse Resp SpO2   02/01/24 1130 (!) 133/94 -- -- 84 20 99 %   02/01/24 0916 (!) 185/116 98.1  F (36.7  C) Temporal 78 18 100 %      Physical Exam  Constitutional:       General: She is not in acute distress.     Appearance: Normal appearance. She is not diaphoretic.   HENT:      Head: Atraumatic.      Right Ear: Tympanic membrane, ear canal and external ear normal.      Left Ear: Tympanic membrane, ear canal and external ear normal.      Nose:      Comments: Nasal cannula in place     Mouth/Throat:      Mouth: Mucous membranes are moist.      Pharynx: No oropharyngeal exudate or posterior oropharyngeal erythema.   Eyes:      General: No scleral icterus.     Conjunctiva/sclera: Conjunctivae normal.      Pupils: Pupils are equal, round, and reactive to light.   Cardiovascular:      Rate and Rhythm: Normal rate and regular rhythm.      Heart sounds: Normal heart sounds.   Pulmonary:      Effort: No respiratory distress.      Breath sounds: Normal breath sounds.   Abdominal:      General: Abdomen is flat. There is no distension.      Tenderness: There is no abdominal tenderness.   Musculoskeletal:      Cervical back: Neck supple.      Right lower leg: No edema.      Left lower leg: No edema.   Skin:     General: Skin is warm.      Capillary Refill: Capillary refill takes less than 2 seconds.      Findings: No rash.   Neurological:      General: No focal deficit present.      Mental Status: She is alert and oriented to person, place, and time.   Psychiatric:         Mood and Affect: Mood normal.         Behavior: Behavior normal.           Emergency Department Course   ECG  ECG results from 02/01/24   EKG 12-lead, tracing only     Value    Systolic Blood Pressure     Diastolic Blood Pressure     Ventricular Rate 76    Atrial Rate 76    SD Interval 150    QRS Duration 84        QTc 429    P Axis 13     R AXIS -34    T Axis 6    Interpretation ECG      Sinus rhythm  Left axis deviation  Nonspecific ST and T wave abnormality  Abnormal ECG  When compared with ECG of 16-DEC-2023 15:25,  T wave inversion now evident in Inferior leads  Nonspecific T wave abnormality now evident in Anterior leads       Imaging:  XR Chest 2 Views   Final Result   IMPRESSION: Similar enlarged cardiac silhouette. Mild linear bibasilar   opacities are favored as atelectasis. No pleural effusion. No   discernible pneumothorax. No acute displaced fracture.      RAMIREZ GARDNER MD            SYSTEM ID:  [id number]         Laboratory:  Labs Ordered and Resulted from Time of ED Arrival to Time of ED Departure   BASIC METABOLIC PANEL - Abnormal       Result Value    Sodium 141      Potassium 4.1      Chloride 96 (*)     Carbon Dioxide (CO2) 35 (*)     Anion Gap 10      Urea Nitrogen 12.6      Creatinine 0.61      GFR Estimate >90      Calcium 9.9      Glucose 160 (*)    CBC WITH PLATELETS AND DIFFERENTIAL - Abnormal    WBC Count 7.2      RBC Count 4.03      Hemoglobin 11.9      Hematocrit 38.6      MCV 96      MCH 29.5      MCHC 30.8 (*)     RDW 13.2      Platelet Count 212      % Neutrophils 67      % Lymphocytes 24      % Monocytes 7      % Eosinophils 2      % Basophils 0      % Immature Granulocytes 0      NRBCs per 100 WBC 0      Absolute Neutrophils 4.8      Absolute Lymphocytes 1.7      Absolute Monocytes 0.5      Absolute Eosinophils 0.2      Absolute Basophils 0.0      Absolute Immature Granulocytes 0.0      Absolute NRBCs 0.0     INFLUENZA A/B, RSV, & SARS-COV2 PCR - Normal    Influenza A PCR Negative      Influenza B PCR Negative      RSV PCR Negative      SARS CoV2 PCR Negative        Emergency Department Course & Assessments:    Interventions:  Medications   ipratropium - albuterol 0.5 mg/2.5 mg/3 mL (DUONEB) neb solution 3 mL (3 mLs Nebulization $Given 2/1/24 1013)      Assessments:  0940 Obtained the patients history and performed  initial exam  1119 Rechecked the patient and updated her and family on findings    Independent Interpretation (X-rays, CTs, rhythm strip):  Chest x-ray independently interpreted.  No pneumothorax or pulmonary edema.    Social Determinants of Health affecting care:   The patient lives at home with her family who is very involved in her care.    Disposition:  The patient was discharged.     Impression & Plan      Medical Decision Making:  This patient is a 73-year-old who presents to the ED with cough and shortness of breath.  Multiple other family numbers have had URI symptoms.  COVID and influenza swabs are negative.  X-ray questionably with atelectasis.  Given her underlying lung disease she will be treated with azithromycin.  She has nebulizers at home which she will continue to use and does not need refills.  She has an oxygen compressor which she will continue to use.  She has CPAP which she uses at nighttime and she feels is helpful.  She a course of prednisone.  She is to follow in the short-term with her primary care clinic.    The patient does not appear to be volume overloaded and there is no pulmonary edema on the chest x-ray.  No evidence of unstable angina by her history.  EKG does not show ischemic appearing changes.      Diagnosis:    ICD-10-CM    1. COPD with acute exacerbation (H)  J44.1       2. Upper respiratory tract infection, unspecified type  J06.9            Discharge Medications:  Discharge Medication List as of 2/1/2024 11:25 AM        START taking these medications    Details   azithromycin (ZITHROMAX) 250 MG tablet Take 2 tablets (500 mg) by mouth daily for 1 day, THEN 1 tablet (250 mg) daily for 4 days., Disp-6 tablet, R-0, E-Prescribe         5-day burst of prednisone    Scribe Disclosure:  Lv LEAHY, am serving as a scribe at 9:35 AM on 2/1/2024 to document services personally performed by Palomo Crandall MD based on my observations and the provider's statements to me.      2/1/2024   Palomo Crandall MD McRoberts, Sean Edward, MD  02/01/24 1134

## 2024-02-01 NOTE — ED TRIAGE NOTES
Patient reports SOB, wheezing, and cough for a few days.  Patient uses 2L of oxygen at baseline.  History of asthma, DM, HTN.  ABCs intact, A&O     Triage Assessment (Adult)       Row Name 02/01/24 0914          Triage Assessment    Airway WDL WDL        Respiratory WDL    Respiratory WDL X;cough        Skin Circulation/Temperature WDL    Skin Circulation/Temperature WDL WDL        Cardiac WDL    Cardiac WDL WDL        Peripheral/Neurovascular WDL    Peripheral Neurovascular WDL WDL        Cognitive/Neuro/Behavioral WDL    Cognitive/Neuro/Behavioral WDL WDL

## 2024-03-27 ENCOUNTER — HOSPITAL ENCOUNTER (EMERGENCY)
Facility: CLINIC | Age: 74
Discharge: HOME | End: 2024-03-27
Attending: EMERGENCY MEDICINE | Admitting: EMERGENCY MEDICINE
Payer: COMMERCIAL

## 2024-03-27 ENCOUNTER — APPOINTMENT (OUTPATIENT)
Dept: GENERAL RADIOLOGY | Facility: CLINIC | Age: 74
End: 2024-03-27
Attending: EMERGENCY MEDICINE
Payer: COMMERCIAL

## 2024-03-27 VITALS
TEMPERATURE: 98.2 F | HEIGHT: 59 IN | BODY MASS INDEX: 33.26 KG/M2 | RESPIRATION RATE: 17 BRPM | SYSTOLIC BLOOD PRESSURE: 132 MMHG | WEIGHT: 165 LBS | OXYGEN SATURATION: 97 % | DIASTOLIC BLOOD PRESSURE: 86 MMHG | HEART RATE: 75 BPM

## 2024-03-27 DIAGNOSIS — J40 BRONCHITIS: ICD-10-CM

## 2024-03-27 DIAGNOSIS — J44.1 COPD EXACERBATION (H): ICD-10-CM

## 2024-03-27 LAB
ANION GAP SERPL CALCULATED.3IONS-SCNC: 10 MMOL/L (ref 7–15)
ATRIAL RATE - MUSE: 80 BPM
BASE EXCESS BLDV CALC-SCNC: 7.5 MMOL/L (ref -3–3)
BASOPHILS # BLD AUTO: 0 10E3/UL (ref 0–0.2)
BASOPHILS NFR BLD AUTO: 0 %
BUN SERPL-MCNC: 15.5 MG/DL (ref 8–23)
CALCIUM SERPL-MCNC: 9.9 MG/DL (ref 8.8–10.2)
CHLORIDE SERPL-SCNC: 95 MMOL/L (ref 98–107)
CREAT SERPL-MCNC: 0.6 MG/DL (ref 0.51–0.95)
DEPRECATED HCO3 PLAS-SCNC: 35 MMOL/L (ref 22–29)
DIASTOLIC BLOOD PRESSURE - MUSE: NORMAL MMHG
EGFRCR SERPLBLD CKD-EPI 2021: >90 ML/MIN/1.73M2
EOSINOPHIL # BLD AUTO: 0.1 10E3/UL (ref 0–0.7)
EOSINOPHIL NFR BLD AUTO: 2 %
ERYTHROCYTE [DISTWIDTH] IN BLOOD BY AUTOMATED COUNT: 13.3 % (ref 10–15)
FLUAV RNA SPEC QL NAA+PROBE: NEGATIVE
FLUBV RNA RESP QL NAA+PROBE: NEGATIVE
GLUCOSE SERPL-MCNC: 283 MG/DL (ref 70–99)
GROUP A STREP BY PCR: NOT DETECTED
HCO3 BLDV-SCNC: 36 MMOL/L (ref 21–28)
HCT VFR BLD AUTO: 37 % (ref 35–47)
HGB BLD-MCNC: 11.7 G/DL (ref 11.7–15.7)
HOLD SPECIMEN: NORMAL
HOLD SPECIMEN: NORMAL
IMM GRANULOCYTES # BLD: 0 10E3/UL
IMM GRANULOCYTES NFR BLD: 0 %
INTERPRETATION ECG - MUSE: NORMAL
LYMPHOCYTES # BLD AUTO: 1.2 10E3/UL (ref 0.8–5.3)
LYMPHOCYTES NFR BLD AUTO: 16 %
MCH RBC QN AUTO: 29.7 PG (ref 26.5–33)
MCHC RBC AUTO-ENTMCNC: 31.6 G/DL (ref 31.5–36.5)
MCV RBC AUTO: 94 FL (ref 78–100)
MONOCYTES # BLD AUTO: 0.5 10E3/UL (ref 0–1.3)
MONOCYTES NFR BLD AUTO: 7 %
NEUTROPHILS # BLD AUTO: 5.2 10E3/UL (ref 1.6–8.3)
NEUTROPHILS NFR BLD AUTO: 75 %
NRBC # BLD AUTO: 0 10E3/UL
NRBC BLD AUTO-RTO: 0 /100
NT-PROBNP SERPL-MCNC: 86 PG/ML (ref 0–900)
O2/TOTAL GAS SETTING VFR VENT: 2 %
OXYHGB MFR BLDV: 60 % (ref 70–75)
P AXIS - MUSE: 44 DEGREES
PCO2 BLDV: 68 MM HG (ref 40–50)
PH BLDV: 7.34 [PH] (ref 7.32–7.43)
PLATELET # BLD AUTO: 216 10E3/UL (ref 150–450)
PO2 BLDV: 35 MM HG (ref 25–47)
POTASSIUM SERPL-SCNC: 4.4 MMOL/L (ref 3.4–5.3)
PR INTERVAL - MUSE: 130 MS
QRS DURATION - MUSE: 78 MS
QT - MUSE: 386 MS
QTC - MUSE: 445 MS
R AXIS - MUSE: -43 DEGREES
RBC # BLD AUTO: 3.94 10E6/UL (ref 3.8–5.2)
RSV RNA SPEC NAA+PROBE: NEGATIVE
SAO2 % BLDV: 60.5 % (ref 70–75)
SARS-COV-2 RNA RESP QL NAA+PROBE: NEGATIVE
SODIUM SERPL-SCNC: 140 MMOL/L (ref 135–145)
SYSTOLIC BLOOD PRESSURE - MUSE: NORMAL MMHG
T AXIS - MUSE: 44 DEGREES
TROPONIN T SERPL HS-MCNC: 17 NG/L
TROPONIN T SERPL HS-MCNC: 19 NG/L
VENTRICULAR RATE- MUSE: 80 BPM
WBC # BLD AUTO: 7 10E3/UL (ref 4–11)

## 2024-03-27 PROCEDURE — 82805 BLOOD GASES W/O2 SATURATION: CPT | Performed by: EMERGENCY MEDICINE

## 2024-03-27 PROCEDURE — 250N000013 HC RX MED GY IP 250 OP 250 PS 637: Performed by: EMERGENCY MEDICINE

## 2024-03-27 PROCEDURE — 250N000011 HC RX IP 250 OP 636: Performed by: EMERGENCY MEDICINE

## 2024-03-27 PROCEDURE — 96374 THER/PROPH/DIAG INJ IV PUSH: CPT

## 2024-03-27 PROCEDURE — 83880 ASSAY OF NATRIURETIC PEPTIDE: CPT | Performed by: EMERGENCY MEDICINE

## 2024-03-27 PROCEDURE — 36415 COLL VENOUS BLD VENIPUNCTURE: CPT | Performed by: EMERGENCY MEDICINE

## 2024-03-27 PROCEDURE — 87040 BLOOD CULTURE FOR BACTERIA: CPT | Performed by: EMERGENCY MEDICINE

## 2024-03-27 PROCEDURE — 84484 ASSAY OF TROPONIN QUANT: CPT | Performed by: EMERGENCY MEDICINE

## 2024-03-27 PROCEDURE — 80048 BASIC METABOLIC PNL TOTAL CA: CPT | Performed by: EMERGENCY MEDICINE

## 2024-03-27 PROCEDURE — 250N000009 HC RX 250: Performed by: EMERGENCY MEDICINE

## 2024-03-27 PROCEDURE — 85025 COMPLETE CBC W/AUTO DIFF WBC: CPT | Performed by: EMERGENCY MEDICINE

## 2024-03-27 PROCEDURE — 93005 ELECTROCARDIOGRAM TRACING: CPT

## 2024-03-27 PROCEDURE — 87651 STREP A DNA AMP PROBE: CPT | Performed by: EMERGENCY MEDICINE

## 2024-03-27 PROCEDURE — 71046 X-RAY EXAM CHEST 2 VIEWS: CPT

## 2024-03-27 PROCEDURE — 87637 SARSCOV2&INF A&B&RSV AMP PRB: CPT | Performed by: EMERGENCY MEDICINE

## 2024-03-27 PROCEDURE — 99285 EMERGENCY DEPT VISIT HI MDM: CPT | Mod: 25

## 2024-03-27 PROCEDURE — 94640 AIRWAY INHALATION TREATMENT: CPT

## 2024-03-27 RX ORDER — AZITHROMYCIN 250 MG/1
TABLET, FILM COATED ORAL
Qty: 6 TABLET | Refills: 0 | Status: SHIPPED | OUTPATIENT
Start: 2024-03-27 | End: 2024-04-01

## 2024-03-27 RX ORDER — METHYLPREDNISOLONE SODIUM SUCCINATE 125 MG/2ML
125 INJECTION, POWDER, LYOPHILIZED, FOR SOLUTION INTRAMUSCULAR; INTRAVENOUS ONCE
Status: COMPLETED | OUTPATIENT
Start: 2024-03-27 | End: 2024-03-27

## 2024-03-27 RX ORDER — LIDOCAINE 40 MG/G
CREAM TOPICAL
Status: DISCONTINUED | OUTPATIENT
Start: 2024-03-27 | End: 2024-03-27 | Stop reason: HOSPADM

## 2024-03-27 RX ORDER — AZITHROMYCIN 250 MG/1
500 TABLET, FILM COATED ORAL ONCE
Status: COMPLETED | OUTPATIENT
Start: 2024-03-27 | End: 2024-03-27

## 2024-03-27 RX ORDER — PREDNISONE 20 MG/1
TABLET ORAL
Qty: 10 TABLET | Refills: 0 | Status: SHIPPED | OUTPATIENT
Start: 2024-03-27 | End: 2024-04-04

## 2024-03-27 RX ORDER — ALBUTEROL SULFATE 0.83 MG/ML
2.5 SOLUTION RESPIRATORY (INHALATION)
Status: COMPLETED | OUTPATIENT
Start: 2024-03-27 | End: 2024-03-27

## 2024-03-27 RX ORDER — ALBUTEROL SULFATE 90 UG/1
2 AEROSOL, METERED RESPIRATORY (INHALATION) EVERY 4 HOURS PRN
Qty: 18 G | Refills: 0 | Status: SHIPPED | OUTPATIENT
Start: 2024-03-27

## 2024-03-27 RX ADMIN — ALBUTEROL SULFATE 2.5 MG: 2.5 SOLUTION RESPIRATORY (INHALATION) at 09:19

## 2024-03-27 RX ADMIN — ALBUTEROL SULFATE 2.5 MG: 2.5 SOLUTION RESPIRATORY (INHALATION) at 09:26

## 2024-03-27 RX ADMIN — AZITHROMYCIN DIHYDRATE 500 MG: 250 TABLET ORAL at 09:11

## 2024-03-27 RX ADMIN — METHYLPREDNISOLONE SODIUM SUCCINATE 125 MG: 125 INJECTION, POWDER, FOR SOLUTION INTRAMUSCULAR; INTRAVENOUS at 09:14

## 2024-03-27 RX ADMIN — ALBUTEROL SULFATE 2.5 MG: 2.5 SOLUTION RESPIRATORY (INHALATION) at 09:12

## 2024-03-27 ASSESSMENT — ACTIVITIES OF DAILY LIVING (ADL)
ADLS_ACUITY_SCORE: 39
ADLS_ACUITY_SCORE: 37
ADLS_ACUITY_SCORE: 39

## 2024-03-27 NOTE — ED TRIAGE NOTES
Pt comes in after 2 days of coughing.  Per son she has been having some wheezing associated with her coughing.  Pt is on 2LPM O2 at baseline for COPD.  Son is translating,  offered and declined.     Triage Assessment (Adult)       Row Name 03/27/24 0756          Triage Assessment    Airway WDL WDL        Respiratory WDL    Respiratory WDL X;cough     Cough Frequency frequent

## 2024-03-27 NOTE — ED PROVIDER NOTES
History     Chief Complaint:  Shortness of Breath    The history is provided by the patient and a relative.     Pilo Quintana is a 73 year old female with history of type 2 diabetes mellitus, heart failure, hypertension, and hyperlipidemia presenting for evaluation of shortness of breath. Pilo's son reports a mild cough for the past two days that worsened last night, accompanied by some wheezing. He states that she has used CPAP at night for the past two and a half months but did not use it last night secondary to coughing. He adds that she is complaining of pharyngitis. He denies lower extremity edema or fevers. Pilo denies chest pain or headache. Pilo's son adds that his wife currently has a cough. He notes use of 2 L oxygen at baseline.    Independent Historian:   The patient's son supplements the above history and serves as interpretor with the Nadja language.    Review of External Notes:   I reviewed the 2/1/24 ED note for COPD with acute exacerbation, which also notes a history of asthma and pulmonary hypertension. I reviewed the discharge summary from 5/2023.    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  Hyperlipidemia  Multiple pulmonary nodules  Renal oncocytoma of right kidney  Simple renal cyst  ICH  Primary hyperparathyroidism  Thyroid nodule  Heart failure  Hemiplegia of right dominant side as late effect of cerebral infarction  Impaired functional mobility, balance, gait, and endurance  MAGALIS treated with BiPAP    Past Surgical History:    Dilation and  "curettage, operative hysteroscopy with morcellator, combined  Renal biopsy  Cataract removal, right  Cataract removal, left    Physical Exam   Patient Vitals for the past 24 hrs:   BP Temp Temp src Pulse Resp SpO2 Height Weight   03/27/24 0925 132/86 -- -- 75 -- 100 % -- --   03/27/24 0757 134/80 98.2  F (36.8  C) Temporal 79 17 99 % 1.499 m (4' 11\") 74.8 kg (165 lb)     Physical Exam  General: The patient is alert, in no respiratory distress.    HENT: Mucous membranes moist.    Cardiovascular: Regular rate and rhythm. Good pulses in all four extremities. Normal capillary refill and skin turgor.     Respiratory: She is on 2 L oxygen, wheezing, prolonged expirations.    Gastrointestinal: Abdomen soft. No guarding, no rebound. No palpable hernias.     Musculoskeletal: No gross deformity.     Skin: No rashes or petechiae.     Neurologic: She is alert, follows commands.    Lymphatic: No cervical adenopathy. No lower extremity swelling.    Psychiatric: The patient is non-tearful.    Emergency Department Course   ECG  ECG taken at 0842, ECG read at 0853  Normal sinus rhythm  Left axis deviation  Rate 80 bpm. IN interval 130 ms. QRS duration 78 ms. QT/QTc 386/445 ms. P-R-T axes 44 -43 44.     Imaging:  XR Chest 2 Views   Preliminary Result   IMPRESSION: Stable cardiomegaly. Stable left base atelectasis. No new   airspace disease identified.        Laboratory:  Labs Ordered and Resulted from Time of ED Arrival to Time of ED Departure   BASIC METABOLIC PANEL - Abnormal       Result Value    Sodium 140      Potassium 4.4      Chloride 95 (*)     Carbon Dioxide (CO2) 35 (*)     Anion Gap 10      Urea Nitrogen 15.5      Creatinine 0.60      GFR Estimate >90      Calcium 9.9      Glucose 283 (*)    TROPONIN T, HIGH SENSITIVITY - Abnormal    Troponin T, High Sensitivity 17 (*)    BLOOD GAS VENOUS - Abnormal    pH Venous 7.34      pCO2 Venous 68 (*)     pO2 Venous 35      Bicarbonate Venous 36 (*)     Base Excess/Deficit Venous " 7.5 (*)     FIO2 2      Oxyhemoglobin Venous 60 (*)     O2 Sat, Venous 60.5 (*)    TROPONIN T, HIGH SENSITIVITY - Abnormal    Troponin T, High Sensitivity 19 (*)    NT PROBNP INPATIENT - Normal    N terminal Pro BNP Inpatient 86     INFLUENZA A/B, RSV, & SARS-COV2 PCR - Normal    Influenza A PCR Negative      Influenza B PCR Negative      RSV PCR Negative      SARS CoV2 PCR Negative     GROUP A STREPTOCOCCUS PCR THROAT SWAB - Normal    Group A strep by PCR Not Detected     CBC WITH PLATELETS AND DIFFERENTIAL    WBC Count 7.0      RBC Count 3.94      Hemoglobin 11.7      Hematocrit 37.0      MCV 94      MCH 29.7      MCHC 31.6      RDW 13.3      Platelet Count 216      % Neutrophils 75      % Lymphocytes 16      % Monocytes 7      % Eosinophils 2      % Basophils 0      % Immature Granulocytes 0      NRBCs per 100 WBC 0      Absolute Neutrophils 5.2      Absolute Lymphocytes 1.2      Absolute Monocytes 0.5      Absolute Eosinophils 0.1      Absolute Basophils 0.0      Absolute Immature Granulocytes 0.0      Absolute NRBCs 0.0     BLOOD CULTURE   BLOOD CULTURE     Procedures    Emergency Department Course & Assessments:    Interventions:  Medications   lidocaine 1 % 0.1-1 mL (has no administration in time range)   lidocaine (LMX4) cream (has no administration in time range)   sodium chloride (PF) 0.9% PF flush 3 mL (3 mLs Intracatheter Not Given 3/27/24 1011)   sodium chloride (PF) 0.9% PF flush 3 mL (has no administration in time range)   albuterol (PROVENTIL) neb solution 2.5 mg (2.5 mg Nebulization $Given 3/27/24 0926)   methylPREDNISolone sodium succinate (solu-MEDROL) injection 125 mg (125 mg Intravenous $Given 3/27/24 0914)   azithromycin (ZITHROMAX) tablet 500 mg (500 mg Oral $Given 3/27/24 0911)     Assessments:  0823 I obtained history and examined the patient as noted above.  1320 I reassessed the patient she is breathing much more easily.    Independent Interpretation (X-rays, CTs, rhythm strip):  I  reviewed the patient's chest X-ray and note cardiomegaly there is some slight consolidation along the left heart border likely atelectasis.         Social Determinants of Health affecting care:   Healthcare Access/Compliance    Disposition:  The patient was discharged.     Impression & Plan        Medical Decision Making:  The patient's son who lives and cares for her did serve as her .  I reviewed did review the chart however she has a history of COPD asthma as well as CHF.  The patient does not show significant signs of lower extremity edema I did hear wheezing on her respiratory exam.  I felt this could be cardiogenic and I did check both a chest x-ray as well as BNP which was thankfully low.  At this point I feel this is less likely CHF and more likely COPD.  The patient was neb and did improve.  She was not hypoxic.  Her troponin was low had a slight increase but I think that is more related to her respiratory status and maria a ischemia she did not show signs of an NSTEMI.  The patient was not anemic there was no significant arrhythmia.  At this point I feel the patient is improved she does have new productive cough is not positive for COVID or influenza.  The son did say that the patient's daughter-in-law's been sick and this is likely viral mediated she was started on antibiotics due to the new productive cough    Active COPD exacerbation start on steroids and discharged home in good condition with no hypoxia.  She is at baseline of 2 L of oxygen.  The son also not used CPAP last night because of the coughing I feel that may have made her somewhat worse and slightly because the CO2 retention.  The patient does not appear overly sedated and was discharged to close outpatient follow-up.    Diagnosis:    ICD-10-CM    1. COPD exacerbation (H)  J44.1       2. Bronchitis  J40            Discharge Medications:  New Prescriptions    AZITHROMYCIN (ZITHROMAX) 250 MG TABLET    Take 2 tablets (500 mg) by mouth  daily for 1 day, THEN 1 tablet (250 mg) daily for 4 days.    PREDNISONE (DELTASONE) 20 MG TABLET    Take two tablets (= 40mg) each day for 5 (five) days     Scribe Disclosure:  I, Rosanne Lucia, am serving as a scribe at 8:21 AM on 3/27/2024 to document services personally performed by Efe Mckay MD based on my observations and the provider's statements to me.   3/27/2024   Efe Mckay MD Farnan, Christopher M, MD  03/27/24 6053

## 2024-04-01 LAB
BACTERIA BLD CULT: NO GROWTH
BACTERIA BLD CULT: NO GROWTH

## 2024-04-03 ENCOUNTER — HOSPITAL ENCOUNTER (INPATIENT)
Facility: CLINIC | Age: 74
LOS: 4 days | Discharge: HOME HEALTH | DRG: 202 | End: 2024-04-09
Attending: EMERGENCY MEDICINE | Admitting: INTERNAL MEDICINE
Payer: COMMERCIAL

## 2024-04-03 DIAGNOSIS — T38.0X5A STEROID-INDUCED HYPERGLYCEMIA: ICD-10-CM

## 2024-04-03 DIAGNOSIS — E87.20 LACTIC ACIDOSIS: ICD-10-CM

## 2024-04-03 DIAGNOSIS — R73.9 STEROID-INDUCED HYPERGLYCEMIA: ICD-10-CM

## 2024-04-03 DIAGNOSIS — K59.00 CONSTIPATION, UNSPECIFIED CONSTIPATION TYPE: Primary | ICD-10-CM

## 2024-04-03 DIAGNOSIS — J44.1 COPD EXACERBATION (H): ICD-10-CM

## 2024-04-03 LAB — GLUCOSE BLDC GLUCOMTR-MCNC: 523 MG/DL (ref 70–99)

## 2024-04-03 PROCEDURE — 99285 EMERGENCY DEPT VISIT HI MDM: CPT | Mod: 25

## 2024-04-03 PROCEDURE — 82962 GLUCOSE BLOOD TEST: CPT

## 2024-04-03 PROCEDURE — 93005 ELECTROCARDIOGRAM TRACING: CPT

## 2024-04-03 PROCEDURE — 96361 HYDRATE IV INFUSION ADD-ON: CPT

## 2024-04-03 RX ORDER — IPRATROPIUM BROMIDE AND ALBUTEROL SULFATE 2.5; .5 MG/3ML; MG/3ML
3 SOLUTION RESPIRATORY (INHALATION) ONCE
Status: COMPLETED | OUTPATIENT
Start: 2024-04-03 | End: 2024-04-04

## 2024-04-03 ASSESSMENT — COLUMBIA-SUICIDE SEVERITY RATING SCALE - C-SSRS
6. HAVE YOU EVER DONE ANYTHING, STARTED TO DO ANYTHING, OR PREPARED TO DO ANYTHING TO END YOUR LIFE?: NO
1. IN THE PAST MONTH, HAVE YOU WISHED YOU WERE DEAD OR WISHED YOU COULD GO TO SLEEP AND NOT WAKE UP?: NO
2. HAVE YOU ACTUALLY HAD ANY THOUGHTS OF KILLING YOURSELF IN THE PAST MONTH?: NO

## 2024-04-03 ASSESSMENT — ACTIVITIES OF DAILY LIVING (ADL): ADLS_ACUITY_SCORE: 39

## 2024-04-04 ENCOUNTER — APPOINTMENT (OUTPATIENT)
Dept: GENERAL RADIOLOGY | Facility: CLINIC | Age: 74
DRG: 202 | End: 2024-04-04
Attending: EMERGENCY MEDICINE
Payer: COMMERCIAL

## 2024-04-04 ENCOUNTER — APPOINTMENT (OUTPATIENT)
Dept: CARDIOLOGY | Facility: CLINIC | Age: 74
DRG: 202 | End: 2024-04-04
Attending: INTERNAL MEDICINE
Payer: COMMERCIAL

## 2024-04-04 PROBLEM — J44.1 COPD EXACERBATION (H): Status: ACTIVE | Noted: 2024-04-04

## 2024-04-04 PROBLEM — E87.20 LACTIC ACIDOSIS: Status: ACTIVE | Noted: 2024-04-04

## 2024-04-04 PROBLEM — T38.0X5A STEROID-INDUCED HYPERGLYCEMIA: Status: ACTIVE | Noted: 2024-04-04

## 2024-04-04 PROBLEM — R73.9 STEROID-INDUCED HYPERGLYCEMIA: Status: ACTIVE | Noted: 2024-04-04

## 2024-04-04 LAB
ALBUMIN UR-MCNC: NEGATIVE MG/DL
ANION GAP SERPL CALCULATED.3IONS-SCNC: 23 MMOL/L (ref 7–15)
APPEARANCE UR: CLEAR
ATRIAL RATE - MUSE: 94 BPM
B-OH-BUTYR SERPL-SCNC: 0.3 MMOL/L
BASOPHILS # BLD AUTO: 0 10E3/UL (ref 0–0.2)
BASOPHILS NFR BLD AUTO: 0 %
BILIRUB UR QL STRIP: NEGATIVE
BUN SERPL-MCNC: 33.4 MG/DL (ref 8–23)
CALCIUM SERPL-MCNC: 9.8 MG/DL (ref 8.8–10.2)
CHLORIDE SERPL-SCNC: 88 MMOL/L (ref 98–107)
COLOR UR AUTO: ABNORMAL
CREAT SERPL-MCNC: 0.77 MG/DL (ref 0.51–0.95)
D DIMER PPP FEU-MCNC: 0.33 UG/ML FEU (ref 0–0.5)
DEPRECATED HCO3 PLAS-SCNC: 25 MMOL/L (ref 22–29)
DIASTOLIC BLOOD PRESSURE - MUSE: NORMAL MMHG
EGFRCR SERPLBLD CKD-EPI 2021: 80 ML/MIN/1.73M2
EOSINOPHIL # BLD AUTO: 0 10E3/UL (ref 0–0.7)
EOSINOPHIL NFR BLD AUTO: 0 %
ERYTHROCYTE [DISTWIDTH] IN BLOOD BY AUTOMATED COUNT: 13.3 % (ref 10–15)
GLUCOSE BLDC GLUCOMTR-MCNC: 176 MG/DL (ref 70–99)
GLUCOSE BLDC GLUCOMTR-MCNC: 222 MG/DL (ref 70–99)
GLUCOSE BLDC GLUCOMTR-MCNC: 274 MG/DL (ref 70–99)
GLUCOSE BLDC GLUCOMTR-MCNC: 287 MG/DL (ref 70–99)
GLUCOSE BLDC GLUCOMTR-MCNC: 372 MG/DL (ref 70–99)
GLUCOSE SERPL-MCNC: 517 MG/DL (ref 70–99)
GLUCOSE UR STRIP-MCNC: >=1000 MG/DL
HCO3 BLDV-SCNC: 30 MMOL/L (ref 21–28)
HCT VFR BLD AUTO: 34.3 % (ref 35–47)
HGB BLD-MCNC: 11.2 G/DL (ref 11.7–15.7)
HGB UR QL STRIP: NEGATIVE
HYALINE CASTS: 8 /LPF
IMM GRANULOCYTES # BLD: 0 10E3/UL
IMM GRANULOCYTES NFR BLD: 0 %
INTERPRETATION ECG - MUSE: NORMAL
KETONES UR STRIP-MCNC: NEGATIVE MG/DL
LACTATE BLD-SCNC: 8.3 MMOL/L
LACTATE SERPL-SCNC: 1.8 MMOL/L (ref 0.7–2)
LACTATE SERPL-SCNC: 2.1 MMOL/L (ref 0.7–2)
LACTATE SERPL-SCNC: 3.1 MMOL/L (ref 0.7–2)
LACTATE SERPL-SCNC: 3.7 MMOL/L (ref 0.7–2)
LACTATE SERPL-SCNC: 4 MMOL/L (ref 0.7–2)
LEUKOCYTE ESTERASE UR QL STRIP: NEGATIVE
LVEF ECHO: NORMAL
LYMPHOCYTES # BLD AUTO: 0.7 10E3/UL (ref 0.8–5.3)
LYMPHOCYTES NFR BLD AUTO: 10 %
MAGNESIUM SERPL-MCNC: 1.7 MG/DL (ref 1.7–2.3)
MCH RBC QN AUTO: 29.4 PG (ref 26.5–33)
MCHC RBC AUTO-ENTMCNC: 32.7 G/DL (ref 31.5–36.5)
MCV RBC AUTO: 90 FL (ref 78–100)
MONOCYTES # BLD AUTO: 0.2 10E3/UL (ref 0–1.3)
MONOCYTES NFR BLD AUTO: 3 %
MUCOUS THREADS #/AREA URNS LPF: PRESENT /LPF
NEUTROPHILS # BLD AUTO: 6 10E3/UL (ref 1.6–8.3)
NEUTROPHILS NFR BLD AUTO: 87 %
NITRATE UR QL: NEGATIVE
NRBC # BLD AUTO: 0 10E3/UL
NRBC BLD AUTO-RTO: 0 /100
NT-PROBNP SERPL-MCNC: 205 PG/ML (ref 0–900)
P AXIS - MUSE: 45 DEGREES
PCO2 BLDV: 48 MM HG (ref 40–50)
PH BLDV: 7.4 [PH] (ref 7.32–7.43)
PH UR STRIP: 5 [PH] (ref 5–7)
PLATELET # BLD AUTO: 272 10E3/UL (ref 150–450)
PO2 BLDV: 79 MM HG (ref 25–47)
POTASSIUM SERPL-SCNC: 4.5 MMOL/L (ref 3.4–5.3)
PR INTERVAL - MUSE: 138 MS
PROCALCITONIN SERPL IA-MCNC: 0.07 NG/ML
QRS DURATION - MUSE: 78 MS
QT - MUSE: 356 MS
QTC - MUSE: 445 MS
R AXIS - MUSE: -46 DEGREES
RBC # BLD AUTO: 3.81 10E6/UL (ref 3.8–5.2)
RBC URINE: <1 /HPF
SAO2 % BLDV: 95 % (ref 70–75)
SODIUM SERPL-SCNC: 136 MMOL/L (ref 135–145)
SP GR UR STRIP: 1.02 (ref 1–1.03)
SQUAMOUS EPITHELIAL: 2 /HPF
SYSTOLIC BLOOD PRESSURE - MUSE: NORMAL MMHG
T AXIS - MUSE: 45 DEGREES
TROPONIN T SERPL HS-MCNC: 14 NG/L
TROPONIN T SERPL HS-MCNC: 15 NG/L
UROBILINOGEN UR STRIP-MCNC: NORMAL MG/DL
VENTRICULAR RATE- MUSE: 94 BPM
WBC # BLD AUTO: 6.9 10E3/UL (ref 4–11)
WBC URINE: 1 /HPF

## 2024-04-04 PROCEDURE — 999N000208 ECHOCARDIOGRAM COMPLETE

## 2024-04-04 PROCEDURE — 82962 GLUCOSE BLOOD TEST: CPT

## 2024-04-04 PROCEDURE — 999N000157 HC STATISTIC RCP TIME EA 10 MIN

## 2024-04-04 PROCEDURE — 84484 ASSAY OF TROPONIN QUANT: CPT | Performed by: INTERNAL MEDICINE

## 2024-04-04 PROCEDURE — 71046 X-RAY EXAM CHEST 2 VIEWS: CPT

## 2024-04-04 PROCEDURE — 99223 1ST HOSP IP/OBS HIGH 75: CPT | Performed by: INTERNAL MEDICINE

## 2024-04-04 PROCEDURE — 96372 THER/PROPH/DIAG INJ SC/IM: CPT | Performed by: INTERNAL MEDICINE

## 2024-04-04 PROCEDURE — 94640 AIRWAY INHALATION TREATMENT: CPT

## 2024-04-04 PROCEDURE — 250N000011 HC RX IP 250 OP 636: Performed by: EMERGENCY MEDICINE

## 2024-04-04 PROCEDURE — 87040 BLOOD CULTURE FOR BACTERIA: CPT | Performed by: INTERNAL MEDICINE

## 2024-04-04 PROCEDURE — 83605 ASSAY OF LACTIC ACID: CPT | Performed by: INTERNAL MEDICINE

## 2024-04-04 PROCEDURE — 83880 ASSAY OF NATRIURETIC PEPTIDE: CPT | Performed by: INTERNAL MEDICINE

## 2024-04-04 PROCEDURE — 84145 PROCALCITONIN (PCT): CPT | Performed by: EMERGENCY MEDICINE

## 2024-04-04 PROCEDURE — G0378 HOSPITAL OBSERVATION PER HR: HCPCS

## 2024-04-04 PROCEDURE — 94640 AIRWAY INHALATION TREATMENT: CPT | Mod: 76

## 2024-04-04 PROCEDURE — 93306 TTE W/DOPPLER COMPLETE: CPT | Mod: 26 | Performed by: INTERNAL MEDICINE

## 2024-04-04 PROCEDURE — 36416 COLLJ CAPILLARY BLOOD SPEC: CPT | Performed by: INTERNAL MEDICINE

## 2024-04-04 PROCEDURE — 99207 PR NO BILLABLE SERVICE THIS VISIT: CPT | Performed by: INTERNAL MEDICINE

## 2024-04-04 PROCEDURE — 83605 ASSAY OF LACTIC ACID: CPT

## 2024-04-04 PROCEDURE — 96375 TX/PRO/DX INJ NEW DRUG ADDON: CPT

## 2024-04-04 PROCEDURE — 36415 COLL VENOUS BLD VENIPUNCTURE: CPT | Performed by: INTERNAL MEDICINE

## 2024-04-04 PROCEDURE — 96365 THER/PROPH/DIAG IV INF INIT: CPT

## 2024-04-04 PROCEDURE — 83735 ASSAY OF MAGNESIUM: CPT | Performed by: EMERGENCY MEDICINE

## 2024-04-04 PROCEDURE — 250N000009 HC RX 250: Performed by: INTERNAL MEDICINE

## 2024-04-04 PROCEDURE — 82803 BLOOD GASES ANY COMBINATION: CPT

## 2024-04-04 PROCEDURE — 255N000002 HC RX 255 OP 636: Performed by: INTERNAL MEDICINE

## 2024-04-04 PROCEDURE — 999N000156 HC STATISTIC RCP CONSULT EA 30 MIN

## 2024-04-04 PROCEDURE — 250N000011 HC RX IP 250 OP 636: Performed by: INTERNAL MEDICINE

## 2024-04-04 PROCEDURE — 87040 BLOOD CULTURE FOR BACTERIA: CPT | Performed by: EMERGENCY MEDICINE

## 2024-04-04 PROCEDURE — 250N000013 HC RX MED GY IP 250 OP 250 PS 637: Performed by: INTERNAL MEDICINE

## 2024-04-04 PROCEDURE — 258N000003 HC RX IP 258 OP 636: Performed by: EMERGENCY MEDICINE

## 2024-04-04 PROCEDURE — 82010 KETONE BODYS QUAN: CPT | Performed by: EMERGENCY MEDICINE

## 2024-04-04 PROCEDURE — 36415 COLL VENOUS BLD VENIPUNCTURE: CPT

## 2024-04-04 PROCEDURE — 85025 COMPLETE CBC W/AUTO DIFF WBC: CPT | Performed by: EMERGENCY MEDICINE

## 2024-04-04 PROCEDURE — 250N000009 HC RX 250: Performed by: EMERGENCY MEDICINE

## 2024-04-04 PROCEDURE — 250N000012 HC RX MED GY IP 250 OP 636 PS 637: Performed by: INTERNAL MEDICINE

## 2024-04-04 PROCEDURE — 96360 HYDRATION IV INFUSION INIT: CPT

## 2024-04-04 PROCEDURE — 81001 URINALYSIS AUTO W/SCOPE: CPT | Performed by: EMERGENCY MEDICINE

## 2024-04-04 PROCEDURE — 85379 FIBRIN DEGRADATION QUANT: CPT | Performed by: EMERGENCY MEDICINE

## 2024-04-04 PROCEDURE — 36415 COLL VENOUS BLD VENIPUNCTURE: CPT | Performed by: EMERGENCY MEDICINE

## 2024-04-04 PROCEDURE — 80048 BASIC METABOLIC PNL TOTAL CA: CPT | Performed by: EMERGENCY MEDICINE

## 2024-04-04 RX ORDER — PREDNISONE 20 MG/1
TABLET ORAL DAILY
Status: ON HOLD | COMMUNITY
Start: 2024-04-02 | End: 2024-04-09

## 2024-04-04 RX ORDER — ATORVASTATIN CALCIUM 40 MG/1
40 TABLET, FILM COATED ORAL EVERY EVENING
Status: DISCONTINUED | OUTPATIENT
Start: 2024-04-04 | End: 2024-04-09 | Stop reason: HOSPADM

## 2024-04-04 RX ORDER — ACETAMINOPHEN 325 MG/1
650 TABLET ORAL EVERY 4 HOURS PRN
Status: DISCONTINUED | OUTPATIENT
Start: 2024-04-04 | End: 2024-04-09 | Stop reason: HOSPADM

## 2024-04-04 RX ORDER — ASPIRIN 81 MG/1
81 TABLET ORAL DAILY
Status: DISCONTINUED | OUTPATIENT
Start: 2024-04-04 | End: 2024-04-09 | Stop reason: HOSPADM

## 2024-04-04 RX ORDER — CALCIUM CARBONATE 500 MG/1
1000 TABLET, CHEWABLE ORAL 4 TIMES DAILY PRN
Status: DISCONTINUED | OUTPATIENT
Start: 2024-04-04 | End: 2024-04-09 | Stop reason: HOSPADM

## 2024-04-04 RX ORDER — PREDNISONE 20 MG/1
40 TABLET ORAL DAILY
Status: DISCONTINUED | OUTPATIENT
Start: 2024-04-04 | End: 2024-04-07

## 2024-04-04 RX ORDER — ACETAMINOPHEN 650 MG/1
650 SUPPOSITORY RECTAL EVERY 4 HOURS PRN
Status: DISCONTINUED | OUTPATIENT
Start: 2024-04-04 | End: 2024-04-06

## 2024-04-04 RX ORDER — CEFTRIAXONE 1 G/1
1 INJECTION, POWDER, FOR SOLUTION INTRAMUSCULAR; INTRAVENOUS ONCE
Status: COMPLETED | OUTPATIENT
Start: 2024-04-04 | End: 2024-04-04

## 2024-04-04 RX ORDER — VITAMIN B COMPLEX
50 TABLET ORAL DAILY
Status: DISCONTINUED | OUTPATIENT
Start: 2024-04-04 | End: 2024-04-09 | Stop reason: HOSPADM

## 2024-04-04 RX ORDER — AMOXICILLIN 250 MG
1 CAPSULE ORAL 2 TIMES DAILY PRN
Status: DISCONTINUED | OUTPATIENT
Start: 2024-04-04 | End: 2024-04-09 | Stop reason: HOSPADM

## 2024-04-04 RX ORDER — SODIUM CHLORIDE 9 MG/ML
INJECTION, SOLUTION INTRAVENOUS CONTINUOUS
Status: DISCONTINUED | OUTPATIENT
Start: 2024-04-04 | End: 2024-04-04

## 2024-04-04 RX ORDER — AMLODIPINE BESYLATE 5 MG/1
5 TABLET ORAL DAILY
Status: DISCONTINUED | OUTPATIENT
Start: 2024-04-04 | End: 2024-04-09 | Stop reason: HOSPADM

## 2024-04-04 RX ORDER — ENOXAPARIN SODIUM 100 MG/ML
40 INJECTION SUBCUTANEOUS EVERY 24 HOURS
Status: DISCONTINUED | OUTPATIENT
Start: 2024-04-04 | End: 2024-04-09 | Stop reason: HOSPADM

## 2024-04-04 RX ORDER — ALBUTEROL SULFATE 0.83 MG/ML
2.5 SOLUTION RESPIRATORY (INHALATION) EVERY 6 HOURS PRN
Status: DISCONTINUED | OUTPATIENT
Start: 2024-04-04 | End: 2024-04-09 | Stop reason: HOSPADM

## 2024-04-04 RX ORDER — AMOXICILLIN 250 MG
2 CAPSULE ORAL 2 TIMES DAILY PRN
Status: DISCONTINUED | OUTPATIENT
Start: 2024-04-04 | End: 2024-04-09 | Stop reason: HOSPADM

## 2024-04-04 RX ORDER — NICOTINE POLACRILEX 4 MG
15-30 LOZENGE BUCCAL
Status: DISCONTINUED | OUTPATIENT
Start: 2024-04-04 | End: 2024-04-09 | Stop reason: HOSPADM

## 2024-04-04 RX ORDER — ALBUTEROL SULFATE 90 UG/1
2 AEROSOL, METERED RESPIRATORY (INHALATION) EVERY 4 HOURS PRN
Status: DISCONTINUED | OUTPATIENT
Start: 2024-04-04 | End: 2024-04-09 | Stop reason: HOSPADM

## 2024-04-04 RX ORDER — ALBUTEROL SULFATE 0.83 MG/ML
2.5 SOLUTION RESPIRATORY (INHALATION)
Status: DISCONTINUED | OUTPATIENT
Start: 2024-04-04 | End: 2024-04-04

## 2024-04-04 RX ORDER — DEXTROSE MONOHYDRATE 25 G/50ML
25-50 INJECTION, SOLUTION INTRAVENOUS
Status: DISCONTINUED | OUTPATIENT
Start: 2024-04-04 | End: 2024-04-09 | Stop reason: HOSPADM

## 2024-04-04 RX ORDER — LIDOCAINE 40 MG/G
CREAM TOPICAL
Status: DISCONTINUED | OUTPATIENT
Start: 2024-04-04 | End: 2024-04-06

## 2024-04-04 RX ORDER — FUROSEMIDE 10 MG/ML
40 INJECTION INTRAMUSCULAR; INTRAVENOUS DAILY
Status: DISCONTINUED | OUTPATIENT
Start: 2024-04-04 | End: 2024-04-04

## 2024-04-04 RX ORDER — IPRATROPIUM BROMIDE AND ALBUTEROL SULFATE 2.5; .5 MG/3ML; MG/3ML
3 SOLUTION RESPIRATORY (INHALATION)
Status: DISCONTINUED | OUTPATIENT
Start: 2024-04-04 | End: 2024-04-09 | Stop reason: HOSPADM

## 2024-04-04 RX ORDER — BUDESONIDE AND FORMOTEROL FUMARATE DIHYDRATE 160; 4.5 UG/1; UG/1
2 AEROSOL RESPIRATORY (INHALATION) DAILY
Status: DISCONTINUED | OUTPATIENT
Start: 2024-04-04 | End: 2024-04-09 | Stop reason: HOSPADM

## 2024-04-04 RX ADMIN — INSULIN ASPART 2 UNITS: 100 INJECTION, SOLUTION INTRAVENOUS; SUBCUTANEOUS at 11:58

## 2024-04-04 RX ADMIN — PREDNISONE 40 MG: 20 TABLET ORAL at 08:20

## 2024-04-04 RX ADMIN — IPRATROPIUM BROMIDE AND ALBUTEROL SULFATE 3 ML: .5; 3 SOLUTION RESPIRATORY (INHALATION) at 08:19

## 2024-04-04 RX ADMIN — ALBUTEROL SULFATE 2 PUFF: 90 INHALANT RESPIRATORY (INHALATION) at 20:33

## 2024-04-04 RX ADMIN — ALBUTEROL SULFATE 2.5 MG: 2.5 SOLUTION RESPIRATORY (INHALATION) at 16:11

## 2024-04-04 RX ADMIN — INSULIN ASPART 3 UNITS: 100 INJECTION, SOLUTION INTRAVENOUS; SUBCUTANEOUS at 17:20

## 2024-04-04 RX ADMIN — INSULIN ASPART 3 UNITS: 100 INJECTION, SOLUTION INTRAVENOUS; SUBCUTANEOUS at 09:21

## 2024-04-04 RX ADMIN — IPRATROPIUM BROMIDE AND ALBUTEROL SULFATE 3 ML: .5; 3 SOLUTION RESPIRATORY (INHALATION) at 00:09

## 2024-04-04 RX ADMIN — Medication 50 MCG: at 14:27

## 2024-04-04 RX ADMIN — IPRATROPIUM BROMIDE AND ALBUTEROL SULFATE 3 ML: .5; 3 SOLUTION RESPIRATORY (INHALATION) at 12:02

## 2024-04-04 RX ADMIN — SODIUM CHLORIDE 1000 ML: 9 INJECTION, SOLUTION INTRAVENOUS at 00:03

## 2024-04-04 RX ADMIN — CEFTRIAXONE 1 G: 1 INJECTION, POWDER, FOR SOLUTION INTRAMUSCULAR; INTRAVENOUS at 07:47

## 2024-04-04 RX ADMIN — AMLODIPINE BESYLATE 5 MG: 5 TABLET ORAL at 14:27

## 2024-04-04 RX ADMIN — ASPIRIN 81 MG: 81 TABLET, COATED ORAL at 14:27

## 2024-04-04 RX ADMIN — FUROSEMIDE 40 MG: 10 INJECTION, SOLUTION INTRAMUSCULAR; INTRAVENOUS at 08:18

## 2024-04-04 RX ADMIN — IPRATROPIUM BROMIDE AND ALBUTEROL SULFATE 3 ML: .5; 3 SOLUTION RESPIRATORY (INHALATION) at 19:18

## 2024-04-04 RX ADMIN — ATORVASTATIN CALCIUM 40 MG: 40 TABLET, FILM COATED ORAL at 20:27

## 2024-04-04 RX ADMIN — ENOXAPARIN SODIUM 40 MG: 40 INJECTION SUBCUTANEOUS at 08:18

## 2024-04-04 RX ADMIN — SODIUM CHLORIDE 1000 ML: 9 INJECTION, SOLUTION INTRAVENOUS at 01:48

## 2024-04-04 RX ADMIN — HUMAN ALBUMIN MICROSPHERES AND PERFLUTREN 6 ML: 10; .22 INJECTION, SOLUTION INTRAVENOUS at 13:20

## 2024-04-04 RX ADMIN — INSULIN GLARGINE 10 UNITS: 100 INJECTION, SOLUTION SUBCUTANEOUS at 09:22

## 2024-04-04 RX ADMIN — INSULIN GLARGINE 10 UNITS: 100 INJECTION, SOLUTION SUBCUTANEOUS at 20:28

## 2024-04-04 ASSESSMENT — ACTIVITIES OF DAILY LIVING (ADL)
ADLS_ACUITY_SCORE: 39
ADLS_ACUITY_SCORE: 39
ADLS_ACUITY_SCORE: 40
ADLS_ACUITY_SCORE: 39
ADLS_ACUITY_SCORE: 36
ADLS_ACUITY_SCORE: 40
ADLS_ACUITY_SCORE: 36
ADLS_ACUITY_SCORE: 40
ADLS_ACUITY_SCORE: 36
ADLS_ACUITY_SCORE: 41
ADLS_ACUITY_SCORE: 36
ADLS_ACUITY_SCORE: 39
ADLS_ACUITY_SCORE: 36
ADLS_ACUITY_SCORE: 40
ADLS_ACUITY_SCORE: 39
ADLS_ACUITY_SCORE: 41
ADLS_ACUITY_SCORE: 40
ADLS_ACUITY_SCORE: 39
ADLS_ACUITY_SCORE: 40

## 2024-04-04 NOTE — PROGRESS NOTES
Admitted earlier today  H&P reviewed  Hyperglycemia improving  Stable hemodynamics  Presented with marked lactic acidosis  No initial suspicion severe infectious process  Subsequent lactic acid levels still elevated but showing downward trend  Concerns earlier if she is having possible CHF for her shortness of breath  Received IV Lasix  Stop IV fluids  proBNP normal  Echocardiogram pending  -With her underlying severe lactic acidosis I discontinued IV Lasix for now as patient also showing normal proBNP levels  Continued on breathing treatments, recently prescribe corticosteroids for underlying COPD  -Continue to monitor glucose levels, added prandial insulin coverage, insulin sliding scale and continued on Lantus    Bhavya

## 2024-04-04 NOTE — PROVIDER NOTIFICATION
5:08 PM  Message: Please order albuterol (PROAIR HFA/PROVENTIL HFA/VENTOLIN HFA) 108 (90 Base) MCG/ACT inhaler Inhale 2 puffs into the lungs every 4 hours as needed for wheezing  Response: ordered, neb discontinued

## 2024-04-04 NOTE — PLAN OF CARE
[facility]    ED Boarding Nurse Handoff Addendum Report:    Date/time: 4/4/2024, 12:17 PM    Activity Level: assist of 2    Fall Risk: Yes:  nonskid shoes/slippers when out of bed, arm band in place, patient and family education, assistive device/personal items within reach, activity supervised, and mobility aid in reach    Active Infusions: None    Current Meds Due: None    Current care needs: Lunch and carb cover insulin    Oxygen requirements (liters/min and/or FiO2): 2 LPM    Respiratory status: Nasal cannula    Vital signs (within last 30 minutes):    Vitals:    04/04/24 0402 04/04/24 0500 04/04/24 0733 04/04/24 1100   BP: (!) 172/99 (!) 154/93 (!) 146/109 (!) 142/87   BP Location:   Right arm Right arm   Patient Position:    Semi-Valero's   Cuff Size:    Adult Regular   Pulse: 83 80 71    Resp:   20 18   Temp:   98.6  F (37  C)    TempSrc:   Oral    SpO2: 100% 99%  98%       Focused assessment within last 30 minutes:    A&Ox4. VSS. . 2 unit(s) insulin pre meal given. Pt hasn't eaten lunch yet, needs carb cover insulin after lunch. On telemetry. On 2 LPM nasal cannula. Scheduled medications given. Nebulizer given. No further needs at this time.     ED Boarding Nurse name: Nora Millan RN      Goal Outcome Evaluation:      Plan of Care Reviewed With: patient    Overall Patient Progress: improvingOverall Patient Progress: improving

## 2024-04-04 NOTE — ED PROVIDER NOTES
History     Chief Complaint:  Hyperglycemia     The history is provided by a relative.      Pilo Quintana is a 73 year old female with a history of hypertension, hyperlipidemia, ICH, heart failure, and type 2 diabetes who presents to the emergency department for hyperglycemia. The patient's son states that tonight, he checked the patient's blood sugar which was 543 and increased to 555 upon second check. He reports that the patient was seen on 03/27 for COPD exacerbation and bronchitis when she was experiencing a dry cough, and was prescribed prednisone.  She finished the initial course of prednisone and followed up with her primary care provider yesterday and was prescribed another course of prednisone starting at higher dose.  She took the first dose of this increased prednisone today.  Son adds that the patient is still experiencing a dry cough and has also been wheezing. She took an albuterol inhaler at home, prior to emergency department arrival. She has a hx of COPD and is on 2L of O2 at home. She has a hx of type 2 diabetes and takes metformin, denies taking insulin. Denies fever, nausea, vomiting, or diarrhea. Denies abdominal pain. Denies leg swelling. Denies changes to bowel movements or urination. Denies taking any antibiotics recently.    Independent Historian:   The patient's son provided the history as noted above.    Review of External Notes:   I reviewed the ER visit from 3/27/2024 when patient was initially seen for cough and wheezing and was diagnosed with COPD exacerbation and put on prednisone.  I reviewed the office visit from yesterday with the patient was seen in follow-up by her primary care provider and again prescribed prednisone, this time at a higher dose.    Medications:    albuterol (PROAIR HFA/PROVENTIL HFA/VENTOLIN HFA) 108 (90 Base) MCG/ACT inhaler  albuterol (PROVENTIL) (2.5 MG/3ML) 0.083% neb solution  amLODIPine (NORVASC) 2.5 MG tablet  aspirin (ASA) 81 MG EC  tablet  atorvastatin (LIPITOR) 40 MG tablet  budesonide-formoterol (SYMBICORT) 160-4.5 MCG/ACT Inhaler  furosemide (LASIX) 20 MG tablet  losartan (COZAAR) 100 MG tablet  metFORMIN (GLUCOPHAGE XR) 500 MG 24 hr tablet  metoprolol succinate ER (TOPROL XL) 50 MG 24 hr tablet  predniSONE (DELTASONE) 20 MG tablet    Past Medical History:    Hypertension  Type 2 diabetes  Hyperlipidemia  ICH  Renal mass  Asthma  Osteoarthritis  Heart failure    Past Surgical History:    D&C  Cataract removal bilateral     Physical Exam   Patient Vitals for the past 24 hrs:   BP Temp Temp src Pulse Resp SpO2   04/04/24 0500 (!) 154/93 -- -- 80 -- 99 %   04/04/24 0402 (!) 172/99 -- -- 83 -- 100 %   04/04/24 0347 (!) 143/84 -- -- 81 -- 100 %   04/04/24 0332 (!) 146/92 -- -- (!) 48 -- 100 %   04/04/24 0317 (!) 141/82 -- -- 82 -- 100 %   04/04/24 0302 (!) 144/87 -- -- 82 -- 99 %   04/04/24 0247 (!) 142/88 -- -- 86 -- 100 %   04/04/24 0232 (!) 165/98 -- -- 91 -- 98 %   04/04/24 0000 126/81 -- -- 90 (!) 31 100 %   04/03/24 2350 131/80 -- -- 94 17 100 %   04/03/24 2331 135/83 -- -- 101 25 100 %   04/03/24 2316 137/77 -- -- 94 20 100 %   04/03/24 2301 135/88 98.5  F (36.9  C) Oral 96 22 100 %      Physical Exam  Vitals and nursing note reviewed.   Constitutional:       General: She is not in acute distress.     Appearance: She is not ill-appearing.   HENT:      Head: Normocephalic and atraumatic.      Right Ear: External ear normal.      Left Ear: External ear normal.      Nose: Nose normal.      Mouth/Throat:      Mouth: Mucous membranes are moist.   Eyes:      Extraocular Movements: Extraocular movements intact.      Conjunctiva/sclera: Conjunctivae normal.   Cardiovascular:      Rate and Rhythm: Normal rate and regular rhythm.      Heart sounds: No murmur heard.  Pulmonary:      Effort: Pulmonary effort is normal. No respiratory distress.      Breath sounds: Wheezing present. No rhonchi or rales.   Abdominal:      General: Abdomen is flat. Bowel  sounds are normal. There is no distension.      Palpations: Abdomen is soft.      Tenderness: There is no abdominal tenderness. There is no guarding or rebound.   Musculoskeletal:         General: No swelling, deformity or signs of injury.      Cervical back: Normal range of motion and neck supple.   Skin:     General: Skin is warm and dry.      Findings: No rash.   Neurological:      Mental Status: She is alert and oriented to person, place, and time.   Psychiatric:         Behavior: Behavior normal.       Emergency Department Course   ECG  ECG taken at 2321, ECG read at 2338  Normal sinus rhythm  Left anterior fascicular block  Nonspecific ST abnormality   No significant changes as compared to prior, dated 03/27/24.  Rate 94 bpm. NM interval 138 ms. QRS duration 78 ms. QT/QTc 356/445 ms. P-R-T axes 45 -46 45.     Imaging:  XR Chest 2 Views   Final Result   IMPRESSION: Improved platelike atelectasis in the left base demonstrated previously. Both lungs are otherwise clear. No adenopathy or effusion. Mild cardiac enlargement. Normal pulmonary vascularity. Mild degenerative changes both shoulders and the    spine.         Read by radiologist.    Laboratory:  Labs Ordered and Resulted from Time of ED Arrival to Time of ED Departure   GLUCOSE BY METER - Abnormal       Result Value    GLUCOSE BY METER POCT 523 (*)    BASIC METABOLIC PANEL - Abnormal    Sodium 136      Potassium 4.5      Chloride 88 (*)     Carbon Dioxide (CO2) 25      Anion Gap 23 (*)     Urea Nitrogen 33.4 (*)     Creatinine 0.77      GFR Estimate 80      Calcium 9.8      Glucose 517 (*)    ROUTINE UA WITH MICROSCOPIC REFLEX TO CULTURE - Abnormal    Color Urine Light Yellow      Appearance Urine Clear      Glucose Urine >=1000 (*)     Bilirubin Urine Negative      Ketones Urine Negative      Specific Gravity Urine 1.019      Blood Urine Negative      pH Urine 5.0      Protein Albumin Urine Negative      Urobilinogen Urine Normal      Nitrite Urine  Negative      Leukocyte Esterase Urine Negative      Mucus Urine Present (*)     RBC Urine <1      WBC Urine 1      Squamous Epithelials Urine 2 (*)     Hyaline Casts Urine 8 (*)    CBC WITH PLATELETS AND DIFFERENTIAL - Abnormal    WBC Count 6.9      RBC Count 3.81      Hemoglobin 11.2 (*)     Hematocrit 34.3 (*)     MCV 90      MCH 29.4      MCHC 32.7      RDW 13.3      Platelet Count 272      % Neutrophils 87      % Lymphocytes 10      % Monocytes 3      % Eosinophils 0      % Basophils 0      % Immature Granulocytes 0      NRBCs per 100 WBC 0      Absolute Neutrophils 6.0      Absolute Lymphocytes 0.7 (*)     Absolute Monocytes 0.2      Absolute Eosinophils 0.0      Absolute Basophils 0.0      Absolute Immature Granulocytes 0.0      Absolute NRBCs 0.0     ISTAT GASES LACTATE VENOUS POCT - Abnormal    Lactic Acid POCT 8.3 (*)     Bicarbonate Venous POCT 30 (*)     O2 Sat, Venous POCT 95 (*)     pCO2 Venous POCT 48      pH Venous POCT 7.40      pO2 Venous POCT 79 (*)    LACTIC ACID WHOLE BLOOD - Abnormal    Lactic Acid 4.0 (*)    GLUCOSE BY METER - Abnormal    GLUCOSE BY METER POCT 372 (*)    PROCALCITONIN - Normal    Procalcitonin 0.07     MAGNESIUM - Normal    Magnesium 1.7     KETONE BETA-HYDROXYBUTYRATE QUANTITATIVE, RAPID - Normal    Ketone (Beta-Hydroxybutyrate) Quantitative 0.30     D DIMER QUANTITATIVE - Normal    D-Dimer Quantitative 0.33     GLUCOSE MONITOR NURSING POCT   GLUCOSE MONITOR NURSING POCT   GLUCOSE MONITOR NURSING POCT   GLUCOSE MONITOR NURSING POCT   GLUCOSE MONITOR NURSING POCT   NT PROBNP INPATIENT   LACTIC ACID WHOLE BLOOD   BLOOD CULTURE   BLOOD CULTURE     Emergency Department Course & Assessments:    Interventions:  Medications   cefTRIAXone (ROCEPHIN) 1 g vial to attach to  mL bag for ADULTS or NS 50 mL bag for PEDS (has no administration in time range)   lidocaine 1 % 0.1-1 mL (has no administration in time range)   lidocaine (LMX4) cream (has no administration in time range)    sodium chloride (PF) 0.9% PF flush 3 mL (has no administration in time range)   sodium chloride (PF) 0.9% PF flush 3 mL (has no administration in time range)   senna-docusate (SENOKOT-S/PERICOLACE) 8.6-50 MG per tablet 1 tablet (has no administration in time range)     Or   senna-docusate (SENOKOT-S/PERICOLACE) 8.6-50 MG per tablet 2 tablet (has no administration in time range)   calcium carbonate (TUMS) chewable tablet 1,000 mg (has no administration in time range)   glucose gel 15-30 g (has no administration in time range)     Or   dextrose 50 % injection 25-50 mL (has no administration in time range)     Or   glucagon injection 1 mg (has no administration in time range)   enoxaparin ANTICOAGULANT (LOVENOX) injection 40 mg (has no administration in time range)   acetaminophen (TYLENOL) tablet 650 mg (has no administration in time range)     Or   acetaminophen (TYLENOL) Suppository 650 mg (has no administration in time range)   insulin aspart (NovoLOG) injection (RAPID ACTING) (has no administration in time range)   insulin aspart (NovoLOG) injection (RAPID ACTING) (has no administration in time range)   insulin glargine (LANTUS PEN) injection 10 Units (has no administration in time range)   ipratropium - albuterol 0.5 mg/2.5 mg/3 mL (DUONEB) neb solution 3 mL (has no administration in time range)   albuterol (PROVENTIL) neb solution 2.5 mg (has no administration in time range)   furosemide (LASIX) injection 40 mg (has no administration in time range)   predniSONE (DELTASONE) tablet 40 mg (has no administration in time range)   sodium chloride 0.9 % infusion (has no administration in time range)   sodium chloride 0.9% BOLUS 1,000 mL (0 mLs Intravenous Stopped 4/4/24 0103)   ipratropium - albuterol 0.5 mg/2.5 mg/3 mL (DUONEB) neb solution 3 mL (3 mLs Nebulization $Given 4/4/24 0009)   sodium chloride 0.9% BOLUS 1,000 mL (0 mLs Intravenous Stopped 4/4/24 0248)      Assessments:  2310 I obtained history and examined  the patient as noted above.   0130 I rechecked the patient.   0547 I rechecked the patient. I discussed findings and hospitalization with the patient and her son. All questions answered.     Independent Interpretation (X-rays, CTs, rhythm strip):  I reviewed the chest x-ray and see no obvious infiltrate or pneumothorax per my read    Consultations/Discussion of Management or Tests:  0542 I spoke with Dr. Villa, hospitalist, regarding the patient. He accepts admission.    Social Determinants of Health affecting care:   None    Disposition:  The patient was admitted to the hospital under the care of Dr. Villa.     Impression & Plan    CMS Diagnoses: The Lactic acid level is elevated due to dehydration, metformin, albuterol, and hyperglycemia, at this time there is no sign of severe sepsis or septic shock.    Medical Decision Makin-year-old female presenting with hyperglycemia.  I suspect that this is due to her recent steroid use.  She continues to have wheezing and signs of ongoing COPD exacerbation.  She is not in any respiratory distress.  There is no signs of CHF on her exam.  She has not had a fever so low suspicion for pneumonia at this time.  Given her ongoing respiratory symptoms we will check a chest x-ray and EKG.  Regarding the hyperglycemia, we will check labs to ensure no signs of DKA or other electrolyte abnormality and treat with IV fluids and possibly insulin.    0005 patient's lactic acid was noted to be 8.3.  I have a low suspicion for sepsis at this time.  I suspect this may be multifactorial, related to dehydration from her hyperglycemia, metformin use, and from the albuterol that she received prior to the lab draw.  We will give 2 L of IV fluids and repeat her lactic acid.    0542 patient's repeat lactic significant improved but remains high at 4.  I still do not think that the patient has sepsis at this point but we will send cultures and cover with a dose of ceftriaxone.  Given her  persistent hyperglycemia and persistent elevated lactic acid, will plan to admit for observation, additional IV fluids, additional treatment of her hyperglycemia, COPD exacerbation, etc.  I discussed with Darío Villa MD who accepts admission.    Diagnosis:    ICD-10-CM    1. Lactic acidosis  E87.20       2. COPD exacerbation (H)  J44.1       3. Steroid-induced hyperglycemia  R73.9     T38.0X5A         Scribe Disclosure:  I, Ronnie Mckinnon, am serving as a scribe at 11:04 PM on 4/3/2024 to document services personally performed by Errol Tapia MD based on my observations and the provider's statements to me.     4/3/2024   Errol Tapia MD Goodwin, Shaun M, MD  04/04/24 0707

## 2024-04-04 NOTE — PROGRESS NOTES
"                                                            [facility]    Date:4/4/24    Admission Dx: Steroid Induced Hyperglycemia    Pulmonary History: Central Sleep Apnea, COPD, Asthma    Home Nebulizer/MDI Use:Albuterol MDI Q4 prn, Symbicort BID    Home Oxygen: 2 L NC    Acuity Level (RCAT flow sheet): 3    Aerosol Therapy initiated: Duoneb QID, Albuterol Q6 prn    Pulmonary Hygiene initiated: coughing techniques    Volume Expansion initiated: Incentive spirometry    Current Oxygen Requirements: on RA pt's sat's are 95%.  Left pt on 2 L which she wears chronically but does not require continuously.     Current SpO2: 98%    Re-evaluation date: 4/7/24    Patient Education: Will educate pt on the indications and benefits of nebulizer's as well as deep breathing and coughing techniques.    Nataliia Holden, RT on 4/4/2024 at 5:35 PM      See \"RT Assessments\" flow sheet for patient assessment scoring and Acuity Level Details.           "

## 2024-04-04 NOTE — ED NOTES
DATE/TIME OF CALL RECEIVED FROM LAB:  04/04/24 at 5:34 AM   LAB TEST:  Lactic  LAB VALUE:  4.0  PROVIDER NOTIFIED?: Yes  PROVIDER NAME: Dr. Tapia  DATE/TIME LAB VALUE REPORTED TO PROVIDER: 5:34 AM  MECHANISM OF PROVIDER NOTIFICATION: Face-To-Face  PROVIDER RESPONSE: orders received

## 2024-04-04 NOTE — H&P
[facility]    History and Physical - Hospitalist Service       Date of Admission:  4/3/2024    Assessment & Plan      Pilo Quintana is a 73 year old female admitted on 4/3/2024. She has a history of COPD on 2 L of oxygen, dementia, type 2 diabetes, asthma, CVA with some left-sided weakness, central sleep apnea who has been having a cough which has been mildly productive for the past week.  She has been treated with outpatient medications to include prednisone.  She also had some concomitant wheezing.  No fever.  Her symptoms persisted and her prednisone dose was escalated recently.  Her son checked on her today and noticed her sugar to be in the 500s with persistent wheezing and hence he brought her to the hospital.  No ongoing fever.  She does have discomfort in her chest on coughing.  Her shortness of breath is better when she sits up.  She takes a water pill and has been compliant with that.  He does not weigh her regularly.  I am asked to admit her for further evaluation.  She is from [facility] and does not speak any English and the history is obtained from the son who is interpreting.    Marked hyperglycemia likely as a result of steroids without ketoacidosis.  -Subcu insulin.    2.  Acute exacerbation of COPD.  -She has been treated with escalating doses of steroids to no effect.  -Her pro calcitonin is within normal limits.  -I will check a BNP and an echocardiogram.  -Her last echocardiogram was in May 2023 which showed moderate pulmonary hypertension and a normal EF.  -She may require IV diuresis.    3.  Elevated troponin likely due to demand ischemia.  -Will trend it and monitor on telemetry.    4.  Marked lactic acidosis.  -Likely a combination of factors to include metformin and neb treatments.  -Will recheck.    5. Central sleep apnea.  - BiPAP.          Diet:  ADAT.  DVT Prophylaxis: Enoxaparin (Lovenox) SQ  Morrison Catheter: Not present  Lines: None     Cardiac Monitoring:  "None  Code Status:  Full Code.    Clinically Significant Risk Factors Present on Admission             # Anion Gap Metabolic Acidosis: Highest Anion Gap = 23 mmol/L in last 2 days, will monitor and treat as appropriate    # Drug Induced Platelet Defect: home medication list includes an antiplatelet medication   # Hypertension: Noted on problem list     # DMII: A1C = N/A within past 6 months    # Obesity: Estimated body mass index is 33.33 kg/m  as calculated from the following:    Height as of 3/27/24: 1.499 m (4' 11\").    Weight as of 3/27/24: 74.8 kg (165 lb).              Disposition Plan      Expected Discharge Date: 04/05/2024                  Darío Villa MD  Hospitalist Service  [facility]  Securely message with [employer] (more info)  Text page via Dragonplay Paging/Directory     ______________________________________________________________________    Chief Complaint     SOB, hyperglycemia.    History is obtained from the patient and patient's son    History of Present Illness   Pilo Quintana is a 73 year old female who has a history of COPD on 2 L of oxygen, dementia, type 2 diabetes, asthma, CVA with some left-sided weakness, central sleep apnea who has been having a cough which has been mildly productive for the past week.  She has been treated with outpatient medications to include prednisone.  She also had some concomitant wheezing.  No fever.  Her symptoms persisted and her prednisone dose was escalated recently.  Her son checked on her today and noticed her sugar to be in the 500s with persistent wheezing and hence he brought her to the hospital.  No ongoing fever.  She does have discomfort in her chest on coughing.  Her shortness of breath is better when she sits up.  She takes a water pill and has been compliant with that.  He does not weigh her regularly.  I am asked to admit her for further evaluation.      Past Medical History    Past Medical History:   Diagnosis Date    " Diabetes (H)     Hypertension     Morbid obesity (H)     Osteoarthritis 6/7/2003    PMB (postmenopausal bleeding) 9/16/2014    Renal mass     Thickened endometrium 9/16/2014    Uncomplicated asthma     Unspecified cerebral artery occlusion with cerebral infarction 1998    was in [place], no residual at present       Past Surgical History   Past Surgical History:   Procedure Laterality Date    DILATION AND CURETTAGE, OPERATIVE HYSTEROSCOPY WITH MORCELLATOR, COMBINED N/A 9/25/2014    Procedure: COMBINED DILATION AND CURETTAGE, OPERATIVE HYSTEROSCOPY WITH MORCELLATOR;  Surgeon: Silverio Christy MD;  Location:  OR       Prior to Admission Medications   Prior to Admission Medications   Prescriptions Last Dose Informant Patient Reported? Taking?   Cholecalciferol (VITAMIN D3) 50 MCG (2000 UT) CAPS  Son Yes No   Sig: Take 50 mcg by mouth daily   acetaminophen (TYLENOL) 500 MG tablet  Son Yes No   Sig: Take 500-1,000 mg by mouth every 8 hours as needed for mild pain   albuterol (PROAIR HFA/PROVENTIL HFA/VENTOLIN HFA) 108 (90 Base) MCG/ACT inhaler   No No   Sig: Inhale 2 puffs into the lungs every 4 hours as needed for wheezing   albuterol (PROVENTIL) (2.5 MG/3ML) 0.083% neb solution   Yes No   Sig: Take 1 vial by nebulization every 6 hours as needed for shortness of breath, wheezing or cough   amLODIPine (NORVASC) 2.5 MG tablet   Yes No   Sig: Take 1 tablet by mouth daily   aspirin (ASA) 81 MG EC tablet   No No   Sig: Take 1 tablet (81 mg) by mouth daily   atorvastatin (LIPITOR) 40 MG tablet   No No   Sig: Take 1 tablet (40 mg) by mouth every evening   budesonide-formoterol (SYMBICORT) 160-4.5 MCG/ACT Inhaler   No No   Sig: Inhale 2 puffs once daily plus 1-2 puffs as needed. May use up to 12 puffs per day.   furosemide (LASIX) 20 MG tablet   Yes No   Sig: Take 2 tablets (40 mg) by mouth daily   furosemide (LASIX) 20 MG tablet   No No   Sig: Take 4 tablets (80 mg) by mouth every morning   losartan (COZAAR) 100 MG tablet   Son Yes No   Sig: Take 100 mg by mouth daily   metFORMIN (GLUCOPHAGE XR) 500 MG 24 hr tablet   Yes No   Sig: Take 500 mg by mouth 2 times daily (with meals)   metoprolol succinate ER (TOPROL XL) 50 MG 24 hr tablet  Son Yes No   Sig: Take 50 mg by mouth daily   predniSONE (DELTASONE) 20 MG tablet   No No   Sig: Take two tablets daily for 5 days.   predniSONE (DELTASONE) 20 MG tablet   No No   Sig: Take two tablets (= 40mg) each day for 5 (five) days   triamcinolone (KENALOG) 0.1 % external ointment  Son Yes No   Sig: Apply topically 2 times daily as needed for irritation      Facility-Administered Medications: None        Review of Systems    The 10 point Review of Systems is negative other than noted in the HPI or here.     Social History   I have reviewed this patient's social history and updated it with pertinent information if needed.  Social History     Tobacco Use    Smoking status: Never    Smokeless tobacco: Never   Substance Use Topics    Alcohol use: No    Drug use: No         Family History     Unable to obtain due to: dementia      Allergies   No Known Allergies     Physical Exam   Vital Signs: Temp: 98.5  F (36.9  C) Temp src: Oral BP: (!) 172/99 Pulse: 83   Resp: (!) 31 SpO2: 100 % O2 Device: Nasal cannula Oxygen Delivery: 2 LPM  Weight: 0 lbs 0 oz    Gen - Alert, awake, son interprets. On oxygen via NC.  Lungs - + exp wheezing B.  Heart - RR,S1+S2 nml, no m/g/r.  Abd - soft, NT, ND, + BS.  Ext - trace edema.    EKG shows normal sinus rhythm at 94 bpm with a left anterior fascicular block and nonspecific ST-T wave changes.    Medical Decision Making       80 MINUTES SPENT BY ME on the date of service doing chart review, history, exam, documentation & further activities per the note.      Data     I have personally reviewed the following data over the past 24 hrs:    6.9  \   11.2 (L)   / 272     136 88 (L) 33.4 (H) /  372 (H)   4.5 25 0.77 \     Procal: 0.07 CRP: N/A Lactic Acid: 4.0 (HH)          Imaging results reviewed over the past 24 hrs:   Recent Results (from the past 24 hour(s))   XR Chest 2 Views    Narrative    EXAM: XR CHEST 2 VIEWS  LOCATION: [facility]  DATE: 4/4/2024    INDICATION: Ongoing cough.  COMPARISON: Chest x-ray 2 views 03/27/2024 at 1023 hours.      Impression    IMPRESSION: Improved platelike atelectasis in the left base demonstrated previously. Both lungs are otherwise clear. No adenopathy or effusion. Mild cardiac enlargement. Normal pulmonary vascularity. Mild degenerative changes both shoulders and the   spine.

## 2024-04-04 NOTE — PHARMACY-ADMISSION MEDICATION HISTORY
Pharmacist Admission Medication History    Admission medication history is complete. The information provided in this note is only as accurate as the sources available at the time of the update.    Information Source(s): Family member (son) and CareEverywhere/SureScripts via in-person    Pertinent Information: Per son, pt uses budesonide-formoterol inhaler up to 12 times daily for cough. Today 4/4/24 would be day #3 of prednisone taper.     Changes made to PTA medication list:  Added:   Prednisone taper  Deleted:   Duplicate lasix  Old prednisone bursts  Changed:   Lasix 20mg daily --> 40mg daily    Allergies reviewed with patient and updates made in EHR: yes    Medication History Completed By: Jeannette Spring [facility] 4/4/2024 8:49 AM    PTA Med List   Medication Sig Last Dose    acetaminophen (TYLENOL) 500 MG tablet Take 500-1,000 mg by mouth every 8 hours as needed for mild pain Unknown at PRN    albuterol (PROAIR HFA/PROVENTIL HFA/VENTOLIN HFA) 108 (90 Base) MCG/ACT inhaler Inhale 2 puffs into the lungs every 4 hours as needed for wheezing Unknown at PRN    albuterol (PROVENTIL) (2.5 MG/3ML) 0.083% neb solution Take 1 vial by nebulization every 6 hours as needed for shortness of breath, wheezing or cough Unknown at PRN    amLODIPine (NORVASC) 5 MG tablet Take 5 mg by mouth daily 4/3/2024 at AM    aspirin (ASA) 81 MG EC tablet Take 1 tablet (81 mg) by mouth daily 4/3/2024 at AM    atorvastatin (LIPITOR) 40 MG tablet Take 1 tablet (40 mg) by mouth every evening 4/3/2024 at PM    budesonide-formoterol (SYMBICORT) 160-4.5 MCG/ACT Inhaler Inhale 2 puffs once daily plus 1-2 puffs as needed. May use up to 12 puffs per day. Unknown at PRN cough    Cholecalciferol (VITAMIN D3) 50 MCG (2000 UT) CAPS Take 50 mcg by mouth daily 4/3/2024 at AM    furosemide (LASIX) 40 MG tablet Take 40 mg by mouth daily 4/3/2024 at AM    losartan (COZAAR) 100 MG tablet Take 100 mg by mouth daily 4/3/2024 at AM    metFORMIN (GLUCOPHAGE XR) 500 MG  24 hr tablet Take 500 mg by mouth 2 times daily (with meals) 4/3/2024    metoprolol succinate ER (TOPROL XL) 50 MG 24 hr tablet Take 50 mg by mouth daily 4/3/2024 at AM    predniSONE (DELTASONE) 20 MG tablet Take by mouth daily TAKE 3 TABLETS BY MOUTH ONCE DAILY FOR 3 DAYS THEN 2 ONCE DAILY FOR 3 DAYS THEN 1 ONCE DAILY FOR 3 DAYS THEN 1/2 (ONE-HALF) ONCE DAILY FOR 3 DAYS 4/3/2024 at 60 mg    triamcinolone (KENALOG) 0.1 % external ointment Apply topically 2 times daily as needed for irritation Unknown at PRN

## 2024-04-04 NOTE — ED NOTES
[facility]  ED Nurse Handoff Report    ED Chief complaint: Hyperglycemia  . ED Diagnosis:   Final diagnoses:   Lactic acidosis   COPD exacerbation (H)   Steroid-induced hyperglycemia       Allergies: No Known Allergies    Code Status: Full Code    Activity level - Baseline/Home:  assist of 1.  Activity Level - Current:   assist of 1.   Lift room needed: No.   Bariatric: No   Needed: Yes   Isolation: No.   Infection: Not Applicable.     Respiratory status: Nasal cannula    Vital Signs (within 30 minutes):   Vitals:    04/04/24 0317 04/04/24 0332 04/04/24 0347 04/04/24 0402   BP: (!) 141/82 (!) 146/92 (!) 143/84 (!) 172/99   Pulse: 82 (!) 48 81 83   Resp:       Temp:       TempSrc:       SpO2: 100% 100% 100% 100%       Cardiac Rhythm:  ,      Pain level:    Patient confused: No.   Patient Falls Risk: nonskid shoes/slippers when out of bed.   Elimination Status: Has voided     Patient Report - Initial Complaint: Hyperglycemia.   Focused Assessment: Patient has history of COPD on 2 L of oxygen, dementia, type 2 diabetes, asthma, CVA with some left-sided weakness, central sleep apnea who has been having a cough which has been mildly productive for the past week. She has been treated with outpatient medications to include prednisone. She also had some concomitant wheezing. No fever. Her symptoms persisted and her prednisone dose was escalated recently. Her son checked on her today and noticed her sugar to be in the 500s with persistent wheezing and hence he brought her to the hospital. No ongoing fever. She does have discomfort in her chest on coughing. Her shortness of breath is better when she sits up. She takes a water pill and has been compliant with that.          Abnormal Results:   Labs Ordered and Resulted from Time of ED Arrival to Time of ED Departure   GLUCOSE BY METER - Abnormal       Result Value    GLUCOSE BY METER POCT 523 (*)    BASIC METABOLIC PANEL - Abnormal    Sodium  136      Potassium 4.5      Chloride 88 (*)     Carbon Dioxide (CO2) 25      Anion Gap 23 (*)     Urea Nitrogen 33.4 (*)     Creatinine 0.77      GFR Estimate 80      Calcium 9.8      Glucose 517 (*)    ROUTINE UA WITH MICROSCOPIC REFLEX TO CULTURE - Abnormal    Color Urine Light Yellow      Appearance Urine Clear      Glucose Urine >=1000 (*)     Bilirubin Urine Negative      Ketones Urine Negative      Specific Gravity Urine 1.019      Blood Urine Negative      pH Urine 5.0      Protein Albumin Urine Negative      Urobilinogen Urine Normal      Nitrite Urine Negative      Leukocyte Esterase Urine Negative      Mucus Urine Present (*)     RBC Urine <1      WBC Urine 1      Squamous Epithelials Urine 2 (*)     Hyaline Casts Urine 8 (*)    CBC WITH PLATELETS AND DIFFERENTIAL - Abnormal    WBC Count 6.9      RBC Count 3.81      Hemoglobin 11.2 (*)     Hematocrit 34.3 (*)     MCV 90      MCH 29.4      MCHC 32.7      RDW 13.3      Platelet Count 272      % Neutrophils 87      % Lymphocytes 10      % Monocytes 3      % Eosinophils 0      % Basophils 0      % Immature Granulocytes 0      NRBCs per 100 WBC 0      Absolute Neutrophils 6.0      Absolute Lymphocytes 0.7 (*)     Absolute Monocytes 0.2      Absolute Eosinophils 0.0      Absolute Basophils 0.0      Absolute Immature Granulocytes 0.0      Absolute NRBCs 0.0     ISTAT GASES LACTATE VENOUS POCT - Abnormal    Lactic Acid POCT 8.3 (*)     Bicarbonate Venous POCT 30 (*)     O2 Sat, Venous POCT 95 (*)     pCO2 Venous POCT 48      pH Venous POCT 7.40      pO2 Venous POCT 79 (*)    LACTIC ACID WHOLE BLOOD - Abnormal    Lactic Acid 4.0 (*)    GLUCOSE BY METER - Abnormal    GLUCOSE BY METER POCT 372 (*)    PROCALCITONIN - Normal    Procalcitonin 0.07     MAGNESIUM - Normal    Magnesium 1.7     KETONE BETA-HYDROXYBUTYRATE QUANTITATIVE, RAPID - Normal    Ketone (Beta-Hydroxybutyrate) Quantitative 0.30     D DIMER QUANTITATIVE   BLOOD CULTURE   BLOOD CULTURE        XR Chest 2  Views   Final Result   IMPRESSION: Improved platelike atelectasis in the left base demonstrated previously. Both lungs are otherwise clear. No adenopathy or effusion. Mild cardiac enlargement. Normal pulmonary vascularity. Mild degenerative changes both shoulders and the    spine.          Treatments provided: See mar  Family Comments: Son at bedside  OBS brochure/video discussed/provided to patient:  Yes  ED Medications:   Medications   cefTRIAXone (ROCEPHIN) 1 g vial to attach to  mL bag for ADULTS or NS 50 mL bag for PEDS (has no administration in time range)   sodium chloride 0.9% BOLUS 1,000 mL (1,000 mLs Intravenous $New Bag 4/4/24 0003)   ipratropium - albuterol 0.5 mg/2.5 mg/3 mL (DUONEB) neb solution 3 mL (3 mLs Nebulization $Given 4/4/24 0009)   sodium chloride 0.9% BOLUS 1,000 mL (1,000 mLs Intravenous $New Bag 4/4/24 0148)       Drips infusing:  No  For the majority of the shift this patient was Green.   Interventions performed were N/A.    Sepsis treatment initiated: No    Cares/treatment/interventions/medications to be completed following ED care: See mar    ED Nurse Name: Flower Stephen RN  6:24 AM

## 2024-04-04 NOTE — ED TRIAGE NOTES
Pt BIBA, from home, was recently admitted for respiratory issues. Pt has hx of COPD, has been coughing, nonproductive cough, taking Prednisone, family checked ,  brought in for further evaluation. BG at the moment 523. A&O x4.  /88   Pulse 96   Temp 98.5  F (36.9  C) (Oral)   Resp 22   SpO2 100%        Triage Assessment (Adult)       Row Name 04/03/24 7150          Triage Assessment    Airway WDL WDL        Respiratory WDL    Respiratory WDL X;cough     Cough Frequency infrequent     Cough Type congested;nonproductive        Skin Circulation/Temperature WDL    Skin Circulation/Temperature WDL WDL        Cardiac WDL    Cardiac WDL WDL        Peripheral/Neurovascular WDL    Peripheral Neurovascular WDL WDL        Cognitive/Neuro/Behavioral WDL    Cognitive/Neuro/Behavioral WDL WDL

## 2024-04-04 NOTE — PLAN OF CARE
Goal Outcome Evaluation:      Plan of Care Reviewed With: patient    Overall Patient Progress: improvingOverall Patient Progress: improving    Outcome Evaluation: BG trending down (initially 500s, most recently 200s). Infrequent intermit cough - non productive - with exp. wheeze.    Guillermo Hendrickson, RN : Progress Note  Shift : [id number]  Category of Care: OBS  VS: /61   Pulse 71   Temp 98.6  F (37  C) (Oral)   Resp 24   SpO2 100%     Shift Summary; Occurences, Discussions & Interventions of Note --  Pt admitted to OBS unit this afternoon. Very difficult to communicate profoundly with pt, pt is [place] speaking, and this language is not available with [facility]  services. I printed out a non-verbal patient image guide to communicate essentials with pt - effective. Son will be arriving to obs unit early tonight and can assist with interpreting - allowing for completion of relevant obs admission documentation.  Task delegated to oncoming RN.  Pt on 2L NC sat 100%. With expiratory wheeze, neb this afternoon.  Pt on ACHS with Novlog carb coverage and sliding scale insulin.   HR regular, bowel sounds audible.  Pt without dependent edema - skin intact x appearance of slight discoloration of bottom skin. Pt incontinent of urine - Purewick on - no frontal skin breakdown.   Pt not OOB following recent admission.     Considerations --  Assist of 2, Blood sugars,     Care Plan notation attached to the bottom of this note. Space added to increase note readability.                                                                                                                          ---    Problem: Adult Inpatient Plan of Care  Goal: Plan of Care Review  Description: The Plan of Care Review/Shift note should be completed every shift.  The Outcome Evaluation is a brief statement about your assessment that the patient is improving, declining, or no change.  This information will be displayed automatically  "on your shift  note.  Outcome: Progressing  Flowsheets (Taken 4/4/2024 1531)  Outcome Evaluation: BG trending down (initially 500s, most recently 200s). Infrequent intermit cough - non productive - with exp. wheeze.  Plan of Care Reviewed With: patient  Overall Patient Progress: improving  Goal: Patient-Specific Goal (Individualized)  Description: You can add care plan individualizations to a care plan. Examples of Individualization might be:  \"Parent requests to be called daily at 9am for status\", \"I have a hard time hearing out of my right ear\", or \"Do not touch me to wake me up as it startles  me\".  Outcome: Progressing  Goal: Absence of Hospital-Acquired Illness or Injury  Outcome: Progressing  Intervention: Identify and Manage Fall Risk  Recent Flowsheet Documentation  Taken 4/4/2024 1306 by Guillermo Hendrickson, RN  Safety Promotion/Fall Prevention:   supervised activity   safety round/check completed   room organization consistent   increase visualization of patient   lighting adjusted  Intervention: Prevent Skin Injury  Recent Flowsheet Documentation  Taken 4/4/2024 1306 by Guillermo Hendrickson, RN  Body Position: supine, head elevated  Goal: Optimal Comfort and Wellbeing  Outcome: Progressing  Goal: Readiness for Transition of Care  Outcome: Progressing     Problem: Comorbidity Management  Goal: Maintenance of COPD Symptom Control  Outcome: Progressing  Goal: Blood Glucose Levels Within Targeted Range  Outcome: Progressing     "

## 2024-04-04 NOTE — PLAN OF CARE
"PRIMARY DIAGNOSIS: COPD Exacerbation  OUTPATIENT/OBSERVATION GOALS TO BE MET BEFORE DISCHARGE:  1. Vital signs stable: HTN    2. Improvement of peak flow to greater than 70% sustained off nebulizer for 4 hours: No    3. Dyspnea improved and O2 sats >88% at RA or at prior home O2 therapy level: Yes      SpO2: 98 %, O2 Device: Nasal cannula    4. Short term supplemental O2 needed for use with activity at home: Yes    5. Tolerating adequate PO diet and medications: Yes    6. Return to near baseline physical activity: Yes    Discharge Planner Nurse   Safe discharge environment identified: Yes  Barriers to discharge: Yes       Entered by: Chico Tong RN 04/04/2024     BP (!) 150/84 (BP Location: Right arm)   Pulse 74   Temp 98.9  F (37.2  C) (Oral)   Resp 19   SpO2 98%   HTN on 1lpm NC. Alert to self, unable to fully determine orientation due to language barrier. Tolerating diabetic diet, PM  covered with 3 units and 2 units for 34 carbs (see MAR). Has not been OOB, fall bundle in place. Denies pain. PIV SL. On tele running SR 70s. Plan to monitor VS and BG while treating COPD exacerbation as ordered, RT following. Pt's son is agreeable to POC and pt is resting peacefully with call light in reach. Will continue to provide supportive cares.    Problem: Adult Inpatient Plan of Care  Goal: Plan of Care Review  Description: The Plan of Care Review/Shift note should be completed every shift.  The Outcome Evaluation is a brief statement about your assessment that the patient is improving, declining, or no change.  This information will be displayed automatically on your shift  note.  Outcome: Progressing  Goal: Patient-Specific Goal (Individualized)  Description: You can add care plan individualizations to a care plan. Examples of Individualization might be:  \"Parent requests to be called daily at 9am for status\", \"I have a hard time hearing out of my right ear\", or \"Do not touch me to wake me up as it " "startles  me\".  Outcome: Progressing  Goal: Absence of Hospital-Acquired Illness or Injury  Outcome: Progressing  Intervention: Identify and Manage Fall Risk  Recent Flowsheet Documentation  Taken 4/4/2024 1650 by Chico Tong, RN  Safety Promotion/Fall Prevention:   assistive device/personal items within reach   clutter free environment maintained   lighting adjusted   mobility aid in reach   nonskid shoes/slippers when out of bed   patient and family education   room organization consistent   safety round/check completed  Intervention: Prevent Skin Injury  Recent Flowsheet Documentation  Taken 4/4/2024 1650 by Chico Tong, RN  Body Position:   neutral body alignment   neutral head position  Skin Protection:   adhesive use limited   incontinence pads utilized   transparent dressing maintained  Device Skin Pressure Protection:   absorbent pad utilized/changed   tubing/devices free from skin contact  Intervention: Prevent and Manage VTE (Venous Thromboembolism) Risk  Recent Flowsheet Documentation  Taken 4/4/2024 1650 by Chico Tong, RN  VTE Prevention/Management:   compression stockings off   foot pump device off   SCDs (sequential compression devices) off  Intervention: Prevent Infection  Recent Flowsheet Documentation  Taken 4/4/2024 1650 by Chico Tong, RN  Infection Prevention:   environmental surveillance performed   equipment surfaces disinfected   hand hygiene promoted   rest/sleep promoted   single patient room provided  Goal: Optimal Comfort and Wellbeing  Outcome: Progressing  Goal: Readiness for Transition of Care  Outcome: Progressing     Problem: Comorbidity Management  Goal: Maintenance of COPD Symptom Control  Outcome: Progressing  Intervention: Maintain COPD Symptom Control  Recent Flowsheet Documentation  Taken 4/4/2024 1650 by Chico Tong, RN  Medication Review/Management: medications reviewed  Goal: Blood Glucose Levels Within Targeted Range  Outcome: " Progressing  Intervention: Monitor and Manage Glycemia  Recent Flowsheet Documentation  Taken 4/4/2024 1650 by Chico Tong, RN  Medication Review/Management: medications reviewed  Goal: Blood Pressure in Desired Range  Outcome: Progressing  Intervention: Maintain Blood Pressure Management  Recent Flowsheet Documentation  Taken 4/4/2024 1650 by Chico Tong, RN  Medication Review/Management: medications reviewed     Please review provider order for any additional goals.   Nurse to notify provider when observation goals have been met and patient is ready for discharge.

## 2024-04-05 ENCOUNTER — APPOINTMENT (OUTPATIENT)
Dept: SPEECH THERAPY | Facility: CLINIC | Age: 74
DRG: 202 | End: 2024-04-05
Attending: PHYSICIAN ASSISTANT
Payer: COMMERCIAL

## 2024-04-05 LAB
ANION GAP SERPL CALCULATED.3IONS-SCNC: 13 MMOL/L (ref 7–15)
BASOPHILS # BLD AUTO: 0 10E3/UL (ref 0–0.2)
BASOPHILS NFR BLD AUTO: 0 %
BUN SERPL-MCNC: 23.1 MG/DL (ref 8–23)
CALCIUM SERPL-MCNC: 9.5 MG/DL (ref 8.8–10.2)
CHLORIDE SERPL-SCNC: 102 MMOL/L (ref 98–107)
CREAT SERPL-MCNC: 0.61 MG/DL (ref 0.51–0.95)
DEPRECATED HCO3 PLAS-SCNC: 29 MMOL/L (ref 22–29)
EGFRCR SERPLBLD CKD-EPI 2021: >90 ML/MIN/1.73M2
EOSINOPHIL # BLD AUTO: 0.1 10E3/UL (ref 0–0.7)
EOSINOPHIL NFR BLD AUTO: 1 %
ERYTHROCYTE [DISTWIDTH] IN BLOOD BY AUTOMATED COUNT: 13.5 % (ref 10–15)
GLUCOSE BLDC GLUCOMTR-MCNC: 102 MG/DL (ref 70–99)
GLUCOSE BLDC GLUCOMTR-MCNC: 144 MG/DL (ref 70–99)
GLUCOSE BLDC GLUCOMTR-MCNC: 182 MG/DL (ref 70–99)
GLUCOSE BLDC GLUCOMTR-MCNC: 76 MG/DL (ref 70–99)
GLUCOSE BLDC GLUCOMTR-MCNC: 96 MG/DL (ref 70–99)
GLUCOSE SERPL-MCNC: 106 MG/DL (ref 70–99)
HCT VFR BLD AUTO: 35.7 % (ref 35–47)
HGB BLD-MCNC: 11.2 G/DL (ref 11.7–15.7)
IMM GRANULOCYTES # BLD: 0 10E3/UL
IMM GRANULOCYTES NFR BLD: 1 %
LYMPHOCYTES # BLD AUTO: 2.2 10E3/UL (ref 0.8–5.3)
LYMPHOCYTES NFR BLD AUTO: 28 %
MCH RBC QN AUTO: 28.6 PG (ref 26.5–33)
MCHC RBC AUTO-ENTMCNC: 31.4 G/DL (ref 31.5–36.5)
MCV RBC AUTO: 91 FL (ref 78–100)
MONOCYTES # BLD AUTO: 0.5 10E3/UL (ref 0–1.3)
MONOCYTES NFR BLD AUTO: 6 %
NEUTROPHILS # BLD AUTO: 5.1 10E3/UL (ref 1.6–8.3)
NEUTROPHILS NFR BLD AUTO: 64 %
NRBC # BLD AUTO: 0 10E3/UL
NRBC BLD AUTO-RTO: 0 /100
PLATELET # BLD AUTO: 255 10E3/UL (ref 150–450)
POTASSIUM SERPL-SCNC: 3.6 MMOL/L (ref 3.4–5.3)
RBC # BLD AUTO: 3.91 10E6/UL (ref 3.8–5.2)
SODIUM SERPL-SCNC: 144 MMOL/L (ref 135–145)
WBC # BLD AUTO: 8 10E3/UL (ref 4–11)

## 2024-04-05 PROCEDURE — 94640 AIRWAY INHALATION TREATMENT: CPT | Mod: 76

## 2024-04-05 PROCEDURE — 85025 COMPLETE CBC W/AUTO DIFF WBC: CPT | Performed by: INTERNAL MEDICINE

## 2024-04-05 PROCEDURE — 82962 GLUCOSE BLOOD TEST: CPT

## 2024-04-05 PROCEDURE — 99233 SBSQ HOSP IP/OBS HIGH 50: CPT | Performed by: PHYSICIAN ASSISTANT

## 2024-04-05 PROCEDURE — 96372 THER/PROPH/DIAG INJ SC/IM: CPT | Performed by: INTERNAL MEDICINE

## 2024-04-05 PROCEDURE — 250N000009 HC RX 250: Performed by: INTERNAL MEDICINE

## 2024-04-05 PROCEDURE — 250N000013 HC RX MED GY IP 250 OP 250 PS 637: Performed by: INTERNAL MEDICINE

## 2024-04-05 PROCEDURE — 94640 AIRWAY INHALATION TREATMENT: CPT

## 2024-04-05 PROCEDURE — 999N000157 HC STATISTIC RCP TIME EA 10 MIN

## 2024-04-05 PROCEDURE — 92526 ORAL FUNCTION THERAPY: CPT | Mod: GN

## 2024-04-05 PROCEDURE — 120N000001 HC R&B MED SURG/OB

## 2024-04-05 PROCEDURE — 36415 COLL VENOUS BLD VENIPUNCTURE: CPT | Performed by: INTERNAL MEDICINE

## 2024-04-05 PROCEDURE — 92610 EVALUATE SWALLOWING FUNCTION: CPT | Mod: GN

## 2024-04-05 PROCEDURE — 99418 PROLNG IP/OBS E/M EA 15 MIN: CPT | Performed by: PHYSICIAN ASSISTANT

## 2024-04-05 PROCEDURE — 250N000011 HC RX IP 250 OP 636: Performed by: INTERNAL MEDICINE

## 2024-04-05 PROCEDURE — G0378 HOSPITAL OBSERVATION PER HR: HCPCS

## 2024-04-05 PROCEDURE — 250N000011 HC RX IP 250 OP 636: Performed by: PHYSICIAN ASSISTANT

## 2024-04-05 PROCEDURE — 82374 ASSAY BLOOD CARBON DIOXIDE: CPT | Performed by: INTERNAL MEDICINE

## 2024-04-05 RX ORDER — FUROSEMIDE 40 MG
40 TABLET ORAL DAILY
Status: DISCONTINUED | OUTPATIENT
Start: 2024-04-05 | End: 2024-04-06

## 2024-04-05 RX ORDER — DOXYCYCLINE 100 MG/10ML
100 INJECTION, POWDER, LYOPHILIZED, FOR SOLUTION INTRAVENOUS EVERY 12 HOURS
Status: DISCONTINUED | OUTPATIENT
Start: 2024-04-05 | End: 2024-04-09 | Stop reason: HOSPADM

## 2024-04-05 RX ORDER — LOSARTAN POTASSIUM 100 MG/1
100 TABLET ORAL DAILY
Status: DISCONTINUED | OUTPATIENT
Start: 2024-04-05 | End: 2024-04-09 | Stop reason: HOSPADM

## 2024-04-05 RX ORDER — METFORMIN HCL 500 MG
500 TABLET, EXTENDED RELEASE 24 HR ORAL 2 TIMES DAILY WITH MEALS
Status: DISCONTINUED | OUTPATIENT
Start: 2024-04-05 | End: 2024-04-06

## 2024-04-05 RX ORDER — METOPROLOL SUCCINATE 50 MG/1
50 TABLET, EXTENDED RELEASE ORAL DAILY
Status: DISCONTINUED | OUTPATIENT
Start: 2024-04-05 | End: 2024-04-09 | Stop reason: HOSPADM

## 2024-04-05 RX ADMIN — IPRATROPIUM BROMIDE AND ALBUTEROL SULFATE 3 ML: .5; 3 SOLUTION RESPIRATORY (INHALATION) at 11:28

## 2024-04-05 RX ADMIN — ENOXAPARIN SODIUM 40 MG: 40 INJECTION SUBCUTANEOUS at 10:11

## 2024-04-05 RX ADMIN — INSULIN GLARGINE 10 UNITS: 100 INJECTION, SOLUTION SUBCUTANEOUS at 10:11

## 2024-04-05 RX ADMIN — IPRATROPIUM BROMIDE AND ALBUTEROL SULFATE 3 ML: .5; 3 SOLUTION RESPIRATORY (INHALATION) at 21:53

## 2024-04-05 RX ADMIN — ACETAMINOPHEN 650 MG: 650 SUPPOSITORY RECTAL at 17:38

## 2024-04-05 RX ADMIN — BUDESONIDE AND FORMOTEROL FUMARATE DIHYDRATE 2 PUFF: 160; 4.5 AEROSOL RESPIRATORY (INHALATION) at 10:11

## 2024-04-05 RX ADMIN — DOXYCYCLINE 100 MG: 100 INJECTION, POWDER, LYOPHILIZED, FOR SOLUTION INTRAVENOUS at 14:43

## 2024-04-05 RX ADMIN — IPRATROPIUM BROMIDE AND ALBUTEROL SULFATE 3 ML: .5; 3 SOLUTION RESPIRATORY (INHALATION) at 16:58

## 2024-04-05 RX ADMIN — IPRATROPIUM BROMIDE AND ALBUTEROL SULFATE 3 ML: .5; 3 SOLUTION RESPIRATORY (INHALATION) at 08:21

## 2024-04-05 ASSESSMENT — ACTIVITIES OF DAILY LIVING (ADL)
ADLS_ACUITY_SCORE: 49
ADLS_ACUITY_SCORE: 50
ADLS_ACUITY_SCORE: 49
ADLS_ACUITY_SCORE: 49
ADLS_ACUITY_SCORE: 50
ADLS_ACUITY_SCORE: 49
ADLS_ACUITY_SCORE: 50
ADLS_ACUITY_SCORE: 49
ADLS_ACUITY_SCORE: 38
ADLS_ACUITY_SCORE: 36
ADLS_ACUITY_SCORE: 49
ADLS_ACUITY_SCORE: 50
ADLS_ACUITY_SCORE: 50
ADLS_ACUITY_SCORE: 49
ADLS_ACUITY_SCORE: 38
ADLS_ACUITY_SCORE: 38

## 2024-04-05 NOTE — PLAN OF CARE
"PRIMARY DIAGNOSIS: COPD Exacerbation   OUTPATIENT/OBSERVATION GOALS TO BE MET BEFORE DISCHARGE:  1. Vital signs stable: Yes    2. Improvement of peak flow to greater than 70% sustained off nebulizer for 4 hours: No    3. Dyspnea improved and O2 sats >88% at RA or at prior home O2 therapy level: Yes      SpO2: 99 %, O2 Device: Nasal cannula    4. Short term supplemental O2 needed for use with activity at home: Yes    5. Tolerating adequate PO diet and medications: Yes    6. Return to near baseline physical activity: Yes    Discharge Planner Nurse   Safe discharge environment identified: Yes  Barriers to discharge: Yes       Entered by: Chico Tong, RN 04/04/2024     /83 (BP Location: Right arm)   Pulse 78   Temp 97.8  F (36.6  C) (Oral)   Resp 16   SpO2 99%   VSS on 1lpm NC. Alert to self, unable to fully determine orientation due to language barrier. Tolerating diabetic diet, HS  covered with no coverage. Has not been OOB, fall bundle in place. Denies pain. PIV SL. On tele running SR 70s. PW in place, skin CDI. PRN Albuterol MDI administered for wheezing (see MAR). Plan to monitor VS and BG while treating COPD exacerbation as ordered, RT following. Pt's son is agreeable to POC and pt is resting peacefully with call light in reach. Will continue to provide supportive cares.     Problem: Adult Inpatient Plan of Care  Goal: Plan of Care Review  Description: The Plan of Care Review/Shift note should be completed every shift.  The Outcome Evaluation is a brief statement about your assessment that the patient is improving, declining, or no change.  This information will be displayed automatically on your shift  note.  4/4/2024 2115 by Chico Tong, RN  Outcome: Progressing  4/4/2024 1805 by Chico Tong, RN  Outcome: Progressing  Goal: Patient-Specific Goal (Individualized)  Description: You can add care plan individualizations to a care plan. Examples of Individualization might be:  \"Parent " "requests to be called daily at 9am for status\", \"I have a hard time hearing out of my right ear\", or \"Do not touch me to wake me up as it startles  me\".  4/4/2024 2115 by Chico Tong, RN  Outcome: Progressing  4/4/2024 1805 by Chico Tong, RN  Outcome: Progressing  Goal: Absence of Hospital-Acquired Illness or Injury  4/4/2024 2115 by Chico Tong, RN  Outcome: Progressing  4/4/2024 1805 by Chico Tong, RN  Outcome: Progressing  Intervention: Identify and Manage Fall Risk  Recent Flowsheet Documentation  Taken 4/4/2024 2000 by Chico Tong, RN  Safety Promotion/Fall Prevention: safety round/check completed  Taken 4/4/2024 1650 by Chico Tong, RN  Safety Promotion/Fall Prevention:   assistive device/personal items within reach   clutter free environment maintained   lighting adjusted   mobility aid in reach   nonskid shoes/slippers when out of bed   patient and family education   room organization consistent   safety round/check completed  Intervention: Prevent Skin Injury  Recent Flowsheet Documentation  Taken 4/4/2024 2000 by Chico Tong, RN  Body Position: position changed independently  Taken 4/4/2024 1650 by Chico Tong, RN  Body Position:   neutral body alignment   neutral head position  Skin Protection:   adhesive use limited   incontinence pads utilized   transparent dressing maintained  Device Skin Pressure Protection:   absorbent pad utilized/changed   tubing/devices free from skin contact  Intervention: Prevent and Manage VTE (Venous Thromboembolism) Risk  Recent Flowsheet Documentation  Taken 4/4/2024 2000 by Chico Tong, RN  VTE Prevention/Management:   compression stockings off   foot pump device off   SCDs (sequential compression devices) on  Taken 4/4/2024 1650 by Chico Tong, RN  VTE Prevention/Management:   compression stockings off   foot pump device off   SCDs (sequential compression devices) off  Intervention: Prevent Infection  Recent Flowsheet " Documentation  Taken 4/4/2024 1650 by Chico Tong, RN  Infection Prevention:   environmental surveillance performed   equipment surfaces disinfected   hand hygiene promoted   rest/sleep promoted   single patient room provided  Goal: Optimal Comfort and Wellbeing  4/4/2024 2115 by Chico Tong, RN  Outcome: Progressing  4/4/2024 1805 by Chico Tong, RN  Outcome: Progressing  Goal: Readiness for Transition of Care  4/4/2024 2115 by Chico Tong, RN  Outcome: Progressing  4/4/2024 1805 by Chico Tong, RN  Outcome: Progressing     Problem: Comorbidity Management  Goal: Maintenance of COPD Symptom Control  4/4/2024 2115 by Chico Tong, RN  Outcome: Progressing  4/4/2024 1805 by Chico Tong, RN  Outcome: Progressing  Intervention: Maintain COPD Symptom Control  Recent Flowsheet Documentation  Taken 4/4/2024 1650 by Chico Tong, RN  Medication Review/Management: medications reviewed  Goal: Blood Glucose Levels Within Targeted Range  4/4/2024 2115 by Chico Tong, RN  Outcome: Progressing  4/4/2024 1805 by Chico Tong, RN  Outcome: Progressing  Intervention: Monitor and Manage Glycemia  Recent Flowsheet Documentation  Taken 4/4/2024 1650 by Chico Tong, RN  Medication Review/Management: medications reviewed  Goal: Blood Pressure in Desired Range  4/4/2024 2115 by Chico Tong, RN  Outcome: Progressing  4/4/2024 1805 by Chico Tong, RN  Outcome: Progressing  Intervention: Maintain Blood Pressure Management  Recent Flowsheet Documentation  Taken 4/4/2024 1650 by Chico Tong, RN  Medication Review/Management: medications reviewed     Please review provider order for any additional goals.   Nurse to notify provider when observation goals have been met and patient is ready for discharge.

## 2024-04-05 NOTE — PROGRESS NOTES
[facility]    Medicine Progress Note - Hospitalist Service    Date of Admission:  4/3/2024    Assessment & Plan   Pilo Quintana is a 73 year old (is likely at least 83 yr old per son) female with PMHx significant for COPD vs asthma on 2 L of oxygen, dementia, type 2 diabetes, HTN, HFpEF, pulmonary HTN, renal oncocytoma, CVA with some left-sided weakness, hx of intracerebral hemorrhage, and central sleep apnea, who was admitted on 4/3/2024 with hyperglycemia. She also has been having a cough with wheezing and SOB for the past 2 weeks. She was seen in the ED on 3/27 and dx with COPD exacerbation and started on Prednisone 40 mg x 5 days and Azithromycin. No fever. Her symptoms persisted so on 4/2, she was restarted on Prednisone at 60 mg with taper every 3 days.   Now with hyperglycemia. Also notes heaviness in her chest with cough and wheezing.   She is from [facility] and does not speak any English and the history is obtained from the son who is interpreting.    1. Marked hyperglycemia, resolved  Type II DM  Due to steroid use. Without ketoacidosis but significant lactic acidosis.   DM typically managed with Metformin alone. Last HgbA1c was 7.7 in January   Needs to continue steroids for now  - Lantus started on admission, 10 units BID, continue for now  - continue sliding scale insulin   - metformin held due to lactic acidosis. This is resolved, resume this evening, may need to adjust Lantus  - hypoglycemia protocol    2. Acute exacerbation of COPD vs asthma  Per chart review, no definite dx of COPD, multiple visits for wheezing, was managing with albuterol alone at home, started on Symbicort in October by PCP. Had PFTs done but were inconclusive, thought maybe it was due to poor effort. Sleep study done, recommended BiPAP  Seen on 3/27 for cough, SOB and wheezing, treated with 5 day course of Prednisone and Azithromycin  Seen by PCP on 4/2 for persistent sx, started on Prednisone taper  starting at 60 mg. Presented here 4/3.  - symptoms slow to improve, son states wheezing is better today although pt appears very unwell. Still significant wheeze, lungs sound tight. Developed coughing with drinking water today  - procalcitonin is low risk but given probable COPD and lack of significant improvement after 2 weeks, will start IV Doxycycline.   - BNP within normal limits  - echocardiogram unchanged   - continue Prednisone 40 mg for 5 days then taper  - continue scheduled Duonebs  - continue Symbicort  - resume home lasix  - given coughing with drinking water, will consult SLP to rule out aspiration as a contributing factor  - continue supplemental O2  - BiPAP at night   - if not improving, consider CT chest    3. Elevated troponin  Chest discomfort   Likely due to demand ischemia; 17-->19-->15-->14. Continues to note chest discomfort. Echocardiogram fairly unremarkable, no wall motion abnormality. Suspect discomfort is due to cough  - Telemetry unremarkable thus far, discontinue   - consider outpatient stress testing if indicated    4. Marked lactic acidosis  Likely a combination of factors to include metformin and neb treatments.  Metformin held  Trended down and resolved with IVFs  - resume metformin this evening    5. Central sleep apnea  - continue home BiPAP.  6. HTN  - continue home amlodipine  - resume home losartan and metoprolol  7. HFpEF  Pulmonary HTN  - resume home lasix  8. CVA with some left-sided weakness  - continue baby ASA and statin  9. Dementia  10. Renal oncocytoma        Diet: Moderate Consistent Carb (60 g CHO per Meal) Diet    DVT Prophylaxis: Enoxaparin (Lovenox) SQ  Morrison Catheter: Not present  Lines: None     Cardiac Monitoring: ACTIVE order. Indication: Chest pain/ ACS rule out (24 hours)  Code Status: Full Code      Clinically Significant Risk Factors Present on Admission             # Anion Gap Metabolic Acidosis: Highest Anion Gap = 23 mmol/L in last 2 days, will monitor  "and treat as appropriate    # Drug Induced Platelet Defect: home medication list includes an antiplatelet medication   # Hypertension: Noted on problem list     # DMII: A1C = N/A within past 6 months    # Obesity: Estimated body mass index is 33.33 kg/m  as calculated from the following:    Height as of 3/27/24: 1.499 m (4' 11\").    Weight as of 3/27/24: 74.8 kg (165 lb).       # Financial/Environmental Concerns: none         Disposition Plan     Expected Discharge Date: 04/05/2024      Destination: home with family            The patient's care was discussed with the Attending Physician, Dr. Coleman, Bedside Nurse, Patient, and Patient's Family.    Patricia Adams PA-C  Hospitalist Service  [facility]  Securely message with [employer] (more info)  Text page via [employer] Paging/Directory   ______________________________________________________________________    Interval History   Continues to have wheezing although son states its slightly improved. Notes coughing after trying to take pills. Sx ongoing for 2 weeks    Physical Exam   Vital Signs: Temp: 98.5  F (36.9  C) Temp src: Oral BP: (!) 149/90 Pulse: 73   Resp: 20 SpO2: 98 % O2 Device: Nasal cannula Oxygen Delivery: 1 LPM  Weight: 0 lbs 0 oz    GENERAL:  appears unwell.  PSYCH: pleasant, No acute distress.  HEENT:  Atraumatic, normocephalic. Normal conjunctiva, normal hearing, and oropharynx is normal.  NECK:  Supple, no neck vein distention  HEART:  Normal S1, S2 with no murmur, no pericardial rub, gallops or S3 or S4.  LUNGS:  expiratory wheezing throughout, more pronounced anteriorly.   GI:  Soft, normal bowel sounds. Non-tender, non distended.   EXTREMITIES:  able to move extremities  SKIN:  Dry to touch, No rash, wound or ulcerations.  NEUROLOGIC:  grossly intact     Medical Decision Making       65 MINUTES SPENT BY ME on the date of service doing chart review, history, exam, documentation & further activities per the note.      Data "     I have personally reviewed the following data over the past 24 hrs:    8.0  \   11.2 (L)   / 255     144 102 23.1 (H) /  182 (H)   3.6 29 0.61 \     Trop: 14 BNP: N/A     Procal: N/A CRP: N/A Lactic Acid: 1.8         Imaging results reviewed over the past 24 hrs:   Recent Results (from the past 24 hour(s))   Echocardiogram Complete   Result Value    LVEF  60-65%    Narrative    [id number]  [id number]  [id number]^FAINA^AL^BILL     [facility]  Echocardiography Laboratory  [address] [place] [address]     Name: FABIOLA FREEMAN  MRN: [id number]  : 1950  Study Date: 2024 01:02 PM  Age: 73 yrs  Gender: Female  Patient Location: [facility] [place]  Reason For Study: SOB  Ordering Physician: LASHELL EISENBERG  Performed By: IVY Hull     BSA: 1.7 m2  Height: 59 in  Weight: 165 lb  HR: 67  BP: 130/83 mmHg  ______________________________________________________________________________  Procedure  Complete Portable Echo Adult. Optison (NDC #8159-1115) given intravenously.  Technically difficult study.Extremely difficult acoustic windows despite the  use of contrast for endcardial border definition.  ______________________________________________________________________________  Interpretation Summary     Technically difficult study     Left ventricular systolic function is normal.The visual ejection fraction is  60-65%.  The right ventricular systolic function is normal.  No significant valvular stenosis or regurgitation on Doppler interrogation  Pulmonary hypertension- RVSP 47 mm hg +RA.  The inferior vena cava was normal in size with preserved respiratory  variability.  ______________________________________________________________________________  Left Ventricle  The left ventricle is normal in size. Left ventricular hypertrophy is noted by  two-dimensional echocardiography. Left ventricular systolic function is  normal. The visual ejection fraction is 60-65%.  Diastolic Doppler findings  (E/E' ratio and/or other parameters) suggest left ventricular filling  pressures are indeterminate. No regional wall motion abnormalities noted.     Right Ventricle  The right ventricle is normal size. The right ventricular systolic function is  normal.     Atria  The left atrium is mildly dilated. The right atrium is mildly dilated. There  is no color Doppler evidence of an atrial shunt.     Mitral Valve  There is trace mitral regurgitation. There is no mitral valve stenosis.     Tricuspid Valve  There is trace tricuspid regurgitation. The right ventricular systolic  pressure is approximated at 47.4 mmHg plus the right atrial pressure.  Pulmonary hypertension.     Aortic Valve  The aortic valve is not well visualized. No aortic regurgitation is present.  No aortic stenosis is present.     Pulmonic Valve  There is no pulmonic valvular stenosis.     Vessels  The aortic root is normal size. Normal size ascending aorta. The inferior vena  cava was normal in size with preserved respiratory variability.     Pericardium  There is no pericardial effusion.     ______________________________________________________________________________  MMode/2D Measurements & Calculations  IVSd: 1.1 cm  LVIDd: 4.7 cm  LVIDs: 3.1 cm  LVPWd: 0.75 cm  FS: 33.5 %     LV mass(C)d: 145.6 grams  LV mass(C)dI: 85.7 grams/m2  Ao root diam: 3.1 cm  asc Aorta Diam: 3.2 cm  LVOT diam: 2.1 cm  LVOT area: 3.4 cm2  Ao root diam index Ht(cm/m): 2.1  Ao root diam index BSA (cm/m2): 1.9  Asc Ao diam index BSA (cm/m2): 1.9  Asc Ao diam index Ht(cm/m): 2.1  LA Volume (BP): 61.4 ml  LA Volume Index (BP): 36.1 ml/m2     RWT: 0.32     Doppler Measurements & Calculations  MV E max barry: 52.7 cm/sec  MV A max barry: 98.1 cm/sec  MV E/A: 0.54  MV max P.1 mmHg  MV mean P.4 mmHg  MV V2 VTI: 28.2 cm  MV dec time: 0.28 sec  PA V2 max: 92.9 cm/sec  PA max PG: 3.5 mmHg  TR max barry: 344.2 cm/sec  TR max P.4 mmHg  E/E' avg:  13.0  Lateral E/e': 12.1  Medial E/e': 13.8     ______________________________________________________________________________  Report approved by: Michelle Tran 04/04/2024 01:55 PM

## 2024-04-05 NOTE — PLAN OF CARE
Goal Outcome Evaluation:      Plan of Care Reviewed With: patient    Overall Patient Progress: decliningOverall Patient Progress: declining    Outcome Evaluation: Pt appeared to be improving on all aspects that lead to her admission - BG under control with lantus, nebs helpful at reducing wheeze. This morning pt family reported pt swallowing difficulty after finishing breakfast. I investigated further. Pt appeared to tolerate food, but not liquids. Pt kept NPO until speach consult - which resulted in NPO. Pt started on ABX. NPO on-going. See - MD notification at 1000 for more details.    AOx4 - with intermittent confusion . Mano speaking - son assistance with interpretation. Picture charts used for basic communication without son present.   Pt with frequent productive cough. Suction available in room should there be concern of mucus plug.   Hx. COPD, and Type 2 diabetes.  Pt up assist of 2, short distances, pivot to commode.  On 1L O2 (2L was pt's baseline) - pt likely to tolerate RA - 1L O2 sats 99%.  Given NPO status, unable to pass PO meds that relate to BP, Antiinflammatory.   Pt with redness/color change of coccyx. Mep applied. Coccyx area slightly increased from admission - but this area of the skin was noted to have this quality on initial admission skin assessment. Pt with incontinence  Purewick In use - no redness or skin breakdown of Purewick area.     ? Care Plan notation attached at the bottom of this note. Space added to increase note readability for relevant care team members.                                                                                                                            ---    Problem: Adult Inpatient Plan of Care  Goal: Plan of Care Review  Description: The Plan of Care Review/Shift note should be completed every shift.  The Outcome Evaluation is a brief statement about your assessment that the patient is improving, declining, or no change.  This information will be  "displayed automatically on your shift  note.  Outcome: Not Progressing  Flowsheets (Taken 4/5/2024 1615)  Outcome Evaluation: Pt appeared to be improving on all aspects that lead to her admission - BG under control with lantus, nebs helpful at reducing wheeze. This morning pt family reported pt swallowing difficulty after finishing breakfast. I investigated further. Pt appeared to tolerate food, but not liquids. Pt kept NPO until speach consult - which resulted in NPO. Pt started on ABX. NPO on-going. See - MD notification at 1000 for more details.  Plan of Care Reviewed With: patient  Overall Patient Progress: declining     Problem: Adult Inpatient Plan of Care  Goal: Patient-Specific Goal (Individualized)  Description: You can add care plan individualizations to a care plan. Examples of Individualization might be:  \"Parent requests to be called daily at 9am for status\", \"I have a hard time hearing out of my right ear\", or \"Do not touch me to wake me up as it startles  me\".  Outcome: Progressing  Goal: Absence of Hospital-Acquired Illness or Injury  Outcome: Progressing  Intervention: Identify and Manage Fall Risk  Recent Flowsheet Documentation  Taken 4/5/2024 9357 by Guillermo Hendrickson, RN  Safety Promotion/Fall Prevention: activity supervised  Goal: Optimal Comfort and Wellbeing  Outcome: Progressing  Goal: Readiness for Transition of Care  Outcome: Progressing       "

## 2024-04-05 NOTE — PLAN OF CARE
"PRIMARY DIAGNOSIS: COPD exac  OUTPATIENT/OBSERVATION GOALS TO BE MET BEFORE DISCHARGE:  ADLs back to baseline: No    Activity and level of assistance: assist x 2    Pain status: Pain free.    Return to near baseline physical activity: No     Discharge Planner Nurse   Safe discharge environment identified: Yes  Barriers to discharge: Yes       Entered by: Diana Rubio RN 04/05/2024 1:00 AM    Patient is resting comfortably in bed, currently denies pain, on baseline oxygen, ECHO completed, tolerating a mod carb diet, SOB with activity, Tele: SR [id number]       Please review provider order for any additional goals.   Nurse to notify provider when observation goals have been met and patient is ready for discharge.Goal Outcome Evaluation:    Problem: Adult Inpatient Plan of Care  Goal: Plan of Care Review  Description: The Plan of Care Review/Shift note should be completed every shift.  The Outcome Evaluation is a brief statement about your assessment that the patient is improving, declining, or no change.  This information will be displayed automatically on your shift  note.  Outcome: Progressing  Goal: Patient-Specific Goal (Individualized)  Description: You can add care plan individualizations to a care plan. Examples of Individualization might be:  \"Parent requests to be called daily at 9am for status\", \"I have a hard time hearing out of my right ear\", or \"Do not touch me to wake me up as it startles  me\".  Outcome: Progressing  Goal: Absence of Hospital-Acquired Illness or Injury  Outcome: Progressing  Goal: Optimal Comfort and Wellbeing  Outcome: Progressing  Goal: Readiness for Transition of Care  Outcome: Progressing     Problem: Comorbidity Management  Goal: Maintenance of COPD Symptom Control  Outcome: Progressing  Goal: Blood Glucose Levels Within Targeted Range  Outcome: Progressing  Goal: Blood Pressure in Desired Range  Outcome: Progressing                           "

## 2024-04-05 NOTE — CONSULTS
Care Management Initial Consult    General Information  Assessment completed with: Children, Son Balwinder  Type of CM/SW Visit: Initial Assessment    Primary Care Provider verified and updated as needed: Yes   Readmission within the last 30 days:     Reason for Consult: discharge planning  Advance Care Planning: Advance Care Planning Reviewed: no concerns identified        Communication Assessment  Patient's communication style: spoken language (non-English)    Hearing Difficulty or Deaf: no      Cognitive  Cognitive/Neuro/Behavioral: WDL  Level of Consciousness: alert  Arousal Level: opens eyes spontaneously  Orientation: oriented x 4  Mood/Behavior: calm  Best Language: 0 - No aphasia  Speech: spontaneous    Living Environment:   People in home: child(mahi), adult     Current living Arrangements: house      Able to return to prior arrangements: yes     Family/Social Support:  Care provided by: child(mahi)  Provides care for: no one, unable/limited ability to care for self  Marital Status:   Children          Description of Support System: Supportive, Involved       Current Resources:   Patient receiving home care services: No   Equipment currently used at home:  CPAP    Employment/Financial:  Employment Status: retired      Financial Concerns: none   Referral to Financial Worker: No     Does the patient's insurance plan have a 3 day qualifying hospital stay waiver?  No    Lifestyle & Psychosocial Needs:  Social Determinants of Health     Food Insecurity: Not on file   Depression: Not at risk (5/23/2023)    PHQ-2     PHQ-2 Score: 0   Housing Stability: Not on file   Tobacco Use: Low Risk  (5/23/2023)    Patient History     Smoking Tobacco Use: Never     Smokeless Tobacco Use: Never     Passive Exposure: Not on file   Financial Resource Strain: Not on file   Alcohol Use: Not on file   Transportation Needs: Not on file   Physical Activity: Not on file   Interpersonal Safety: Not on file   Stress: Not on file    Social Connections: Not on file     Functional Status:  Prior to admission patient needed assistance:   Patient was admitted under observation status for hyperglycemia, COPD exacerbation.     Patient identified as an elevated unplanned readmission risk of 25%. SW met with pt's son Balwinder at bedside. Pt lives at home with son and daughter-in-law. Son works overnights so he can be at home with patient during the day. Patient is not open to any outside HC services at this time. Pt uses a CPAP at home.     Mental Health Status:  Mental Health Status: No Current Concerns       Chemical Dependency Status:  Chemical Dependency Status: No Current Concerns           Values/Beliefs:  Spiritual, Cultural Beliefs, Congregational Practices, Values that affect care: yes             Additional Information:  Plan: Home when medically stable. No CC/SW needs identified. Son to transport. Please call if needs arise prior to discharge.     SCOTT Gonzalez, [facility]   Inpatient Care Coordination  [facility]   [phone]

## 2024-04-05 NOTE — PLAN OF CARE
"PRIMARY DIAGNOSIS: COPD exac  OUTPATIENT/OBSERVATION GOALS TO BE MET BEFORE DISCHARGE:  ADLs back to baseline: No    Activity and level of assistance: assist x 2    Pain status: Pain free.    Return to near baseline physical activity: No     Discharge Planner Nurse   Safe discharge environment identified: No  Barriers to discharge: Yes       Entered by: Diana Rubio RN 04/05/2024 5:45 AM    Patient resting comfortably in bed, currently denies pain, SOB with activity, ECHO completed, SW consult in, tele: SR [id number]       Please review provider order for any additional goals.   Nurse to notify provider when observation goals have been met and patient is ready for discharge.Goal Outcome Evaluation:    Problem: Adult Inpatient Plan of Care  Goal: Plan of Care Review  Description: The Plan of Care Review/Shift note should be completed every shift.  The Outcome Evaluation is a brief statement about your assessment that the patient is improving, declining, or no change.  This information will be displayed automatically on your shift  note.  Outcome: Progressing  Goal: Patient-Specific Goal (Individualized)  Description: You can add care plan individualizations to a care plan. Examples of Individualization might be:  \"Parent requests to be called daily at 9am for status\", \"I have a hard time hearing out of my right ear\", or \"Do not touch me to wake me up as it startles  me\".  Outcome: Progressing  Goal: Absence of Hospital-Acquired Illness or Injury  Outcome: Progressing  Intervention: Identify and Manage Fall Risk  Recent Flowsheet Documentation  Taken 4/5/2024 0100 by Diana Rubio RN  Safety Promotion/Fall Prevention: activity supervised  Intervention: Prevent Skin Injury  Recent Flowsheet Documentation  Taken 4/5/2024 0100 by Diana Rubio RN  Body Position: position changed independently  Goal: Optimal Comfort and Wellbeing  Outcome: Progressing  Goal: Readiness for Transition of Care  Outcome: Progressing   "   Problem: Comorbidity Management  Goal: Maintenance of COPD Symptom Control  Outcome: Progressing  Intervention: Maintain COPD Symptom Control  Recent Flowsheet Documentation  Taken 4/5/2024 0100 by Diana Rubio RN  Medication Review/Management: medications reviewed  Goal: Blood Glucose Levels Within Targeted Range  Outcome: Progressing  Intervention: Monitor and Manage Glycemia  Recent Flowsheet Documentation  Taken 4/5/2024 0100 by Diana Rubio RN  Medication Review/Management: medications reviewed  Goal: Blood Pressure in Desired Range  Outcome: Progressing  Intervention: Maintain Blood Pressure Management  Recent Flowsheet Documentation  Taken 4/5/2024 0100 by Diana Rubio RN  Medication Review/Management: medications reviewed

## 2024-04-05 NOTE — PROVIDER NOTIFICATION
New Dysphagia:   Pt having difficulty with thin liquids this morning - per pt family pt tolerated foods ok - but following liquids - frequent persistent coughing - pt reported liquids feeling stuck. Pt rescreened for dysphagia and did not tolerate 3 oz water. Pt appears appropriate for swallow study - potential thickened liquids. Holding PO meds for now. MD notified.     Prior:  Pt did not have difficulty swallowing yesterday upon admission to obs at 1300.

## 2024-04-05 NOTE — PROGRESS NOTES
04/05/24 1200   Appointment Info   Signing Clinician's Name / Credentials (SLP) Jacinta Ventura M.A. CCC-SLP   General Information   Onset of Illness/Injury or Date of Surgery 04/03/24   Referring Physician Patricia Adams PA-C   Patient/Family Therapy Goal Statement (SLP) pt unable to state; son hopes she can eat all the foods she enjoys   Pertinent History of Current Problem Pilo Quintana is a 73 year old (is likely at least 83 yr old per son) female with PMHx significant for COPD vs asthma on 2 L of oxygen, dementia, type 2 diabetes, HTN, HFpEF, pulmonary HTN, renal oncocytoma, CVA with some left-sided weakness, hx of intracerebral hemorrhage, and central sleep apnea, who was admitted on 4/3/2024 with hyperglycemia. She also has been having a cough with wheezing and SOB for the past 2 weeks. She was seen in the ED on 3/27 and dx with COPD exacerbation and started on Prednisone 40 mg x 5 days and Azithromycin. No fever. Her symptoms persisted so on 4/2, she was restarted on Prednisone at 60 mg with taper every 3 days.   Now with hyperglycemia. Also notes heaviness in her chest with cough and wheezing.   She is from [facility] and does not speak any English and the history is obtained from the son who is interpreting.       SLP consulted due to concern for aspiration with thin H20 earlier today.   General Observations Pt alert, having a coughing spell upon arrival. Reportedly she has been coughing since an aspiration event earlier today.      Language Mano - son prefers to interpret   Type of Evaluation   Type of Evaluation Swallow Evaluation   Oral Motor   Oral Musculature anomalies present   Mucosal Quality adequate   Dentition (Oral Motor)   Dentition (Oral Motor) some missing teeth;significant number of missing teeth   Facial Symmetry (Oral Motor)   Facial Symmetry (Oral Motor) right side impairment   Right Side Facial Asymmetry minimal impairment   Lip Function (Oral Motor)   Lip Range of  Motion (Oral Motor) WNL   Tongue Function (Oral Motor)   Comment, Tongue Function (Oral Motor) reduced coordination   Cough/Swallow/Gag Reflex (Oral Motor)   Soft Palate/Velum (Oral Motor) WNL   Gag Reflex (Oral Motor) not tested   Volitional Throat Clear/Cough (Oral Motor) unable/difficult to assess   Volitional Swallow (Oral Motor) unable to initiate   Vocal Quality/Secretion Management (Oral Motor)   Vocal Quality (Oral Motor) hoarse;wet/gurgly   General Swallowing Observations   Past History of Dysphagia Per chart review the pt was previously on a soft and bite sized diet with thin liquids per SLP in May 2023. Son reports he gives his mom regular food but makes sure she has small bites & feeds her carefully.   Respiratory Support nasal cannula   Current Diet/Method of Nutritional Intake (General Swallowing Observations, NIS) regular diet;thin liquids (level 0)   Swallowing Evaluation Clinical swallow evaluation   Clinical Swallow Evaluation   Feeding Assistance dependent   Clinical Swallow Evaluation Textures Trialed mildly thick liquids;pureed   Clinical Swallow Eval: Mildly Thick Liquids   Mode of Presentation spoon;fed by clinician   Volume Presented 2 oz mildly thick H20   Oral Phase WFL   Pharyngeal Phase impaired;coughing/choking;reduction in laryngeal movement   Diagnostic Statement elevated risk for aspiration   Clinical Swallow Evaluation: Puree Solid Texture Trial   Mode of Presentation, Puree spoon;fed by clinician   Volume of Puree Presented 2 oz pudding   Oral Phase, Puree WFL   Pharyngeal Phase, Puree impaired;coughing/choking;reduction in laryngeal movement   Diagnostic Statement elevated risk for aspiration   Esophageal Phase of Swallow   Patient reports or presents with symptoms of esophageal dysphagia No   Swallowing Recommendations   Diet Consistency Recommendations NPO   Medication Administration Recommendations, Swallowing (SLP) rec medication be served via alternative means   General  Therapy Interventions   Planned Therapy Interventions Dysphagia Treatment   Dysphagia treatment Instruction of safe swallow strategies;Modified diet education   Clinical Impression   Criteria for Skilled Therapeutic Interventions Met (SLP Eval) Yes, treatment indicated   SLP Diagnosis oropharyngeal dysphagia   Risks & Benefits of therapy have been explained evaluation/treatment results reviewed;current/potential barriers reviewed;participants voiced agreement with care plan;participants included;patient   Clinical Impression Comments Clinical dysphagia eval completed per MD order. Son served as  and historian. This patient typically eats a regular diet and thin liquids at home, son said he makes sure she is fed small bites. He has not noticed any dysphagia aside from today. Today both RN and son report cough after drinking water. During this eval the patient demonstrated reduced lingual control and she was not able to initiate a volitional swallow. Voice hoarse and weak. After mildly thick H20 by spoon and puree textures demonstrated a weak cough 100% of the time. Educated the patient/son about rec for NPO except for oral cares for comfort and good hygiene. Rec alternative source for nutrition/hydration/medication. SLP will follow closely for PO readiness.   SLP Total Evaluation Time   Eval: oral/pharyngeal swallow function, clinical swallow Minutes (53824) 20   SLP Goals   Therapy Frequency (SLP Eval) daily   SLP Predicted Duration/Target Date for Goal Attainment 04/11/24   SLP Goals Swallow   SLP: Safely tolerate diet without signs/symptoms of aspiration One P.O. texture;With use of swallow precautions;With assistance/supervision   Interventions   Interventions Quick Adds Swallowing Dysfunction   Swallowing Dysfunction &/or Oral Function for Feeding   Treatment of Swallowing Dysfunction &/or Oral Function for Feeding Minutes (47047) 15   Treatment Detail/Skilled Intervention Educated the patient/son  about SLP role, signs & risks of aspiration, rationale for SLP rec & POC.   SLP Discharge Planning   SLP Plan PO readiness   SLP Discharge Recommendation home with home health   SLP Rationale for DC Rec oropharyngeal swallowing ability well below baseline   SLP Brief overview of current status  Recommend NPO except for oral cares for comfort and good hygiene. Rec alternative source for nutrition/hydration/medication.   Total Session Time   Total Session Time (sum of timed and untimed services) 35

## 2024-04-05 NOTE — UTILIZATION REVIEW
Admission Status; Secondary Review Determination    Under the authority of the Utilization Management Committee, the utilization review process indicated a secondary review on the above patient. The review outcome is based on review of the medical records, discussions with staff, and applying clinical experience noted on the date of the review.    (x) Inpatient Status Appropriate - This patient's medical care is consistent with medical management for inpatient care and reasonable inpatient medical practice.    RATIONALE FOR DETERMINATION: 73-year-old female with complex medical history including hypertension, prior intracerebral hemorrhage, heart failure, type 2 diabetes, COPD with recent exacerbation with ongoing wheezes and cough, chronic hypoxemic respiratory failure on steroids presented to hospital with hyperglycemia, tachypnea and ongoing wheezes.  Patient felt to have ongoing acute COPD exacerbation with lactic acidosis and hyperglycemia.  On hospital day 3 patient was noted to have significant difficulty with recurrent coughing when attempting to drink liquids and ultimately made n.p.o. with signs and symptoms worrisome for aspiration on top of acute COPD exacerbation appropriate for inpatient management.    At the time of admission with the information available to the attending physician more than 2 nights Hospital complex care was anticipated, based on patient risk of adverse outcome if treated as outpatient and complex care required. Inpatient admission is appropriate based on the Medicare guidelines.    This document was produced using voice recognition software    The information on this document is developed by the utilization review team in order for the business office to ensure compliance. This only denotes the appropriateness of proper admission status and does not reflect the quality of care rendered.    The definitions of Inpatient Status and Observation Status used in making the  determination above are those provided in the CMS Coverage Manual, Chapter 1 and Chapter 6, section 70.4.    Sincerely,    Emilio Duron MD  Utilization Review  Physician Advisor  [facility], [place].

## 2024-04-06 ENCOUNTER — APPOINTMENT (OUTPATIENT)
Dept: SPEECH THERAPY | Facility: CLINIC | Age: 74
DRG: 202 | End: 2024-04-06
Payer: COMMERCIAL

## 2024-04-06 LAB
ACANTHOCYTES BLD QL SMEAR: SLIGHT
ANION GAP SERPL CALCULATED.3IONS-SCNC: 14 MMOL/L (ref 7–15)
AUER BODIES BLD QL SMEAR: ABNORMAL
BASO STIPL BLD QL SMEAR: ABNORMAL
BASOPHILS # BLD AUTO: 0 10E3/UL (ref 0–0.2)
BASOPHILS NFR BLD AUTO: 0 %
BITE CELLS BLD QL SMEAR: ABNORMAL
BLISTER CELLS BLD QL SMEAR: ABNORMAL
BUN SERPL-MCNC: 13.8 MG/DL (ref 8–23)
BURR CELLS BLD QL SMEAR: SLIGHT
CALCIUM SERPL-MCNC: 9.8 MG/DL (ref 8.8–10.2)
CHLORIDE SERPL-SCNC: 105 MMOL/L (ref 98–107)
CREAT SERPL-MCNC: 0.49 MG/DL (ref 0.51–0.95)
DACRYOCYTES BLD QL SMEAR: ABNORMAL
DEPRECATED HCO3 PLAS-SCNC: 27 MMOL/L (ref 22–29)
EGFRCR SERPLBLD CKD-EPI 2021: >90 ML/MIN/1.73M2
ELLIPTOCYTES BLD QL SMEAR: ABNORMAL
EOSINOPHIL # BLD AUTO: 0.3 10E3/UL (ref 0–0.7)
EOSINOPHIL NFR BLD AUTO: 4 %
ERYTHROCYTE [DISTWIDTH] IN BLOOD BY AUTOMATED COUNT: 13.6 % (ref 10–15)
FRAGMENTS BLD QL SMEAR: ABNORMAL
GLUCOSE BLDC GLUCOMTR-MCNC: 121 MG/DL (ref 70–99)
GLUCOSE BLDC GLUCOMTR-MCNC: 123 MG/DL (ref 70–99)
GLUCOSE BLDC GLUCOMTR-MCNC: 137 MG/DL (ref 70–99)
GLUCOSE BLDC GLUCOMTR-MCNC: 259 MG/DL (ref 70–99)
GLUCOSE BLDC GLUCOMTR-MCNC: 324 MG/DL (ref 70–99)
GLUCOSE BLDC GLUCOMTR-MCNC: 61 MG/DL (ref 70–99)
GLUCOSE BLDC GLUCOMTR-MCNC: 73 MG/DL (ref 70–99)
GLUCOSE SERPL-MCNC: 103 MG/DL (ref 70–99)
HCT VFR BLD AUTO: 36.6 % (ref 35–47)
HGB BLD-MCNC: 12 G/DL (ref 11.7–15.7)
HGB C CRYSTALS: ABNORMAL
HOWELL-JOLLY BOD BLD QL SMEAR: ABNORMAL
IMM GRANULOCYTES # BLD: 0.1 10E3/UL
IMM GRANULOCYTES NFR BLD: 1 %
LYMPHOCYTES # BLD AUTO: 2.1 10E3/UL (ref 0.8–5.3)
LYMPHOCYTES NFR BLD AUTO: 29 %
MCH RBC QN AUTO: 29.1 PG (ref 26.5–33)
MCHC RBC AUTO-ENTMCNC: 32.8 G/DL (ref 31.5–36.5)
MCV RBC AUTO: 89 FL (ref 78–100)
MONOCYTES # BLD AUTO: 0.4 10E3/UL (ref 0–1.3)
MONOCYTES NFR BLD AUTO: 5 %
NEUTROPHILS # BLD AUTO: 4.4 10E3/UL (ref 1.6–8.3)
NEUTROPHILS NFR BLD AUTO: 61 %
NEUTS HYPERSEG BLD QL SMEAR: ABNORMAL
NRBC # BLD AUTO: 0 10E3/UL
NRBC BLD AUTO-RTO: 0 /100
PLAT MORPH BLD: ABNORMAL
PLATELET # BLD AUTO: 228 10E3/UL (ref 150–450)
POLYCHROMASIA BLD QL SMEAR: ABNORMAL
POTASSIUM SERPL-SCNC: 3.9 MMOL/L (ref 3.4–5.3)
RBC # BLD AUTO: 4.13 10E6/UL (ref 3.8–5.2)
RBC AGGLUT BLD QL: ABNORMAL
RBC MORPH BLD: ABNORMAL
ROULEAUX BLD QL SMEAR: ABNORMAL
SICKLE CELLS BLD QL SMEAR: ABNORMAL
SMUDGE CELLS BLD QL SMEAR: ABNORMAL
SODIUM SERPL-SCNC: 146 MMOL/L (ref 135–145)
SPHEROCYTES BLD QL SMEAR: ABNORMAL
STOMATOCYTES BLD QL SMEAR: ABNORMAL
TARGETS BLD QL SMEAR: ABNORMAL
TOXIC GRANULES BLD QL SMEAR: ABNORMAL
VARIANT LYMPHS BLD QL SMEAR: ABNORMAL
WBC # BLD AUTO: 7.3 10E3/UL (ref 4–11)

## 2024-04-06 PROCEDURE — 250N000013 HC RX MED GY IP 250 OP 250 PS 637: Performed by: PHYSICIAN ASSISTANT

## 2024-04-06 PROCEDURE — 250N000011 HC RX IP 250 OP 636: Performed by: INTERNAL MEDICINE

## 2024-04-06 PROCEDURE — 250N000009 HC RX 250: Performed by: INTERNAL MEDICINE

## 2024-04-06 PROCEDURE — 999N000157 HC STATISTIC RCP TIME EA 10 MIN

## 2024-04-06 PROCEDURE — 120N000001 HC R&B MED SURG/OB

## 2024-04-06 PROCEDURE — 92526 ORAL FUNCTION THERAPY: CPT | Mod: GN

## 2024-04-06 PROCEDURE — 36416 COLLJ CAPILLARY BLOOD SPEC: CPT | Performed by: PHYSICIAN ASSISTANT

## 2024-04-06 PROCEDURE — 258N000002 HC RX IP 258 OP 250: Performed by: PHYSICIAN ASSISTANT

## 2024-04-06 PROCEDURE — 250N000013 HC RX MED GY IP 250 OP 250 PS 637: Performed by: INTERNAL MEDICINE

## 2024-04-06 PROCEDURE — 94640 AIRWAY INHALATION TREATMENT: CPT | Mod: 76

## 2024-04-06 PROCEDURE — 94640 AIRWAY INHALATION TREATMENT: CPT

## 2024-04-06 PROCEDURE — 250N000012 HC RX MED GY IP 250 OP 636 PS 637: Performed by: INTERNAL MEDICINE

## 2024-04-06 PROCEDURE — 258N000001 HC RX 258: Performed by: INTERNAL MEDICINE

## 2024-04-06 PROCEDURE — 80048 BASIC METABOLIC PNL TOTAL CA: CPT | Performed by: PHYSICIAN ASSISTANT

## 2024-04-06 PROCEDURE — 99232 SBSQ HOSP IP/OBS MODERATE 35: CPT | Performed by: PHYSICIAN ASSISTANT

## 2024-04-06 PROCEDURE — 85004 AUTOMATED DIFF WBC COUNT: CPT | Performed by: PHYSICIAN ASSISTANT

## 2024-04-06 PROCEDURE — 250N000011 HC RX IP 250 OP 636: Performed by: PHYSICIAN ASSISTANT

## 2024-04-06 RX ORDER — HYDRALAZINE HYDROCHLORIDE 20 MG/ML
10 INJECTION INTRAMUSCULAR; INTRAVENOUS EVERY 4 HOURS PRN
Status: DISCONTINUED | OUTPATIENT
Start: 2024-04-06 | End: 2024-04-09 | Stop reason: HOSPADM

## 2024-04-06 RX ORDER — SODIUM CHLORIDE 450 MG/100ML
INJECTION, SOLUTION INTRAVENOUS CONTINUOUS
Status: DISCONTINUED | OUTPATIENT
Start: 2024-04-06 | End: 2024-04-07

## 2024-04-06 RX ADMIN — ASPIRIN 81 MG: 81 TABLET, COATED ORAL at 10:43

## 2024-04-06 RX ADMIN — IPRATROPIUM BROMIDE AND ALBUTEROL SULFATE 3 ML: .5; 3 SOLUTION RESPIRATORY (INHALATION) at 16:44

## 2024-04-06 RX ADMIN — PREDNISONE 40 MG: 20 TABLET ORAL at 10:43

## 2024-04-06 RX ADMIN — BUDESONIDE AND FORMOTEROL FUMARATE DIHYDRATE 2 PUFF: 160; 4.5 AEROSOL RESPIRATORY (INHALATION) at 09:05

## 2024-04-06 RX ADMIN — ATORVASTATIN CALCIUM 40 MG: 40 TABLET, FILM COATED ORAL at 20:17

## 2024-04-06 RX ADMIN — AMLODIPINE BESYLATE 5 MG: 5 TABLET ORAL at 10:42

## 2024-04-06 RX ADMIN — DEXTROSE MONOHYDRATE 50 ML: 25 INJECTION, SOLUTION INTRAVENOUS at 04:48

## 2024-04-06 RX ADMIN — IPRATROPIUM BROMIDE AND ALBUTEROL SULFATE 3 ML: .5; 3 SOLUTION RESPIRATORY (INHALATION) at 08:58

## 2024-04-06 RX ADMIN — SODIUM CHLORIDE: 4.5 INJECTION, SOLUTION INTRAVENOUS at 11:03

## 2024-04-06 RX ADMIN — DOXYCYCLINE 100 MG: 100 INJECTION, POWDER, LYOPHILIZED, FOR SOLUTION INTRAVENOUS at 01:05

## 2024-04-06 RX ADMIN — LOSARTAN POTASSIUM 100 MG: 100 TABLET, FILM COATED ORAL at 10:42

## 2024-04-06 RX ADMIN — IPRATROPIUM BROMIDE AND ALBUTEROL SULFATE 3 ML: .5; 3 SOLUTION RESPIRATORY (INHALATION) at 12:19

## 2024-04-06 RX ADMIN — IPRATROPIUM BROMIDE AND ALBUTEROL SULFATE 3 ML: .5; 3 SOLUTION RESPIRATORY (INHALATION) at 20:17

## 2024-04-06 RX ADMIN — ENOXAPARIN SODIUM 40 MG: 40 INJECTION SUBCUTANEOUS at 10:43

## 2024-04-06 RX ADMIN — DOXYCYCLINE 100 MG: 100 INJECTION, POWDER, LYOPHILIZED, FOR SOLUTION INTRAVENOUS at 11:03

## 2024-04-06 RX ADMIN — METOPROLOL SUCCINATE 50 MG: 50 TABLET, EXTENDED RELEASE ORAL at 10:43

## 2024-04-06 RX ADMIN — Medication 50 MCG: at 10:43

## 2024-04-06 ASSESSMENT — ACTIVITIES OF DAILY LIVING (ADL)
ADLS_ACUITY_SCORE: 50
ADLS_ACUITY_SCORE: 53
ADLS_ACUITY_SCORE: 50
ADLS_ACUITY_SCORE: 50
ADLS_ACUITY_SCORE: 49
ADLS_ACUITY_SCORE: 50
ADLS_ACUITY_SCORE: 50
ADLS_ACUITY_SCORE: 49
ADLS_ACUITY_SCORE: 50
ADLS_ACUITY_SCORE: 50
ADLS_ACUITY_SCORE: 49
ADLS_ACUITY_SCORE: 49
ADLS_ACUITY_SCORE: 50
ADLS_ACUITY_SCORE: 49
ADLS_ACUITY_SCORE: 53
ADLS_ACUITY_SCORE: 50
ADLS_ACUITY_SCORE: 50
ADLS_ACUITY_SCORE: 53
ADLS_ACUITY_SCORE: 50
ADLS_ACUITY_SCORE: 50

## 2024-04-06 NOTE — PLAN OF CARE
"Care from [id number]      Inpatient Progress Note:  For complete assessment see flow sheet documentation.       BP (!) 145/84 (BP Location: Right arm)   Pulse 75   Temp 98.1  F (36.7  C) (Oral)   Resp 20   Wt 71.1 kg (156 lb 12 oz)   SpO2 95%   BMI 31.66 kg/m         Neuro: Intact  Vital Signs: O2 mid 90s with 1/2 L of O2  Pain/Comfort: Got rectal tylenol for pain, NPO. Pt resting comfortably now.   Diet/po intake: NPO- Pt had low glucose this morning, was given IV dextrose and blood glucose stabilized in the 120s.  Output: Has a purewick for urine, no bowel movement on this shift  Activity/Ambulation: Ax 2 with walker and GB to commode.  Pertinent assessments: Pt lungs are still wheezy and diminished.   Infusions: Q 12 Doxycycline  Major Shift Events: Low glucose this morning.   Plan (Upcoming Events): Monitor O2, BS and swallowing.    Will continue with POC.          Will continue to monitor and provide cares.      Problem: Adult Inpatient Plan of Care  Goal: Plan of Care Review  Description: The Plan of Care Review/Shift note should be completed every shift.  The Outcome Evaluation is a brief statement about your assessment that the patient is improving, declining, or no change.  This information will be displayed automatically on your shift  note.  Outcome: Not Progressing  Flowsheets (Taken 4/5/2024 2028)  Outcome Evaluation: Nebs for wheezing, pt still NPO due to swallowing troubles. Increased pain due to coughing. Given tylenol. Sat up high in bed to help with breathing. Brought down to 1/2 L O2 NC.  Plan of Care Reviewed With: patient  Overall Patient Progress: no change  Goal: Patient-Specific Goal (Individualized)  Description: You can add care plan individualizations to a care plan. Examples of Individualization might be:  \"Parent requests to be called daily at 9am for status\", \"I have a hard time hearing out of my right ear\", or \"Do not touch me to wake me up as it startles  me\".  Outcome: Not " Progressing  Goal: Absence of Hospital-Acquired Illness or Injury  Outcome: Not Progressing  Goal: Optimal Comfort and Wellbeing  Outcome: Not Progressing  Intervention: Monitor Pain and Promote Comfort  Recent Flowsheet Documentation  Taken 4/5/2024 1643 by Karina Hansen, RN  Pain Management Interventions: medication (see MAR)  Goal: Readiness for Transition of Care  Outcome: Not Progressing

## 2024-04-06 NOTE — PROGRESS NOTES
[facility]  Hospitalist Progress Note  Emi Bailey PA-C 04/06/2024           Assessment and Plan:      Pilo Quintana is a 73 year old (is likely at least 83 yr old per son) female with PMHx significant for COPD vs asthma on 2 L of oxygen, dementia, type 2 diabetes, HTN, HFpEF, pulmonary HTN, renal oncocytoma, CVA with some left-sided weakness, hx of intracerebral hemorrhage, and central sleep apnea, who was admitted on 4/3/2024 with hyperglycemia.     She also has been having a cough with wheezing and SOB for the past 2 weeks. She was seen in the ED on 3/27 and dx with COPD exacerbation and started on Prednisone 40 mg x 5 days and Azithromycin. No fever. Her symptoms persisted so on 4/2, she was restarted on Prednisone at 60 mg with taper every 3 days.   She is from [facility] and does not speak any English and the history is obtained from the son who is interpreting.    Interval hx:   Pt and son noticed cough with oral intake which is new for pt. Seen by SLP 4/5 recommend NPO for now with repeat assessment. Suspect dec swallowing due recent illness.     # New onset dysphagia vs aspiration risk (cough with water)   - apparently this is new, son denies hx of choking with food or water   - No new c/o of dysarthria, or any focal deficits to suggest concern for CVA   - Seen by SLP 4/5 and 4/6, has been NPO since 4/5 with some improvement today  - Re-eval tomorrow, place on IVF today. Hold lantus and only use ssi if needed   - If still having difficulty swallowing will expand work up with Video swallow      # Marked hyperglycemia - due to increased steroid use for COPD exacerbation. Resolved  Hx of Type II DM  - Elevated BG due to steroid use. Without ketoacidosis but significant lactic acidosis.   - DM typically managed with Metformin alone. Last HgbA1c was 7.7 in January   - Lantus started on admission, 10 units BID, but discontinued 4/6 given NPO status. - continue sliding scale insulin Q4 PRN  -  metformin held due to lactic acidosis.Cont to hold given NPO status   - hypoglycemia protocol     # Acute exacerbation of COPD vs asthma, on chronic 1-2L O2 at home   # Suspect concomitant bronchitis   # MAGALIS on Bipap   Per chart review, no definite dx of COPD, multiple visits for wheezing, was managing with albuterol alone at home, started on Symbicort in October by PCP. Had PFTs done but were inconclusive, thought maybe it was due to poor effort. Sleep study done, recommended BiPAP  Seen on 3/27 for cough, SOB and wheezing, treated with 5 day course of Prednisone and Azithromycin  Seen by PCP on 4/2 for persistent sx, started on Prednisone taper starting at 60 mg. Presented here 4/3, started prednisone and Doxycycline started 4/5  Plan:   - Overall seems to be a bit better today   - Plan to cont prednisone,  40 mg for 5 days then taper  - continue scheduled Duonebs  - Cont Doxycycline (Day 2/7)   - continue Symbicort  - hold home lasix due to NPO status, start gentle fluids   - continue supplemental O2 (pt is 97% on 1/2 L, likely does not need O2 at baseline but per son pt is on O2 at all times)   - BiPAP at night   - if not improving, consider CT chest     3. Elevated troponin  Chest discomfort on admission - resolved   Likely due to demand ischemia; 17-->19-->15-->14.   -  Echocardiogram fairly unremarkable, no wall motion abnormality. Suspect discomfort is due to cough  - Telemetry unremarkable thus far, discontinue   - consider outpatient stress testing if she has not had one recently      4. Marked lactic acidosis - resolved.   Likely a combination of factors to include metformin and neb treatments.  Now resolved.      5. Central sleep apnea  - continue home BiPAP.    6. HTN - somewhat labile 130's-170's  - continue home amlodipine  - resume home losartan and metoprolol    7. HFpEF  Pulmonary HTN  - looks stable  - lasix held due to NPO status  - Cont gentle fluids  - Currently appears euvolemic     8. CVA  "with some left-sided weakness  - continue baby ASA and statin  - No e/o acute deficits     9. Dementia  10. Renal oncocytoma        Diet: NPO   DVT Prophylaxis: Enoxaparin (Lovenox) SQ  Morrison Catheter: Not present  Lines: None     Cardiac Monitoring: discontinued   Code Status: Full Code           Disposition Plan     Expected Discharge Date: unclear in 2-3 days, I suspect she will need further speech work up for dysphagia   Destination: home with family             Interval History (Subjective):      Son not available when I arrived.   Pt is smiling, no complaints. Did answer \"hello\" and \"thank you\" during my visit.   No e/o dysarthria, no e/o focal deficits that I can tell during conversation                   Physical Exam:      Last Vital Signs:  BP (!) 161/94 (BP Location: Left arm)   Pulse 69   Temp 98.2  F (36.8  C) (Oral)   Resp 20   Wt 71.1 kg (156 lb 12 oz)   SpO2 97%   BMI 31.66 kg/m      Constitutional: Awake, alert, cooperative, no apparent distress     Respiratory: Dec BS, poor inspiratory effort, no crackles or wheezing   Cardiovascular: Regular rate and rhythm, normal S1 and S2, and no murmur noted   Abdomen: Normal bowel sounds, soft, non-distended, non-tender   Skin: No rashes, no cyanosis, dry to touch   Neuro: Alert and oriented x3, no weakness, numbness, memory loss   Extremities: No edema, normal range of motion   Other(s):        All other systems: Negative          Medications:      All current medications were reviewed with changes reflected in problem list.         Data:      All new lab and imaging data was reviewed.   Labs:       Lab Results   Component Value Date     04/06/2024     04/05/2024     04/04/2024     09/10/2020     01/12/2017     07/13/2016    Lab Results   Component Value Date    CHLORIDE 105 04/06/2024    CHLORIDE 102 04/05/2024    CHLORIDE 88 04/04/2024    CHLORIDE 106 09/10/2020    CHLORIDE 106 01/12/2017    CHLORIDE 101 07/13/2016 "    Lab Results   Component Value Date    BUN 13.8 2024    BUN 23.1 2024    BUN 33.4 2024    BUN 15 09/10/2020    BUN 19 2017    BUN 20 2016      Lab Results   Component Value Date    POTASSIUM 3.9 2024    POTASSIUM 3.6 2024    POTASSIUM 4.5 2024    POTASSIUM 3.9 09/10/2020    POTASSIUM 4.4 2017    POTASSIUM 3.6 2016    Lab Results   Component Value Date    CO2 27 2024    CO2 29 2024    CO2 25 2024    CO2 29 09/10/2020    CO2 27 2017    CO2 34 2016    Lab Results   Component Value Date    CR 0.49 2024    CR 0.61 2024    CR 0.77 2024    CR 0.62 09/10/2020    CR 0.80 2017    CR 0.80 2016        Recent Labs   Lab 24  0637 24  0558 24  0000   WBC 7.3 8.0 6.9   HGB 12.0 11.2* 11.2*   HCT 36.6 35.7 34.3*   MCV 89 91 90    255 272      Imaging:   Results for orders placed or performed during the hospital encounter of 24   XR Chest 2 Views    Narrative    EXAM: XR CHEST 2 VIEWS  LOCATION: [facility]  DATE: 2024    INDICATION: Ongoing cough.  COMPARISON: Chest x-ray 2 views 2024 at 1023 hours.      Impression    IMPRESSION: Improved platelike atelectasis in the left base demonstrated previously. Both lungs are otherwise clear. No adenopathy or effusion. Mild cardiac enlargement. Normal pulmonary vascularity. Mild degenerative changes both shoulders and the   spine.   Echocardiogram Complete     Value    LVEF  60-65%    Narrative    [id number]  [id number]  [id number]  [id number]^FAINA^AL^BILL     [facility]  Echocardiography Laboratory  [address] [place] [address]     Name: FABIOLA FREEMAN  MRN: [id number]  : 1950  Study Date: 2024 01:02 PM  Age: 73 yrs  Gender: Female  Patient Location: [facility]  Reason For Study: SOB  Ordering Physician: LASHELL EISENBERG  Performed By: IVY Hull     BSA: 1.7  m2  Height: 59 in  Weight: 165 lb  HR: 67  BP: 130/83 mmHg  ______________________________________________________________________________  Procedure  Complete Portable Echo Adult. [employer] (NDC #[id number]) given intravenously.  Technically difficult study.Extremely difficult acoustic windows despite the  use of contrast for endcardial border definition.  ______________________________________________________________________________  Interpretation Summary     Technically difficult study     Left ventricular systolic function is normal.The visual ejection fraction is  60-65%.  The right ventricular systolic function is normal.  No significant valvular stenosis or regurgitation on Doppler interrogation  Pulmonary hypertension- RVSP 47 mm hg +RA.  The inferior vena cava was normal in size with preserved respiratory  variability.  ______________________________________________________________________________  Left Ventricle  The left ventricle is normal in size. Left ventricular hypertrophy is noted by  two-dimensional echocardiography. Left ventricular systolic function is  normal. The visual ejection fraction is 60-65%. Diastolic Doppler findings  (E/E' ratio and/or other parameters) suggest left ventricular filling  pressures are indeterminate. No regional wall motion abnormalities noted.     Right Ventricle  The right ventricle is normal size. The right ventricular systolic function is  normal.     Atria  The left atrium is mildly dilated. The right atrium is mildly dilated. There  is no color Doppler evidence of an atrial shunt.     Mitral Valve  There is trace mitral regurgitation. There is no mitral valve stenosis.     Tricuspid Valve  There is trace tricuspid regurgitation. The right ventricular systolic  pressure is approximated at 47.4 mmHg plus the right atrial pressure.  Pulmonary hypertension.     Aortic Valve  The aortic valve is not well visualized. No aortic regurgitation is present.  No aortic stenosis  is present.     Pulmonic Valve  There is no pulmonic valvular stenosis.     Vessels  The aortic root is normal size. Normal size ascending aorta. The inferior vena  cava was normal in size with preserved respiratory variability.     Pericardium  There is no pericardial effusion.     ______________________________________________________________________________  MMode/2D Measurements & Calculations  IVSd: 1.1 cm  LVIDd: 4.7 cm  LVIDs: 3.1 cm  LVPWd: 0.75 cm  FS: 33.5 %     LV mass(C)d: 145.6 grams  LV mass(C)dI: 85.7 grams/m2  Ao root diam: 3.1 cm  asc Aorta Diam: 3.2 cm  LVOT diam: 2.1 cm  LVOT area: 3.4 cm2  Ao root diam index Ht(cm/m): 2.1  Ao root diam index BSA (cm/m2): 1.9  Asc Ao diam index BSA (cm/m2): 1.9  Asc Ao diam index Ht(cm/m): 2.1  LA Volume (BP): 61.4 ml  LA Volume Index (BP): 36.1 ml/m2     RWT: 0.32     Doppler Measurements & Calculations  MV E max barry: 52.7 cm/sec  MV A max barry: 98.1 cm/sec  MV E/A: 0.54  MV max P.1 mmHg  MV mean P.4 mmHg  MV V2 VTI: 28.2 cm  MV dec time: 0.28 sec  PA V2 max: 92.9 cm/sec  PA max PG: 3.5 mmHg  TR max barry: 344.2 cm/sec  TR max P.4 mmHg  E/E' av.0  Lateral E/e': 12.1  Medial E/e': 13.8     ______________________________________________________________________________  Report approved by: Michelle Tran 2024 01:55 PM

## 2024-04-06 NOTE — PLAN OF CARE
"AOx4- son at bedside most of shift and interpreting for pt.  Denies pain. O2 sats stable on RA but utilizing 1/2 L NC for comfort. NPO except meds crushed in applesauce and ice chips per speech- to re-eval tomorrow. , 137, and 324 this shift- 324 after applesauce.  IV fluids started- Contineu IV doxy q12.  Pt up in chair and to bedside commode -purewick placed end of shift d/t urgency per son.  Re-evaluate NPO status in am/     Problem: Adult Inpatient Plan of Care  Goal: Plan of Care Review  Description: The Plan of Care Review/Shift note should be completed every shift.  The Outcome Evaluation is a brief statement about your assessment that the patient is improving, declining, or no change.  This information will be displayed automatically on your shift  note.  Outcome: Progressing  Flowsheets (Taken 4/6/2024 1832)  Outcome Evaluation: Remains NPO except meds- crushed in apple sauce and ice chips.  On 1/2 L O2 NC for comfort.  Plan of Care Reviewed With: patient  Overall Patient Progress: no change  Goal: Patient-Specific Goal (Individualized)  Description: You can add care plan individualizations to a care plan. Examples of Individualization might be:  \"Parent requests to be called daily at 9am for status\", \"I have a hard time hearing out of my right ear\", or \"Do not touch me to wake me up as it startles  me\".  Outcome: Progressing  Goal: Absence of Hospital-Acquired Illness or Injury  Outcome: Progressing  Intervention: Identify and Manage Fall Risk  Recent Flowsheet Documentation  Taken 4/6/2024 1118 by Britney Oates, RN  Safety Promotion/Fall Prevention:   activity supervised   clutter free environment maintained   nonskid shoes/slippers when out of bed   room near nurse's station   safety round/check completed   supervised activity  Intervention: Prevent Skin Injury  Recent Flowsheet Documentation  Taken 4/6/2024 1118 by Britney Oates, RN  Body Position: position changed independently  Skin " Protection:   adhesive use limited   incontinence pads utilized   transparent dressing maintained  Device Skin Pressure Protection:   absorbent pad utilized/changed   tubing/devices free from skin contact  Intervention: Prevent Infection  Recent Flowsheet Documentation  Taken 4/6/2024 1118 by Britney Oates RN  Infection Prevention:   environmental surveillance performed   equipment surfaces disinfected   hand hygiene promoted   rest/sleep promoted   single patient room provided  Goal: Optimal Comfort and Wellbeing  Outcome: Progressing  Goal: Readiness for Transition of Care  Outcome: Progressing   Goal Outcome Evaluation:      Plan of Care Reviewed With: patient    Overall Patient Progress: no changeOverall Patient Progress: no change    Outcome Evaluation: Remains NPO except meds- crushed in apple sauce and ice chips.  On 1/2 L O2 NC for comfort.

## 2024-04-07 ENCOUNTER — APPOINTMENT (OUTPATIENT)
Dept: SPEECH THERAPY | Facility: CLINIC | Age: 74
DRG: 202 | End: 2024-04-07
Payer: COMMERCIAL

## 2024-04-07 ENCOUNTER — APPOINTMENT (OUTPATIENT)
Dept: CT IMAGING | Facility: CLINIC | Age: 74
DRG: 202 | End: 2024-04-07
Attending: PHYSICIAN ASSISTANT
Payer: COMMERCIAL

## 2024-04-07 LAB
ANION GAP SERPL CALCULATED.3IONS-SCNC: 8 MMOL/L (ref 7–15)
BUN SERPL-MCNC: 9.9 MG/DL (ref 8–23)
CALCIUM SERPL-MCNC: 9.8 MG/DL (ref 8.8–10.2)
CHLORIDE SERPL-SCNC: 101 MMOL/L (ref 98–107)
CREAT SERPL-MCNC: 0.56 MG/DL (ref 0.51–0.95)
DEPRECATED HCO3 PLAS-SCNC: 31 MMOL/L (ref 22–29)
EGFRCR SERPLBLD CKD-EPI 2021: >90 ML/MIN/1.73M2
ERYTHROCYTE [DISTWIDTH] IN BLOOD BY AUTOMATED COUNT: 13.5 % (ref 10–15)
GLUCOSE BLDC GLUCOMTR-MCNC: 149 MG/DL (ref 70–99)
GLUCOSE BLDC GLUCOMTR-MCNC: 152 MG/DL (ref 70–99)
GLUCOSE BLDC GLUCOMTR-MCNC: 190 MG/DL (ref 70–99)
GLUCOSE BLDC GLUCOMTR-MCNC: 231 MG/DL (ref 70–99)
GLUCOSE BLDC GLUCOMTR-MCNC: 373 MG/DL (ref 70–99)
GLUCOSE BLDC GLUCOMTR-MCNC: 442 MG/DL (ref 70–99)
GLUCOSE SERPL-MCNC: 202 MG/DL (ref 70–99)
HCT VFR BLD AUTO: 38.2 % (ref 35–47)
HGB BLD-MCNC: 12.1 G/DL (ref 11.7–15.7)
MAGNESIUM SERPL-MCNC: 1.5 MG/DL (ref 1.7–2.3)
MCH RBC QN AUTO: 29.1 PG (ref 26.5–33)
MCHC RBC AUTO-ENTMCNC: 31.7 G/DL (ref 31.5–36.5)
MCV RBC AUTO: 92 FL (ref 78–100)
NT-PROBNP SERPL-MCNC: 174 PG/ML (ref 0–900)
PLATELET # BLD AUTO: 279 10E3/UL (ref 150–450)
POTASSIUM SERPL-SCNC: 3.1 MMOL/L (ref 3.4–5.3)
POTASSIUM SERPL-SCNC: 4.1 MMOL/L (ref 3.4–5.3)
RBC # BLD AUTO: 4.16 10E6/UL (ref 3.8–5.2)
SODIUM SERPL-SCNC: 140 MMOL/L (ref 135–145)
WBC # BLD AUTO: 8.6 10E3/UL (ref 4–11)

## 2024-04-07 PROCEDURE — 87633 RESP VIRUS 12-25 TARGETS: CPT | Performed by: PHYSICIAN ASSISTANT

## 2024-04-07 PROCEDURE — 250N000011 HC RX IP 250 OP 636: Performed by: INTERNAL MEDICINE

## 2024-04-07 PROCEDURE — 250N000013 HC RX MED GY IP 250 OP 250 PS 637: Performed by: INTERNAL MEDICINE

## 2024-04-07 PROCEDURE — 120N000001 HC R&B MED SURG/OB

## 2024-04-07 PROCEDURE — 999N000157 HC STATISTIC RCP TIME EA 10 MIN

## 2024-04-07 PROCEDURE — 71260 CT THORAX DX C+: CPT

## 2024-04-07 PROCEDURE — 83880 ASSAY OF NATRIURETIC PEPTIDE: CPT | Performed by: PHYSICIAN ASSISTANT

## 2024-04-07 PROCEDURE — 83735 ASSAY OF MAGNESIUM: CPT | Performed by: PHYSICIAN ASSISTANT

## 2024-04-07 PROCEDURE — 94640 AIRWAY INHALATION TREATMENT: CPT | Mod: 76

## 2024-04-07 PROCEDURE — 94640 AIRWAY INHALATION TREATMENT: CPT

## 2024-04-07 PROCEDURE — 250N000012 HC RX MED GY IP 250 OP 636 PS 637: Performed by: PHYSICIAN ASSISTANT

## 2024-04-07 PROCEDURE — 80048 BASIC METABOLIC PNL TOTAL CA: CPT | Performed by: PHYSICIAN ASSISTANT

## 2024-04-07 PROCEDURE — 85027 COMPLETE CBC AUTOMATED: CPT | Performed by: PHYSICIAN ASSISTANT

## 2024-04-07 PROCEDURE — 250N000011 HC RX IP 250 OP 636: Performed by: PHYSICIAN ASSISTANT

## 2024-04-07 PROCEDURE — 36415 COLL VENOUS BLD VENIPUNCTURE: CPT | Performed by: PHYSICIAN ASSISTANT

## 2024-04-07 PROCEDURE — 250N000013 HC RX MED GY IP 250 OP 250 PS 637: Performed by: PHYSICIAN ASSISTANT

## 2024-04-07 PROCEDURE — 99233 SBSQ HOSP IP/OBS HIGH 50: CPT | Performed by: PHYSICIAN ASSISTANT

## 2024-04-07 PROCEDURE — 92526 ORAL FUNCTION THERAPY: CPT | Mod: GN

## 2024-04-07 PROCEDURE — 84132 ASSAY OF SERUM POTASSIUM: CPT | Performed by: PHYSICIAN ASSISTANT

## 2024-04-07 PROCEDURE — 250N000009 HC RX 250: Performed by: INTERNAL MEDICINE

## 2024-04-07 PROCEDURE — 250N000012 HC RX MED GY IP 250 OP 636 PS 637: Performed by: INTERNAL MEDICINE

## 2024-04-07 RX ORDER — IOPAMIDOL 755 MG/ML
500 INJECTION, SOLUTION INTRAVASCULAR ONCE
Status: COMPLETED | OUTPATIENT
Start: 2024-04-07 | End: 2024-04-07

## 2024-04-07 RX ORDER — METHYLPREDNISOLONE SODIUM SUCCINATE 125 MG/2ML
60 INJECTION, POWDER, LYOPHILIZED, FOR SOLUTION INTRAMUSCULAR; INTRAVENOUS EVERY 24 HOURS
Status: DISCONTINUED | OUTPATIENT
Start: 2024-04-07 | End: 2024-04-08

## 2024-04-07 RX ORDER — GUAIFENESIN AND DEXTROMETHORPHAN HYDROBROMIDE 600; 30 MG/1; MG/1
1 TABLET, EXTENDED RELEASE ORAL 2 TIMES DAILY
Status: DISCONTINUED | OUTPATIENT
Start: 2024-04-07 | End: 2024-04-09 | Stop reason: HOSPADM

## 2024-04-07 RX ORDER — FUROSEMIDE 10 MG/ML
40 INJECTION INTRAMUSCULAR; INTRAVENOUS ONCE
Status: COMPLETED | OUTPATIENT
Start: 2024-04-07 | End: 2024-04-07

## 2024-04-07 RX ORDER — MAGNESIUM SULFATE HEPTAHYDRATE 40 MG/ML
4 INJECTION, SOLUTION INTRAVENOUS ONCE
Status: COMPLETED | OUTPATIENT
Start: 2024-04-07 | End: 2024-04-08

## 2024-04-07 RX ORDER — BENZONATATE 100 MG/1
100 CAPSULE ORAL 3 TIMES DAILY PRN
Status: DISCONTINUED | OUTPATIENT
Start: 2024-04-07 | End: 2024-04-09 | Stop reason: HOSPADM

## 2024-04-07 RX ORDER — FUROSEMIDE 40 MG
40 TABLET ORAL DAILY
Status: DISCONTINUED | OUTPATIENT
Start: 2024-04-08 | End: 2024-04-09 | Stop reason: HOSPADM

## 2024-04-07 RX ORDER — POTASSIUM CHLORIDE 1500 MG/1
40 TABLET, EXTENDED RELEASE ORAL ONCE
Qty: 2 TABLET | Refills: 0 | Status: COMPLETED | OUTPATIENT
Start: 2024-04-07 | End: 2024-04-07

## 2024-04-07 RX ADMIN — PREDNISONE 40 MG: 20 TABLET ORAL at 09:37

## 2024-04-07 RX ADMIN — DOXYCYCLINE 100 MG: 100 INJECTION, POWDER, LYOPHILIZED, FOR SOLUTION INTRAVENOUS at 01:25

## 2024-04-07 RX ADMIN — IPRATROPIUM BROMIDE AND ALBUTEROL SULFATE 3 ML: .5; 3 SOLUTION RESPIRATORY (INHALATION) at 20:05

## 2024-04-07 RX ADMIN — METHYLPREDNISOLONE SODIUM SUCCINATE 62.5 MG: 125 INJECTION, POWDER, FOR SOLUTION INTRAMUSCULAR; INTRAVENOUS at 14:01

## 2024-04-07 RX ADMIN — METOPROLOL SUCCINATE 50 MG: 50 TABLET, EXTENDED RELEASE ORAL at 09:37

## 2024-04-07 RX ADMIN — SODIUM CHLORIDE 66 ML: 9 INJECTION, SOLUTION INTRAVENOUS at 13:26

## 2024-04-07 RX ADMIN — FUROSEMIDE 40 MG: 10 INJECTION, SOLUTION INTRAVENOUS at 13:59

## 2024-04-07 RX ADMIN — POTASSIUM CHLORIDE 40 MEQ: 1500 TABLET, EXTENDED RELEASE ORAL at 13:59

## 2024-04-07 RX ADMIN — MAGNESIUM SULFATE HEPTAHYDRATE 4 G: 4 INJECTION, SOLUTION INTRAVENOUS at 19:21

## 2024-04-07 RX ADMIN — BUDESONIDE AND FORMOTEROL FUMARATE DIHYDRATE 2 PUFF: 160; 4.5 AEROSOL RESPIRATORY (INHALATION) at 10:37

## 2024-04-07 RX ADMIN — LOSARTAN POTASSIUM 100 MG: 100 TABLET, FILM COATED ORAL at 09:37

## 2024-04-07 RX ADMIN — ENOXAPARIN SODIUM 40 MG: 40 INJECTION SUBCUTANEOUS at 09:37

## 2024-04-07 RX ADMIN — ATORVASTATIN CALCIUM 40 MG: 40 TABLET, FILM COATED ORAL at 20:14

## 2024-04-07 RX ADMIN — INSULIN GLARGINE 10 UNITS: 100 INJECTION, SOLUTION SUBCUTANEOUS at 22:10

## 2024-04-07 RX ADMIN — GUAIFENESIN AND DEXTROMETHORPHAN HYDROBROMIDE 1 TABLET: 600; 30 TABLET, EXTENDED RELEASE ORAL at 13:59

## 2024-04-07 RX ADMIN — DOXYCYCLINE 100 MG: 100 INJECTION, POWDER, LYOPHILIZED, FOR SOLUTION INTRAVENOUS at 13:59

## 2024-04-07 RX ADMIN — Medication 50 MCG: at 09:36

## 2024-04-07 RX ADMIN — INSULIN ASPART 7 UNITS: 100 INJECTION, SOLUTION INTRAVENOUS; SUBCUTANEOUS at 20:53

## 2024-04-07 RX ADMIN — IPRATROPIUM BROMIDE AND ALBUTEROL SULFATE 3 ML: .5; 3 SOLUTION RESPIRATORY (INHALATION) at 12:25

## 2024-04-07 RX ADMIN — IPRATROPIUM BROMIDE AND ALBUTEROL SULFATE 3 ML: .5; 3 SOLUTION RESPIRATORY (INHALATION) at 08:49

## 2024-04-07 RX ADMIN — HYDRALAZINE HYDROCHLORIDE 10 MG: 20 INJECTION INTRAMUSCULAR; INTRAVENOUS at 06:00

## 2024-04-07 RX ADMIN — AMLODIPINE BESYLATE 5 MG: 5 TABLET ORAL at 09:37

## 2024-04-07 RX ADMIN — ASPIRIN 81 MG: 81 TABLET, COATED ORAL at 09:37

## 2024-04-07 RX ADMIN — IOPAMIDOL 79 ML: 755 INJECTION, SOLUTION INTRAVENOUS at 13:26

## 2024-04-07 RX ADMIN — IPRATROPIUM BROMIDE AND ALBUTEROL SULFATE 3 ML: .5; 3 SOLUTION RESPIRATORY (INHALATION) at 15:39

## 2024-04-07 ASSESSMENT — ACTIVITIES OF DAILY LIVING (ADL)
ADLS_ACUITY_SCORE: 53
ADLS_ACUITY_SCORE: 48
ADLS_ACUITY_SCORE: 52
ADLS_ACUITY_SCORE: 53
ADLS_ACUITY_SCORE: 48
ADLS_ACUITY_SCORE: 52
ADLS_ACUITY_SCORE: 46
ADLS_ACUITY_SCORE: 53
ADLS_ACUITY_SCORE: 53
ADLS_ACUITY_SCORE: 46
ADLS_ACUITY_SCORE: 53
ADLS_ACUITY_SCORE: 52
ADLS_ACUITY_SCORE: 48
ADLS_ACUITY_SCORE: 52
ADLS_ACUITY_SCORE: 46
ADLS_ACUITY_SCORE: 52
ADLS_ACUITY_SCORE: 46
ADLS_ACUITY_SCORE: 46
ADLS_ACUITY_SCORE: 48
ADLS_ACUITY_SCORE: 53
ADLS_ACUITY_SCORE: 53

## 2024-04-07 NOTE — PROGRESS NOTES
[facility]  Hospitalist Progress Note  Emi Bailey PA-C 04/07/2024         Assessment and Plan:      Pilo Quintana is a 73 year old (is likely at least 83 yr old per son) female with PMHx significant for COPD vs asthma on 2 L of oxygen, dementia, type 2 diabetes, HTN, HFpEF, pulmonary HTN, renal oncocytoma, CVA with mild RIGHT sided weakness, hx of intracerebral hemorrhage, and central sleep apnea, who was admitted on 4/3/2024 with severe hyperglycemia in the setting of steroid use for COPD exacerbation.   She is from [facility] and does not speak any English and the history is obtained from the son who is interpreting.     Seen in ED on 3/27 and dx with COPD exacerbation and started on Prednisone 40 mg x 5 days and Azithromycin, sxs did not improve so PCP increased her Prednisone to 60 mg every day with taper every 3 days on 4/2. Son was seeing BG in the 500's at home thus prompting current ED visit.   Since admission, BG has improved with hydration and insulin. She was continued on oral 40 mg PO steroids and scheduled nebs but has not had significant improvement. She continues to c/o cough, and subjective SOB though her O2 sats have been stable in the upper 90's on 1/2 L o2. She was started on Doxycycline 4/5 given lack of clinical improvement.     It was noted that she has had intervals of worsening cough with drinking thus SLP was consulted on 4/5 which showed concern for aspiration and was placed on NPO status. Seen by Speech 4/6 with some mild improvement but kept pt on NPO status and repeat eval 4/7 shows improvement in her swallowing so she is placed back on soft small bite diet with video swallow on Monday to reassess.      Interval hx:   Today, pt sounds more congested with crackles at bases. Lasix has been on hold for the last several days due to NPO status and was receiving IVF. Sats remains stable on 1/2 L (in fact she really doesn't need PRN O2, recommend discontinuing). Plan for CT  chest given slow improvement and concern for COPD/bronchitis for >3 weeks.       # New Dysphagia vs aspiration risk (cough with water) of unclear etiology   - Son denies hx of choking with food or water, has noticed occasional cough with water   - No new c/o of dysarthria, or any focal deficits to suggest concern for CVA   - D/w SLP, will need clarification regarding diet consistency. Per son, pt had Canes last night without difficulty. Not sure if she only has cough with water only or solids as well. Prefer not to keep her strict NPO if possible.   -  SLP to eval today and clarify dietary restriction if any, plan for Video swallow tomorrow   -  Would be judicious with IVF if pt is to remain NPO as she was slightly fluid overloaded this am, would just give NS at 50cc/hr TKO    Hosp Addendum:  D/w SLP this afternoon, her swallowing has improved. Cleared to have soft foods but will proceed with Video Swallow tomorrow      # Marked hyperglycemia - due to increased steroid use for COPD exacerbation. Resolved  Hx of Type II DM  - Elevated BG on admission due to steroid use. Without ketoacidosis but significant lactic acidosis.   - DM typically managed with Metformin alone. Last HgbA1c was 7.7 in January   - Lantus started on admission, 10 units BID, but discontinued 4/6 given NPO status and BG of 61   - Suspect BG will be elevated again now that pt is able to eat and still on steroids   - Will start Lantus at 10 unit(s) tonight with sliding scale insulin      # Acute exacerbation of COPD vs asthma, on chronic 1-2L O2 at home   # Suspect concomitant bronchitis   # MAGALIS on Bipap   Per chart review, no definite dx of COPD, multiple visits for wheezing, was managing with albuterol alone at home, started on Symbicort in October by PCP. Had PFTs done but were inconclusive, thought maybe it was due to poor effort. Sleep study done, recommended BiPAP  Seen on 3/27 for cough, SOB and wheezing, treated with 5 day course of  Prednisone and Azithromycin  Seen by PCP on 4/2 for persistent sx, started on Prednisone taper starting at 60 mg. Presented here 4/3, started prednisone and Doxycycline started 4/5  - Increased coughing and congestion today, liekly due to mild fluid overload from IVF and holding lasix  - 40mg IV lasix today   - Ok to resume PTA daily lasix 40 mg   - continue scheduled Duonebs  - Cont Doxycycline (Day 3/7)   - continue Symbicort  - Add mucolytic and tessalon (4/7)  - recommend to pt's son that we should wean pt off O2, she might not need it during the day - BiPAP at night   - will change to IV steroid 60 mg (from po 40) since she did not get better with 40 mg previously   - Will get CT chest as pt has had sob and cough for > 3 weeks     Hosp Addendum:  CT chest actually does not show any significant infiltrate, fluid overload, but +atelectasis   She typically has poor inspiratory effort, will trial a flutter valve to help      3. Elevated troponin  Chest discomfort on admission - resolved   Likely due to demand ischemia; 17-->19-->15-->14.   -  Echocardiogram fairly unremarkable, no wall motion abnormality. Suspect discomfort is due to cough  - Telemetry unremarkable thus far, discontinue   - consider outpatient stress testing if she has not had one recently      4. Marked lactic acidosis - resolved.   Likely a combination of factors to include metformin and neb treatments.  Now resolved.      5. Central sleep apnea  - continue home BiPAP.     6. HTN - somewhat labile 130's-170's  - continue home amlodipine  - resume home losartan and metoprolol     7. HFpEF  Pulmonary HTN  - looks stable  - needed IV lasix this am  - Now breathing better after lasix  - Recent ECHO wnl, normal EF  - Resume PTA lasix      8. CVA with some left-sided weakness  - continue baby ASA and statin  - No e/o acute deficits      9. Dementia  10. Renal oncocytoma        Diet: small bites with thin liquid   DVT Prophylaxis: Enoxaparin (Lovenox)  "SQ  Morrison Catheter: Not present  Lines: None     Cardiac Monitoring: discontinued   Code Status: Full Code           Disposition Plan     Expected Discharge Date: long discussion with son, suspect home in couple more days? Seen by PT  Destination: home with family             Interval History (Subjective):      C/o increasing cough congestion today   O/w no complaints. Feels weak   Still with cough  Was suppose to be NPO last night but did have some \"Canes\" last night, unsure how much she ate                   Physical Exam:      Last Vital Signs:  BP (!) 152/84 (BP Location: Right arm)   Pulse 87   Temp 98.4  F (36.9  C) (Oral)   Resp 24   Wt 71.1 kg (156 lb 12 oz)   SpO2 96%   BMI 31.66 kg/m      Constitutional: Awake, alert, cooperative, no apparent distress     Respiratory: Dec bs thoughout with coarse bs sl crackles at base but no significant wheezing   Cardiovascular: Regular rate and rhythm, normal S1 and S2, and no murmur noted   Abdomen: Distended but soft, Normal bowel sounds, soft, non-distended, non-tender   Skin: No rashes, no cyanosis, dry to touch   Neuro: Alert and oriented x3, no weakness, numbness, memory loss   Extremities: Trace ankle edema, normal range of motion   Other(s):        All other systems: Negative          Medications:      All current medications were reviewed with changes reflected in problem list.         Data:      All new lab and imaging data was reviewed.   Labs:       Lab Results   Component Value Date     04/07/2024     04/06/2024     04/05/2024     09/10/2020     01/12/2017     07/13/2016    Lab Results   Component Value Date    CHLORIDE 101 04/07/2024    CHLORIDE 105 04/06/2024    CHLORIDE 102 04/05/2024    CHLORIDE 106 09/10/2020    CHLORIDE 106 01/12/2017    CHLORIDE 101 07/13/2016    Lab Results   Component Value Date    BUN 9.9 04/07/2024    BUN 13.8 04/06/2024    BUN 23.1 04/05/2024    BUN 15 09/10/2020    BUN 19 01/12/2017    " BUN 20 07/13/2016      Lab Results   Component Value Date    POTASSIUM 4.1 04/07/2024    POTASSIUM 3.1 04/07/2024    POTASSIUM 3.9 04/06/2024    POTASSIUM 3.9 09/10/2020    POTASSIUM 4.4 01/12/2017    POTASSIUM 3.6 07/13/2016    Lab Results   Component Value Date    CO2 31 04/07/2024    CO2 27 04/06/2024    CO2 29 04/05/2024    CO2 29 09/10/2020    CO2 27 01/12/2017    CO2 34 07/13/2016    Lab Results   Component Value Date    CR 0.56 04/07/2024    CR 0.49 04/06/2024    CR 0.61 04/05/2024    CR 0.62 09/10/2020    CR 0.80 01/12/2017    CR 0.80 07/13/2016        Recent Labs   Lab 04/07/24  1016 04/06/24  0637 04/05/24  0558   WBC 8.6 7.3 8.0   HGB 12.1 12.0 11.2*   HCT 38.2 36.6 35.7   MCV 92 89 91    228 255      Imaging:   Results for orders placed or performed during the hospital encounter of 04/03/24   XR Chest 2 Views    Narrative    EXAM: XR CHEST 2 VIEWS  LOCATION: [facility]  DATE: 4/4/2024    INDICATION: Ongoing cough.  COMPARISON: Chest x-ray 2 views 03/27/2024 at 1023 hours.      Impression    IMPRESSION: Improved platelike atelectasis in the left base demonstrated previously. Both lungs are otherwise clear. No adenopathy or effusion. Mild cardiac enlargement. Normal pulmonary vascularity. Mild degenerative changes both shoulders and the   spine.   CT Chest w Contrast    Narrative    EXAM: CT CHEST W CONTRAST  LOCATION: [facility]  DATE: 4/7/2024    INDICATION: prolongue cough, sob r o PNA  COMPARISON: CT 10/25/2023  TECHNIQUE: CT chest with IV contrast. Multiplanar reformats were obtained. Dose reduction techniques were used.    CONTRAST: 79mL Isovue 370    FINDINGS:   LUNGS AND PLEURA: Mild atelectasis in the left lower lobe with no central airway obstruction.    MEDIASTINUM/AXILLAE: Cardiomegaly. Enlargement of the central pulmonary artery measuring 4.1 cm in greatest radial dimension most typical for changes of chronic pulmonary arterial  hypertension.    CORONARY ARTERY CALCIFICATION: Moderate.    UPPER ABDOMEN: There is mild to moderate fatty infiltration of the liver.    MUSCULOSKELETAL: Moderate scattered hypertrophic changes most marked in the mid thoracic spine.      Impression    IMPRESSION:   1.  No inflammatory infiltrates, or active CHF.    2.  Mild atelectasis left lower lobe with no central airway obstruction.    3.  Prominent left main pulmonary artery most typical for changes of pulmonary arterial hypertension.    4.  Moderate coronary artery calcification.   Echocardiogram Complete     Value    LVEF  60-65%    [facility]    [id number]  [id number]  [id number]^FAINA^AL^BILL     [facility]  Echocardiography Laboratory  [address] [place] [address]     Name: FABIOLA FREEMAN  MRN: [id number]  : 1950  Study Date: 2024 01:02 PM  Age: 73 yrs  Gender: Female  Patient Location: [facility]  Reason For Study: SOB  Ordering Physician: LASHELL EISENBERG  Performed By: [facility] Jina Hull     BSA: 1.7 m2  Height: 59 in  Weight: 165 lb  HR: 67  BP: 130/83 mmHg  ______________________________________________________________________________  Procedure  Complete Portable Echo Adult. [employer]ison (NDC #2828-0702) given intravenously.  Technically difficult study.Extremely difficult acoustic windows despite the  use of contrast for endcardial border definition.  ______________________________________________________________________________  Interpretation Summary     Technically difficult study     Left ventricular systolic function is normal.The visual ejection fraction is  60-65%.  The right ventricular systolic function is normal.  No significant valvular stenosis or regurgitation on Doppler interrogation  Pulmonary hypertension- RVSP 47 mm hg +RA.  The inferior vena cava was normal in size with preserved  respiratory  variability.  ______________________________________________________________________________  Left Ventricle  The left ventricle is normal in size. Left ventricular hypertrophy is noted by  two-dimensional echocardiography. Left ventricular systolic function is  normal. The visual ejection fraction is 60-65%. Diastolic Doppler findings  (E/E' ratio and/or other parameters) suggest left ventricular filling  pressures are indeterminate. No regional wall motion abnormalities noted.     Right Ventricle  The right ventricle is normal size. The right ventricular systolic function is  normal.     Atria  The left atrium is mildly dilated. The right atrium is mildly dilated. There  is no color Doppler evidence of an atrial shunt.     Mitral Valve  There is trace mitral regurgitation. There is no mitral valve stenosis.     Tricuspid Valve  There is trace tricuspid regurgitation. The right ventricular systolic  pressure is approximated at 47.4 mmHg plus the right atrial pressure.  Pulmonary hypertension.     Aortic Valve  The aortic valve is not well visualized. No aortic regurgitation is present.  No aortic stenosis is present.     Pulmonic Valve  There is no pulmonic valvular stenosis.     Vessels  The aortic root is normal size. Normal size ascending aorta. The inferior vena  cava was normal in size with preserved respiratory variability.     Pericardium  There is no pericardial effusion.     ______________________________________________________________________________  MMode/2D Measurements & Calculations  IVSd: 1.1 cm  LVIDd: 4.7 cm  LVIDs: 3.1 cm  LVPWd: 0.75 cm  FS: 33.5 %     LV mass(C)d: 145.6 grams  LV mass(C)dI: 85.7 grams/m2  Ao root diam: 3.1 cm  asc Aorta Diam: 3.2 cm  LVOT diam: 2.1 cm  LVOT area: 3.4 cm2  Ao root diam index Ht(cm/m): 2.1  Ao root diam index BSA (cm/m2): 1.9  Asc Ao diam index BSA (cm/m2): 1.9  Asc Ao diam index Ht(cm/m): 2.1  LA Volume (BP): 61.4 ml  LA Volume Index (BP): 36.1  ml/m2     RWT: 0.32     Doppler Measurements & Calculations  MV E max barry: 52.7 cm/sec  MV A max barry: 98.1 cm/sec  MV E/A: 0.54  MV max P.1 mmHg  MV mean P.4 mmHg  MV V2 VTI: 28.2 cm  MV dec time: 0.28 sec  PA V2 max: 92.9 cm/sec  PA max PG: 3.5 mmHg  TR max barry: 344.2 cm/sec  TR max P.4 mmHg  E/E' av.0  Lateral E/e': 12.1  Medial E/e': 13.8     ______________________________________________________________________________  Report approved by: Michelle Tran 2024 01:55 PM

## 2024-04-07 NOTE — PROGRESS NOTES
SLP:      Pt seen for second session of dysphagia therapy following d/w PA re: preference to pursue oral intake, even limited consistencies, this date. RN reported administering medication with thin liquid with no coughing. Administered trials of thin liquid via cup/straw, puree, and solid. + delayed cough following thin liquid via cup x1 (daughter-in-law stated cough was secondary to laryngeal palpation by SLP, however slight touch was used, and no other cough occurred when completed). No other s/s aspiration. Pt demonstrating significant improvement in clinical presentation. Recommend initiate soft and bite size diet with thin liquid with swallow precautions: upright posture, small bites/sips, slow rate, discontinue PO if s/s aspiration occur. Continue to recommend VFSS given recent clinical presentation/frequent s/s aspiration in prior sessions, including this AM.

## 2024-04-07 NOTE — PLAN OF CARE
"AOx4. Up A1 gaitbelt and walker- ambulated in room and up in chair several times today. Denies pain. Frequent cough and congestion this am- fluids stopped, 1 x IV lasix given, CT chest completed- see results.  Mucinex added on- heat pad applied to chest for comfort after pt complaining of pain in chest from coughing.  Improvement noted in cough frequency later this shift and pain. Weened O2.  Son at bedside and interpreting. Speech following- diet advanced to soft and bite sized -thin liquids, tolerating.  Swallow study ordered- needs to be completed. , 231- 231 taken soon after eating- now q4 BG with coverage.  Showered this shift.  K+ replaced, re-check in am.  Mag low- replacement ordered and will administer. Resp panel to be collected.  Continue current POC.     Problem: Adult Inpatient Plan of Care  Goal: Plan of Care Review  Description: The Plan of Care Review/Shift note should be completed every shift.  The Outcome Evaluation is a brief statement about your assessment that the patient is improving, declining, or no change.  This information will be displayed automatically on your shift  note.  Outcome: Progressing  Flowsheets (Taken 4/7/2024 1794)  Outcome Evaluation: weened O2, showered, advanced diet and tolerating, 1 xIV lasix, needs to have BM still  Plan of Care Reviewed With: child  Overall Patient Progress: improving  Goal: Patient-Specific Goal (Individualized)  Description: You can add care plan individualizations to a care plan. Examples of Individualization might be:  \"Parent requests to be called daily at 9am for status\", \"I have a hard time hearing out of my right ear\", or \"Do not touch me to wake me up as it startles  me\".  Outcome: Progressing  Goal: Absence of Hospital-Acquired Illness or Injury  Outcome: Progressing  Intervention: Identify and Manage Fall Risk  Recent Flowsheet Documentation  Taken 4/7/2024 1030 by Britney Oates RN  Safety Promotion/Fall Prevention:   activity " supervised   clutter free environment maintained   nonskid shoes/slippers when out of bed   room near nurse's station   safety round/check completed   supervised activity  Intervention: Prevent Skin Injury  Recent Flowsheet Documentation  Taken 4/7/2024 1310 by Britney Oates RN  Body Position: position changed independently  Goal: Optimal Comfort and Wellbeing  Outcome: Progressing  Intervention: Monitor Pain and Promote Comfort  Recent Flowsheet Documentation  Taken 4/7/2024 1310 by Britney Oates RN  Pain Management Interventions: (pt states sore from coughing- antitussive ordered, heat applied for comfort)   medication (see MAR)   heat applied  Goal: Readiness for Transition of Care  Outcome: Progressing   Goal Outcome Evaluation:      Plan of Care Reviewed With: child    Overall Patient Progress: improvingOverall Patient Progress: improving    Outcome Evaluation: weened O2, showered, advanced diet and tolerating, 1 xIV lasix, needs to have BM still

## 2024-04-07 NOTE — PLAN OF CARE
"Care from [id number]      Inpatient Progress Note:  For complete assessment see flow sheet documentation.       /76 (BP Location: Left arm)   Pulse 68   Temp 98.1  F (36.7  C) (Oral)   Resp 14   Wt 71.1 kg (156 lb 12 oz)   SpO2 96%   BMI 31.66 kg/m         Neuro: Intact  Vital Signs: Has hydralazine for systolic above 160.   Pain/Comfort: Denies pain.   Diet/po intake: NPO except ice chips   Output: Using a purewick for incontinence   Activity/Ambulation: Ax 1 with walker and gait belt   Pertinent assessments: Monitoring glucose levels, COPD symptoms.   Infusions: Q12 infusions, .45% NA+  Major Shift Events: N/A  Plan (Upcoming Events): Work with SLP to get off NPO.     Will continue with POC.          Will continue to monitor and provide cares.    Goal Outcome Evaluation:      Plan of Care Reviewed With: patient    Overall Patient Progress: no changeOverall Patient Progress: no change    Outcome Evaluation: NPO- meds to be crushed in apple sauce, continue with 1/2 L of O2. Q12 vanco, new IV placed, previous occluded.      Problem: Adult Inpatient Plan of Care  Goal: Plan of Care Review  Description: The Plan of Care Review/Shift note should be completed every shift.  The Outcome Evaluation is a brief statement about your assessment that the patient is improving, declining, or no change.  This information will be displayed automatically on your shift  note.  Outcome: Progressing  Flowsheets (Taken 4/6/2024 7816)  Outcome Evaluation: NPO- meds to be crushed in apple sauce, continue with 1/2 L of O2. Q12 vanco, new IV placed, previous occluded.  Plan of Care Reviewed With: patient  Overall Patient Progress: no change  Goal: Patient-Specific Goal (Individualized)  Description: You can add care plan individualizations to a care plan. Examples of Individualization might be:  \"Parent requests to be called daily at 9am for status\", \"I have a hard time hearing out of my right ear\", or \"Do not touch me to wake me " "up as it startles  me\".  Outcome: Progressing  Goal: Absence of Hospital-Acquired Illness or Injury  Outcome: Progressing  Intervention: Prevent Skin Injury  Recent Flowsheet Documentation  Taken 4/6/2024 2052 by Karina Hansen, RN  Skin Protection:   adhesive use limited   incontinence pads utilized   transparent dressing maintained  Device Skin Pressure Protection:   absorbent pad utilized/changed   tubing/devices free from skin contact  Goal: Optimal Comfort and Wellbeing  Outcome: Progressing  Goal: Readiness for Transition of Care  Outcome: Progressing     "

## 2024-04-08 ENCOUNTER — APPOINTMENT (OUTPATIENT)
Dept: SPEECH THERAPY | Facility: CLINIC | Age: 74
DRG: 202 | End: 2024-04-08
Payer: COMMERCIAL

## 2024-04-08 ENCOUNTER — APPOINTMENT (OUTPATIENT)
Dept: PHYSICAL THERAPY | Facility: CLINIC | Age: 74
DRG: 202 | End: 2024-04-08
Attending: PHYSICIAN ASSISTANT
Payer: COMMERCIAL

## 2024-04-08 ENCOUNTER — APPOINTMENT (OUTPATIENT)
Dept: GENERAL RADIOLOGY | Facility: CLINIC | Age: 74
DRG: 202 | End: 2024-04-08
Attending: PHYSICIAN ASSISTANT
Payer: COMMERCIAL

## 2024-04-08 LAB
ANION GAP SERPL CALCULATED.3IONS-SCNC: 9 MMOL/L (ref 7–15)
BUN SERPL-MCNC: 15.2 MG/DL (ref 8–23)
C PNEUM DNA SPEC QL NAA+PROBE: NOT DETECTED
CALCIUM SERPL-MCNC: 9.8 MG/DL (ref 8.8–10.2)
CHLORIDE SERPL-SCNC: 101 MMOL/L (ref 98–107)
CREAT SERPL-MCNC: 0.65 MG/DL (ref 0.51–0.95)
DEPRECATED HCO3 PLAS-SCNC: 30 MMOL/L (ref 22–29)
EGFRCR SERPLBLD CKD-EPI 2021: >90 ML/MIN/1.73M2
ERYTHROCYTE [DISTWIDTH] IN BLOOD BY AUTOMATED COUNT: 13.6 % (ref 10–15)
FLUAV H1 2009 PAND RNA SPEC QL NAA+PROBE: NOT DETECTED
FLUAV H1 RNA SPEC QL NAA+PROBE: NOT DETECTED
FLUAV H3 RNA SPEC QL NAA+PROBE: NOT DETECTED
FLUAV RNA SPEC QL NAA+PROBE: NOT DETECTED
FLUBV RNA SPEC QL NAA+PROBE: NOT DETECTED
GLUCOSE BLDC GLUCOMTR-MCNC: 161 MG/DL (ref 70–99)
GLUCOSE BLDC GLUCOMTR-MCNC: 251 MG/DL (ref 70–99)
GLUCOSE BLDC GLUCOMTR-MCNC: 262 MG/DL (ref 70–99)
GLUCOSE BLDC GLUCOMTR-MCNC: 283 MG/DL (ref 70–99)
GLUCOSE BLDC GLUCOMTR-MCNC: 287 MG/DL (ref 70–99)
GLUCOSE BLDC GLUCOMTR-MCNC: 288 MG/DL (ref 70–99)
GLUCOSE SERPL-MCNC: 191 MG/DL (ref 70–99)
HADV DNA SPEC QL NAA+PROBE: NOT DETECTED
HCOV PNL SPEC NAA+PROBE: NOT DETECTED
HCT VFR BLD AUTO: 35.8 % (ref 35–47)
HGB BLD-MCNC: 11.5 G/DL (ref 11.7–15.7)
HMPV RNA SPEC QL NAA+PROBE: NOT DETECTED
HPIV1 RNA SPEC QL NAA+PROBE: NOT DETECTED
HPIV2 RNA SPEC QL NAA+PROBE: NOT DETECTED
HPIV3 RNA SPEC QL NAA+PROBE: NOT DETECTED
HPIV4 RNA SPEC QL NAA+PROBE: NOT DETECTED
M PNEUMO DNA SPEC QL NAA+PROBE: NOT DETECTED
MAGNESIUM SERPL-MCNC: 3.1 MG/DL (ref 1.7–2.3)
MCH RBC QN AUTO: 29 PG (ref 26.5–33)
MCHC RBC AUTO-ENTMCNC: 32.1 G/DL (ref 31.5–36.5)
MCV RBC AUTO: 90 FL (ref 78–100)
PLATELET # BLD AUTO: 280 10E3/UL (ref 150–450)
POTASSIUM SERPL-SCNC: 4 MMOL/L (ref 3.4–5.3)
RBC # BLD AUTO: 3.97 10E6/UL (ref 3.8–5.2)
RSV RNA SPEC QL NAA+PROBE: NOT DETECTED
RSV RNA SPEC QL NAA+PROBE: NOT DETECTED
RV+EV RNA SPEC QL NAA+PROBE: NOT DETECTED
SODIUM SERPL-SCNC: 140 MMOL/L (ref 135–145)
WBC # BLD AUTO: 7.8 10E3/UL (ref 4–11)

## 2024-04-08 PROCEDURE — 250N000011 HC RX IP 250 OP 636: Performed by: PHYSICIAN ASSISTANT

## 2024-04-08 PROCEDURE — 250N000013 HC RX MED GY IP 250 OP 250 PS 637: Performed by: PHYSICIAN ASSISTANT

## 2024-04-08 PROCEDURE — 74230 X-RAY XM SWLNG FUNCJ C+: CPT

## 2024-04-08 PROCEDURE — 92611 MOTION FLUOROSCOPY/SWALLOW: CPT | Mod: GN | Performed by: SPEECH-LANGUAGE PATHOLOGIST

## 2024-04-08 PROCEDURE — 250N000009 HC RX 250: Performed by: INTERNAL MEDICINE

## 2024-04-08 PROCEDURE — 120N000001 HC R&B MED SURG/OB

## 2024-04-08 PROCEDURE — 250N000013 HC RX MED GY IP 250 OP 250 PS 637: Performed by: INTERNAL MEDICINE

## 2024-04-08 PROCEDURE — 999N000111 HC STATISTIC OT IP EVAL DEFER

## 2024-04-08 PROCEDURE — 83735 ASSAY OF MAGNESIUM: CPT | Performed by: PHYSICIAN ASSISTANT

## 2024-04-08 PROCEDURE — 92526 ORAL FUNCTION THERAPY: CPT | Mod: GN | Performed by: SPEECH-LANGUAGE PATHOLOGIST

## 2024-04-08 PROCEDURE — 97116 GAIT TRAINING THERAPY: CPT | Mod: GP | Performed by: PHYSICAL THERAPIST

## 2024-04-08 PROCEDURE — 85027 COMPLETE CBC AUTOMATED: CPT | Performed by: PHYSICIAN ASSISTANT

## 2024-04-08 PROCEDURE — 250N000011 HC RX IP 250 OP 636: Performed by: INTERNAL MEDICINE

## 2024-04-08 PROCEDURE — 97530 THERAPEUTIC ACTIVITIES: CPT | Mod: GP | Performed by: PHYSICAL THERAPIST

## 2024-04-08 PROCEDURE — 80048 BASIC METABOLIC PNL TOTAL CA: CPT | Performed by: PHYSICIAN ASSISTANT

## 2024-04-08 PROCEDURE — 97161 PT EVAL LOW COMPLEX 20 MIN: CPT | Mod: GP | Performed by: PHYSICAL THERAPIST

## 2024-04-08 PROCEDURE — 999N000157 HC STATISTIC RCP TIME EA 10 MIN

## 2024-04-08 PROCEDURE — 99232 SBSQ HOSP IP/OBS MODERATE 35: CPT | Performed by: HOSPITALIST

## 2024-04-08 PROCEDURE — 250N000012 HC RX MED GY IP 250 OP 636 PS 637: Performed by: HOSPITALIST

## 2024-04-08 PROCEDURE — 36415 COLL VENOUS BLD VENIPUNCTURE: CPT | Performed by: PHYSICIAN ASSISTANT

## 2024-04-08 PROCEDURE — 250N000013 HC RX MED GY IP 250 OP 250 PS 637: Performed by: HOSPITALIST

## 2024-04-08 PROCEDURE — 94640 AIRWAY INHALATION TREATMENT: CPT | Mod: 76

## 2024-04-08 RX ORDER — BARIUM SULFATE 400 MG/ML
SUSPENSION ORAL ONCE
Status: COMPLETED | OUTPATIENT
Start: 2024-04-08 | End: 2024-04-08

## 2024-04-08 RX ORDER — BARIUM SULFATE 400 MG/ML
SUSPENSION ORAL ONCE
Status: DISCONTINUED | OUTPATIENT
Start: 2024-04-08 | End: 2024-04-08

## 2024-04-08 RX ORDER — METFORMIN HCL 500 MG
500 TABLET, EXTENDED RELEASE 24 HR ORAL 2 TIMES DAILY WITH MEALS
Status: DISCONTINUED | OUTPATIENT
Start: 2024-04-08 | End: 2024-04-09 | Stop reason: HOSPADM

## 2024-04-08 RX ORDER — PREDNISONE 20 MG/1
40 TABLET ORAL DAILY
Status: DISCONTINUED | OUTPATIENT
Start: 2024-04-08 | End: 2024-04-09 | Stop reason: HOSPADM

## 2024-04-08 RX ADMIN — PREDNISONE 40 MG: 20 TABLET ORAL at 10:06

## 2024-04-08 RX ADMIN — INSULIN ASPART 3 UNITS: 100 INJECTION, SOLUTION INTRAVENOUS; SUBCUTANEOUS at 18:45

## 2024-04-08 RX ADMIN — INSULIN GLARGINE 10 UNITS: 100 INJECTION, SOLUTION SUBCUTANEOUS at 22:18

## 2024-04-08 RX ADMIN — ASPIRIN 81 MG: 81 TABLET, COATED ORAL at 10:08

## 2024-04-08 RX ADMIN — IPRATROPIUM BROMIDE AND ALBUTEROL SULFATE 3 ML: .5; 3 SOLUTION RESPIRATORY (INHALATION) at 19:27

## 2024-04-08 RX ADMIN — FUROSEMIDE 40 MG: 40 TABLET ORAL at 10:06

## 2024-04-08 RX ADMIN — METOPROLOL SUCCINATE 50 MG: 50 TABLET, EXTENDED RELEASE ORAL at 10:06

## 2024-04-08 RX ADMIN — INSULIN ASPART 3 UNITS: 100 INJECTION, SOLUTION INTRAVENOUS; SUBCUTANEOUS at 14:27

## 2024-04-08 RX ADMIN — GUAIFENESIN AND DEXTROMETHORPHAN HYDROBROMIDE 1 TABLET: 600; 30 TABLET, EXTENDED RELEASE ORAL at 10:06

## 2024-04-08 RX ADMIN — AMLODIPINE BESYLATE 5 MG: 5 TABLET ORAL at 10:07

## 2024-04-08 RX ADMIN — METFORMIN ER 500 MG 500 MG: 500 TABLET ORAL at 16:53

## 2024-04-08 RX ADMIN — BARIUM SULFATE: 400 SUSPENSION ORAL at 09:11

## 2024-04-08 RX ADMIN — Medication 50 MCG: at 10:06

## 2024-04-08 RX ADMIN — ATORVASTATIN CALCIUM 40 MG: 40 TABLET, FILM COATED ORAL at 20:32

## 2024-04-08 RX ADMIN — BUDESONIDE AND FORMOTEROL FUMARATE DIHYDRATE 2 PUFF: 160; 4.5 AEROSOL RESPIRATORY (INHALATION) at 10:10

## 2024-04-08 RX ADMIN — INSULIN ASPART 1 UNITS: 100 INJECTION, SOLUTION INTRAVENOUS; SUBCUTANEOUS at 10:08

## 2024-04-08 RX ADMIN — SENNOSIDES AND DOCUSATE SODIUM 2 TABLET: 8.6; 5 TABLET ORAL at 01:10

## 2024-04-08 RX ADMIN — GUAIFENESIN AND DEXTROMETHORPHAN HYDROBROMIDE 1 TABLET: 600; 30 TABLET, EXTENDED RELEASE ORAL at 20:32

## 2024-04-08 RX ADMIN — IPRATROPIUM BROMIDE AND ALBUTEROL SULFATE 3 ML: .5; 3 SOLUTION RESPIRATORY (INHALATION) at 11:50

## 2024-04-08 RX ADMIN — DOXYCYCLINE 100 MG: 100 INJECTION, POWDER, LYOPHILIZED, FOR SOLUTION INTRAVENOUS at 14:22

## 2024-04-08 RX ADMIN — DOXYCYCLINE 100 MG: 100 INJECTION, POWDER, LYOPHILIZED, FOR SOLUTION INTRAVENOUS at 00:58

## 2024-04-08 RX ADMIN — IPRATROPIUM BROMIDE AND ALBUTEROL SULFATE 3 ML: .5; 3 SOLUTION RESPIRATORY (INHALATION) at 16:21

## 2024-04-08 RX ADMIN — SENNOSIDES AND DOCUSATE SODIUM 2 TABLET: 8.6; 5 TABLET ORAL at 20:33

## 2024-04-08 RX ADMIN — METFORMIN ER 500 MG 500 MG: 500 TABLET ORAL at 13:09

## 2024-04-08 RX ADMIN — ENOXAPARIN SODIUM 40 MG: 40 INJECTION SUBCUTANEOUS at 10:08

## 2024-04-08 RX ADMIN — LOSARTAN POTASSIUM 100 MG: 100 TABLET, FILM COATED ORAL at 10:08

## 2024-04-08 ASSESSMENT — ACTIVITIES OF DAILY LIVING (ADL)
ADLS_ACUITY_SCORE: 46
DIFFICULTY_EATING/SWALLOWING: YES
ADLS_ACUITY_SCORE: 46
ADLS_ACUITY_SCORE: 52
TOILETING_ISSUES: NO
WALKING_OR_CLIMBING_STAIRS: AMBULATION DIFFICULTY, ASSISTANCE 1 PERSON
DRESSING/BATHING_DIFFICULTY: YES
ADLS_ACUITY_SCORE: 46
ADLS_ACUITY_SCORE: 52
ADLS_ACUITY_SCORE: 54
ADLS_ACUITY_SCORE: 54
WALKING_OR_CLIMBING_STAIRS_DIFFICULTY: YES
ADLS_ACUITY_SCORE: 54
EATING/SWALLOWING: SWALLOWING LIQUIDS;SWALLOWING SOLID FOOD
ADLS_ACUITY_SCORE: 54
WEAR_GLASSES_OR_BLIND: YES
ADLS_ACUITY_SCORE: 54
DOING_ERRANDS_INDEPENDENTLY_DIFFICULTY: NO
ADLS_ACUITY_SCORE: 54
ADLS_ACUITY_SCORE: 54
CHANGE_IN_FUNCTIONAL_STATUS_SINCE_ONSET_OF_CURRENT_ILLNESS/INJURY: NO
CONCENTRATING,_REMEMBERING_OR_MAKING_DECISIONS_DIFFICULTY: YES
EATING/SWALLOWING_MANAGEMENT: SPEECH
ADLS_ACUITY_SCORE: 46
ADLS_ACUITY_SCORE: 52
ADLS_ACUITY_SCORE: 46
ADLS_ACUITY_SCORE: 46
ADLS_ACUITY_SCORE: 54
ADLS_ACUITY_SCORE: 54
DRESSING/BATHING: BATHING DIFFICULTY, ASSISTANCE 1 PERSON
ADLS_ACUITY_SCORE: 54
FALL_HISTORY_WITHIN_LAST_SIX_MONTHS: NO
ADLS_ACUITY_SCORE: 52
ADLS_ACUITY_SCORE: 54
TOILETING_ASSISTANCE: TOILETING DIFFICULTY, ASSISTANCE 1 PERSON

## 2024-04-08 NOTE — PLAN OF CARE
"AOx4. Up A1 gaitbelt and walker. Up in chair and ambulating in room throughout shift. Son at bedside- interpreting. Video swallow completed- WNL- reg diet, tolerating.  and 287- correcting with novolog sliding scale per orders. O2 sats stable on RA. Infrequent cough- nebs and inhalers. Doxycycline q 12. Mucinex.  Still needs to have BM- will pas along for PRN bowel meds to be given. PT assessed- home with home cares. Possible discharge home with son tomorrow.     Problem: Adult Inpatient Plan of Care  Goal: Plan of Care Review  Description: The Plan of Care Review/Shift note should be completed every shift.  The Outcome Evaluation is a brief statement about your assessment that the patient is improving, declining, or no change.  This information will be displayed automatically on your shift  note.  Outcome: Progressing  Flowsheets (Taken 4/8/2024 1508)  Outcome Evaluation: Continues on RA- video swallow complete and WNL- reg diet. BG elevated- correcting per orders. PT recommending home with cares. IV doxy. No BM yet  Plan of Care Reviewed With:   patient   child  Overall Patient Progress: improving  Goal: Patient-Specific Goal (Individualized)  Description: You can add care plan individualizations to a care plan. Examples of Individualization might be:  \"Parent requests to be called daily at 9am for status\", \"I have a hard time hearing out of my right ear\", or \"Do not touch me to wake me up as it startles  me\".  Outcome: Progressing  Goal: Absence of Hospital-Acquired Illness or Injury  Outcome: Progressing  Intervention: Identify and Manage Fall Risk  Recent Flowsheet Documentation  Taken 4/8/2024 0800 by Britney Oates RN  Safety Promotion/Fall Prevention:   clutter free environment maintained   lighting adjusted   nonskid shoes/slippers when out of bed   room near nurse's station   room organization consistent   safety round/check completed   supervised activity  Intervention: Prevent Skin Injury  Recent " Flowsheet Documentation  Taken 4/8/2024 0800 by Britney Oates RN  Body Position: position changed independently  Skin Protection:   adhesive use limited   incontinence pads utilized   transparent dressing maintained  Device Skin Pressure Protection:   absorbent pad utilized/changed   tubing/devices free from skin contact  Goal: Optimal Comfort and Wellbeing  Outcome: Progressing  Goal: Readiness for Transition of Care  Outcome: Progressing  Intervention: Mutually Develop Transition Plan  Recent Flowsheet Documentation  Taken 4/8/2024 1200 by Britney Oaets RN  Equipment Currently Used at Home:   commode chair   grab bar, toilet   grab bar, tub/shower   hospital bed   shower chair   walker, rolling   walker, standard   wheelchair, manual   Goal Outcome Evaluation:      Plan of Care Reviewed With: patient, child    Overall Patient Progress: improvingOverall Patient Progress: improving    Outcome Evaluation: Continues on RA- video swallow complete and WNL- reg diet. BG elevated- correcting per orders. PT recommending home with cares. IV doxy. No BM yet

## 2024-04-08 NOTE — PROGRESS NOTES
[facility]    Medicine Progress Note - Hospitalist Service    Date of Admission:  4/3/2024    Assessment & Plan   Pilo Quintana is a 73 year old (is likely at least 83 yr old per son) female with PMHx significant for COPD vs asthma on 2 L of oxygen, dementia, type 2 diabetes, HTN, HFpEF, pulmonary HTN, renal oncocytoma, CVA with some left-sided weakness, hx of intracerebral hemorrhage, and central sleep apnea, who was admitted on 4/3/2024 with hyperglycemia. She also has been having a cough with wheezing and SOB for the past 2 weeks. She was seen in the ED on 3/27 and dx with COPD exacerbation and started on Prednisone 40 mg x 5 days and Azithromycin. No fever. Her symptoms persisted so on 4/2, she was restarted on Prednisone at 60 mg with taper every 3 days.   Now with hyperglycemia. Also notes heaviness in her chest with cough and wheezing.   She is from [facility] and does not speak any English and the history is obtained from the son who is interpreting.    Acute exacerbation of COPD vs asthma  Chronic hypoxic respiratory failure  HFpEF w/pulm HTN  Dysphagia, resolved  Per chart review, no definite dx of COPD, multiple visits for wheezing, was managing with albuterol alone at home, started on Symbicort in October by PCP. Had PFTs done but were inconclusive, thought maybe it was due to poor effort. Sleep study done, recommended BiPAP. Per family has been on oxygen for past year  Seen on 3/27 for cough, SOB and wheezing, treated with 5 day course of Prednisone and Azithromycin  Seen by PCP on 4/2 for persistent sx, started on Prednisone taper starting at 60 mg. Presented here 4/3.  - procalcitonin is low risk but given probable COPD and lack of significant improvement after 2 weeks, was started on doxycycline. Day 5 of antibiotics today  -seen by ST and noted to have dysphagia initially, VVS completed 4/8 with no aspiration and diet advanced  -trial of steroids with improvement, change to  oral prednisone 40mg daily today. Cont nebs  -currently off oxygen, has required for past year per family  -monitor response now that she's on oral lasix, steroids and diet adjusted. If continued improvement possible discharge home tomorrow. Unclear if she will need to resume her home oxygen, walk test tomorrow am.    Marked hyperglycemia, resolved  Type II DM  Due to steroid use. Without ketoacidosis but significant lactic acidosis.   DM typically managed with Metformin alone. Last HgbA1c was 7.7 in January   Needs to continue steroids for now  - Lantus started on admission, 10 units BID, continue for now  - continue sliding scale insulin   - metformin held due to lactic acidosis, resumed today    Demand ischemia  Chest discomfort 2/2 cough  Likely due to demand ischemia; 17-->19-->15-->14. Continues to note chest discomfort. Echocardiogram fairly unremarkable, no wall motion abnormality. Suspect discomfort is due to cough  - Telemetry unremarkable thus far, discontinue      lactic acidosis  Likely a combination of factors to include metformin and neb treatments.  Metformin held  -resolved, metformin resumed    Central sleep apnea  - continue home BiPAP.    HTN  - continue home amlodipine  - resume home losartan and metoprolol    HFpEF  Pulmonary HTN  - resume home lasix    CVA with some left-sided weakness  - continue baby ASA and statin    Dementia  Renal oncocytoma          Diet: Combination Diet Regular Diet; Thin Liquids (level 0)  Room Service    DVT Prophylaxis: Enoxaparin (Lovenox) SQ  Morrison Catheter: Not present  Lines: None     Cardiac Monitoring: None  Code Status: Full Code        Disposition Plan   Likely home next 1-2 days with family, possibly with [facility]         Abner Mar DO  Hospitalist Service  [facility]  Securely message with Doug (more info)  Text page via [facility] Paging/Directory   ______________________________________________________________________    Interval  History   Seen by ST and had diet advanced after VSS. Still with occasional cough that's productive. Off oxygen now, still some exertional dyspnea but much improved    Physical Exam   Vital Signs: Temp: 97.8  F (36.6  C) Temp src: Oral BP: (!) 160/89 Pulse: 69   Resp: 18 SpO2: 94 % O2 Device: None (Room air)    Weight: 156 lbs 11.95 oz    Constitutional: awake, alert, and cooperative  Eyes: pupils equal, round and reactive to light and conjunctiva normal  ENT: normocephalic, without obvious abnormality, atraumatic  Respiratory: no increased work of breathing, mild wheezing, mildly diminished air entry  Cardiovascular: regular rate and rhythm, no murmur noted, and no edema  GI: normal bowel sounds, soft, and non-distended  Skin: no bruising or bleeding  Neurologic: alert, interactive, moving extremities    45 MINUTES SPENT BY ME on the date of service doing chart review, history, exam, documentation & further activities per the note.      Data   ------------------------- PAST 24 HR DATA REVIEWED -----------------------------------------------    I have personally reviewed the following data over the past 24 hrs:    7.8  \   11.5 (L)   / 280     140 101 15.2 /  161 (H)   4.0 30 (H) 0.65 \     Trop: N/A BNP: N/A       Imaging results reviewed over the past 24 hrs:   Recent Results (from the past 24 hour(s))   CT Chest w Contrast    Narrative    EXAM: CT CHEST W CONTRAST  LOCATION: [facility]  DATE: 4/7/2024    INDICATION: prolongue cough, sob r o PNA  COMPARISON: CT 10/25/2023  TECHNIQUE: CT chest with IV contrast. Multiplanar reformats were obtained. Dose reduction techniques were used.    CONTRAST: 79mL Isovue 370    FINDINGS:   LUNGS AND PLEURA: Mild atelectasis in the left lower lobe with no central airway obstruction.    MEDIASTINUM/AXILLAE: Cardiomegaly. Enlargement of the central pulmonary artery measuring 4.1 cm in greatest radial dimension most typical for changes of chronic pulmonary  arterial hypertension.    CORONARY ARTERY CALCIFICATION: Moderate.    UPPER ABDOMEN: There is mild to moderate fatty infiltration of the liver.    MUSCULOSKELETAL: Moderate scattered hypertrophic changes most marked in the mid thoracic spine.      Impression    IMPRESSION:   1.  No inflammatory infiltrates, or active CHF.    2.  Mild atelectasis left lower lobe with no central airway obstruction.    3.  Prominent left main pulmonary artery most typical for changes of pulmonary arterial hypertension.    4.  Moderate coronary artery calcification.

## 2024-04-08 NOTE — DISCHARGE INSTRUCTIONS
Your home care referral was sent to [facility]  If you haven't heard from them within the next 24-48 hours,  Please call them at [phone]

## 2024-04-08 NOTE — PROGRESS NOTES
Occupational Therapy: Orders received. Chart reviewed and discussed with care team.? Occupational Therapy not indicated due to pt needs can be met by PT while IP. Son assists with needs at home for ADL/IADL, they have all Dme/ AE. Defer discharge recs to PT.? Defer discharge recommendations to care team and PT..? Will complete orders.

## 2024-04-08 NOTE — PLAN OF CARE
"Pt is alert and oriented x4. Tolerating room air in the 90's. Denies pain. Cough- infrequent.  Lungs clear. BS- 283, 262, 161. Has a purewick.No BM- senna given. Family in room to translate. Tingling in upper extremities. Assist X2 with a lift In bed. K+mag protocol. Doxycycline q12h. Soft bite sized, thin liquid diet. Plan is video swallow study this morning. Speech following.   Problem: Adult Inpatient Plan of Care  Goal: Plan of Care Review  Description: The Plan of Care Review/Shift note should be completed every shift.  The Outcome Evaluation is a brief statement about your assessment that the patient is improving, declining, or no change.  This information will be displayed automatically on your shift  note.  Outcome: Progressing  Flowsheets (Taken 4/8/2024 0859)  Outcome Evaluation: Pt is on room air. Pulse ox on. Denies pain. BS monitoring.  Plan of Care Reviewed With: patient  Overall Patient Progress: improving  Goal: Patient-Specific Goal (Individualized)  Description: You can add care plan individualizations to a care plan. Examples of Individualization might be:  \"Parent requests to be called daily at 9am for status\", \"I have a hard time hearing out of my right ear\", or \"Do not touch me to wake me up as it startles  me\".  Outcome: Progressing  Goal: Absence of Hospital-Acquired Illness or Injury  Outcome: Progressing  Intervention: Identify and Manage Fall Risk  Recent Flowsheet Documentation  Taken 4/8/2024 0058 by Malini Low, RN  Safety Promotion/Fall Prevention:   safety round/check completed   room organization consistent   room near nurse's station   nonskid shoes/slippers when out of bed   mobility aid in reach   lighting adjusted   increased rounding and observation  Intervention: Prevent Skin Injury  Recent Flowsheet Documentation  Taken 4/8/2024 0058 by Malini Low, RN  Body Position: position changed independently  Intervention: Prevent and Manage VTE (Venous Thromboembolism) " Risk  Recent Flowsheet Documentation  Taken 4/8/2024 0058 by Malini Low RN  VTE Prevention/Management: SCDs (sequential compression devices) off  Intervention: Prevent Infection  Recent Flowsheet Documentation  Taken 4/8/2024 0058 by Malini Low RN  Infection Prevention:   rest/sleep promoted   single patient room provided   hand hygiene promoted  Goal: Optimal Comfort and Wellbeing  Outcome: Progressing  Goal: Readiness for Transition of Care  Outcome: Progressing     Problem: Comorbidity Management  Goal: Maintenance of COPD Symptom Control  Outcome: Progressing  Intervention: Maintain COPD Symptom Control  Recent Flowsheet Documentation  Taken 4/8/2024 0058 by Malini Low RN  Medication Review/Management: medications reviewed  Goal: Blood Glucose Levels Within Targeted Range  Outcome: Progressing  Intervention: Monitor and Manage Glycemia  Recent Flowsheet Documentation  Taken 4/8/2024 0058 by Malini Low RN  Medication Review/Management: medications reviewed  Goal: Blood Pressure in Desired Range  Outcome: Progressing  Intervention: Maintain Blood Pressure Management  Recent Flowsheet Documentation  Taken 4/8/2024 0058 by Malini Low RN  Medication Review/Management: medications reviewed   Goal Outcome Evaluation:      Plan of Care Reviewed With: patient    Overall Patient Progress: improvingOverall Patient Progress: improving    Outcome Evaluation: Pt is on room air. Pulse ox on. Denies pain. BS monitoring.

## 2024-04-08 NOTE — PROGRESS NOTES
VIDEO SWALLOW STUDY       04/08/24 0927   Appointment Info   Signing Clinician's Name / Credentials (SLP) Prisca Marti M.A., CCC-SLP, [facility]   General Information   Onset of Illness/Injury or Date of Surgery 04/03/24   Referring Physician Dr. Mar   Patient/Family Therapy Goal Statement (SLP) Patient enjoys larger breakfast portions   Pertinent History of Current Problem Pilo Quintana is a 73 year old (is likely at least 83 yr old per son) female with PMHx significant for COPD vs asthma on 2 L of oxygen, dementia, type 2 diabetes, HTN, HFpEF, pulmonary HTN, renal oncocytoma, CVA with some left-sided weakness, hx of intracerebral hemorrhage, and central sleep apnea, who was admitted on 4/3/2024 with hyperglycemia. She also has been having a cough with wheezing and SOB for the past 2 weeks. She was seen in the ED on 3/27 and dx with COPD exacerbation and started on Prednisone 40 mg x 5 days and Azithromycin. No fever. Her symptoms persisted so on 4/2, she was restarted on Prednisone at 60 mg with taper every 3 days.   Now with hyperglycemia. Also notes heaviness in her chest with cough and wheezing.   Patient has exhibited intermittent non-productive coughing both during and outside of PO intake.   General Observations Pt alert, having a coughing spell upon arrival. Reportedly she has been coughing since an aspiration event earlier today.       Present yes   Language [place] - son (rare language)   Type of Evaluation   Type of Evaluation Swallow Evaluation   Oral Motor   Oral Musculature anomalies present   Mucosal Quality adequate   Dentition (Oral Motor)   Dentition (Oral Motor) some missing teeth   Facial Symmetry (Oral Motor)   Facial Symmetry (Oral Motor) right side impairment   Right Side Facial Asymmetry minimal impairment   Vocal Quality/Secretion Management (Oral Motor)   Vocal Quality (Oral Motor) WNL  (Improved)   General Swallowing Observations   Past History of Dysphagia Per chart  review the pt was previously on a soft and bite sized diet with thin liquids per SLP in May 2023. Son reports he gives his mom regular food but makes sure she has small bites & feeds her carefully.   Respiratory Support room air   Current Diet/Method of Nutritional Intake (General Swallowing Observations, NIS) thin liquids (level 0);soft and bite-sized (dysphagia advanced) (level 6)   Swallowing Evaluation Videofluoroscopic swallow study (VFSS)   Clinical Swallow Evaluation   Feeding Assistance minimal assistance required   VFSS Evaluation   Radiologist Dr. Hernandez   Views Taken left lateral   VFSS Textures Trialed thin liquids;mildly thick liquids;pureed;solid foods   VFSS Eval: Thin Liquid Texture Trial   Mode of Presentation, Thin Liquid cup;straw;self-fed   Order of Presentation 3, 4,   Oral Phase, Thin Liquid premature pharyngeal entry   Bolus Location When Swallow Triggered posterior laryngeal surface of epiglottis   Pharyngeal Phase, Thin Liquid WFL   Rosenbek's Penetration Aspiration Scale: Thin Liquid Trial Results 2 - contrast enters airway, remains above the vocal cords, no residue remains (penetration)   Strategies and Compensations reduce bolus size   Diagnostic Statement Patient exhibited flash penetration with straw drinking only.   VFSS Eval: Mildly Thick Liquids   Mode of Presentation cup;self-fed   Order of Presentation 1, 2, 8, 9   Preparatory Phase WFL   Oral Phase premature pharyngeal entry   Bolus Location When Swallow Triggered valleculae   Pharyngeal Phase WFL   Rosenbek's Penetration Aspiration Scale 1 - no aspiration, contrast does not enter airway   Diagnostic Statement No aspiration/penetration   VFSS Evaluation: Puree Solid Texture Trial   Mode of Presentation, Puree spoon;self-fed   Order of Presentation 5   Preparatory Phase holding   Oral Phase, Puree WFL   Pharyngeal Phase, Puree WFL   Rosenbek's Penetration Aspiration Scale: Puree Food Trial Results 1 - no aspiration, contrast does  not enter airway   Diagnostic Statement Brief bolus holding thought to be related to barium taste.  No aspiration/penetration, no pharyngeal residue   VFSS Evaluation: Solid Food Texture Trial   Mode of Presentation, Solid spoon;self-fed   Order of Presentation 6, 7   Preparatory Phase WFL   Pharyngeal Phase, Solid WFL   Rosenbek's Penetration Aspiration Scale: Solid Food Trial Results 1 - no aspiration, contrast does not enter airway   Diagnostic Statement Thorough chewing. No aspiration/penetration, no pharyngeal residue   Esophageal Phase of Swallow   Patient reports or presents with symptoms of esophageal dysphagia No   Swallowing Recommendations   Diet Consistency Recommendations regular diet;thin liquids (level 0)   Supervision Level for Intake distant supervision needed   Mode of Delivery Recommendations bolus size, small;no straws;slow rate of intake   Recommended Feeding/Eating Techniques (Swallow Eval) maintain upright sitting position for eating;maintain upright posture during/after eating for 30 minutes;set-up and prepare tray   Medication Administration Recommendations, Swallowing (SLP) whole or crushed meds in puree carrier   General Therapy Interventions   Planned Therapy Interventions Dysphagia Treatment   Dysphagia treatment Instruction of safe swallow strategies   Clinical Impression   Criteria for Skilled Therapeutic Interventions Met (SLP Eval) Yes, treatment indicated   Risks & Benefits of therapy have been explained evaluation/treatment results reviewed;current/potential barriers reviewed;participants voiced agreement with care plan;participants included;patient   Clinical Impression Comments Video swallow study indicating no aspiration or deep penetration during study, and intermittent dry cough not related to oropharyngeal deficit.  Flash penetration only occurred by straw, to which patient did exhibit throat clear response, however, no aspiration was observed and penetrated material did not  reach vocal fold level or remain in the laryngeal vestibule at all.   SLP Total Evaluation Time   Evaluation, videofluoroscopic eval of swallow function Minutes (13435) 25   SLP Goals   Therapy Frequency (SLP Eval) 2 times/week   SLP Predicted Duration/Target Date for Goal Attainment 04/15/24   SLP Goals Swallow   SLP: Safely tolerate diet without signs/symptoms of aspiration One P.O. texture;With use of swallow precautions;With assistance/supervision;Regular diet;Thin liquids;With use of compensatory swallow strategies   Interventions   Interventions Quick Adds Swallowing Dysfunction   Swallowing Dysfunction &/or Oral Function for Feeding   Treatment of Swallowing Dysfunction &/or Oral Function for Feeding Minutes (57054) 10   Symptoms Noted During/After Treatment None   Treatment Detail/Skilled Intervention Trained patient and son re: generalized safe swallow strategies and cough response unrelated to any aspiration event during study.  Patient's son in agreement re: no straw strategy.   SLP Discharge Planning   SLP Plan Follow up, no straw education as precaution   SLP Discharge Recommendation home   SLP Rationale for DC Rec Expect patient may meet swallowing goals by discharge   SLP Brief overview of current status  Video swallow study indicating no aspiration or any deep laryngeal penetration despite some intermittent cough response.  Regular diet with thin liquids recommended, no straw.   Total Session Time   Total Session Time (sum of timed and untimed services) 35

## 2024-04-08 NOTE — PROGRESS NOTES
04/08/24 1030   Appointment Info   Signing Clinician's Name / Credentials (PT) Sarah Adler DPT   Living Environment   People in Home child(mahi), adult   Current Living Arrangements house   Home Accessibility no concerns   Transportation Anticipated family or friend will provide   Living Environment Comments Pt lives in house with stair lift. Has hosptial bed, commode, FWW and 4WW, walk in shower with chair and grab bars, wheelchair.   Self-Care   Usual Activity Tolerance fair   Current Activity Tolerance fair   Equipment Currently Used at Home commode chair;grab bar, toilet;grab bar, tub/shower;hospital bed;shower chair;walker, rolling;walker, standard;wheelchair, manual   Fall history within last six months no   Activity/Exercise/Self-Care Comment Normally ambulates short distances with walker; very supportive family that helps with navigating stair lift and w/c use for longer distances   General Information   Onset of Illness/Injury or Date of Surgery 04/03/24   Referring Physician Emi Bailey PA-C   Patient/Family Therapy Goals Statement (PT) return home, get home therapies to continue to get stronger   Pertinent History of Current Problem (include personal factors and/or comorbidities that impact the POC) Pilo Quintana is a 73 year old (is likely at least 83 yr old per son) female with PMHx significant for COPD vs asthma on 2 L of oxygen, dementia, type 2 diabetes, HTN, HFpEF, pulmonary HTN, renal oncocytoma, CVA with mild RIGHT sided weakness, hx of intracerebral hemorrhage, and central sleep apnea, who was admitted on 4/3/2024 with severe hyperglycemia in the setting of steroid use for COPD exacerbation and concern for new dysphagia   Existing Precautions/Restrictions fall   Weight-Bearing Status - LUE full weight-bearing   Weight-Bearing Status - RUE full weight-bearing   Weight-Bearing Status - LLE full weight-bearing   Weight-Bearing Status - RLE full weight-bearing   Cognition   Affect/Mental  Status (Cognition) unable/difficult to assess   Orientation Status (Cognition) unable/difficult to assess   Follows Commands (Cognition) follows one-step commands   Cognitive Status Comments family present and interprets; per family pt seems like herself, no concern for confusion or change in mental status   Pain Assessment   Patient Currently in Pain No   Posture    Posture Kyphosis   Strength (Manual Muscle Testing)   Strength (Manual Muscle Testing) Deficits observed during functional mobility   Strength Comments generalized weakness, needing assist to stand   Transfers   Comment, (Transfers) Min A sit <> stand with FWW   Gait/Stairs (Locomotion)   [place] Level (Gait) contact guard   Assistive Device (Gait) walker, front-wheeled   Distance in Feet (Gait) 10'   Comment, (Gait/Stairs) slow, steady gait with FWW and wheelchair follow   Sensory Examination   Sensory Perception patient reports no sensory changes   Coordination   Coordination no deficits were identified   Muscle Tone   Muscle Tone no deficits were identified   Clinical Impression   Criteria for Skilled Therapeutic Intervention Yes, treatment indicated   PT Diagnosis (PT) impaired mobility   Influenced by the following impairments impaired endurance   Functional limitations due to impairments fall risk, decreased activity tolerance   Clinical Presentation (PT Evaluation Complexity) stable   Clinical Presentation Rationale clinical judgement   Clinical Decision Making (Complexity) low complexity   Planned Therapy Interventions (PT) balance training;bed mobility training;gait training;patient/family education;ROM (range of motion);strengthening;transfer training;progressive activity/exercise   Risk & Benefits of therapy have been explained evaluation/treatment results reviewed;care plan/treatment goals reviewed;risks/benefits reviewed;current/potential barriers reviewed;participants voiced agreement with care plan;participants  included;patient;son;daughter   PT Total Evaluation Time   PT Eval, Low Complexity Minutes (69173) 15   Physical Therapy Goals   PT Frequency Daily   PT Predicted Duration/Target Date for Goal Attainment 04/12/24   PT Goals Bed Mobility;Transfers;Gait   PT: Bed Mobility Supervision/stand-by assist;Supine to/from sit  (hospital bed so HOB can be elevated with use of bed rails)   PT: Transfers Supervision/stand-by assist;Sit to/from stand;Bed to/from chair;Assistive device   PT: Gait Supervision/stand-by assist;Rolling walker;25 feet   Interventions   Interventions Quick Adds Gait Training;Therapeutic Activity   Therapeutic Activity   Therapeutic Activities: dynamic activities to improve functional performance Minutes (27289) 10   Symptoms Noted During/After Treatment Fatigue   Treatment Detail/Skilled Intervention performed sit <> stand from recliner with Min A, cued for hands on chair but prefers to keep on FWW. Increased time for donning extra brief and purewick cares for session.   Gait Training   Gait Training Minutes (31893) 10   Symptoms Noted During/After Treatment (Gait Training) fatigue   Treatment Detail/Skilled Intervention ambulation in room out to hallway with close w/c follow, cues for upright posture and breathing techniques; son present to interpret and is encoruaging. requires 2 prolonged seated rest breaks while ambulating.   Distance in Feet 5' + 10' + 10'   [place] Level (Gait Training) contact guard   Assistive Device (Gait Training) rolling walker   PT Discharge Planning   PT Plan gait with FWW and chair follow   PT Discharge Recommendation (DC Rec) home with home care physical therapy   PT Rationale for DC Rec Appears close to baseline and has excellent family support, all equipment needs met. Recommend home with HHPT to address remaining endurance and activity tolerance limitations related to recent hospitalization   PT Brief overview of current status Min A with FWW   Total Session Time    Timed Code Treatment Minutes 20   Total Session Time (sum of timed and untimed services) 35

## 2024-04-08 NOTE — PLAN OF CARE
"Care from [id number]      Inpatient Progress Note:  For complete assessment see flow sheet documentation.       BP (!) 162/86 (BP Location: Right arm)   Pulse 56   Temp 97.5  F (36.4  C) (Axillary)   Resp 18   Wt 71.1 kg (156 lb 12 oz)   SpO2 91%   BMI 31.66 kg/m         Pt vitals are stable on RA. Cont pulse ox on for family comfort. Son sleeping in the room and translating for pt. Pt got IV magnesium, on K and Mag protocol, redraws in the morning. PW in for incontinence. Pt is A&O x4 but is sometimes forgetful. Denying pain. Cont IV doxy Q12. Is now on a soft and bite size diet with thin liquids. Plan to continue antibiotics, scheduled nebs, monitor oxygen needs and have a video swallow in morning.         Will continue with POC.       Will continue to monitor and provide cares.      Goal Outcome Evaluation:    Problem: Adult Inpatient Plan of Care  Goal: Plan of Care Review  Description: The Plan of Care Review/Shift note should be completed every shift.  The Outcome Evaluation is a brief statement about your assessment that the patient is improving, declining, or no change.  This information will be displayed automatically on your shift  note.  4/8/2024 0139 by Karina Hansen RN  Flowsheets (Taken 4/8/2024 0139)  Outcome Evaluation: RA now, cont pulse ox on. Tolerating diet, no BM yet. Cont IV doxy.  Plan of Care Reviewed With: patient  Overall Patient Progress: improving  4/8/2024 0139 by Karina Hansen RN  Outcome: Progressing  Flowsheets (Taken 4/8/2024 0139)  Outcome Evaluation: RA now, cont pulse ox on. Tolerating diet, no BM yet. Cont IV doxy.  Plan of Care Reviewed With: patient  Overall Patient Progress: improving  Goal: Patient-Specific Goal (Individualized)  Description: You can add care plan individualizations to a care plan. Examples of Individualization might be:  \"Parent requests to be called daily at 9am for status\", \"I have a hard time hearing out of my right ear\", or \"Do not touch " "me to wake me up as it startles  me\".  Outcome: Progressing  Goal: Absence of Hospital-Acquired Illness or Injury  Outcome: Progressing  Goal: Optimal Comfort and Wellbeing  Outcome: Progressing  Goal: Readiness for Transition of Care  Outcome: Progressing         "

## 2024-04-08 NOTE — PROGRESS NOTES
Care Management Follow Up    Length of Stay (days): 3    Expected Discharge Date: TBD     Concerns to be Addressed: discharge planning     Patient plan of care discussed at interdisciplinary rounds: Yes    Anticipated Discharge Disposition: Home Care, Home     Anticipated Discharge Services: None  Anticipated Discharge DME: None    Patient/family educated on Medicare website which has current facility and service quality ratings: yes  Education Provided on the Discharge Plan: Yes  Patient/Family in Agreement with the Plan: yes    Referrals Placed by CM/SW: Homecare  Private pay costs discussed: Not applicable    Additional Information:  Therapies are recommending patient to discharge back to home with support from family as well are home care PT. Met with pt and son as bedside (son to interpret) to discuss recommendation. Son is in agreement with referral to home care for home physical therapy. Son states that pt has used [facility] multiple times in the past and would prefer to use them again if possible. Son agrees for [facility] team to review and/or outsource referral if [facility] does not have availability. Referral for home care PT has been sent. Will continue to follow for discharge needs.       ADDENDUM 4/8/24 1317: [facility] has accepted home care referral for home PT. [facility] contact information added to AVS.       Carla Yoo, RN  Care Coordinator  [facility]  [phone]

## 2024-04-09 VITALS
HEART RATE: 75 BPM | DIASTOLIC BLOOD PRESSURE: 109 MMHG | BODY MASS INDEX: 31.66 KG/M2 | WEIGHT: 156.75 LBS | TEMPERATURE: 98.3 F | RESPIRATION RATE: 20 BRPM | SYSTOLIC BLOOD PRESSURE: 140 MMHG | OXYGEN SATURATION: 94 %

## 2024-04-09 LAB
BACTERIA BLD CULT: NO GROWTH
BACTERIA BLD CULT: NO GROWTH
GLUCOSE BLDC GLUCOMTR-MCNC: 113 MG/DL (ref 70–99)
GLUCOSE BLDC GLUCOMTR-MCNC: 168 MG/DL (ref 70–99)
GLUCOSE BLDC GLUCOMTR-MCNC: 173 MG/DL (ref 70–99)
GLUCOSE BLDC GLUCOMTR-MCNC: 205 MG/DL (ref 70–99)
MAGNESIUM SERPL-MCNC: 2.2 MG/DL (ref 1.7–2.3)
POTASSIUM SERPL-SCNC: 3.7 MMOL/L (ref 3.4–5.3)

## 2024-04-09 PROCEDURE — 250N000011 HC RX IP 250 OP 636: Performed by: PHYSICIAN ASSISTANT

## 2024-04-09 PROCEDURE — 84132 ASSAY OF SERUM POTASSIUM: CPT | Performed by: HOSPITALIST

## 2024-04-09 PROCEDURE — 250N000013 HC RX MED GY IP 250 OP 250 PS 637: Performed by: HOSPITALIST

## 2024-04-09 PROCEDURE — 36415 COLL VENOUS BLD VENIPUNCTURE: CPT | Performed by: HOSPITALIST

## 2024-04-09 PROCEDURE — 250N000013 HC RX MED GY IP 250 OP 250 PS 637: Performed by: INTERNAL MEDICINE

## 2024-04-09 PROCEDURE — 250N000012 HC RX MED GY IP 250 OP 636 PS 637: Performed by: HOSPITALIST

## 2024-04-09 PROCEDURE — 250N000009 HC RX 250: Performed by: INTERNAL MEDICINE

## 2024-04-09 PROCEDURE — 250N000013 HC RX MED GY IP 250 OP 250 PS 637: Performed by: PHYSICIAN ASSISTANT

## 2024-04-09 PROCEDURE — 83735 ASSAY OF MAGNESIUM: CPT | Performed by: HOSPITALIST

## 2024-04-09 PROCEDURE — 99239 HOSP IP/OBS DSCHRG MGMT >30: CPT | Performed by: HOSPITALIST

## 2024-04-09 PROCEDURE — 94640 AIRWAY INHALATION TREATMENT: CPT

## 2024-04-09 PROCEDURE — 999N000157 HC STATISTIC RCP TIME EA 10 MIN

## 2024-04-09 PROCEDURE — 94640 AIRWAY INHALATION TREATMENT: CPT | Mod: 76

## 2024-04-09 PROCEDURE — 250N000011 HC RX IP 250 OP 636: Performed by: INTERNAL MEDICINE

## 2024-04-09 RX ORDER — PREDNISONE 20 MG/1
TABLET ORAL
Qty: 3 TABLET | Refills: 0 | Status: SHIPPED | OUTPATIENT
Start: 2024-04-10 | End: 2024-04-14

## 2024-04-09 RX ORDER — AMOXICILLIN 250 MG
1 CAPSULE ORAL 2 TIMES DAILY PRN
Qty: 20 TABLET | Refills: 0 | Status: SHIPPED | OUTPATIENT
Start: 2024-04-09 | End: 2024-07-07

## 2024-04-09 RX ADMIN — METFORMIN ER 500 MG 500 MG: 500 TABLET ORAL at 08:17

## 2024-04-09 RX ADMIN — INSULIN ASPART 2 UNITS: 100 INJECTION, SOLUTION INTRAVENOUS; SUBCUTANEOUS at 13:21

## 2024-04-09 RX ADMIN — ASPIRIN 81 MG: 81 TABLET, COATED ORAL at 08:16

## 2024-04-09 RX ADMIN — PREDNISONE 40 MG: 20 TABLET ORAL at 08:16

## 2024-04-09 RX ADMIN — BUDESONIDE AND FORMOTEROL FUMARATE DIHYDRATE 2 PUFF: 160; 4.5 AEROSOL RESPIRATORY (INHALATION) at 13:07

## 2024-04-09 RX ADMIN — IPRATROPIUM BROMIDE AND ALBUTEROL SULFATE 3 ML: .5; 3 SOLUTION RESPIRATORY (INHALATION) at 07:30

## 2024-04-09 RX ADMIN — IPRATROPIUM BROMIDE AND ALBUTEROL SULFATE 3 ML: .5; 3 SOLUTION RESPIRATORY (INHALATION) at 13:10

## 2024-04-09 RX ADMIN — SENNOSIDES AND DOCUSATE SODIUM 2 TABLET: 8.6; 5 TABLET ORAL at 08:16

## 2024-04-09 RX ADMIN — AMLODIPINE BESYLATE 5 MG: 5 TABLET ORAL at 08:24

## 2024-04-09 RX ADMIN — Medication 50 MCG: at 08:15

## 2024-04-09 RX ADMIN — DOXYCYCLINE 100 MG: 100 INJECTION, POWDER, LYOPHILIZED, FOR SOLUTION INTRAVENOUS at 00:39

## 2024-04-09 RX ADMIN — METOPROLOL SUCCINATE 50 MG: 50 TABLET, EXTENDED RELEASE ORAL at 08:15

## 2024-04-09 RX ADMIN — FUROSEMIDE 40 MG: 40 TABLET ORAL at 08:23

## 2024-04-09 RX ADMIN — GUAIFENESIN AND DEXTROMETHORPHAN HYDROBROMIDE 1 TABLET: 600; 30 TABLET, EXTENDED RELEASE ORAL at 08:17

## 2024-04-09 RX ADMIN — DOXYCYCLINE 100 MG: 100 INJECTION, POWDER, LYOPHILIZED, FOR SOLUTION INTRAVENOUS at 13:10

## 2024-04-09 RX ADMIN — ENOXAPARIN SODIUM 40 MG: 40 INJECTION SUBCUTANEOUS at 08:23

## 2024-04-09 RX ADMIN — LOSARTAN POTASSIUM 100 MG: 100 TABLET, FILM COATED ORAL at 08:24

## 2024-04-09 ASSESSMENT — ACTIVITIES OF DAILY LIVING (ADL)
ADLS_ACUITY_SCORE: 54

## 2024-04-09 NOTE — PROGRESS NOTES
Care Management Discharge Note    Discharge Date: 04/09/2024     Discharge Disposition: Home Care, Home    Discharge Services: None    Discharge DME: None    Discharge Transportation: family or friend will provide    Patient/family educated on Medicare website which has current facility and service quality ratings: yes    Education Provided on the Discharge Plan: Yes  Persons Notified of Discharge Plans: Pt  Patient/Family in Agreement with the Plan: yes    Additional Information:  Pt discharging to home with HC PT through [facility]. AVS updated, orders completed and sent.     Meenakshi Dee RN BSN   Inpatient Care Coordination  [facility]   Phone [phone]

## 2024-04-09 NOTE — PLAN OF CARE
Physical Therapy Discharge Summary     Reason for therapy discharge:    Discharged to home with home therapy.     Progress towards therapy goal(s). See goals on Care Plan in [facility] electronic health record for goal details.  Goals partially met.  Barriers to achieving goals:   discharge from facility.     Therapy recommendation(s):    Continued therapy is recommended.  Rationale/Recommendations:  Patient would benefit from home PT in order to increase strength, activity tolerance and independence with mobility.  Patient requires home PT at this time as attending PT in a clinic setting would be a considerable and significantly taxing effort, limiting his ability to participate in therapy session.

## 2024-04-09 NOTE — DISCHARGE SUMMARY
"[facility]  Hospitalist Discharge Summary      Date of Admission:  4/3/2024  Date of Discharge:  4/9/2024  Discharging Provider: Juan R Coleman MD  Discharge Service: Hospitalist Service    Discharge Diagnoses   Acute exacerbation of COPD vs asthma  Chronic hypoxic respiratory failure  HFpEF w/pulm HTN  Dysphagia, resolved  Steroid induced hyperglycemia    Clinically Significant Risk Factors     # DMII: A1C = N/A within past 6 months  # Obesity: Estimated body mass index is 31.66 kg/m  as calculated from the following:    Height as of 3/27/24: 1.499 m (4' 11\").    Weight as of this encounter: 71.1 kg (156 lb 12 oz).       Follow-ups Needed After Discharge   Follow-up Appointments     Follow-up and recommended labs and tests       Follow up with primary care provider, Chela Griffith, within 7 days   for hospital follow- up.  The following labs/tests are recommended:   follow-up on blood sugars.            Unresulted Labs Ordered in the Past 30 Days of this Admission       No orders found from 3/4/2024 to 4/4/2024.        These results will be followed up by NA    Discharge Disposition   Discharged to home  Condition at discharge: Stable    Hospital Course   Pilo Quintana is a 73 year old female who has a history of COPD on 2 L of oxygen, dementia, type 2 diabetes, asthma, CVA with some left-sided weakness, central sleep apnea who has been having a cough which has been mildly productive for the past week.  She has been treated with outpatient medications to include prednisone.  She also had some concomitant wheezing.  No fever.  Her symptoms persisted and her prednisone dose was escalated recently.  Her son checked on her today and noticed her sugar to be in the 500s with persistent wheezing and hence he brought her to the hospital.  No ongoing fever.  She does have discomfort in her chest on coughing.  Her shortness of breath is better when she sits up.  She takes a water pill and has " been compliant with that.  He does not weigh her regularly.  I am asked to admit her for further evaluation.   Acute exacerbation of COPD vs asthma  Chronic hypoxic respiratory failure  HFpEF w/pulm HTN  Dysphagia, resolved  Per chart review, no definite dx of COPD, multiple visits for wheezing, was managing with albuterol alone at home, started on Symbicort in October by PCP. Had PFTs done but were inconclusive, thought maybe it was due to poor effort. Sleep study done, recommended BiPAP. Per family has been on oxygen for past year  Seen on 3/27 for cough, SOB and wheezing, treated with 5 day course of Prednisone and Azithromycin  Seen by PCP on 4/2 for persistent sx, started on Prednisone taper starting at 60 mg. Presented here 4/3.  - procalcitonin is low risk but given probable COPD and lack of significant improvement after 2 weeks, was started on doxycycline. Day 5 of antibiotics today  -seen by ST and noted to have dysphagia initially, VVS completed 4/8 with no aspiration and diet advanced  -trial of steroids with improvement, change to oral prednisone 40mg daily today. Cont nebs  -currently off oxygen, has required for past year per family  -monitor response now that she's on oral lasix, steroids and diet adjusted. If continued improvement possible discharge home tomorrow. Unclear if she will need to resume her home oxygen, walk test tomorrow am.     Marked hyperglycemia, resolved  Type II DM  Due to steroid use. Without ketoacidosis but significant lactic acidosis.   DM typically managed with Metformin alone. Last HgbA1c was 7.7 in January   Needs to continue steroids for now  - Lantus started on admission, 10 units BID, continue for now  - continue sliding scale insulin   - metformin held due to lactic acidosis, resumed today     Demand ischemia  Chest discomfort 2/2 cough  Likely due to demand ischemia; 17-->19-->15-->14. Continues to note chest discomfort. Echocardiogram fairly unremarkable, no wall  motion abnormality. Suspect discomfort is due to cough  - Telemetry unremarkable thus far, discontinue       lactic acidosis  Likely a combination of factors to include metformin and neb treatments.  Metformin held  -resolved, metformin resumed     Central sleep apnea  - continue home BiPAP.     HTN  - continue home amlodipine  - resume home losartan and metoprolol     HFpEF  Pulmonary HTN  - resume home lasix     CVA with some left-sided weakness  - continue baby ASA and statin     Dementia  Renal oncocytoma        I assumed care of the patient today.  Her son and daughter are both in the room.  She is doing well.  Everyone is comfortable with her discharging home today.  She is off oxygen.  I will just taper her steroids quickly on discharge.  I indicated to the family that her blood sugars might be still slightly elevated at home, but should come down after she is off the steroids.    Consultations This Hospital Stay   CARE MANAGEMENT / SOCIAL WORK IP CONSULT  SPEECH LANGUAGE PATH ADULT IP CONSULT  PHYSICAL THERAPY ADULT IP CONSULT  OCCUPATIONAL THERAPY ADULT IP CONSULT    Code Status   Full Code    Time Spent on this Encounter   I, Juan R Coleman MD, personally saw the patient today and spent greater than 30 minutes discharging this patient.       Juan R Coleman MD  [facility] OBSERVATION DEPT  [address] [place] [address]  Phone: [phone]  ______________________________________________________________________    Physical Exam   Vital Signs: Temp: 98.3  F (36.8  C) Temp src: Oral BP: (!) 140/109 Pulse: 75   Resp: 20 SpO2: 94 % O2 Device: None (Room air)    Weight: 156 lbs 11.95 oz  Constitutional: awake, alert, cooperative, no apparent distress, and appears stated age  Eyes: Lids and lashes normal, pupils equal, round and reactive to light, extra ocular muscles intact, sclera clear, conjunctiva normal  ENT: Normocephalic, without obvious abnormality, atraumatic, sinuses  nontender on palpation, external ears without lesions, oral pharynx with moist mucous membranes, tonsils without erythema or exudates, gums normal and good dentition.  Respiratory: clear. Good air movement. No wheezing         Primary Care Physician   Chela Griffith    Discharge Orders      Primary Care - Care Coordination Referral      Reason for your hospital stay    COPD exacerbation  Hyperglycemia     Follow-up and recommended labs and tests     Follow up with primary care provider, Chela Griffith, within 7 days for hospital follow- up.  The following labs/tests are recommended: follow-up on blood sugars.     Activity    Your activity upon discharge: activity as tolerated     Diet    Follow this diet upon discharge: Orders Placed This Encounter      Room Service      Combination Diet Regular Diet; Thin Liquids (level 0)       Significant Results and Procedures   Most Recent 3 CBC's:  Recent Labs   Lab Test 04/08/24  0557 04/07/24  1016 04/06/24  0637   WBC 7.8 8.6 7.3   HGB 11.5* 12.1 12.0   MCV 90 92 89    279 228     Most Recent 3 BMP's:  Recent Labs   Lab Test 04/09/24  1318 04/09/24  1037 04/09/24  0829 04/09/24  0542 04/08/24  0753 04/08/24  0557 04/07/24  2007 04/07/24  1752 04/07/24  1201 04/07/24  1016 04/06/24  1225 04/06/24  0637   NA  --   --   --   --   --  140  --   --   --  140  --  146*   POTASSIUM  --   --   --  3.7  --  4.0  --  4.1  --  3.1*  --  3.9   CHLORIDE  --   --   --   --   --  101  --   --   --  101  --  105   CO2  --   --   --   --   --  30*  --   --   --  31*  --  27   BUN  --   --   --   --   --  15.2  --   --   --  9.9  --  13.8   CR  --   --   --   --   --  0.65  --   --   --  0.56  --  0.49*   ANIONGAP  --   --   --   --   --  9  --   --   --  8  --  14   GLENIS  --   --   --   --   --  9.8  --   --   --  9.8  --  9.8   * 173* 113*  --    < > 191*   < >  --    < > 202*   < > 103*    < > = values in this interval not displayed.     Most Recent 2  LFT's:  Recent Labs   Lab Test 12/16/23  1555 12/05/23  2123 12/05/23 2013   AST 21  21 30  --    ALT 20  19  --  22   ALKPHOS 80  78  --  79   BILITOTAL 0.3  0.3  --  0.2   ,   Results for orders placed or performed during the hospital encounter of 04/03/24   XR Chest 2 Views    Narrative    EXAM: XR CHEST 2 VIEWS  LOCATION: [facility]  DATE: 4/4/2024    INDICATION: Ongoing cough.  COMPARISON: Chest x-ray 2 views 03/27/2024 at 1023 hours.      Impression    IMPRESSION: Improved platelike atelectasis in the left base demonstrated previously. Both lungs are otherwise clear. No adenopathy or effusion. Mild cardiac enlargement. Normal pulmonary vascularity. Mild degenerative changes both shoulders and the   spine.   XR Video Swallow with SLP or OT    Narrative    VIDEO SPEECH EVALUATION WITH OR WITHOUT ESOPHAGRAM  4/8/2024 9:36 AM     HISTORY: persistent s/s aspiration    COMPARISON: None.    FLUOROSCOPY TIME: 1.8 minutes.    SPOT FILMS: 10    TECHNIQUE: A modified barium swallow is performed with speech  pathology. Note that only the cervical esophagus was evaluated in  lateral view.      Impression    IMPRESSION: Flash penetration with thin liquids..    Please see the speech pathology report for further details.    JENNIFER LAYNE MD         SYSTEM ID:  [id number]   CT Chest w Contrast    Narrative    EXAM: CT CHEST W CONTRAST  LOCATION: [facility]  DATE: 4/7/2024    INDICATION: prolongue cough, sob r o PNA  COMPARISON: CT 10/25/2023  TECHNIQUE: CT chest with IV contrast. Multiplanar reformats were obtained. Dose reduction techniques were used.    CONTRAST: 79mL Isovue 370    FINDINGS:   LUNGS AND PLEURA: Mild atelectasis in the left lower lobe with no central airway obstruction.    MEDIASTINUM/AXILLAE: Cardiomegaly. Enlargement of the central pulmonary artery measuring 4.1 cm in greatest radial dimension most typical for changes of chronic pulmonary arterial  hypertension.    CORONARY ARTERY CALCIFICATION: Moderate.    UPPER ABDOMEN: There is mild to moderate fatty infiltration of the liver.    MUSCULOSKELETAL: Moderate scattered hypertrophic changes most marked in the mid thoracic spine.      Impression    IMPRESSION:   1.  No inflammatory infiltrates, or active CHF.    2.  Mild atelectasis left lower lobe with no central airway obstruction.    3.  Prominent left main pulmonary artery most typical for changes of pulmonary arterial hypertension.    4.  Moderate coronary artery calcification.   Echocardiogram Complete     Value    LVEF  60-65%    [facility]    [id number]  [id number]  [id number]^FAINA^AL^BILL     [facility]  Echocardiography Laboratory  [address] [place] [address]     Name: FABIOLA FREEMAN  MRN: [id number]  : 1950  Study Date: 2024 01:02 PM  Age: 73 yrs  Gender: Female  Patient Location: [facility]  Reason For Study: SOB  Ordering Physician: LASHELL EISENBERG  Performed By: [facility] [[email or web address]] Jina Hull     BSA: 1.7 m2  Height: 59 in  Weight: 165 lb  HR: 67  BP: 130/83 mmHg  ______________________________________________________________________________  Procedure  Complete Portable Echo Adult. Optison (NDC #[id number]) given intravenously.  Technically difficult study.Extremely difficult acoustic windows despite the  use of contrast for endcardial border definition.  ______________________________________________________________________________  Interpretation Summary     Technically difficult study     Left ventricular systolic function is normal.The visual ejection fraction is  60-65%.  The right ventricular systolic function is normal.  No significant valvular stenosis or regurgitation on Doppler interrogation  Pulmonary hypertension- RVSP 47 mm hg +RA.  The inferior vena cava was normal in size with preserved  respiratory  variability.  ______________________________________________________________________________  Left Ventricle  The left ventricle is normal in size. Left ventricular hypertrophy is noted by  two-dimensional echocardiography. Left ventricular systolic function is  normal. The visual ejection fraction is 60-65%. Diastolic Doppler findings  (E/E' ratio and/or other parameters) suggest left ventricular filling  pressures are indeterminate. No regional wall motion abnormalities noted.     Right Ventricle  The right ventricle is normal size. The right ventricular systolic function is  normal.     Atria  The left atrium is mildly dilated. The right atrium is mildly dilated. There  is no color Doppler evidence of an atrial shunt.     Mitral Valve  There is trace mitral regurgitation. There is no mitral valve stenosis.     Tricuspid Valve  There is trace tricuspid regurgitation. The right ventricular systolic  pressure is approximated at 47.4 mmHg plus the right atrial pressure.  Pulmonary hypertension.     Aortic Valve  The aortic valve is not well visualized. No aortic regurgitation is present.  No aortic stenosis is present.     Pulmonic Valve  There is no pulmonic valvular stenosis.     Vessels  The aortic root is normal size. Normal size ascending aorta. The inferior vena  cava was normal in size with preserved respiratory variability.     Pericardium  There is no pericardial effusion.     ______________________________________________________________________________  MMode/2D Measurements & Calculations  IVSd: 1.1 cm  LVIDd: 4.7 cm  LVIDs: 3.1 cm  LVPWd: 0.75 cm  FS: 33.5 %     LV mass(C)d: 145.6 grams  LV mass(C)dI: 85.7 grams/m2  Ao root diam: 3.1 cm  asc Aorta Diam: 3.2 cm  LVOT diam: 2.1 cm  LVOT area: 3.4 cm2  Ao root diam index Ht(cm/m): 2.1  Ao root diam index BSA (cm/m2): 1.9  Asc Ao diam index BSA (cm/m2): 1.9  Asc Ao diam index Ht(cm/m): 2.1  LA Volume (BP): 61.4 ml  LA Volume Index (BP): 36.1  ml/m2     RWT: 0.32     Doppler Measurements & Calculations  MV E max barry: 52.7 cm/sec  MV A max barry: 98.1 cm/sec  MV E/A: 0.54  MV max P.1 mmHg  MV mean P.4 mmHg  MV V2 VTI: 28.2 cm  MV dec time: 0.28 sec  PA V2 max: 92.9 cm/sec  PA max PG: 3.5 mmHg  TR max barry: 344.2 cm/sec  TR max P.4 mmHg  E/E' av.0  Lateral E/e': 12.1  Medial E/e': 13.8     ______________________________________________________________________________  Report approved by: Michelle Tran 2024 01:55 PM             Discharge Medications   Current Discharge Medication List        START taking these medications    Details   senna-docusate (SENOKOT-S/PERICOLACE) 8.6-50 MG tablet Take 1 tablet by mouth 2 times daily as needed for constipation  Qty: 20 tablet, Refills: 0    Associated Diagnoses: Constipation, unspecified constipation type           CONTINUE these medications which have CHANGED    Details   predniSONE (DELTASONE) 20 MG tablet Take 1 tablet (20 mg) by mouth daily for 2 days, THEN 0.5 tablets (10 mg) daily for 2 days.  Qty: 3 tablet, Refills: 0    Associated Diagnoses: COPD exacerbation (H)           CONTINUE these medications which have NOT CHANGED    Details   acetaminophen (TYLENOL) 500 MG tablet Take 500-1,000 mg by mouth every 8 hours as needed for mild pain      albuterol (PROAIR HFA/PROVENTIL HFA/VENTOLIN HFA) 108 (90 Base) MCG/ACT inhaler Inhale 2 puffs into the lungs every 4 hours as needed for wheezing  Qty: 18 g, Refills: 0    Comments: Pharmacy may dispense brand covered by insurance (Proair, or proventil or ventolin or generic albuterol inhaler)      albuterol (PROVENTIL) (2.5 MG/3ML) 0.083% neb solution Take 1 vial by nebulization every 6 hours as needed for shortness of breath, wheezing or cough      amLODIPine (NORVASC) 5 MG tablet Take 5 mg by mouth daily      aspirin (ASA) 81 MG EC tablet Take 1 tablet (81 mg) by mouth daily  Qty: 30 tablet, Refills: 11    Associated Diagnoses: Right-sided  nontraumatic intracerebral hemorrhage, unspecified cerebral location (H)      atorvastatin (LIPITOR) 40 MG tablet Take 1 tablet (40 mg) by mouth every evening  Qty: 30 tablet, Refills: 4    Associated Diagnoses: Right-sided nontraumatic intracerebral hemorrhage, unspecified cerebral location (H)      budesonide-formoterol (SYMBICORT) 160-4.5 MCG/ACT Inhaler Inhale 2 puffs once daily plus 1-2 puffs as needed. May use up to 12 puffs per day.  Qty: 20.4 g, Refills: 11    Comments: Please dispense #2 10.2g inhalers for a 30-day supply per MYRTLE 2021 guidelines. If reject arises, enter DUR codes: HD / M0 / 1G. Note: MA prefers brand Symbicort.  Associated Diagnoses: Hypoxia      Cholecalciferol (VITAMIN D3) 50 MCG (2000 UT) CAPS Take 50 mcg by mouth daily      furosemide (LASIX) 40 MG tablet Take 40 mg by mouth daily      losartan (COZAAR) 100 MG tablet Take 100 mg by mouth daily      metFORMIN (GLUCOPHAGE XR) 500 MG 24 hr tablet Take 500 mg by mouth 2 times daily (with meals)      metoprolol succinate ER (TOPROL XL) 50 MG 24 hr tablet Take 50 mg by mouth daily      triamcinolone (KENALOG) 0.1 % external ointment Apply topically 2 times daily as needed for irritation           Allergies   No Known Allergies

## 2024-04-09 NOTE — PLAN OF CARE
Goal Outcome Evaluation    Inpatient Progress Note:  For complete assessment see flow sheet documentation.  BP (!) 140/109 (BP Location: Left arm, Cuff Size: Adult Regular)   Pulse 75   Temp 98.3  F (36.8  C) (Oral)   Resp 20   Wt 71.1 kg (156 lb 12 oz)   SpO2 94%   BMI 31.66 kg/m     Neuro: Intact  Vital Signs:WDL. RA,   Pain/Comfort: Denies pain.   Diet/po intake: Moderate Carb.  Output: Using a purewick for incontinence   Activity/Ambulation: Ax 1 with walker and gait belt   Pertinent assessments: AC/HS BG,   Infusions: SL  Plan, Home care with PT home.     Pt is on CPAP for  with 1/2 O2 Over night .         patient son in the room,he help to , patient had a great night, no complain.     Tariq Childs ............. LEIGH

## 2024-04-09 NOTE — PLAN OF CARE
Goal Outcome Evaluation:    Care from 1500 2300      Inpatient Progress Note:  For complete assessment see flow sheet documentation.       /76 (BP Location: Left arm)   Pulse 68   Temp 98.1  F (36.7  C) (Oral)   Resp 14   Wt 71.1 kg (156 lb 12 oz)   SpO2 96%   BMI 31.66 kg/m         Neuro: Intact  Vital Signs:WDL. RA,   Pain/Comfort: Denies pain.   Diet/po intake: Moderate Carb.  Output: Using a purewick for incontinence   Activity/Ambulation: Ax 1 with walker and gait belt   Pertinent assessments: AC/HS BG,   Infusions: SL  Plan, Home care with PT home.    Pt is on CPAP for Over night .

## 2024-04-09 NOTE — PROGRESS NOTES
Patient's After Visit Summary was reviewed with patient and/or family.   Patient verbalized understanding of After Visit Summary, recommended follow up and was given an opportunity to ask questions.   Discharge medications sent home with patient/family:No   Discharged with son and daughter        VSS, afeb.Lungs coarse and diminished throughout. Did receive a Duoneb this afternoon.Pt. was determined appropriate to DC to home this afternoon.Son and daughter present and helpful.RN printed copy of discontinue orders and reviewed with son, whom she lives with. Son stated understanding and offered no questions/concerns.Pt. was dressed, MIV was Dc'd.Pt. placed in W/C, wheeled down to waiting car. Son safely put pt. Into car and left.

## 2024-04-10 ENCOUNTER — PATIENT OUTREACH (OUTPATIENT)
Dept: CARE COORDINATION | Facility: CLINIC | Age: 74
End: 2024-04-10
Payer: COMMERCIAL

## 2024-04-10 NOTE — PROGRESS NOTES
"Speech Language Therapy Discharge Summary    Reason for therapy discharge:    Discharged to home.    Progress towards therapy goal(s). See goals on Care Plan in [facility] electronic health record for goal details.  Goals partially met.  Barriers to achieving goals:   discharge from facility.    Therapy recommendation(s):    No further therapy is recommended. VFSS 4/8/24 clinical impressions: \"Video swallow study indicating no aspiration or deep penetration during study, and intermittent dry cough not related to oropharyngeal deficit. Flash penetration only occurred by straw, to which patient did exhibit throat clear response, however, no aspiration was observed and penetrated material did not reach vocal fold level or remain in the laryngeal vestibule at all.\"      Note: pt not seen for therapy this date.      "

## 2024-04-10 NOTE — PROGRESS NOTES
Clinic Care Coordination Chart Review     Situation: Patient chart reviewed by care coordination leader.     Background: Primary Care Care Coordination referral entered by Inpatient Care Manager through IP to Amb CM handoff process.     Assessment: Per chart review, patient has primary care provider outside of [facility].  Primary Care Care Coordination team supports patients in collaboration with their [facility] PCP therefore this patient would not be a candidate for outreach.       Plan/Recommendations: Patient can be encouraged to follow discharge instructions as outlined on AVS. No intervention planned by [facility] Primary Care Care Coordination team at this time.     Jeannette Sevilla, RUMAN, RN   Manager of Ambulatory Care Management  [facility]

## 2024-05-26 ENCOUNTER — OFFICE VISIT (OUTPATIENT)
Dept: URGENT CARE | Facility: URGENT CARE | Age: 74
End: 2024-05-26
Payer: COMMERCIAL

## 2024-05-26 VITALS
SYSTOLIC BLOOD PRESSURE: 123 MMHG | TEMPERATURE: 98.1 F | DIASTOLIC BLOOD PRESSURE: 74 MMHG | OXYGEN SATURATION: 100 % | HEART RATE: 105 BPM

## 2024-05-26 DIAGNOSIS — S90.212A SUBUNGUAL HEMATOMA OF GREAT TOE OF LEFT FOOT, INITIAL ENCOUNTER: Primary | ICD-10-CM

## 2024-05-26 PROCEDURE — 99213 OFFICE O/P EST LOW 20 MIN: CPT | Performed by: FAMILY MEDICINE

## 2024-05-26 NOTE — PROGRESS NOTES
SUBJECTIVE:  Chief Complaint   Patient presents with    CONCERNS LEFT BIG TOE     ONSET 1-2 DAYS PT IS DIABETIC SON WOULD LIKE TO MAKE SURE PATIENT LEFT BIG TOE IS WELL      Pilo Quintana is a 74 year old female who presents with a chief complaint of left big toe pain/nail discoloration    Denies any trauma or injury.  Patient thinks that has been there for couple of days but son only notice this today.  Denies any pain.  Takes baby aspirin.    Has diabetes and son wants to make sure whether current symptom is concerning for bad infection    Past Medical History:   Diagnosis Date    Diabetes (H)     Hypertension     Morbid obesity (H)     Osteoarthritis 6/7/2003    PMB (postmenopausal bleeding) 9/16/2014    Renal mass     Thickened endometrium 9/16/2014    Uncomplicated asthma     Unspecified cerebral artery occlusion with cerebral infarction 1998    was in [place], no residual at present     Current Outpatient Medications   Medication Sig Dispense Refill    amLODIPine (NORVASC) 5 MG tablet Take 5 mg by mouth daily      aspirin (ASA) 81 MG EC tablet Take 1 tablet (81 mg) by mouth daily 30 tablet 11    atorvastatin (LIPITOR) 40 MG tablet Take 1 tablet (40 mg) by mouth every evening 30 tablet 4    budesonide-formoterol (SYMBICORT) 160-4.5 MCG/ACT Inhaler Inhale 2 puffs once daily plus 1-2 puffs as needed. May use up to 12 puffs per day. 20.4 g 11    Cholecalciferol (VITAMIN D3) 50 MCG (2000 UT) CAPS Take 50 mcg by mouth daily      furosemide (LASIX) 40 MG tablet Take 40 mg by mouth daily      losartan (COZAAR) 100 MG tablet Take 100 mg by mouth daily      metFORMIN (GLUCOPHAGE XR) 500 MG 24 hr tablet Take 500 mg by mouth 2 times daily (with meals)      metoprolol succinate ER (TOPROL XL) 50 MG 24 hr tablet Take 50 mg by mouth daily      triamcinolone (KENALOG) 0.1 % external ointment Apply topically 2 times daily as needed for irritation      acetaminophen (TYLENOL) 500 MG tablet Take 500-1,000 mg by mouth every 8  hours as needed for mild pain (Patient not taking: Reported on 5/26/2024)      albuterol (PROAIR HFA/PROVENTIL HFA/VENTOLIN HFA) 108 (90 Base) MCG/ACT inhaler Inhale 2 puffs into the lungs every 4 hours as needed for wheezing (Patient not taking: Reported on 5/26/2024) 18 g 0    albuterol (PROVENTIL) (2.5 MG/3ML) 0.083% neb solution Take 1 vial by nebulization every 6 hours as needed for shortness of breath, wheezing or cough (Patient not taking: Reported on 5/26/2024)      senna-docusate (SENOKOT-S/PERICOLACE) 8.6-50 MG tablet Take 1 tablet by mouth 2 times daily as needed for constipation (Patient not taking: Reported on 5/26/2024) 20 tablet 0     Social History     Tobacco Use    Smoking status: Never    Smokeless tobacco: Never   Substance Use Topics    Alcohol use: No       ROS:  Review of systems negative except as stated above.    EXAM:   /74   Pulse 105   Temp 98.1  F (36.7  C)   SpO2 100%   GENERAL APPEARANCE: healthy, alert and no distress  EXTREMITIES: peripheral pulses normal, left big toenail with 90% subungual hematoma, nontender, no erythema, no drainage  PSYCH:alert, affect bright    X-RAY was not done - not available    ASSESSMENT/PLAN:  (S90.212A) Subungual hematoma of great toe of left foot, initial encounter  (primary encounter diagnosis)  Plan: monitor      Reviewed that has subungual hematoma, denies any pain or trauma.  Exam is not concerning for toe infection. Reviewed that trephination can be performed to help relieve pressure/pain but as patient does not endorse any discomfort, okay to monitor.  Reviewed that trephination works best in the first few days.  Discussed possible nail loss can occur.    Follow up with primary provider of podiatry in 2-4 weeks    Duong Richmond MD  May 26, 2024 2:36 PM

## 2024-06-30 ENCOUNTER — HOSPITAL ENCOUNTER (EMERGENCY)
Facility: CLINIC | Age: 74
Discharge: HOME | End: 2024-06-30
Attending: EMERGENCY MEDICINE | Admitting: EMERGENCY MEDICINE
Payer: COMMERCIAL

## 2024-06-30 ENCOUNTER — APPOINTMENT (OUTPATIENT)
Dept: GENERAL RADIOLOGY | Facility: CLINIC | Age: 74
End: 2024-06-30
Attending: EMERGENCY MEDICINE
Payer: COMMERCIAL

## 2024-06-30 VITALS
SYSTOLIC BLOOD PRESSURE: 139 MMHG | DIASTOLIC BLOOD PRESSURE: 80 MMHG | RESPIRATION RATE: 18 BRPM | BODY MASS INDEX: 33.93 KG/M2 | TEMPERATURE: 97.1 F | OXYGEN SATURATION: 95 % | HEART RATE: 74 BPM | WEIGHT: 168 LBS

## 2024-06-30 DIAGNOSIS — J44.1 COPD EXACERBATION (H): ICD-10-CM

## 2024-06-30 LAB
ANION GAP SERPL CALCULATED.3IONS-SCNC: 16 MMOL/L (ref 7–15)
BASOPHILS # BLD AUTO: 0 10E3/UL (ref 0–0.2)
BASOPHILS NFR BLD AUTO: 0 %
BUN SERPL-MCNC: 28.2 MG/DL (ref 8–23)
CALCIUM SERPL-MCNC: 10.3 MG/DL (ref 8.8–10.2)
CHLORIDE SERPL-SCNC: 97 MMOL/L (ref 98–107)
CREAT SERPL-MCNC: 0.73 MG/DL (ref 0.51–0.95)
DEPRECATED HCO3 PLAS-SCNC: 29 MMOL/L (ref 22–29)
EGFRCR SERPLBLD CKD-EPI 2021: 85 ML/MIN/1.73M2
EOSINOPHIL # BLD AUTO: 0.4 10E3/UL (ref 0–0.7)
EOSINOPHIL NFR BLD AUTO: 5 %
ERYTHROCYTE [DISTWIDTH] IN BLOOD BY AUTOMATED COUNT: 13.9 % (ref 10–15)
FLUAV RNA SPEC QL NAA+PROBE: NEGATIVE
FLUBV RNA RESP QL NAA+PROBE: NEGATIVE
GLUCOSE SERPL-MCNC: 156 MG/DL (ref 70–99)
HCT VFR BLD AUTO: 41.3 % (ref 35–47)
HGB BLD-MCNC: 12.8 G/DL (ref 11.7–15.7)
HOLD SPECIMEN: NORMAL
HOLD SPECIMEN: NORMAL
IMM GRANULOCYTES # BLD: 0 10E3/UL
IMM GRANULOCYTES NFR BLD: 0 %
LYMPHOCYTES # BLD AUTO: 2.2 10E3/UL (ref 0.8–5.3)
LYMPHOCYTES NFR BLD AUTO: 22 %
MCH RBC QN AUTO: 28.2 PG (ref 26.5–33)
MCHC RBC AUTO-ENTMCNC: 31 G/DL (ref 31.5–36.5)
MCV RBC AUTO: 91 FL (ref 78–100)
MONOCYTES # BLD AUTO: 0.5 10E3/UL (ref 0–1.3)
MONOCYTES NFR BLD AUTO: 5 %
NEUTROPHILS # BLD AUTO: 6.6 10E3/UL (ref 1.6–8.3)
NEUTROPHILS NFR BLD AUTO: 68 %
NRBC # BLD AUTO: 0 10E3/UL
NRBC BLD AUTO-RTO: 0 /100
PLATELET # BLD AUTO: 293 10E3/UL (ref 150–450)
POTASSIUM SERPL-SCNC: 3.6 MMOL/L (ref 3.4–5.3)
RBC # BLD AUTO: 4.54 10E6/UL (ref 3.8–5.2)
RSV RNA SPEC NAA+PROBE: NEGATIVE
SARS-COV-2 RNA RESP QL NAA+PROBE: NEGATIVE
SODIUM SERPL-SCNC: 142 MMOL/L (ref 135–145)
WBC # BLD AUTO: 9.8 10E3/UL (ref 4–11)

## 2024-06-30 PROCEDURE — 250N000012 HC RX MED GY IP 250 OP 636 PS 637: Performed by: EMERGENCY MEDICINE

## 2024-06-30 PROCEDURE — 71046 X-RAY EXAM CHEST 2 VIEWS: CPT

## 2024-06-30 PROCEDURE — 85025 COMPLETE CBC W/AUTO DIFF WBC: CPT | Performed by: EMERGENCY MEDICINE

## 2024-06-30 PROCEDURE — 250N000009 HC RX 250: Performed by: EMERGENCY MEDICINE

## 2024-06-30 PROCEDURE — 99285 EMERGENCY DEPT VISIT HI MDM: CPT | Mod: 25

## 2024-06-30 PROCEDURE — 250N000013 HC RX MED GY IP 250 OP 250 PS 637: Performed by: EMERGENCY MEDICINE

## 2024-06-30 PROCEDURE — 87637 SARSCOV2&INF A&B&RSV AMP PRB: CPT | Performed by: EMERGENCY MEDICINE

## 2024-06-30 PROCEDURE — 94640 AIRWAY INHALATION TREATMENT: CPT

## 2024-06-30 PROCEDURE — 36415 COLL VENOUS BLD VENIPUNCTURE: CPT | Performed by: EMERGENCY MEDICINE

## 2024-06-30 PROCEDURE — 80048 BASIC METABOLIC PNL TOTAL CA: CPT | Performed by: EMERGENCY MEDICINE

## 2024-06-30 RX ORDER — ALBUTEROL SULFATE 0.83 MG/ML
2.5 SOLUTION RESPIRATORY (INHALATION) EVERY 6 HOURS PRN
Qty: 90 ML | Refills: 0 | Status: SHIPPED | OUTPATIENT
Start: 2024-06-30

## 2024-06-30 RX ORDER — DOXYCYCLINE 100 MG/1
100 CAPSULE ORAL 2 TIMES DAILY
Qty: 28 CAPSULE | Refills: 0 | Status: ON HOLD | OUTPATIENT
Start: 2024-06-30 | End: 2024-07-10

## 2024-06-30 RX ORDER — DOXYCYCLINE 100 MG/1
100 CAPSULE ORAL ONCE
Status: COMPLETED | OUTPATIENT
Start: 2024-06-30 | End: 2024-06-30

## 2024-06-30 RX ORDER — ALBUTEROL SULFATE 0.83 MG/ML
2.5 SOLUTION RESPIRATORY (INHALATION)
Status: COMPLETED | OUTPATIENT
Start: 2024-06-30 | End: 2024-06-30

## 2024-06-30 RX ORDER — PREDNISONE 20 MG/1
60 TABLET ORAL ONCE
Status: COMPLETED | OUTPATIENT
Start: 2024-06-30 | End: 2024-06-30

## 2024-06-30 RX ORDER — PREDNISONE 20 MG/1
TABLET ORAL
Qty: 10 TABLET | Refills: 0 | Status: SHIPPED | OUTPATIENT
Start: 2024-06-30 | End: 2024-07-07

## 2024-06-30 RX ORDER — IPRATROPIUM BROMIDE AND ALBUTEROL SULFATE 2.5; .5 MG/3ML; MG/3ML
6 SOLUTION RESPIRATORY (INHALATION) ONCE
Status: DISCONTINUED | OUTPATIENT
Start: 2024-06-30 | End: 2024-06-30

## 2024-06-30 RX ORDER — ALBUTEROL SULFATE 0.83 MG/ML
2.5 SOLUTION RESPIRATORY (INHALATION) ONCE
Status: COMPLETED | OUTPATIENT
Start: 2024-06-30 | End: 2024-06-30

## 2024-06-30 RX ADMIN — ALBUTEROL SULFATE 2.5 MG: 2.5 SOLUTION RESPIRATORY (INHALATION) at 13:45

## 2024-06-30 RX ADMIN — ALBUTEROL SULFATE 2.5 MG: 2.5 SOLUTION RESPIRATORY (INHALATION) at 14:13

## 2024-06-30 RX ADMIN — DOXYCYCLINE HYCLATE 100 MG: 100 CAPSULE ORAL at 13:45

## 2024-06-30 RX ADMIN — PREDNISONE 60 MG: 20 TABLET ORAL at 13:46

## 2024-06-30 RX ADMIN — ALBUTEROL SULFATE 2.5 MG: 2.5 SOLUTION RESPIRATORY (INHALATION) at 13:58

## 2024-06-30 ASSESSMENT — ACTIVITIES OF DAILY LIVING (ADL)
ADLS_ACUITY_SCORE: 39
ADLS_ACUITY_SCORE: 37

## 2024-06-30 ASSESSMENT — COLUMBIA-SUICIDE SEVERITY RATING SCALE - C-SSRS
2. HAVE YOU ACTUALLY HAD ANY THOUGHTS OF KILLING YOURSELF IN THE PAST MONTH?: NO
1. IN THE PAST MONTH, HAVE YOU WISHED YOU WERE DEAD OR WISHED YOU COULD GO TO SLEEP AND NOT WAKE UP?: NO
6. HAVE YOU EVER DONE ANYTHING, STARTED TO DO ANYTHING, OR PREPARED TO DO ANYTHING TO END YOUR LIFE?: NO

## 2024-06-30 NOTE — ED PROVIDER NOTES
Emergency Department Note      History of Present Illness     Chief Complaint   Cough      HPI   Pilo Quintana is a 74 year old female who presents with son for evaluation of a cough. The patient normally uses a walker to get around. She was admitted here in early April and was determined to be suffering either from COPD or CHF. No definite diagnosis. The son reports the patient experiencing a cough for the past week with the worse episode being last night. He gave the patient cough syrup to help her go to sleep. He adds this morning the patient produced a small amount of sputum. He states that he has been giving the patient nebs every 6-7 hours since onset. He has also been checking the patient's vitals which have been normal. Denies fever, sore throat, headache, and leg swelling. The son reports the patient saw her primary 1.5 months ago.     Independent Historian   Son as detailed above.    Review of External Notes     I reviewed ED admission from 4/03/24.  Past Medical History     Medical History and Problem List   Past Medical History:   Diagnosis Date    Diabetes (H)     Hypertension     Morbid obesity (H)     Osteoarthritis 6/7/2003    PMB (postmenopausal bleeding) 9/16/2014    Renal mass     Thickened endometrium 9/16/2014    Uncomplicated asthma     Unspecified cerebral artery occlusion with cerebral infarction 1998       Medications   Symbicort  Metformin  Lasix  Metoprolol  Mucinex  Glycolax  Lipitor        albuterol (PROVENTIL) (2.5 MG/3ML) 0.083% neb solution  doxycycline hyclate (VIBRAMYCIN) 100 MG capsule  predniSONE (DELTASONE) 20 MG tablet  acetaminophen (TYLENOL) 500 MG tablet  albuterol (PROAIR HFA/PROVENTIL HFA/VENTOLIN HFA) 108 (90 Base) MCG/ACT inhaler  amLODIPine (NORVASC) 5 MG tablet  aspirin (ASA) 81 MG EC tablet  atorvastatin (LIPITOR) 40 MG tablet  budesonide-formoterol (SYMBICORT) 160-4.5 MCG/ACT Inhaler  Cholecalciferol (VITAMIN D3) 50 MCG (2000 UT) CAPS  furosemide (LASIX) 40 MG  tablet  losartan (COZAAR) 100 MG tablet  metFORMIN (GLUCOPHAGE XR) 500 MG 24 hr tablet  metoprolol succinate ER (TOPROL XL) 50 MG 24 hr tablet  senna-docusate (SENOKOT-S/PERICOLACE) 8.6-50 MG tablet  triamcinolone (KENALOG) 0.1 % external ointment        Surgical History   Past Surgical History:   Procedure Laterality Date    DILATION AND CURETTAGE, OPERATIVE HYSTEROSCOPY WITH MORCELLATOR, COMBINED N/A 9/25/2014    Procedure: COMBINED DILATION AND CURETTAGE, OPERATIVE HYSTEROSCOPY WITH MORCELLATOR;  Surgeon: Silverio Christy MD;  Location:  OR       Physical Exam     Patient Vitals for the past 24 hrs:   BP Temp Temp src Pulse Resp SpO2 Weight   06/30/24 1805 139/80 -- -- -- -- -- --   06/30/24 1308 (!) 131/99 -- -- 74 -- 95 % --   06/30/24 1156 (!) 131/90 97.1  F (36.2  C) Oral 73 18 96 % 76.2 kg (168 lb)     Physical Exam  General: The patient is alert, in no respiratory distress.    HENT: Mucous membranes moist.    Cardiovascular: Regular rate rate    Respiratory:  Decreased breath sounds.    Gastrointestinal: Abdomen soft. No guarding, no rebound. No palpable hernias.     Musculoskeletal: No gross deformity.     Skin: No rashes or petechiae.     Neurologic: The patient is alert.  She does communicate with her son.  She has adequate tone and sits in a wheelchair.  The patient did walk      Psychiatric: The patient is non-tearful.    Diagnostics     Lab Results   Labs Ordered and Resulted from Time of ED Arrival to Time of ED Departure   BASIC METABOLIC PANEL - Abnormal       Result Value    Sodium 142      Potassium 3.6      Chloride 97 (*)     Carbon Dioxide (CO2) 29      Anion Gap 16 (*)     Urea Nitrogen 28.2 (*)     Creatinine 0.73      GFR Estimate 85      Calcium 10.3 (*)     Glucose 156 (*)    CBC WITH PLATELETS AND DIFFERENTIAL - Abnormal    WBC Count 9.8      RBC Count 4.54      Hemoglobin 12.8      Hematocrit 41.3      MCV 91      MCH 28.2      MCHC 31.0 (*)     RDW 13.9      Platelet Count 293      %  Neutrophils 68      % Lymphocytes 22      % Monocytes 5      % Eosinophils 5      % Basophils 0      % Immature Granulocytes 0      NRBCs per 100 WBC 0      Absolute Neutrophils 6.6      Absolute Lymphocytes 2.2      Absolute Monocytes 0.5      Absolute Eosinophils 0.4      Absolute Basophils 0.0      Absolute Immature Granulocytes 0.0      Absolute NRBCs 0.0     INFLUENZA A/B, RSV, & SARS-COV2 PCR - Normal    Influenza A PCR Negative      Influenza B PCR Negative      RSV PCR Negative      SARS CoV2 PCR Negative         Imaging   Chest XR,  PA & LAT   Final Result   IMPRESSION: Shallower inspiration/low lung volumes. Slight increase in mild bandlike opacities left base suggesting some atelectasis. Right lung clear. Prominent heart size, accentuated by technique. Aortic calcification. Chest otherwise negative.            Independent Interpretation   I interpreted the chest x-ray, don't see any infiltrate.      ED Course      Medications Administered   Medications   albuterol (PROVENTIL) neb solution 2.5 mg (2.5 mg Nebulization Not Given 6/30/24 1409)   predniSONE (DELTASONE) tablet 60 mg (60 mg Oral $Given 6/30/24 1346)   doxycycline hyclate (VIBRAMYCIN) capsule 100 mg (100 mg Oral $Given 6/30/24 1345)   albuterol (PROVENTIL) neb solution 2.5 mg (2.5 mg Nebulization $Given 6/30/24 1413)       Procedures   Procedures     Discussion of Management   I discussed the case and care with the patient's son who is her  as well as caregiver    Social Determinants of Health adding to complexity of care   Healthcare Access/Compliance and Education/Literacy  Patient has a language barrier limiting getting history directly from her.    ED Course   ED Course as of 06/30/24 2017   Sun Jun 30, 2024   1327 I obtained history and performed physical exam as noted above.    1358 I reassessed the patient.       Medical Decision Making / Diagnosis       MDM   Pilo Quintana is a 74 year old female who I reviewed her recent  admission she has both a history of CHF as well as COPD.  With the report from the son of a cough which is now productive with her shortness of breath and her having responded to nebs I did feel this is more likely COPD.  She does not have any significant swelling of her lower extremities there is no crackles.  I did reassess her after the nebs and she was starting to move more air therefore this is much more consistent with COPD.  I did form a chest x-ray looking for pneumonia or pneumothorax I did consider ACS though felt that is unlikely.  The patient does not have COVID or influenza she is improved was able to walk the son felt comfortable taking her home I start her on antibiotics refilled her neb prescription and she was discharged in good condition.    Disposition   The patient was discharged.     Diagnosis     ICD-10-CM    1. COPD exacerbation (H)  J44.1            Discharge Medications   Discharge Medication List as of 6/30/2024  6:05 PM        START taking these medications    Details   doxycycline hyclate (VIBRAMYCIN) 100 MG capsule Take 1 capsule (100 mg) by mouth 2 times daily for 14 days, Disp-28 capsule, R-0, Local Print      predniSONE (DELTASONE) 20 MG tablet Take two tablets (= 40mg) each day for 5 (five) days, Disp-10 tablet, R-0, Local Print               Scribe Disclosure:  I, Skyler Wahl, am serving as a scribe at 3:12 PM on 6/30/2024 to document services personally performed by Efe Mckay MD based on my observations and the provider's statements to me.        Efe Mckay MD  06/30/24 2019

## 2024-06-30 NOTE — ED TRIAGE NOTES
Pt arrives with son for a cough that has been persistent for the past week. Son has been giving nebs as need a and cough syrup. This has improved symptoms but they continue to come back. Pt uses CPAP at night. Hx of COPD per son, pt denies sore throat.     Triage Assessment (Adult)       Row Name 06/30/24 1158          Triage Assessment    Airway WDL WDL        Respiratory WDL    Respiratory WDL WDL        Cardiac WDL    Cardiac WDL WDL

## 2024-07-07 ENCOUNTER — APPOINTMENT (OUTPATIENT)
Dept: CT IMAGING | Facility: CLINIC | Age: 74
DRG: 871 | End: 2024-07-07
Attending: PHYSICIAN ASSISTANT
Payer: COMMERCIAL

## 2024-07-07 ENCOUNTER — APPOINTMENT (OUTPATIENT)
Dept: GENERAL RADIOLOGY | Facility: CLINIC | Age: 74
DRG: 871 | End: 2024-07-07
Attending: EMERGENCY MEDICINE
Payer: COMMERCIAL

## 2024-07-07 ENCOUNTER — HOSPITAL ENCOUNTER (INPATIENT)
Facility: CLINIC | Age: 74
LOS: 3 days | Discharge: HOME | DRG: 871 | End: 2024-07-10
Attending: EMERGENCY MEDICINE | Admitting: INTERNAL MEDICINE
Payer: COMMERCIAL

## 2024-07-07 DIAGNOSIS — J96.01 ACUTE RESPIRATORY FAILURE WITH HYPOXIA AND HYPERCAPNIA (H): ICD-10-CM

## 2024-07-07 DIAGNOSIS — J96.02 ACUTE RESPIRATORY FAILURE WITH HYPOXIA AND HYPERCAPNIA (H): ICD-10-CM

## 2024-07-07 DIAGNOSIS — J15.9 COMMUNITY ACQUIRED BACTERIAL PNEUMONIA: ICD-10-CM

## 2024-07-07 DIAGNOSIS — J44.1 COPD WITH ACUTE EXACERBATION (H): ICD-10-CM

## 2024-07-07 LAB
ANION GAP SERPL CALCULATED.3IONS-SCNC: 15 MMOL/L (ref 7–15)
BASE EXCESS BLDV CALC-SCNC: 2.6 MMOL/L (ref -3–3)
BASOPHILS # BLD AUTO: 0 10E3/UL (ref 0–0.2)
BASOPHILS NFR BLD AUTO: 0 %
BUN SERPL-MCNC: 26.5 MG/DL (ref 8–23)
CALCIUM SERPL-MCNC: 10 MG/DL (ref 8.8–10.2)
CHLORIDE SERPL-SCNC: 96 MMOL/L (ref 98–107)
CREAT SERPL-MCNC: 0.73 MG/DL (ref 0.51–0.95)
DEPRECATED HCO3 PLAS-SCNC: 29 MMOL/L (ref 22–29)
EGFRCR SERPLBLD CKD-EPI 2021: 85 ML/MIN/1.73M2
EOSINOPHIL # BLD AUTO: 0.1 10E3/UL (ref 0–0.7)
EOSINOPHIL NFR BLD AUTO: 0 %
ERYTHROCYTE [DISTWIDTH] IN BLOOD BY AUTOMATED COUNT: 14.1 % (ref 10–15)
FLUAV RNA SPEC QL NAA+PROBE: NEGATIVE
FLUBV RNA RESP QL NAA+PROBE: NEGATIVE
GLUCOSE BLDC GLUCOMTR-MCNC: 246 MG/DL (ref 70–99)
GLUCOSE BLDC GLUCOMTR-MCNC: 299 MG/DL (ref 70–99)
GLUCOSE BLDC GLUCOMTR-MCNC: 306 MG/DL (ref 70–99)
GLUCOSE SERPL-MCNC: 154 MG/DL (ref 70–99)
HCO3 BLDV-SCNC: 30 MMOL/L (ref 21–28)
HCO3 BLDV-SCNC: 36 MMOL/L (ref 21–28)
HCT VFR BLD AUTO: 38.8 % (ref 35–47)
HGB BLD-MCNC: 12.6 G/DL (ref 11.7–15.7)
HOLD SPECIMEN: NORMAL
HOLD SPECIMEN: NORMAL
IMM GRANULOCYTES # BLD: 0.1 10E3/UL
IMM GRANULOCYTES NFR BLD: 0 %
LACTATE BLD-SCNC: 1.4 MMOL/L
LACTATE SERPL-SCNC: 1.5 MMOL/L (ref 0.7–2)
LYMPHOCYTES # BLD AUTO: 1.6 10E3/UL (ref 0.8–5.3)
LYMPHOCYTES NFR BLD AUTO: 14 %
MCH RBC QN AUTO: 29 PG (ref 26.5–33)
MCHC RBC AUTO-ENTMCNC: 32.5 G/DL (ref 31.5–36.5)
MCV RBC AUTO: 89 FL (ref 78–100)
MONOCYTES # BLD AUTO: 0.8 10E3/UL (ref 0–1.3)
MONOCYTES NFR BLD AUTO: 7 %
NEUTROPHILS # BLD AUTO: 9.5 10E3/UL (ref 1.6–8.3)
NEUTROPHILS NFR BLD AUTO: 79 %
NRBC # BLD AUTO: 0 10E3/UL
NRBC BLD AUTO-RTO: 0 /100
O2/TOTAL GAS SETTING VFR VENT: 2 %
OXYHGB MFR BLDV: 96 % (ref 70–75)
PCO2 BLDV: 53 MM HG (ref 40–50)
PCO2 BLDV: 60 MM HG (ref 40–50)
PH BLDV: 7.31 [PH] (ref 7.32–7.43)
PH BLDV: 7.44 [PH] (ref 7.32–7.43)
PLATELET # BLD AUTO: 281 10E3/UL (ref 150–450)
PO2 BLDV: 43 MM HG (ref 25–47)
PO2 BLDV: 81 MM HG (ref 25–47)
POTASSIUM SERPL-SCNC: 3.8 MMOL/L (ref 3.4–5.3)
RBC # BLD AUTO: 4.35 10E6/UL (ref 3.8–5.2)
RSV RNA SPEC NAA+PROBE: NEGATIVE
SAO2 % BLDV: 79 % (ref 70–75)
SAO2 % BLDV: 97.8 % (ref 70–75)
SARS-COV-2 RNA RESP QL NAA+PROBE: NEGATIVE
SODIUM SERPL-SCNC: 140 MMOL/L (ref 135–145)
WBC # BLD AUTO: 12 10E3/UL (ref 4–11)

## 2024-07-07 PROCEDURE — 99223 1ST HOSP IP/OBS HIGH 75: CPT | Performed by: PHYSICIAN ASSISTANT

## 2024-07-07 PROCEDURE — 87205 SMEAR GRAM STAIN: CPT | Performed by: PHYSICIAN ASSISTANT

## 2024-07-07 PROCEDURE — 83036 HEMOGLOBIN GLYCOSYLATED A1C: CPT | Performed by: INTERNAL MEDICINE

## 2024-07-07 PROCEDURE — 96375 TX/PRO/DX INJ NEW DRUG ADDON: CPT

## 2024-07-07 PROCEDURE — 250N000013 HC RX MED GY IP 250 OP 250 PS 637: Performed by: PHYSICIAN ASSISTANT

## 2024-07-07 PROCEDURE — 82805 BLOOD GASES W/O2 SATURATION: CPT | Performed by: PHYSICIAN ASSISTANT

## 2024-07-07 PROCEDURE — 36415 COLL VENOUS BLD VENIPUNCTURE: CPT | Performed by: PHYSICIAN ASSISTANT

## 2024-07-07 PROCEDURE — 87637 SARSCOV2&INF A&B&RSV AMP PRB: CPT | Performed by: EMERGENCY MEDICINE

## 2024-07-07 PROCEDURE — 96367 TX/PROPH/DG ADDL SEQ IV INF: CPT

## 2024-07-07 PROCEDURE — 120N000001 HC R&B MED SURG/OB

## 2024-07-07 PROCEDURE — 94640 AIRWAY INHALATION TREATMENT: CPT | Mod: 76

## 2024-07-07 PROCEDURE — 96365 THER/PROPH/DIAG IV INF INIT: CPT

## 2024-07-07 PROCEDURE — 87040 BLOOD CULTURE FOR BACTERIA: CPT | Performed by: EMERGENCY MEDICINE

## 2024-07-07 PROCEDURE — 36415 COLL VENOUS BLD VENIPUNCTURE: CPT | Performed by: EMERGENCY MEDICINE

## 2024-07-07 PROCEDURE — 83605 ASSAY OF LACTIC ACID: CPT | Performed by: EMERGENCY MEDICINE

## 2024-07-07 PROCEDURE — 99418 PROLNG IP/OBS E/M EA 15 MIN: CPT | Performed by: PHYSICIAN ASSISTANT

## 2024-07-07 PROCEDURE — 250N000011 HC RX IP 250 OP 636: Performed by: EMERGENCY MEDICINE

## 2024-07-07 PROCEDURE — 999N000156 HC STATISTIC RCP CONSULT EA 30 MIN

## 2024-07-07 PROCEDURE — 250N000009 HC RX 250: Performed by: EMERGENCY MEDICINE

## 2024-07-07 PROCEDURE — 5A09357 ASSISTANCE WITH RESPIRATORY VENTILATION, LESS THAN 24 CONSECUTIVE HOURS, CONTINUOUS POSITIVE AIRWAY PRESSURE: ICD-10-PCS | Performed by: PHYSICIAN ASSISTANT

## 2024-07-07 PROCEDURE — 93005 ELECTROCARDIOGRAM TRACING: CPT

## 2024-07-07 PROCEDURE — 85025 COMPLETE CBC W/AUTO DIFF WBC: CPT | Performed by: EMERGENCY MEDICINE

## 2024-07-07 PROCEDURE — 258N000003 HC RX IP 258 OP 636: Performed by: EMERGENCY MEDICINE

## 2024-07-07 PROCEDURE — 94640 AIRWAY INHALATION TREATMENT: CPT

## 2024-07-07 PROCEDURE — 250N000009 HC RX 250: Performed by: PHYSICIAN ASSISTANT

## 2024-07-07 PROCEDURE — 71045 X-RAY EXAM CHEST 1 VIEW: CPT

## 2024-07-07 PROCEDURE — 94799 UNLISTED PULMONARY SVC/PX: CPT

## 2024-07-07 PROCEDURE — 250N000013 HC RX MED GY IP 250 OP 250 PS 637: Performed by: EMERGENCY MEDICINE

## 2024-07-07 PROCEDURE — 82803 BLOOD GASES ANY COMBINATION: CPT

## 2024-07-07 PROCEDURE — 272N000202 HC AEROBIKA WITH MANOMETER

## 2024-07-07 PROCEDURE — 71275 CT ANGIOGRAPHY CHEST: CPT

## 2024-07-07 PROCEDURE — 80048 BASIC METABOLIC PNL TOTAL CA: CPT | Performed by: EMERGENCY MEDICINE

## 2024-07-07 PROCEDURE — 250N000009 HC RX 250: Performed by: INTERNAL MEDICINE

## 2024-07-07 PROCEDURE — 999N000157 HC STATISTIC RCP TIME EA 10 MIN

## 2024-07-07 PROCEDURE — 250N000011 HC RX IP 250 OP 636: Performed by: PHYSICIAN ASSISTANT

## 2024-07-07 PROCEDURE — 99285 EMERGENCY DEPT VISIT HI MDM: CPT | Mod: 25

## 2024-07-07 PROCEDURE — 250N000012 HC RX MED GY IP 250 OP 636 PS 637: Performed by: PHYSICIAN ASSISTANT

## 2024-07-07 RX ORDER — CALCIUM CARBONATE 500 MG/1
1000 TABLET, CHEWABLE ORAL 4 TIMES DAILY PRN
Status: DISCONTINUED | OUTPATIENT
Start: 2024-07-07 | End: 2024-07-10 | Stop reason: HOSPADM

## 2024-07-07 RX ORDER — IBUPROFEN 600 MG/1
600 TABLET, FILM COATED ORAL 4 TIMES DAILY PRN
Status: DISCONTINUED | OUTPATIENT
Start: 2024-07-07 | End: 2024-07-10 | Stop reason: HOSPADM

## 2024-07-07 RX ORDER — CEFTRIAXONE 2 G/1
2 INJECTION, POWDER, FOR SOLUTION INTRAMUSCULAR; INTRAVENOUS ONCE
Status: COMPLETED | OUTPATIENT
Start: 2024-07-07 | End: 2024-07-07

## 2024-07-07 RX ORDER — ATORVASTATIN CALCIUM 40 MG/1
40 TABLET, FILM COATED ORAL EVERY EVENING
Status: DISCONTINUED | OUTPATIENT
Start: 2024-07-07 | End: 2024-07-10 | Stop reason: HOSPADM

## 2024-07-07 RX ORDER — HYDRALAZINE HYDROCHLORIDE 20 MG/ML
10 INJECTION INTRAMUSCULAR; INTRAVENOUS EVERY 4 HOURS PRN
Status: DISCONTINUED | OUTPATIENT
Start: 2024-07-07 | End: 2024-07-10 | Stop reason: HOSPADM

## 2024-07-07 RX ORDER — ONDANSETRON 4 MG/1
4 TABLET, ORALLY DISINTEGRATING ORAL EVERY 6 HOURS PRN
Status: DISCONTINUED | OUTPATIENT
Start: 2024-07-07 | End: 2024-07-10 | Stop reason: HOSPADM

## 2024-07-07 RX ORDER — PROCHLORPERAZINE 25 MG
12.5 SUPPOSITORY, RECTAL RECTAL EVERY 12 HOURS PRN
Status: DISCONTINUED | OUTPATIENT
Start: 2024-07-07 | End: 2024-07-10 | Stop reason: HOSPADM

## 2024-07-07 RX ORDER — IOPAMIDOL 755 MG/ML
500 INJECTION, SOLUTION INTRAVASCULAR ONCE
Status: COMPLETED | OUTPATIENT
Start: 2024-07-07 | End: 2024-07-07

## 2024-07-07 RX ORDER — AZITHROMYCIN 500 MG/5ML
500 INJECTION, POWDER, LYOPHILIZED, FOR SOLUTION INTRAVENOUS ONCE
Status: COMPLETED | OUTPATIENT
Start: 2024-07-07 | End: 2024-07-07

## 2024-07-07 RX ORDER — KETOROLAC TROMETHAMINE 15 MG/ML
15 INJECTION, SOLUTION INTRAMUSCULAR; INTRAVENOUS 4 TIMES DAILY PRN
Status: DISCONTINUED | OUTPATIENT
Start: 2024-07-07 | End: 2024-07-10 | Stop reason: HOSPADM

## 2024-07-07 RX ORDER — ENOXAPARIN SODIUM 100 MG/ML
40 INJECTION SUBCUTANEOUS EVERY 24 HOURS
Status: DISCONTINUED | OUTPATIENT
Start: 2024-07-07 | End: 2024-07-10 | Stop reason: HOSPADM

## 2024-07-07 RX ORDER — NYSTATIN 100000 [USP'U]/G
POWDER TOPICAL 2 TIMES DAILY PRN
COMMUNITY

## 2024-07-07 RX ORDER — BUDESONIDE AND FORMOTEROL FUMARATE DIHYDRATE 160; 4.5 UG/1; UG/1
2 AEROSOL RESPIRATORY (INHALATION)
COMMUNITY

## 2024-07-07 RX ORDER — NICOTINE POLACRILEX 4 MG
15-30 LOZENGE BUCCAL
Status: DISCONTINUED | OUTPATIENT
Start: 2024-07-07 | End: 2024-07-10 | Stop reason: HOSPADM

## 2024-07-07 RX ORDER — LIDOCAINE 40 MG/G
CREAM TOPICAL
Status: DISCONTINUED | OUTPATIENT
Start: 2024-07-07 | End: 2024-07-10 | Stop reason: HOSPADM

## 2024-07-07 RX ORDER — AMLODIPINE BESYLATE 5 MG/1
5 TABLET ORAL DAILY
Status: DISCONTINUED | OUTPATIENT
Start: 2024-07-07 | End: 2024-07-10 | Stop reason: HOSPADM

## 2024-07-07 RX ORDER — AMOXICILLIN 250 MG
1 CAPSULE ORAL 2 TIMES DAILY PRN
Status: DISCONTINUED | OUTPATIENT
Start: 2024-07-07 | End: 2024-07-10 | Stop reason: HOSPADM

## 2024-07-07 RX ORDER — BISACODYL 10 MG
10 SUPPOSITORY, RECTAL RECTAL DAILY PRN
Status: DISCONTINUED | OUTPATIENT
Start: 2024-07-07 | End: 2024-07-10 | Stop reason: HOSPADM

## 2024-07-07 RX ORDER — POLYETHYLENE GLYCOL 3350 17 G/17G
17 POWDER, FOR SOLUTION ORAL DAILY
Status: DISCONTINUED | OUTPATIENT
Start: 2024-07-07 | End: 2024-07-10 | Stop reason: HOSPADM

## 2024-07-07 RX ORDER — ACETAMINOPHEN 325 MG/1
650 TABLET ORAL EVERY 4 HOURS PRN
Status: DISCONTINUED | OUTPATIENT
Start: 2024-07-07 | End: 2024-07-10 | Stop reason: HOSPADM

## 2024-07-07 RX ORDER — ALBUTEROL SULFATE 0.83 MG/ML
2.5 SOLUTION RESPIRATORY (INHALATION) 4 TIMES DAILY
Status: DISCONTINUED | OUTPATIENT
Start: 2024-07-07 | End: 2024-07-08

## 2024-07-07 RX ORDER — PROCHLORPERAZINE MALEATE 5 MG
5 TABLET ORAL EVERY 6 HOURS PRN
Status: DISCONTINUED | OUTPATIENT
Start: 2024-07-07 | End: 2024-07-10 | Stop reason: HOSPADM

## 2024-07-07 RX ORDER — DEXTROSE MONOHYDRATE 25 G/50ML
25-50 INJECTION, SOLUTION INTRAVENOUS
Status: DISCONTINUED | OUTPATIENT
Start: 2024-07-07 | End: 2024-07-10 | Stop reason: HOSPADM

## 2024-07-07 RX ORDER — METOPROLOL SUCCINATE 50 MG/1
50 TABLET, EXTENDED RELEASE ORAL DAILY
Status: DISCONTINUED | OUTPATIENT
Start: 2024-07-07 | End: 2024-07-10 | Stop reason: HOSPADM

## 2024-07-07 RX ORDER — LOSARTAN POTASSIUM 100 MG/1
100 TABLET ORAL DAILY
Status: DISCONTINUED | OUTPATIENT
Start: 2024-07-07 | End: 2024-07-10 | Stop reason: HOSPADM

## 2024-07-07 RX ORDER — BUDESONIDE AND FORMOTEROL FUMARATE DIHYDRATE 160; 4.5 UG/1; UG/1
2 AEROSOL RESPIRATORY (INHALATION)
Status: DISCONTINUED | OUTPATIENT
Start: 2024-07-07 | End: 2024-07-07

## 2024-07-07 RX ORDER — ONDANSETRON 2 MG/ML
4 INJECTION INTRAMUSCULAR; INTRAVENOUS EVERY 6 HOURS PRN
Status: DISCONTINUED | OUTPATIENT
Start: 2024-07-07 | End: 2024-07-10 | Stop reason: HOSPADM

## 2024-07-07 RX ORDER — HYDRALAZINE HYDROCHLORIDE 10 MG/1
10 TABLET, FILM COATED ORAL EVERY 4 HOURS PRN
Status: DISCONTINUED | OUTPATIENT
Start: 2024-07-07 | End: 2024-07-10 | Stop reason: HOSPADM

## 2024-07-07 RX ORDER — ACETAMINOPHEN 325 MG/1
650 TABLET ORAL ONCE
Status: COMPLETED | OUTPATIENT
Start: 2024-07-07 | End: 2024-07-07

## 2024-07-07 RX ORDER — BUDESONIDE AND FORMOTEROL FUMARATE DIHYDRATE 160; 4.5 UG/1; UG/1
2 AEROSOL RESPIRATORY (INHALATION)
Status: DISCONTINUED | OUTPATIENT
Start: 2024-07-07 | End: 2024-07-08

## 2024-07-07 RX ORDER — ACETAMINOPHEN 650 MG/1
650 SUPPOSITORY RECTAL EVERY 4 HOURS PRN
Status: DISCONTINUED | OUTPATIENT
Start: 2024-07-07 | End: 2024-07-10 | Stop reason: HOSPADM

## 2024-07-07 RX ORDER — ASPIRIN 81 MG/1
81 TABLET ORAL DAILY
Status: DISCONTINUED | OUTPATIENT
Start: 2024-07-07 | End: 2024-07-10 | Stop reason: HOSPADM

## 2024-07-07 RX ORDER — IPRATROPIUM BROMIDE AND ALBUTEROL SULFATE 2.5; .5 MG/3ML; MG/3ML
3 SOLUTION RESPIRATORY (INHALATION) ONCE
Status: COMPLETED | OUTPATIENT
Start: 2024-07-07 | End: 2024-07-07

## 2024-07-07 RX ORDER — AMOXICILLIN 250 MG
2 CAPSULE ORAL 2 TIMES DAILY PRN
Status: DISCONTINUED | OUTPATIENT
Start: 2024-07-07 | End: 2024-07-10 | Stop reason: HOSPADM

## 2024-07-07 RX ORDER — ALBUTEROL SULFATE 0.83 MG/ML
2.5 SOLUTION RESPIRATORY (INHALATION)
Status: DISCONTINUED | OUTPATIENT
Start: 2024-07-07 | End: 2024-07-08

## 2024-07-07 RX ORDER — METHYLPREDNISOLONE SODIUM SUCCINATE 125 MG/2ML
125 INJECTION, POWDER, LYOPHILIZED, FOR SOLUTION INTRAMUSCULAR; INTRAVENOUS ONCE
Status: COMPLETED | OUTPATIENT
Start: 2024-07-07 | End: 2024-07-07

## 2024-07-07 RX ORDER — CEFTRIAXONE 2 G/1
2 INJECTION, POWDER, FOR SOLUTION INTRAMUSCULAR; INTRAVENOUS EVERY 24 HOURS
Status: DISCONTINUED | OUTPATIENT
Start: 2024-07-08 | End: 2024-07-10 | Stop reason: HOSPADM

## 2024-07-07 RX ADMIN — AZITHROMYCIN MONOHYDRATE 500 MG: 500 INJECTION, POWDER, LYOPHILIZED, FOR SOLUTION INTRAVENOUS at 09:43

## 2024-07-07 RX ADMIN — CEFTRIAXONE 2 G: 2 INJECTION, POWDER, FOR SOLUTION INTRAMUSCULAR; INTRAVENOUS at 09:13

## 2024-07-07 RX ADMIN — IPRATROPIUM BROMIDE AND ALBUTEROL SULFATE 3 ML: .5; 3 SOLUTION RESPIRATORY (INHALATION) at 09:02

## 2024-07-07 RX ADMIN — INSULIN ASPART 1 UNITS: 100 INJECTION, SOLUTION INTRAVENOUS; SUBCUTANEOUS at 16:21

## 2024-07-07 RX ADMIN — ALBUTEROL SULFATE 2.5 MG: 2.5 SOLUTION RESPIRATORY (INHALATION) at 17:06

## 2024-07-07 RX ADMIN — ALBUTEROL SULFATE 2.5 MG: 2.5 SOLUTION RESPIRATORY (INHALATION) at 19:36

## 2024-07-07 RX ADMIN — SODIUM CHLORIDE 1000 ML: 9 INJECTION, SOLUTION INTRAVENOUS at 13:07

## 2024-07-07 RX ADMIN — BUDESONIDE AND FORMOTEROL FUMARATE DIHYDRATE 2 PUFF: 160; 4.5 AEROSOL RESPIRATORY (INHALATION) at 22:34

## 2024-07-07 RX ADMIN — ACETAMINOPHEN 650 MG: 325 TABLET, FILM COATED ORAL at 09:13

## 2024-07-07 RX ADMIN — SODIUM CHLORIDE 79 ML: 9 INJECTION, SOLUTION INTRAVENOUS at 10:58

## 2024-07-07 RX ADMIN — METOPROLOL SUCCINATE 50 MG: 50 TABLET, EXTENDED RELEASE ORAL at 16:15

## 2024-07-07 RX ADMIN — LOSARTAN POTASSIUM 100 MG: 100 TABLET, FILM COATED ORAL at 16:15

## 2024-07-07 RX ADMIN — METHYLPREDNISOLONE SODIUM SUCCINATE 125 MG: 125 INJECTION, POWDER, FOR SOLUTION INTRAMUSCULAR; INTRAVENOUS at 09:04

## 2024-07-07 RX ADMIN — IOPAMIDOL 58 ML: 755 INJECTION, SOLUTION INTRAVENOUS at 10:58

## 2024-07-07 RX ADMIN — AMLODIPINE BESYLATE 5 MG: 5 TABLET ORAL at 16:15

## 2024-07-07 RX ADMIN — ATORVASTATIN CALCIUM 40 MG: 40 TABLET, FILM COATED ORAL at 20:17

## 2024-07-07 RX ADMIN — ASPIRIN 81 MG: 81 TABLET, COATED ORAL at 16:15

## 2024-07-07 RX ADMIN — ENOXAPARIN SODIUM 40 MG: 40 INJECTION SUBCUTANEOUS at 16:16

## 2024-07-07 RX ADMIN — POLYETHYLENE GLYCOL 3350 17 G: 17 POWDER, FOR SOLUTION ORAL at 16:15

## 2024-07-07 ASSESSMENT — COLUMBIA-SUICIDE SEVERITY RATING SCALE - C-SSRS
6. HAVE YOU EVER DONE ANYTHING, STARTED TO DO ANYTHING, OR PREPARED TO DO ANYTHING TO END YOUR LIFE?: NO
2. HAVE YOU ACTUALLY HAD ANY THOUGHTS OF KILLING YOURSELF IN THE PAST MONTH?: NO
1. IN THE PAST MONTH, HAVE YOU WISHED YOU WERE DEAD OR WISHED YOU COULD GO TO SLEEP AND NOT WAKE UP?: NO

## 2024-07-07 ASSESSMENT — ACTIVITIES OF DAILY LIVING (ADL)
ADLS_ACUITY_SCORE: 39
ADLS_ACUITY_SCORE: 41
ADLS_ACUITY_SCORE: 39
ADLS_ACUITY_SCORE: 44
ADLS_ACUITY_SCORE: 48
ADLS_ACUITY_SCORE: 39
ADLS_ACUITY_SCORE: 39
ADLS_ACUITY_SCORE: 41
ADLS_ACUITY_SCORE: 54
ADLS_ACUITY_SCORE: 39
ADLS_ACUITY_SCORE: 39
ADLS_ACUITY_SCORE: 44
ADLS_ACUITY_SCORE: 39
ADLS_ACUITY_SCORE: 39

## 2024-07-07 NOTE — ED PROVIDER NOTES
Emergency Department Note      History of Present Illness     Chief Complaint   Shortness of Breath and Cough      HPI : History Limited due to language barrier, son at bedside translating  Pilo Quintana is a 74 year old female with a history of type II diabetes, HTN, HLD, stroke, COPD, and hyperparathyroidism, and osteoporosis who presents to the emergency department for evaluation of shortness of breath and cough. She was brought in by ambulance this morning from her home. Her son is her primary caretaker, and she lives with him. When he came home from work, he found her hanging out of bed and so he called 911. She was also hypoxic at 80% according to their home pulse oximeter. She has been on 2L of oxygen in the past, but was taken off of this as of 5 months ago. Her son put her back on her oxygen this morning, and her stats returned to 98%. She also complains of a cough which she has had for over one week, and she was seen in the ED about a week ago on 6/30 for COPD exacerbation. She was discharged and started on a course of prednisone 40 mg for 5 days and doxycycline twice daily. She finished her course of prednisone yesterday but is still taking doxycycline. Her son notes that she has had trouble breathing, and when he found her this morning she was leaning over sideways in bed. He also notes that her skin felt warm to the touch. He did not give her any tylenol or OTC pain medication. Last night he put her on a nebulizer, but she still had a productive cough. Besides the cough, she was feeling otherwise well last night. No history of smoking. No history of CHF, but does have asthma and COPD. Son is now at bedside.     Independent Historian   Son as detailed above.    Review of External Notes   I reviewed the ED visit note from 6/30 in which she was seen for a cough and diagnosed with COPD exacerbation. She was discharged with prednisone 40 mg for 5 days and doxycycline twice daily.     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  Multiple pulmonary nodules  Renal oncocytoma of right kidney  Social maladjustment  Type 2 diabetes mellitus  ICH  Primary hyperparathyroidism  Thyroid nodule  Lactic acidosis  COPD exacerbation  Steroid-induced hyperglycemia  Stroke  DJD  Endometrial hyperplasia  Oncocytoma of right kidney  Carpal tunnel syndrome  Tibial plateau fracture    Medications   albuterol  amlodipine  aspirin  atorvastatin  budesonide-formoterol  doxycycline hyclate  furosemide  losartan  metformin  metoprolol succinate  prednisone  senna-docusate  Simvastatin  Lancets  Gauifenesin  Polyethylene glycol  Nystatin  Budesonide-fomoterol  Doxycycline  prednisone    Surgical History   D&C  Renal biopsy  Cataract removal  Physical Exam     Patient Vitals for the past 24 hrs:   BP Temp Temp src Pulse Resp SpO2   07/07/24 0826 -- -- -- -- -- 98 %   07/07/24 0825 -- -- -- -- -- 98 %   07/07/24 0824 -- -- -- -- -- 98 %   07/07/24 0813 (!) 151/93 (!) 100.5  F (38.1  C) Oral 97 30 --     Physical Exam  Nursing note and vitals reviewed.  Constitutional:  Appears well-developed and well-nourished. Moderate respiratory distress with tachypnea and productive sounding cough.   HENT:   Head:    Atraumatic.   Mouth/Throat:   Oropharynx is clear and moist. No oropharyngeal exudate.   Eyes:    Pupils are equal, round, and reactive to light.   Neck:    Normal range of motion. Neck supple.      No tracheal deviation present. No thyromegaly present.   Cardiovascular:  Normal rate, regular rhythm, no murmur   Pulmonary/Chest: Productive sounding cough, moderate tachypnea. Expiratory  wheezes. Moderate respiratory distress.  Abdominal:   Soft. Bowel sounds are normal. Exhibits no distension and      no mass. There is no tenderness.      There is no rebound and no guarding.   Musculoskeletal:  Exhibits no edema.   Lymphadenopathy:  No cervical adenopathy.   Neurological:   Alert and oriented to person, place, and time.   Skin:    Skin is warm and dry. No rash noted. No pallor.       Diagnostics     Lab Results   Labs Ordered and Resulted from Time of ED Arrival to Time of ED Departure   BASIC METABOLIC PANEL - Abnormal       Result Value    Sodium 140      Potassium 3.8      Chloride 96 (*)     Carbon Dioxide (CO2) 29      Anion Gap 15      Urea Nitrogen 26.5 (*)     Creatinine 0.73      GFR Estimate 85      Calcium 10.0      Glucose 154 (*)    CBC WITH PLATELETS AND DIFFERENTIAL - Abnormal    WBC Count 12.0 (*)     RBC Count 4.35      Hemoglobin 12.6      Hematocrit 38.8      MCV 89      MCH 29.0      MCHC 32.5      RDW 14.1      Platelet Count 281      % Neutrophils 79      % Lymphocytes 14      % Monocytes 7      % Eosinophils 0      % Basophils 0      % Immature Granulocytes 0      NRBCs per 100 WBC 0      Absolute Neutrophils 9.5 (*)     Absolute Lymphocytes 1.6      Absolute Monocytes 0.8      Absolute Eosinophils 0.1      Absolute Basophils 0.0      Absolute Immature Granulocytes 0.1      Absolute NRBCs 0.0     ISTAT GASES LACTATE VENOUS POCT - Abnormal    Lactic Acid POCT 1.4      Bicarbonate Venous POCT 36 (*)     O2 Sat, Venous POCT 79 (*)     pCO2 Venous POCT 53 (*)     pH Venous POCT 7.44 (*)     pO2 Venous POCT 43     BLOOD GAS VENOUS - Abnormal    pH Venous 7.31 (*)     pCO2 Venous 60 (*)     pO2 Venous 81 (*)     Bicarbonate Venous 30 (*)     Base Excess/Deficit Venous 2.6      FIO2 2      Oxyhemoglobin Venous 96 (*)     O2 Sat, Venous 97.8 (*)    INFLUENZA A/B, RSV, & SARS-COV2 PCR - Normal    Influenza A PCR Negative      Influenza B PCR Negative      RSV PCR Negative      SARS  CoV2 PCR Negative     LACTIC ACID WHOLE BLOOD - Normal    Lactic Acid 1.5     GLUCOSE MONITOR NURSING POCT   BLOOD CULTURE   BLOOD CULTURE       Imaging   CT Chest Pulmonary Embolism w Contrast   Final Result   IMPRESSION:   1.  New airspace consolidation within the left lower lobe concerning for pneumonia. Consider follow-up chest CT in 3 months to ensure resolution.   2.  Evaluation of the pulmonary arteries is substantially degraded by respiratory motion artifact. No evidence of central acute pulmonary embolism.   3.  Similar severe dilatation of the pulmonary trunk which can be seen with pulmonary hypertension. Similar enlargement of right-sided heart chambers.      XR Chest Port 1 View   Final Result   IMPRESSION: Heart size is enlarged. There is some bandlike opacity within the lingula, suspicious for an infectious process and significantly changed from comparison radiograph is a 2.3 x 2.4 cm rounded nodular opacity adjacent to the left hilum. This is    also suspicious for new infection. Pulmonary mass would be an additional consideration, though since it is new from recent chest radiograph, this is thought unlikely though radiographic follow-up to resolution is recommended. No pleural effusion or    pneumothorax.                EKG   ECG taken at 0806, ECG read at 0626  Normal sinus rhythm  Nonspecific ST abnormality  Abnormal ECG   No significant change as compared to prior, dated 4/3/24.  Rate 95 bpm. UT interval 130 ms. QRS duration 76 ms. QT/QTc 350/439 ms. P-R-T axes 28 -26 24.    Independent Interpretation   CXR shows infiltrate in left lower lung, concerning for pneumonia.    ED Course      Medications Administered   Medications   cefTRIAXone (ROCEPHIN) 2 g vial to attach to  ml bag for ADULTS or NS 50 ml bag for PEDS (2 g Intravenous $New Bag 7/7/24 0978)   azithromycin (ZITHROMAX) 500 mg in  mL intermittent infusion (has no administration in time range)   ipratropium - albuterol 0.5  mg/2.5 mg/3 mL (DUONEB) neb solution 3 mL (3 mLs Nebulization $Given 7/7/24 0902)   acetaminophen (TYLENOL) tablet 650 mg (650 mg Oral $Given 7/7/24 0913)   methylPREDNISolone sodium succinate (solu-MEDROL) injection 125 mg (125 mg Intravenous $Given 7/7/24 0904)       Procedures   Procedures     Discussion of Management   Admitting Hospitalist, Dr. Duong    ED Course   ED Course as of 07/07/24 1012   Sun Jul 07, 2024   0807 I obtained history and examined the patient as noted above.     0939 I rechecked the patient and updated.     1007 I spoke with Sarah GUTIERREZ for Dr. Duong from Hospitalist Services, who accepts the patient for admission.         Optional/Additional Documentation  None    Medical Decision Making / Diagnosis     CMS Diagnoses: None    MIPS       None    MDM   Pilo Quintana is a 74 year old female who arrives to the emergency department due to shortness of breath and cough.  I found the patient to have acute respiratory failure with hypoxia and mild hypercapnia due to acute community-acquired pneumonia of the left lung field and COPD exacerbation.  She was given broad-spectrum antibiotics of ceftriaxone and azithromycin as well as supplemental oxygen and IV fluid hydration and admitted to the care of the hospitalist service for further evaluation treatment.  The hospitalist PA ordered the chest CT which did not show any sign of pulmonary embolism and confirmed the pneumonia.    Disposition   The patient was admitted to the hospital.     Diagnosis     ICD-10-CM    1. Acute respiratory failure with hypoxia and hypercapnia (H)  J96.01     J96.02       2. Community acquired bacterial pneumonia  J15.9       3. COPD with acute exacerbation (H)  J44.1            Discharge Medications   New Prescriptions    No medications on file         Scribe Disclosure:  Pallavi LEAHY, am serving as a scribe at 8:56 AM on 7/7/2024 to document services personally performed by Brittany Frey MD based on my  observations and the provider's statements to me.        Brittany Frye MD  07/07/24 7347

## 2024-07-07 NOTE — PLAN OF CARE
"Pt A&O x4. Needs . VSS. On CPAP at night. On zithromax and rocephin.     Problem: Adult Inpatient Plan of Care  Goal: Plan of Care Review  Description: The Plan of Care Review/Shift note should be completed every shift.  The Outcome Evaluation is a brief statement about your assessment that the patient is improving, declining, or no change.  This information will be displayed automatically on your shift  note.  Outcome: Progressing  Flowsheets (Taken 7/7/2024 1824)  Outcome Evaluation: Denies pain, SOB. On cont pulse ox - O2 95%  Plan of Care Reviewed With: patient  Overall Patient Progress: no change  Goal: Patient-Specific Goal (Individualized)  Description: You can add care plan individualizations to a care plan. Examples of Individualization might be:  \"Parent requests to be called daily at 9am for status\", \"I have a hard time hearing out of my right ear\", or \"Do not touch me to wake me up as it startles  me\".  Outcome: Progressing  Goal: Absence of Hospital-Acquired Illness or Injury  Outcome: Progressing  Intervention: Identify and Manage Fall Risk  Recent Flowsheet Documentation  Taken 7/7/2024 1743 by Christa Saba RN  Safety Promotion/Fall Prevention:   safety round/check completed   nonskid shoes/slippers when out of bed  Intervention: Prevent Skin Injury  Recent Flowsheet Documentation  Taken 7/7/2024 1743 by Christa Saba RN  Body Position: position changed independently  Intervention: Prevent and Manage VTE (Venous Thromboembolism) Risk  Recent Flowsheet Documentation  Taken 7/7/2024 1743 by Christa Saba RN  VTE Prevention/Management: SCDs off (sequential compression devices)  Intervention: Prevent Infection  Recent Flowsheet Documentation  Taken 7/7/2024 1743 by Christa Saba RN  Infection Prevention:   rest/sleep promoted   single patient room provided  Goal: Optimal Comfort and Wellbeing  Outcome: Progressing  Goal: Readiness for Transition of Care  Outcome: Progressing     Problem: Pulmonary " Impairment  Goal: Improved Activity Tolerance  Outcome: Progressing  Goal: Effective Airway Clearance  Outcome: Progressing  Goal: Optimal Gas Exchange  Outcome: Progressing     Problem: Comorbidity Management  Goal: Maintenance of COPD Symptom Control  Outcome: Progressing  Intervention: Maintain COPD Symptom Control  Recent Flowsheet Documentation  Taken 7/7/2024 1743 by Christa Saba RN  Medication Review/Management: medications reviewed  Goal: Blood Glucose Levels Within Targeted Range  Outcome: Progressing  Intervention: Monitor and Manage Glycemia  Recent Flowsheet Documentation  Taken 7/7/2024 1743 by Christa Saba RN  Medication Review/Management: medications reviewed  Goal: Blood Pressure in Desired Range  Outcome: Progressing  Intervention: Maintain Blood Pressure Management  Recent Flowsheet Documentation  Taken 7/7/2024 1743 by Christa Saba RN  Medication Review/Management: medications reviewed   Goal Outcome Evaluation:      Plan of Care Reviewed With: patient    Overall Patient Progress: no changeOverall Patient Progress: no change    Outcome Evaluation: Denies pain, SOB. On cont pulse ox - O2 95%. On 1L NC, tolerating it well.

## 2024-07-07 NOTE — ED TRIAGE NOTES
Pt. Was found at home by her son, she was half hanging out of the bed. Pt. Has hx of COPD. Pt denies falling. Son put her on O2 when he arrived but she is not on oxygen at baseline. Provider removed O2 orders a few months ago. Pt. Has some rhonchi upon auscultation. Suspected pneumonia. Sat 98% for EMS, low 80's for son upon arrival. Pt. Was here on Sunday for a respiratory infection and she was put on prednisone and an antibiotic. Pt. Does not speak english.
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
## 2024-07-07 NOTE — PROGRESS NOTES
"[facility] RCAT     Date:07/07/24  Admission Dx:Pneumonia  Pulmonary History COPD  Home Nebulizer/MDI Use: Albuterol nebs,Budesonide-Formotel  Home Oxygen: 2LNC  Acuity Level (RCAT flow sheet):3  Aerosol Therapy initiated: Albuterol Qid and prn      Pulmonary Hygiene initiated: Aerobika  Qid       Volume Expansion initiated: I.S Tid      Current Oxygen Requirements: 1LPM  Current SpO2:99%    Re-evaluation date:07/10/24    Patient Education:Education was performed with the patient in regards to indications/benefits and possible side effects of bronchodilators. Will continue to do education with patient.         See \"RT Assessments\" flow sheet for patient assessment scoring and Acuity Level Details.           "

## 2024-07-07 NOTE — PHARMACY-ADMISSION MEDICATION HISTORY
Pharmacy Intern Admission Medication History    Admission medication history is complete. The information provided in this note is only as accurate as the sources available at the time of the update.    Information Source(s): Family member and CareEverywhere/SureScripts via in-person    Pertinent Information: Med list was verified from pt's son. Pt started doxycycline 100 mg cap on Monday 7/1/2024 for 14 days    Changes made to PTA medication list:  Added: nystatin  Deleted: prednisone, senna-docusate  Changed: SYMBICORT 2 puffs daily-> 2 puffs BID    Allergies reviewed with patient and updates made in EHR: yes    Medication History Completed By: Johnny Agarwal 7/7/2024 12:23 PM    PTA Med List   Medication Sig Last Dose    acetaminophen (TYLENOL) 500 MG tablet Take 500-1,000 mg by mouth every 8 hours as needed for mild pain Unknown at PRN    albuterol (PROAIR HFA/PROVENTIL HFA/VENTOLIN HFA) 108 (90 Base) MCG/ACT inhaler Inhale 2 puffs into the lungs every 4 hours as needed for wheezing 7/6/2024 at -    albuterol (PROVENTIL) (2.5 MG/3ML) 0.083% neb solution Take 1 vial (2.5 mg) by nebulization every 6 hours as needed for shortness of breath, wheezing or cough 7/7/2024 at AM    amLODIPine (NORVASC) 5 MG tablet Take 5 mg by mouth daily 7/6/2024 at AM    aspirin (ASA) 81 MG EC tablet Take 1 tablet (81 mg) by mouth daily 7/6/2024 at AM    atorvastatin (LIPITOR) 40 MG tablet Take 1 tablet (40 mg) by mouth every evening 7/6/2024 at PM    budesonide-formoterol (SYMBICORT) 160-4.5 MCG/ACT Inhaler Inhale 2 puffs into the lungs two times daily 7/6/2024 at PM    Cholecalciferol (VITAMIN D3) 50 MCG (2000 UT) CAPS Take 50 mcg by mouth daily 7/6/2024 at AM    doxycycline hyclate (VIBRAMYCIN) 100 MG capsule Take 1 capsule (100 mg) by mouth 2 times daily for 14 days 7/6/2024 at PM    furosemide (LASIX) 40 MG tablet Take 40 mg by mouth daily 7/6/2024 at AM    losartan (COZAAR) 100 MG tablet Take 100 mg by mouth daily 7/6/2024 at AM     metFORMIN (GLUCOPHAGE XR) 500 MG 24 hr tablet Take 500 mg by mouth 2 times daily (with meals) 7/6/2024 at PM    metoprolol succinate ER (TOPROL XL) 50 MG 24 hr tablet Take 50 mg by mouth daily 7/6/2024 at AM    nystatin (NYSTOP) 550669 UNIT/GM external powder Apply topically 2 times daily as needed 7/6/2024 at -    triamcinolone (KENALOG) 0.1 % external ointment Apply topically 2 times daily as needed for irritation Unknown at PRN

## 2024-07-07 NOTE — ED NOTES
ED Nurse Handoff Report    ED Chief complaint: Shortness of Breath and Cough  . ED Diagnosis:   Final diagnoses:   Acute respiratory failure with hypoxia and hypercapnia (H)   Community acquired bacterial pneumonia   COPD with acute exacerbation (H)       Allergies: No Known Allergies    Code Status: Full Code    Activity level - Baseline/Home:  assist of 1.  Activity Level - Current:   in bed.   Lift room needed: No.   Bariatric: No   Needed: Yes Son can interpret  Isolation: No.   Infection: Not Applicable.     Respiratory status: Nasal cannula    Vital Signs (within 30 minutes):   Vitals:    07/07/24 1023 07/07/24 1036 07/07/24 1045 07/07/24 1139   BP:  (!) 130/120 117/84    Pulse:   86    Resp:       Temp:       TempSrc:       SpO2: 98%   98%   Weight:       Height:           Cardiac Rhythm:  ,      Pain level:    Patient confused: No.   Patient Falls Risk: nonskid shoes/slippers when out of bed, arm band in place, and patient and family education.   Elimination Status: Unable to void     Patient Report - Initial Complaint: shortness of breath, cough .   Focused Assessment:   RespiratoryAirway WDL: WDL  Respiratory WDLRespiratory WDL: .WDL except; rhythm/pattern (Pt. was here last week for upper respiratory infection. SHe has been taking her steroids and antibiotics but has not improved. Son found her this morning hanging half out of her bed. No evidence of a fall but O2 sats were in the low 80's)Rhythm/Pattern, Respiratory: tachypneic; shortness of breath (Pt. was working hard to breathe upon arrival, O2 sats for EMS were 98% nasal cannula 2L. Pt sats have been stable since arrival to the ED) RW           Abnormal Results:   Labs Ordered and Resulted from Time of ED Arrival to Time of ED Departure   BASIC METABOLIC PANEL - Abnormal       Result Value    Sodium 140      Potassium 3.8      Chloride 96 (*)     Carbon Dioxide (CO2) 29      Anion Gap 15      Urea  Nitrogen 26.5 (*)     Creatinine 0.73      GFR Estimate 85      Calcium 10.0      Glucose 154 (*)    CBC WITH PLATELETS AND DIFFERENTIAL - Abnormal    WBC Count 12.0 (*)     RBC Count 4.35      Hemoglobin 12.6      Hematocrit 38.8      MCV 89      MCH 29.0      MCHC 32.5      RDW 14.1      Platelet Count 281      % Neutrophils 79      % Lymphocytes 14      % Monocytes 7      % Eosinophils 0      % Basophils 0      % Immature Granulocytes 0      NRBCs per 100 WBC 0      Absolute Neutrophils 9.5 (*)     Absolute Lymphocytes 1.6      Absolute Monocytes 0.8      Absolute Eosinophils 0.1      Absolute Basophils 0.0      Absolute Immature Granulocytes 0.1      Absolute NRBCs 0.0     ISTAT GASES LACTATE VENOUS POCT - Abnormal    Lactic Acid POCT 1.4      Bicarbonate Venous POCT 36 (*)     O2 Sat, Venous POCT 79 (*)     pCO2 Venous POCT 53 (*)     pH Venous POCT 7.44 (*)     pO2 Venous POCT 43     INFLUENZA A/B, RSV, & SARS-COV2 PCR - Normal    Influenza A PCR Negative      Influenza B PCR Negative      RSV PCR Negative      SARS CoV2 PCR Negative     LACTIC ACID WHOLE BLOOD - Normal    Lactic Acid 1.5     GLUCOSE MONITOR NURSING POCT   BLOOD GAS VENOUS   BLOOD CULTURE   BLOOD CULTURE        CT Chest Pulmonary Embolism w Contrast   Final Result   IMPRESSION:   1.  New airspace consolidation within the left lower lobe concerning for pneumonia. Consider follow-up chest CT in 3 months to ensure resolution.   2.  Evaluation of the pulmonary arteries is substantially degraded by respiratory motion artifact. No evidence of central acute pulmonary embolism.   3.  Similar severe dilatation of the pulmonary trunk which can be seen with pulmonary hypertension. Similar enlargement of right-sided heart chambers.      XR Chest Port 1 View   Final Result   IMPRESSION: Heart size is enlarged. There is some bandlike opacity within the lingula, suspicious for an infectious process and significantly changed from comparison radiograph is a  2.3 x 2.4 cm rounded nodular opacity adjacent to the left hilum. This is    also suspicious for new infection. Pulmonary mass would be an additional consideration, though since it is new from recent chest radiograph, this is thought unlikely though radiographic follow-up to resolution is recommended. No pleural effusion or    pneumothorax.                Treatments provided: Medications, scans, labs  Family Comments: son is at bedside and aware of care plan  OBS brochure/video discussed/provided to patient:  Yes  ED Medications:   Medications   ipratropium - albuterol 0.5 mg/2.5 mg/3 mL (DUONEB) neb solution 3 mL (3 mLs Nebulization $Given 7/7/24 0902)   acetaminophen (TYLENOL) tablet 650 mg (650 mg Oral $Given 7/7/24 0913)   cefTRIAXone (ROCEPHIN) 2 g vial to attach to  ml bag for ADULTS or NS 50 ml bag for PEDS (0 g Intravenous Stopped 7/7/24 0942)   azithromycin (ZITHROMAX) 500 mg in  mL intermittent infusion (500 mg Intravenous $New Bag 7/7/24 0943)   methylPREDNISolone sodium succinate (solu-MEDROL) injection 125 mg (125 mg Intravenous $Given 7/7/24 0904)   CT Scan Flush (79 mLs Intravenous $Given 7/7/24 1058)   iopamidol (ISOVUE-370) solution 500 mL (58 mLs Intravenous $Given 7/7/24 1058)       Drips infusing:  No  For the majority of the shift this patient was Green.   Interventions performed were Antibiotics, neb, steroids.    Sepsis treatment initiated: No    Cares/treatment/interventions/medications to be completed following ED care: Respiratory management after discharge.     ED Nurse Name: Margret Pace RN  11:51 AM     RECEIVING UNIT ED HANDOFF REVIEW    Above ED Nurse Handoff Report was reviewed: Yes  Reviewed by: Christa Saba RN on July 7, 2024 at 2:28 PM   I Doug called the ED to inform them the note was read: No

## 2024-07-08 ENCOUNTER — APPOINTMENT (OUTPATIENT)
Dept: PHYSICAL THERAPY | Facility: CLINIC | Age: 74
DRG: 871 | End: 2024-07-08
Attending: PHYSICIAN ASSISTANT
Payer: COMMERCIAL

## 2024-07-08 LAB
ANION GAP SERPL CALCULATED.3IONS-SCNC: 14 MMOL/L (ref 7–15)
ATRIAL RATE - MUSE: 95 BPM
BASOPHILS # BLD AUTO: 0 10E3/UL (ref 0–0.2)
BASOPHILS NFR BLD AUTO: 0 %
BUN SERPL-MCNC: 27.7 MG/DL (ref 8–23)
CALCIUM SERPL-MCNC: 9.6 MG/DL (ref 8.8–10.2)
CHLORIDE SERPL-SCNC: 100 MMOL/L (ref 98–107)
CREAT SERPL-MCNC: 0.78 MG/DL (ref 0.51–0.95)
DEPRECATED HCO3 PLAS-SCNC: 27 MMOL/L (ref 22–29)
DIASTOLIC BLOOD PRESSURE - MUSE: NORMAL MMHG
EGFRCR SERPLBLD CKD-EPI 2021: 79 ML/MIN/1.73M2
EOSINOPHIL # BLD AUTO: 0 10E3/UL (ref 0–0.7)
EOSINOPHIL NFR BLD AUTO: 0 %
ERYTHROCYTE [DISTWIDTH] IN BLOOD BY AUTOMATED COUNT: 14 % (ref 10–15)
GLUCOSE BLDC GLUCOMTR-MCNC: 201 MG/DL (ref 70–99)
GLUCOSE BLDC GLUCOMTR-MCNC: 219 MG/DL (ref 70–99)
GLUCOSE BLDC GLUCOMTR-MCNC: 281 MG/DL (ref 70–99)
GLUCOSE BLDC GLUCOMTR-MCNC: 335 MG/DL (ref 70–99)
GLUCOSE BLDC GLUCOMTR-MCNC: 406 MG/DL (ref 70–99)
GLUCOSE SERPL-MCNC: 229 MG/DL (ref 70–99)
HBA1C MFR BLD: 7.7 %
HCT VFR BLD AUTO: 40 % (ref 35–47)
HGB BLD-MCNC: 12.9 G/DL (ref 11.7–15.7)
IMM GRANULOCYTES # BLD: 0.1 10E3/UL
IMM GRANULOCYTES NFR BLD: 1 %
INTERPRETATION ECG - MUSE: NORMAL
LYMPHOCYTES # BLD AUTO: 1.4 10E3/UL (ref 0.8–5.3)
LYMPHOCYTES NFR BLD AUTO: 17 %
MCH RBC QN AUTO: 28.8 PG (ref 26.5–33)
MCHC RBC AUTO-ENTMCNC: 32.3 G/DL (ref 31.5–36.5)
MCV RBC AUTO: 89 FL (ref 78–100)
MONOCYTES # BLD AUTO: 0.6 10E3/UL (ref 0–1.3)
MONOCYTES NFR BLD AUTO: 7 %
NEUTROPHILS # BLD AUTO: 6.4 10E3/UL (ref 1.6–8.3)
NEUTROPHILS NFR BLD AUTO: 76 %
NRBC # BLD AUTO: 0 10E3/UL
NRBC BLD AUTO-RTO: 0 /100
P AXIS - MUSE: 28 DEGREES
PLATELET # BLD AUTO: 232 10E3/UL (ref 150–450)
POTASSIUM SERPL-SCNC: 4 MMOL/L (ref 3.4–5.3)
PR INTERVAL - MUSE: 130 MS
QRS DURATION - MUSE: 76 MS
QT - MUSE: 350 MS
QTC - MUSE: 439 MS
R AXIS - MUSE: -26 DEGREES
RBC # BLD AUTO: 4.48 10E6/UL (ref 3.8–5.2)
SODIUM SERPL-SCNC: 141 MMOL/L (ref 135–145)
SYSTOLIC BLOOD PRESSURE - MUSE: NORMAL MMHG
T AXIS - MUSE: 24 DEGREES
VENTRICULAR RATE- MUSE: 95 BPM
WBC # BLD AUTO: 8.5 10E3/UL (ref 4–11)

## 2024-07-08 PROCEDURE — 94640 AIRWAY INHALATION TREATMENT: CPT

## 2024-07-08 PROCEDURE — 250N000009 HC RX 250: Performed by: INTERNAL MEDICINE

## 2024-07-08 PROCEDURE — 97161 PT EVAL LOW COMPLEX 20 MIN: CPT | Mod: GP | Performed by: PHYSICAL THERAPIST

## 2024-07-08 PROCEDURE — 250N000012 HC RX MED GY IP 250 OP 636 PS 637: Performed by: HOSPITALIST

## 2024-07-08 PROCEDURE — 250N000009 HC RX 250: Performed by: HOSPITALIST

## 2024-07-08 PROCEDURE — 120N000001 HC R&B MED SURG/OB

## 2024-07-08 PROCEDURE — 97530 THERAPEUTIC ACTIVITIES: CPT | Mod: GP | Performed by: PHYSICAL THERAPIST

## 2024-07-08 PROCEDURE — 97110 THERAPEUTIC EXERCISES: CPT | Mod: GP | Performed by: PHYSICAL THERAPIST

## 2024-07-08 PROCEDURE — 250N000011 HC RX IP 250 OP 636: Performed by: PHYSICIAN ASSISTANT

## 2024-07-08 PROCEDURE — 85025 COMPLETE CBC W/AUTO DIFF WBC: CPT | Performed by: PHYSICIAN ASSISTANT

## 2024-07-08 PROCEDURE — 94799 UNLISTED PULMONARY SVC/PX: CPT

## 2024-07-08 PROCEDURE — 250N000013 HC RX MED GY IP 250 OP 250 PS 637: Performed by: PHYSICIAN ASSISTANT

## 2024-07-08 PROCEDURE — 250N000013 HC RX MED GY IP 250 OP 250 PS 637: Performed by: HOSPITALIST

## 2024-07-08 PROCEDURE — 99232 SBSQ HOSP IP/OBS MODERATE 35: CPT | Performed by: HOSPITALIST

## 2024-07-08 PROCEDURE — 258N000003 HC RX IP 258 OP 636: Performed by: PHYSICIAN ASSISTANT

## 2024-07-08 PROCEDURE — 94640 AIRWAY INHALATION TREATMENT: CPT | Mod: 76

## 2024-07-08 PROCEDURE — 999N000157 HC STATISTIC RCP TIME EA 10 MIN

## 2024-07-08 PROCEDURE — 36415 COLL VENOUS BLD VENIPUNCTURE: CPT | Performed by: PHYSICIAN ASSISTANT

## 2024-07-08 PROCEDURE — 80048 BASIC METABOLIC PNL TOTAL CA: CPT | Performed by: PHYSICIAN ASSISTANT

## 2024-07-08 RX ORDER — BENZONATATE 100 MG/1
100 CAPSULE ORAL 3 TIMES DAILY PRN
Status: DISCONTINUED | OUTPATIENT
Start: 2024-07-08 | End: 2024-07-10 | Stop reason: HOSPADM

## 2024-07-08 RX ORDER — PREDNISONE 20 MG/1
40 TABLET ORAL DAILY
Status: DISCONTINUED | OUTPATIENT
Start: 2024-07-08 | End: 2024-07-10 | Stop reason: HOSPADM

## 2024-07-08 RX ORDER — IPRATROPIUM BROMIDE AND ALBUTEROL SULFATE 2.5; .5 MG/3ML; MG/3ML
3 SOLUTION RESPIRATORY (INHALATION)
Status: DISCONTINUED | OUTPATIENT
Start: 2024-07-09 | End: 2024-07-10 | Stop reason: HOSPADM

## 2024-07-08 RX ORDER — GUAIFENESIN 600 MG/1
600 TABLET, EXTENDED RELEASE ORAL 2 TIMES DAILY
Status: DISCONTINUED | OUTPATIENT
Start: 2024-07-08 | End: 2024-07-10 | Stop reason: HOSPADM

## 2024-07-08 RX ORDER — ALBUTEROL SULFATE 0.83 MG/ML
2.5 SOLUTION RESPIRATORY (INHALATION)
Status: DISCONTINUED | OUTPATIENT
Start: 2024-07-08 | End: 2024-07-10 | Stop reason: HOSPADM

## 2024-07-08 RX ORDER — IPRATROPIUM BROMIDE AND ALBUTEROL SULFATE 2.5; .5 MG/3ML; MG/3ML
3 SOLUTION RESPIRATORY (INHALATION)
Status: DISCONTINUED | OUTPATIENT
Start: 2024-07-08 | End: 2024-07-08

## 2024-07-08 RX ADMIN — GUAIFENESIN 600 MG: 600 TABLET, EXTENDED RELEASE ORAL at 12:17

## 2024-07-08 RX ADMIN — AMLODIPINE BESYLATE 5 MG: 5 TABLET ORAL at 09:23

## 2024-07-08 RX ADMIN — PREDNISONE 40 MG: 20 TABLET ORAL at 12:17

## 2024-07-08 RX ADMIN — GUAIFENESIN 600 MG: 600 TABLET, EXTENDED RELEASE ORAL at 20:12

## 2024-07-08 RX ADMIN — ATORVASTATIN CALCIUM 40 MG: 40 TABLET, FILM COATED ORAL at 20:12

## 2024-07-08 RX ADMIN — INSULIN ASPART 2 UNITS: 100 INJECTION, SOLUTION INTRAVENOUS; SUBCUTANEOUS at 16:29

## 2024-07-08 RX ADMIN — BUDESONIDE AND FORMOTEROL FUMARATE DIHYDRATE 2 PUFF: 160; 4.5 AEROSOL RESPIRATORY (INHALATION) at 08:35

## 2024-07-08 RX ADMIN — INSULIN ASPART 4 UNITS: 100 INJECTION, SOLUTION INTRAVENOUS; SUBCUTANEOUS at 12:17

## 2024-07-08 RX ADMIN — INSULIN ASPART 2 UNITS: 100 INJECTION, SOLUTION INTRAVENOUS; SUBCUTANEOUS at 09:21

## 2024-07-08 RX ADMIN — METOPROLOL SUCCINATE 50 MG: 50 TABLET, EXTENDED RELEASE ORAL at 09:23

## 2024-07-08 RX ADMIN — IPRATROPIUM BROMIDE AND ALBUTEROL SULFATE 3 ML: .5; 3 SOLUTION RESPIRATORY (INHALATION) at 15:18

## 2024-07-08 RX ADMIN — IPRATROPIUM BROMIDE AND ALBUTEROL SULFATE 3 ML: .5; 3 SOLUTION RESPIRATORY (INHALATION) at 19:39

## 2024-07-08 RX ADMIN — LOSARTAN POTASSIUM 100 MG: 100 TABLET, FILM COATED ORAL at 09:22

## 2024-07-08 RX ADMIN — CEFTRIAXONE 2 G: 2 INJECTION, POWDER, FOR SOLUTION INTRAMUSCULAR; INTRAVENOUS at 09:22

## 2024-07-08 RX ADMIN — ALBUTEROL SULFATE 2.5 MG: 2.5 SOLUTION RESPIRATORY (INHALATION) at 08:27

## 2024-07-08 RX ADMIN — AZITHROMYCIN MONOHYDRATE 250 MG: 500 INJECTION, POWDER, LYOPHILIZED, FOR SOLUTION INTRAVENOUS at 10:34

## 2024-07-08 RX ADMIN — INSULIN GLARGINE 10 UNITS: 100 INJECTION, SOLUTION SUBCUTANEOUS at 12:34

## 2024-07-08 RX ADMIN — ENOXAPARIN SODIUM 40 MG: 40 INJECTION SUBCUTANEOUS at 16:29

## 2024-07-08 RX ADMIN — ASPIRIN 81 MG: 81 TABLET, COATED ORAL at 09:22

## 2024-07-08 ASSESSMENT — ACTIVITIES OF DAILY LIVING (ADL)
ADLS_ACUITY_SCORE: 57
ADLS_ACUITY_SCORE: 56
ADLS_ACUITY_SCORE: 57
ADLS_ACUITY_SCORE: 57
ADLS_ACUITY_SCORE: 58
ADLS_ACUITY_SCORE: 56
ADLS_ACUITY_SCORE: 58
ADLS_ACUITY_SCORE: 57
ADLS_ACUITY_SCORE: 58
ADLS_ACUITY_SCORE: 56
ADLS_ACUITY_SCORE: 57
ADLS_ACUITY_SCORE: 56
ADLS_ACUITY_SCORE: 57
ADLS_ACUITY_SCORE: 56
ADLS_ACUITY_SCORE: 57
ADLS_ACUITY_SCORE: 56
ADLS_ACUITY_SCORE: 58
ADLS_ACUITY_SCORE: 56
ADLS_ACUITY_SCORE: 58
ADLS_ACUITY_SCORE: 56
ADLS_ACUITY_SCORE: 57

## 2024-07-08 NOTE — PROGRESS NOTES
CPAP orders placed. Son has brought in home CPAP and set up for patient. Son is able to place patient on for tonight with RT to continue to follow for nightly nebs and possible CPAP assist.     Jon Sofia, RT on 7/7/2024 at 10:31 PM

## 2024-07-08 NOTE — PLAN OF CARE
"Goal Outcome Evaluation:      Plan of Care Reviewed With: patient    Overall Patient Progress: improvingOverall Patient Progress: improving    Outcome Evaluation: room air @93%    To Do:  End of Shift Summary  For vital signs and complete assessments, please see documentation flowsheets.     Pertinent assessments: pt disoriented to time, situation, Ls dim on room air @93%, VSS, on regular diet, son brought food, pt ate, , gave coverage, RT gave neb, did not have urine at first, bladder scan 139, pt ambulated with asist of 2 to the commode, had BM and urine. Has healed perineal rash on both sides of groin, Son at the bedside   Major Shift Events: none   Treatment Plan: symptom management, Nebs per RT, CPAP at night, 02     Problem: Adult Inpatient Plan of Care  Goal: Plan of Care Review  Description: The Plan of Care Review/Shift note should be completed every shift.  The Outcome Evaluation is a brief statement about your assessment that the patient is improving, declining, or no change.  This information will be displayed automatically on your shift  note.  Outcome: Progressing  Flowsheets (Taken 7/7/2024 3379)  Outcome Evaluation: room air @93%  Plan of Care Reviewed With: patient  Overall Patient Progress: improving  Goal: Patient-Specific Goal (Individualized)  Description: You can add care plan individualizations to a care plan. Examples of Individualization might be:  \"Parent requests to be called daily at 9am for status\", \"I have a hard time hearing out of my right ear\", or \"Do not touch me to wake me up as it startles  me\".  Outcome: Progressing  Goal: Absence of Hospital-Acquired Illness or Injury  Outcome: Progressing  Intervention: Identify and Manage Fall Risk  Recent Flowsheet Documentation  Taken 7/7/2024 2017 by Lizzette Cruz, RN  Safety Promotion/Fall Prevention:   room door open   safety round/check completed   room organization consistent  Intervention: Prevent Skin Injury  Recent Flowsheet " Documentation  Taken 7/7/2024 2017 by Lizzette Cruz RN  Body Position:   turned   right   left  Intervention: Prevent and Manage VTE (Venous Thromboembolism) Risk  Recent Flowsheet Documentation  Taken 7/7/2024 2017 by Lizzette Cruz RN  VTE Prevention/Management: SCDs off (sequential compression devices)  Intervention: Prevent Infection  Recent Flowsheet Documentation  Taken 7/7/2024 2017 by Lizzette Cruz RN  Infection Prevention: single patient room provided  Goal: Optimal Comfort and Wellbeing  Outcome: Progressing  Goal: Readiness for Transition of Care  Outcome: Progressing     Problem: Pulmonary Impairment  Goal: Improved Activity Tolerance  Outcome: Progressing  Goal: Effective Airway Clearance  Outcome: Progressing  Goal: Optimal Gas Exchange  Outcome: Progressing     Problem: Comorbidity Management  Goal: Maintenance of COPD Symptom Control  Outcome: Progressing  Intervention: Maintain COPD Symptom Control  Recent Flowsheet Documentation  Taken 7/7/2024 2017 by Lizzette Cruz RN  Medication Review/Management: medications reviewed  Goal: Blood Glucose Levels Within Targeted Range  Outcome: Progressing  Intervention: Monitor and Manage Glycemia  Recent Flowsheet Documentation  Taken 7/7/2024 2017 by Lizzette Cruz RN  Glycemic Management: blood glucose monitored  Medication Review/Management: medications reviewed  Goal: Blood Pressure in Desired Range  Outcome: Progressing  Intervention: Maintain Blood Pressure Management  Recent Flowsheet Documentation  Taken 7/7/2024 2017 by Lizzette Cruz RN  Medication Review/Management: medications reviewed

## 2024-07-08 NOTE — PROGRESS NOTES
07/08/24 0700   Appointment Info   Signing Clinician's Name / Credentials (PT) Bethany Loaiza PT      Language Pt speaks [place] ([place] ).  Son present and acting as .  Son reports signing  waiver.   Living Environment   People in Home child(mahi), adult;grandchild(mahi)   Current Living Arrangements house   Home Accessibility wheelchair accessible   Transportation Anticipated family or friend will provide   Living Environment Comments Pt lives with son, daughter in law  and grandchildren in a split level home with portable ramp at entrance and stair lift to access her bedroom upstairs.  Son works the night shift but comes home to check on mother and assist with toileting in the middle of the night.  Family present 24/7 to assist pt.   Self-Care   Usual Activity Tolerance moderate   Current Activity Tolerance fair   Equipment Currently Used at Home walker, rolling;wheelchair, manual;shower chair;grab bar, toilet;commode chair;hospital bed   Fall history within last six months no   Activity/Exercise/Self-Care Comment Pt ambulates with a FWW and assist from family.  Family assists with all ADLs.  Pt has a walk in shower with shower chair.  Pt has a hospital bed.  Pt spends most of day sitting in a lift chair.  Pt uses a CPAP at night or when taking a nap.   General Information   Onset of Illness/Injury or Date of Surgery 07/07/24   Referring Physician Sarah Mcclain PA-C   Patient/Family Therapy Goals Statement (PT) Son wanting pt  to return home with home therapies   Pertinent History of Current Problem (include personal factors and/or comorbidities that impact the POC) Per medical chart: Vera Peguero is a 89 year old female with history of COPD on chronic supplemental O2, HTN, CVA, and cognitive impairment.  She presented to the ED on 7/5/2024 with abdominal pain.  Emergency department evaluation showed stable vital signs.  Laboratory evaluation showed white blood cells  26.7 but was otherwise unremarkable.  CT aortic survey showed perforated sigmoid diverticulitis.  She was taken to the OR urgently and had sigmoid colectomy with colostomy placement.  She was stable intraoperatively and postoperatively.  She was admitted to [facility] after surgery.  She was treated with Zosyn.  Hospital course was complicated by infectious encephalopathy.  Pt dx with   acute hypoxic respiratory failure on chronic hypercapnic respiratory failure   Existing Precautions/Restrictions fall   General Observations Pt lying in bed with son feeding her breakfast.  Son reports he feeds her because if she feeds herself it takes hours.  Pt with NC removed (wall O2 set at .5L), SpO2 91-93%.   Cognition   Cognitive Status Comments Son reports patient is not educated is doesn't know the date.  Pt oriented to name and being in the hospital.   Pain Assessment   Patient Currently in Pain No   Integumentary/Edema   Integumentary/Edema no deficits were identifed   Posture    Posture Forward head position;Protracted shoulders   Range of Motion (ROM)   ROM Comment limited B shld  AROM to ~ 90 deg, AAROM to ~ 170 deg.  Decreased hamstring and gastroc  flexibility.   Strength (Manual Muscle Testing)   Strength Comments global mms weakness and  deconditioning.  B shld flex 3-/5, B hip flex and knee ext 3-/5   Bed Mobility   Comment, (Bed Mobility) sup > sit Mod-Max A   Transfers   Comment, (Transfers) sit > stand to FWW Max A x 2   Gait/Stairs (Locomotion)   Comment, (Gait/Stairs) Pt took 5-6 small steps at FWW with Mod A x 2 to pivot to chair   Balance   Balance Comments Impaired dynamic  standing balance with retro lean, requiring B UE support on FWW and Mod A x 2 to maintain  balance.   Sensory Examination   Sensory Perception patient reports no sensory changes   Coordination   Coordination no deficits were identified   Muscle Tone   Muscle Tone no deficits were identified   Clinical Impression   Criteria for Skilled  Therapeutic Intervention Yes, treatment indicated   PT Diagnosis (PT) impaired functional mobility   Influenced by the following impairments global mms weakness and  deconditioning, impaired balance, impaired cognition, impaired gait   Functional limitations due to impairments increased falls risk, assisted mobility and ADLs, unable to amb functional household  distances   Clinical Presentation (PT Evaluation Complexity) stable   Clinical Presentation Rationale Pt medically stable, supportive family   Clinical Decision Making (Complexity) low complexity   Planned Therapy Interventions (PT) balance training;bed mobility training;gait training;home exercise program;patient/family education;ROM (range of motion);strengthening;stretching;transfer training;progressive activity/exercise;risk factor education;home program guidelines   Risk & Benefits of therapy have been explained evaluation/treatment results reviewed;care plan/treatment goals reviewed;risks/benefits reviewed;current/potential barriers reviewed;participants voiced agreement with care plan;participants included;patient;son   PT Total Evaluation Time   PT Eval, Low Complexity Minutes (48135) 15   Physical Therapy Goals   PT Frequency Daily   PT Predicted Duration/Target Date for Goal Attainment 07/11/24   PT Goals Bed Mobility;Transfers;Gait;PT Goal 1   PT: Bed Mobility Minimal assist;Supine to/from sit   PT: Transfers Minimal assist;Sit to/from stand;Assistive device   PT: Gait Minimal assist;Rolling walker;25 feet   PT: Goal 1 Pt will increase B LE strength to 3/5 hip flex, knee ext and ankle DF to increase ease and safety with all mobility   PT Discharge Planning   PT Plan progress bed mobility, repeated STS, LE ther ex (issue seated HEP), progress gait as able with close WC follow   PT Discharge Recommendation (DC Rec) home with assist;home with home care physical therapy   PT Rationale for DC Rec Pt below baseline with mobility.  Pt lives with son's  family in a house with a portable ramp to enter and  stair lift to upper level where all needs met.  Family provides 24/7 assist with all mobiity and ADLs. At baseline pt ambulates short distances to bathroom with FWW and  uses a WC for longer distances.  Pt  currently requires Mod/Max A x 2 for transfers and only able to take 6 steps to pivot  to chair.  Patient and  family would like for patient  to return home  with continued assist from family for all ADLs and mobility.  Recommend  HHPT and  OT to increase strength and progress independence with all mobility and ADLs.   PT Brief overview of current status Mod A bed mobility, Mod/Max A x 2 stand  pivot  transfer bed <> chair with FWW

## 2024-07-08 NOTE — PLAN OF CARE
"End of Shift Summary  For vital signs and complete assessments, please see documentation flowsheets.     Pertinent assessments: LS diminished. On Cpap with 1-2L oxygen. VSS. . JOLIE orientation due to language barrier. PAINAD score 0. Turn and repos for skin integrity.      Treatment Plan: IV abx, symptom management, Nebs per RT, CPAP at night, supplemental 02 as needed.      Plan of Care Reviewed With: other (see comments) (unable to review with pt due to language barrier)    Overall Patient Progress: improvingOutcome Evaluation: Lungs sounds diminished. No SOB noted. Home Cpap overnight, with 1-2 L supplmental oxygen      Problem: Adult Inpatient Plan of Care  Goal: Plan of Care Review  Description: The Plan of Care Review/Shift note should be completed every shift.  The Outcome Evaluation is a brief statement about your assessment that the patient is improving, declining, or no change.  This information will be displayed automatically on your shift  note.  Outcome: Progressing  Flowsheets (Taken 7/8/2024 4475)  Outcome Evaluation: Lungs sounds diminished. No SOB noted. Home Cpap overnight, with 1-2 L supplmental oxygen  Plan of Care Reviewed With: (unable to review with pt due to language barrier) other (see comments)  Overall Patient Progress: improving  Goal: Patient-Specific Goal (Individualized)  Description: You can add care plan individualizations to a care plan. Examples of Individualization might be:  \"Parent requests to be called daily at 9am for status\", \"I have a hard time hearing out of my right ear\", or \"Do not touch me to wake me up as it startles  me\".  Outcome: Progressing  Goal: Absence of Hospital-Acquired Illness or Injury  Outcome: Progressing  Intervention: Identify and Manage Fall Risk  Recent Flowsheet Documentation  Taken 7/8/2024 0203 by Nina Cuadra RN  Safety Promotion/Fall Prevention: safety round/check completed  Intervention: Prevent Skin Injury  Recent Flowsheet " Documentation  Taken 7/8/2024 0203 by Nina Cuadra RN  Body Position:   turned   right  Goal: Optimal Comfort and Wellbeing  Outcome: Progressing  Goal: Readiness for Transition of Care  Outcome: Progressing     Problem: Pulmonary Impairment  Goal: Improved Activity Tolerance  Outcome: Progressing  Goal: Effective Airway Clearance  Outcome: Progressing  Goal: Optimal Gas Exchange  Outcome: Progressing     Problem: Comorbidity Management  Goal: Maintenance of COPD Symptom Control  Outcome: Progressing  Intervention: Maintain COPD Symptom Control  Recent Flowsheet Documentation  Taken 7/8/2024 0203 by Nina Cuadra RN  Medication Review/Management: medications reviewed  Goal: Blood Glucose Levels Within Targeted Range  Outcome: Progressing  Intervention: Monitor and Manage Glycemia  Recent Flowsheet Documentation  Taken 7/8/2024 0203 by Nina Cuadra RN  Medication Review/Management: medications reviewed  Goal: Blood Pressure in Desired Range  Outcome: Progressing  Intervention: Maintain Blood Pressure Management  Recent Flowsheet Documentation  Taken 7/8/2024 0203 by Nina Cuadra RN  Medication Review/Management: medications reviewed

## 2024-07-08 NOTE — PROGRESS NOTES
[facility]    Hospitalist Progress Note      Assessment & Plan   Pilo Quintana is a 74 year old  female (speaks a native [place]  dialect) with COPD, dementia, NIDDM2, h/o CVA and ventral pontine IPH (2023) complicated by right-sided hemiplegia, hypertension, dyslipidemia, MAGALIS on nocturnal BiPAP, HFpEF, right kidney oncocytoma, and h/o latent TB s/p 9 months INH 2002 who presented to [facility] on 7/7/2024 after being found askew bed and more weak.     #Acute hypoxic respiratory failure on chronic hypercapnic respiratory failure.  CAP with sepsis.  COPD with acute exacerbation. MAGALIS on BiPAP/CPAP.  Severe pulmonary hypertension:  Has been off home O2 since April 2024 with sats typically 95% per son. Increasing cough x2 weeks, not improved with doxycycline or steroids.   Seen in ED 6/30 for worsening of chronic cough and found to have a LLL PNA. Discharged on doxy 100 BID x 14 days and prednisone 40 x5 days without improvement in symptoms.   -In ED on 7/6, febrile to 100.5 with WBC 12 and ANC 9.5. CXR and CT demonstrate persistent LLL PNA. Received nebs and SoluMedrol 125 and empirically started on ceftriaxone + azithromycin. Admission requested.   -Pt with bilateral expiratory wheeze on exam on 7/8.  In setting of COPD will start prednisone 40 mg daily, schedule duonebs. Albuterol prn.   - Continue empiric ceftriaxone + azithromycin   - Schedule guaifenesin. Tessalon prn.   -- Continue CPAP/BiPAP at bedtime and with naps (will need encouragement to keep it on)  -- Pulmonary toilet  -- PT/OT/SW     #Generalized weakness related to infection.  Dementia.  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 No worsening confusion or e/o delirium with infection but is certainly at risk for hospital-induced delirium.   -- Continue PTA ASA 81  -- Delirium precautions  -- Redirect  frequently  -- PT/OT     #NIDDM2  -- Hold PTA metformin given CTA Chest; resume in 48 hours  -- Give lantus 10 units daily. Sliding scale insulin. Starting prednisone on 7/8.       #HTN  DLD: Continue PTA amlodipine, losartan, metoprolol succinate, atorvastatin, and lasix     Dispo: Likely at least 1 more day with IV abx. Await therapy consultation.   DVT Prophylaxis: Enoxaparin (Lovenox) SQ  Code Status:  FULL    Son updated at bedside.     Parag Liu MD    Interval History   Son acted as interpretor per patient preference. Offered formal ipad interpretor but declined.    Pt feels better. Has productive cough. Still feels SOB but better. Some pleuritic pain with coughing but better. No fevers/chills.  Eating some bfast.     -Data reviewed today: I reviewed all new labs and imaging results over the last 24 hours. I personally reviewed     Physical Exam   Temp: 97.8  F (36.6  C) Temp src: Axillary BP: 108/56 Pulse: 61   Resp: 16 SpO2: 92 % O2 Device: None (Room air) Oxygen Delivery: 1 LPM  Vitals:    07/07/24 0949 07/07/24 1446 07/08/24 0636   Weight: 73.9 kg (163 lb) 70.5 kg (155 lb 6.8 oz) 68.6 kg (151 lb 3.8 oz)     Vital Signs with Ranges  Temp:  [96.8  F (36  C)-97.9  F (36.6  C)] 97.8  F (36.6  C)  Pulse:  [60-98] 61  Resp:  [16-28] 16  BP: ()/(56-84) 108/56  SpO2:  [92 %-100 %] 92 %  I/O last 3 completed shifts:  In: 100 [P.O.:100]  Out: 200 [Urine:200]    Constitutional: Nontoxic, NAD  HEENT: Normocephalic. MMM, No elevation of JVD noted.   Respiratory: Nl WOB, Bilateral expiratory wheeze noted. Rhonchi at left base.   Cardiovascular: Distant. Regular, no murmur  GI: BS+, NT, ND  Skin/Integumen: WWP, no rash. No edema  Neuro: CNII-XII intact. Moves all extremities. No tremor. A&Ox3.    Medications   Current Facility-Administered Medications   Medication Dose Route Frequency Provider Last Rate Last Admin    No Medication Sleep Aids for this Patient   Does not apply Continuous PRN Sarah Mcclain,  MATT         Current Facility-Administered Medications   Medication Dose Route Frequency Provider Last Rate Last Admin    albuterol (PROVENTIL) neb solution 2.5 mg  2.5 mg Nebulization 4x Daily Nish Duong MD   2.5 mg at 07/08/24 0827    amLODIPine (NORVASC) tablet 5 mg  5 mg Oral Daily Sarah Mcclain PA-C   5 mg at 07/08/24 0923    aspirin EC tablet 81 mg  81 mg Oral Daily Sarah Mcclain PA-C   81 mg at 07/08/24 0922    atorvastatin (LIPITOR) tablet 40 mg  40 mg Oral QPM Sarah Mcclain PA-C   40 mg at 07/07/24 2017    azithromycin (ZITHROMAX) 250 mg in  mL intermittent infusion  250 mg Intravenous Q24H Sarah Mcclain PA-C   250 mg at 07/08/24 1034    cefTRIAXone (ROCEPHIN) 2 g vial to attach to  ml bag for ADULTS or NS 50 ml bag for PEDS  2 g Intravenous Q24H Sarah Mcclain PA-C   2 g at 07/08/24 0922    enoxaparin ANTICOAGULANT (LOVENOX) injection 40 mg  40 mg Subcutaneous Q24H Sarah Mcclain PA-C   40 mg at 07/07/24 1616    guaiFENesin (MUCINEX) 12 hr tablet 600 mg  600 mg Oral BID Parag Liu MD        insulin aspart (NovoLOG) injection (RAPID ACTING)  1-7 Units Subcutaneous TID AC Sarah Mcclain PA-C   2 Units at 07/08/24 0921    insulin aspart (NovoLOG) injection (RAPID ACTING)  1-5 Units Subcutaneous At Bedtime Sarah Mcclain PA-C   3 Units at 07/07/24 2204    insulin glargine (LANTUS PEN) injection 10 Units  10 Units Subcutaneous QAM AC Parag Liu MD        ipratropium - albuterol 0.5 mg/2.5 mg/3 mL (DUONEB) neb solution 3 mL  3 mL Nebulization Q6H Parag Liu MD        losartan (COZAAR) tablet 100 mg  100 mg Oral Daily Sarah Mcclain PA-C   100 mg at 07/08/24 0922    metoprolol succinate ER (TOPROL XL) 24 hr tablet 50 mg  50 mg Oral Daily Sarah Mcclain PA-C   50 mg at 07/08/24 0923    polyethylene glycol (MIRALAX) Packet 17 g  17 g Oral Daily Sarah Mcclain PA-C   17 g at 07/07/24 1615    predniSONE (DELTASONE) tablet  40 mg  40 mg Oral Daily Parag Liu MD        sodium chloride (PF) 0.9% PF flush 3 mL  3 mL Intracatheter Q8H Sarah Mcclain PA-C   3 mL at 07/08/24 0922       Data   Recent Labs   Lab 07/08/24  0909 07/08/24  0650 07/08/24  0200 07/07/24  1534 07/07/24  0825   WBC  --  8.5  --   --  12.0*   HGB  --  12.9  --   --  12.6   MCV  --  89  --   --  89   PLT  --  232  --   --  281   NA  --  141  --   --  140   POTASSIUM  --  4.0  --   --  3.8   CHLORIDE  --  100  --   --  96*   CO2  --  27  --   --  29   BUN  --  27.7*  --   --  26.5*   CR  --  0.78  --   --  0.73   ANIONGAP  --  14  --   --  15   GLENIS  --  9.6  --   --  10.0   * 229* 281*   < > 154*    < > = values in this interval not displayed.       Recent Results (from the past 24 hour(s))   CT Chest Pulmonary Embolism w Contrast    Narrative    EXAM: CT CHEST PULMONARY EMBOLISM W CONTRAST  LOCATION: [facility]  DATE: 7/7/2024    INDICATION: hypoxia, tachycardia, fever, progressive changes on CXR despite course of doxy and steroids. CXR suggests possible mass. Rule out PE, mass vs infection.  COMPARISON: CT chest 04/07/2024.  TECHNIQUE: CT chest pulmonary angiogram during arterial phase injection of IV contrast. Multiplanar reformats and MIP reconstructions were performed. Dose reduction techniques were used.   CONTRAST: 58mL Isovue 370    FINDINGS:  ANGIOGRAM CHEST: Examination substantially degraded by respiratory motion artifact. No evidence of central acute pulmonary embolism. Similar marked dilatation of the pulmonary trunk measuring up to 42 mm in diameter which can be seen with pulmonary   hypertension. Thoracic aorta is negative for dissection within the limitations of cardiac motion artifact.     LUNGS AND PLEURA: Respiratory motion artifact. There is new dense airspace consolidation within the left lower lobe concerning for pneumonia. Additional subsegmental atelectasis of the left lower lobe. No pleural  effusion.    MEDIASTINUM/AXILLAE: Cardiomegaly with enlargement of right-sided heart chambers. No pericardial effusion. Mildly enlarged subcarinal lymph node is nonspecific but favored reactive.    CORONARY ARTERY CALCIFICATION: Moderate.    UPPER ABDOMEN: Normal.    MUSCULOSKELETAL: No acute findings.      Impression    IMPRESSION:  1.  New airspace consolidation within the left lower lobe concerning for pneumonia. Consider follow-up chest CT in 3 months to ensure resolution.  2.  Evaluation of the pulmonary arteries is substantially degraded by respiratory motion artifact. No evidence of central acute pulmonary embolism.  3.  Similar severe dilatation of the pulmonary trunk which can be seen with pulmonary hypertension. Similar enlargement of right-sided heart chambers.

## 2024-07-09 ENCOUNTER — APPOINTMENT (OUTPATIENT)
Dept: PHYSICAL THERAPY | Facility: CLINIC | Age: 74
DRG: 871 | End: 2024-07-09
Payer: COMMERCIAL

## 2024-07-09 ENCOUNTER — APPOINTMENT (OUTPATIENT)
Dept: OCCUPATIONAL THERAPY | Facility: CLINIC | Age: 74
DRG: 871 | End: 2024-07-09
Attending: HOSPITALIST
Payer: COMMERCIAL

## 2024-07-09 LAB
ANION GAP SERPL CALCULATED.3IONS-SCNC: 12 MMOL/L (ref 7–15)
BACTERIA SPT CULT: NORMAL
BUN SERPL-MCNC: 25.1 MG/DL (ref 8–23)
CALCIUM SERPL-MCNC: 9.5 MG/DL (ref 8.8–10.2)
CHLORIDE SERPL-SCNC: 108 MMOL/L (ref 98–107)
CREAT SERPL-MCNC: 0.72 MG/DL (ref 0.51–0.95)
DEPRECATED HCO3 PLAS-SCNC: 25 MMOL/L (ref 22–29)
EGFRCR SERPLBLD CKD-EPI 2021: 87 ML/MIN/1.73M2
ERYTHROCYTE [DISTWIDTH] IN BLOOD BY AUTOMATED COUNT: 14.2 % (ref 10–15)
GLUCOSE BLDC GLUCOMTR-MCNC: 260 MG/DL (ref 70–99)
GLUCOSE BLDC GLUCOMTR-MCNC: 296 MG/DL (ref 70–99)
GLUCOSE BLDC GLUCOMTR-MCNC: 323 MG/DL (ref 70–99)
GLUCOSE BLDC GLUCOMTR-MCNC: 379 MG/DL (ref 70–99)
GLUCOSE SERPL-MCNC: 220 MG/DL (ref 70–99)
GRAM STAIN RESULT: NORMAL
HCT VFR BLD AUTO: 33.7 % (ref 35–47)
HGB BLD-MCNC: 10.9 G/DL (ref 11.7–15.7)
MCH RBC QN AUTO: 28.8 PG (ref 26.5–33)
MCHC RBC AUTO-ENTMCNC: 32.3 G/DL (ref 31.5–36.5)
MCV RBC AUTO: 89 FL (ref 78–100)
PLATELET # BLD AUTO: 219 10E3/UL (ref 150–450)
POTASSIUM SERPL-SCNC: 4.3 MMOL/L (ref 3.4–5.3)
RBC # BLD AUTO: 3.78 10E6/UL (ref 3.8–5.2)
SODIUM SERPL-SCNC: 145 MMOL/L (ref 135–145)
WBC # BLD AUTO: 8 10E3/UL (ref 4–11)

## 2024-07-09 PROCEDURE — 97165 OT EVAL LOW COMPLEX 30 MIN: CPT | Mod: GO

## 2024-07-09 PROCEDURE — 97530 THERAPEUTIC ACTIVITIES: CPT | Mod: GO

## 2024-07-09 PROCEDURE — 120N000001 HC R&B MED SURG/OB

## 2024-07-09 PROCEDURE — 250N000011 HC RX IP 250 OP 636: Performed by: PHYSICIAN ASSISTANT

## 2024-07-09 PROCEDURE — 85027 COMPLETE CBC AUTOMATED: CPT | Performed by: HOSPITALIST

## 2024-07-09 PROCEDURE — 97110 THERAPEUTIC EXERCISES: CPT | Mod: GP | Performed by: PHYSICAL THERAPIST

## 2024-07-09 PROCEDURE — 94640 AIRWAY INHALATION TREATMENT: CPT | Mod: 76

## 2024-07-09 PROCEDURE — 250N000009 HC RX 250: Performed by: HOSPITALIST

## 2024-07-09 PROCEDURE — 97530 THERAPEUTIC ACTIVITIES: CPT | Mod: GP | Performed by: PHYSICAL THERAPIST

## 2024-07-09 PROCEDURE — 999N000157 HC STATISTIC RCP TIME EA 10 MIN

## 2024-07-09 PROCEDURE — 94640 AIRWAY INHALATION TREATMENT: CPT

## 2024-07-09 PROCEDURE — 250N000013 HC RX MED GY IP 250 OP 250 PS 637: Performed by: PHYSICIAN ASSISTANT

## 2024-07-09 PROCEDURE — 250N000013 HC RX MED GY IP 250 OP 250 PS 637: Performed by: HOSPITALIST

## 2024-07-09 PROCEDURE — 94799 UNLISTED PULMONARY SVC/PX: CPT

## 2024-07-09 PROCEDURE — 80048 BASIC METABOLIC PNL TOTAL CA: CPT | Performed by: HOSPITALIST

## 2024-07-09 PROCEDURE — 258N000003 HC RX IP 258 OP 636: Performed by: PHYSICIAN ASSISTANT

## 2024-07-09 PROCEDURE — 250N000012 HC RX MED GY IP 250 OP 636 PS 637: Performed by: HOSPITALIST

## 2024-07-09 PROCEDURE — 99232 SBSQ HOSP IP/OBS MODERATE 35: CPT | Performed by: HOSPITALIST

## 2024-07-09 PROCEDURE — 36416 COLLJ CAPILLARY BLOOD SPEC: CPT | Performed by: HOSPITALIST

## 2024-07-09 RX ADMIN — INSULIN ASPART 3 UNITS: 100 INJECTION, SOLUTION INTRAVENOUS; SUBCUTANEOUS at 11:38

## 2024-07-09 RX ADMIN — AZITHROMYCIN MONOHYDRATE 250 MG: 500 INJECTION, POWDER, LYOPHILIZED, FOR SOLUTION INTRAVENOUS at 08:29

## 2024-07-09 RX ADMIN — IPRATROPIUM BROMIDE AND ALBUTEROL SULFATE 3 ML: .5; 3 SOLUTION RESPIRATORY (INHALATION) at 12:49

## 2024-07-09 RX ADMIN — IPRATROPIUM BROMIDE AND ALBUTEROL SULFATE 3 ML: .5; 3 SOLUTION RESPIRATORY (INHALATION) at 19:54

## 2024-07-09 RX ADMIN — ONDANSETRON 4 MG: 4 TABLET, ORALLY DISINTEGRATING ORAL at 07:56

## 2024-07-09 RX ADMIN — GUAIFENESIN 600 MG: 600 TABLET, EXTENDED RELEASE ORAL at 20:23

## 2024-07-09 RX ADMIN — POLYETHYLENE GLYCOL 3350 17 G: 17 POWDER, FOR SOLUTION ORAL at 09:14

## 2024-07-09 RX ADMIN — ASPIRIN 81 MG: 81 TABLET, COATED ORAL at 09:15

## 2024-07-09 RX ADMIN — BENZONATATE 100 MG: 100 CAPSULE ORAL at 21:23

## 2024-07-09 RX ADMIN — PREDNISONE 40 MG: 20 TABLET ORAL at 09:15

## 2024-07-09 RX ADMIN — AMLODIPINE BESYLATE 5 MG: 5 TABLET ORAL at 09:15

## 2024-07-09 RX ADMIN — CEFTRIAXONE 2 G: 2 INJECTION, POWDER, FOR SOLUTION INTRAMUSCULAR; INTRAVENOUS at 07:49

## 2024-07-09 RX ADMIN — IPRATROPIUM BROMIDE AND ALBUTEROL SULFATE 3 ML: .5; 3 SOLUTION RESPIRATORY (INHALATION) at 15:21

## 2024-07-09 RX ADMIN — GUAIFENESIN 600 MG: 600 TABLET, EXTENDED RELEASE ORAL at 09:15

## 2024-07-09 RX ADMIN — INSULIN ASPART 2 UNITS: 100 INJECTION, SOLUTION INTRAVENOUS; SUBCUTANEOUS at 07:53

## 2024-07-09 RX ADMIN — LOSARTAN POTASSIUM 100 MG: 100 TABLET, FILM COATED ORAL at 09:15

## 2024-07-09 RX ADMIN — ATORVASTATIN CALCIUM 40 MG: 40 TABLET, FILM COATED ORAL at 20:23

## 2024-07-09 RX ADMIN — INSULIN ASPART 5 UNITS: 100 INJECTION, SOLUTION INTRAVENOUS; SUBCUTANEOUS at 20:19

## 2024-07-09 RX ADMIN — METOPROLOL SUCCINATE 50 MG: 50 TABLET, EXTENDED RELEASE ORAL at 09:15

## 2024-07-09 RX ADMIN — ENOXAPARIN SODIUM 40 MG: 40 INJECTION SUBCUTANEOUS at 14:00

## 2024-07-09 RX ADMIN — IPRATROPIUM BROMIDE AND ALBUTEROL SULFATE 3 ML: .5; 3 SOLUTION RESPIRATORY (INHALATION) at 09:17

## 2024-07-09 ASSESSMENT — ACTIVITIES OF DAILY LIVING (ADL)
ADLS_ACUITY_SCORE: 57
ADLS_ACUITY_SCORE: 58
ADLS_ACUITY_SCORE: 58
ADLS_ACUITY_SCORE: 57
ADLS_ACUITY_SCORE: 58
ADLS_ACUITY_SCORE: 57
ADLS_ACUITY_SCORE: 57
ADLS_ACUITY_SCORE: 58

## 2024-07-09 NOTE — PLAN OF CARE
"End of Shift Summary  For vital signs and complete assessments, please see documentation flowsheets.     Pertinent assessments: VSS on cpap overnight with 2 L supplemental o2. Denies pain. AX2 with bed mobility. B    Treatment Plan: Rocephin. Zithro. PT, OT, and SW following.    Plan of Care Reviewed With: other (see comments) (language barrier)    Overall Patient Progress: improvingOutcome Evaluation: LS dim, sats maintained overnight on Cpap and 2 L supplemental oxygen      Problem: Adult Inpatient Plan of Care  Goal: Plan of Care Review  Description: The Plan of Care Review/Shift note should be completed every shift.  The Outcome Evaluation is a brief statement about your assessment that the patient is improving, declining, or no change.  This information will be displayed automatically on your shift  note.  Outcome: Progressing  Flowsheets (Taken 2024 0734)  Outcome Evaluation: LS dim, sats maintained overnight on Cpap and 2 L supplemental oxygen  Plan of Care Reviewed With: (language barrier) other (see comments)  Overall Patient Progress: improving  Goal: Patient-Specific Goal (Individualized)  Description: You can add care plan individualizations to a care plan. Examples of Individualization might be:  \"Parent requests to be called daily at 9am for status\", \"I have a hard time hearing out of my right ear\", or \"Do not touch me to wake me up as it startles  me\".  Outcome: Progressing  Goal: Absence of Hospital-Acquired Illness or Injury  Outcome: Progressing  Intervention: Identify and Manage Fall Risk  Recent Flowsheet Documentation  Taken 2024 0300 by Nina Cuadra, RN  Safety Promotion/Fall Prevention: safety round/check completed  Intervention: Prevent Skin Injury  Recent Flowsheet Documentation  Taken 2024 0530 by Nina Cuadra, RN  Body Position:   turned   left  Goal: Optimal Comfort and Wellbeing  Outcome: Progressing  Goal: Readiness for Transition of Care  Outcome: Progressing   "   Problem: Pulmonary Impairment  Goal: Improved Activity Tolerance  Outcome: Progressing  Goal: Effective Airway Clearance  Outcome: Progressing  Goal: Optimal Gas Exchange  Outcome: Progressing     Problem: Comorbidity Management  Goal: Maintenance of COPD Symptom Control  Outcome: Progressing  Intervention: Maintain COPD Symptom Control  Recent Flowsheet Documentation  Taken 7/9/2024 0300 by Nina Cuadra RN  Medication Review/Management: medications reviewed  Goal: Blood Glucose Levels Within Targeted Range  Outcome: Progressing  Intervention: Monitor and Manage Glycemia  Recent Flowsheet Documentation  Taken 7/9/2024 0300 by Nina Cuadra RN  Medication Review/Management: medications reviewed  Goal: Blood Pressure in Desired Range  Outcome: Progressing  Intervention: Maintain Blood Pressure Management  Recent Flowsheet Documentation  Taken 7/9/2024 0300 by Nina Cuadra RN  Medication Review/Management: medications reviewed

## 2024-07-09 NOTE — PROGRESS NOTES
07/09/24 0902   Appointment Info   Signing Clinician's Name / Credentials (OT) Sly Richardson OTR/L   Living Environment   People in Home child(mahi), adult;grandchild(mahi)   Current Living Arrangements house   Home Accessibility wheelchair accessible   Transportation Anticipated family or friend will provide   Living Environment Comments Pt lives with son, daughter in law  and grandchildren in a split level home with portable ramp at entrance and stair lift to access her bedroom upstairs.  Son works the night shift but comes home to check on mother and assist as needed over night. Bathroom setup includes grab bars, walk in shower, and shower chair.  Family present 24/7 to assist pt.   Self-Care   Usual Activity Tolerance moderate   Current Activity Tolerance fair   Equipment Currently Used at Home walker, rolling;wheelchair, manual;shower chair;grab bar, toilet;commode chair;hospital bed   Fall history within last six months no   Activity/Exercise/Self-Care Comment Pt ambulates with FWW and SB-CGA from family. Has assist from family for all I/ADL.   General Information   Onset of Illness/Injury or Date of Surgery 07/07/24   Referring Physician Parag Liu MD   Patient/Family Therapy Goal Statement (OT) To return home   Additional Occupational Profile Info/Pertinent History of Current Problem Pilo Quintana is a 74 year old  female (speaks a native [place]  dialect) with COPD, dementia, NIDDM2, h/o CVA and ventral pontine IPH (2023) complicated by right-sided hemiplegia, hypertension, dyslipidemia, MAGALIS on nocturnal BiPAP, HFpEF, right kidney oncocytoma, and h/o latent TB s/p 9 months INH 2002 who presented to [facility] on 7/7/2024 after being found askew bed and more weak.   Existing Precautions/Restrictions fall   Cognitive Status Examination   Follows Commands follows one-step commands;75-90% accuracy;increased processing time needed;repetition of directions required;verbal cues/prompting required    Cognitive Status Comments Pt not oriented to year, state per son this is baseline. Reports some forgetfulness.   Strength Comprehensive (MMT)   Comment, General Manual Muscle Testing (MMT) Assessment Generalized weakness. Deficits noted with functional mobility.  residual right hemiplegia from prior CVA   Bed Mobility   Bed Mobility supine-sit   Supine-Sit [place] (Bed Mobility) maximum assist (25% patient effort)   Transfers   Transfers sit-stand transfer   Sit-Stand Transfer   Sit-Stand [place] (Transfers) moderate assist (50% patient effort);2 person assist   Activities of Daily Living   BADL Assessment/Intervention toileting;feeding;grooming;lower body dressing;bathing   Bathing Assessment/Intervention   [place] Level (Bathing) dependent (less than 25% patient effort)   Lower Body Dressing Assessment/Training   [place] Level (Lower Body Dressing) dependent (less than 25% patient effort)   Grooming Assessment/Training   [place] Level (Grooming) maximum assist (25% patient effort)   Eating/Self Feeding   [place] Level (Feeding) maximum assist (25% patient effort)   Toileting   [place] Level (Toileting) dependent (less than 25% patient effort)   Clinical Impression   Criteria for Skilled Therapeutic Interventions Met (OT) Yes, treatment indicated   OT Diagnosis Decreased ADL performance   OT Problem List-Impairments impacting ADL problems related to;activity tolerance impaired;mobility;strength   Assessment of Occupational Performance 3-5 Performance Deficits   Identified Performance Deficits toilet transfers, shower transfers, other ADL transfers, grooming, bathing   Planned Therapy Interventions (OT) ADL retraining;strengthening;transfer training;home program guidelines;progressive activity/exercise   Clinical Decision Making Complexity (OT) problem focused assessment/low complexity   Risk & Benefits of therapy have been explained evaluation/treatment results reviewed;care  plan/treatment goals reviewed;risks/benefits reviewed;current/potential barriers reviewed;participants voiced agreement with care plan;participants included;patient;son   OT Total Evaluation Time   OT Eval, Low Complexity Minutes (21144) 10   OT Goals   Therapy Frequency (OT) Daily   OT Predicted Duration/Target Date for Goal Attainment 07/18/24   OT Goals Toilet Transfer/Toileting;OT Goal 1;Upper Body Dressing;Hygiene/Grooming   OT: Hygiene/Grooming moderate assist;from wheelchair   OT: Upper Body Dressing Moderate assist   OT: Toilet Transfer/Toileting Supervision/stand-by assist;toilet transfer   OT: Goal 1 The patient will demonstrate shower transfer with CGA and use of AE   Therapeutic Activities   Therapeutic Activity Minutes (88631) 30   Symptoms noted during/after treatment fatigue;shortness of breath   Treatment Detail/Skilled Intervention The patient is supine at encounter, agreeable to OT. Transfers to EOB w/ max A and cues to bring LEs to EOB. Lalo mcintyre ability to transfer pt from supine to sitting EOB via log sit then scooting pt to EOB. Pt SBA seated at EOB. spO2 reading in low 90s on RA, cues for PLB. STS w/ mod Ax2 and use of FWW. Tolerates ~10 feet of ambulation around  end of bed to chair, sitting for rest. Requires min Ax2 for ambulating, cues throughout for FWW position and for stepping through turns. requires mod A for controlled sit to chair as pt sitting early and would sit on arm rest w/o assist from OT for full pivot. Does not pivot fully to chair despite cueing. spO2 stable in mid-low 90s on RA after amb. Pt resting 3-4 min in chair prior to second bout of mobility. STS second attempt w/ mod A x2, cues for hand position, and use of FWW. min A x2 for 7 feet of ambulation to recliner chair. Assist again and cues for full pivot to recliner prior to sit. Positioned pt in recliner w/ alarm on and all needs in reach. very slow pace with all ambulation.   OT Discharge Planning   OT Plan ambulate  to toilet for transfer vs commode transfer, simulated walk in shower transfer.   OT Discharge Recommendation (DC Rec) home with assist;home with home care occupational therapy   OT Rationale for DC Rec The patient presents below basline with weakness and decreased activity tolerance. Pt recieves assist x1 from family for all transfers and ADL at home at baseline. Anticipate with medical mgmt and IP rehab, pt will progress to Ax1 and be appropriate for discharge home with HH OT/PT.   OT Brief overview of current status Ax2 w/ FWW

## 2024-07-09 NOTE — PROGRESS NOTES
Per  services, there is no [place]  throughout [place] or in the state on [place]. Staff will have to rely on family members to translate.

## 2024-07-09 NOTE — PLAN OF CARE
"To Do:  End of Shift Summary  For vital signs and complete assessments, please see documentation flowsheets.     Pertinent assessments: Pt is A/Ox4. VSS on 2L NC. Speaks [place], son interprets. Denies pain and N/V. SOB upon exertion. AX2 with walker, pivot to commode. PIV SL. Regular diet. B, 219, 406.     Major Shift Events: . Cross-cover notified. RN gave 5 units of novolog per sliding scale at 2146. Cross-cover ordered an additional 5 units of novolog to be administered. RN administered 5 additional units of Novolog at 2251.    Treatment Plan: Rocephin. Zithro. PT, OT, and SW following.    Bedside Nurse: Margret Iverson RN     Goal Outcome Evaluation:      Plan of Care Reviewed With: patient    Overall Patient Progress: improvingOverall Patient Progress: improving    Outcome Evaluation: LS dim, Dyspnea upon exertion      Problem: Adult Inpatient Plan of Care  Goal: Plan of Care Review  Description: The Plan of Care Review/Shift note should be completed every shift.  The Outcome Evaluation is a brief statement about your assessment that the patient is improving, declining, or no change.  This information will be displayed automatically on your shift  note.  Outcome: Progressing  Flowsheets (Taken 2024)  Outcome Evaluation: LS dim, Dyspnea upon exertion  Plan of Care Reviewed With: patient  Overall Patient Progress: improving  Goal: Patient-Specific Goal (Individualized)  Description: You can add care plan individualizations to a care plan. Examples of Individualization might be:  \"Parent requests to be called daily at 9am for status\", \"I have a hard time hearing out of my right ear\", or \"Do not touch me to wake me up as it startles  me\".  Outcome: Progressing  Goal: Absence of Hospital-Acquired Illness or Injury  Outcome: Progressing  Intervention: Identify and Manage Fall Risk  Recent Flowsheet Documentation  Taken 2024 1515 by Margret Iverson, RN  Safety Promotion/Fall Prevention:   safety " round/check completed   room near nurse's station   nonskid shoes/slippers when out of bed   mobility aid in reach   activity supervised  Intervention: Prevent Skin Injury  Recent Flowsheet Documentation  Taken 7/8/2024 1515 by Margret Iverson RN  Body Position: supine, head elevated  Taken 7/8/2024 1234 by Margret Iverson RN  Body Position: supine, head elevated  Intervention: Prevent and Manage VTE (Venous Thromboembolism) Risk  Recent Flowsheet Documentation  Taken 7/8/2024 1515 by Margret Iverson RN  VTE Prevention/Management: SCDs off (sequential compression devices)  Intervention: Prevent Infection  Recent Flowsheet Documentation  Taken 7/8/2024 1515 by Margret Iverson RN  Infection Prevention:   single patient room provided   rest/sleep promoted   cohorting utilized  Goal: Optimal Comfort and Wellbeing  Outcome: Progressing  Goal: Readiness for Transition of Care  Outcome: Progressing     Problem: Pulmonary Impairment  Goal: Improved Activity Tolerance  Outcome: Progressing  Goal: Effective Airway Clearance  Outcome: Progressing  Goal: Optimal Gas Exchange  Outcome: Progressing     Problem: Comorbidity Management  Goal: Maintenance of COPD Symptom Control  Outcome: Progressing  Intervention: Maintain COPD Symptom Control  Recent Flowsheet Documentation  Taken 7/8/2024 1515 by Margret Iverson RN  Medication Review/Management: medications reviewed  Goal: Blood Glucose Levels Within Targeted Range  Outcome: Progressing  Intervention: Monitor and Manage Glycemia  Recent Flowsheet Documentation  Taken 7/8/2024 1515 by Margret Iverson RN  Medication Review/Management: medications reviewed  Goal: Blood Pressure in Desired Range  Outcome: Progressing  Intervention: Maintain Blood Pressure Management  Recent Flowsheet Documentation  Taken 7/8/2024 1515 by Margret Iverson RN  Medication Review/Management: medications reviewed

## 2024-07-09 NOTE — PROGRESS NOTES
[facility]    Hospitalist Progress Note      Assessment & Plan   Pilo Quintana is a 74 year old  female (speaks [place] native [place]  dialect) with COPD, dementia, NIDDM2, h/o CVA and ventral pontine IPH (2023) complicated by right-sided hemiplegia, hypertension, dyslipidemia, MAGALIS on nocturnal BiPAP, HFpEF, right kidney oncocytoma, and h/o latent TB s/p 9 months INH 2002 who presented to [facility] on 7/7/2024 after being found askew bed and more weak.     #Acute hypoxic respiratory failure on chronic hypercapnic respiratory failure.  CAP with sepsis.  COPD with acute exacerbation. MAGALIS on BiPAP/CPAP.  Severe pulmonary hypertension:  Has been off home O2 since April 2024 with sats typically 95% per son. Increasing cough x2 weeks, not improved with doxycycline or steroids.   Seen in ED 6/30 for worsening of chronic cough and found to have a LLL PNA. Discharged on doxy 100 BID x 14 days and prednisone 40 x5 days without improvement in symptoms.   -In ED on 7/6, febrile to 100.5 with WBC 12 and ANC 9.5. CXR and CT demonstrate persistent LLL PNA. Received nebs and SoluMedrol 125 and empirically started on ceftriaxone + azithromycin. Admission requested.   -Pt with bilateral expiratory wheeze on exam on 7/8.  In setting of COPD will start prednisone 40 mg daily, schedule duonebs. Albuterol prn.  Continue empiric ceftriaxone + azithromycin for CAP.   -Schedule guaifenesin. Tessalon prn.   -Continue CPAP/BiPAP at bedtime and with naps (will need encouragement to keep it on)  -Therapies consulted.  Will discharge with home cares.  -Pt doing better on 7/9 but still with congested cough and generally fatigued/weak. On RA during day and using her positive pressure at night.  Discussed with patient and son, if continues to improve then will discharge home tomorrow, 7/10.      #Generalized weakness related to infection.  Dementia.  H/o CVA and pontine ICH with residual right hemiplegia: Appetite, bowels  unchanged per son who notes patient tires out by end of day and may need some assistance eating. No worsening confusion or e/o delirium with infection but is certainly at risk for hospital-induced delirium.   -- Continue PTA ASA 81  -- Delirium precautions  -- Redirect frequently  -- PT/OT     #NIDDM2  -- Hold PTA metformin given CTA Chest; resume in 48 hours  -- BS on higher side and insulin regimen adjusted.  Continue lantus 15 units BID, carb count 1 unit per 15 g carbs with meals, sliding scale insulin while here in hospital.  Likely will not discharge on prednisone given higher BS.        #HTN  DLD: Continue PTA amlodipine, losartan, metoprolol succinate, atorvastatin, and lasix     Dispo: Hoping to discharge tomorrow,7/10.   DVT Prophylaxis: Enoxaparin (Lovenox) SQ  Code Status:  FULL    Son updated at bedside on 7/9.     Parag Liu MD    Interval History   Son acted as  per patient preference.    No events. Feels better. Still with congested cough and bringing up sputum that is clear/yellow.  No chest pain. No fevers/chills.  Eating/drinking OK. Hoping for discharge tomorrow.     -Data reviewed today: I reviewed all new labs and imaging results over the last 24 hours. I personally reviewed     Physical Exam   Temp: 98.4  F (36.9  C) Temp src: Oral BP: (!) 153/80 Pulse: 70   Resp: 22 SpO2: 94 % O2 Device: None (Room air) Oxygen Delivery: 2 LPM  Vitals:    07/07/24 1446 07/08/24 0636 07/09/24 0659   Weight: 70.5 kg (155 lb 6.8 oz) 68.6 kg (151 lb 3.8 oz) 69.1 kg (152 lb 5.4 oz)     Vital Signs with Ranges  Temp:  [98.2  F (36.8  C)-98.4  F (36.9  C)] 98.4  F (36.9  C)  Pulse:  [62-70] 70  Resp:  [16-28] 22  BP: (106-164)/() 153/80  SpO2:  [91 %-94 %] 94 %  I/O last 3 completed shifts:  In: 240 [P.O.:240]  Out: -     Constitutional: Nontoxic, NAD  HEENT: Normocephalic. MMM, No elevation of JVD noted.   Respiratory: Nl WOB, Bilateral expiratory wheeze noted. Rhonchi at left base.  Improved air  movement on 7/9.    Cardiovascular: Distant. Regular, no murmur  GI: BS+, NT, ND  Skin/Integumen: WWP, no rash. No edema  Neuro: CNII-XII intact. Moves all extremities. No tremor. A&Ox3.    Medications   Current Facility-Administered Medications   Medication Dose Route Frequency Provider Last Rate Last Admin    No Medication Sleep Aids for this Patient   Does not apply Continuous PRN Sarah Mcclain PA-C         Current Facility-Administered Medications   Medication Dose Route Frequency Provider Last Rate Last Admin    amLODIPine (NORVASC) tablet 5 mg  5 mg Oral Daily Sarah Mcclain PA-C   5 mg at 07/09/24 0915    aspirin EC tablet 81 mg  81 mg Oral Daily Sarah Mcclain PA-C   81 mg at 07/09/24 0915    atorvastatin (LIPITOR) tablet 40 mg  40 mg Oral QPM Sarah Mcclain PA-C   40 mg at 07/08/24 2012    azithromycin (ZITHROMAX) 250 mg in  mL intermittent infusion  250 mg Intravenous Q24H Sarah Mcclain PA-C   250 mg at 07/09/24 0829    cefTRIAXone (ROCEPHIN) 2 g vial to attach to  ml bag for ADULTS or NS 50 ml bag for PEDS  2 g Intravenous Q24H Sarah Mcclain PA-C   2 g at 07/09/24 0749    enoxaparin ANTICOAGULANT (LOVENOX) injection 40 mg  40 mg Subcutaneous Q24H Sarah Mcclain PA-C   40 mg at 07/08/24 1629    guaiFENesin (MUCINEX) 12 hr tablet 600 mg  600 mg Oral BID Parag Liu MD   600 mg at 07/09/24 0915    insulin aspart (NovoLOG) injection (RAPID ACTING)   Subcutaneous TID w/meals Nish Duong MD        insulin aspart (NovoLOG) injection (RAPID ACTING)  1-7 Units Subcutaneous TID AC Sarah Mcclain PA-C   2 Units at 07/09/24 0753    insulin aspart (NovoLOG) injection (RAPID ACTING)  1-5 Units Subcutaneous At Bedtime Sarah Mcclain PA-C   5 Units at 07/08/24 2146    insulin glargine (LANTUS PEN) injection 15 Units  15 Units Subcutaneous BID Nish Duong MD        ipratropium - albuterol 0.5 mg/2.5 mg/3 mL (DUONEB) neb solution 3 mL  3 mL  Nebulization 4x daily Parag Liu MD   3 mL at 07/09/24 0917    losartan (COZAAR) tablet 100 mg  100 mg Oral Daily Sarah Mcclain PA-C   100 mg at 07/09/24 0915    metoprolol succinate ER (TOPROL XL) 24 hr tablet 50 mg  50 mg Oral Daily Sarah Mcclain PA-C   50 mg at 07/09/24 0915    polyethylene glycol (MIRALAX) Packet 17 g  17 g Oral Daily Sarah Mcclain PA-C   17 g at 07/09/24 0914    predniSONE (DELTASONE) tablet 40 mg  40 mg Oral Daily Parag Liu MD   40 mg at 07/09/24 0915    sodium chloride (PF) 0.9% PF flush 3 mL  3 mL Intracatheter Q8H Sarah Mcclain PA-C   3 mL at 07/09/24 0752       Data   Recent Labs   Lab 07/09/24  0650 07/09/24  0204 07/08/24  2131 07/08/24  0909 07/08/24  0650 07/07/24  1534 07/07/24  0825   WBC 8.0  --   --   --  8.5  --  12.0*   HGB 10.9*  --   --   --  12.9  --  12.6   MCV 89  --   --   --  89  --  89     --   --   --  232  --  281     --   --   --  141  --  140   POTASSIUM 4.3  --   --   --  4.0  --  3.8   CHLORIDE 108*  --   --   --  100  --  96*   CO2 25  --   --   --  27  --  29   BUN 25.1*  --   --   --  27.7*  --  26.5*   CR 0.72  --   --   --  0.78  --  0.73   ANIONGAP 12  --   --   --  14  --  15   GLENIS 9.5  --   --   --  9.6  --  10.0   * 296* 406*   < > 229*   < > 154*    < > = values in this interval not displayed.       No results found for this or any previous visit (from the past 24 hour(s)).

## 2024-07-09 NOTE — PLAN OF CARE
"To Do:  End of Shift Summary  For vital signs and complete assessments, please see documentation flowsheets.     Pertinent assessments: Pt is A/Ox4. VSS on 1L NC. Denies SOB, N/V, and pain. PIV SL. Mod carb diet, carb count. Ax2 with walker and gait belt. Productive cough, LS dim. B, 260.    Major Shift Events: up in chair this shift. Zofran given x1 with improvement.    Treatment Plan: Rocephin. Christianothro. Nebs. SW, PT, OT following. Potential discharge 7/10.    Bedside Nurse: Margret Iverson RN     Goal Outcome Evaluation:      Plan of Care Reviewed With: patient    Overall Patient Progress: improvingOverall Patient Progress: improving    Outcome Evaluation: 1L NC.      Problem: Adult Inpatient Plan of Care  Goal: Plan of Care Review  Description: The Plan of Care Review/Shift note should be completed every shift.  The Outcome Evaluation is a brief statement about your assessment that the patient is improving, declining, or no change.  This information will be displayed automatically on your shift  note.  Outcome: Progressing  Flowsheets (Taken 2024 3056)  Outcome Evaluation: 1L NC.  Plan of Care Reviewed With: patient  Overall Patient Progress: improving  Goal: Patient-Specific Goal (Individualized)  Description: You can add care plan individualizations to a care plan. Examples of Individualization might be:  \"Parent requests to be called daily at 9am for status\", \"I have a hard time hearing out of my right ear\", or \"Do not touch me to wake me up as it startles  me\".  Outcome: Progressing  Goal: Absence of Hospital-Acquired Illness or Injury  Outcome: Progressing  Intervention: Identify and Manage Fall Risk  Recent Flowsheet Documentation  Taken 2024 9823 by Margret Iverson, RN  Safety Promotion/Fall Prevention:   activity supervised   clutter free environment maintained   mobility aid in reach   nonskid shoes/slippers when out of bed   room near nurse's station   safety round/check completed  Intervention: " Prevent Skin Injury  Recent Flowsheet Documentation  Taken 7/9/2024 0745 by Margret Iverson RN  Body Position: position changed independently  Intervention: Prevent and Manage VTE (Venous Thromboembolism) Risk  Recent Flowsheet Documentation  Taken 7/9/2024 0745 by Margret Iverson RN  VTE Prevention/Management: SCDs off (sequential compression devices)  Intervention: Prevent Infection  Recent Flowsheet Documentation  Taken 7/9/2024 0745 by Margret Iverson RN  Infection Prevention:   rest/sleep promoted   hand hygiene promoted   single patient room provided  Goal: Optimal Comfort and Wellbeing  Outcome: Progressing  Goal: Readiness for Transition of Care  Outcome: Progressing     Problem: Pulmonary Impairment  Goal: Improved Activity Tolerance  Outcome: Progressing  Goal: Effective Airway Clearance  Outcome: Progressing  Goal: Optimal Gas Exchange  Outcome: Progressing     Problem: Comorbidity Management  Goal: Maintenance of COPD Symptom Control  Outcome: Progressing  Intervention: Maintain COPD Symptom Control  Recent Flowsheet Documentation  Taken 7/9/2024 0745 by Margret Iverson RN  Medication Review/Management: medications reviewed  Goal: Blood Glucose Levels Within Targeted Range  Outcome: Progressing  Intervention: Monitor and Manage Glycemia  Recent Flowsheet Documentation  Taken 7/9/2024 0745 by Margret Iverson RN  Medication Review/Management: medications reviewed  Goal: Blood Pressure in Desired Range  Outcome: Progressing  Intervention: Maintain Blood Pressure Management  Recent Flowsheet Documentation  Taken 7/9/2024 0745 by Margret Iverson RN  Medication Review/Management: medications reviewed

## 2024-07-09 NOTE — CONSULTS
Care Management Initial Consult    General Information  Assessment completed with: Lalo Turcios Andrew  Type of CM/SW Visit: Initial Assessment    Primary Care Provider verified and updated as needed: Yes   Readmission within the last 30 days:        Reason for Consult: discharge planning  Advance Care Planning:            Communication Assessment  Patient's communication style: spoken language (English or Bilingual)    Hearing Difficulty or Deaf: yes   Wear Glasses or Blind: no    Cognitive  Cognitive/Neuro/Behavioral: .WDL except (language barrier- speaks [place])  Level of Consciousness: alert  Arousal Level: opens eyes spontaneously  Orientation: oriented x 4  Mood/Behavior: calm     Speech: spontaneous    Living Environment:   People in home: child(mahi), adult, grandchild(mahi)  Balwinder albert  Current living Arrangements: house      Able to return to prior arrangements: yes       Family/Social Support:  Care provided by: child(mahi)  Provides care for: no one, unable/limited ability to care for self     Children          Description of Support System: Supportive, Involved         Current Resources:   Patient receiving home care services:       Community Resources:    Equipment currently used at home: walker, rolling, wheelchair, manual, shower chair, grab bar, toilet, commode chair, hospital bed  Supplies currently used at home:      Employment/Financial:  Employment Status:          Financial Concerns:             Does the patient's insurance plan have a 3 day qualifying hospital stay waiver?  No    Lifestyle & Psychosocial Needs:  Social Determinants of Health     Food Insecurity: Not on file   Depression: Not at risk (1/29/2024)    Received from [facility]    PHQ-2     PHQ-2 Score: 0   Housing Stability: Not on file   Tobacco Use: Low Risk  (7/2/2024)    Received from [facility]    Patient History     Smoking Tobacco Use: Never     Smokeless Tobacco Use: Never     Passive Exposure: Not on  file   Financial Resource Strain: Not on file   Alcohol Use: Not on file   Transportation Needs: Not on file   Physical Activity: Not on file   Interpersonal Safety: Not on file   Stress: Not on file   Social Connections: Not on file   Health Literacy: Not on file       Functional Status:  Prior to admission patient needed assistance:   Dependent ADLs:: Ambulation-walker, Bathing, Dressing, Transfers, Toileting  Dependent IADLs:: Transportation, Medication Management, Shopping, Meal Preparation, Laundry, Cooking, Cleaning       Mental Health Status:          Chemical Dependency Status:                Values/Beliefs:  Spiritual, Cultural Beliefs, Quaker Practices, Values that affect care:                 Additional Information:  SW met with patient & her son, Balwinder to complete the initial assessment for discharge planning. Patient resides with her son, Balwinder, daughter in law, & grandchildren in a private home. Family provides 24/7 assistance at home. Balwinder shared that he will help his mom with anything that she needs & that family is always with her. Balwinder also have video monitoring on his phone to monitor when he is not home in case he needs to come back to assist. GENEVIEVE discussed the recommendation for home PT & OT. Balwinder & patient are agreeable to this. They shared patient used [facility] in the past & would prefer to use them again. Family plans to provide transportation at discharge. Home care referral is pending & GENEVIEVE will continue to follow to assist with discharge planning.     AYAN Huff

## 2024-07-10 VITALS
DIASTOLIC BLOOD PRESSURE: 70 MMHG | TEMPERATURE: 98.3 F | RESPIRATION RATE: 20 BRPM | SYSTOLIC BLOOD PRESSURE: 155 MMHG | WEIGHT: 152.34 LBS | BODY MASS INDEX: 30.71 KG/M2 | HEIGHT: 59 IN | OXYGEN SATURATION: 96 % | HEART RATE: 64 BPM

## 2024-07-10 LAB
CREAT SERPL-MCNC: 0.61 MG/DL (ref 0.51–0.95)
EGFRCR SERPLBLD CKD-EPI 2021: >90 ML/MIN/1.73M2
GLUCOSE BLDC GLUCOMTR-MCNC: 146 MG/DL (ref 70–99)
GLUCOSE BLDC GLUCOMTR-MCNC: 156 MG/DL (ref 70–99)
GLUCOSE BLDC GLUCOMTR-MCNC: 269 MG/DL (ref 70–99)
GLUCOSE BLDC GLUCOMTR-MCNC: 48 MG/DL (ref 70–99)
PLATELET # BLD AUTO: 240 10E3/UL (ref 150–450)

## 2024-07-10 PROCEDURE — 250N000012 HC RX MED GY IP 250 OP 636 PS 637: Performed by: HOSPITALIST

## 2024-07-10 PROCEDURE — 85049 AUTOMATED PLATELET COUNT: CPT | Performed by: PHYSICIAN ASSISTANT

## 2024-07-10 PROCEDURE — 250N000013 HC RX MED GY IP 250 OP 250 PS 637: Performed by: PHYSICIAN ASSISTANT

## 2024-07-10 PROCEDURE — 258N000003 HC RX IP 258 OP 636: Performed by: PHYSICIAN ASSISTANT

## 2024-07-10 PROCEDURE — 94640 AIRWAY INHALATION TREATMENT: CPT

## 2024-07-10 PROCEDURE — 999N000157 HC STATISTIC RCP TIME EA 10 MIN

## 2024-07-10 PROCEDURE — 94640 AIRWAY INHALATION TREATMENT: CPT | Mod: 76

## 2024-07-10 PROCEDURE — 250N000013 HC RX MED GY IP 250 OP 250 PS 637: Performed by: HOSPITALIST

## 2024-07-10 PROCEDURE — 94799 UNLISTED PULMONARY SVC/PX: CPT

## 2024-07-10 PROCEDURE — 250N000011 HC RX IP 250 OP 636: Performed by: PHYSICIAN ASSISTANT

## 2024-07-10 PROCEDURE — 99239 HOSP IP/OBS DSCHRG MGMT >30: CPT | Performed by: HOSPITALIST

## 2024-07-10 PROCEDURE — 82565 ASSAY OF CREATININE: CPT | Performed by: PHYSICIAN ASSISTANT

## 2024-07-10 PROCEDURE — 36415 COLL VENOUS BLD VENIPUNCTURE: CPT | Performed by: PHYSICIAN ASSISTANT

## 2024-07-10 PROCEDURE — 250N000009 HC RX 250: Performed by: HOSPITALIST

## 2024-07-10 RX ORDER — BENZONATATE 200 MG/1
200 CAPSULE ORAL 3 TIMES DAILY PRN
Qty: 21 CAPSULE | Refills: 0 | Status: SHIPPED | OUTPATIENT
Start: 2024-07-10 | End: 2024-07-17

## 2024-07-10 RX ORDER — GUAIFENESIN 200 MG/1
200 TABLET ORAL EVERY 6 HOURS PRN
Qty: 30 TABLET | Refills: 0 | Status: SHIPPED | OUTPATIENT
Start: 2024-07-10 | End: 2024-07-24

## 2024-07-10 RX ORDER — CEFUROXIME AXETIL 500 MG/1
500 TABLET ORAL 2 TIMES DAILY
Qty: 14 TABLET | Refills: 0 | Status: SHIPPED | OUTPATIENT
Start: 2024-07-10 | End: 2024-07-17

## 2024-07-10 RX ORDER — AZITHROMYCIN 250 MG/1
250 TABLET, FILM COATED ORAL DAILY
Qty: 2 TABLET | Refills: 0 | Status: SHIPPED | OUTPATIENT
Start: 2024-07-10 | End: 2024-07-12

## 2024-07-10 RX ORDER — FUROSEMIDE 40 MG
40 TABLET ORAL DAILY
Status: DISCONTINUED | OUTPATIENT
Start: 2024-07-10 | End: 2024-07-10 | Stop reason: HOSPADM

## 2024-07-10 RX ADMIN — GUAIFENESIN 600 MG: 600 TABLET, EXTENDED RELEASE ORAL at 09:39

## 2024-07-10 RX ADMIN — CEFTRIAXONE 2 G: 2 INJECTION, POWDER, FOR SOLUTION INTRAMUSCULAR; INTRAVENOUS at 09:39

## 2024-07-10 RX ADMIN — LOSARTAN POTASSIUM 100 MG: 100 TABLET, FILM COATED ORAL at 09:39

## 2024-07-10 RX ADMIN — FUROSEMIDE 40 MG: 40 TABLET ORAL at 09:39

## 2024-07-10 RX ADMIN — IPRATROPIUM BROMIDE AND ALBUTEROL SULFATE 3 ML: .5; 3 SOLUTION RESPIRATORY (INHALATION) at 07:49

## 2024-07-10 RX ADMIN — INSULIN ASPART 3 UNITS: 100 INJECTION, SOLUTION INTRAVENOUS; SUBCUTANEOUS at 13:26

## 2024-07-10 RX ADMIN — AMLODIPINE BESYLATE 5 MG: 5 TABLET ORAL at 09:39

## 2024-07-10 RX ADMIN — AZITHROMYCIN MONOHYDRATE 250 MG: 500 INJECTION, POWDER, LYOPHILIZED, FOR SOLUTION INTRAVENOUS at 10:19

## 2024-07-10 RX ADMIN — METOPROLOL SUCCINATE 50 MG: 50 TABLET, EXTENDED RELEASE ORAL at 09:39

## 2024-07-10 RX ADMIN — PREDNISONE 40 MG: 20 TABLET ORAL at 09:39

## 2024-07-10 RX ADMIN — ASPIRIN 81 MG: 81 TABLET, COATED ORAL at 09:39

## 2024-07-10 RX ADMIN — POLYETHYLENE GLYCOL 3350 17 G: 17 POWDER, FOR SOLUTION ORAL at 09:39

## 2024-07-10 RX ADMIN — IPRATROPIUM BROMIDE AND ALBUTEROL SULFATE 3 ML: .5; 3 SOLUTION RESPIRATORY (INHALATION) at 11:15

## 2024-07-10 ASSESSMENT — ACTIVITIES OF DAILY LIVING (ADL)
ADLS_ACUITY_SCORE: 58

## 2024-07-10 NOTE — PLAN OF CARE
Discharge Note    Patient discharged to home via private vehicle  accompanied by son.  IV: Discontinued  Prescriptions filled and given to patient/family.   Belongings reviewed and sent with patient and family.   Home medications returned to patient: Yes  Equipment sent with: and family.   patient and family verbalizes understanding of discharge instructions. AVS given to patient and family.  Additional education completed?  N/a

## 2024-07-10 NOTE — DISCHARGE INSTRUCTIONS
Your home care referral was sent to [facility] for skilled nursing, physical therapy, and occupational therapy services.    If you haven't heard from them within the next 24-48 hours, please call them at [phone].

## 2024-07-10 NOTE — PROGRESS NOTES
Care Management Discharge Note    Discharge Date: 07/10/2024       Discharge Disposition: Home, Home Care    Discharge Services:  Home Care    Discharge DME:  walker    Discharge Transportation: family or friend will provide - Pt's son    Private pay costs discussed: Not applicable    Does the patient's insurance plan have a 3 day qualifying hospital stay waiver?  No    PAS Confirmation Code:  N/A  Patient/family educated on Medicare website which has current facility and service quality ratings: yes    Education Provided on the Discharge Plan:  yes  Persons Notified of Discharge Plans: Pt's son Baldo and [facility]  Patient/Family in Agreement with the Plan:  yes    Handoff Referral Completed: Yes    Additional Information:  The pt will discharge home today with family and [facility] RN/PT/OT services.  Scott faxed the pt's discharge orders to [facility] P: [phone] F: [phone].  Scott updated the pt and her son Balwinder, who was at the bed side.  They had no questions or concerns for Sw at this time.    Scott will continue to be available as needed until discharge.    SCOTT Cotton, [facility]  Inpatient Care Coordination  [facility]  [phone]

## 2024-07-10 NOTE — PLAN OF CARE
"To Do:  End of Shift Summary  For vital signs and complete assessments, please see documentation flowsheets.     Pertinent assessments: Pt A&Ox4, VSS on 1L. Afebrile. Denies pain, nausea, SOB. Mod carb diet. , 323, insulin given per protocol. Assist of 2 with GB and walker. BSC. LPIV SL. PRN tessalon given for cough. Expiratory wheezes noted. CPAP on for sleeping. Bed alarm on for safety.     Major Shift Events: None.     Treatment Plan: Rocephin. Christianothro. Nebs. SW, PT, OT following. Potential discharge 7/10.    Bedside Nurse: Dominga Mckinnon RN  Problem: Adult Inpatient Plan of Care  Goal: Plan of Care Review  Description: The Plan of Care Review/Shift note should be completed every shift.  The Outcome Evaluation is a brief statement about your assessment that the patient is improving, declining, or no change.  This information will be displayed automatically on your shift  note.  Outcome: Progressing  Flowsheets (Taken 7/9/2024 4198)  Outcome Evaluation: VSS on RA. Denies pain. 1L NC.  Plan of Care Reviewed With: patient  Overall Patient Progress: improving  Goal: Patient-Specific Goal (Individualized)  Description: You can add care plan individualizations to a care plan. Examples of Individualization might be:  \"Parent requests to be called daily at 9am for status\", \"I have a hard time hearing out of my right ear\", or \"Do not touch me to wake me up as it startles  me\".  Outcome: Progressing  Goal: Absence of Hospital-Acquired Illness or Injury  Outcome: Progressing  Intervention: Identify and Manage Fall Risk  Recent Flowsheet Documentation  Taken 7/9/2024 1709 by Dominga Mckinnon RN  Safety Promotion/Fall Prevention:   activity supervised   nonskid shoes/slippers when out of bed   room near nurse's station   safety round/check completed  Intervention: Prevent and Manage VTE (Venous Thromboembolism) Risk  Recent Flowsheet Documentation  Taken 7/9/2024 1709 by Dominga Mckinnon RN  VTE Prevention/Management: " SCDs off (sequential compression devices)  Intervention: Prevent Infection  Recent Flowsheet Documentation  Taken 7/9/2024 1709 by Dominga Mckinnon RN  Infection Prevention:   rest/sleep promoted   hand hygiene promoted   single patient room provided  Goal: Optimal Comfort and Wellbeing  Outcome: Progressing  Intervention: Monitor Pain and Promote Comfort  Recent Flowsheet Documentation  Taken 7/9/2024 1709 by Dominga Mckinnon RN  Pain Management Interventions: declines  Goal: Readiness for Transition of Care  Outcome: Progressing     Problem: Pulmonary Impairment  Goal: Improved Activity Tolerance  Outcome: Progressing  Goal: Effective Airway Clearance  Outcome: Progressing  Goal: Optimal Gas Exchange  Outcome: Progressing     Problem: Comorbidity Management  Goal: Maintenance of COPD Symptom Control  Outcome: Progressing  Intervention: Maintain COPD Symptom Control  Recent Flowsheet Documentation  Taken 7/9/2024 1709 by Dominga Mckinnon RN  Medication Review/Management: medications reviewed  Goal: Blood Glucose Levels Within Targeted Range  Outcome: Progressing  Intervention: Monitor and Manage Glycemia  Recent Flowsheet Documentation  Taken 7/9/2024 1709 by Dominga Mckinnon RN  Medication Review/Management: medications reviewed  Goal: Blood Pressure in Desired Range  Outcome: Progressing  Intervention: Maintain Blood Pressure Management  Recent Flowsheet Documentation  Taken 7/9/2024 1709 by Dominga Mckinnon RN  Medication Review/Management: medications reviewed   Goal Outcome Evaluation:      Plan of Care Reviewed With: patient    Overall Patient Progress: improvingOverall Patient Progress: improving    Outcome Evaluation: VSS on RA. Denies pain. 1L NC.

## 2024-07-10 NOTE — PLAN OF CARE
"Occupational Therapy Discharge Summary    Reason for therapy discharge:    Discharged to home with home therapy.    Progress towards therapy goal(s). See goals on Care Plan in [facility] electronic health record for goal details.  Goals not met.  Barriers to achieving goals:   discharge from facility.    Therapy recommendation(s):    Continued therapy is recommended.  Rationale/Recommendations:  per treating OT \"The patient presents below basline with weakness and decreased activity tolerance. Pt recieves assist x1 from family for all transfers and ADL at home at baseline. Anticipate with medical mgmt and IP rehab, pt will progress to Ax1 and be appropriate for discharge home with HH OT/PT\".    **This patient was not seen by writing OT, information for discharge summary taken from treating OT's notes.**      "

## 2024-07-10 NOTE — PROGRESS NOTES
Physical Therapy Discharge Summary    Reason for therapy discharge:    Discharged to home with home therapy.    Progress towards therapy goal(s). See goals on Care Plan in [facility] electronic health record for goal details.  Goals not met.  Barriers to achieving goals:   discharge from facility.    Therapy recommendation(s):    Continued therapy is recommended.  Rationale/Recommendations:  Recommend HHPT and OT to increase strength and progress independence with all mobility and ADLs..    This pt was not seen by writing PT, information for discharge summary taken from treating PT notes.

## 2024-07-10 NOTE — PROGRESS NOTES
Pt with hypoglycemia this AM.  Given juice. No mental status changes. Pt feels better and son at bedside.  Pt will not be discharging on insulin therapy.      Monitor BS for 2-3 hours to ensure improvement of BS then patient can discharge home with her son.    Parag Liu MD

## 2024-07-10 NOTE — DISCHARGE SUMMARY
[facility]    Discharge Summary  Hospitalist    Date of Admission:  7/7/2024  Date of Discharge:  7/10/2024  Discharging Provider: Parag Liu MD  Date of Service (when I saw the patient): 07/10/24    Discharge Diagnoses   #Acute hypoxic respiratory failure on chronic hypercapnic respiratory failure  #CAP with sepsis  #COPD with acute exacerbation  #MAGALIS on BiPAP/CPAP  #Severe pulmonary hypertension  #Generalized weakness related to infection.  Dementia.  H/o CVA and pontine ICH with residual right hemiplegia  #NIDDM2  #HTN  #HL    Hospital Course   Siaarmando Quintana is a 74 year old  female (speaks [place] native [place]  dialect) with COPD, dementia, NIDDM2, h/o CVA and ventral pontine IPH (2023) complicated by right-sided hemiplegia, hypertension, dyslipidemia, MAGALIS on nocturnal BiPAP, HFpEF, right kidney oncocytoma, and h/o latent TB s/p 9 months INH 2002 who presented to [facility] on 7/7/2024 after being found askew bed and more weak.     #Acute hypoxic respiratory failure on chronic hypercapnic respiratory failure.  CAP with sepsis.  COPD with acute exacerbation. MAGALIS on BiPAP/CPAP.  Severe pulmonary hypertension:  Has been off home O2 since April 2024 with sats typically 95% per son. Increasing cough x2 weeks, not improved with doxycycline or steroids.   Seen in ED 6/30 for worsening of chronic cough and found to have a LLL PNA. Discharged on doxy 100 BID x 14 days and prednisone 40 x5 days without improvement in symptoms.   -In ED on 7/6, febrile to 100.5 with WBC 12 and ANC 9.5. CXR and CT demonstrate persistent LLL PNA. Received nebs and SoluMedrol 125 and empirically started on ceftriaxone + azithromycin. Admission requested.   -Pt with bilateral expiratory wheeze on exam on 7/8.  In setting of COPD, started prednisone 40 mg daily, schedule duonebs. Albuterol prn.  She was treated with ceftriaxone and azithro.  Also treated with guaifenesin and tessalon.  She was continued on home  positive pressure therapy.  -Pt improved symptomatically.  She weaned to RA.  She will discharge with course of ceftin and azithro.  Continue home inhalers and albuterol nebs.  Did not prescribe further steroids given relative hypergylcemia and symptomatic improvement (received 3 days).    -Therapies consulted and she will discharge with home care therapies with her son with whom she lives.   -Follow up with PCP in 1 week with repeat labs.      #Generalized weakness related to infection.  Dementia.  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 No worsening confusion or e/o delirium with infection but is certainly at risk for hospital-induced delirium.      #NIDDM2  -- Hold PTA metformin given CTA Chest; resume in 48 hours  --Given lantus and sliding scale insulin with prednisone but holding off prescribing insulin on discharge.  She will need to follow up with her PCP.         #HTN  DLD: Continue PTA amlodipine, losartan, metoprolol succinate, atorvastatin, and lasix    Parag Liu MD    Pending Results   These results will be followed up by Hospital medicine  Unresulted Labs Ordered in the Past 30 Days of this Admission       Date and Time Order Name Status Description    7/7/2024  8:20 AM Blood Culture Peripheral Blood Preliminary     7/7/2024  8:20 AM Blood Culture Arm, Right Preliminary             Code Status   Full Code       Primary Care Physician   Chela Griffith    Physical Exam   Temp: 97.9  F (36.6  C) Temp src: Axillary BP: (!) 155/69 Pulse: 58   Resp: 20 SpO2: 93 % O2 Device: None (Room air) Oxygen Delivery: 2 LPM  Vitals:    07/07/24 1446 07/08/24 0636 07/09/24 0659   Weight: 70.5 kg (155 lb 6.8 oz) 68.6 kg (151 lb 3.8 oz) 69.1 kg (152 lb 5.4 oz)     Vital Signs with Ranges  Temp:  [97.9  F (36.6  C)-98.4  F (36.9  C)] 97.9  F (36.6  C)  Pulse:  [58-70] 58  Resp:  [20-24]  20  BP: (115-155)/(51-80) 155/69  SpO2:  [93 %-100 %] 93 %  I/O last 3 completed shifts:  In: 340 [P.O.:340]  Out: 350 [Urine:350]    Constitutional: Nontoxic, NAD  HEENT: Normocephalic. MMM, No elevation of JVD noted.   Respiratory: Nl WOB, Still with expiratory wheeze but improved. Moving air bilaterally. Comfortable. Periodic cough.    Cardiovascular: Distant. Regular, no murmur  GI: BS+, NT, ND  Skin/Integumen: WWP, no rash. No edema  Neuro: CNII-XII intact. Moves all extremities. No tremor. A&Ox3.    Discharge Disposition   Discharged to home  Condition at discharge: Stable    Consultations This Hospital Stay   PHYSICAL THERAPY ADULT IP CONSULT  CARE MANAGEMENT / SOCIAL WORK IP CONSULT  OCCUPATIONAL THERAPY ADULT IP CONSULT  OCCUPATIONAL THERAPY ADULT IP CONSULT    Time Spent on this Encounter   I, Parag Liu MD, personally saw the patient today and spent greater than 30 minutes discharging this patient.    Discharge Orders      Home Care Referral      Reason for your hospital stay    You were hospitalized for pneumonia along with COPD exacerbation.  You were treated with IV antibiotics along with nebulizer treatments with steroids.  You will complete a course of oral antibiotics on discharge.  You should continue your inhaler therapy as prescribed for COPD.  You can use albuterol nebulizer treatments as needed for shortness of breath.     Follow-up and recommended labs and tests     Follow up with primary care provider, Chela Griffith, within 7 days for hospital follow- up.  The following labs/tests are recommended: CBC and BMP.     Activity    Your activity upon discharge: activity as tolerated     Full Code     Diet    Follow this diet upon discharge: Orders Placed This Encounter      Moderate Consistent Carb (60 g CHO per Meal) Diet     Discharge Medications   Current Discharge Medication List        START taking these medications    Details   azithromycin (ZITHROMAX) 250 MG tablet Take 1 tablet (250  mg) by mouth daily for 2 days  Qty: 2 tablet, Refills: 0    Associated Diagnoses: Community acquired bacterial pneumonia      cefuroxime (CEFTIN) 500 MG tablet Take 1 tablet (500 mg) by mouth 2 times daily for 7 days  Qty: 14 tablet, Refills: 0    Associated Diagnoses: Community acquired bacterial pneumonia      guaiFENesin (ORGANIDIN) 200 MG tablet Take 1 tablet (200 mg) by mouth every 6 hours as needed for cough  Qty: 30 tablet, Refills: 0    Associated Diagnoses: Community acquired bacterial pneumonia           CONTINUE these medications which have NOT CHANGED    Details   acetaminophen (TYLENOL) 500 MG tablet Take 500-1,000 mg by mouth every 8 hours as needed for mild pain      albuterol (PROAIR HFA/PROVENTIL HFA/VENTOLIN HFA) 108 (90 Base) MCG/ACT inhaler Inhale 2 puffs into the lungs every 4 hours as needed for wheezing  Qty: 18 g, Refills: 0    Comments: Pharmacy may dispense brand covered by insurance (Proair, or proventil or ventolin or generic albuterol inhaler)      albuterol (PROVENTIL) (2.5 MG/3ML) 0.083% neb solution Take 1 vial (2.5 mg) by nebulization every 6 hours as needed for shortness of breath, wheezing or cough  Qty: 90 mL, Refills: 0      amLODIPine (NORVASC) 5 MG tablet Take 5 mg by mouth daily      aspirin (ASA) 81 MG EC tablet Take 1 tablet (81 mg) by mouth daily  Qty: 30 tablet, Refills: 11    Associated Diagnoses: Right-sided nontraumatic intracerebral hemorrhage, unspecified cerebral location (H)      atorvastatin (LIPITOR) 40 MG tablet Take 1 tablet (40 mg) by mouth every evening  Qty: 30 tablet, Refills: 4    Associated Diagnoses: Right-sided nontraumatic intracerebral hemorrhage, unspecified cerebral location (H)      budesonide-formoterol (SYMBICORT) 160-4.5 MCG/ACT Inhaler Inhale 2 puffs into the lungs two times daily      Cholecalciferol (VITAMIN D3) 50 MCG (2000 UT) CAPS Take 50 mcg by mouth daily      furosemide (LASIX) 40 MG tablet Take 40 mg by mouth daily      losartan  (COZAAR) 100 MG tablet Take 100 mg by mouth daily      metFORMIN (GLUCOPHAGE XR) 500 MG 24 hr tablet Take 500 mg by mouth 2 times daily (with meals)      metoprolol succinate ER (TOPROL XL) 50 MG 24 hr tablet Take 50 mg by mouth daily      nystatin (NYSTOP) 816837 UNIT/GM external powder Apply topically 2 times daily as needed      triamcinolone (KENALOG) 0.1 % external ointment Apply topically 2 times daily as needed for irritation           STOP taking these medications       doxycycline hyclate (VIBRAMYCIN) 100 MG capsule Comments:   Reason for Stopping:             Allergies   No Known Allergies  Data   Most Recent 3 CBC's:  Recent Labs   Lab Test 07/10/24  0703 07/09/24  0650 07/08/24  0650 07/07/24  0825   WBC  --  8.0 8.5 12.0*   HGB  --  10.9* 12.9 12.6   MCV  --  89 89 89    219 232 281      Most Recent 3 BMP's:  Recent Labs   Lab Test 07/10/24  0703 07/10/24  0207 07/09/24 2138 07/09/24 2018 07/09/24  1125 07/09/24  0650 07/08/24  0909 07/08/24  0650 07/07/24  1534 07/07/24  0825   NA  --   --   --   --   --  145  --  141  --  140   POTASSIUM  --   --   --   --   --  4.3  --  4.0  --  3.8   CHLORIDE  --   --   --   --   --  108*  --  100  --  96*   CO2  --   --   --   --   --  25  --  27  --  29   BUN  --   --   --   --   --  25.1*  --  27.7*  --  26.5*   CR 0.61  --   --   --   --  0.72  --  0.78  --  0.73   ANIONGAP  --   --   --   --   --  12  --  14  --  15   GLENIS  --   --   --   --   --  9.5  --  9.6  --  10.0   GLC  --  146* 323* 379*   < > 220*   < > 229*   < > 154*    < > = values in this interval not displayed.     Most Recent 2 LFT's:  Recent Labs   Lab Test 12/16/23  1555 12/05/23  2123 12/05/23 2013   AST 21  21 30  --    ALT 20  19  --  22   ALKPHOS 80  78  --  79   BILITOTAL 0.3  0.3  --  0.2     Most Recent INR's and Anticoagulation Dosing History:  Anticoagulation Dose History          Latest Ref Rng & Units 1/22/2016 5/11/2023 5/22/2023   Recent Dosing and Labs   INR 0.85 -  1.15 1.1  1.00  1.09       Details                 Most Recent 3 Troponin's:  Recent Labs   Lab Test 01/12/17  1728 01/12/17  1720 07/13/16  1200   TROPI  --  <0.015  The 99th percentile for upper reference range is 0.045 ug/L.  Troponin values in   the range of 0.045 - 0.120 ug/L may be associated with risks of adverse   clinical events.   <0.015  The 99th percentile for upper reference range is 0.045 ug/L.  Troponin values in   the range of 0.045 - 0.120 ug/L may be associated with risks of adverse   clinical events.     TROPONIN 0.01  --   --      Most Recent Cholesterol Panel:  Recent Labs   Lab Test 05/12/23  0512   CHOL 179   *   HDL 52   TRIG 132     Most Recent 6 Bacteria Isolates From Any Culture (See EPIC Reports for Culture Details):  Recent Labs   Lab Test 09/10/20  1456 09/10/20  1453   CULT No growth No growth     Most Recent TSH, T4 and A1c Labs:  Recent Labs   Lab Test 07/07/24  0825   A1C 7.7*

## 2024-07-12 ENCOUNTER — PATIENT OUTREACH (OUTPATIENT)
Dept: CARE COORDINATION | Facility: CLINIC | Age: 74
End: 2024-07-12
Payer: COMMERCIAL

## 2024-07-12 LAB
BACTERIA BLD CULT: NO GROWTH
BACTERIA BLD CULT: NO GROWTH

## 2024-07-12 NOTE — PROGRESS NOTES
Connected Care Resource Center:   [facility] Resource Center Contact  [facility]/Voicemail     Clinical Data: Post-Discharge Outreach     Outreach attempted x 2.  Left message on patient's voicemail, providing [facility]'s central phone number of [phone] ([phone]) for questions/concerns and/or to schedule an appt with an [facility] provider, if they do not have a PCP.      Plan:  [facility] will do no further outreaches at this time.       Duyen Gibbons MA  Connected Care Resource Center, [facility]    *Connected Care Resource Team does NOT follow patient ongoing. Referrals are identified based on internal discharge reports and the outreach is to ensure patient has an understanding of their discharge instructions.

## 2024-07-13 ENCOUNTER — APPOINTMENT (OUTPATIENT)
Dept: CT IMAGING | Facility: CLINIC | Age: 74
End: 2024-07-13
Attending: EMERGENCY MEDICINE
Payer: COMMERCIAL

## 2024-07-13 ENCOUNTER — APPOINTMENT (OUTPATIENT)
Dept: GENERAL RADIOLOGY | Facility: CLINIC | Age: 74
End: 2024-07-13
Attending: EMERGENCY MEDICINE
Payer: COMMERCIAL

## 2024-07-13 ENCOUNTER — APPOINTMENT (OUTPATIENT)
Dept: MRI IMAGING | Facility: CLINIC | Age: 74
End: 2024-07-13
Attending: EMERGENCY MEDICINE
Payer: COMMERCIAL

## 2024-07-13 ENCOUNTER — HOSPITAL ENCOUNTER (EMERGENCY)
Facility: CLINIC | Age: 74
Discharge: HOME | End: 2024-07-13
Attending: EMERGENCY MEDICINE | Admitting: EMERGENCY MEDICINE
Payer: COMMERCIAL

## 2024-07-13 VITALS
HEART RATE: 62 BPM | RESPIRATION RATE: 18 BRPM | TEMPERATURE: 98.1 F | SYSTOLIC BLOOD PRESSURE: 158 MMHG | DIASTOLIC BLOOD PRESSURE: 86 MMHG | OXYGEN SATURATION: 90 %

## 2024-07-13 DIAGNOSIS — R41.82 ALTERED MENTAL STATUS, UNSPECIFIED ALTERED MENTAL STATUS TYPE: ICD-10-CM

## 2024-07-13 DIAGNOSIS — I67.1 ANEURYSM OF MIDDLE CEREBRAL ARTERY: ICD-10-CM

## 2024-07-13 LAB
ALBUMIN UR-MCNC: NEGATIVE MG/DL
ANION GAP SERPL CALCULATED.3IONS-SCNC: 13 MMOL/L (ref 7–15)
APPEARANCE UR: CLEAR
BASOPHILS # BLD AUTO: 0 10E3/UL (ref 0–0.2)
BASOPHILS NFR BLD AUTO: 0 %
BILIRUB UR QL STRIP: NEGATIVE
BUN SERPL-MCNC: 14.6 MG/DL (ref 8–23)
CALCIUM SERPL-MCNC: 9.7 MG/DL (ref 8.8–10.2)
CHLORIDE SERPL-SCNC: 93 MMOL/L (ref 98–107)
COLOR UR AUTO: NORMAL
CREAT SERPL-MCNC: 0.63 MG/DL (ref 0.51–0.95)
DEPRECATED HCO3 PLAS-SCNC: 28 MMOL/L (ref 22–29)
EGFRCR SERPLBLD CKD-EPI 2021: >90 ML/MIN/1.73M2
EOSINOPHIL # BLD AUTO: 0.3 10E3/UL (ref 0–0.7)
EOSINOPHIL NFR BLD AUTO: 4 %
ERYTHROCYTE [DISTWIDTH] IN BLOOD BY AUTOMATED COUNT: 14 % (ref 10–15)
GLUCOSE SERPL-MCNC: 271 MG/DL (ref 70–99)
GLUCOSE UR STRIP-MCNC: NEGATIVE MG/DL
HCT VFR BLD AUTO: 39.8 % (ref 35–47)
HGB BLD-MCNC: 12.6 G/DL (ref 11.7–15.7)
HGB UR QL STRIP: NEGATIVE
HYALINE CASTS: 1 /LPF
IMM GRANULOCYTES # BLD: 0.1 10E3/UL
IMM GRANULOCYTES NFR BLD: 1 %
KETONES UR STRIP-MCNC: NEGATIVE MG/DL
LEUKOCYTE ESTERASE UR QL STRIP: NEGATIVE
LYMPHOCYTES # BLD AUTO: 2.2 10E3/UL (ref 0.8–5.3)
LYMPHOCYTES NFR BLD AUTO: 27 %
MCH RBC QN AUTO: 28.6 PG (ref 26.5–33)
MCHC RBC AUTO-ENTMCNC: 31.7 G/DL (ref 31.5–36.5)
MCV RBC AUTO: 90 FL (ref 78–100)
MONOCYTES # BLD AUTO: 0.3 10E3/UL (ref 0–1.3)
MONOCYTES NFR BLD AUTO: 4 %
NEUTROPHILS # BLD AUTO: 5.2 10E3/UL (ref 1.6–8.3)
NEUTROPHILS NFR BLD AUTO: 64 %
NITRATE UR QL: NEGATIVE
NRBC # BLD AUTO: 0 10E3/UL
NRBC BLD AUTO-RTO: 0 /100
PH UR STRIP: 7 [PH] (ref 5–7)
PLATELET # BLD AUTO: 265 10E3/UL (ref 150–450)
POTASSIUM SERPL-SCNC: 4.1 MMOL/L (ref 3.4–5.3)
RBC # BLD AUTO: 4.41 10E6/UL (ref 3.8–5.2)
RBC URINE: 0 /HPF
SODIUM SERPL-SCNC: 134 MMOL/L (ref 135–145)
SP GR UR STRIP: 1.02 (ref 1–1.03)
UROBILINOGEN UR STRIP-MCNC: NORMAL MG/DL
WBC # BLD AUTO: 8 10E3/UL (ref 4–11)
WBC URINE: <1 /HPF

## 2024-07-13 PROCEDURE — 70553 MRI BRAIN STEM W/O & W/DYE: CPT

## 2024-07-13 PROCEDURE — 255N000002 HC RX 255 OP 636: Performed by: EMERGENCY MEDICINE

## 2024-07-13 PROCEDURE — 80048 BASIC METABOLIC PNL TOTAL CA: CPT | Performed by: EMERGENCY MEDICINE

## 2024-07-13 PROCEDURE — 258N000003 HC RX IP 258 OP 636: Performed by: EMERGENCY MEDICINE

## 2024-07-13 PROCEDURE — 36415 COLL VENOUS BLD VENIPUNCTURE: CPT | Performed by: EMERGENCY MEDICINE

## 2024-07-13 PROCEDURE — 70496 CT ANGIOGRAPHY HEAD: CPT

## 2024-07-13 PROCEDURE — 250N000009 HC RX 250: Performed by: EMERGENCY MEDICINE

## 2024-07-13 PROCEDURE — 96360 HYDRATION IV INFUSION INIT: CPT | Mod: 59

## 2024-07-13 PROCEDURE — 70450 CT HEAD/BRAIN W/O DYE: CPT | Mod: XU

## 2024-07-13 PROCEDURE — 96361 HYDRATE IV INFUSION ADD-ON: CPT

## 2024-07-13 PROCEDURE — 250N000011 HC RX IP 250 OP 636: Performed by: EMERGENCY MEDICINE

## 2024-07-13 PROCEDURE — A9585 GADOBUTROL INJECTION: HCPCS | Performed by: EMERGENCY MEDICINE

## 2024-07-13 PROCEDURE — 71046 X-RAY EXAM CHEST 2 VIEWS: CPT

## 2024-07-13 PROCEDURE — 99285 EMERGENCY DEPT VISIT HI MDM: CPT | Mod: 25

## 2024-07-13 PROCEDURE — 81001 URINALYSIS AUTO W/SCOPE: CPT | Performed by: EMERGENCY MEDICINE

## 2024-07-13 PROCEDURE — 85025 COMPLETE CBC W/AUTO DIFF WBC: CPT | Performed by: EMERGENCY MEDICINE

## 2024-07-13 RX ORDER — IOPAMIDOL 755 MG/ML
500 INJECTION, SOLUTION INTRAVASCULAR ONCE
Status: COMPLETED | OUTPATIENT
Start: 2024-07-13 | End: 2024-07-13

## 2024-07-13 RX ORDER — GADOBUTROL 604.72 MG/ML
0.1 INJECTION INTRAVENOUS ONCE
Status: COMPLETED | OUTPATIENT
Start: 2024-07-13 | End: 2024-07-13

## 2024-07-13 RX ADMIN — IOPAMIDOL 67 ML: 755 INJECTION, SOLUTION INTRAVENOUS at 17:40

## 2024-07-13 RX ADMIN — SODIUM CHLORIDE 80 ML: 9 INJECTION, SOLUTION INTRAVENOUS at 17:40

## 2024-07-13 RX ADMIN — GADOBUTROL 6.91 ML: 604.72 INJECTION INTRAVENOUS at 20:47

## 2024-07-13 RX ADMIN — SODIUM CHLORIDE 1000 ML: 9 INJECTION, SOLUTION INTRAVENOUS at 19:28

## 2024-07-13 ASSESSMENT — COLUMBIA-SUICIDE SEVERITY RATING SCALE - C-SSRS
2. HAVE YOU ACTUALLY HAD ANY THOUGHTS OF KILLING YOURSELF IN THE PAST MONTH?: NO
6. HAVE YOU EVER DONE ANYTHING, STARTED TO DO ANYTHING, OR PREPARED TO DO ANYTHING TO END YOUR LIFE?: NO
1. IN THE PAST MONTH, HAVE YOU WISHED YOU WERE DEAD OR WISHED YOU COULD GO TO SLEEP AND NOT WAKE UP?: NO

## 2024-07-13 ASSESSMENT — ACTIVITIES OF DAILY LIVING (ADL)
ADLS_ACUITY_SCORE: 39

## 2024-07-13 NOTE — ED PROVIDER NOTES
"  Emergency Department Note      History of Present Illness     Chief Complaint   Generalized Weakness    HPI   Pilo Quintana is a 74 year old female with a history of hypertension, asthma, stroke, diabetes mellitus, and hyperparathyroidism presenting with generalized weakness. Pilo was discharged from [facility] on Wednesday (7/10/24) after being diagnosed with pneumonia. She was prescribed azithromycin upon discharge. Around 1300 yesterday, her son noticed her mouth \"appeared to be twisted\" and she was verbally unresponsive. Patient's son notes increased confusion. She ate this morning with no complications. Pilo has been experiencing difficulty ambulating and drags her legs when walking. No recent fall or head trauma.     Independent Historian   Patient's son present at bedside and provided interpretation.     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  Multiple pulmonary nodules  Renal oncocytoma of right kidney  Social maladjustment  Type 2 diabetes mellitus  ICH  Primary hyperparathyroidism  Thyroid nodule  Lactic acidosis  COPD exacerbation  Steroid-induced hyperglycemia  Stroke  DJD  Endometrial hyperplasia  Oncocytoma of right kidney  Carpal tunnel syndrome  Tibial plateau fracture     Medications   albuterol  amlodipine  aspirin  atorvastatin  budesonide-formoterol  doxycycline hyclate  furosemide  losartan  metformin  metoprolol succinate  prednisone  senna-docusate  Simvastatin  Lancets  Gauifenesin  Polyethylene " glycol  Nystatin  Budesonide-fomoterol  Doxycycline  prednisone     Surgical History   D&C  Renal biopsy  Cataract removal    Physical Exam     Patient Vitals for the past 24 hrs:   BP Temp Temp src Pulse Resp SpO2   07/13/24 1930 (!) 144/97 -- -- 76 -- (!) 64 %   07/13/24 1721 138/89 -- -- 75 -- 97 %   07/13/24 1515 -- -- -- -- -- 96 %   07/13/24 1513 (!) 126/97 -- -- 81 -- --   07/13/24 1445 (!) 147/92 98.1  F (36.7  C) Oral 81 18 98 %     Physical Exam  Constitutional: Well developed, nontox appearance  Head: Atraumatic.   Mouth/Throat: Oropharynx is clear and mildly tacky  Neck:  no stridor  Eyes: no scleral icterus, PERRL  Cardiovascular: RRR, 2+ bilat radial pulses  Pulmonary/Chest: nml resp effort, Clear BS bilat  Abdominal: ND, soft, NT, no rebound or guarding   Ext: Warm, well perfused  Neurological: A&O, exam somewhat limited due to language barrier, symmetric facies intact, moves extremities x 4, symmetric; sensation grossly intact,  Skin: Skin is warm and dry.   Nursing note and vitals reviewed.    Diagnostics     Lab Results   Labs Ordered and Resulted from Time of ED Arrival to Time of ED Departure   BASIC METABOLIC PANEL - Abnormal       Result Value    Sodium 134 (*)     Potassium 4.1      Chloride 93 (*)     Carbon Dioxide (CO2) 28      Anion Gap 13      Urea Nitrogen 14.6      Creatinine 0.63      GFR Estimate >90      Calcium 9.7      Glucose 271 (*)    ROUTINE UA WITH MICROSCOPIC REFLEX TO CULTURE - Normal    Color Urine Straw      Appearance Urine Clear      Glucose Urine Negative      Bilirubin Urine Negative      Ketones Urine Negative      Specific Gravity Urine 1.024      Blood Urine Negative      pH Urine 7.0      Protein Albumin Urine Negative      Urobilinogen Urine Normal      Nitrite Urine Negative      Leukocyte Esterase Urine Negative      RBC Urine 0      WBC Urine <1      Hyaline Casts Urine 1     CBC WITH PLATELETS AND DIFFERENTIAL    WBC Count 8.0      RBC Count 4.41       Hemoglobin 12.6      Hematocrit 39.8      MCV 90      MCH 28.6      MCHC 31.7      RDW 14.0      Platelet Count 265      % Neutrophils 64      % Lymphocytes 27      % Monocytes 4      % Eosinophils 4      % Basophils 0      % Immature Granulocytes 1      NRBCs per 100 WBC 0      Absolute Neutrophils 5.2      Absolute Lymphocytes 2.2      Absolute Monocytes 0.3      Absolute Eosinophils 0.3      Absolute Basophils 0.0      Absolute Immature Granulocytes 0.1      Absolute NRBCs 0.0     GLUCOSE MONITOR NURSING POCT     Imaging   MR Brain w/o & w Contrast   Final Result   IMPRESSION:   1.  Allowing for motion, no acute intracranial process.   2.  Generalized brain atrophy and presumed microvascular ischemic changes as detailed above.      CTA Head Neck w Contrast   Final Result   IMPRESSION:    HEAD CTA:   1.  No evidence of proximal large vessel occlusion or flow-limiting stenosis.   2.  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      NECK CTA:   1.  No evidence of hemodynamically significant stenosis based on NASCET criteria.   2.  No evidence for dissection.      CT Head w/o Contrast   Final Result   IMPRESSION:   1.  No evidence of an acute intracranial abnormality.   2.  Severe presumed chronic small vessel ischemic change.   3.  Ventriculomegaly again disproportionate to the degree of sulcal atrophy, a finding that could reflect centrally predominant volume loss or normal pressure hydrocephalus. Clinical correlation recommended.      Chest XR,  PA & LAT    (Results Pending)     Independent Interpretation   Independent interpretations: Chest x-ray without pneumothoraces    ED Course      Medications Administered   Medications   CT Scan Flush (80 mLs Intravenous $Given 7/13/24 1740)   iopamidol (ISOVUE-370) solution 500 mL (67 mLs Intravenous $Given 7/13/24 1740)   sodium chloride 0.9% BOLUS 1,000 mL (1,000 mLs Intravenous $New Bag 7/13/24 1928)   gadobutrol (GADAVIST) injection 6.91 mL (6.91 mLs Intravenous $Given  7/13/24 2047)     Procedures   Procedures     Discussion of Management   None    ED Course   ED Course as of 07/13/24 2126   Sat Jul 13, 2024   8905 I obtained the history and examined the patient as noted above.      1942 I rechecked and updated the patient. She is doing better.       Optional/Additional Documentation    Social determinants affecting patient's health include: Age increasing risk for presentation to the emergency department    Medical Decision Making / Diagnosis     CMS Diagnoses: None    MIPS       None    MDM   74 year old female presenting w/ altered mental status     DDx includes altered mental status NOS, encephalopathy NOS, electrolyte abnormality, dehydration, infection such as UTI or worsening pneumonia, subacute CVA.  Doubt meningitis, encephalitis given history and physical exam.  Labs significant for mild hyperglycemia otherwise largely reassuring, UA inconsistent with infection.  Imaging sig for no evidence of CVA on MRI.  CT imaging demonstrated an aneurysm as described above no large vessel occlusion.  The patient was given fluids in the emergency department and her son reports that she seems significantly improved and is back to baseline and more conversant.  Savior presentation mild dehydration.  Given no infection or evidence of CVA, at this time I feel the pt is safe for discharge.  Recommendations given regarding follow up with PCP and return to the emergency department as needed for new or worsening symptoms.  Pt counseled on all results, disposition and diagnosis.  They are understanding and agreeable to plan. Patient discharged in stable condition.  Neurosurgery referral was placed for incidental finding of aneurysm.    Disposition   Discharged home    Diagnosis     ICD-10-CM    1. Altered mental status, unspecified altered mental status type  R41.82       2. Aneurysm of middle cerebral artery  I67.1 Adult Neurosurgery  Referral           Discharge Medications   New  Prescriptions    No medications on file     Scribe Disclosure:  I, Quin Hollie, am serving as a scribe at 4:58 PM on 7/13/2024 to document services personally performed by Efe Phipps MD based on my observations and the provider's statements to me.        Efe Phipps MD  07/13/24 2126

## 2024-07-13 NOTE — ED TRIAGE NOTES
"Patient was discharged from hospital on Wednesday, yesterday her son reports that patient was noted to have \"twisting of her mouth\" and was not willing to answer him, patient has seemed confused since this incident and has been unwilling to speak.      Triage Assessment (Adult)       Row Name 07/13/24 1443          Triage Assessment    Airway WDL WDL        Respiratory WDL    Respiratory WDL WDL        Skin Circulation/Temperature WDL    Skin Circulation/Temperature WDL WDL        Cardiac WDL    Cardiac WDL WDL        Peripheral/Neurovascular WDL    Peripheral Neurovascular WDL WDL        Cognitive/Neuro/Behavioral WDL    Cognitive/Neuro/Behavioral WDL WDL                     "

## 2024-07-14 NOTE — DISCHARGE INSTRUCTIONS
Please return to the emergency department as needed for new or worsening symptoms including fever greater than 100.4  F, vomiting unable to keep anything down, shortness of breath, fainting, any other concerning symptoms.    Please continue medications as previously prescribed.

## 2024-08-10 ENCOUNTER — HOSPITAL ENCOUNTER (EMERGENCY)
Facility: CLINIC | Age: 74
Discharge: HOME | End: 2024-08-10
Attending: EMERGENCY MEDICINE | Admitting: EMERGENCY MEDICINE
Payer: COMMERCIAL

## 2024-08-10 ENCOUNTER — APPOINTMENT (OUTPATIENT)
Dept: GENERAL RADIOLOGY | Facility: CLINIC | Age: 74
End: 2024-08-10
Attending: EMERGENCY MEDICINE
Payer: COMMERCIAL

## 2024-08-10 VITALS
HEIGHT: 59 IN | TEMPERATURE: 98 F | OXYGEN SATURATION: 94 % | DIASTOLIC BLOOD PRESSURE: 80 MMHG | RESPIRATION RATE: 24 BRPM | SYSTOLIC BLOOD PRESSURE: 122 MMHG | WEIGHT: 156 LBS | BODY MASS INDEX: 31.45 KG/M2 | HEART RATE: 71 BPM

## 2024-08-10 DIAGNOSIS — J18.9 PNEUMONIA DUE TO INFECTIOUS ORGANISM, UNSPECIFIED LATERALITY, UNSPECIFIED PART OF LUNG: ICD-10-CM

## 2024-08-10 DIAGNOSIS — J44.1 COPD EXACERBATION (H): ICD-10-CM

## 2024-08-10 LAB
ANION GAP SERPL CALCULATED.3IONS-SCNC: 18 MMOL/L (ref 7–15)
BASOPHILS # BLD AUTO: 0 10E3/UL (ref 0–0.2)
BASOPHILS NFR BLD AUTO: 0 %
BUN SERPL-MCNC: 20.4 MG/DL (ref 8–23)
CALCIUM SERPL-MCNC: 10.2 MG/DL (ref 8.8–10.4)
CHLORIDE SERPL-SCNC: 96 MMOL/L (ref 98–107)
CREAT SERPL-MCNC: 0.76 MG/DL (ref 0.51–0.95)
EGFRCR SERPLBLD CKD-EPI 2021: 81 ML/MIN/1.73M2
EOSINOPHIL # BLD AUTO: 0.3 10E3/UL (ref 0–0.7)
EOSINOPHIL NFR BLD AUTO: 4 %
ERYTHROCYTE [DISTWIDTH] IN BLOOD BY AUTOMATED COUNT: 14 % (ref 10–15)
FLUAV RNA SPEC QL NAA+PROBE: NEGATIVE
FLUBV RNA RESP QL NAA+PROBE: NEGATIVE
GLUCOSE SERPL-MCNC: 209 MG/DL (ref 70–99)
HCO3 SERPL-SCNC: 25 MMOL/L (ref 22–29)
HCT VFR BLD AUTO: 35.1 % (ref 35–47)
HGB BLD-MCNC: 11.1 G/DL (ref 11.7–15.7)
HOLD SPECIMEN: NORMAL
IMM GRANULOCYTES # BLD: 0 10E3/UL
IMM GRANULOCYTES NFR BLD: 0 %
LYMPHOCYTES # BLD AUTO: 1.8 10E3/UL (ref 0.8–5.3)
LYMPHOCYTES NFR BLD AUTO: 25 %
MCH RBC QN AUTO: 28.5 PG (ref 26.5–33)
MCHC RBC AUTO-ENTMCNC: 31.6 G/DL (ref 31.5–36.5)
MCV RBC AUTO: 90 FL (ref 78–100)
MONOCYTES # BLD AUTO: 0.5 10E3/UL (ref 0–1.3)
MONOCYTES NFR BLD AUTO: 7 %
NEUTROPHILS # BLD AUTO: 4.6 10E3/UL (ref 1.6–8.3)
NEUTROPHILS NFR BLD AUTO: 64 %
NRBC # BLD AUTO: 0 10E3/UL
NRBC BLD AUTO-RTO: 0 /100
PLATELET # BLD AUTO: 250 10E3/UL (ref 150–450)
POTASSIUM SERPL-SCNC: 4.8 MMOL/L (ref 3.4–5.3)
RBC # BLD AUTO: 3.89 10E6/UL (ref 3.8–5.2)
RSV RNA SPEC NAA+PROBE: NEGATIVE
SARS-COV-2 RNA RESP QL NAA+PROBE: NEGATIVE
SODIUM SERPL-SCNC: 139 MMOL/L (ref 135–145)
WBC # BLD AUTO: 7.2 10E3/UL (ref 4–11)

## 2024-08-10 PROCEDURE — 36415 COLL VENOUS BLD VENIPUNCTURE: CPT | Performed by: EMERGENCY MEDICINE

## 2024-08-10 PROCEDURE — 85025 COMPLETE CBC W/AUTO DIFF WBC: CPT | Performed by: EMERGENCY MEDICINE

## 2024-08-10 PROCEDURE — 96375 TX/PRO/DX INJ NEW DRUG ADDON: CPT

## 2024-08-10 PROCEDURE — 94640 AIRWAY INHALATION TREATMENT: CPT

## 2024-08-10 PROCEDURE — 250N000011 HC RX IP 250 OP 636: Performed by: EMERGENCY MEDICINE

## 2024-08-10 PROCEDURE — 250N000009 HC RX 250: Performed by: EMERGENCY MEDICINE

## 2024-08-10 PROCEDURE — 71046 X-RAY EXAM CHEST 2 VIEWS: CPT

## 2024-08-10 PROCEDURE — 80048 BASIC METABOLIC PNL TOTAL CA: CPT | Performed by: EMERGENCY MEDICINE

## 2024-08-10 PROCEDURE — 250N000013 HC RX MED GY IP 250 OP 250 PS 637: Performed by: EMERGENCY MEDICINE

## 2024-08-10 PROCEDURE — 87637 SARSCOV2&INF A&B&RSV AMP PRB: CPT | Performed by: EMERGENCY MEDICINE

## 2024-08-10 PROCEDURE — 96365 THER/PROPH/DIAG IV INF INIT: CPT

## 2024-08-10 PROCEDURE — 93005 ELECTROCARDIOGRAM TRACING: CPT

## 2024-08-10 PROCEDURE — 96367 TX/PROPH/DG ADDL SEQ IV INF: CPT

## 2024-08-10 PROCEDURE — 99285 EMERGENCY DEPT VISIT HI MDM: CPT | Mod: 25

## 2024-08-10 RX ORDER — AZITHROMYCIN 250 MG/1
TABLET, FILM COATED ORAL
Qty: 6 TABLET | Refills: 0 | Status: SHIPPED | OUTPATIENT
Start: 2024-08-10 | End: 2024-08-15

## 2024-08-10 RX ORDER — PREDNISONE 20 MG/1
TABLET ORAL
Qty: 10 TABLET | Refills: 0 | Status: SHIPPED | OUTPATIENT
Start: 2024-08-10 | End: 2024-09-16

## 2024-08-10 RX ORDER — MAGNESIUM SULFATE HEPTAHYDRATE 40 MG/ML
2 INJECTION, SOLUTION INTRAVENOUS ONCE
Status: COMPLETED | OUTPATIENT
Start: 2024-08-10 | End: 2024-08-10

## 2024-08-10 RX ORDER — METHYLPREDNISOLONE SODIUM SUCCINATE 125 MG/2ML
125 INJECTION, POWDER, LYOPHILIZED, FOR SOLUTION INTRAMUSCULAR; INTRAVENOUS ONCE
Status: COMPLETED | OUTPATIENT
Start: 2024-08-10 | End: 2024-08-10

## 2024-08-10 RX ORDER — IPRATROPIUM BROMIDE AND ALBUTEROL SULFATE 2.5; .5 MG/3ML; MG/3ML
1 SOLUTION RESPIRATORY (INHALATION) EVERY 6 HOURS PRN
Qty: 90 ML | Refills: 0 | Status: SHIPPED | OUTPATIENT
Start: 2024-08-10

## 2024-08-10 RX ORDER — AZITHROMYCIN 250 MG/1
500 TABLET, FILM COATED ORAL ONCE
Status: COMPLETED | OUTPATIENT
Start: 2024-08-10 | End: 2024-08-10

## 2024-08-10 RX ORDER — CEFTRIAXONE 1 G/1
1 INJECTION, POWDER, FOR SOLUTION INTRAMUSCULAR; INTRAVENOUS ONCE
Status: COMPLETED | OUTPATIENT
Start: 2024-08-10 | End: 2024-08-10

## 2024-08-10 RX ORDER — AMOXICILLIN 500 MG/1
500 CAPSULE ORAL 3 TIMES DAILY
Qty: 21 CAPSULE | Refills: 0 | Status: SHIPPED | OUTPATIENT
Start: 2024-08-10 | End: 2024-08-17

## 2024-08-10 RX ORDER — IPRATROPIUM BROMIDE AND ALBUTEROL SULFATE 2.5; .5 MG/3ML; MG/3ML
6 SOLUTION RESPIRATORY (INHALATION) ONCE
Status: COMPLETED | OUTPATIENT
Start: 2024-08-10 | End: 2024-08-10

## 2024-08-10 RX ADMIN — IPRATROPIUM BROMIDE AND ALBUTEROL SULFATE 6 ML: .5; 3 SOLUTION RESPIRATORY (INHALATION) at 14:22

## 2024-08-10 RX ADMIN — MAGNESIUM SULFATE HEPTAHYDRATE 2 G: 40 INJECTION, SOLUTION INTRAVENOUS at 16:44

## 2024-08-10 RX ADMIN — CEFTRIAXONE 1 G: 1 INJECTION, POWDER, FOR SOLUTION INTRAMUSCULAR; INTRAVENOUS at 18:03

## 2024-08-10 RX ADMIN — METHYLPREDNISOLONE SODIUM SUCCINATE 125 MG: 125 INJECTION, POWDER, FOR SOLUTION INTRAMUSCULAR; INTRAVENOUS at 16:43

## 2024-08-10 RX ADMIN — AZITHROMYCIN DIHYDRATE 500 MG: 250 TABLET ORAL at 18:22

## 2024-08-10 ASSESSMENT — ACTIVITIES OF DAILY LIVING (ADL)
ADLS_ACUITY_SCORE: 39
ADLS_ACUITY_SCORE: 37
ADLS_ACUITY_SCORE: 39

## 2024-08-10 NOTE — ED PROVIDER NOTES
"  Emergency Department Note      History of Present Illness     Chief Complaint   Cough      HPI   Pilo Quintana is a 74 year old female with history of COPD who presents for a worsening cough. Patient's son reports she has been wheezing for the past 2-3 days with a runny nose and slight fever. No vomiting or diarrhea. Patient uses C-Pap at home. Her son reports her breathing was abnormal this morning. He checked her oxygen at home and reports she was down to 87%. He placed her on oxygen for 30 min and was in the low 90s. Patient's family at home are also sick with a cough. Patient was able to eat prior to arrival.     Independent Historian   Son as detailed above.    Review of External Notes       Past Medical History     Medical History and Problem List   Diabetes  Hypertension  COPD  Osteoarthritis  Renal mass  Asthma  Hyperlipidemia  Cerebrovascular disease  Dementia     Medications   Albuterol  Norvasc  Lipitor  Symbicort  Lasix  Cozaar  Metformin  Toprol     Surgical History   Combined dilation and curettage    Physical Exam     Patient Vitals for the past 24 hrs:   BP Temp Temp src Pulse Resp SpO2 Height Weight   08/10/24 1843 122/80 -- -- 71 -- -- -- --   08/10/24 1835 -- -- -- -- -- 94 % -- --   08/10/24 1828 -- -- -- -- -- 94 % -- --   08/10/24 1820 -- -- -- -- -- 96 % -- --   08/10/24 1810 -- -- -- -- -- 94 % -- --   08/10/24 1506 -- -- -- -- -- 93 % -- --   08/10/24 1451 -- -- -- -- -- 93 % -- --   08/10/24 1445 -- -- -- -- -- 94 % -- --   08/10/24 1430 114/68 -- -- 90 -- 100 % -- --   08/10/24 1429 -- -- -- -- -- 98 % -- --   08/10/24 1421 121/66 -- -- 80 -- 94 % -- --   08/10/24 1414 117/70 -- -- 74 -- 95 % -- --   08/10/24 1411 -- -- -- -- -- 94 % -- --   08/10/24 1344 (!) 122/115 98  F (36.7  C) Temporal 92 24 95 % 1.499 m (4' 11\") 70.8 kg (156 lb)     Physical Exam    HENT:  mmm, no rhinorrhea  Eyes: periorbital tissues and sclera normal   Neck: supple, no abnormal swelling  Lungs: Tachypnea "  muscle use, slightly prolonged expiratory, scattered wheeze  CV: rrr, no m/r/g, ppi  Abd: soft, nontender, nondistended, no rebound/masses/guarding/hsm  Ext: no peripheral edema  Skin: warm, dry, well perfused, no rashes/bruising/lesions on exposed skin  Neuro: alert, MAEE, no gross motor or sensory deficits,   Psych: Normal mood, normal affect      Diagnostics     Lab Results   Labs Ordered and Resulted from Time of ED Arrival to Time of ED Departure   BASIC METABOLIC PANEL - Abnormal       Result Value    Sodium 139      Potassium 4.8      Chloride 96 (*)     Carbon Dioxide (CO2) 25      Anion Gap 18 (*)     Urea Nitrogen 20.4      Creatinine 0.76      GFR Estimate 81      Calcium 10.2      Glucose 209 (*)    CBC WITH PLATELETS AND DIFFERENTIAL - Abnormal    WBC Count 7.2      RBC Count 3.89      Hemoglobin 11.1 (*)     Hematocrit 35.1      MCV 90      MCH 28.5      MCHC 31.6      RDW 14.0      Platelet Count 250      % Neutrophils 64      % Lymphocytes 25      % Monocytes 7      % Eosinophils 4      % Basophils 0      % Immature Granulocytes 0      NRBCs per 100 WBC 0      Absolute Neutrophils 4.6      Absolute Lymphocytes 1.8      Absolute Monocytes 0.5      Absolute Eosinophils 0.3      Absolute Basophils 0.0      Absolute Immature Granulocytes 0.0      Absolute NRBCs 0.0     INFLUENZA A/B, RSV, & SARS-COV2 PCR - Normal    Influenza A PCR Negative      Influenza B PCR Negative      RSV PCR Negative      SARS CoV2 PCR Negative     RBC AND PLATELET MORPHOLOGY       Imaging   XR Chest 2 Views   Final Result   IMPRESSION:       Airspace opacity within the medial left lower lobe appears similar to the prior exam and could reflect persistent/recurrent infection and/or atelectasis. There are also streaky bands of atelectasis within the left mid to lower lung.       Right lung is clear. No pleural effusions or pneumothorax. Pulmonary vascularity is within normal limits.      Stable cardiomegaly. Aortic  atherosclerotic calcifications.          EKG   ECG results from 08/10/24   EKG 12 lead     Value    Systolic Blood Pressure     Diastolic Blood Pressure     Ventricular Rate 72    Atrial Rate 72    WI Interval 134    QRS Duration 82        QTc 409    P Axis 18    R AXIS -44    T Axis 14    Interpretation ECG      Sinus rhythm  Left axis deviation  Nonspecific ST abnormality  Abnormal ECG  When compared with ECG of 07-Jul-2024 08:06,  Nonspecific T wave abnormality, worse in Inferior leads           Independent Interpretation   To my read chest radiograph shows breath of the left lower/left midlung.    ED Course      Medications Administered   Medications   ipratropium - albuterol 0.5 mg/2.5 mg/3 mL (DUONEB) neb solution 6 mL (6 mLs Nebulization $Given 8/10/24 1422)   magnesium sulfate 2 g in 50 mL sterile water intermittent infusion (0 g Intravenous Stopped 8/10/24 1732)   methylPREDNISolone sodium succinate (solu-MEDROL) injection 125 mg (125 mg Intravenous $Given 8/10/24 1643)   cefTRIAXone (ROCEPHIN) 1 g vial to attach to  mL bag for ADULTS or NS 50 mL bag for PEDS (0 g Intravenous Stopped 8/10/24 1839)   azithromycin (ZITHROMAX) tablet 500 mg (500 mg Oral $Given 8/10/24 1822)       Procedures   Procedures     Discussion of Management       ED Course   ED Course as of 08/11/24 0100   Sat Aug 10, 2024   1407 I obtained history and examined the patient as noted above.         Additional Documentation      Medical Decision Making / Diagnosis     CMS Diagnoses:     MIPS           MDM   Siaarmando Quintana is a 74 year old female presenting with respiratory tract infectious symptoms and shortness of breath.  History of COPD.,  Worsened appetite, at home oxygen needs as needed at home.  Evidence of bronchospasm examination here.  Fortunately she did well with therapies initiated here in emergency department to degree that she felt, discharged home and did well on ambulatory road test.  Her son is comfortable  agreeable with the plan.  Given the opacity on chest radiograph treat for possible [facility] pneumonia.  They will return with any new or worsening signs or symptoms of illness.  No clinical concern for PE coronary cardiogenic pathology.    Disposition   The patient was discharged.     Diagnosis     ICD-10-CM    1. Pneumonia due to infectious organism, unspecified laterality, unspecified part of lung  J18.9       2. COPD exacerbation (H)  J44.1            Discharge Medications   Discharge Medication List as of 8/10/2024  6:43 PM        START taking these medications    Details   amoxicillin (AMOXIL) 500 MG capsule Take 1 capsule (500 mg) by mouth 3 times daily for 7 days, Disp-21 capsule, R-0, E-Prescribe      azithromycin (ZITHROMAX) 250 MG tablet Take 2 tablets (500 mg) by mouth daily for 1 day, THEN 1 tablet (250 mg) daily for 4 days., Disp-6 tablet, R-0, E-Prescribe      ipratropium - albuterol 0.5 mg/2.5 mg/3 mL (DUONEB) 0.5-2.5 (3) MG/3ML neb solution Take 1 vial (3 mLs) by nebulization every 6 hours as needed for shortness of breath, wheezing or cough, Disp-90 mL, R-0, E-Prescribe      predniSONE (DELTASONE) 20 MG tablet Take two tablets (= 40mg) each day for 5 (five) days, Disp-10 tablet, R-0, E-Prescribe               Scribe Disclosure:  Josephine LEAHY, am serving as a scribe at 2:45 PM on 8/10/2024 to document services personally performed by Neftali Blum, based on my observations and the provider's statements to me.        Neftali Blum MD  08/11/24 0100

## 2024-08-10 NOTE — ED TRIAGE NOTES
Pt presents to the ED with son who states pt has had cough and congestion for 2.5 days. Pt feels SOB. Hx COPD. Pt denies pain.

## 2024-08-12 LAB
ATRIAL RATE - MUSE: 72 BPM
DIASTOLIC BLOOD PRESSURE - MUSE: NORMAL MMHG
INTERPRETATION ECG - MUSE: NORMAL
P AXIS - MUSE: 18 DEGREES
PR INTERVAL - MUSE: 134 MS
QRS DURATION - MUSE: 82 MS
QT - MUSE: 374 MS
QTC - MUSE: 409 MS
R AXIS - MUSE: -44 DEGREES
SYSTOLIC BLOOD PRESSURE - MUSE: NORMAL MMHG
T AXIS - MUSE: 14 DEGREES
VENTRICULAR RATE- MUSE: 72 BPM

## 2024-09-10 ENCOUNTER — ANCILLARY PROCEDURE (OUTPATIENT)
Dept: GENERAL RADIOLOGY | Facility: CLINIC | Age: 74
End: 2024-09-10
Attending: PHYSICIAN ASSISTANT
Payer: COMMERCIAL

## 2024-09-10 ENCOUNTER — OFFICE VISIT (OUTPATIENT)
Dept: URGENT CARE | Facility: URGENT CARE | Age: 74
End: 2024-09-10
Payer: COMMERCIAL

## 2024-09-10 VITALS
SYSTOLIC BLOOD PRESSURE: 122 MMHG | TEMPERATURE: 97.6 F | OXYGEN SATURATION: 93 % | DIASTOLIC BLOOD PRESSURE: 84 MMHG | HEART RATE: 71 BPM | RESPIRATION RATE: 18 BRPM

## 2024-09-10 DIAGNOSIS — M79.644 PAIN OF FINGER OF RIGHT HAND: ICD-10-CM

## 2024-09-10 DIAGNOSIS — M79.644 PAIN OF FINGER OF RIGHT HAND: Primary | ICD-10-CM

## 2024-09-10 PROCEDURE — 73130 X-RAY EXAM OF HAND: CPT | Mod: TC | Performed by: RADIOLOGY

## 2024-09-10 PROCEDURE — 99214 OFFICE O/P EST MOD 30 MIN: CPT | Performed by: PHYSICIAN ASSISTANT

## 2024-09-11 NOTE — PROGRESS NOTES
SUBJECTIVE:  Chief Complaint   Patient presents with    Fall     Son found her on the ground next to her bed about 2 hrs ago, she slid off of the bed. Not complaining of pain currently but her son wants her checked out, when I left the room she started complaining of pain in right fingers     Pilo Quintana is a 74 year old female presents with a chief complaint of right hand pain.  The injury occurred 2 hour(s) ago.   The injury happened while at home. How: as above pain is resolved. But son would like to condfirm no fracture      Past Medical History:   Diagnosis Date    Diabetes (H)     Hypertension     Morbid obesity (H)     Osteoarthritis 6/7/2003    PMB (postmenopausal bleeding) 9/16/2014    Renal mass     Thickened endometrium 9/16/2014    Uncomplicated asthma     Unspecified cerebral artery occlusion with cerebral infarction 1998    was in [place], no residual at present     Current Outpatient Medications   Medication Sig Dispense Refill    acetaminophen (TYLENOL) 500 MG tablet Take 500-1,000 mg by mouth every 8 hours as needed for mild pain      albuterol (PROAIR HFA/PROVENTIL HFA/VENTOLIN HFA) 108 (90 Base) MCG/ACT inhaler Inhale 2 puffs into the lungs every 4 hours as needed for wheezing 18 g 0    albuterol (PROVENTIL) (2.5 MG/3ML) 0.083% neb solution Take 1 vial (2.5 mg) by nebulization every 6 hours as needed for shortness of breath, wheezing or cough 90 mL 0    amLODIPine (NORVASC) 5 MG tablet Take 5 mg by mouth daily      aspirin (ASA) 81 MG EC tablet Take 1 tablet (81 mg) by mouth daily 30 tablet 11    atorvastatin (LIPITOR) 40 MG tablet Take 1 tablet (40 mg) by mouth every evening 30 tablet 4    budesonide-formoterol (SYMBICORT) 160-4.5 MCG/ACT Inhaler Inhale 2 puffs into the lungs two times daily      Cholecalciferol (VITAMIN D3) 50 MCG (2000 UT) CAPS Take 50 mcg by mouth daily      furosemide (LASIX) 40 MG tablet Take 40 mg by mouth daily      ipratropium - albuterol 0.5 mg/2.5 mg/3 mL (DUONEB)  0.5-2.5 (3) MG/3ML neb solution Take 1 vial (3 mLs) by nebulization every 6 hours as needed for shortness of breath, wheezing or cough 90 mL 0    losartan (COZAAR) 100 MG tablet Take 100 mg by mouth daily      metFORMIN (GLUCOPHAGE XR) 500 MG 24 hr tablet Take 500 mg by mouth 2 times daily (with meals)      metoprolol succinate ER (TOPROL XL) 50 MG 24 hr tablet Take 50 mg by mouth daily      nystatin (NYSTOP) 072051 UNIT/GM external powder Apply topically 2 times daily as needed      triamcinolone (KENALOG) 0.1 % external ointment Apply topically 2 times daily as needed for irritation      predniSONE (DELTASONE) 20 MG tablet Take two tablets (= 40mg) each day for 5 (five) days (Patient not taking: Reported on 9/10/2024) 10 tablet 0     Social History     Tobacco Use    Smoking status: Never    Smokeless tobacco: Never   Substance Use Topics    Alcohol use: No       ROS:  Review of systems negative except as stated above.    EXAM:   /84 (BP Location: Right arm, Patient Position: Sitting, Cuff Size: Adult Regular)   Pulse 71   Temp 97.6  F (36.4  C) (Temporal)   Resp 18   LMP  (LMP Unknown)   SpO2 93%   Gen: healthy,alert,no distress  Extremity: no swelling, nontender  GENERAL APPEARANCE: healthy, alert and no distress  CHEST: clear to auscultation  CV: regular rate and rhythm  SKIN: no suspicious lesions or rashes  NEURO: Normal strength and tone, sensory exam grossly normal, mentation intact and speech normal    Results for orders placed or performed in visit on 09/10/24   XR Hand Right G/E 3 Views     Status: None    Narrative    EXAM: XR HAND RIGHT G/E 3 VIEWS  LOCATION: [facility]  DATE: 9/10/2024    INDICATION:  Pain of finger of right hand  COMPARISON: None.      Impression    IMPRESSION: Right hand negative for fracture or dislocation. Moderate-severe degenerative arthritic changes involving the right thumb CMC joint, MCP joint, and IP joint. Mild degenerative arthritic changes  in the other IP joints. Moderate degenerative   arthrosis also at the distal radioulnar joint. Negative right hand otherwise.         ASSESSMENT:   (M79.154) Pain of finger of right hand  (primary encounter diagnosis)  Comment: No evidence of fracture or dislocation  Plan: XR Hand Right G/E 3 Views          Red flags and emergent follow up discussed, and understood by patient  Follow up with PCP if symptoms worsen or fail to improve

## 2024-09-14 ENCOUNTER — HOSPITAL ENCOUNTER (EMERGENCY)
Facility: CLINIC | Age: 74
Discharge: HOME | End: 2024-09-15
Attending: EMERGENCY MEDICINE | Admitting: EMERGENCY MEDICINE
Payer: COMMERCIAL

## 2024-09-14 ENCOUNTER — APPOINTMENT (OUTPATIENT)
Dept: ULTRASOUND IMAGING | Facility: CLINIC | Age: 74
End: 2024-09-14
Attending: EMERGENCY MEDICINE
Payer: COMMERCIAL

## 2024-09-14 ENCOUNTER — APPOINTMENT (OUTPATIENT)
Dept: CT IMAGING | Facility: CLINIC | Age: 74
End: 2024-09-14
Attending: EMERGENCY MEDICINE
Payer: COMMERCIAL

## 2024-09-14 ENCOUNTER — APPOINTMENT (OUTPATIENT)
Dept: GENERAL RADIOLOGY | Facility: CLINIC | Age: 74
End: 2024-09-14
Attending: EMERGENCY MEDICINE
Payer: COMMERCIAL

## 2024-09-14 DIAGNOSIS — R06.09 DYSPNEA ON EXERTION: ICD-10-CM

## 2024-09-14 DIAGNOSIS — M25.471 RIGHT ANKLE SWELLING: ICD-10-CM

## 2024-09-14 LAB
ALBUMIN SERPL BCG-MCNC: 4.1 G/DL (ref 3.5–5.2)
ALP SERPL-CCNC: 91 U/L (ref 40–150)
ALT SERPL W P-5'-P-CCNC: 15 U/L (ref 0–50)
ANION GAP SERPL CALCULATED.3IONS-SCNC: 14 MMOL/L (ref 7–15)
AST SERPL W P-5'-P-CCNC: 17 U/L (ref 0–45)
BASOPHILS # BLD AUTO: 0 10E3/UL (ref 0–0.2)
BASOPHILS NFR BLD AUTO: 0 %
BILIRUB SERPL-MCNC: 0.2 MG/DL
BUN SERPL-MCNC: 30.2 MG/DL (ref 8–23)
CALCIUM SERPL-MCNC: 10.2 MG/DL (ref 8.8–10.4)
CHLORIDE SERPL-SCNC: 97 MMOL/L (ref 98–107)
CREAT SERPL-MCNC: 0.97 MG/DL (ref 0.51–0.95)
D DIMER PPP FEU-MCNC: 0.54 UG/ML FEU (ref 0–0.5)
EGFRCR SERPLBLD CKD-EPI 2021: 61 ML/MIN/1.73M2
EOSINOPHIL # BLD AUTO: 0.2 10E3/UL (ref 0–0.7)
EOSINOPHIL NFR BLD AUTO: 2 %
ERYTHROCYTE [DISTWIDTH] IN BLOOD BY AUTOMATED COUNT: 13.7 % (ref 10–15)
GLUCOSE SERPL-MCNC: 171 MG/DL (ref 70–99)
HCO3 SERPL-SCNC: 27 MMOL/L (ref 22–29)
HCT VFR BLD AUTO: 35.6 % (ref 35–47)
HGB BLD-MCNC: 11.2 G/DL (ref 11.7–15.7)
IMM GRANULOCYTES # BLD: 0 10E3/UL
IMM GRANULOCYTES NFR BLD: 0 %
LIPASE SERPL-CCNC: 71 U/L (ref 13–60)
LYMPHOCYTES # BLD AUTO: 2.1 10E3/UL (ref 0.8–5.3)
LYMPHOCYTES NFR BLD AUTO: 27 %
MAGNESIUM SERPL-MCNC: 1.8 MG/DL (ref 1.7–2.3)
MCH RBC QN AUTO: 28.4 PG (ref 26.5–33)
MCHC RBC AUTO-ENTMCNC: 31.5 G/DL (ref 31.5–36.5)
MCV RBC AUTO: 90 FL (ref 78–100)
MONOCYTES # BLD AUTO: 0.6 10E3/UL (ref 0–1.3)
MONOCYTES NFR BLD AUTO: 8 %
NEUTROPHILS # BLD AUTO: 5 10E3/UL (ref 1.6–8.3)
NEUTROPHILS NFR BLD AUTO: 62 %
NRBC # BLD AUTO: 0 10E3/UL
NRBC BLD AUTO-RTO: 0 /100
NT-PROBNP SERPL-MCNC: 108 PG/ML (ref 0–900)
PLATELET # BLD AUTO: 290 10E3/UL (ref 150–450)
POTASSIUM SERPL-SCNC: 4.6 MMOL/L (ref 3.4–5.3)
PROT SERPL-MCNC: 7.4 G/DL (ref 6.4–8.3)
RBC # BLD AUTO: 3.95 10E6/UL (ref 3.8–5.2)
SODIUM SERPL-SCNC: 138 MMOL/L (ref 135–145)
TROPONIN T SERPL HS-MCNC: 17 NG/L
WBC # BLD AUTO: 8 10E3/UL (ref 4–11)

## 2024-09-14 PROCEDURE — 85379 FIBRIN DEGRADATION QUANT: CPT | Performed by: EMERGENCY MEDICINE

## 2024-09-14 PROCEDURE — 93005 ELECTROCARDIOGRAM TRACING: CPT

## 2024-09-14 PROCEDURE — 72125 CT NECK SPINE W/O DYE: CPT

## 2024-09-14 PROCEDURE — 36415 COLL VENOUS BLD VENIPUNCTURE: CPT | Performed by: EMERGENCY MEDICINE

## 2024-09-14 PROCEDURE — 99285 EMERGENCY DEPT VISIT HI MDM: CPT | Mod: 25

## 2024-09-14 PROCEDURE — 85025 COMPLETE CBC W/AUTO DIFF WBC: CPT | Performed by: EMERGENCY MEDICINE

## 2024-09-14 PROCEDURE — 71046 X-RAY EXAM CHEST 2 VIEWS: CPT

## 2024-09-14 PROCEDURE — 73610 X-RAY EXAM OF ANKLE: CPT | Mod: RT

## 2024-09-14 PROCEDURE — 70450 CT HEAD/BRAIN W/O DYE: CPT

## 2024-09-14 PROCEDURE — 83880 ASSAY OF NATRIURETIC PEPTIDE: CPT | Performed by: EMERGENCY MEDICINE

## 2024-09-14 PROCEDURE — 80053 COMPREHEN METABOLIC PANEL: CPT | Performed by: EMERGENCY MEDICINE

## 2024-09-14 PROCEDURE — 73630 X-RAY EXAM OF FOOT: CPT | Mod: RT

## 2024-09-14 PROCEDURE — 93971 EXTREMITY STUDY: CPT | Mod: RT

## 2024-09-14 PROCEDURE — 83735 ASSAY OF MAGNESIUM: CPT | Performed by: EMERGENCY MEDICINE

## 2024-09-14 PROCEDURE — 84484 ASSAY OF TROPONIN QUANT: CPT | Performed by: EMERGENCY MEDICINE

## 2024-09-14 PROCEDURE — 83690 ASSAY OF LIPASE: CPT | Performed by: EMERGENCY MEDICINE

## 2024-09-14 ASSESSMENT — ACTIVITIES OF DAILY LIVING (ADL)
ADLS_ACUITY_SCORE: 39
ADLS_ACUITY_SCORE: 39
ADLS_ACUITY_SCORE: 37
ADLS_ACUITY_SCORE: 39

## 2024-09-15 VITALS
BODY MASS INDEX: 31.79 KG/M2 | SYSTOLIC BLOOD PRESSURE: 167 MMHG | WEIGHT: 157.41 LBS | DIASTOLIC BLOOD PRESSURE: 101 MMHG | HEART RATE: 72 BPM | OXYGEN SATURATION: 93 % | TEMPERATURE: 98.2 F | RESPIRATION RATE: 28 BRPM

## 2024-09-15 LAB — TROPONIN T SERPL HS-MCNC: 15 NG/L

## 2024-09-15 PROCEDURE — 36416 COLLJ CAPILLARY BLOOD SPEC: CPT | Performed by: EMERGENCY MEDICINE

## 2024-09-15 PROCEDURE — 84484 ASSAY OF TROPONIN QUANT: CPT | Performed by: EMERGENCY MEDICINE

## 2024-09-15 ASSESSMENT — ACTIVITIES OF DAILY LIVING (ADL)
ADLS_ACUITY_SCORE: 41

## 2024-09-15 NOTE — ED PROVIDER NOTES
Emergency Department Note      History of Present Illness     Chief Complaint   Leg Swelling    HPI   Pilo Quintana is a 74 year old female with a history of type II diabetes and carpal tunnel syndrome presenting with right ankle swelling. 2 days ago, the patient was at home when her son found her on the floor. Her son helped her up, asked her what happened, and checked the cameras. He believes she fell. A few days later, she endorsed a pain in her right wrist.  She was seen at urgent care and had an x-ray that showed no bony injury.  Then, she mentioned right ankle swelling. Her son mentions that she is hard of breathing when walking and eating.  Her son also states that he noticed she was favoring the left side of her neck after the fall. The patient denies any current chest pain, headache, or neck pain. The patient is on 81 mg of Asprin, no blood thinners.     Independent Historian   The patient's son provided history and translated for the patient.     Review of External Notes   Progress note from 9/10/2024 reviewed.  Seen for a fall.  Had right hand pain, hand x-ray showed no fracture. Echocardiogram from 4/4/24 reviewed.  LVEF 60 to 65%, RV systolic function normal.  Pulmonary hypertension present.        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    Osteoarthritis  PMB  Renal mass  Thickened endometrium  Uncomplicated asthma  Unspecified cerebral artery occlusion with cerebral infarction  ICH    Medications   Albuterol   Amlodipine   Aspirin 81 mg   Atorvastatin   Furosemide   Losartan   Metformin   Metoprolol succinate   Prednisone   Simvastatin   Lancets     Surgical History   D and C, operative hysteroscopy with morcellator, combined   Cataract removal, bilateral   Renal biopsy, right     Physical Exam      Patient Vitals for the past 24 hrs:   BP Temp Temp src Pulse Resp SpO2 Weight   09/15/24 0145 (!) 146/90 -- -- 67 -- 96 % --   09/15/24 0130 (!) 148/91 -- -- 69 -- 94 % --   09/15/24 0100 (!) 147/110 -- -- 74 -- 98 % --   09/15/24 0030 (!) 152/85 -- -- 65 -- 100 % --   09/15/24 0020 (!) 152/85 -- -- -- -- 92 % --   09/15/24 0000 (!) 149/93 -- -- 52 -- 96 % --   09/14/24 2345 (!) 152/92 -- -- 65 -- -- --   09/14/24 2215 136/89 -- -- 73 -- 100 % --   09/14/24 1906 (!) 168/87 98.2  F (36.8  C) Temporal 77 28 94 % 71.4 kg (157 lb 6.5 oz)     Physical Exam  General: Sitting on the ED wheelchair  HEENT: Normocephalic, atraumatic  Cardiac: Radial pulses 2+, regular rate and rhythm  Pulm: Breathing comfortably, no accessory muscle usage, no conversational dyspnea, and lungs clear bilaterally  MSK: No bony deformities, trace edema BLE with slight predominance over the left lateral malleolus, right ankle nontender to palpation  Skin: Warm and dry  Neuro: Moves all extremities  Psych: Pleasant mood and affect      Diagnostics     Lab Results   Labs Ordered and Resulted from Time of ED Arrival to Time of ED Departure   D DIMER QUANTITATIVE - Abnormal       Result Value    D-Dimer Quantitative 0.54 (*)    COMPREHENSIVE METABOLIC PANEL - Abnormal    Sodium 138      Potassium 4.6      Carbon Dioxide (CO2) 27      Anion Gap 14      Urea Nitrogen 30.2 (*)     Creatinine 0.97 (*)     GFR Estimate 61      Calcium 10.2      Chloride 97 (*)     Glucose 171 (*)     Alkaline Phosphatase 91      AST 17      ALT 15      Protein Total 7.4      Albumin 4.1      Bilirubin Total 0.2     LIPASE - Abnormal    Lipase 71 (*)    TROPONIN T, HIGH SENSITIVITY - Abnormal    Troponin T, High Sensitivity 17 (*)    CBC WITH PLATELETS AND DIFFERENTIAL - Abnormal    WBC Count 8.0      RBC Count 3.95      Hemoglobin 11.2 (*)     Hematocrit 35.6      MCV 90      MCH 28.4      MCHC 31.5      RDW 13.7      Platelet Count 290      % Neutrophils 62      %  Lymphocytes 27      % Monocytes 8      % Eosinophils 2      % Basophils 0      % Immature Granulocytes 0      NRBCs per 100 WBC 0      Absolute Neutrophils 5.0      Absolute Lymphocytes 2.1      Absolute Monocytes 0.6      Absolute Eosinophils 0.2      Absolute Basophils 0.0      Absolute Immature Granulocytes 0.0      Absolute NRBCs 0.0     TROPONIN T, HIGH SENSITIVITY - Abnormal    Troponin T, High Sensitivity 15 (*)    MAGNESIUM - Normal    Magnesium 1.8     NT PROBNP INPATIENT - Normal    N terminal Pro BNP Inpatient 108         Imaging   Foot  XR, G/E 3 views, right   Final Result   IMPRESSION: No acute displaced right foot fracture or dislocation. Mild degenerative osteoarthritis at the first MTP joint and scattered in the midfoot. No localizing right foot soft tissue swelling.      XR Chest 2 Views   Final Result   IMPRESSION: Heart size is normal. Prominence of central pulmonary vasculature without overt failure. Linear scarring or atelectasis in the left lung, unchanged. No significant effusions. No acute bony abnormality.      Ankle XR, G/E 3 views, right   Final Result   IMPRESSION: Normal joint spaces and alignment. No acute displaced right ankle fracture. Moderate to severe ankle soft tissue swelling. Intact appearing ankle mortise and distal syndesmosis.      US Lower Extremity Venous Duplex Right   Final Result   IMPRESSION:   1.  No deep venous thrombosis in the right lower extremity.      CT Cervical Spine w/o Contrast   Final Result   IMPRESSION:   HEAD CT:   1.  No acute intracranial process.      CERVICAL SPINE CT:   1.  No acute fracture.      CT Head w/o Contrast   Final Result   IMPRESSION:   HEAD CT:   1.  No acute intracranial process.      CERVICAL SPINE CT:   1.  No acute fracture.          EKG   ECG taken at 1959, ECG read at 2020  Sinus rhythm  No change as compared to prior, dated 8/10/24.  Rate 73 bpm. MS interval 140 ms. QRS duration 84 ms. QT/QTc 384/423 ms. P-R-T axes 27 -36  32.    Independent Interpretation   CT Head: No intracranial hemorrhage.    ED Course      Medications Administered   Medications - No data to display    Procedures   Procedures     Discussion of Management   None    ED Course   ED Course as of 09/15/24 0310   Sat Sep 14, 2024   1940 I obtained the history and examined the patient as above.        Additional Documentation  None    Medical Decision Making / Diagnosis     CMS Diagnoses: None    MIPS       None    MDM   Pilo Quintana is a 74 year old female who presents with her son after a fall a couple of days ago and decreased exertional tolerance.  Vital signs are stable.  Differential for the patient's reported right ankle swelling includes posttraumatic, DVT, edema from fluid overload.  No clinical signs of lower extremity cellulitis.  Lab work shows a troponin consistent with prior values and downtrending on repeat.  Age-adjusted D-dimer is negative.  Lipase is very mildly elevated, no abdominal pain or complaints here so low suspicion for pancreatitis, and LFTs are reassuringly within reference range.  Given the patient's fall couple days ago and her son reporting some favoring of the left side of her neck, CT of the head and C-spine were obtained and no acute injuries.  Right ankle and foot films likewise without bony injury.  With regard to the decreased exertional tolerance, EKG shows no change from prior.  Chest x-ray shows prominent central pulmonary vasculature, but no maria a edema and BNP is not elevated.  I discussed the above findings with the patient and her son. I find tonight's evaluation overall reassuring.  Plan is to discharge home with recommendation for PCP follow-up.  The patient and her son expressed understanding and agreement.    Disposition   The patient was discharged.     Diagnosis     ICD-10-CM    1. Right ankle swelling  M25.471       2. Dyspnea on exertion  R06.09            Scribe Disclosure:  I, Phoenix Peterson, am serving as a  scribe at 7:42 PM on 9/14/2024 to document services personally performed by Henrik Carpenter MD based on my observations and the provider's statements to me.        Henrik Carpenter MD  09/15/24 0310

## 2024-09-15 NOTE — ED TRIAGE NOTES
Pt brought in by son with c/o bilat ankle swelling and pain that started today. Hx of HTN, DM, and COPD. Swelling noted in ankles, pt appears somewhat SOB and son states that she gets like this with exertion. After standing O2 in the low 90's but after sitting increased to 99% Denies any cough. Per son pt has been working with PT and getting stronger. Pt was diagnosed with PNA a month ago but finished her abx and has been getting better. Has still been doing nebulizers and wears a Cpap at night.      Triage Assessment (Adult)       Row Name 09/14/24 5262          Triage Assessment    Airway WDL WDL        Respiratory WDL    Respiratory WDL WDL        Skin Circulation/Temperature WDL    Skin Circulation/Temperature WDL WDL        Cardiac WDL    Cardiac WDL WDL        Peripheral/Neurovascular WDL    Peripheral Neurovascular WDL WDL        Cognitive/Neuro/Behavioral WDL    Cognitive/Neuro/Behavioral WDL WDL

## 2024-09-16 ENCOUNTER — HOSPITAL ENCOUNTER (EMERGENCY)
Facility: CLINIC | Age: 74
Discharge: HOME | End: 2024-09-16
Attending: STUDENT IN AN ORGANIZED HEALTH CARE EDUCATION/TRAINING PROGRAM | Admitting: STUDENT IN AN ORGANIZED HEALTH CARE EDUCATION/TRAINING PROGRAM
Payer: COMMERCIAL

## 2024-09-16 ENCOUNTER — APPOINTMENT (OUTPATIENT)
Dept: GENERAL RADIOLOGY | Facility: CLINIC | Age: 74
End: 2024-09-16
Attending: STUDENT IN AN ORGANIZED HEALTH CARE EDUCATION/TRAINING PROGRAM
Payer: COMMERCIAL

## 2024-09-16 VITALS
OXYGEN SATURATION: 97 % | RESPIRATION RATE: 18 BRPM | HEART RATE: 76 BPM | SYSTOLIC BLOOD PRESSURE: 127 MMHG | TEMPERATURE: 97.9 F | DIASTOLIC BLOOD PRESSURE: 88 MMHG

## 2024-09-16 DIAGNOSIS — I50.9 CONGESTIVE HEART FAILURE, UNSPECIFIED HF CHRONICITY, UNSPECIFIED HEART FAILURE TYPE (H): ICD-10-CM

## 2024-09-16 DIAGNOSIS — G47.33 OSA (OBSTRUCTIVE SLEEP APNEA): ICD-10-CM

## 2024-09-16 DIAGNOSIS — R06.02 SOB (SHORTNESS OF BREATH): Primary | ICD-10-CM

## 2024-09-16 LAB
ANION GAP SERPL CALCULATED.3IONS-SCNC: 13 MMOL/L (ref 7–15)
ATRIAL RATE - MUSE: 70 BPM
ATRIAL RATE - MUSE: 73 BPM
BASOPHILS # BLD AUTO: 0 10E3/UL (ref 0–0.2)
BASOPHILS NFR BLD AUTO: 1 %
BUN SERPL-MCNC: 25.8 MG/DL (ref 8–23)
CALCIUM SERPL-MCNC: 10.2 MG/DL (ref 8.8–10.4)
CHLORIDE SERPL-SCNC: 98 MMOL/L (ref 98–107)
CREAT SERPL-MCNC: 0.86 MG/DL (ref 0.51–0.95)
D DIMER PPP FEU-MCNC: 0.64 UG/ML FEU (ref 0–0.5)
DIASTOLIC BLOOD PRESSURE - MUSE: NORMAL MMHG
DIASTOLIC BLOOD PRESSURE - MUSE: NORMAL MMHG
EGFRCR SERPLBLD CKD-EPI 2021: 70 ML/MIN/1.73M2
EOSINOPHIL # BLD AUTO: 0.2 10E3/UL (ref 0–0.7)
EOSINOPHIL NFR BLD AUTO: 3 %
ERYTHROCYTE [DISTWIDTH] IN BLOOD BY AUTOMATED COUNT: 14.1 % (ref 10–15)
FLUAV RNA SPEC QL NAA+PROBE: NEGATIVE
FLUBV RNA RESP QL NAA+PROBE: NEGATIVE
GLUCOSE SERPL-MCNC: 166 MG/DL (ref 70–99)
HCO3 SERPL-SCNC: 29 MMOL/L (ref 22–29)
HCT VFR BLD AUTO: 36.5 % (ref 35–47)
HGB BLD-MCNC: 11.6 G/DL (ref 11.7–15.7)
IMM GRANULOCYTES # BLD: 0 10E3/UL
IMM GRANULOCYTES NFR BLD: 0 %
INTERPRETATION ECG - MUSE: NORMAL
INTERPRETATION ECG - MUSE: NORMAL
LYMPHOCYTES # BLD AUTO: 1.7 10E3/UL (ref 0.8–5.3)
LYMPHOCYTES NFR BLD AUTO: 26 %
MCH RBC QN AUTO: 28.9 PG (ref 26.5–33)
MCHC RBC AUTO-ENTMCNC: 31.8 G/DL (ref 31.5–36.5)
MCV RBC AUTO: 91 FL (ref 78–100)
MONOCYTES # BLD AUTO: 0.5 10E3/UL (ref 0–1.3)
MONOCYTES NFR BLD AUTO: 7 %
NEUTROPHILS # BLD AUTO: 4.1 10E3/UL (ref 1.6–8.3)
NEUTROPHILS NFR BLD AUTO: 63 %
NRBC # BLD AUTO: 0 10E3/UL
NRBC BLD AUTO-RTO: 0 /100
NT-PROBNP SERPL-MCNC: 65 PG/ML (ref 0–900)
P AXIS - MUSE: 27 DEGREES
P AXIS - MUSE: 31 DEGREES
PLATELET # BLD AUTO: 274 10E3/UL (ref 150–450)
POTASSIUM SERPL-SCNC: 4.1 MMOL/L (ref 3.4–5.3)
PR INTERVAL - MUSE: 140 MS
PR INTERVAL - MUSE: 152 MS
QRS DURATION - MUSE: 84 MS
QRS DURATION - MUSE: 88 MS
QT - MUSE: 384 MS
QT - MUSE: 390 MS
QTC - MUSE: 421 MS
QTC - MUSE: 423 MS
R AXIS - MUSE: -27 DEGREES
R AXIS - MUSE: -36 DEGREES
RBC # BLD AUTO: 4.01 10E6/UL (ref 3.8–5.2)
RSV RNA SPEC NAA+PROBE: NEGATIVE
SARS-COV-2 RNA RESP QL NAA+PROBE: NEGATIVE
SODIUM SERPL-SCNC: 140 MMOL/L (ref 135–145)
SYSTOLIC BLOOD PRESSURE - MUSE: NORMAL MMHG
SYSTOLIC BLOOD PRESSURE - MUSE: NORMAL MMHG
T AXIS - MUSE: 13 DEGREES
T AXIS - MUSE: 32 DEGREES
TROPONIN T SERPL HS-MCNC: 18 NG/L
VENTRICULAR RATE- MUSE: 70 BPM
VENTRICULAR RATE- MUSE: 73 BPM
WBC # BLD AUTO: 6.5 10E3/UL (ref 4–11)

## 2024-09-16 PROCEDURE — 80048 BASIC METABOLIC PNL TOTAL CA: CPT | Performed by: STUDENT IN AN ORGANIZED HEALTH CARE EDUCATION/TRAINING PROGRAM

## 2024-09-16 PROCEDURE — 36415 COLL VENOUS BLD VENIPUNCTURE: CPT | Performed by: STUDENT IN AN ORGANIZED HEALTH CARE EDUCATION/TRAINING PROGRAM

## 2024-09-16 PROCEDURE — 85004 AUTOMATED DIFF WBC COUNT: CPT | Performed by: STUDENT IN AN ORGANIZED HEALTH CARE EDUCATION/TRAINING PROGRAM

## 2024-09-16 PROCEDURE — 93005 ELECTROCARDIOGRAM TRACING: CPT

## 2024-09-16 PROCEDURE — 84484 ASSAY OF TROPONIN QUANT: CPT | Performed by: STUDENT IN AN ORGANIZED HEALTH CARE EDUCATION/TRAINING PROGRAM

## 2024-09-16 PROCEDURE — 71046 X-RAY EXAM CHEST 2 VIEWS: CPT

## 2024-09-16 PROCEDURE — 87637 SARSCOV2&INF A&B&RSV AMP PRB: CPT | Performed by: STUDENT IN AN ORGANIZED HEALTH CARE EDUCATION/TRAINING PROGRAM

## 2024-09-16 PROCEDURE — 99285 EMERGENCY DEPT VISIT HI MDM: CPT | Mod: 25

## 2024-09-16 PROCEDURE — 85379 FIBRIN DEGRADATION QUANT: CPT | Performed by: STUDENT IN AN ORGANIZED HEALTH CARE EDUCATION/TRAINING PROGRAM

## 2024-09-16 PROCEDURE — 83880 ASSAY OF NATRIURETIC PEPTIDE: CPT | Performed by: STUDENT IN AN ORGANIZED HEALTH CARE EDUCATION/TRAINING PROGRAM

## 2024-09-16 RX ORDER — DOXYCYCLINE 100 MG/1
100 CAPSULE ORAL 2 TIMES DAILY
Qty: 10 CAPSULE | Refills: 0 | Status: SHIPPED | OUTPATIENT
Start: 2024-09-16 | End: 2024-09-21

## 2024-09-16 RX ORDER — PREDNISONE 20 MG/1
TABLET ORAL
Qty: 10 TABLET | Refills: 0 | Status: SHIPPED | OUTPATIENT
Start: 2024-09-16

## 2024-09-16 ASSESSMENT — COLUMBIA-SUICIDE SEVERITY RATING SCALE - C-SSRS
1. IN THE PAST MONTH, HAVE YOU WISHED YOU WERE DEAD OR WISHED YOU COULD GO TO SLEEP AND NOT WAKE UP?: NO
6. HAVE YOU EVER DONE ANYTHING, STARTED TO DO ANYTHING, OR PREPARED TO DO ANYTHING TO END YOUR LIFE?: NO
2. HAVE YOU ACTUALLY HAD ANY THOUGHTS OF KILLING YOURSELF IN THE PAST MONTH?: NO

## 2024-09-16 ASSESSMENT — ACTIVITIES OF DAILY LIVING (ADL)
ADLS_ACUITY_SCORE: 39
ADLS_ACUITY_SCORE: 39

## 2024-09-16 NOTE — DISCHARGE INSTRUCTIONS
Thank you for allowing us to evaluate you today.  Follow up with primary care clinician  in 1 week for reevaluation.  Take the steroid as prescribed. Use the antibiotic(s) as prescribed.   Please read the guidance provided with your discharge instructions.  Immediately return to the emergency department with any concerns.    
aspirin 81 mg oral delayed release tablet: 1 tab(s) orally once a day  atorvastatin 10 mg oral tablet: 1 tab(s) orally once a day  ertapenem 1 g injection: 1 gram(s) injectable  Lantus 100 units/mL subcutaneous solution: 35  subcutaneous once a day (at bedtime)  lisinopril 2.5 mg oral tablet: 1 tab(s) orally once a day  metFORMIN 1000 mg oral tablet: 1 tab(s) orally 2 times a day

## 2024-09-16 NOTE — ED PROVIDER NOTES
Emergency Department Note      History of Present Illness     Chief Complaint   Shortness of Breath      HPI   Pilo Quintana is a pleasant 74 year old female with history of COPD, type 2 diabetes mellitus, hypertension, hyperlipidemia, chronic heart failure, intracerebral hemorrhage, cerebrovascular disease presenting with shortness of breath. The son of the patient reports the patient began complaining of shortness of breath yesterday but when he checked her oxygen it was normal. He states today the patient woke up with wheezing and a cough. He mentions when the patient was walking her oxygen came down to 87% pulse oximeter at home. He notes his son, the patient's grandson, has cold symptoms. He reports the patient wears a BiPAP when sleeping. He denies the patient experiencing recent fever or ill exposures. He reports the patient was recently seen for ankle swelling without significant findings.     Language interpretation was provided by the patient's son at the patient's request.     Independent Historian   Son as detailed above.    Review of External Notes   I personally reviewed notes from the patient's clinic visit dated  9/11/2024 . This provided me with information regarding patient's baseline medical problems.     I personally reviewed the patient's chart, including available medication list and available past medical history, past surgical history, family history, and social history.    Physical Exam     Patient Vitals for the past 24 hrs:   BP Temp Pulse Resp SpO2   09/16/24 1150 -- -- -- -- 97 %   09/16/24 1149 127/88 -- 76 -- --   09/16/24 1135 133/86 -- 77 -- (!) 88 %   09/16/24 1133 -- 97.9  F (36.6  C) -- 18 --      Physical Exam  Vitals and nursing note reviewed.   Constitutional:       Appearance: She is obese. She is not ill-appearing or diaphoretic.   Cardiovascular:      Rate and Rhythm: Normal rate and regular rhythm.      Heart sounds: Normal heart sounds.   Pulmonary:      Effort:  Pulmonary effort is normal.      Breath sounds: Wheezing and rhonchi present. No rales.   Musculoskeletal:      Right lower leg: No edema.      Left lower leg: No edema.   Skin:     General: Skin is warm and dry.   Neurological:      Mental Status: She is alert and oriented to person, place, and time.            Diagnostics     Lab Results   Labs Ordered and Resulted from Time of ED Arrival to Time of ED Departure   BASIC METABOLIC PANEL - Abnormal       Result Value    Sodium 140      Potassium 4.1      Chloride 98      Carbon Dioxide (CO2) 29      Anion Gap 13      Urea Nitrogen 25.8 (*)     Creatinine 0.86      GFR Estimate 70      Calcium 10.2      Glucose 166 (*)    D DIMER QUANTITATIVE - Abnormal    D-Dimer Quantitative 0.64 (*)    TROPONIN T, HIGH SENSITIVITY - Abnormal    Troponin T, High Sensitivity 18 (*)    CBC WITH PLATELETS AND DIFFERENTIAL - Abnormal    WBC Count 6.5      RBC Count 4.01      Hemoglobin 11.6 (*)     Hematocrit 36.5      MCV 91      MCH 28.9      MCHC 31.8      RDW 14.1      Platelet Count 274      % Neutrophils 63      % Lymphocytes 26      % Monocytes 7      % Eosinophils 3      % Basophils 1      % Immature Granulocytes 0      NRBCs per 100 WBC 0      Absolute Neutrophils 4.1      Absolute Lymphocytes 1.7      Absolute Monocytes 0.5      Absolute Eosinophils 0.2      Absolute Basophils 0.0      Absolute Immature Granulocytes 0.0      Absolute NRBCs 0.0     NT PROBNP INPATIENT - Normal    N terminal Pro BNP Inpatient 65     INFLUENZA A/B, RSV, & SARS-COV2 PCR - Normal    Influenza A PCR Negative      Influenza B PCR Negative      RSV PCR Negative      SARS CoV2 PCR Negative         Imaging   XR Chest 2 Views   Preliminary Result   IMPRESSION: Mild opacities in the left lower lung may be related to   atelectasis or infection. The right lung is clear. No pleural   effusions. The heart size is stable. Pulmonary vascularity is within   normal limits.          EKG  ECG results from 09/16/24    EKG 12-lead, tracing only     Value    Systolic Blood Pressure     Diastolic Blood Pressure     Ventricular Rate 70    Atrial Rate 70    AK Interval 152    QRS Duration 88        QTc 421    P Axis 31    R AXIS -27    T Axis 13    Interpretation ECG      Sinus rhythm  Nonspecific ST abnormality  Abnormal ECG  When compared with ECG of 14-Sep-2024 19:59,  No significant change was found  Taken at 1258         Independent Interpretation   See ED Course below    ED Course      Medications Administered   Medications - No data to display    Procedures   Procedures   None performed    Discussion of Management   See ED Course below    Social Determinants of Health adding to complexity of care   None.      ED Course   Independent Interpretation / Discussion of Management / Repeat Assessments  ED Course as of 09/16/24 1419   Mon Sep 16, 2024   1149 I obtained history and examined the patient as noted above.    1322 XR Chest 2 Views  I independently interpreted the patient's chest x-ray; possible left lower lobe infiltrate     1337 I rechecked the patient and explained findings.        Medical Decision Making / Diagnosis     CMS Diagnoses: None    MIPS       None    MDM   Patient presenting with shortness of breath.  Vital signs initially notable for very mild hypoxemia but on my assessment this is normalized.  Considered differential including COPD exacerbation, CHF exacerbation, ACS, pneumonia, COVID among others.  Work appears reassuring.  Can rule out pulmonary embolism using age-adjusted D-dimer.  Troponin is within patient's normal range, and unchanged from 2 days ago, with EKG having no acute appearing ischemic changes.  Patient has no leukocytosis.  Do appreciate some mild rhonchi and scattered wheeze on exam.  Chest x-ray does show possible left lower lobe infiltrate.  Do not feel that this is strong evidence of pneumonia but given patient's history of obstructive lung disease [unclear whether asthma or COPD  per previous testing] and reassuring other workup, will plan for treatment with burst of steroids and antibiotics.  We did ambulate the patient and the patient was not hypoxemic with ambulation.  She was able to tolerate walking well using a walker, which is at baseline.  Patient's son and the patient both felt comfortable with the plan.  Recommended follow-up with primary care clinician in 1 week for reevaluation.  Return precautions were provided.         Disposition   The patient was discharged.     Diagnosis     ICD-10-CM    1. SOB (shortness of breath)  R06.02       2. MAGALIS (obstructive sleep apnea)  G47.33       3. Congestive heart failure, unspecified HF chronicity, unspecified heart failure type (H)  I50.9            Discharge Medications   Discharge Medication List as of 9/16/2024  1:46 PM        START taking these medications    Details   doxycycline hyclate (VIBRAMYCIN) 100 MG capsule Take 1 capsule (100 mg) by mouth 2 times daily for 5 days., Disp-10 capsule, R-0, E-Prescribe            Scribe Disclosure:  ILexie, am serving as a scribe at 12:00 PM on 9/16/2024 to document services personally performed by Salvador Polo MD based on my observations and the provider's statements to me.     Salvador Polo MD  09/16/24 [id number]

## 2024-09-16 NOTE — ED TRIAGE NOTES
Pt presents for complaint of chest pain and shortness of breath on going for a few days. Family noted this morning that she had an episode of oxygen saturations <90%. Seen this weekend for leg swelling. ABC intact. A&Ox4.     Triage Assessment (Adult)       Row Name 09/16/24 1134          Triage Assessment    Airway WDL WDL        Respiratory WDL    Respiratory WDL WDL        Skin Circulation/Temperature WDL    Skin Circulation/Temperature WDL WDL        Cardiac WDL    Cardiac WDL WDL        Peripheral/Neurovascular WDL    Peripheral Neurovascular WDL WDL        Cognitive/Neuro/Behavioral WDL    Cognitive/Neuro/Behavioral WDL WDL

## 2024-09-21 ENCOUNTER — APPOINTMENT (OUTPATIENT)
Dept: ULTRASOUND IMAGING | Facility: CLINIC | Age: 74
End: 2024-09-21
Attending: PHYSICIAN ASSISTANT
Payer: COMMERCIAL

## 2024-09-21 ENCOUNTER — HOSPITAL ENCOUNTER (EMERGENCY)
Facility: CLINIC | Age: 74
Discharge: HOME | End: 2024-09-21
Attending: PHYSICIAN ASSISTANT | Admitting: PHYSICIAN ASSISTANT
Payer: COMMERCIAL

## 2024-09-21 VITALS
BODY MASS INDEX: 31.65 KG/M2 | OXYGEN SATURATION: 100 % | TEMPERATURE: 98.1 F | WEIGHT: 157 LBS | DIASTOLIC BLOOD PRESSURE: 83 MMHG | HEART RATE: 64 BPM | SYSTOLIC BLOOD PRESSURE: 158 MMHG | RESPIRATION RATE: 26 BRPM | HEIGHT: 59 IN

## 2024-09-21 DIAGNOSIS — R73.9 STEROID-INDUCED HYPERGLYCEMIA: ICD-10-CM

## 2024-09-21 DIAGNOSIS — R93.89 ENDOMETRIAL THICKENING ON ULTRASOUND: ICD-10-CM

## 2024-09-21 DIAGNOSIS — T38.0X5A STEROID-INDUCED HYPERGLYCEMIA: ICD-10-CM

## 2024-09-21 DIAGNOSIS — N93.9 VAGINAL BLEEDING: ICD-10-CM

## 2024-09-21 LAB
ALBUMIN SERPL BCG-MCNC: 3.8 G/DL (ref 3.5–5.2)
ALP SERPL-CCNC: 82 U/L (ref 40–150)
ALT SERPL W P-5'-P-CCNC: 14 U/L (ref 0–50)
ANION GAP SERPL CALCULATED.3IONS-SCNC: 14 MMOL/L (ref 7–15)
AST SERPL W P-5'-P-CCNC: 11 U/L (ref 0–45)
BASOPHILS # BLD AUTO: 0 10E3/UL (ref 0–0.2)
BASOPHILS NFR BLD AUTO: 0 %
BILIRUB SERPL-MCNC: 0.3 MG/DL
BUN SERPL-MCNC: 35.5 MG/DL (ref 8–23)
CALCIUM SERPL-MCNC: 10.5 MG/DL (ref 8.8–10.4)
CHLORIDE SERPL-SCNC: 96 MMOL/L (ref 98–107)
CLUE CELLS: NORMAL
CREAT SERPL-MCNC: 0.9 MG/DL (ref 0.51–0.95)
EGFRCR SERPLBLD CKD-EPI 2021: 66 ML/MIN/1.73M2
EOSINOPHIL # BLD AUTO: 0.1 10E3/UL (ref 0–0.7)
EOSINOPHIL NFR BLD AUTO: 1 %
ERYTHROCYTE [DISTWIDTH] IN BLOOD BY AUTOMATED COUNT: 14 % (ref 10–15)
GLUCOSE SERPL-MCNC: 346 MG/DL (ref 70–99)
HCO3 SERPL-SCNC: 29 MMOL/L (ref 22–29)
HCT VFR BLD AUTO: 37.2 % (ref 35–47)
HGB BLD-MCNC: 12 G/DL (ref 11.7–15.7)
IMM GRANULOCYTES # BLD: 0 10E3/UL
IMM GRANULOCYTES NFR BLD: 0 %
LYMPHOCYTES # BLD AUTO: 2.5 10E3/UL (ref 0.8–5.3)
LYMPHOCYTES NFR BLD AUTO: 25 %
MCH RBC QN AUTO: 28.8 PG (ref 26.5–33)
MCHC RBC AUTO-ENTMCNC: 32.3 G/DL (ref 31.5–36.5)
MCV RBC AUTO: 89 FL (ref 78–100)
MONOCYTES # BLD AUTO: 0.6 10E3/UL (ref 0–1.3)
MONOCYTES NFR BLD AUTO: 6 %
NEUTROPHILS # BLD AUTO: 6.9 10E3/UL (ref 1.6–8.3)
NEUTROPHILS NFR BLD AUTO: 68 %
NRBC # BLD AUTO: 0 10E3/UL
NRBC BLD AUTO-RTO: 0 /100
PLATELET # BLD AUTO: 291 10E3/UL (ref 150–450)
POTASSIUM SERPL-SCNC: 3.4 MMOL/L (ref 3.4–5.3)
PROT SERPL-MCNC: 6.9 G/DL (ref 6.4–8.3)
RBC # BLD AUTO: 4.16 10E6/UL (ref 3.8–5.2)
SODIUM SERPL-SCNC: 139 MMOL/L (ref 135–145)
TRICHOMONAS, WET PREP: NORMAL
WBC # BLD AUTO: 10.1 10E3/UL (ref 4–11)
WBC'S/HIGH POWER FIELD, WET PREP: NORMAL
YEAST, WET PREP: NORMAL

## 2024-09-21 PROCEDURE — 87210 SMEAR WET MOUNT SALINE/INK: CPT | Performed by: PHYSICIAN ASSISTANT

## 2024-09-21 PROCEDURE — 99284 EMERGENCY DEPT VISIT MOD MDM: CPT | Mod: 25 | Performed by: PHYSICIAN ASSISTANT

## 2024-09-21 PROCEDURE — 76856 US EXAM PELVIC COMPLETE: CPT

## 2024-09-21 PROCEDURE — 82040 ASSAY OF SERUM ALBUMIN: CPT | Performed by: PHYSICIAN ASSISTANT

## 2024-09-21 PROCEDURE — 36415 COLL VENOUS BLD VENIPUNCTURE: CPT | Performed by: PHYSICIAN ASSISTANT

## 2024-09-21 PROCEDURE — 85025 COMPLETE CBC W/AUTO DIFF WBC: CPT | Performed by: PHYSICIAN ASSISTANT

## 2024-09-21 ASSESSMENT — ACTIVITIES OF DAILY LIVING (ADL)
ADLS_ACUITY_SCORE: 37
ADLS_ACUITY_SCORE: 39
ADLS_ACUITY_SCORE: 37
ADLS_ACUITY_SCORE: 37

## 2024-09-21 ASSESSMENT — COLUMBIA-SUICIDE SEVERITY RATING SCALE - C-SSRS
1. IN THE PAST MONTH, HAVE YOU WISHED YOU WERE DEAD OR WISHED YOU COULD GO TO SLEEP AND NOT WAKE UP?: NO
2. HAVE YOU ACTUALLY HAD ANY THOUGHTS OF KILLING YOURSELF IN THE PAST MONTH?: NO
6. HAVE YOU EVER DONE ANYTHING, STARTED TO DO ANYTHING, OR PREPARED TO DO ANYTHING TO END YOUR LIFE?: NO

## 2024-09-21 NOTE — DISCHARGE INSTRUCTIONS
You should call on Monday to schedule an appointment with an OB/GYN for further evaluation of your vaginal bleeding and thickening of the endometrial lining seen on ultrasound.  This is not a normal finding in a postmenopausal female and requires further evaluation to rule out a cancer/malignancy.  Regarding your elevated blood sugar this is likely due to the steroids you have been on I think you can stop the prednisone at this time.    Discharge Instructions  Vaginal Bleeding    You were seen today for unusual vaginal bleeding. Heavy or irregular bleeding may be caused by many different things such as hormone changes, infection, birth control, or cancer. At this time your provider does not find that your bleeding is a sign of anything dangerous or life-threatening, and you have not lost enough blood to be dangerous.  However, sometimes the signs of serious illness do not show up right away.  If you have new or worse symptoms, you may need to be seen again in the Emergency Department or by your primary provider.      Generally, every Emergency Department visit should have a follow-up clinic visit with either a primary or a specialty clinic/provider. Please follow-up as instructed by your emergency provider today.    Return to the Emergency Department if:  You feel lightheaded or faint.  Your bleeding becomes much heavier than it is now.  You have severe cramping or abdominal (belly) pain.  You have any new or different symptoms.    Treatment:  Motrin  or Advil  (ibuprofen) can help relieve cramps and can also decrease bleeding. You may use this according to the directions on the package. Do not use a medicine that you are allergic to, or if your provider has told you not to use it.  Hormone pills or birth control pills are occasionally prescribed to help control the bleeding but often this is left to an OB/GYN provider. These can cause problems if you have a history of blood clots or stroke, so tell your provider if  you have these problems before you leave.  Iron tablets may be recommended if you have anemia (low blood count.) Iron can cause constipation, so be sure to have plenty of fiber in your diet and let your provider know if you have problems.  Drinking plenty of fluid is important. Be sure to drink extra water or other healthy drinks.  If you were given a prescription for medicine here today, be sure to read all of the information (including the package insert) that comes with your prescription.  This will include important information about the medicine, its side effects, and any warnings that you need to know about.  The pharmacist who fills the prescription can provide more information and answer questions you may have about the medicine.  If you have questions or concerns that the pharmacist cannot address, please call or return to the Emergency Department.     Remember that you can always come back to the Emergency Department if you are not able to see your regular provider in the amount of time listed above, if you get any new symptoms, or if there is anything that worries you.  Discharge Instructions  Hyperglycemia, High Blood Sugar    Today we found your blood sugar (glucose) was high. This may mean that you have developed diabetes, or if you already know that you have diabetes, it may mean that your diabetes is not as well controlled as it should be. Sometimes blood sugar can be high temporarily and it is not diabetes. Signs of elevated blood sugar include increased thirst, frequent urination (peeing), blurred vision, fatigue, unexplained weight loss, poor wound healing, and frequent infections.    We sometimes give medicine in the Emergency Department to lower the blood sugar. We may also prescribe medicine for you to use at home, or increase the medicine that you already take. While we do not like to see your blood sugar high, it is much more dangerous to let your blood sugar get too low, so it is reasonable  to take time to bring it down, or to wait and watch to see if it comes down on its own.    Generally, every Emergency Department visit should have a follow-up clinic visit with either a primary or a specialty clinic/provider. Please follow-up as instructed by your emergency provider today.     Return to the Emergency Department if you develop:  Vomiting (throwing up).  Confusion, disorientation, or being unable to wake up.  Severe weakness or illness.  Abdominal (belly) pain.    What can I do to help myself?  Check your blood sugar as instructed by your provider.  Take medications prescribed by your provider.  Follow a diabetic diet (low fat, low concentrated sweets, high fiber).  Exercise regularly.  Moderate or eliminate alcohol use.  Stop smoking.    Diabetes: Diabetes mellitus is a disease in which the body cannot regulate the amount of sugar (glucose) in the blood. Insulin allows glucose to move out of the blood into cells throughout the body where it is used for fuel. People with diabetes do not produce enough insulin (type 1 diabetes), or cannot use insulin properly (type 2 diabetes), or both. This starves the cells that need the glucose for fuel, and also harms certain organs and tissues exposed to the high glucose levels.  Over a long period of time, uncontrolled diabetes can lead to heart and blood vessel disease, blindness, kidney failure, foot ulcers and many other problems.          About 17 million Americans (6.2% of adults) have diabetes. We think that about one third of adults with diabetes do not know they have diabetes.  The incidence of diabetes is increasing rapidly. This increase is due to many factors, but the most significant are our increasing weight and decreased activity levels.     Diabetes can be a very serious and life-threatening illness if not treated.  If you were given a prescription for medicine here today, be sure to read all of the information (including the package insert) that  comes with your prescription.  This will include important information about the medicine, its side effects, and any warnings that you need to know about.  The pharmacist who fills the prescription can provide more information and answer questions you may have about the medicine.  If you have questions or concerns that the pharmacist cannot address, please call or return to the Emergency Department.   Remember that you can always come back to the Emergency Department if you are not able to see your regular provider in the amount of time listed above, if you get any new symptoms, or if there is anything that worries you.

## 2024-09-21 NOTE — ED TRIAGE NOTES
Pt presents to the ED with her son who is caregiver for pt and states pt has light vaginal bleeding. On Monday pt bled for 2 days and then it stopped. Pt ha been bleeding again for 2 days. Son has picture. Pt denies pain.

## 2024-09-22 NOTE — ED PROVIDER NOTES
Emergency Department Note      History of Present Illness     Chief Complaint   Vaginal Bleeding      HPI Patient speaks west  language and has memory impairment and is [place].  Son and daughter translation.  They and pt decline formal .  Pilo Quintana is a 74 year old female with history of type II DM and memory impairment among others who presents for evaluation of vaginal bleeding.  Patient and son state that she has had some light vaginal bleeding and streaking over the last 2 days.  No passage of large clots or heavy bleeding and it is intermittent and not continuous.  No abdominal or pelvic pain but does have a little bit of vaginal itching.  No fever.  No rectal bleeding or melena or hematuria/dysuria.  No known injury.  Is not on blood thinners and feels otherwise well.  Was recently finishing up a steroid burst for COPD exacerbation/atypical pneumonia and has 1 day left.  They do have way to monitor her blood sugar at home.  There has not been any vomiting and she is feeling much better from that standpoint without shortness of breath chest pain or breathing difficulties/dizziness.    Independent Historian   Son as detailed above.    Review of External Notes   Reviewed [facility] note from 9/18 note pt taking prednisone for asthma exacerbation.  ALso note hx type 2 dm on metformin w/glucometer.    Past Medical History     Medical History and Problem List   Past Medical History:   Diagnosis Date    Diabetes (H)     Hypertension     Morbid obesity (H)     Osteoarthritis 6/7/2003    PMB (postmenopausal bleeding) 9/16/2014    Renal mass     Thickened endometrium 9/16/2014    Uncomplicated asthma     Unspecified cerebral artery occlusion with cerebral infarction 1998       Medications   acetaminophen (TYLENOL) 500 MG tablet  albuterol (PROAIR HFA/PROVENTIL HFA/VENTOLIN HFA) 108 (90 Base) MCG/ACT inhaler  albuterol (PROVENTIL) (2.5 MG/3ML) 0.083% neb solution  amLODIPine (NORVASC) 5 MG  "tablet  aspirin (ASA) 81 MG EC tablet  atorvastatin (LIPITOR) 40 MG tablet  budesonide-formoterol (SYMBICORT) 160-4.5 MCG/ACT Inhaler  Cholecalciferol (VITAMIN D3) 50 MCG (2000 UT) CAPS  doxycycline hyclate (VIBRAMYCIN) 100 MG capsule  furosemide (LASIX) 40 MG tablet  ipratropium - albuterol 0.5 mg/2.5 mg/3 mL (DUONEB) 0.5-2.5 (3) MG/3ML neb solution  losartan (COZAAR) 100 MG tablet  metFORMIN (GLUCOPHAGE XR) 500 MG 24 hr tablet  metoprolol succinate ER (TOPROL XL) 50 MG 24 hr tablet  nystatin (NYSTOP) 114792 UNIT/GM external powder  predniSONE (DELTASONE) 20 MG tablet  triamcinolone (KENALOG) 0.1 % external ointment        Surgical History   Past Surgical History:   Procedure Laterality Date    DILATION AND CURETTAGE, OPERATIVE HYSTEROSCOPY WITH MORCELLATOR, COMBINED N/A 9/25/2014    Procedure: COMBINED DILATION AND CURETTAGE, OPERATIVE HYSTEROSCOPY WITH MORCELLATOR;  Surgeon: Silverio Christy MD;  Location:  OR       Physical Exam     Patient Vitals for the past 24 hrs:   BP Temp Temp src Pulse Resp SpO2 Height Weight   09/21/24 1715 (!) 158/83 -- -- 64 -- 100 % -- --   09/21/24 1339 (!) 150/86 98.1  F (36.7  C) Temporal 82 26 98 % 1.499 m (4' 11\") 71.2 kg (157 lb)     Physical Exam  General: Awake, alert, non-toxic.  Head:  Scalp is NC/AT  Eyes:  Conjunctiva normal, PERRL  ENT:  The external nose and ears are normal.   Neck:  Normal range of motion without rigidity.  CV:  Regular rate and rhythm    No pathologic murmur, rubs, or gallops.  Resp:  Breath sounds are clear bilaterally    Non-labored, no retractions or accessory muscle use  Abdomen: Abdomen is soft, no distension, no tenderness, no masses. No CVA tenderness.  Pelvic:  Normal external genitalia.  No external vulvovaginal lesions or discharge.  No bleeding from introitus.  No rectal bleeding.  Internal/speculum exam deferred due to body habitus pt preference (Performed in presence of EDT Ab[place] female chaparone)    MS:  No lower extremity " edema/swelling.   Skin:  Warm and dry, No rash or lesions noted.  Neuro:  Limited use of extremities (baseline per pt/family)  No facial asymmetry. Gait normal.  Psych:  Awake. Alert. Normal affect. Appropriate interactions.      Diagnostics     Lab Results   Labs Ordered and Resulted from Time of ED Arrival to Time of ED Departure   COMPREHENSIVE METABOLIC PANEL - Abnormal       Result Value    Sodium 139      Potassium 3.4      Carbon Dioxide (CO2) 29      Anion Gap 14      Urea Nitrogen 35.5 (*)     Creatinine 0.90      GFR Estimate 66      Calcium 10.5 (*)     Chloride 96 (*)     Glucose 346 (*)     Alkaline Phosphatase 82      AST 11      ALT 14      Protein Total 6.9      Albumin 3.8      Bilirubin Total 0.3     WET PREPARATION - Normal    Trichomonas Absent      Yeast Absent      Clue Cells Absent      WBCs/high power field None     CBC WITH PLATELETS AND DIFFERENTIAL    WBC Count 10.1      RBC Count 4.16      Hemoglobin 12.0      Hematocrit 37.2      MCV 89      MCH 28.8      MCHC 32.3      RDW 14.0      Platelet Count 291      % Neutrophils 68      % Lymphocytes 25      % Monocytes 6      % Eosinophils 1      % Basophils 0      % Immature Granulocytes 0      NRBCs per 100 WBC 0      Absolute Neutrophils 6.9      Absolute Lymphocytes 2.5      Absolute Monocytes 0.6      Absolute Eosinophils 0.1      Absolute Basophils 0.0      Absolute Immature Granulocytes 0.0      Absolute NRBCs 0.0         Imaging   US Pelvis Complete without Transvaginal   Final Result   IMPRESSION:   1.  Endometrium is prominent measuring 5 mm in a postmenopausal woman. This is not well characterized due to nontolerance of transvaginal probe. Neither ovary is visualized.                 Independent Interpretation   None    ED Course      Medications Administered   Medications - No data to display    Procedures   Procedures     Discussion of Management   None    ED Course        Additional Documentation  None    Medical Decision Making /  Diagnosis     CMS Diagnoses: None    MIPS       None    MDM   Pilo Quintana is a 74 year old female who presents for evaluation of about 2 days of light intermittent vaginal bleeding postmenopausally.  No active bleeding currently on exam and vitally stable labs show normal hemoglobin and platelets no evidence of generalized coagulopathy.  Externally there is no evidence of vulvovaginitis or other lesion and nothing clinically or exam lysed to suggest hematuria or GI bleed.  She is not on thinners.  Ultrasound does show some thickening of the endometrium and obviously I discussed these findings with the patient and family and explained that this is abnormal and requires follow-up with OB/GYN for further evaluation and to exclude malignancy.  They expressed an understanding of this and will call on Monday.  Do not suspect nonaccidental trauma or abuse and lives with family.  I think she is safe for discharge to home at this time.  Wet prep also negative.  Return precautions discussed for increased or heavier bleeding, dizziness, shortness of breath, abdominal or pelvic pain, weakness dizziness, fever or other new or worsening symptoms or concerns.    Disposition   The patient was discharged.     Diagnosis     ICD-10-CM    1. Vaginal bleeding  N93.9     post menopausal      2. Endometrial thickening on ultrasound  R93.89       3. Steroid-induced hyperglycemia  R73.9     [id number]            Discharge Medications   Discharge Medication List as of 9/21/2024  5:21 PM             Juan Ramon Hawkins PA-C  09/21/24 2036

## 2024-10-01 ENCOUNTER — VIRTUAL VISIT (OUTPATIENT)
Dept: NEUROSURGERY | Facility: CLINIC | Age: 74
End: 2024-10-01
Payer: COMMERCIAL

## 2024-10-01 DIAGNOSIS — I67.1 CEREBRAL ANEURYSM, NONRUPTURED: Primary | ICD-10-CM

## 2024-10-01 PROCEDURE — 99213 OFFICE O/P EST LOW 20 MIN: CPT | Mod: 93 | Performed by: RADIOLOGY

## 2024-10-01 NOTE — LETTER
"10/1/2024       RE: Pilo Quintana  [address] [address]     Dear Colleague,    Thank you for referring your patient, Pilo Quintana, to the [facility] NEUROSURGERY CLINIC [place] at [facility]. Please see a copy of my visit note below.                                                                         [facility] NEUROSURGERY [facility]IN[facility] [place]  [address]  [address]RD FLO[address]  [facility] [address]  Phone: [phone]  Fax: [phone]    Neuro-interventional clinic progress note:    Reason for clinic visit: Right MCA aneurysm      History of present Illness: Pilo Quintana is a \"73\" year old (more likely mid 80s per family) female patient with history of HTN, DMII, and stroke who presents to Neuro IR clinic for evaluation of a 1x2 mm right MCA bifurcation aneurysm. This was incidentally diagnosed in the process of evaluation for right hemiparesis and new diagnosis of a pontine hemorrhage on 5/11/23. She also presented to the ED last night for sharp left parietal headache, and imaging was stable. She was quite hypertensive there and again in clinic this morning, despite taking her BP meds as prescribed. Her son says she gets worked up, worrying about her medical condition and the care she now requires, and that due to her forgetfulness, her multiple children who care for him, but primarily he, performs q30 minute checks on her and reminds her to relax and let them care for her.    Interval history:  She has been doing well,she had some vaginal bleeding and is being evaluated by OB/gyn. Her blood pressure is 118-130s. SH eis accompanied by her son, Balwinder who provides collateral history.     Past Medical History:  Past Medical History:   Diagnosis Date     Diabetes (H)      Hypertension      Morbid obesity (H)      Osteoarthritis 6/7/2003     PMB (postmenopausal bleeding) 9/16/2014     Renal mass      Thickened endometrium " 9/16/2014     Uncomplicated asthma      Unspecified cerebral artery occlusion with cerebral infarction 1998    was in [place], no residual at present       Past Surgical History:  Past Surgical History:   Procedure Laterality Date     DILATION AND CURETTAGE, OPERATIVE HYSTEROSCOPY WITH MORCELLATOR, COMBINED N/A 9/25/2014    Procedure: COMBINED DILATION AND CURETTAGE, OPERATIVE HYSTEROSCOPY WITH MORCELLATOR;  Surgeon: Silverio Christy MD;  Location:  OR       Social History:  Social History     Socioeconomic History     Marital status:      Spouse name: Not on file     Number of children: 8     Years of education: 0     Highest education level: Not on file   Occupational History     Not on file   Tobacco Use     Smoking status: Never     Smokeless tobacco: Never   Substance and Sexual Activity     Alcohol use: No     Drug use: No     Sexual activity: Not on file   Other Topics Concern     Not on file   Social History Narrative     Not on file     Social Determinants of Health     Financial Resource Strain: Not on file   Food Insecurity: Not on file   Transportation Needs: Not on file   Physical Activity: Not on file   Stress: Not on file   Social Connections: Not on file   Interpersonal Safety: Not on file   Housing Stability: Not on file       Family History:  No family history on file.    Home Medications:  Current Outpatient Medications   Medication Sig Dispense Refill     acetaminophen (TYLENOL) 500 MG tablet Take 500-1,000 mg by mouth every 8 hours as needed for mild pain       albuterol (PROAIR HFA/PROVENTIL HFA/VENTOLIN HFA) 108 (90 Base) MCG/ACT inhaler Inhale 2 puffs into the lungs every 4 hours as needed for wheezing 18 g 0     albuterol (PROVENTIL) (2.5 MG/3ML) 0.083% neb solution Take 1 vial (2.5 mg) by nebulization every 6 hours as needed for shortness of breath, wheezing or cough 90 mL 0     amLODIPine (NORVASC) 5 MG tablet Take 5 mg by mouth daily       aspirin (ASA) 81 MG EC tablet Take 1  tablet (81 mg) by mouth daily 30 tablet 11     atorvastatin (LIPITOR) 40 MG tablet Take 1 tablet (40 mg) by mouth every evening 30 tablet 4     budesonide-formoterol (SYMBICORT) 160-4.5 MCG/ACT Inhaler Inhale 2 puffs into the lungs two times daily       Cholecalciferol (VITAMIN D3) 50 MCG (2000 UT) CAPS Take 50 mcg by mouth daily       furosemide (LASIX) 40 MG tablet Take 40 mg by mouth daily       ipratropium - albuterol 0.5 mg/2.5 mg/3 mL (DUONEB) 0.5-2.5 (3) MG/3ML neb solution Take 1 vial (3 mLs) by nebulization every 6 hours as needed for shortness of breath, wheezing or cough 90 mL 0     losartan (COZAAR) 100 MG tablet Take 100 mg by mouth daily       metFORMIN (GLUCOPHAGE XR) 500 MG 24 hr tablet Take 500 mg by mouth 2 times daily (with meals)       metoprolol succinate ER (TOPROL XL) 50 MG 24 hr tablet Take 50 mg by mouth daily       nystatin (NYSTOP) 772538 UNIT/GM external powder Apply topically 2 times daily as needed       predniSONE (DELTASONE) 20 MG tablet Take two tablets (= 40mg) each day for 5 (five) days 10 tablet 0     triamcinolone (KENALOG) 0.1 % external ointment Apply topically 2 times daily as needed for irritation       No current facility-administered medications for this visit.       Allergies:  No Known Allergies    Telephone visit: Physical is limited due to telephone visit,son provides collateral history.       Laboratory findings:  Reviewed    Imaging findings:  7/ 2024: CTA : IMPRESSION:   HEAD CTA:  1.  No evidence of proximal large vessel occlusion or flow-limiting stenosis.  2.  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     NECK CTA:  1.  No evidence of hemodynamically significant stenosis based on NASCET criteria.  2.  No evidence for dissection.    Impression and Plan:  Stable right middle cerebral artery bifurcation aneurysm on repeat imaging, given the small size would recommend continued serial monitoring.     Plan:  -Repeat MRA 1 year  Goal normotension, <130/80 or lower if  tolerated for overall reduced cardiovascular risk. PCP to titrate enteral meds to achieve.        Carolina Castaneda MD  Endovascular Surgical Neuroradiology Fellow  [facility]  [phone]    Endovascular Surgical Neuroradiology staff is Dr. Lucia        Attestation signed by Gio Lucia MD at 10/9/2024  4:33 PM:  I agree with the above note by Dr. Castaneda         on  10/01/24      Again, thank you for allowing me to participate in the care of your patient.      Sincerely,    Gio Lucia MD

## 2024-10-01 NOTE — PATIENT INSTRUCTIONS
Please follow-up with Dr. Lucia in 1 year with MRA prior.     Stroke & Endovascular RN Care Coordinators:    Ksenia High, RN, BSN  Milly Fry, RN, CNRN, SCRN    If you have any questions please contact the RN Care Coordinators at [phone], option 1.    After business hours call the  at [phone] and have the Neuro-Interventional Fellow paged.    Thank you for choosing [facility] for your health care needs.

## 2024-10-01 NOTE — PROGRESS NOTES
"[facility] NEUROSURGERY CLINIC [address]  3RD FLOOR  [facility] [address]  Phone: [phone]  Fax: [phone]    Neuro-interventional clinic progress note:    Reason for clinic visit: Right MCA aneurysm      History of present Illness: Pilo Quintana is a \"73\" year old (more likely mid 80s per family) female patient with history of HTN, DMII, and stroke who presents to Neuro IR clinic for evaluation of a 1x2 mm right MCA bifurcation aneurysm. This was incidentally diagnosed in the process of evaluation for right hemiparesis and new diagnosis of a pontine hemorrhage on 5/11/23. She also presented to the ED last night for sharp left parietal headache, and imaging was stable. She was quite hypertensive there and again in clinic this morning, despite taking her BP meds as prescribed. Her son says she gets worked up, worrying about her medical condition and the care she now requires, and that due to her forgetfulness, her multiple children who care for him, but primarily he, performs q30 minute checks on her and reminds her to relax and let them care for her.    Interval history:  She has been doing well,she had some vaginal bleeding and is being evaluated by OB/gyn. Her blood pressure is 118-130s. SH eis accompanied by her son, Balwinder who provides collateral history.     Past Medical History:  Past Medical History:   Diagnosis Date    Diabetes (H)     Hypertension     Morbid obesity (H)     Osteoarthritis 6/7/2003    PMB (postmenopausal bleeding) 9/16/2014    Renal mass     Thickened endometrium 9/16/2014    Uncomplicated asthma     Unspecified cerebral artery occlusion with cerebral infarction 1998    was in [place], no residual at present       Past Surgical History:  Past Surgical History:   Procedure Laterality Date    DILATION AND CURETTAGE, OPERATIVE HYSTEROSCOPY WITH MORCELLATOR, COMBINED N/A 9/25/2014    " Procedure: COMBINED DILATION AND CURETTAGE, OPERATIVE HYSTEROSCOPY WITH MORCELLATOR;  Surgeon: Silverio Christy MD;  Location: [facility] OR       Social History:  Social History     Socioeconomic History    Marital status:      Spouse name: Not on file    Number of children: 8    Years of education: 0    Highest education level: Not on file   Occupational History    Not on file   Tobacco Use    Smoking status: Never    Smokeless tobacco: Never   Substance and Sexual Activity    Alcohol use: No    Drug use: No    Sexual activity: Not on file   Other Topics Concern    Not on file   Social History Narrative    Not on file     Social Determinants of Health     Financial Resource Strain: Not on file   Food Insecurity: Not on file   Transportation Needs: Not on file   Physical Activity: Not on file   Stress: Not on file   Social Connections: Not on file   Interpersonal Safety: Not on file   Housing Stability: Not on file       Family History:  No family history on file.    Home Medications:  Current Outpatient Medications   Medication Sig Dispense Refill    acetaminophen (TYLENOL) 500 MG tablet Take 500-1,000 mg by mouth every 8 hours as needed for mild pain      albuterol (PROAIR HFA/PROVENTIL HFA/VENTOLIN HFA) 108 (90 Base) MCG/ACT inhaler Inhale 2 puffs into the lungs every 4 hours as needed for wheezing 18 g 0    albuterol (PROVENTIL) (2.5 MG/3ML) 0.083% neb solution Take 1 vial (2.5 mg) by nebulization every 6 hours as needed for shortness of breath, wheezing or cough 90 mL 0    amLODIPine (NORVASC) 5 MG tablet Take 5 mg by mouth daily      aspirin (ASA) 81 MG EC tablet Take 1 tablet (81 mg) by mouth daily 30 tablet 11    atorvastatin (LIPITOR) 40 MG tablet Take 1 tablet (40 mg) by mouth every evening 30 tablet 4    budesonide-formoterol (SYMBICORT) 160-4.5 MCG/ACT Inhaler Inhale 2 puffs into the lungs two times daily      Cholecalciferol (VITAMIN D3) 50 MCG (2000 UT) CAPS Take 50 mcg by mouth daily      furosemide  (LASIX) 40 MG tablet Take 40 mg by mouth daily      ipratropium - albuterol 0.5 mg/2.5 mg/3 mL (DUONEB) 0.5-2.5 (3) MG/3ML neb solution Take 1 vial (3 mLs) by nebulization every 6 hours as needed for shortness of breath, wheezing or cough 90 mL 0    losartan (COZAAR) 100 MG tablet Take 100 mg by mouth daily      metFORMIN (GLUCOPHAGE XR) 500 MG 24 hr tablet Take 500 mg by mouth 2 times daily (with meals)      metoprolol succinate ER (TOPROL XL) 50 MG 24 hr tablet Take 50 mg by mouth daily      nystatin (NYSTOP) [id number] UNIT/GM external powder Apply topically 2 times daily as needed      predniSONE (DELTASONE) 20 MG tablet Take two tablets (= 40mg) each day for 5 (five) days 10 tablet 0    triamcinolone (KENALOG) 0.1 % external ointment Apply topically 2 times daily as needed for irritation       No current facility-administered medications for this visit.       Allergies:  No Known Allergies    Telephone visit: Physical is limited due to telephone visit,son provides collateral history.       Laboratory findings:  Reviewed    Imaging findings:  7/ 2024: CTA : IMPRESSION:   HEAD CTA:  1.  No evidence of proximal large vessel occlusion or flow-limiting stenosis.  2.  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     NECK CTA:  1.  No evidence of hemodynamically significant stenosis based on NASCET criteria.  2.  No evidence for dissection.    Impression and Plan:  Stable right middle cerebral artery bifurcation aneurysm on repeat imaging, given the small size would recommend continued serial monitoring.     Plan:  -Repeat MRA 1 year  Goal normotension, <130/80 or lower if tolerated for overall reduced cardiovascular risk. PCP to titrate enteral meds to achieve.        Carolina Castaneda MD  Endovascular Surgical Neuroradiology Fellow  [facility]  [phone]    Endovascular Surgical Neuroradiology staff is Dr. Lucia

## 2024-11-03 ENCOUNTER — HOSPITAL ENCOUNTER (EMERGENCY)
Facility: CLINIC | Age: 74
Discharge: HOME | End: 2024-11-03
Attending: EMERGENCY MEDICINE | Admitting: EMERGENCY MEDICINE
Payer: COMMERCIAL

## 2024-11-03 VITALS
DIASTOLIC BLOOD PRESSURE: 88 MMHG | RESPIRATION RATE: 22 BRPM | OXYGEN SATURATION: 97 % | SYSTOLIC BLOOD PRESSURE: 168 MMHG | HEART RATE: 70 BPM | TEMPERATURE: 99.2 F

## 2024-11-03 DIAGNOSIS — J44.1 COPD EXACERBATION (H): ICD-10-CM

## 2024-11-03 LAB
ANION GAP SERPL CALCULATED.3IONS-SCNC: 16 MMOL/L (ref 7–15)
BASOPHILS # BLD AUTO: 0 10E3/UL (ref 0–0.2)
BASOPHILS NFR BLD AUTO: 0 %
BUN SERPL-MCNC: 25.7 MG/DL (ref 8–23)
CALCIUM SERPL-MCNC: 10.8 MG/DL (ref 8.8–10.4)
CHLORIDE SERPL-SCNC: 99 MMOL/L (ref 98–107)
CREAT SERPL-MCNC: 0.9 MG/DL (ref 0.51–0.95)
EGFRCR SERPLBLD CKD-EPI 2021: 66 ML/MIN/1.73M2
EOSINOPHIL # BLD AUTO: 0.2 10E3/UL (ref 0–0.7)
EOSINOPHIL NFR BLD AUTO: 2 %
ERYTHROCYTE [DISTWIDTH] IN BLOOD BY AUTOMATED COUNT: 13.6 % (ref 10–15)
FLUAV RNA SPEC QL NAA+PROBE: NEGATIVE
FLUBV RNA RESP QL NAA+PROBE: NEGATIVE
GLUCOSE SERPL-MCNC: 120 MG/DL (ref 70–99)
HCO3 SERPL-SCNC: 27 MMOL/L (ref 22–29)
HCT VFR BLD AUTO: 40.5 % (ref 35–47)
HGB BLD-MCNC: 12.5 G/DL (ref 11.7–15.7)
IMM GRANULOCYTES # BLD: 0 10E3/UL
IMM GRANULOCYTES NFR BLD: 0 %
LYMPHOCYTES # BLD AUTO: 2.5 10E3/UL (ref 0.8–5.3)
LYMPHOCYTES NFR BLD AUTO: 27 %
MCH RBC QN AUTO: 28.2 PG (ref 26.5–33)
MCHC RBC AUTO-ENTMCNC: 30.9 G/DL (ref 31.5–36.5)
MCV RBC AUTO: 91 FL (ref 78–100)
MONOCYTES # BLD AUTO: 0.5 10E3/UL (ref 0–1.3)
MONOCYTES NFR BLD AUTO: 6 %
NEUTROPHILS # BLD AUTO: 5.8 10E3/UL (ref 1.6–8.3)
NEUTROPHILS NFR BLD AUTO: 64 %
NRBC # BLD AUTO: 0 10E3/UL
NRBC BLD AUTO-RTO: 0 /100
PLATELET # BLD AUTO: 288 10E3/UL (ref 150–450)
POTASSIUM SERPL-SCNC: 4 MMOL/L (ref 3.4–5.3)
RBC # BLD AUTO: 4.44 10E6/UL (ref 3.8–5.2)
RSV RNA SPEC NAA+PROBE: NEGATIVE
SARS-COV-2 RNA RESP QL NAA+PROBE: NEGATIVE
SODIUM SERPL-SCNC: 142 MMOL/L (ref 135–145)
WBC # BLD AUTO: 9 10E3/UL (ref 4–11)

## 2024-11-03 PROCEDURE — 87637 SARSCOV2&INF A&B&RSV AMP PRB: CPT | Performed by: EMERGENCY MEDICINE

## 2024-11-03 PROCEDURE — 85025 COMPLETE CBC W/AUTO DIFF WBC: CPT | Performed by: EMERGENCY MEDICINE

## 2024-11-03 PROCEDURE — 99284 EMERGENCY DEPT VISIT MOD MDM: CPT

## 2024-11-03 PROCEDURE — 36415 COLL VENOUS BLD VENIPUNCTURE: CPT | Performed by: STUDENT IN AN ORGANIZED HEALTH CARE EDUCATION/TRAINING PROGRAM

## 2024-11-03 PROCEDURE — 80048 BASIC METABOLIC PNL TOTAL CA: CPT | Performed by: EMERGENCY MEDICINE

## 2024-11-03 PROCEDURE — 87637 SARSCOV2&INF A&B&RSV AMP PRB: CPT | Performed by: STUDENT IN AN ORGANIZED HEALTH CARE EDUCATION/TRAINING PROGRAM

## 2024-11-03 PROCEDURE — 80048 BASIC METABOLIC PNL TOTAL CA: CPT | Performed by: STUDENT IN AN ORGANIZED HEALTH CARE EDUCATION/TRAINING PROGRAM

## 2024-11-03 PROCEDURE — 85004 AUTOMATED DIFF WBC COUNT: CPT | Performed by: STUDENT IN AN ORGANIZED HEALTH CARE EDUCATION/TRAINING PROGRAM

## 2024-11-03 RX ORDER — PREDNISONE 10 MG/1
TABLET ORAL
Qty: 32 TABLET | Refills: 0 | Status: SHIPPED | OUTPATIENT
Start: 2024-11-03 | End: 2024-11-13

## 2024-11-03 RX ORDER — DOXYCYCLINE 100 MG/1
100 CAPSULE ORAL 2 TIMES DAILY
Qty: 20 CAPSULE | Refills: 0 | Status: SHIPPED | OUTPATIENT
Start: 2024-11-03 | End: 2024-11-13

## 2024-11-03 ASSESSMENT — ACTIVITIES OF DAILY LIVING (ADL): ADLS_ACUITY_SCORE: 0

## 2024-11-03 NOTE — ED TRIAGE NOTES
Pt here for 4 days of cough. Son accompanies patient and interprets. Son is primary caregiver. Unknown if she's had fevers. Has been using home nebs. Endorses chest pain w/ coughing.

## 2024-11-03 NOTE — ED PROVIDER NOTES
Emergency Department Note      History of Present Illness     Chief Complaint   Cough      HPI   Siattsebastian Quintana is a 74 year old female with a history of type II diabetes, stroke, asthma, and COPD who presents with her son to the Emergency Department for coughing. The patient reports she has been coughing for a four days and typically uses nebulizers every six hours. She denies fever or vomiting. The patient uses a walker at baseline and her son says she still has her normal strength.    Independent Historian   Son as detailed above.    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    Osteoarthritis  PMB  Renal mass  Thickened endometrium  Uncomplicated asthma  Unspecified cerebral artery occlusion with cerebral infarction  ICH  COPD    Medications   Albuterol   Amlodipine   Aspirin 81 mg   Atorvastatin   Furosemide   Losartan   Metformin   Metoprolol succinate   Prednisone   Simvastatin   Lancets     Surgical History   D and C, operative hysteroscopy with morcellator, combined   Cataract removal, bilateral   Renal biopsy, right    Physical Exam     Patient Vitals for the past 24 hrs:   BP Temp Temp src Pulse Resp SpO2   11/03/24 1052 (!) 141/97 99.2  F (37.3  C) Temporal 79 24 98 %     Physical Exam  Gen: well appearing, in no acute distress  Oral : Mucous membranes moist,   Nose: No rhinorhea  Ears: External near normal, without drainage  Eyes: periorbital tissues and sclera normal   Neck: supple, no abnormal swelling  Lungs: no tachypnea or distress, speaks full sentences. Mildly decreased breaths bilaterally, scant wheezing both sides  CV: Regular rate, regular rhythm  Abd: soft, nontender, nondistended, no rebound/guarding  Ext: no lower extremity edema  Skin: warm, dry, well perfused,  no rashes/bruising/lesions on exposed skin  Neuro: alert, no gross motor or sensory deficits,   Psych: pleasant mood, normal affect      Diagnostics     Lab Results   Labs Ordered and Resulted from Time of ED Arrival to Time of ED Departure   BASIC METABOLIC PANEL - Abnormal       Result Value    Sodium 142      Potassium 4.0      Chloride 99      Carbon Dioxide (CO2) 27      Anion Gap 16 (*)     Urea Nitrogen 25.7 (*)     Creatinine 0.90      GFR Estimate 66      Calcium 10.8 (*)     Glucose 120 (*)    CBC WITH PLATELETS AND DIFFERENTIAL - Abnormal    WBC Count 9.0      RBC Count 4.44      Hemoglobin 12.5      Hematocrit 40.5      MCV 91      MCH 28.2      MCHC 30.9 (*)     RDW 13.6      Platelet Count 288      % Neutrophils 64      % Lymphocytes 27      % Monocytes 6      % Eosinophils 2      % Basophils 0      % Immature Granulocytes 0      NRBCs per 100 WBC 0      Absolute Neutrophils 5.8      Absolute Lymphocytes 2.5      Absolute Monocytes 0.5      Absolute Eosinophils 0.2      Absolute Basophils 0.0      Absolute Immature Granulocytes 0.0      Absolute NRBCs 0.0     INFLUENZA A/B, RSV, & SARS-COV2 PCR - Normal    Influenza A PCR Negative      Influenza B PCR Negative      RSV PCR Negative      SARS CoV2 PCR Negative       Imaging   No orders to display     EKG   None    Independent Interpretation   None    ED Course      Medications Administered   Medications - No data to display    Procedures   Procedures     Discussion of Management   None    ED Course   ED Course as of 11/03/24 1338   Sun Nov 03, 2024   1332 I evaluated the patient, obtained history, and performed a physical exam as detailed above.      Additional Documentation  None    Medical Decision Making / Diagnosis     CMS Diagnoses: None    MIPS       None    MDM   Pilo Quintana is a 74 year old female presents with cough and increased mucus production.  Has history of COPD, her son helps take care of her and she is still completing her ADLs at  her baseline, she still has plenty of nebulizer solution at home.  She is not having fevers or systemic signs of infection.  Does have a bit of wheezing on her exam and with her increased mucus production and increased shortness of breath think is very reasonable to treat empirically for COPD at this point.  Will start doxycycline and a prednisone taper.  Discussed with her son and he seems very comfortable with outpatient management recommendations.    Disposition   The patient was discharged.     Diagnosis     ICD-10-CM    1. COPD exacerbation (H)  J44.1            Discharge Medications   New Prescriptions    No medications on file     Scribe Disclosure:  I, Georgia Davalos, am serving as a scribe at 1:26 PM on 11/3/2024 to document services personally performed by Regulo Barraza MD based on my observations and the provider's statements to me.        Regulo Barraza MD  11/03/24 8789

## 2024-11-28 ENCOUNTER — HOSPITAL ENCOUNTER (OUTPATIENT)
Facility: CLINIC | Age: 74
Setting detail: OBSERVATION
End: 2024-11-28
Attending: EMERGENCY MEDICINE | Admitting: INTERNAL MEDICINE
Payer: COMMERCIAL

## 2024-11-28 ENCOUNTER — APPOINTMENT (OUTPATIENT)
Dept: GENERAL RADIOLOGY | Facility: CLINIC | Age: 74
End: 2024-11-28
Attending: EMERGENCY MEDICINE
Payer: COMMERCIAL

## 2024-11-28 ENCOUNTER — APPOINTMENT (OUTPATIENT)
Dept: MRI IMAGING | Facility: CLINIC | Age: 74
End: 2024-11-28
Attending: EMERGENCY MEDICINE
Payer: COMMERCIAL

## 2024-11-28 ENCOUNTER — APPOINTMENT (OUTPATIENT)
Dept: CT IMAGING | Facility: CLINIC | Age: 74
End: 2024-11-28
Attending: EMERGENCY MEDICINE
Payer: COMMERCIAL

## 2024-11-28 VITALS
SYSTOLIC BLOOD PRESSURE: 141 MMHG | OXYGEN SATURATION: 95 % | RESPIRATION RATE: 18 BRPM | DIASTOLIC BLOOD PRESSURE: 103 MMHG | HEART RATE: 84 BPM | TEMPERATURE: 97.4 F

## 2024-11-28 DIAGNOSIS — Z09 HOSPITAL DISCHARGE FOLLOW-UP: ICD-10-CM

## 2024-11-28 DIAGNOSIS — R53.1 WEAKNESS: ICD-10-CM

## 2024-11-28 DIAGNOSIS — J44.1 COPD WITH ACUTE EXACERBATION (H): ICD-10-CM

## 2024-11-28 DIAGNOSIS — R73.9 HYPERGLYCEMIA: Primary | ICD-10-CM

## 2024-11-28 DIAGNOSIS — R41.0 CONFUSION: ICD-10-CM

## 2024-11-28 DIAGNOSIS — J44.1 COPD EXACERBATION (H): ICD-10-CM

## 2024-11-28 LAB
ALBUMIN UR-MCNC: NEGATIVE MG/DL
AMPHETAMINES UR QL SCN: NORMAL
ANION GAP SERPL CALCULATED.3IONS-SCNC: 16 MMOL/L (ref 7–15)
APPEARANCE UR: CLEAR
APTT PPP: 27 SECONDS (ref 22–38)
ATRIAL RATE - MUSE: 66 BPM
BARBITURATES UR QL SCN: NORMAL
BASE EXCESS BLDV CALC-SCNC: 5.9 MMOL/L (ref -3–3)
BASOPHILS # BLD AUTO: 0 10E3/UL (ref 0–0.2)
BASOPHILS NFR BLD AUTO: 0 %
BENZODIAZ UR QL SCN: NORMAL
BILIRUB UR QL STRIP: NEGATIVE
BUN SERPL-MCNC: 22.9 MG/DL (ref 8–23)
BZE UR QL SCN: NORMAL
CALCIUM SERPL-MCNC: 9.8 MG/DL (ref 8.8–10.4)
CANNABINOIDS UR QL SCN: NORMAL
CHLORIDE SERPL-SCNC: 99 MMOL/L (ref 98–107)
COLOR UR AUTO: NORMAL
CREAT SERPL-MCNC: 0.87 MG/DL (ref 0.51–0.95)
DIASTOLIC BLOOD PRESSURE - MUSE: NORMAL MMHG
EGFRCR SERPLBLD CKD-EPI 2021: 69 ML/MIN/1.73M2
EOSINOPHIL # BLD AUTO: 0.2 10E3/UL (ref 0–0.7)
EOSINOPHIL NFR BLD AUTO: 3 %
ERYTHROCYTE [DISTWIDTH] IN BLOOD BY AUTOMATED COUNT: 13.8 % (ref 10–15)
ETHANOL SERPL-MCNC: <0.01 G/DL
FENTANYL UR QL: NORMAL
FLUAV RNA SPEC QL NAA+PROBE: NEGATIVE
FLUBV RNA RESP QL NAA+PROBE: NEGATIVE
GLUCOSE BLDC GLUCOMTR-MCNC: 226 MG/DL (ref 70–99)
GLUCOSE SERPL-MCNC: 136 MG/DL (ref 70–99)
GLUCOSE UR STRIP-MCNC: NEGATIVE MG/DL
HCO3 BLDV-SCNC: 33 MMOL/L (ref 21–28)
HCO3 SERPL-SCNC: 27 MMOL/L (ref 22–29)
HCT VFR BLD AUTO: 37.3 % (ref 35–47)
HGB BLD-MCNC: 11.7 G/DL (ref 11.7–15.7)
HGB UR QL STRIP: NEGATIVE
HOLD SPECIMEN: NORMAL
IMM GRANULOCYTES # BLD: 0 10E3/UL
IMM GRANULOCYTES NFR BLD: 1 %
INR PPP: 0.99 (ref 0.85–1.15)
INTERPRETATION ECG - MUSE: NORMAL
KETONES UR STRIP-MCNC: NEGATIVE MG/DL
LACTATE SERPL-SCNC: 2.5 MMOL/L (ref 0.7–2)
LACTATE SERPL-SCNC: 2.8 MMOL/L (ref 0.7–2)
LEUKOCYTE ESTERASE UR QL STRIP: NEGATIVE
LYMPHOCYTES # BLD AUTO: 1.9 10E3/UL (ref 0.8–5.3)
LYMPHOCYTES NFR BLD AUTO: 30 %
MCH RBC QN AUTO: 28.3 PG (ref 26.5–33)
MCHC RBC AUTO-ENTMCNC: 31.4 G/DL (ref 31.5–36.5)
MCV RBC AUTO: 90 FL (ref 78–100)
MONOCYTES # BLD AUTO: 0.4 10E3/UL (ref 0–1.3)
MONOCYTES NFR BLD AUTO: 7 %
NEUTROPHILS # BLD AUTO: 3.8 10E3/UL (ref 1.6–8.3)
NEUTROPHILS NFR BLD AUTO: 59 %
NITRATE UR QL: NEGATIVE
NRBC # BLD AUTO: 0 10E3/UL
NRBC BLD AUTO-RTO: 0 /100
NT-PROBNP SERPL-MCNC: 81 PG/ML (ref 0–900)
O2/TOTAL GAS SETTING VFR VENT: 21 %
OPIATES UR QL SCN: NORMAL
OXYHGB MFR BLDV: 35 % (ref 70–75)
P AXIS - MUSE: 22 DEGREES
PCO2 BLDV: 61 MM HG (ref 40–50)
PCP QUAL URINE (ROCHE): NORMAL
PH BLDV: 7.35 [PH] (ref 7.32–7.43)
PH UR STRIP: 6.5 [PH] (ref 5–7)
PLATELET # BLD AUTO: 262 10E3/UL (ref 150–450)
PO2 BLDV: 23 MM HG (ref 25–47)
POTASSIUM SERPL-SCNC: 4 MMOL/L (ref 3.4–5.3)
PR INTERVAL - MUSE: 146 MS
PROLACTIN SERPL 3RD IS-MCNC: 16 NG/ML (ref 5–23)
QRS DURATION - MUSE: 82 MS
QT - MUSE: 394 MS
QTC - MUSE: 413 MS
R AXIS - MUSE: -28 DEGREES
RBC # BLD AUTO: 4.13 10E6/UL (ref 3.8–5.2)
RBC URINE: <1 /HPF
RSV RNA SPEC NAA+PROBE: NEGATIVE
SAO2 % BLDV: 35.3 % (ref 70–75)
SARS-COV-2 RNA RESP QL NAA+PROBE: NEGATIVE
SODIUM SERPL-SCNC: 142 MMOL/L (ref 135–145)
SP GR UR STRIP: 1 (ref 1–1.03)
SQUAMOUS EPITHELIAL: 1 /HPF
SYSTOLIC BLOOD PRESSURE - MUSE: NORMAL MMHG
T AXIS - MUSE: 14 DEGREES
TROPONIN T SERPL HS-MCNC: 20 NG/L
UROBILINOGEN UR STRIP-MCNC: NORMAL MG/DL
VENTRICULAR RATE- MUSE: 66 BPM
WBC # BLD AUTO: 6.4 10E3/UL (ref 4–11)
WBC URINE: 1 /HPF

## 2024-11-28 PROCEDURE — 81001 URINALYSIS AUTO W/SCOPE: CPT | Performed by: EMERGENCY MEDICINE

## 2024-11-28 PROCEDURE — 70546 MR ANGIOGRAPH HEAD W/O&W/DYE: CPT

## 2024-11-28 PROCEDURE — 82962 GLUCOSE BLOOD TEST: CPT

## 2024-11-28 PROCEDURE — 71045 X-RAY EXAM CHEST 1 VIEW: CPT

## 2024-11-28 PROCEDURE — 93005 ELECTROCARDIOGRAM TRACING: CPT

## 2024-11-28 PROCEDURE — 82077 ASSAY SPEC XCP UR&BREATH IA: CPT | Performed by: EMERGENCY MEDICINE

## 2024-11-28 PROCEDURE — A9585 GADOBUTROL INJECTION: HCPCS | Performed by: EMERGENCY MEDICINE

## 2024-11-28 PROCEDURE — 87637 SARSCOV2&INF A&B&RSV AMP PRB: CPT | Performed by: EMERGENCY MEDICINE

## 2024-11-28 PROCEDURE — 70551 MRI BRAIN STEM W/O DYE: CPT

## 2024-11-28 PROCEDURE — 255N000002 HC RX 255 OP 636: Performed by: EMERGENCY MEDICINE

## 2024-11-28 PROCEDURE — 82805 BLOOD GASES W/O2 SATURATION: CPT | Performed by: EMERGENCY MEDICINE

## 2024-11-28 PROCEDURE — 83605 ASSAY OF LACTIC ACID: CPT | Performed by: EMERGENCY MEDICINE

## 2024-11-28 PROCEDURE — 258N000003 HC RX IP 258 OP 636: Performed by: EMERGENCY MEDICINE

## 2024-11-28 PROCEDURE — 87040 BLOOD CULTURE FOR BACTERIA: CPT | Performed by: EMERGENCY MEDICINE

## 2024-11-28 PROCEDURE — 85610 PROTHROMBIN TIME: CPT | Performed by: EMERGENCY MEDICINE

## 2024-11-28 PROCEDURE — G0378 HOSPITAL OBSERVATION PER HR: HCPCS

## 2024-11-28 PROCEDURE — 85025 COMPLETE CBC W/AUTO DIFF WBC: CPT | Performed by: EMERGENCY MEDICINE

## 2024-11-28 PROCEDURE — 99291 CRITICAL CARE FIRST HOUR: CPT | Mod: 25

## 2024-11-28 PROCEDURE — 250N000009 HC RX 250: Performed by: EMERGENCY MEDICINE

## 2024-11-28 PROCEDURE — 99222 1ST HOSP IP/OBS MODERATE 55: CPT | Performed by: INTERNAL MEDICINE

## 2024-11-28 PROCEDURE — 80048 BASIC METABOLIC PNL TOTAL CA: CPT | Performed by: EMERGENCY MEDICINE

## 2024-11-28 PROCEDURE — 70547 MR ANGIOGRAPHY NECK W/O DYE: CPT

## 2024-11-28 PROCEDURE — 85014 HEMATOCRIT: CPT | Performed by: EMERGENCY MEDICINE

## 2024-11-28 PROCEDURE — 80307 DRUG TEST PRSMV CHEM ANLYZR: CPT | Performed by: EMERGENCY MEDICINE

## 2024-11-28 PROCEDURE — 70450 CT HEAD/BRAIN W/O DYE: CPT

## 2024-11-28 PROCEDURE — 36415 COLL VENOUS BLD VENIPUNCTURE: CPT | Performed by: EMERGENCY MEDICINE

## 2024-11-28 PROCEDURE — 83880 ASSAY OF NATRIURETIC PEPTIDE: CPT | Performed by: EMERGENCY MEDICINE

## 2024-11-28 PROCEDURE — 96374 THER/PROPH/DIAG INJ IV PUSH: CPT | Mod: 59

## 2024-11-28 PROCEDURE — 250N000011 HC RX IP 250 OP 636: Performed by: EMERGENCY MEDICINE

## 2024-11-28 PROCEDURE — 84484 ASSAY OF TROPONIN QUANT: CPT | Performed by: EMERGENCY MEDICINE

## 2024-11-28 PROCEDURE — 84146 ASSAY OF PROLACTIN: CPT | Performed by: EMERGENCY MEDICINE

## 2024-11-28 PROCEDURE — 85730 THROMBOPLASTIN TIME PARTIAL: CPT | Performed by: EMERGENCY MEDICINE

## 2024-11-28 RX ORDER — ACETAMINOPHEN 325 MG/1
650 TABLET ORAL EVERY 4 HOURS PRN
Status: DISCONTINUED | OUTPATIENT
Start: 2024-11-28 | End: 2024-11-30 | Stop reason: HOSPADM

## 2024-11-28 RX ORDER — AMOXICILLIN 250 MG
1 CAPSULE ORAL 2 TIMES DAILY PRN
Status: DISCONTINUED | OUTPATIENT
Start: 2024-11-28 | End: 2024-11-30 | Stop reason: HOSPADM

## 2024-11-28 RX ORDER — ONDANSETRON 2 MG/ML
4 INJECTION INTRAMUSCULAR; INTRAVENOUS EVERY 6 HOURS PRN
Status: DISCONTINUED | OUTPATIENT
Start: 2024-11-28 | End: 2024-11-30 | Stop reason: HOSPADM

## 2024-11-28 RX ORDER — PROCHLORPERAZINE MALEATE 5 MG/1
5 TABLET ORAL EVERY 6 HOURS PRN
Status: DISCONTINUED | OUTPATIENT
Start: 2024-11-28 | End: 2024-11-30 | Stop reason: HOSPADM

## 2024-11-28 RX ORDER — PREDNISONE 20 MG/1
60 TABLET ORAL DAILY
Status: DISCONTINUED | OUTPATIENT
Start: 2024-11-28 | End: 2024-11-29

## 2024-11-28 RX ORDER — ACETAMINOPHEN 650 MG/1
650 SUPPOSITORY RECTAL EVERY 4 HOURS PRN
Status: DISCONTINUED | OUTPATIENT
Start: 2024-11-28 | End: 2024-11-30 | Stop reason: HOSPADM

## 2024-11-28 RX ORDER — GADOBUTROL 604.72 MG/ML
10 INJECTION INTRAVENOUS ONCE
Status: COMPLETED | OUTPATIENT
Start: 2024-11-28 | End: 2024-11-28

## 2024-11-28 RX ORDER — AMOXICILLIN 250 MG
2 CAPSULE ORAL 2 TIMES DAILY PRN
Status: DISCONTINUED | OUTPATIENT
Start: 2024-11-28 | End: 2024-11-30 | Stop reason: HOSPADM

## 2024-11-28 RX ORDER — LORAZEPAM 2 MG/ML
2 INJECTION INTRAMUSCULAR ONCE
Status: COMPLETED | OUTPATIENT
Start: 2024-11-28 | End: 2024-11-28

## 2024-11-28 RX ORDER — POLYETHYLENE GLYCOL 3350 17 G
2 POWDER IN PACKET (EA) ORAL
Status: DISCONTINUED | OUTPATIENT
Start: 2024-11-28 | End: 2024-11-30 | Stop reason: HOSPADM

## 2024-11-28 RX ORDER — IOPAMIDOL 755 MG/ML
117 INJECTION, SOLUTION INTRAVASCULAR ONCE
Status: DISCONTINUED | OUTPATIENT
Start: 2024-11-28 | End: 2024-11-28

## 2024-11-28 RX ORDER — IPRATROPIUM BROMIDE AND ALBUTEROL SULFATE 2.5; .5 MG/3ML; MG/3ML
3 SOLUTION RESPIRATORY (INHALATION)
Status: DISCONTINUED | OUTPATIENT
Start: 2024-11-29 | End: 2024-11-29

## 2024-11-28 RX ORDER — ONDANSETRON 4 MG/1
4 TABLET, ORALLY DISINTEGRATING ORAL EVERY 6 HOURS PRN
Status: DISCONTINUED | OUTPATIENT
Start: 2024-11-28 | End: 2024-11-30 | Stop reason: HOSPADM

## 2024-11-28 RX ORDER — IPRATROPIUM BROMIDE AND ALBUTEROL SULFATE 2.5; .5 MG/3ML; MG/3ML
3 SOLUTION RESPIRATORY (INHALATION) EVERY 4 HOURS PRN
Status: DISCONTINUED | OUTPATIENT
Start: 2024-11-28 | End: 2024-11-30 | Stop reason: HOSPADM

## 2024-11-28 RX ORDER — IOPAMIDOL 755 MG/ML
117 INJECTION, SOLUTION INTRAVASCULAR ONCE
Status: COMPLETED | OUTPATIENT
Start: 2024-11-28 | End: 2024-11-28

## 2024-11-28 RX ORDER — DOXYCYCLINE 100 MG/1
100 CAPSULE ORAL EVERY 12 HOURS SCHEDULED
Status: DISCONTINUED | OUTPATIENT
Start: 2024-11-28 | End: 2024-11-30 | Stop reason: HOSPADM

## 2024-11-28 RX ADMIN — SODIUM CHLORIDE 50 ML: 9 INJECTION, SOLUTION INTRAVENOUS at 21:28

## 2024-11-28 RX ADMIN — LORAZEPAM 2 MG: 2 INJECTION INTRAMUSCULAR; INTRAVENOUS at 22:31

## 2024-11-28 RX ADMIN — SODIUM CHLORIDE 1000 ML: 9 INJECTION, SOLUTION INTRAVENOUS at 20:51

## 2024-11-28 RX ADMIN — GADOBUTROL 10 ML: 604.72 INJECTION INTRAVENOUS at 21:28

## 2024-11-28 ASSESSMENT — ACTIVITIES OF DAILY LIVING (ADL)
ADLS_ACUITY_SCORE: 61

## 2024-11-28 NOTE — ED PROVIDER NOTES
"  Emergency Department Note      History of Present Illness     Chief Complaint   Cough    Son interpreted for patient and provided additional history.   The history is limited due to patient condition    HPI   Pilo Quintana is a 74 year old female with history of hypertension, hyperlipidemia, type 2 diabetes mellitus, COPD, CHF, and ICH with right sided hemiplegia who presents to the ED with her son for evaluation of a cough and generalized weakness. Patient's son reports Pilo has been doing well since last ED visit 11/3/24 where she was seen for a similar cough. The cough never completely resolved. This morning, son states he noticed the patient was leaning forward and would not lie back. This afternoon, she developed new onset dizziness and increased work of breathing. Son reports Siatta was \"wheezing\" which she has not had since earlier this month. Patient was moving all extremities and completed her exercises this morning just fine. She was acting behaviorally normal at this time as well. This afternoon, son adds that he noticed new weakness in the bilateral lower extremities, increased lethargy, and acute mentation change. Son denies any falls or head strike. No abdominal pain. Patient is not anticoagulated.   LKW noon earlier today.       Independent Historian   Son as detailed above.    Review of External Notes   I reviewed Dr. Barraza's 11/3/24 ED Note regarding COPD exacerbation.    I reviewed Dr. Griffith's 11/13/24 Office Visit regarding asthma and PMH.      Sosa Marina's 11/20/24 Cardiology Note reviewed discussing hypertension, hyperlipidemia, and CHF.    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  Multiple pulmonary nodules  Renal oncocytoma of right kidney  Simple " renal cyst  Social maladjustment  Type 2 diabetes mellitus   Intracerebral hemorrhage  Primary hyperparathyroidism   Thyroid nodule  Hypoxia  Lactic acidosis  COPD  Pneumonia   Acute respiratory failure with hypoxia and hypercapnia   Sleep apnea  Chronic heart failure with preserved ejection fraction  Cataract, bilateral  Hemiplegia of right dominant side  DJD  Carpal tunnel syndrome  Exposure to TB    Medications   Albuterol  Amlodipine  Aspirin 81 mg  Atorvastatin  Budesonide-formoterol  Furosemide  Ipratropium-albuterol  Losartan  Metformin  Metoprolol succinate  Estradiol     Surgical History   Dilation and curettage, operative hysteroscopy with morcellator  Right renal biopsy  Cataract removal, bilateral      Physical Exam     Patient Vitals for the past 24 hrs:   BP Temp Temp src Pulse Resp SpO2   11/28/24 1719 (!) 141/103 97.4  F (36.3  C) Temporal 84 18 95 %     Physical Exam  General: The patient is alert, in no respiratory distress.    HENT: Mucous membranes moist.  Atraumatic no drainage from the eyes nose or mouth    Cardiovascular: Regular rate and rhythm.  Adequate pulses    Respiratory: Slightly prolonged expirations no nasal flaring no audible wheezing no retractions decreased breath sounds    Gastrointestinal: Abdomen soft. No guarding,      Musculoskeletal: No gross deformity no grimace with palpation    Skin: No rashes or petechiae.     Neurologic: The patient is alert.  She is nonverbal she does regard the examiner and her son she does not respond verbally or attempt to move her mouth she will look in every direction to follow my finger she will open her mouth there is no visible facial droop to command using her son's  she will lift her arms off the bed.  She has weak but equal bilateral .  She responds to stimuli to her legs with withdrawal of the toes however will not flex her knees and cannot lift her legs off the bed    Lymphatic: No cervical adenopathy. No lower extremity  swelling.    Psychiatric: The patient is non-tearful.     Diagnostics     Lab Results   Labs Ordered and Resulted from Time of ED Arrival to Time of ED Departure   BASIC METABOLIC PANEL - Abnormal       Result Value    Sodium 142      Potassium 4.0      Chloride 99      Carbon Dioxide (CO2) 27      Anion Gap 16 (*)     Urea Nitrogen 22.9      Creatinine 0.87      GFR Estimate 69      Calcium 9.8      Glucose 136 (*)    TROPONIN T, HIGH SENSITIVITY - Abnormal    Troponin T, High Sensitivity 20 (*)    BLOOD GAS VENOUS - Abnormal    pH Venous 7.35      pCO2 Venous 61 (*)     pO2 Venous 23 (*)     Bicarbonate Venous 33 (*)     Base Excess/Deficit Venous 5.9 (*)     FIO2 21      Oxyhemoglobin Venous 35 (*)     O2 Sat, Venous 35.3 (*)    LACTIC ACID WHOLE BLOOD WITH 1X REPEAT IN 2 HR WHEN >2 - Abnormal    Lactic Acid, Initial 2.8 (*)    GLUCOSE BY METER - Abnormal    GLUCOSE BY METER POCT 226 (*)    CBC WITH PLATELETS AND DIFFERENTIAL - Abnormal    WBC Count 6.4      RBC Count 4.13      Hemoglobin 11.7      Hematocrit 37.3      MCV 90      MCH 28.3      MCHC 31.4 (*)     RDW 13.8      Platelet Count 262      % Neutrophils 59      % Lymphocytes 30      % Monocytes 7      % Eosinophils 3      % Basophils 0      % Immature Granulocytes 1      NRBCs per 100 WBC 0      Absolute Neutrophils 3.8      Absolute Lymphocytes 1.9      Absolute Monocytes 0.4      Absolute Eosinophils 0.2      Absolute Basophils 0.0      Absolute Immature Granulocytes 0.0      Absolute NRBCs 0.0     LACTIC ACID WHOLE BLOOD - Abnormal    Lactic Acid 2.5 (*)    INR - Normal    INR 0.99     PARTIAL THROMBOPLASTIN TIME - Normal    aPTT 27     PROLACTIN - Normal    Prolactin 16     ETHYL ALCOHOL LEVEL - Normal    Alcohol ethyl <0.01     ROUTINE UA WITH MICROSCOPIC - Normal    Color Urine Straw      Appearance Urine Clear      Glucose Urine Negative      Bilirubin Urine Negative      Ketones Urine Negative      Specific Gravity Urine 1.005      Blood Urine  Negative      pH Urine 6.5      Protein Albumin Urine Negative      Urobilinogen Urine Normal      Nitrite Urine Negative      Leukocyte Esterase Urine Negative      RBC Urine <1      WBC Urine 1      Squamous Epithelials Urine 1     INFLUENZA A/B, RSV AND SARS-COV2 PCR - Normal    Influenza A PCR Negative      Influenza B PCR Negative      RSV PCR Negative      SARS CoV2 PCR Negative     URINE DRUG SCREEN PANEL - Normal    Amphetamines Urine Screen Negative      Barbituates Urine Screen Negative      Benzodiazepine Urine Screen Negative      Cannabinoids Urine Screen Negative      Cocaine Urine Screen Negative      Fentanyl Qual Urine Screen Negative      Opiates Urine Screen Negative      PCP Urine Screen Negative     NT PROBNP INPATIENT - Normal    N terminal Pro BNP Inpatient 81     GLUCOSE MONITOR NURSING POCT   GLUCOSE MONITOR NURSING POCT   BLOOD CULTURE       Imaging   XR Chest Port 1 View   Final Result   IMPRESSION: Stable linear scarring in the left lung. Stable cardiomediastinal silhouette. No acute cardiopulmonary process.      MRA Brain ([facility]) wo & w Contrast   Final Result   IMPRESSION:   1.  Within the limits of motion, no large vessel occlusion or flow-limiting stenosis.   2.  Stable 2 mm aneurysm arising at the right middle cerebral artery bifurcation.      MR Brain w/o Contrast   Final Result   IMPRESSION:   HEAD MRI:    1.  No acute intracranial process.   2.  Generalized brain atrophy and presumed microvascular ischemic changes as detailed above.   3.  Chronic lacunar infarcts in the casimiro.   4.  Small chronic hemorrhagic infarct in the right cerebellar hemisphere.      NECK MRA:   1.  Images are degraded by motion.   2.  Within this limitation, no hemodynamically significant stenosis or dissection identified.      MRA Angiogram Neck w/o Contrast   Final Result   IMPRESSION:   HEAD MRI:    1.  No acute intracranial process.   2.  Generalized brain atrophy and presumed microvascular  ischemic changes as detailed above.   3.  Chronic lacunar infarcts in the casimiro.   4.  Small chronic hemorrhagic infarct in the right cerebellar hemisphere.      NECK MRA:   1.  Images are degraded by motion.   2.  Within this limitation, no hemodynamically significant stenosis or dissection identified.      CT Head w/o Contrast   Final Result   IMPRESSION:   1.  No acute intracranial process.      Results were called to Dr. Mckay at 11/28/2024 5:57 PM CST.       CTA Head Neck with Contrast    (Results Pending)   CT Head Perfusion w Contrast - For Tier 2 Stroke    (Results Pending)       EKG   ECG taken at 1926, ECG read at 1933  Normal sinus rhythm  Normal ECG    Rate 66 bpm. IN interval 146 ms. QRS duration 82 ms. QT/QTc 394/413 ms. P-R-T axes 22 -28 14.    Independent Interpretation   I reviewed the patient's chest x-ray and do not see signs of pneumonia    ED Course      Medications Administered   Medications   sodium chloride 0.9% BOLUS 1,000 mL (has no administration in time range)   acetaminophen (TYLENOL) tablet 650 mg (has no administration in time range)     Or   acetaminophen (TYLENOL) Suppository 650 mg (has no administration in time range)   senna-docusate (SENOKOT-S/PERICOLACE) 8.6-50 MG per tablet 1 tablet (has no administration in time range)     Or   senna-docusate (SENOKOT-S/PERICOLACE) 8.6-50 MG per tablet 2 tablet (has no administration in time range)   ondansetron (ZOFRAN ODT) ODT tab 4 mg (has no administration in time range)     Or   ondansetron (ZOFRAN) injection 4 mg (has no administration in time range)   prochlorperazine (COMPAZINE) injection 5 mg (has no administration in time range)     Or   prochlorperazine (COMPAZINE) tablet 5 mg (has no administration in time range)   nicotine (COMMIT) lozenge 2 mg (has no administration in time range)   iopamidol (ISOVUE-370) solution 117 mL (117 mLs Intravenous Not Given 11/28/24 4811)     And   sodium chloride 0.9 % bag for CT scan flush use (62  mLs As instructed Not Given 11/28/24 1747)   sodium chloride 0.9% BOLUS 1,000 mL (1,000 mLs Intravenous $New Bag 11/28/24 2051)   gadobutrol (GADAVIST) injection 10 mL (10 mLs Intravenous $Given 11/28/24 2128)   sodium chloride 0.9% BOLUS 50 mL (50 mLs Intravenous $New Bag 11/28/24 2128)   LORazepam (ATIVAN) injection 2 mg (2 mg Intravenous $Given 11/28/24 2231)       Procedures   Procedures     Discussion of Management   Stroke Neurology, Dr. James  I discussed the case with Dr. Ellis who agrees to admit the patient  ED Course   ED Course as of 11/28/24 2314   Thu Nov 28, 2024 1726 I obtained history and examined the patient as noted above.    1732 Tier 2 Code Stroke   1741 I spoke with Dr. James, Stroke Neurology, regarding the patient's history and presentation in the emergency department today. They recommended to proceed with stroke work up but thought this could be due to encephalopathy.   1742 I was called back to patient's room. Son was able to get her to say one to two words to him, but she could not answer any speciifc questions. At that point, she could bend both knees and attempt to lift both legs off of the bed.    1807 I spoke with Dr. Contreras, [facility], regarding CT findings. CT looked okay.   1831 CT could not get a line in. Ultrasound IV. Okay with not doing a perfusion.   1858 Discussed patient with Dr. James of Stroke Neurology. They are okay with not doing the perfusion.   1932 Spoke with Dr. James.   1939 I reassesed patient and talked to son. Patient is talking to him more, not back to baseline.   1956 I went down to CT. IV had blown and they are replacing it now.   2003 Discussed patient with Stroke Neurology. Recommend MRI/MRA. They will deescalate.    2048 I reassesed patient. She is moving her legs and talking more.        Additional Documentation  Healthcare access is limited by language barrier    Medical Decision Making / Diagnosis            ED Stroke specific  documentation           NIHSS PDF     Patient last known well time: Noon today  ED Provider first to bedside at: 1726  CT Results received at: 1757    Thrombolytics:   Not given due to:   - unclear or unfavorable risk-benefit profile for extended window thrombolysis beyond the conventional 4.5 hour time window    If treating with thrombolytics: Ensure SBP<180 and DBP<105 prior to treatment with thrombolytics.  Administering thrombolytics after treatment with IV labetalol, hydralazine, or nicardipine is reasonable once BP control is established.    Endovascular Retrieval:  Lack of likely large vessel clot    National Institutes of Health Stroke Scale (Baseline)  Time Performed: 1729     Score    Level of consciousness: (0)   Alert, keenly responsive    LOC questions: (2)   Answers neither question correctly    LOC commands: (2)   Performs neither task correctly    Best gaze: (0)   Normal    Visual: (0)   No visual loss    Facial palsy: (0)   Normal symmetrical movements    Motor arm (left): (0)   No drift    Motor arm (right): (0)   No drift    Motor leg (left): (3)   No effort against gravity    Motor leg (right): (3)   No effort against gravity    Limb ataxia: (0)   Absent    Sensory: (0)   Normal- no sensory loss    Best language: (3)   Mute- global aphasia    Dysarthria: (2)   Severe dysarthria    Extinction and inattention: (0)   No abnormality        Total Score:  15        Stroke Mimics were considered (including migraine headache, seizure disorder, hypoglycemia (or hyperglycemia), head or spinal trauma, CNS infection, Toxin ingestion and shock state (e.g. sepsis) .    Evaluation/Treatement was delayed due to: IV access difficulty and that the patient's vessels were infiltrated with attempt to infuse contrast for the perfusion and/or CTAs      National Institutes of Health Stroke Scale  Time Performed: 1740      Score    Level of consciousness: (0)   Alert, keenly responsive    LOC questions: (2)   Answers  neither question correctly    LOC commands: (0)   Performs both tasks correctly    Best gaze: (0)   Normal    Visual: (0)   No visual loss    Facial palsy: (0)   Normal symmetrical movements    Motor arm (left): (0)   No drift    Motor arm (right): (0)   No drift    Motor leg (left): (2)   Some effort against gravity    Motor leg (right): (2)   Some effort against gravity    Limb ataxia: (0)   Absent    Sensory: (0)   Normal- no sensory loss    Best language: (1)   Mild to moderate aphasia    Dysarthria: (1)   Mild to moderate dysarthria    Extinction and inattention: (1)   Visual, tacile, auditory, spatial, person inattention        Total Score:  9                MDM   Pilo Quintana is a 74 year old female who unfortunately speaks another language her son however did come with her to help interpret.  The patient sounds like he she was quite conversant this morning and had been able to use her lower extremities however at noon had fairly acute onset of altered mental status weakness of her lower extremities.  Here she would not answer questions she would follow some commands but could not lift her lower legs.  The fact that this is bilateral let me do feel that this is less likely to be due to a primary neurologic event however a tier 2 activation was started I discussed the case with stroke neurology who recommended a CT and CT a.  Unfortunate there was a delay with the IV access the CT was discussed with the radiologist I did call back stroke neurology on several occasions the decision was made that this is less likely be due to a stroke and they recommended canceling and going with a MRI/MRA this was ordered.  I did consider that her altered mental status could be due to endocrine problems such as hypoglycemia but this was not the case infection was considered hypoxia she does have a history of COPD and the signs that she been wheezing however I do not appreciate wheezing now she is adequately oxygenating.  I  considered alcohol or intoxicants however these are not present.  The lactate is slightly elevated but is trending down patient is MRI did not show signs of a stroke the exact cause of her symptoms are not known but her symptoms are rapidly improving there is no signs of a vascular deficit and she was admitted to observation status.        Disposition   Patient was admitted to observation status    Diagnosis     ICD-10-CM    1. Weakness  R53.1       2. Confusion  R41.0            Discharge Medications   New Prescriptions    No medications on file         Scribe Disclosure:  I, Barbara Barton, am serving as a scribe at 5:43 PM on 11/28/2024 to document services personally performed by Efe Mckay MD based on my observations and the provider's statements to me.        Efe Mckay MD  11/28/24 5636

## 2024-11-28 NOTE — ED NOTES
"  [facility]    Stroke Telephone Note    I was called by Efe Mckay Md on 11/28/24 regarding patient Pilo Quintana. The patient is a 74 year old female with past medical history significant for neurocognitive disorder, stroke, hypertension, COPD and diabetes mellitus presents for generalized weakness, non verbal and wheezing. LKW noon. In ED she was found to be non verbal, there was no effort versus gravity in b/l LE, able to lift b/l UE against gravity equally, no droop or any other focality.        Vitals  BP: (!) 141/103   Pulse: 84   Resp: 18   Temp: 97.4  F (36.3  C)        Stroke Code Data (for stroke code without tele)  Stroke code activated 11/28/24  1733   Stroke provider first response 11/28/24  1734   Last known normal 11/28/24  1200      Time of discovery (or onset of symptoms) 11/28/24  1200   Head CT read by Stroke Neuro Provider 11/28/24  1800   Was stroke code de-escalated?             Imaging Findings  CT head: no hemorrhage   CTA head/neck: UNABLE TO GET IT DUE TO IV ACCESS ISSUES     Intravenous Thrombolysis  Not given due to:   - unclear or unfavorable risk-benefit profile for extended window thrombolysis beyond the conventional 4.5 hour time window    Endovascular Treatment  Not initiated due to absence of proximal vessel occlusion    Impression    Generalized weakness  Aphasia     Recommendations   MRI brain and MRA head and neck stat    My recommendations are based on the information provided over the phone by Pilo Quintana's in-person providers. They are not intended to replace the clinical judgment of her in-person providers. I was not requested to personally see or examine the patient at this time.     The Stroke Staff is Dr. AMANDA James MD  Vascular Neurology Fellow    To page me or covering stroke neurology team member, click here: AMCOM  Choose \"On Call\" tab at top, then select \"NEUROLOGY/ALL SITES\" from middle " "drop-down box, press Enter, then look for \"stroke\" or \"telestroke\" for your site.   "

## 2024-11-29 ENCOUNTER — APPOINTMENT (OUTPATIENT)
Dept: PHYSICAL THERAPY | Facility: CLINIC | Age: 74
End: 2024-11-29
Attending: INTERNAL MEDICINE
Payer: COMMERCIAL

## 2024-11-29 LAB
ANION GAP SERPL CALCULATED.3IONS-SCNC: 15 MMOL/L (ref 7–15)
ATRIAL RATE - MUSE: 66 BPM
BUN SERPL-MCNC: 17.6 MG/DL (ref 8–23)
CALCIUM SERPL-MCNC: 9.3 MG/DL (ref 8.8–10.4)
CHLORIDE SERPL-SCNC: 105 MMOL/L (ref 98–107)
CREAT SERPL-MCNC: 0.72 MG/DL (ref 0.51–0.95)
DIASTOLIC BLOOD PRESSURE - MUSE: NORMAL MMHG
EGFRCR SERPLBLD CKD-EPI 2021: 87 ML/MIN/1.73M2
ERYTHROCYTE [DISTWIDTH] IN BLOOD BY AUTOMATED COUNT: 13.6 % (ref 10–15)
GLUCOSE BLDC GLUCOMTR-MCNC: 240 MG/DL (ref 70–99)
GLUCOSE BLDC GLUCOMTR-MCNC: 292 MG/DL (ref 70–99)
GLUCOSE BLDC GLUCOMTR-MCNC: 339 MG/DL (ref 70–99)
GLUCOSE BLDC GLUCOMTR-MCNC: 412 MG/DL (ref 70–99)
GLUCOSE BLDC GLUCOMTR-MCNC: 415 MG/DL (ref 70–99)
GLUCOSE BLDC GLUCOMTR-MCNC: 99 MG/DL (ref 70–99)
GLUCOSE SERPL-MCNC: 244 MG/DL (ref 70–99)
HCO3 SERPL-SCNC: 25 MMOL/L (ref 22–29)
HCT VFR BLD AUTO: 36.7 % (ref 35–47)
HGB BLD-MCNC: 11.6 G/DL (ref 11.7–15.7)
INTERPRETATION ECG - MUSE: NORMAL
MCH RBC QN AUTO: 28.6 PG (ref 26.5–33)
MCHC RBC AUTO-ENTMCNC: 31.6 G/DL (ref 31.5–36.5)
MCV RBC AUTO: 90 FL (ref 78–100)
P AXIS - MUSE: 22 DEGREES
PLATELET # BLD AUTO: 218 10E3/UL (ref 150–450)
POTASSIUM SERPL-SCNC: 4.2 MMOL/L (ref 3.4–5.3)
PR INTERVAL - MUSE: 146 MS
QRS DURATION - MUSE: 82 MS
QT - MUSE: 394 MS
QTC - MUSE: 413 MS
R AXIS - MUSE: -28 DEGREES
RBC # BLD AUTO: 4.06 10E6/UL (ref 3.8–5.2)
SODIUM SERPL-SCNC: 145 MMOL/L (ref 135–145)
SYSTOLIC BLOOD PRESSURE - MUSE: NORMAL MMHG
T AXIS - MUSE: 14 DEGREES
VENTRICULAR RATE- MUSE: 66 BPM
WBC # BLD AUTO: 8.4 10E3/UL (ref 4–11)

## 2024-11-29 PROCEDURE — 82962 GLUCOSE BLOOD TEST: CPT

## 2024-11-29 PROCEDURE — 999N000157 HC STATISTIC RCP TIME EA 10 MIN

## 2024-11-29 PROCEDURE — 36415 COLL VENOUS BLD VENIPUNCTURE: CPT | Performed by: INTERNAL MEDICINE

## 2024-11-29 PROCEDURE — G0378 HOSPITAL OBSERVATION PER HR: HCPCS

## 2024-11-29 PROCEDURE — 85018 HEMOGLOBIN: CPT | Performed by: INTERNAL MEDICINE

## 2024-11-29 PROCEDURE — 999N000156 HC STATISTIC RCP CONSULT EA 30 MIN

## 2024-11-29 PROCEDURE — 80048 BASIC METABOLIC PNL TOTAL CA: CPT | Performed by: INTERNAL MEDICINE

## 2024-11-29 PROCEDURE — 250N000009 HC RX 250: Performed by: INTERNAL MEDICINE

## 2024-11-29 PROCEDURE — 250N000012 HC RX MED GY IP 250 OP 636 PS 637: Performed by: INTERNAL MEDICINE

## 2024-11-29 PROCEDURE — 97116 GAIT TRAINING THERAPY: CPT | Mod: GP

## 2024-11-29 PROCEDURE — 99233 SBSQ HOSP IP/OBS HIGH 50: CPT | Performed by: PHYSICIAN ASSISTANT

## 2024-11-29 PROCEDURE — 97161 PT EVAL LOW COMPLEX 20 MIN: CPT | Mod: GP

## 2024-11-29 PROCEDURE — 94640 AIRWAY INHALATION TREATMENT: CPT

## 2024-11-29 PROCEDURE — 85041 AUTOMATED RBC COUNT: CPT | Performed by: INTERNAL MEDICINE

## 2024-11-29 PROCEDURE — 250N000013 HC RX MED GY IP 250 OP 250 PS 637: Performed by: INTERNAL MEDICINE

## 2024-11-29 PROCEDURE — 84132 ASSAY OF SERUM POTASSIUM: CPT | Performed by: INTERNAL MEDICINE

## 2024-11-29 PROCEDURE — 250N000009 HC RX 250: Performed by: PHYSICIAN ASSISTANT

## 2024-11-29 PROCEDURE — 258N000003 HC RX IP 258 OP 636: Performed by: EMERGENCY MEDICINE

## 2024-11-29 PROCEDURE — 97530 THERAPEUTIC ACTIVITIES: CPT | Mod: GP

## 2024-11-29 RX ORDER — ATORVASTATIN CALCIUM 40 MG/1
40 TABLET, FILM COATED ORAL EVERY EVENING
Status: DISCONTINUED | OUTPATIENT
Start: 2024-11-29 | End: 2024-11-30 | Stop reason: HOSPADM

## 2024-11-29 RX ORDER — NICOTINE POLACRILEX 4 MG
15-30 LOZENGE BUCCAL
Status: DISCONTINUED | OUTPATIENT
Start: 2024-11-29 | End: 2024-11-29

## 2024-11-29 RX ORDER — ASPIRIN 81 MG/1
81 TABLET ORAL DAILY
Status: DISCONTINUED | OUTPATIENT
Start: 2024-11-29 | End: 2024-11-30 | Stop reason: HOSPADM

## 2024-11-29 RX ORDER — FUROSEMIDE 10 MG/ML
20 INJECTION INTRAMUSCULAR; INTRAVENOUS ONCE
Status: DISCONTINUED | OUTPATIENT
Start: 2024-11-29 | End: 2024-11-29

## 2024-11-29 RX ORDER — FUROSEMIDE 40 MG/1
40 TABLET ORAL DAILY
Status: DISCONTINUED | OUTPATIENT
Start: 2024-11-29 | End: 2024-11-30 | Stop reason: HOSPADM

## 2024-11-29 RX ORDER — IPRATROPIUM BROMIDE AND ALBUTEROL SULFATE 2.5; .5 MG/3ML; MG/3ML
3 SOLUTION RESPIRATORY (INHALATION)
Status: DISCONTINUED | OUTPATIENT
Start: 2024-11-29 | End: 2024-11-30 | Stop reason: HOSPADM

## 2024-11-29 RX ORDER — METOPROLOL SUCCINATE 50 MG/1
50 TABLET, EXTENDED RELEASE ORAL DAILY
Status: DISCONTINUED | OUTPATIENT
Start: 2024-11-29 | End: 2024-11-30 | Stop reason: HOSPADM

## 2024-11-29 RX ORDER — AMLODIPINE BESYLATE 5 MG/1
5 TABLET ORAL DAILY
Status: DISCONTINUED | OUTPATIENT
Start: 2024-11-29 | End: 2024-11-30 | Stop reason: HOSPADM

## 2024-11-29 RX ORDER — PREDNISONE 20 MG/1
40 TABLET ORAL DAILY
Status: DISCONTINUED | OUTPATIENT
Start: 2024-11-30 | End: 2024-11-30 | Stop reason: HOSPADM

## 2024-11-29 RX ORDER — ALBUTEROL SULFATE 0.83 MG/ML
2.5 SOLUTION RESPIRATORY (INHALATION) EVERY 4 HOURS PRN
COMMUNITY

## 2024-11-29 RX ORDER — ALBUTEROL SULFATE 0.83 MG/ML
2.5 SOLUTION RESPIRATORY (INHALATION) EVERY 4 HOURS PRN
Status: DISCONTINUED | OUTPATIENT
Start: 2024-11-29 | End: 2024-11-30 | Stop reason: HOSPADM

## 2024-11-29 RX ORDER — DEXTROSE MONOHYDRATE 25 G/50ML
25-50 INJECTION, SOLUTION INTRAVENOUS
Status: DISCONTINUED | OUTPATIENT
Start: 2024-11-29 | End: 2024-11-30 | Stop reason: HOSPADM

## 2024-11-29 RX ORDER — DEXTROSE MONOHYDRATE 25 G/50ML
25-50 INJECTION, SOLUTION INTRAVENOUS
Status: DISCONTINUED | OUTPATIENT
Start: 2024-11-29 | End: 2024-11-29

## 2024-11-29 RX ORDER — NICOTINE POLACRILEX 4 MG
15-30 LOZENGE BUCCAL
Status: DISCONTINUED | OUTPATIENT
Start: 2024-11-29 | End: 2024-11-30 | Stop reason: HOSPADM

## 2024-11-29 RX ORDER — LOSARTAN POTASSIUM 100 MG/1
100 TABLET ORAL DAILY
Status: DISCONTINUED | OUTPATIENT
Start: 2024-11-29 | End: 2024-11-30 | Stop reason: HOSPADM

## 2024-11-29 RX ADMIN — IPRATROPIUM BROMIDE AND ALBUTEROL SULFATE 3 ML: .5; 3 SOLUTION RESPIRATORY (INHALATION) at 11:48

## 2024-11-29 RX ADMIN — PREDNISONE 60 MG: 20 TABLET ORAL at 09:51

## 2024-11-29 RX ADMIN — ATORVASTATIN CALCIUM 40 MG: 40 TABLET, FILM COATED ORAL at 19:34

## 2024-11-29 RX ADMIN — IPRATROPIUM BROMIDE AND ALBUTEROL SULFATE 3 ML: .5; 3 SOLUTION RESPIRATORY (INHALATION) at 07:14

## 2024-11-29 RX ADMIN — PREDNISONE 60 MG: 20 TABLET ORAL at 01:14

## 2024-11-29 RX ADMIN — DOXYCYCLINE HYCLATE 100 MG: 100 CAPSULE ORAL at 01:19

## 2024-11-29 RX ADMIN — IPRATROPIUM BROMIDE AND ALBUTEROL SULFATE 3 ML: .5; 3 SOLUTION RESPIRATORY (INHALATION) at 01:14

## 2024-11-29 RX ADMIN — AMLODIPINE BESYLATE 5 MG: 5 TABLET ORAL at 09:51

## 2024-11-29 RX ADMIN — IPRATROPIUM BROMIDE AND ALBUTEROL SULFATE 3 ML: .5; 3 SOLUTION RESPIRATORY (INHALATION) at 20:51

## 2024-11-29 RX ADMIN — DOXYCYCLINE HYCLATE 100 MG: 100 CAPSULE ORAL at 19:34

## 2024-11-29 RX ADMIN — DOXYCYCLINE HYCLATE 100 MG: 100 CAPSULE ORAL at 09:51

## 2024-11-29 RX ADMIN — IPRATROPIUM BROMIDE AND ALBUTEROL SULFATE 3 ML: .5; 3 SOLUTION RESPIRATORY (INHALATION) at 15:18

## 2024-11-29 RX ADMIN — FUROSEMIDE 40 MG: 40 TABLET ORAL at 10:30

## 2024-11-29 RX ADMIN — METOPROLOL SUCCINATE 50 MG: 50 TABLET, EXTENDED RELEASE ORAL at 09:51

## 2024-11-29 RX ADMIN — LOSARTAN POTASSIUM 100 MG: 100 TABLET, FILM COATED ORAL at 10:31

## 2024-11-29 RX ADMIN — SODIUM CHLORIDE 1000 ML: 9 INJECTION, SOLUTION INTRAVENOUS at 01:14

## 2024-11-29 RX ADMIN — ASPIRIN 81 MG: 81 TABLET, COATED ORAL at 10:31

## 2024-11-29 ASSESSMENT — ACTIVITIES OF DAILY LIVING (ADL)
ADLS_ACUITY_SCORE: 68
ADLS_ACUITY_SCORE: 68
ADLS_ACUITY_SCORE: 58
ADLS_ACUITY_SCORE: 68
ADLS_ACUITY_SCORE: 68
ADLS_ACUITY_SCORE: 58
ADLS_ACUITY_SCORE: 68
ADLS_ACUITY_SCORE: 61
ADLS_ACUITY_SCORE: 68
ADLS_ACUITY_SCORE: 58
ADLS_ACUITY_SCORE: 68
ADLS_ACUITY_SCORE: 58
ADLS_ACUITY_SCORE: 68
ADLS_ACUITY_SCORE: 68

## 2024-11-29 NOTE — PLAN OF CARE
"Vitals are Temp: 98.4  F (36.9  C) Temp src: Oral BP: 136/80 Pulse: 88   Resp: 24 SpO2: 96 %.  Patient is Alert and Oriented x4 but forgetful. Denying pain. Patient is Saline locked. Pt is a Regular diet. Activity assist x2 with Gait Belt and walker. Right gluteal fold redness, applied foam dressing. Consult PT recommend Pivot transfer. Baseline assist x1 walker at home. Albuterol Neb for wheezing. Per son pt wears c-pap at [facility] with 1 LPM, on room air when awake.     PRIMARY DIAGNOSIS: Confusion, WEAKNESS    OUTPATIENT/OBSERVATION GOALS TO BE MET BEFORE DISCHARGE  1. Orthostatic performed: N/A    2. Tolerating PO medications: Yes    3. Return to near baseline physical activity: No    4. Cleared for discharge by consultants (if involved): No    Discharge Planner Nurse   Safe discharge environment identified: Yes  Barriers to discharge: Yes       Entered by: Columba Winchester RN 11/29/2024     Please review provider order for any additional goals.   Nurse to notify provider when observation goals have been met and patient is ready for discharge.    Problem: Adult Inpatient Plan of Care  Goal: Plan of Care Review  Description: The Plan of Care Review/Shift note should be completed every shift.  The Outcome Evaluation is a brief statement about your assessment that the patient is improving, declining, or no change.  This information will be displayed automatically on your shift  note.  Outcome: Progressing  Flowsheets (Taken 11/29/2024 8417)  Plan of Care Reviewed With: patient  Overall Patient Progress: improving  Goal: Patient-Specific Goal (Individualized)  Description: You can add care plan individualizations to a care plan. Examples of Individualization might be:  \"Parent requests to be called daily at 9am for status\", \"I have a hard time hearing out of my right ear\", or \"Do not touch me to wake me up as it startles  me\".  Outcome: Progressing  Goal: Absence of Hospital-Acquired Illness or Injury  Outcome: " Progressing  Intervention: Identify and Manage Fall Risk  Recent Flowsheet Documentation  Taken 11/29/2024 1113 by Columba Winchester RN  Safety Promotion/Fall Prevention:   activity supervised   clutter free environment maintained   nonskid shoes/slippers when out of bed   room near nurse's station   safety round/check completed   supervised activity  Intervention: Prevent and Manage VTE (Venous Thromboembolism) Risk  Recent Flowsheet Documentation  Taken 11/29/2024 1113 by Columba Winchester RN  VTE Prevention/Management: SCDs on (sequential compression devices)  Goal: Optimal Comfort and Wellbeing  Outcome: Progressing  Goal: Readiness for Transition of Care  Outcome: Progressing     Goal Outcome Evaluation:      Plan of Care Reviewed With: patient    Overall Patient Progress: improvingOverall Patient Progress: improving

## 2024-11-29 NOTE — PROVIDER NOTIFICATION
11/29/24 0145   RCAT Assessment   Reason for Assessment COPD   Pulmonary Status 4   Surgical Status 0   Chest X-ray 1   Respiratory Pattern 1   Mental Status 0   Breath Sounds 4   Cough Effectiveness 0   Level of Activity 1   O2 Required for SpO2>=92% 0   Acuity Level (points) 11   Acuity Level  3   Re-eval Interval Guideline Every 3 days   Re-evaluation Date 12/02/24   Clinical Indications/Symptoms   Aerosol Therapy RCAT protocol   Broncho-pulmonary Hygiene History of mucous producing disease   Volume Expansion Prevent atelectasis   Aerosol Therapy Plan   RT Treatment Nebulizer   Anticholinergic/Beta-Andrenergic Agonist Duoneb soln (0.5mg/3mg per 3mL) neb Max 6 doses/24h   Aerosol Treatment Frequency Acuity Level 3: QID/PRN @noc-Mod wheezing/Hx asthma/secretion removal   Broncho-Pulmonary Hygiene Plan   Broncho-Pulmonary Hygiene Treatment Coughing techniques   Broncho-Pulm Hygiene Frequency Acuity Level 3: TID-Small amounts secretions/poor cough, hx of secretions   Volume Expansion Plan   Volume Expansion Treatment Incentive Spirometer   Volume Expansion Frequency Acuity Level 3: TID-At risk for developing atelectasis   Breath Sounds   Breath Sounds All Fields   All Lung Fields Breath Sounds Anterior:;Lateral:;wheezes, expiratory     Pt evaluated for neb treatment.     BP (!) 150/127   Pulse 68   Temp 97.4  F (36.3  C) (Temporal)   Resp 22   LMP  (LMP Unknown)   SpO2 97%     Juancarlos Engle, RT on 11/29/2024 at 2:10 AM.

## 2024-11-29 NOTE — PLAN OF CARE
"Vitals are Temp: 98.3  F (36.8  C) Temp src: Oral BP: (!) 155/78 Pulse: 104   Resp: 20 SpO2: 96 %.  Patient is Alert and Oriented x4 but forgetful. Denying pain. Patient is Saline locked. Pt is a Regular diet. Activity assist x2 with Gait Belt and walker. Right gluteal fold redness, applied foam dressing.     PRIMARY DIAGNOSIS: Confusion, WEAKNESS    OUTPATIENT/OBSERVATION GOALS TO BE MET BEFORE DISCHARGE  1. Orthostatic performed: N/A    2. Tolerating PO medications: Yes    3. Return to near baseline physical activity: No    4. Cleared for discharge by consultants (if involved): No    Discharge Planner Nurse   Safe discharge environment identified: Yes  Barriers to discharge: Yes       Entered by: Columba Winchester RN 11/29/2024     Please review provider order for any additional goals.   Nurse to notify provider when observation goals have been met and patient is ready for discharge.    Problem: Adult Inpatient Plan of Care  Goal: Plan of Care Review  Description: The Plan of Care Review/Shift note should be completed every shift.  The Outcome Evaluation is a brief statement about your assessment that the patient is improving, declining, or no change.  This information will be displayed automatically on your shift  note.  Outcome: Progressing  Flowsheets (Taken 11/29/2024 1337)  Plan of Care Reviewed With: patient  Overall Patient Progress: improving  Goal: Patient-Specific Goal (Individualized)  Description: You can add care plan individualizations to a care plan. Examples of Individualization might be:  \"Parent requests to be called daily at 9am for status\", \"I have a hard time hearing out of my right ear\", or \"Do not touch me to wake me up as it startles  me\".  Outcome: Progressing  Goal: Absence of Hospital-Acquired Illness or Injury  Outcome: Progressing  Intervention: Identify and Manage Fall Risk  Recent Flowsheet Documentation  Taken 11/29/2024 1113 by Columba Winchester RN  Safety Promotion/Fall Prevention:   activity " supervised   clutter free environment maintained   nonskid shoes/slippers when out of bed   room near nurse's station   safety round/check completed   supervised activity  Intervention: Prevent and Manage VTE (Venous Thromboembolism) Risk  Recent Flowsheet Documentation  Taken 11/29/2024 1113 by Columba Winchester RN  VTE Prevention/Management: SCDs on (sequential compression devices)  Goal: Optimal Comfort and Wellbeing  Outcome: Progressing  Goal: Readiness for Transition of Care  Outcome: Progressing     Goal Outcome Evaluation:      Plan of Care Reviewed With: patient    Overall Patient Progress: improvingOverall Patient Progress: improving

## 2024-11-29 NOTE — PLAN OF CARE
"Vitals are Temp: 98.4  F (36.9  C) Temp src: Oral BP: 136/80 Pulse: 88   Resp: 24 SpO2: 96 %.  Patient is Alert and Oriented x4 but forgetful. Denying pain. Patient is Saline locked. Pt is a Regular diet. Activity assist x2 with Gait Belt and walker. Right gluteal fold redness, applied foam dressing.     PRIMARY DIAGNOSIS: Confusion, WEAKNESS    OUTPATIENT/OBSERVATION GOALS TO BE MET BEFORE DISCHARGE  1. Orthostatic performed: N/A    2. Tolerating PO medications: Yes    3. Return to near baseline physical activity: No    4. Cleared for discharge by consultants (if involved): No    Discharge Planner Nurse   Safe discharge environment identified: Yes  Barriers to discharge: Yes       Entered by: Columba Winchester RN 11/29/2024     Please review provider order for any additional goals.   Nurse to notify provider when observation goals have been met and patient is ready for discharge.    Problem: Adult Inpatient Plan of Care  Goal: Plan of Care Review  Description: The Plan of Care Review/Shift note should be completed every shift.  The Outcome Evaluation is a brief statement about your assessment that the patient is improving, declining, or no change.  This information will be displayed automatically on your shift  note.  Outcome: Progressing  Flowsheets (Taken 11/29/2024 1337)  Plan of Care Reviewed With: patient  Overall Patient Progress: improving  Goal: Patient-Specific Goal (Individualized)  Description: You can add care plan individualizations to a care plan. Examples of Individualization might be:  \"Parent requests to be called daily at 9am for status\", \"I have a hard time hearing out of my right ear\", or \"Do not touch me to wake me up as it startles  me\".  Outcome: Progressing  Goal: Absence of Hospital-Acquired Illness or Injury  Outcome: Progressing  Intervention: Identify and Manage Fall Risk  Recent Flowsheet Documentation  Taken 11/29/2024 1113 by Columba Winchester RN  Safety Promotion/Fall Prevention:   activity " supervised   clutter free environment maintained   nonskid shoes/slippers when out of bed   room near nurse's station   safety round/check completed   supervised activity  Intervention: Prevent and Manage VTE (Venous Thromboembolism) Risk  Recent Flowsheet Documentation  Taken 11/29/2024 1113 by Columba Winchester RN  VTE Prevention/Management: SCDs on (sequential compression devices)  Goal: Optimal Comfort and Wellbeing  Outcome: Progressing  Goal: Readiness for Transition of Care  Outcome: Progressing     Goal Outcome Evaluation:      Plan of Care Reviewed With: patient    Overall Patient Progress: improvingOverall Patient Progress: improving

## 2024-11-29 NOTE — ED NOTES
[facility]  ED Nurse Handoff Report    ED Chief complaint: Cough  . ED Diagnosis:   Final diagnoses:   Weakness   Confusion       Allergies: No Known Allergies    Code Status: Full Code    Activity level - Baseline/Home:  independent.  Activity Level - Current:   assist of 1.   Lift room needed: No.   Bariatric: No   Needed: Yes, Mano  needed.   Isolation: No.   Infection: Not Applicable.     Respiratory status: Room air    Vital Signs (within 30 minutes):   Vitals:    11/28/24 1719   BP: (!) 141/103   Pulse: 84   Resp: 18   Temp: 97.4  F (36.3  C)   TempSrc: Temporal   SpO2: 95%       Cardiac Rhythm:  ,      Pain level:    Patient confused: Yes.   Patient Falls Risk: nonskid shoes/slippers when out of bed, patient and family education, assistive device/personal items within reach, and activity supervised.   Elimination Status: Has voided     Patient Report - Initial Complaint: Cough.   Focused Assessment: Pilo Quintana is a 74 year old female who unfortunately speaks another language her son however did come with her to help interpret.  The patient sounds like he she was quite conversant this morning and had been able to use her lower extremities however at noon had fairly acute onset of altered mental status weakness of her lower extremities.  Here she would not answer questions she would follow some commands but could not lift her lower legs.  The fact that this is bilateral let me do feel that this is less likely to be due to a primary neurologic event however a tier 2 activation was started I discussed the case with stroke neurology who recommended a CT and CT a.  Unfortunate there was a delay with the IV access the CT was discussed with the radiologist I did call back stroke neurology on several occasions the decision was made that this is less likely be due to a stroke and they recommended canceling and going with a MRI/MRA this was ordered.  I did consider that  her altered mental status could be due to endocrine problems such as hypoglycemia but this was not the case infection was considered hypoxia she does have a history of COPD and the signs that she been wheezing however I do not appreciate wheezing now she is adequately oxygenating.  I considered alcohol or intoxicants however these are not present.  The lactate is slightly elevated but is trending down patient is MRI did not show signs of a stroke the exact cause of her symptoms are not known but her symptoms are rapidly improving there is no signs of a vascular deficit and she was admitted to observation status.      Abnormal Results:   Labs Ordered and Resulted from Time of ED Arrival to Time of ED Departure   BASIC METABOLIC PANEL - Abnormal       Result Value    Sodium 142      Potassium 4.0      Chloride 99      Carbon Dioxide (CO2) 27      Anion Gap 16 (*)     Urea Nitrogen 22.9      Creatinine 0.87      GFR Estimate 69      Calcium 9.8      Glucose 136 (*)    TROPONIN T, HIGH SENSITIVITY - Abnormal    Troponin T, High Sensitivity 20 (*)    BLOOD GAS VENOUS - Abnormal    pH Venous 7.35      pCO2 Venous 61 (*)     pO2 Venous 23 (*)     Bicarbonate Venous 33 (*)     Base Excess/Deficit Venous 5.9 (*)     FIO2 21      Oxyhemoglobin Venous 35 (*)     O2 Sat, Venous 35.3 (*)    LACTIC ACID WHOLE BLOOD WITH 1X REPEAT IN 2 HR WHEN >2 - Abnormal    Lactic Acid, Initial 2.8 (*)    GLUCOSE BY METER - Abnormal    GLUCOSE BY METER POCT 226 (*)    CBC WITH PLATELETS AND DIFFERENTIAL - Abnormal    WBC Count 6.4      RBC Count 4.13      Hemoglobin 11.7      Hematocrit 37.3      MCV 90      MCH 28.3      MCHC 31.4 (*)     RDW 13.8      Platelet Count 262      % Neutrophils 59      % Lymphocytes 30      % Monocytes 7      % Eosinophils 3      % Basophils 0      % Immature Granulocytes 1      NRBCs per 100 WBC 0      Absolute Neutrophils 3.8      Absolute Lymphocytes 1.9      Absolute Monocytes 0.4      Absolute Eosinophils 0.2       Absolute Basophils 0.0      Absolute Immature Granulocytes 0.0      Absolute NRBCs 0.0     LACTIC ACID WHOLE BLOOD - Abnormal    Lactic Acid 2.5 (*)    INR - Normal    INR 0.99     PARTIAL THROMBOPLASTIN TIME - Normal    aPTT 27     PROLACTIN - Normal    Prolactin 16     ETHYL ALCOHOL LEVEL - Normal    Alcohol ethyl <0.01     ROUTINE UA WITH MICROSCOPIC - Normal    Color Urine Straw      Appearance Urine Clear      Glucose Urine Negative      Bilirubin Urine Negative      Ketones Urine Negative      Specific Gravity Urine 1.005      Blood Urine Negative      pH Urine 6.5      Protein Albumin Urine Negative      Urobilinogen Urine Normal      Nitrite Urine Negative      Leukocyte Esterase Urine Negative      RBC Urine <1      WBC Urine 1      Squamous Epithelials Urine 1     INFLUENZA A/B, RSV AND SARS-COV2 PCR - Normal    Influenza A PCR Negative      Influenza B PCR Negative      RSV PCR Negative      SARS CoV2 PCR Negative     URINE DRUG SCREEN PANEL - Normal    Amphetamines Urine Screen Negative      Barbituates Urine Screen Negative      Benzodiazepine Urine Screen Negative      Cannabinoids Urine Screen Negative      Cocaine Urine Screen Negative      Fentanyl Qual Urine Screen Negative      Opiates Urine Screen Negative      PCP Urine Screen Negative     NT PROBNP INPATIENT - Normal    N terminal Pro BNP Inpatient 81     GLUCOSE MONITOR NURSING POCT   GLUCOSE MONITOR NURSING POCT   BLOOD CULTURE        XR Chest Port 1 View   Final Result   IMPRESSION: Stable linear scarring in the left lung. Stable cardiomediastinal silhouette. No acute cardiopulmonary process.      MRA Brain ([facility]) wo & w Contrast   Final Result   IMPRESSION:   1.  Within the limits of motion, no large vessel occlusion or flow-limiting stenosis.   2.  Stable 2 mm aneurysm arising at the right middle cerebral artery bifurcation.      MR Brain w/o Contrast   Final Result   IMPRESSION:   HEAD MRI:    1.  No acute intracranial  process.   2.  Generalized brain atrophy and presumed microvascular ischemic changes as detailed above.   3.  Chronic lacunar infarcts in the casimiro.   4.  Small chronic hemorrhagic infarct in the right cerebellar hemisphere.      NECK MRA:   1.  Images are degraded by motion.   2.  Within this limitation, no hemodynamically significant stenosis or dissection identified.      MRA Angiogram Neck w/o Contrast   Final Result   IMPRESSION:   HEAD MRI:    1.  No acute intracranial process.   2.  Generalized brain atrophy and presumed microvascular ischemic changes as detailed above.   3.  Chronic lacunar infarcts in the casimiro.   4.  Small chronic hemorrhagic infarct in the right cerebellar hemisphere.      NECK MRA:   1.  Images are degraded by motion.   2.  Within this limitation, no hemodynamically significant stenosis or dissection identified.      CT Head w/o Contrast   Final Result   IMPRESSION:   1.  No acute intracranial process.      Results were called to Dr. Mckay at 11/28/2024 5:57 PM CST.       CTA Head Neck with Contrast    (Results Pending)   CT Head Perfusion w Contrast - For Tier 2 Stroke    (Results Pending)       Treatments provided: See MAR  Family Comments: Son was at bedside but had to leave for work. Please call Balwinder albert with any updates [phone]  [facility] brochure/video discussed/provided to patient:  Yes  ED Medications:   Medications   sodium chloride 0.9% BOLUS 1,000 mL (has no administration in time range)   acetaminophen (TYLENOL) tablet 650 mg (has no administration in time range)     Or   acetaminophen (TYLENOL) Suppository 650 mg (has no administration in time range)   senna-docusate (SENOKOT-S/PERICOLACE) 8.6-50 MG per tablet 1 tablet (has no administration in time range)     Or   senna-docusate (SENOKOT-S/PERICOLACE) 8.6-50 MG per tablet 2 tablet (has no administration in time range)   ondansetron (ZOFRAN ODT) ODT tab 4 mg (has no administration in time range)     Or   ondansetron  (ZOFRAN) injection 4 mg (has no administration in time range)   prochlorperazine (COMPAZINE) injection 5 mg (has no administration in time range)     Or   prochlorperazine (COMPAZINE) tablet 5 mg (has no administration in time range)   nicotine (COMMIT) lozenge 2 mg (has no administration in time range)   ipratropium - albuterol 0.5 mg/2.5 mg/3 mL (DUONEB) neb solution 3 mL (has no administration in time range)   ipratropium - albuterol 0.5 mg/2.5 mg/3 mL (DUONEB) neb solution 3 mL (has no administration in time range)   predniSONE (DELTASONE) tablet 60 mg (has no administration in time range)   doxycycline hyclate (VIBRAMYCIN) capsule 100 mg (has no administration in time range)   iopamidol (ISOVUE-370) solution 117 mL (117 mLs Intravenous Not Given 11/28/24 1746)     And   sodium chloride 0.9 % bag for CT scan flush use (62 mLs As instructed Not Given 11/28/24 1747)   sodium chloride 0.9% BOLUS 1,000 mL (1,000 mLs Intravenous $New Bag 11/28/24 2051)   gadobutrol (GADAVIST) injection 10 mL (10 mLs Intravenous $Given 11/28/24 2128)   sodium chloride 0.9% BOLUS 50 mL (50 mLs Intravenous $New Bag 11/28/24 2128)   LORazepam (ATIVAN) injection 2 mg (2 mg Intravenous $Given 11/28/24 2231)       Drips infusing:  No  For the majority of the shift this patient was Green.   Interventions performed were N/A.    Sepsis treatment initiated: No    Cares/treatment/interventions/medications to be completed following ED care: Refer to admission care plan.    ED Nurse Name: Libertad Odom RN  11:44 PM

## 2024-11-29 NOTE — PROGRESS NOTES
11/29/24 1525   Appointment Info   Signing Clinician's Name / Credentials (PT) Jessica Blanchard DPT   Quick Adds   Quick Adds Certification       Present yes   Language son interpreting   Living Environment   People in Home child(mahi), adult   Current Living Arrangements house   Home Accessibility wheelchair accessible   Transportation Anticipated family or friend will provide   Living Environment Comments Pt lives with son. Son reports having ramp to enter, stair lift in home. Son works overnights to assist wtih mother all day. Walk in shower wtih seat and grab bars, also has commode.   Self-Care   Usual Activity Tolerance moderate   Current Activity Tolerance fair   Regular Exercise No   Equipment Currently Used at Home shower chair;walker, rolling;wheelchair, manual;grab bar, toilet;grab bar, tub/shower;commode chair   Fall history within last six months no   Activity/Exercise/Self-Care Comment Son reports assisting wtih all mobility and ADLs at baseline. Reports pt can walk within home using walker, son is nearby to assist as needed. Pt can get into shower with son SBA, son assist wtih bathing/dressing/etc. Uses wheelchair when leaving the home. Son max assists into bed.   General Information   Onset of Illness/Injury or Date of Surgery 11/28/24   Referring Physician Jermaine Manley MD   Patient/Family Therapy Goals Statement (PT) fall   Pertinent History of Current Problem (include personal factors and/or comorbidities that impact the POC) 74F [place]  female (speaks a native [place]  dialect) with history of COPD, hypertension, MAGALIS on CPAP, NIDDM Type II, dementia, and CVA presents with generalized weakness, wheezing, and cough.  Symptoms somewhat scattered and patient underwent MRI imaging to rule out stroke which was negative.  Suspect symptoms due to underlying COPD and will treat for this.  Workup and treatment per below.   Existing Precautions/Restrictions fall    Cognition   Follows Commands (Cognition) follows one-step commands;over 90% accuracy   Pain Assessment   Patient Currently in Pain No   Integumentary/Edema   Integumentary/Edema no deficits were identifed   Posture    Posture Forward head position;Protracted shoulders   Range of Motion (ROM)   Range of Motion ROM is WFL   Strength (Manual Muscle Testing)   Strength (Manual Muscle Testing) Deficits observed during functional mobility   Bed Mobility   Comment, (Bed Mobility) sit>supine wtih max A   Transfers   Comment, (Transfers) sit<>stand wtih FWW and mod A   Gait/Stairs (Locomotion)   Comment, (Gait/Stairs) amb with FWW and mod A   Balance   Balance Comments impaired; needing FWW and mod A   Clinical Impression   Criteria for Skilled Therapeutic Intervention Yes, treatment indicated   PT Diagnosis (PT) impaired functional mobility   Influenced by the following impairments weakness, deconditioning, impaired balance   Functional limitations due to impairments diffiuclty with bed mobility, tranfsers, ambulation   Clinical Presentation (PT Evaluation Complexity) stable   Clinical Presentation Rationale clinical judgement   Clinical Decision Making (Complexity) low complexity   Planned Therapy Interventions (PT) balance training;bed mobility training;gait training;home exercise program;neuromuscular re-education;patient/family education;ROM (range of motion);strengthening;transfer training;progressive activity/exercise;risk factor education;wheelchair management/propulsion training;home program guidelines   Risk & Benefits of therapy have been explained care plan/treatment goals reviewed;evaluation/treatment results reviewed;risks/benefits reviewed;current/potential barriers reviewed;participants voiced agreement with care plan;participants included;patient;son   PT Total Evaluation Time   PT Eval, Low Complexity Minutes (51462) 10   Therapy Certification   Start of care date 11/29/24   Certification date from  11/29/24   Certification date to 12/06/24   Medical Diagnosis weakness   Physical Therapy Goals   PT Frequency Daily   PT Predicted Duration/Target Date for Goal Attainment 12/06/24   PT Goals Bed Mobility;Transfers;Gait   PT: Bed Mobility Moderate assist;Supine to/from sit   PT: Transfers Minimal assist;Sit to/from stand;Assistive device   PT: Gait Minimal assist;Assistive device;50 feet   PT Discharge Planning   PT Plan PT: repeated STS, progress gait wtih w/c follow   PT Discharge Recommendation (DC Rec) home with assist;home with home care physical therapy   PT Rationale for DC Rec Patient is below baseline functional mobility. Pt lives with son, son assists with ADLs and mobility as needed. Pt currently needing mod assist for transfers and ambulation of 15 feet, very fatigued following. Anticipate wtih continued skilled IP PT that pt will meet goals and be able to d/c home with assist of son. Rec HHPT to progress strength and IND mobility.   PT Brief overview of current status A x 2 FWW SPT   Physical Therapy Time and Intention   Total Session Time (sum of timed and untimed services) [address] [place] [address]  OUTPATIENT PHYSICAL THERAPY EVALUATION  PLAN OF TREATMENT FOR OUTPATIENT REHABILITATION  (COMPLETE FOR INITIAL CLAIMS ONLY)  Patient's Last Name, First Name, M.I.  YOB: 1950  Pilo Quintana                        Provider's Name  [facility] Medical Record No.  [id number]                             Onset Date:  11/28/24   Start of Care Date:  11/29/24   Type:     _X_PT   ___OT   ___SLP Medical Diagnosis:  weakness              PT Diagnosis:  impaired functional mobility Visits from SOC:  1     See note for plan of treatment, functional goals and certification details    I CERTIFY THE NEED FOR THESE SERVICES FURNISHED UNDER        THIS PLAN OF TREATMENT AND WHILE UNDER MY CARE     (Physician co-signature of this document indicates  review and certification of the therapy plan).

## 2024-11-29 NOTE — PHARMACY-ADMISSION MEDICATION HISTORY
Pharmacy Intern Admission Medication History    Admission medication history is complete. The information provided in this note is only as accurate as the sources available at the time of the update.    Information Source(s): Family member (Son- Balwinder) and CareEverywhere/SureScripts via in-person    Pertinent Information: Spoke with son to verify medications and last doses.     Changes made to PTA medication list:  Added: None  Deleted: DuoNeb  Changed: Albuterol Neb Soln 2.5 mg every 6 hours prn -> Albuterol Neb Soln 2.5 mg every 4 hours as needed    Allergies reviewed with patient and updates made in EHR: yes    Medication History Completed By: Wei Barahona 11/29/2024 9:38 AM    PTA Med List   Medication Sig Last Dose/Taking    acetaminophen (TYLENOL) 500 MG tablet Take 500-1,000 mg by mouth every 8 hours as needed for mild pain Unknown    albuterol (PROAIR HFA/PROVENTIL HFA/VENTOLIN HFA) 108 (90 Base) MCG/ACT inhaler Inhale 2 puffs into the lungs every 4 hours as needed for wheezing Unknown    albuterol (PROVENTIL) (2.5 MG/3ML) 0.083% neb solution Take 2.5 mg by nebulization every 4 hours as needed for wheezing. Unknown    amLODIPine (NORVASC) 5 MG tablet Take 5 mg by mouth daily 11/28/2024 Morning    aspirin (ASA) 81 MG EC tablet Take 1 tablet (81 mg) by mouth daily 11/28/2024 Morning    atorvastatin (LIPITOR) 40 MG tablet Take 1 tablet (40 mg) by mouth every evening 11/27/2024 Evening    budesonide-formoterol (SYMBICORT) 160-4.5 MCG/ACT Inhaler Inhale 2 puffs into the lungs two times daily 11/28/2024 Morning    Cholecalciferol (VITAMIN D3) 50 MCG (2000 UT) CAPS Take 50 mcg by mouth daily 11/28/2024 Morning    furosemide (LASIX) 40 MG tablet Take 40 mg by mouth daily 11/28/2024 Morning    losartan (COZAAR) 100 MG tablet Take 100 mg by mouth daily 11/28/2024 Morning    metFORMIN (GLUCOPHAGE XR) 500 MG 24 hr tablet Take 500 mg by mouth 2 times daily (with meals) 11/28/2024 Noon    metoprolol succinate ER (TOPROL  XL) 50 MG 24 hr tablet Take 50 mg by mouth daily 11/28/2024 Morning    nystatin (NYSTOP) 502524 UNIT/GM external powder Apply topically 2 times daily as needed Unknown    triamcinolone (KENALOG) 0.1 % external ointment Apply topically 2 times daily as needed for irritation Unknown

## 2024-11-29 NOTE — CONSULTS
Care Management Initial Consult    General Information  Assessment completed with: Balwinder Turcios  Type of CM/SW Visit: Initial Assessment    Primary Care Provider verified and updated as needed: Yes   Readmission within the last 30 days:     Reason for Consult: discharge planning  Advance Care Planning: Advance Care Planning Reviewed: present on chart        Communication Assessment  Patient's communication style: spoken language (non-English)    Hearing Difficulty or Deaf: yes      Cognitive  Cognitive/Neuro/Behavioral: WDL                      Living Environment:   People in home: child(mahi), adult     Current living Arrangements: house      Able to return to prior arrangements: yes     Family/Social Support:  Care provided by: child(mahi)  Provides care for: no one, unable/limited ability to care for self  Marital Status:   Support system: Children          Description of Support System: Supportive, Involved    Support Assessment: Adequate family and caregiver support, Adequate social supports    Current Resources:   Patient receiving home care services: No  Community Resources:    Equipment currently used at home: shower chair, walker, rolling, wheelchair, manual, grab bar, toilet, grab bar, tub/shower, commode chair    Employment/Financial:  Employment Status: retired      Financial Concerns: none   Referral to Financial Worker: No     Does the patient's insurance plan have a 3 day qualifying hospital stay waiver?  No    Lifestyle & Psychosocial Needs:  Social Drivers of Health     Food Insecurity: Low Risk  (11/29/2024)    Food Insecurity     Within the past 12 months, did you worry that your food would run out before you got money to buy more?: No     Within the past 12 months, did the food you bought just not last and you didn t have money to get more?: No   Depression: Not at risk (1/29/2024)    [facility] from [facility]    PHQ-2     PHQ-2 Score: 0   Housing Stability: Low Risk   (11/29/2024)    Housing Stability     Do you have housing? : Yes     Are you worried about losing your housing?: No   Tobacco Use: Low Risk  (11/20/2024)    Received from [facility]    Patient History     Smoking Tobacco Use: Never     Smokeless Tobacco Use: Never     Passive Exposure: Not on file   Financial Resource Strain: Low Risk  (11/29/2024)    Financial Resource Strain     Within the past 12 months, have you or your family members you live with been unable to get utilities (heat, electricity) when it was really needed?: No   Alcohol Use: Not on file   Transportation Needs: Low Risk  (11/29/2024)    Transportation Needs     Within the past 12 months, has lack of transportation kept you from medical appointments, getting your medicines, non-medical meetings or appointments, work, or from getting things that you need?: No   Physical Activity: Not on file   Interpersonal Safety: Not on file   Stress: Not on file   Social Connections: Not on file   Health Literacy: Not on file     Functional Status:  Prior to admission patient needed assistance:   Patient was admitted under observation status for weakness, confusion.     SW completed initial assessment with pt's son Balwinder via phone ([phone]) d/t patient's cognition.   Patient lives with son. Son assists with all mobility (SBA/Ax1) and ADLs at baseline. Son works overnights and is home with patient during the day. Pt has a stair lift at home and uses a WC for mobility outside of the home.     PT evaluated patient and recommend home with HC PT. Family agreeable, they prefer [facility] as pt used this agency most recently and had a positive experience.      Mental Health Status:  Mental Health Status: No Current Concerns       Chemical Dependency Status:  Chemical Dependency Status: No Current Concerns           Values/Beliefs:  Spiritual, Cultural Beliefs, [employer] Practices, Values that affect care: yes             Discussed  Partnership in  Safe Discharge Planning  document with patient/family: Yes    Additional Information:  Plan: Home with HC PT, initial referral sent via HC hub 11/29. Awaiting accepting agency. Son to transport at discharge. SW will continue to follow.     SCOTT Gonzalez, [facility]   Inpatient Care Coordination  [facility]   [phone]

## 2024-11-29 NOTE — PLAN OF CARE
ROOM # 222    Living Situation (if not independent, order SW consult): Home with Son  Facility name:  : [facility] (Son)  [phone] (Mobile)     Activity level at baseline: Assist x1 with walker  Activity level on admit: Assist x2 with walker    Who will be transporting you at discharge: [facility]    Patient registered to observation; given Patient Bill of Rights; given the opportunity to ask questions about observation status and their plan of care.  Patient has been oriented to the observation room, bathroom and call light is in place.    Discussed discharge goals and expectations with patient/family.

## 2024-11-29 NOTE — PROGRESS NOTES
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    In the ED, VSS and no hypoxia. Patient was reportedly nonverbal on arrival and was not moving lower extremities so code stroke was called. MRI imaging degraded by motion but no evidence of acute infarct. Symptoms resolved. Labs only revealing of lactate 2.8 given 2 L normal saline and lactate decreased. CXR negative. Admitted to observation, started on steroids and doxycycline for RAD exacerbation.     Suspect COPD Exacerbation  Chronic Hypercapnic Respiratory Failure 2/2 probable COPD vs Asthma, MAGALIS, Pulmonary HTN   Symptoms improving, patient denies any dyspnea at rest or chest pain. Has 1 month of cough and wheezing that is mostly upper airway on exam. No signs of associated pneumonia. Does have mild LE edema.   History of reported COPD vs Asthma, seen by Pulmonology 11/2023 per report November; PFTs were difficult to interpret due to difficulty doing the test. She has sleep apnea now on CPAP. In the past had been on home oxygen, and previously diagnosed with probable Pulmonary HTN.   Treating for possible COPD exacerbation.  -continue Prednisone at 40 mg as started x5 days, anticipate taper needed at discharge  -Scheduled and as needed nebulizers  -Doxycycline 100 mg twice daily  -add albuterol PRN nebulizer for upper airway wheezing  -uses symbicort at home continue here if able to use home supply   -f/up with Pulmonology  outpatient   -ambulate, road test: pending discharge late today vs tomorrow     Possible Mild Acute Encephalopathy in the setting of Chronic Cognitive Impairment, Resolved  Had episode of global weakness, decreased verbal responsiveness the afternoon 11/28. Symptoms resolved.  Imaging negative for any acute vascular cause, EtOH level, drug test negative etc.  Suspect that the patient had been experiencing the symptoms at home due to respiratory symptoms rather than neurologic issue.    -monitor   -PT pending     HFpEF   Has mild LE edema, weight pending but overall does not appear acutely decompensated. Follows with [facility].   -resume PTA losartan, lasix, Metoprolol, amlodipine    NIDDM Type II  Last A1c 7.7 on metformin prior to admission, previously was also on glipizide which was discontinued in previous hospitalizations.  -Monitor blood sugars, at risk for steroid induced hyperglycemia   -Sliding scale insulin as needed   -Resume metformin likely 11/30 held due to borderline elevated lactic acid    Dementia, Chronic CVA, H/off pontine ICH with residual right hemiplegia   May 2023 with a degree of residual right sided weakness.   -resume PTA aspirin, statin   -cared for by patient's son in the home. Recently graduated Home PT. Due to global weakness, PT eval was requested     Renal Oncocytoma  -being followed with serial US and [facility]     MAGALIS  -treated with BiPAP nightly        Diet: Moderate Consistent Carb (60 g CHO per Meal) Diet    DVT Prophylaxis: Pneumatic Compression Devices  Morrison Catheter: Not present  Lines: None     Cardiac Monitoring: None  Code Status: Full Code      Clinically Significant Risk Factors Present on Admission                 # Drug Induced Platelet Defect: home medication list includes an antiplatelet medication   # Hypertension: Noted on problem list          # DMII: A1C = N/A within past 6 months        # Financial/Environmental Concerns:           Disposition Plan      Expected  Discharge Date: 11/30/2024               Medically Ready for Discharge: Anticipated Tomorrow        Daniela Barragan PA-C  Hospitalist Service    Securely message with [facility] (more info)  Text page via [facility] Paging/Directory   ______________________________________________________________________    Interval History   Evaluation facilitated using the patient's son, Balwinder for interpretation, whom is at bedside.     Afebrile. Mild cough present. Upper airway transmitted wheezing sounds. Denies CP, dyspnea.  Patient's son states that the patient has overall looking improved compared to yesterday afternoon.  He denies any concerns regarding neurologic symptoms and feels that she is near her baseline.     Physical Exam   Vital Signs: Temp: 97.7  F (36.5  C) Temp src: Oral BP: (!) 163/96 Pulse: 118   Resp: 20 SpO2: 95 % O2 Device: None (Room air) Oxygen Delivery: 1 LPM  Weight: 0 lbs 0 oz    Constitutional: Awake, alert, no apparent distress  Respiratory: Mildly tachypneic at rest with scattered upper airway transmitted squeaking or wheezing noises.  Diminished lung sounds bilaterally without crackles or expiratory wheezing at bases.    Cardiovascular: Regular rate and rhythm, normal S1 and S2, and no murmur appreciated.   GI: Bowel sounds present. soft, non-distended, non-tender.   Skin/Integument: Warm, dry. +1 bilateral LE edema.  Neuro: Awake, alert.  Oriented to situation per son.  Conversant with son, minimally verbal however.  Follows commands.  Moving extremities independently in the chair.           Medical Decision Making       55 MINUTES SPENT BY ME on the date of service doing chart review, history, exam, documentation & further activities per the note.      Data   ------------------------- PAST 24 HR DATA REVIEWED -----------------------------------------------[id number]}

## 2024-11-29 NOTE — ED NOTES
Balwinder (son) would like updates when results come in. [phone]   There are no Wet Read(s) to document.

## 2024-11-30 ENCOUNTER — APPOINTMENT (OUTPATIENT)
Dept: PHYSICAL THERAPY | Facility: CLINIC | Age: 74
End: 2024-11-30
Payer: COMMERCIAL

## 2024-11-30 VITALS
TEMPERATURE: 98.1 F | OXYGEN SATURATION: 97 % | DIASTOLIC BLOOD PRESSURE: 73 MMHG | RESPIRATION RATE: 18 BRPM | HEART RATE: 70 BPM | SYSTOLIC BLOOD PRESSURE: 139 MMHG

## 2024-11-30 LAB
ANION GAP SERPL CALCULATED.3IONS-SCNC: 13 MMOL/L (ref 7–15)
B-OH-BUTYR SERPL-SCNC: <0.18 MMOL/L
BUN SERPL-MCNC: 28.3 MG/DL (ref 8–23)
CALCIUM SERPL-MCNC: 9.5 MG/DL (ref 8.8–10.4)
CHLORIDE SERPL-SCNC: 103 MMOL/L (ref 98–107)
CREAT SERPL-MCNC: 1.02 MG/DL (ref 0.51–0.95)
EGFRCR SERPLBLD CKD-EPI 2021: 57 ML/MIN/1.73M2
GLUCOSE BLDC GLUCOMTR-MCNC: 229 MG/DL (ref 70–99)
GLUCOSE BLDC GLUCOMTR-MCNC: 239 MG/DL (ref 70–99)
GLUCOSE BLDC GLUCOMTR-MCNC: 252 MG/DL (ref 70–99)
GLUCOSE BLDC GLUCOMTR-MCNC: 329 MG/DL (ref 70–99)
GLUCOSE SERPL-MCNC: 244 MG/DL (ref 70–99)
HCO3 SERPL-SCNC: 25 MMOL/L (ref 22–29)
POTASSIUM SERPL-SCNC: 4 MMOL/L (ref 3.4–5.3)
SODIUM SERPL-SCNC: 141 MMOL/L (ref 135–145)

## 2024-11-30 PROCEDURE — 999N000157 HC STATISTIC RCP TIME EA 10 MIN

## 2024-11-30 PROCEDURE — 94640 AIRWAY INHALATION TREATMENT: CPT

## 2024-11-30 PROCEDURE — 82010 KETONE BODYS QUAN: CPT | Performed by: PHYSICIAN ASSISTANT

## 2024-11-30 PROCEDURE — 97530 THERAPEUTIC ACTIVITIES: CPT | Mod: GP | Performed by: PHYSICAL THERAPIST

## 2024-11-30 PROCEDURE — 80048 BASIC METABOLIC PNL TOTAL CA: CPT | Performed by: PHYSICIAN ASSISTANT

## 2024-11-30 PROCEDURE — 82962 GLUCOSE BLOOD TEST: CPT

## 2024-11-30 PROCEDURE — G0378 HOSPITAL OBSERVATION PER HR: HCPCS

## 2024-11-30 PROCEDURE — 99239 HOSP IP/OBS DSCHRG MGMT >30: CPT | Performed by: PHYSICIAN ASSISTANT

## 2024-11-30 PROCEDURE — 250N000012 HC RX MED GY IP 250 OP 636 PS 637: Performed by: PHYSICIAN ASSISTANT

## 2024-11-30 PROCEDURE — 250N000013 HC RX MED GY IP 250 OP 250 PS 637: Performed by: PHYSICIAN ASSISTANT

## 2024-11-30 PROCEDURE — 250N000013 HC RX MED GY IP 250 OP 250 PS 637: Performed by: INTERNAL MEDICINE

## 2024-11-30 PROCEDURE — 36415 COLL VENOUS BLD VENIPUNCTURE: CPT | Performed by: PHYSICIAN ASSISTANT

## 2024-11-30 PROCEDURE — 250N000009 HC RX 250: Performed by: PHYSICIAN ASSISTANT

## 2024-11-30 RX ORDER — IPRATROPIUM BROMIDE AND ALBUTEROL SULFATE 2.5; .5 MG/3ML; MG/3ML
3 SOLUTION RESPIRATORY (INHALATION) 4 TIMES DAILY
Qty: 84 ML | Refills: 0 | Status: SHIPPED | OUTPATIENT
Start: 2024-11-30 | End: 2024-12-07

## 2024-11-30 RX ORDER — DOXYCYCLINE 100 MG/1
100 CAPSULE ORAL EVERY 12 HOURS
Qty: 9 CAPSULE | Refills: 0 | Status: SHIPPED | OUTPATIENT
Start: 2024-11-30 | End: 2024-12-05

## 2024-11-30 RX ORDER — PREDNISONE 10 MG/1
TABLET ORAL
Qty: 15 TABLET | Refills: 0 | Status: SHIPPED | OUTPATIENT
Start: 2024-12-01 | End: 2024-12-09

## 2024-11-30 RX ORDER — BLOOD PRESSURE TEST KIT
KIT MISCELLANEOUS
Qty: 100 EACH | Refills: 1 | Status: SHIPPED | OUTPATIENT
Start: 2024-11-30

## 2024-11-30 RX ORDER — INSULIN ASPART 100 [IU]/ML
INJECTION, SOLUTION INTRAVENOUS; SUBCUTANEOUS
Qty: 15 ML | Refills: 1 | Status: SHIPPED | OUTPATIENT
Start: 2024-11-30 | End: 2024-12-08

## 2024-11-30 RX ORDER — CONTAINER,EMPTY
EACH MISCELLANEOUS
Qty: 1 EACH | Refills: 1 | Status: SHIPPED | OUTPATIENT
Start: 2024-11-30

## 2024-11-30 RX ADMIN — LOSARTAN POTASSIUM 100 MG: 100 TABLET, FILM COATED ORAL at 09:40

## 2024-11-30 RX ADMIN — IPRATROPIUM BROMIDE AND ALBUTEROL SULFATE 3 ML: .5; 3 SOLUTION RESPIRATORY (INHALATION) at 12:58

## 2024-11-30 RX ADMIN — METOPROLOL SUCCINATE 50 MG: 50 TABLET, EXTENDED RELEASE ORAL at 09:40

## 2024-11-30 RX ADMIN — DOXYCYCLINE HYCLATE 100 MG: 100 CAPSULE ORAL at 09:40

## 2024-11-30 RX ADMIN — IPRATROPIUM BROMIDE AND ALBUTEROL SULFATE 3 ML: .5; 3 SOLUTION RESPIRATORY (INHALATION) at 08:47

## 2024-11-30 RX ADMIN — INSULIN HUMAN 5 UNITS: 100 INJECTION, SUSPENSION SUBCUTANEOUS at 10:11

## 2024-11-30 RX ADMIN — AMLODIPINE BESYLATE 5 MG: 5 TABLET ORAL at 09:40

## 2024-11-30 RX ADMIN — FUROSEMIDE 40 MG: 40 TABLET ORAL at 09:40

## 2024-11-30 RX ADMIN — PREDNISONE 40 MG: 20 TABLET ORAL at 09:40

## 2024-11-30 RX ADMIN — ASPIRIN 81 MG: 81 TABLET, COATED ORAL at 09:40

## 2024-11-30 ASSESSMENT — ACTIVITIES OF DAILY LIVING (ADL)
ADLS_ACUITY_SCORE: 67
ADLS_ACUITY_SCORE: 76
ADLS_ACUITY_SCORE: 67
ADLS_ACUITY_SCORE: 76
ADLS_ACUITY_SCORE: 76
ADLS_ACUITY_SCORE: 68
ADLS_ACUITY_SCORE: 67
ADLS_ACUITY_SCORE: 76

## 2024-11-30 NOTE — PLAN OF CARE
"Vitals are Temp: 98.1  F (36.7  C) Temp src: Oral BP: 139/73 Pulse: 65   Resp: 18 SpO2: 97 %.  Patient is Alert and Oriented x4 but forgetful. Denying pain.  Patient is Saline locked. Pt is a Regular diet. Assist 1-2 with Gait Belt and Walker. Consult: PT    PRIMARY DIAGNOSIS: GENERALIZED WEAKNESS/AMS    OUTPATIENT/OBSERVATION GOALS TO BE MET BEFORE DISCHARGE  1. Orthostatic performed: N/A    2. Tolerating PO medications: Yes    3. Return to near baseline physical activity: No    4. Cleared for discharge by consultants (if involved): No    Discharge Planner Nurse   Safe discharge environment identified: Yes  Barriers to discharge: Yes       Entered by: Columba Winchester RN 11/30/2024      Please review provider order for any additional goals.   Nurse to notify provider when observation goals have been met and patient is ready for discharge.    Goal Outcome Evaluation:      Plan of Care Reviewed With: patient    Overall Patient Progress: improvingOverall Patient Progress: improving    Outcome Evaluation: Manage BG, encourage movement as tolerated      Problem: Adult Inpatient Plan of Care  Goal: Plan of Care Review  Description: The Plan of Care Review/Shift note should be completed every shift.  The Outcome Evaluation is a brief statement about your assessment that the patient is improving, declining, or no change.  This information will be displayed automatically on your shift  note.  Outcome: Progressing  Flowsheets (Taken 11/30/2024 1146)  Outcome Evaluation: Manage BG, encourage movement as tolerated  Plan of Care Reviewed With: patient  Overall Patient Progress: improving  Goal: Patient-Specific Goal (Individualized)  Description: You can add care plan individualizations to a care plan. Examples of Individualization might be:  \"Parent requests to be called daily at 9am for status\", \"I have a hard time hearing out of my right ear\", or \"Do not touch me to wake me up as it startles  me\".  Outcome: Progressing  Goal: " Absence of Hospital-Acquired Illness or Injury  Outcome: Progressing  Intervention: Identify and Manage Fall Risk  Recent Flowsheet Documentation  Taken 11/30/2024 1015 by Columba Winchester RN  Safety Promotion/Fall Prevention: safety round/check completed  Intervention: Prevent Skin Injury  Recent Flowsheet Documentation  Taken 11/30/2024 1015 by Columba Winchester RN  Body Position: position changed independently  Goal: Optimal Comfort and Wellbeing  Outcome: Progressing  Goal: Readiness for Transition of Care  Outcome: Progressing

## 2024-11-30 NOTE — PLAN OF CARE
PRIMARY DIAGNOSIS: GENERALIZED WEAKNESS/ COUGH/ CONFUSION    OUTPATIENT/OBSERVATION GOALS TO BE MET BEFORE DISCHARGE  1. Orthostatic performed: N/A    2. Tolerating PO medications: Yes    3. Return to near baseline physical activity: No    4. Cleared for discharge by consultants (if involved): No    Discharge Planner Nurse   Safe discharge environment identified: Yes  Barriers to discharge: Yes       Entered by: Otilia Courtney RN 11/30/2024      Please review provider order for any additional goals.   Nurse to notify provider when observation goals have been met and patient is ready for discharge.    Patient is alert and oriented x4. Denies pain, nausea, sob, dizziness. Peripheral iv saline locked. CPAP with 1LPM 02 at bedtime.  Moderate consistent carb diet. Ambulates with assist of 2 gait belt & walker. Continuous pulse oximetry. ACHS checks with insulin correction. Bladder and bowel incontinent. PT recommended home with assist, home with home care PT. SW following for discharge planning. Patient is sleeping well and able to make needs known.     Goal Outcome Evaluation:      Plan of Care Reviewed With: patient    Overall Patient Progress: improvingOverall Patient Progress: improving

## 2024-11-30 NOTE — PLAN OF CARE
Physical Therapy Discharge Summary    Reason for therapy discharge:    Discharged to home with home therapy.    Progress towards therapy goal(s). See goals on Care Plan in [facility] electronic health record for goal details.  Goals partially met.  Barriers to achieving goals:   discharge from facility.    Therapy recommendation(s):    Continued therapy is recommended.  Rationale/Recommendations:  Patient is below baseline functional mobility. Pt lives with son, son assists with ADLs and mobility as needed. Pt currently needing mod assist for transfers and ambulation of 15 feet, very fatigued following. Anticipate wtih continued skilled IP PT that pt will meet goals and be able to d/c home with assist of son. Rec HHPT to progress strength and IND mobility.

## 2024-11-30 NOTE — DISCHARGE SUMMARY
[facility]  Hospitalist Discharge Summary      Date of Admission:  11/28/2024  Date of Discharge:  11/30/2024  Discharging Provider: Daniela Barragan PA-C  Discharge Service: Hospitalist Service    Discharge Diagnoses   Probable COPD Exacerbation  Mild Acute Encephalopathy in the setting of Chronic Cognitive Impairment, Resolved   Steroid Induced Hyperglycemia     Follow-ups Needed After Discharge   Follow-up Appointments       Follow-up and recommended labs and tests       Follow up with primary care provider, Chela Griffith, within 7-14 days to evaluate medication change and for hospital follow- up.  The following labs/tests are recommended: discuss blood sugar trends, diabetic management and review pulmonary symptoms..                Unresulted Labs Ordered in the Past 30 Days of this Admission       Date and Time Order Name Status Description    11/28/2024  7:47 PM Blood Culture Peripheral Blood Preliminary         These results will be followed up by hospitalist.     Discharge Disposition   Discharged to home with family, Rec HHPT to progress strength and IND mobility.   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     In the ED, VSS and no hypoxia. Patient was reportedly nonverbal on arrival and was not moving lower extremities so code stroke was called. MRI imaging degraded by motion but no evidence of acute infarct. Symptoms resolved. Labs only revealing of lactate 2.8 given 2 L normal saline and lactate decreased. CXR  negative. Admitted to observation, started on steroids and doxycycline for RAD exacerbation.     Assumed care. Symptoms improving with COPD exacerbation treatment. Wheezing, cough improved. Denies chest pain. Afebrile. No dyspnea at rest. Son at bedside, feels patient is looking a lot better today and would like to take her home. He denies any concerns regarding neurologic symptoms and feels that she is near her baseline. PT assessed the patient recommending restarting home care PT. Patient is cared for in the home by family and will order HHC.     Problem List      Cough, Wheezing: Suspect COPD Exacerbation  Chronic Hypercapnic Respiratory Failure 2/2 probable COPD vs Asthma, MAGALIS, Pulmonary HTN   Symptoms improving, patient denies any dyspnea at rest or chest pain. Has 1 month of cough and wheezing that is mostly upper airway on exam. No signs of associated pneumonia.    History of reported COPD vs Asthma, seen by Pulmonology 11/2023 per report November; PFTs were difficult to interpret due to difficulty doing the test. She has sleep apnea now on CPAP. In the past had been on home oxygen, and previously diagnosed with probable Pulmonary HTN.   Treating for possible COPD exacerbation.  -Prednisone taper needed at discharge  -Scheduled and as needed nebulizer added duoneb to home regimen  -complete course of Doxycycline   -continue symbicort    -f/up with Pulmonology outpatient     Steroid Induced Hyperglycemia   NIDDM Type II  Last A1c 7.7 on metformin prior to admission, previously was also on glipizide which was discontinued in previous hospitalizations. History of elevated blood sugars noted by family when on steroids.   Monitored at home via CGM.   Mildly elevated in hospital with no DKA, HHS. Improved with insulin.  -Metformin was held initially due to mildly elevated lactic acid, subsequently restarted   -Insulin NPH while on prednisone with sliding scale flex pen, Patient's son will assist with this and  monitor via CGM  -f/up with PCP may need to make ongoing adjustments to maintenance regimen once completed insulin and prednisone      Possible Mild Acute Encephalopathy in the setting of Chronic Cognitive Impairment, Resolved  Details of this somewhat unclear, but apparently had episode of global weakness, decreased verbal responsiveness the afternoon 11/28. Symptoms resolved.  The patient does not speak English but does understand most spoken English per son, this may have played a role into difficult neurological initial assessment in ED.     Imaging negative for any acute vascular cause, EtOH level, drug test negative etc.  Suspect that the patient had been experiencing the symptoms at home due to respiratory symptoms rather than neurologic issue.    Son at bedside, feels patient is looking a lot better today and would like to take her home. He denies any concerns regarding neurologic symptoms and feels that she is near her baseline.  -OT ordered for discharge     HFpEF   Has mild LE edema, weight pending but overall does not appear acutely decompensated. Follows with [facility]iology [facility].   -resume PTA losartan, lasix, Metoprolol, amlodipine       Dementia, Chronic CVA, H/off pontine ICH with residual right hemiplegia   May 2023 with a degree of residual right sided weakness.   -resume PTA aspirin, statin   -cared for by patient's family in the home.       Renal Oncocytoma  -being followed with serial US and [facility]      MAGALIS  -treated with BiPAP, resume     Consultations This Hospital Stay   PHYSICAL THERAPY ADULT IP CONSULT  CARE MANAGEMENT / SOCIAL WORK IP CONSULT    Code Status   Full Code    Time Spent on this Encounter   Daniela LEAHY PA-C, personally saw the patient today and spent greater than 30 minutes discharging this patient.       Daniela Barragan PA-C  ______________________________________________________________________    Interval History    Evaluation facilitated using the patient's son, Balwinder  for interpretation, whom is at bedside.     No acute events overnight. Wheezing, cough improved. Denies chest pain. Afebrile. No dyspnea at rest. Son at bedside, feels patient is looking a lot better today and would like to take her home. He denies any concerns regarding neurologic symptoms and feels that she is near her baseline.     Physical Exam   Vital Signs: Temp: 98.1  F (36.7  C) Temp src: Oral BP: 137/72 Pulse: 87   Resp: 18 SpO2: 97 % O2 Device: None (Room air)   Weight: 0 lbs 0 oz  Constitutional: Awake, alert, no apparent distress  Respiratory: Mildly tachypneic at rest, no abnormal work of breathing. Scattered upper airway transmitted squeaking or wheezing noises are improved today.  Diminished lung sounds bilaterally without crackles or expiratory wheezing at bases.    Cardiovascular: Regular rate and rhythm, normal S1 and S2, and no murmur appreciated.   GI: Bowel sounds present. soft, non-distended, non-tender.   Skin/Integument: Warm, dry. +1 bilateral LE edema.  Neuro: Awake, alert.  Oriented to situation per son.  Interacts with son.  Follows commands.  Moving extremities independently in the bed,          Primary Care Physician   Chela Griffith    Discharge Orders      Medication Therapy Management Referral      Home Care Referral      Reason for your hospital stay    Cough, wheezing. Treated for COPD exacerbation.     Follow-up and recommended labs and tests     Follow up with primary care provider, Chela Griffith, within 7-14 days to evaluate medication change and for hospital follow- up.  The following labs/tests are recommended: discuss blood sugar trends, diabetic management and review pulmonary symptoms..     Activity    Your activity upon discharge: activity as tolerated with supervision and walker.     Discharge Instructions    Continue BiPAP use at night.   Use Duoneb 4x day while awake for 7 days. Use albuterol nebulizer treatments when she is wheezing or complains of  shortness of breath.     Monitor and record    Blood glucose: As per previous directed by primary care provider utilizing continuous glucose monitor     When to contact your care team    Contact your doctor if blood sugar is over 400 for 2 tests in a row (at least 1 hour apart) or if your blood sugar is over 200 for several days in a row.  Call your primary care doctor if you have any of the following: temperature greater than 101 F, worsening shortness of breath, increased swelling, worsening pain, new or unrelenting diarrhea, or any other concerning symptoms. Call 911 or go to the emergency room if you need immediate assistance.     Diet    Follow this diet upon discharge: Current Diet:Orders Placed This Encounter      Moderate Consistent Carb (60 g CHO per Meal) Diet -avoid extra sugar such as fruit juices and sweets       Significant Results and Procedures   Results for orders placed or performed during the hospital encounter of 11/28/24   CT Head w/o Contrast    Narrative    EXAM: CT HEAD W/O CONTRAST  LOCATION: [facility]  DATE: 11/28/2024    INDICATION: Neurological dysfunction, weakness; Code Stroke to evaluate for potential thrombolysis and thrombectomy. PLEASE READ IMMEDIATELY.  COMPARISON: 9/14/2024  TECHNIQUE: Routine CT Head without IV contrast. Multiplanar reformats. Dose reduction techniques were used.    FINDINGS:  INTRACRANIAL CONTENTS: No intracranial hemorrhage, extraaxial collection, or mass effect.  Small chronic lacunar infarctions anterior aspect basal ganglia bilaterally. Small chronic infarction right cerebellum. Moderate presumed chronic small vessel   ischemic changes; could be with component of transependymal CSF flow as well. Ventriculomegaly disproportionate to the degree of volume loss. Correlate for normal pressure hydrocephalus. This is similar to prior.    VISUALIZED ORBITS/SINUSES/MASTOIDS: No intraorbital abnormality. No paranasal sinus mucosal disease.  Opacification left mastoid air cells and middle ear cavity.    BONES/SOFT TISSUES: No acute abnormality.      Impression    IMPRESSION:  1.  No acute intracranial process.    Results were called to Dr. Mckay at 11/28/2024 5:57 PM CST.    MRA Brain ([place]) wo & w Contrast    Narrative    EXAM: MRA BRAIN ([place]) W/O and W CONTRAST  LOCATION: [facility]  DATE: 11/28/2024    INDICATION: lower extmity weakness, aphasia  COMPARISON: CTA 7/13/2024.  TECHNIQUE: 3D time-of-flight head MRA without intravenous contrast.    FINDINGS: The images are degraded by motion.  ANTERIOR CIRCULATION: No large vessel occlusion or flow-limiting stenosis. Stable 2 mm aneurysm arising at the right middle cerebral artery bifurcation. Developmentally hypoplastic left A1 anterior cerebral artery segment.    POSTERIOR CIRCULATION: No stenosis/occlusion, aneurysm, or high flow vascular malformation. Dominant left and smaller right vertebral artery contribute to a normal basilar artery.       Impression    IMPRESSION:  1.  Within the limits of motion, no large vessel occlusion or flow-limiting stenosis.  2.  Stable 2 mm aneurysm arising at the right middle cerebral artery bifurcation.   XR Chest Port 1 View    Narrative    EXAM: XR CHEST PORT 1 VIEW  LOCATION: [facility]  DATE: 11/28/2024    INDICATION: sob, altered mental status  COMPARISON: 9/16/2024      Impression    IMPRESSION: Stable linear scarring in the left lung. Stable cardiomediastinal silhouette. No acute cardiopulmonary process.   MR Brain w/o Contrast    Narrative    EXAM: MRA NECK (CAROTIDS) W/O CONTRAST, MR BRAIN W/O CONTRAST  LOCATION: [facility]  DATE: 11/28/2024    INDICATION: Lower extremity weakness, aphasia.  COMPARISON: CT head 11/28/2024, MRI brain 7/13/2024.  TECHNIQUE:   1) Routine multiplanar multisequence head MRI without intravenous contrast.  2) Neck MRA without IV contrast.  Stenosis measurements made according to NASCET criteria unless otherwise specified.      FINDINGS:  HEAD MRI: Technically suboptimal secondary to mild motion.  INTRACRANIAL CONTENTS: No acute or subacute infarct. No mass, acute hemorrhage, or extra-axial fluid collections. Confluent nonspecific T2/FLAIR hyperintensities within the cerebral white matter most consistent with advanced microvascular ischemic   change. Moderate generalized cerebral atrophy. No hydrocephalus. Chronic lacunar infarcts in the casimiro. Small chronic hemorrhagic infarct in the right cerebellar hemisphere.     SELLA: No abnormality accounting for technique.    OSSEOUS STRUCTURES/SOFT TISSUES: Normal marrow signal. The major intracranial vascular flow voids are maintained.     ORBITS: No abnormality accounting for technique.     SINUSES/MASTOIDS: No paranasal sinus mucosal disease. Left middle ear effusion.     NECK MRA: Images significantly degraded by motion.  RIGHT CAROTID: No measurable stenosis or dissection.    LEFT CAROTID: Atherosclerotic plaque results in less than 50% stenosis in the left ICA. No dissection.    VERTEBRAL ARTERIES: No focal stenosis or dissection. Dominant left and smaller right vertebral arteries.    AORTIC ARCH: Not visualized on this exam.      Impression    IMPRESSION:  HEAD MRI:   1.  No acute intracranial process.  2.  Generalized brain atrophy and presumed microvascular ischemic changes as detailed above.  3.  Chronic lacunar infarcts in the casimiro.  4.  Small chronic hemorrhagic infarct in the right cerebellar hemisphere.    NECK MRA:  1.  Images are degraded by motion.  2.  Within this limitation, no hemodynamically significant stenosis or dissection identified.   MRA Angiogram Neck w/o Contrast    Narrative    EXAM: MRA NECK (CAROTIDS) W/O CONTRAST, MR BRAIN W/O CONTRAST  LOCATION: [facility]  DATE: 11/28/2024    INDICATION: Lower extremity weakness, aphasia.  COMPARISON: CT head 11/28/2024,  MRI brain 7/13/2024.  TECHNIQUE:   1) Routine multiplanar multisequence head MRI without intravenous contrast.  2) Neck MRA without IV contrast. Stenosis measurements made according to NASCET criteria unless otherwise specified.      FINDINGS:  HEAD MRI: Technically suboptimal secondary to mild motion.  INTRACRANIAL CONTENTS: No acute or subacute infarct. No mass, acute hemorrhage, or extra-axial fluid collections. Confluent nonspecific T2/FLAIR hyperintensities within the cerebral white matter most consistent with advanced microvascular ischemic   change. Moderate generalized cerebral atrophy. No hydrocephalus. Chronic lacunar infarcts in the casimiro. Small chronic hemorrhagic infarct in the right cerebellar hemisphere.     SELLA: No abnormality accounting for technique.    OSSEOUS STRUCTURES/SOFT TISSUES: Normal marrow signal. The major intracranial vascular flow voids are maintained.     ORBITS: No abnormality accounting for technique.     SINUSES/MASTOIDS: No paranasal sinus mucosal disease. Left middle ear effusion.     NECK MRA: Images significantly degraded by motion.  RIGHT CAROTID: No measurable stenosis or dissection.    LEFT CAROTID: Atherosclerotic plaque results in less than 50% stenosis in the left ICA. No dissection.    VERTEBRAL ARTERIES: No focal stenosis or dissection. Dominant left and smaller right vertebral arteries.    AORTIC ARCH: Not visualized on this exam.      Impression    IMPRESSION:  HEAD MRI:   1.  No acute intracranial process.  2.  Generalized brain atrophy and presumed microvascular ischemic changes as detailed above.  3.  Chronic lacunar infarcts in the casimiro.  4.  Small chronic hemorrhagic infarct in the right cerebellar hemisphere.    NECK MRA:  1.  Images are degraded by motion.  2.  Within this limitation, no hemodynamically significant stenosis or dissection identified.       Discharge Medications   Current Discharge Medication List        START taking these medications    Details    Alcohol Swabs PADS Use to swab the area of the injection or peyman as directed Per insurance coverage  Qty: 100 each, Refills: 1    Associated Diagnoses: Hyperglycemia      doxycycline hyclate (VIBRAMYCIN) 100 MG capsule Take 1 capsule (100 mg) by mouth every 12 hours for 5 days. Administer at least 2 hours before or after aluminum, calcium, iron, zinc or magnesium containing products.  Qty: 9 capsule, Refills: 0    Associated Diagnoses: COPD with acute exacerbation (H)      insulin aspart (NOVOLOG FLEXPEN) 100 UNIT/ML pen Novolog Flexpen If Pre-meal Glucose For Pre-Meal  - 189 give 1 unit. For Pre-Meal  - 239 give 2 units. For Pre-Meal  - 289 give 3 units. For Pre-Meal  - 339 give 4 units. For Pre-Meal - 389 give 5 units. For Pre-Meal -439 give 6 units For Pre-Meal BG greater than or equal to 440 give 7 units. To be given with prandial insulin, and based on pre-meal blood glucose. Administering insulin within 5 minutes of the start of the meal is ideal. Administer insulin no more than 30 minutes after the start of the meal, unless directed otherwise by provider.  Qty: 15 mL, Refills: 1    Associated Diagnoses: Hyperglycemia      insulin  UNIT/ML injection Inject 5 Units subcutaneously every morning for 3 days.  Qty: 0.15 mL, Refills: 0    Associated Diagnoses: Hyperglycemia      insulin pen needle (31G X 5 MM) 31G X 5 MM miscellaneous Use as directed by provider Per insurance coverage  Qty: 14 each, Refills: 1    Associated Diagnoses: Hyperglycemia      ipratropium - albuterol 0.5 mg/2.5 mg/3 mL (DUONEB) 0.5-2.5 (3) MG/3ML neb solution Take 1 vial (3 mLs) by nebulization 4 times daily for 7 days.  Qty: 84 mL, Refills: 0    Associated Diagnoses: COPD with acute exacerbation (H)      predniSONE (DELTASONE) 10 MG tablet Take 4 tablets (40 mg) by mouth daily for 2 days, THEN 2 tablets (20 mg) daily for 2 days, THEN 1 tablet (10 mg) daily for 2 days, THEN 0.5 tablets (5 mg)  daily for 2 days.  Qty: 15 tablet, Refills: 0    Associated Diagnoses: COPD with acute exacerbation (H)      Sharps Container MISC Use as directed to dispose of needles, lancets and other sharps  Qty: 1 each, Refills: 1    Associated Diagnoses: Hyperglycemia           CONTINUE these medications which have NOT CHANGED    Details   acetaminophen (TYLENOL) 500 MG tablet Take 500-1,000 mg by mouth every 8 hours as needed for mild pain      albuterol (PROAIR HFA/PROVENTIL HFA/VENTOLIN HFA) 108 (90 Base) MCG/ACT inhaler Inhale 2 puffs into the lungs every 4 hours as needed for wheezing  Qty: 18 g, Refills: 0    Comments: Pharmacy may dispense brand covered by insurance (Proair, or proventil or ventolin or generic albuterol inhaler)      albuterol (PROVENTIL) (2.5 MG/3ML) 0.083% neb solution Take 2.5 mg by nebulization every 4 hours as needed for wheezing.      amLODIPine (NORVASC) 5 MG tablet Take 5 mg by mouth daily      aspirin (ASA) 81 MG EC tablet Take 1 tablet (81 mg) by mouth daily  Qty: 30 tablet, Refills: 11    Associated Diagnoses: Right-sided nontraumatic intracerebral hemorrhage, unspecified cerebral location (H)      atorvastatin (LIPITOR) 40 MG tablet Take 1 tablet (40 mg) by mouth every evening  Qty: 30 tablet, Refills: 4    Associated Diagnoses: Right-sided nontraumatic intracerebral hemorrhage, unspecified cerebral location (H)      budesonide-formoterol (SYMBICORT) 160-4.5 MCG/ACT Inhaler Inhale 2 puffs into the lungs two times daily      Cholecalciferol (VITAMIN D3) 50 MCG (2000 UT) CAPS Take 50 mcg by mouth daily      furosemide (LASIX) 40 MG tablet Take 40 mg by mouth daily      losartan (COZAAR) 100 MG tablet Take 100 mg by mouth daily      metFORMIN (GLUCOPHAGE XR) 500 MG 24 hr tablet Take 500 mg by mouth 2 times daily (with meals)      metoprolol succinate ER (TOPROL XL) 50 MG 24 hr tablet Take 50 mg by mouth daily      nystatin (NYSTOP) 767036 UNIT/GM external powder Apply topically 2 times daily  as needed      triamcinolone (KENALOG) 0.1 % external ointment Apply topically 2 times daily as needed for irritation           Allergies   No Known Allergies

## 2024-11-30 NOTE — DISCHARGE INSTRUCTIONS
Set up continuous glucose monitor once you return home.  Give the insulin dose in the morning with NPH.  With the remainder of the meals throughout the day use guidance from the directions for the insulin aspart to give before meals.  Check her blood sugar via CGM before the meal and give insulin based on these readings.  He may discontinue this once prednisone has been completed unless otherwise directed by primary care doctor.

## 2024-11-30 NOTE — PLAN OF CARE
.Patient's After Visit Summary was reviewed with patient and/or family.   Patient verbalized understanding of After Visit Summary, recommended follow up and was given an opportunity to ask questions.   Discharge medications sent home with patient/family: No, Not applicable   Discharged with son

## 2024-11-30 NOTE — PLAN OF CARE
"Vitals are Temp: 98.1  F (36.7  C) Temp src: Oral BP: 139/73 Pulse: 65   Resp: 18 SpO2: 97 %.  Patient is Alert and Oriented x4 but forgetful. Denying pain.  Patient is Saline locked. Pt is a Regular diet. Assist 1-2 with Gait Belt and Walker. Consult: PT    PRIMARY DIAGNOSIS: GENERALIZED WEAKNESS/AMS    OUTPATIENT/OBSERVATION GOALS TO BE MET BEFORE DISCHARGE  1. Orthostatic performed: N/A    2. Tolerating PO medications: Yes    3. Return to near baseline physical activity: No    4. Cleared for discharge by consultants (if involved): Yes    Discharge Planner Nurse   Safe discharge environment identified: Yes  Barriers to discharge: No       Entered by: Columba Winchester RN 11/30/2024      Please review provider order for any additional goals.   Nurse to notify provider when observation goals have been met and patient is ready for discharge.    Goal Outcome Evaluation:      Plan of Care Reviewed With: patient    Overall Patient Progress: improvingOverall Patient Progress: improving    Outcome Evaluation: Manage BG, encourage movement as tolerated      Problem: Adult Inpatient Plan of Care  Goal: Plan of Care Review  Description: The Plan of Care Review/Shift note should be completed every shift.  The Outcome Evaluation is a brief statement about your assessment that the patient is improving, declining, or no change.  This information will be displayed automatically on your shift  note.  Outcome: Progressing  Flowsheets (Taken 11/30/2024 1146)  Outcome Evaluation: Manage BG, encourage movement as tolerated  Plan of Care Reviewed With: patient  Overall Patient Progress: improving  Goal: Patient-Specific Goal (Individualized)  Description: You can add care plan individualizations to a care plan. Examples of Individualization might be:  \"Parent requests to be called daily at 9am for status\", \"I have a hard time hearing out of my right ear\", or \"Do not touch me to wake me up as it startles  me\".  Outcome: Progressing  Goal: " Absence of Hospital-Acquired Illness or Injury  Outcome: Progressing  Intervention: Identify and Manage Fall Risk  Recent Flowsheet Documentation  Taken 11/30/2024 1015 by Columba Winchester RN  Safety Promotion/Fall Prevention: safety round/check completed  Intervention: Prevent Skin Injury  Recent Flowsheet Documentation  Taken 11/30/2024 1015 by Columba Winchester RN  Body Position: position changed independently  Goal: Optimal Comfort and Wellbeing  Outcome: Progressing  Goal: Readiness for Transition of Care  Outcome: Progressing

## 2024-11-30 NOTE — PLAN OF CARE
PRIMARY DIAGNOSIS: GENERALIZED WEAKNESS/ COUGH/ CONFUSION    OUTPATIENT/OBSERVATION GOALS TO BE MET BEFORE DISCHARGE  1. Orthostatic performed: N/A    2. Tolerating PO medications: Yes    3. Return to near baseline physical activity: No    4. Cleared for discharge by consultants (if involved): No    Discharge Planner Nurse   Safe discharge environment identified: Yes  Barriers to discharge: Yes       Entered by: Otilia Courtney RN 11/30/2024      Please review provider order for any additional goals.   Nurse to notify provider when observation goals have been met and patient is ready for discharge.    Patient is alert and oriented x4. Denies pain, nausea, sob, dizziness. Peripheral iv saline locked. CPAP with 1LPM 02 at bedtime, pt slept through the night.  Moderate consistent carb diet. Ambulates with assist of 2 gait belt & walker. Continuous pulse oximetry. ACHS checks with insulin correction. Bladder and bowel incontinent. PT recommended home with assist, home with home care PT. SW following for discharge planning.     Goal Outcome Evaluation:      Plan of Care Reviewed With: patient    Overall Patient Progress: improvingOverall Patient Progress: improving

## 2024-11-30 NOTE — PLAN OF CARE
PRIMARY DIAGNOSIS: GENERALIZED WEAKNESS/ COUGH/ CONFUSION    OUTPATIENT/OBSERVATION GOALS TO BE MET BEFORE DISCHARGE  1. Orthostatic performed: N/A    2. Tolerating PO medications: Yes    3. Return to near baseline physical activity: No    4. Cleared for discharge by consultants (if involved): No    Discharge Planner Nurse   Safe discharge environment identified: Yes  Barriers to discharge: Yes       Entered by: Otilia Courtney RN 11/30/2024      Please review provider order for any additional goals.   Nurse to notify provider when observation goals have been met and patient is ready for discharge.    Patient is alert and oriented x4. Denies pain, nausea, sob, dizziness. Peripheral iv saline locked. On room air. Son at bedside, assisted pt with feeding. Moderate consistent carb diet. Ambulates with assist of 2 gait belt & walker. Continuous pulse oximetry. ACHS checks with insulin correction. PT recommended home with assist, home with home care PT. SW following for discharge planning.     Goal Outcome Evaluation:      Plan of Care Reviewed With: patient    Overall Patient Progress: improvingOverall Patient Progress: improving

## 2024-11-30 NOTE — PROGRESS NOTES
Care Management Discharge Note    Discharge Date: 11/30/2024     Discharge Disposition: Home, Home Care    Discharge Services: Home Care    Discharge Transportation:  Son     Private pay costs discussed: Not applicable    Patient/family educated on Medicare website which has current facility and service quality ratings:  Yes    Education Provided on the Discharge Plan: Yes  Persons Notified of Discharge Plans: Pt/son  Patient/Family in Agreement with the Plan: yes    Handoff Referral Completed: No, handoff not indicated or clinically appropriate    Additional Information:  CM following for discharge planning, [facility]  is able to accept with a delayed start of 12/4. Informed son, he prefers this agency and is ok with the delay as he feels pt's needs will fully be met at home. Informed provider who will include the ok for delay of start in the HC orders at discharge. AVS updated, will send orders once complete.     Update 1400: Orders sent to HC.     Meenakshi Dee RN BSN   Inpatient Care Coordination  [facility]   Phone [phone]

## 2024-12-03 ENCOUNTER — TELEPHONE (OUTPATIENT)
Dept: PHARMACY | Facility: OTHER | Age: 74
End: 2024-12-03
Payer: COMMERCIAL

## 2024-12-03 ENCOUNTER — PATIENT OUTREACH (OUTPATIENT)
Dept: CARE COORDINATION | Facility: CLINIC | Age: 74
End: 2024-12-03
Payer: COMMERCIAL

## 2024-12-03 NOTE — PROGRESS NOTES
Connected Care Resource Center Contact  [facility]/Voicemail     Clinical Data: Post-Discharge Outreach     Outreach attempted x 2.  Left message on patient's voicemail, providing [facility]'s central phone number of [phone] ([phone]) for questions/concerns and/or to schedule an appt with an [facility] provider, if they do not have a PCP.      Plan:  [facility] will do no further outreaches at this time.        Susana PACKER, Community Health Worker  Clinic Care Coordination  [facility]  Phone: [phone]      *Connected Care Resource Team does NOT follow patient ongoing. Referrals are identified based on internal discharge reports and the outreach is to ensure patient has an understanding of their discharge instructions

## 2024-12-03 NOTE — TELEPHONE ENCOUNTER
MTM referral from: Transitions of Care (recent hospital discharge, TCU discharge, or ED visit)    MTM referral outreach attempt #2 on December 3, 2024 at 9:28 AM      Outcome: Left Message    Use [facility]p part d MAP for the carrier/Plan on the flowsheet      MTM Practitioner please send patient letter    Shakila Peraza CMA  MTM

## 2024-12-03 NOTE — PROGRESS NOTES
Medication Therapy Management (MTM) Encounter    ASSESSMENT:                            Medication Adherence/Access: No issues identified.    1. Hospital discharge follow-up (Primary)  Med rec completed - no discrepancies noted    2. COPD with acute exacerbation (H)  Uncontrolled. Patient has experienced 8 hospitalizations.    3. Type 2 diabetes mellitus without complication, without long-term current use of insulin (H)  ***    4. Essential hypertension  ***    5. Cerebrovascular disease  ***      PLAN:                            1. Make sure you use the Breyna inhaler twice every day - it is very important to use this routinely to help open up the lungs and prevent wheezing/shortness of breath    Follow-up: {followuptest2:188320}    SUBJECTIVE/OBJECTIVE:                          Pilo Quintana is a 74 year old female seen for a transitions of care visit. She was discharged from [facility] on 11/30 for probable COPD exacerbation and steroid induced hyperglycemia. Patient was accompanied by her son and Killeen . Patient gets care through [facility] typically.    Reason for visit: transitions of care    Allergies/ADRs: Reviewed in chart  Past Medical History: Reviewed in chart  Tobacco: She reports that she has never smoked. She has never used smokeless tobacco.  Alcohol: none  Immunizations:     Medication Adherence/Access: patients son helps with medication administration    Hospital discharge follow-up: pt was admitted to [facility] for COPD/asthma exacerbation. She was treated with antibiotics and steroid taper and discharged home. This the 8th time she has been hospitalized this year for cough/wheezing.          COPD/Asthma:   Breyna 160-4.5 mg inhaler - 2 puffs twice daily - started  Duoneb every 4 hours - new in hospital  Albuterol inhaler as needed  Albuterol nebs as needed  Prednisone taper - on 10 mg twice daily currently    Pt follows with pulm at [facility]  Reportedly  unclear if she has COPD versus asthma  Had PFTs in 2023 that were non-diagnostic due to language barrier and uncertain effort  She has been hospitalized for cough/shortness of breath/COPD exacerbations 8 times in   Discussed importance of inhaler adherence and patient and son verbalized understanding  Reports cough is improving the last few days but still having occasional coughing spells  Duoneb inhaler has been helpful  Diabetes   Type 2 Diabetes  Metformin  mg twice daily   Novolog before meals based on sliding scale    Blood sugars increased a lot while on high dose prednisone  She reports they have improved now that she is on lower dose  Haven't had to use Novolog the last few days because sugars have been normal  Denies concerns with diarrhea or hypoglycemia  Current diabetes symptoms: none    Blood sugar monitoring: with glucometer several times per day, fasting and before each meal.   FB, 120, 139  At bedtime: 130s usually   Eye exam in the last 12 months? No  Foot exam: due  Hypertension   Amlodipine 5 mg daily  Losartan 100 mg daily  Furosemide 40 mg daily  Metoprolol XL 50 mg daily    Patient denies dizziness/lightheadedness or edema  Feels regimen is working well for her  Patient's son monitors her blood pressures at home  Today's reading was 118/75 mmHg     Prior CVA:  Aspirin 81 mg daily  Atorvastatin 40 mg daily     Pt had CVA in  and   Has hemiplegia from this and some memory loss  Has been on aspirin and statin since then  Denies abnormal bleeding/bruising    BP Readings from Last 3 Encounters:   24 139/73   24 (!) 168/88   24 (!) 158/83     Pulse Readings from Last 3 Encounters:   24 70   24 70   24 64     Lab Results   Component Value Date    A1C 7.7 (H) 2024     (H) 2024     2024    POTASSIUM 4.0 2024    GLENIS 9.5 2024    CHLORIDE 103 2024    CO2 25 2024    BUN 28.3 (H)  11/30/2024    CR 1.02 (H) 11/30/2024    GFRESTIMATED 57 (L) 11/30/2024    CHOL 179 05/12/2023     (H) 05/12/2023    HDL 52 05/12/2023    TRIG 132 05/12/2023     ----------------  Post Discharge Medication Reconciliation Status: discharge medications reconciled, continue medications without change.    I spent 50 minutes with this patient today. I offer these suggestions for consideration by her PCP at [facility]. A copy of the visit note was provided to the patient's provider(s).    A summary of these recommendations was declined by the patient.    Markie Metz, PharmD, [facility]  Medication Therapy Management Provider  [phone]    Telemedicine Visit Details  The patient's medications can be safely assessed via a telemedicine encounter.  Type of service:  Telephone visit  Originating Location (pt. Location): Home  {PROVIDER LOCATION On-site should be selected for visits conducted from your clinic location or adjoining [facility] hospital, academic office, or other nearby [facility] building. Off-site should be selected for all other provider locations, including home:[id number]}  Distant Location (provider location):  On-site  Start Time: 2:30 PM  End Time: 3:20 PM   Medication Therapy Recommendations  No medication therapy recommendations to display

## 2024-12-04 LAB — BACTERIA BLD CULT: NO GROWTH

## 2024-12-05 ENCOUNTER — VIRTUAL VISIT (OUTPATIENT)
Dept: PHARMACY | Facility: CLINIC | Age: 74
End: 2024-12-05
Payer: COMMERCIAL

## 2024-12-05 DIAGNOSIS — I10 ESSENTIAL HYPERTENSION: ICD-10-CM

## 2024-12-05 DIAGNOSIS — J44.1 COPD WITH ACUTE EXACERBATION (H): ICD-10-CM

## 2024-12-05 DIAGNOSIS — E11.9 TYPE 2 DIABETES MELLITUS WITHOUT COMPLICATION, WITHOUT LONG-TERM CURRENT USE OF INSULIN (H): ICD-10-CM

## 2024-12-05 DIAGNOSIS — Z09 HOSPITAL DISCHARGE FOLLOW-UP: Primary | ICD-10-CM

## 2024-12-05 DIAGNOSIS — I67.9 CEREBROVASCULAR DISEASE: ICD-10-CM

## 2024-12-05 NOTE — PATIENT INSTRUCTIONS
Long Da[place]le, it was great talking with you today!     Recommendations from today's MTM visit:                                                      1. Make sure you use the Breyna inhaler twice every day - it is very important to use this routinely to help open up the lungs and prevent wheezing/shortness of breath    I value your experience and would be very grateful for your time with providing feedback on our clinic survey. You may receive a survey via email or text message in the next few days.     To schedule another MTM appointment, please call the clinic directly or you may call the MTM scheduling line at [phone] or toll-free at [phone].     My Clinical Pharmacist's contact information:                                                      Please feel free to contact me with any questions or concerns you have.      Markie Metz, PharmD, [facility]  Phone: [phone]

## 2024-12-22 ENCOUNTER — APPOINTMENT (OUTPATIENT)
Dept: CT IMAGING | Facility: CLINIC | Age: 74
DRG: 190 | End: 2024-12-22
Attending: EMERGENCY MEDICINE
Payer: COMMERCIAL

## 2024-12-22 ENCOUNTER — APPOINTMENT (OUTPATIENT)
Dept: GENERAL RADIOLOGY | Facility: CLINIC | Age: 74
DRG: 190 | End: 2024-12-22
Attending: EMERGENCY MEDICINE
Payer: COMMERCIAL

## 2024-12-22 ENCOUNTER — HOSPITAL ENCOUNTER (INPATIENT)
Facility: CLINIC | Age: 74
LOS: 2 days | Discharge: HOME | DRG: 190 | End: 2024-12-24
Attending: EMERGENCY MEDICINE | Admitting: INTERNAL MEDICINE
Payer: COMMERCIAL

## 2024-12-22 DIAGNOSIS — R09.02 HYPOXIA: ICD-10-CM

## 2024-12-22 DIAGNOSIS — J96.02 ACUTE RESPIRATORY FAILURE WITH HYPOXIA AND HYPERCAPNIA (H): ICD-10-CM

## 2024-12-22 DIAGNOSIS — R91.1 PULMONARY NODULE: ICD-10-CM

## 2024-12-22 DIAGNOSIS — J44.1 COPD EXACERBATION (H): Primary | ICD-10-CM

## 2024-12-22 DIAGNOSIS — J96.01 ACUTE RESPIRATORY FAILURE WITH HYPOXIA AND HYPERCAPNIA (H): ICD-10-CM

## 2024-12-22 DIAGNOSIS — R06.02 SHORTNESS OF BREATH: ICD-10-CM

## 2024-12-22 LAB
ANION GAP SERPL CALCULATED.3IONS-SCNC: 17 MMOL/L (ref 7–15)
BASE EXCESS BLDV CALC-SCNC: 4.4 MMOL/L (ref -3–3)
BASOPHILS # BLD AUTO: 0 10E3/UL (ref 0–0.2)
BASOPHILS NFR BLD AUTO: 0 %
BUN SERPL-MCNC: 16.1 MG/DL (ref 8–23)
CALCIUM SERPL-MCNC: 9.8 MG/DL (ref 8.8–10.4)
CHLORIDE SERPL-SCNC: 97 MMOL/L (ref 98–107)
CREAT SERPL-MCNC: 0.79 MG/DL (ref 0.51–0.95)
D DIMER PPP FEU-MCNC: 1.3 UG/ML FEU (ref 0–0.5)
EGFRCR SERPLBLD CKD-EPI 2021: 78 ML/MIN/1.73M2
EOSINOPHIL # BLD AUTO: 0.3 10E3/UL (ref 0–0.7)
EOSINOPHIL NFR BLD AUTO: 4 %
ERYTHROCYTE [DISTWIDTH] IN BLOOD BY AUTOMATED COUNT: 13.9 % (ref 10–15)
EST. AVERAGE GLUCOSE BLD GHB EST-MCNC: 203 MG/DL
FLUAV RNA SPEC QL NAA+PROBE: NEGATIVE
FLUBV RNA RESP QL NAA+PROBE: NEGATIVE
GLUCOSE BLDC GLUCOMTR-MCNC: 271 MG/DL (ref 70–99)
GLUCOSE BLDC GLUCOMTR-MCNC: 372 MG/DL (ref 70–99)
GLUCOSE SERPL-MCNC: 242 MG/DL (ref 70–99)
HBA1C MFR BLD: 8.7 %
HCO3 BLDV-SCNC: 31 MMOL/L (ref 21–28)
HCO3 SERPL-SCNC: 27 MMOL/L (ref 22–29)
HCT VFR BLD AUTO: 36.5 % (ref 35–47)
HGB BLD-MCNC: 11.8 G/DL (ref 11.7–15.7)
IMM GRANULOCYTES # BLD: 0 10E3/UL
IMM GRANULOCYTES NFR BLD: 0 %
LACTATE SERPL-SCNC: 1.8 MMOL/L (ref 0.7–2)
LACTATE SERPL-SCNC: 2.8 MMOL/L (ref 0.7–2)
LYMPHOCYTES # BLD AUTO: 1.4 10E3/UL (ref 0.8–5.3)
LYMPHOCYTES NFR BLD AUTO: 19 %
MAGNESIUM SERPL-MCNC: 1.3 MG/DL (ref 1.7–2.3)
MCH RBC QN AUTO: 29 PG (ref 26.5–33)
MCHC RBC AUTO-ENTMCNC: 32.3 G/DL (ref 31.5–36.5)
MCV RBC AUTO: 90 FL (ref 78–100)
MONOCYTES # BLD AUTO: 0.3 10E3/UL (ref 0–1.3)
MONOCYTES NFR BLD AUTO: 4 %
NEUTROPHILS # BLD AUTO: 5.5 10E3/UL (ref 1.6–8.3)
NEUTROPHILS NFR BLD AUTO: 72 %
NRBC # BLD AUTO: 0 10E3/UL
NRBC BLD AUTO-RTO: 0 /100
O2/TOTAL GAS SETTING VFR VENT: 21 %
OXYHGB MFR BLDV: 52 % (ref 70–75)
PCO2 BLDV: 51 MM HG (ref 40–50)
PH BLDV: 7.39 [PH] (ref 7.32–7.43)
PLATELET # BLD AUTO: 282 10E3/UL (ref 150–450)
PO2 BLDV: 30 MM HG (ref 25–47)
POTASSIUM SERPL-SCNC: 3.9 MMOL/L (ref 3.4–5.3)
RBC # BLD AUTO: 4.07 10E6/UL (ref 3.8–5.2)
RSV RNA SPEC NAA+PROBE: NEGATIVE
SAO2 % BLDV: 52.7 % (ref 70–75)
SARS-COV-2 RNA RESP QL NAA+PROBE: NEGATIVE
SODIUM SERPL-SCNC: 141 MMOL/L (ref 135–145)
TROPONIN T SERPL HS-MCNC: 19 NG/L
TROPONIN T SERPL HS-MCNC: 23 NG/L
WBC # BLD AUTO: 7.6 10E3/UL (ref 4–11)

## 2024-12-22 PROCEDURE — 258N000003 HC RX IP 258 OP 636: Performed by: EMERGENCY MEDICINE

## 2024-12-22 PROCEDURE — 250N000009 HC RX 250: Performed by: INTERNAL MEDICINE

## 2024-12-22 PROCEDURE — 96366 THER/PROPH/DIAG IV INF ADDON: CPT | Mod: 59

## 2024-12-22 PROCEDURE — 96365 THER/PROPH/DIAG IV INF INIT: CPT | Mod: 59

## 2024-12-22 PROCEDURE — 120N000001 HC R&B MED SURG/OB

## 2024-12-22 PROCEDURE — 250N000009 HC RX 250: Performed by: EMERGENCY MEDICINE

## 2024-12-22 PROCEDURE — 85379 FIBRIN DEGRADATION QUANT: CPT | Performed by: EMERGENCY MEDICINE

## 2024-12-22 PROCEDURE — 94640 AIRWAY INHALATION TREATMENT: CPT

## 2024-12-22 PROCEDURE — 999N000156 HC STATISTIC RCP CONSULT EA 30 MIN

## 2024-12-22 PROCEDURE — 250N000012 HC RX MED GY IP 250 OP 636 PS 637: Performed by: INTERNAL MEDICINE

## 2024-12-22 PROCEDURE — 96375 TX/PRO/DX INJ NEW DRUG ADDON: CPT | Mod: 59

## 2024-12-22 PROCEDURE — 84484 ASSAY OF TROPONIN QUANT: CPT | Performed by: EMERGENCY MEDICINE

## 2024-12-22 PROCEDURE — 83605 ASSAY OF LACTIC ACID: CPT | Performed by: EMERGENCY MEDICINE

## 2024-12-22 PROCEDURE — 87637 SARSCOV2&INF A&B&RSV AMP PRB: CPT | Performed by: EMERGENCY MEDICINE

## 2024-12-22 PROCEDURE — 250N000013 HC RX MED GY IP 250 OP 250 PS 637: Performed by: INTERNAL MEDICINE

## 2024-12-22 PROCEDURE — 82805 BLOOD GASES W/O2 SATURATION: CPT | Performed by: EMERGENCY MEDICINE

## 2024-12-22 PROCEDURE — 250N000011 HC RX IP 250 OP 636: Performed by: INTERNAL MEDICINE

## 2024-12-22 PROCEDURE — 83036 HEMOGLOBIN GLYCOSYLATED A1C: CPT | Performed by: INTERNAL MEDICINE

## 2024-12-22 PROCEDURE — 999N000157 HC STATISTIC RCP TIME EA 10 MIN

## 2024-12-22 PROCEDURE — 85004 AUTOMATED DIFF WBC COUNT: CPT | Performed by: EMERGENCY MEDICINE

## 2024-12-22 PROCEDURE — 80048 BASIC METABOLIC PNL TOTAL CA: CPT | Performed by: EMERGENCY MEDICINE

## 2024-12-22 PROCEDURE — 250N000011 HC RX IP 250 OP 636: Performed by: EMERGENCY MEDICINE

## 2024-12-22 PROCEDURE — 99285 EMERGENCY DEPT VISIT HI MDM: CPT | Mod: 25

## 2024-12-22 PROCEDURE — 99223 1ST HOSP IP/OBS HIGH 75: CPT | Performed by: INTERNAL MEDICINE

## 2024-12-22 PROCEDURE — 83735 ASSAY OF MAGNESIUM: CPT | Performed by: EMERGENCY MEDICINE

## 2024-12-22 PROCEDURE — 71046 X-RAY EXAM CHEST 2 VIEWS: CPT

## 2024-12-22 PROCEDURE — 36415 COLL VENOUS BLD VENIPUNCTURE: CPT | Performed by: EMERGENCY MEDICINE

## 2024-12-22 PROCEDURE — 94640 AIRWAY INHALATION TREATMENT: CPT | Mod: 76

## 2024-12-22 PROCEDURE — 71275 CT ANGIOGRAPHY CHEST: CPT

## 2024-12-22 RX ORDER — CALCIUM CARBONATE 500 MG/1
1000 TABLET, CHEWABLE ORAL 4 TIMES DAILY PRN
Status: DISCONTINUED | OUTPATIENT
Start: 2024-12-22 | End: 2024-12-24 | Stop reason: HOSPADM

## 2024-12-22 RX ORDER — ONDANSETRON 4 MG/1
4 TABLET, ORALLY DISINTEGRATING ORAL EVERY 6 HOURS PRN
Status: DISCONTINUED | OUTPATIENT
Start: 2024-12-22 | End: 2024-12-24 | Stop reason: HOSPADM

## 2024-12-22 RX ORDER — IPRATROPIUM BROMIDE AND ALBUTEROL SULFATE 2.5; .5 MG/3ML; MG/3ML
6 SOLUTION RESPIRATORY (INHALATION) ONCE
Status: COMPLETED | OUTPATIENT
Start: 2024-12-22 | End: 2024-12-22

## 2024-12-22 RX ORDER — ONDANSETRON 2 MG/ML
4 INJECTION INTRAMUSCULAR; INTRAVENOUS EVERY 6 HOURS PRN
Status: DISCONTINUED | OUTPATIENT
Start: 2024-12-22 | End: 2024-12-24 | Stop reason: HOSPADM

## 2024-12-22 RX ORDER — ASPIRIN 81 MG/1
81 TABLET ORAL DAILY
Status: DISCONTINUED | OUTPATIENT
Start: 2024-12-23 | End: 2024-12-24 | Stop reason: HOSPADM

## 2024-12-22 RX ORDER — ATORVASTATIN CALCIUM 40 MG/1
40 TABLET, FILM COATED ORAL EVERY EVENING
Status: DISCONTINUED | OUTPATIENT
Start: 2024-12-22 | End: 2024-12-24 | Stop reason: HOSPADM

## 2024-12-22 RX ORDER — METHYLPREDNISOLONE SODIUM SUCCINATE 125 MG/2ML
125 INJECTION INTRAMUSCULAR; INTRAVENOUS ONCE
Status: COMPLETED | OUTPATIENT
Start: 2024-12-22 | End: 2024-12-22

## 2024-12-22 RX ORDER — POLYETHYLENE GLYCOL 3350 17 G/17G
17 POWDER, FOR SOLUTION ORAL 2 TIMES DAILY PRN
Status: DISCONTINUED | OUTPATIENT
Start: 2024-12-22 | End: 2024-12-24 | Stop reason: HOSPADM

## 2024-12-22 RX ORDER — IPRATROPIUM BROMIDE AND ALBUTEROL SULFATE 2.5; .5 MG/3ML; MG/3ML
3 SOLUTION RESPIRATORY (INHALATION)
Status: DISCONTINUED | OUTPATIENT
Start: 2024-12-22 | End: 2024-12-24 | Stop reason: HOSPADM

## 2024-12-22 RX ORDER — METFORMIN HYDROCHLORIDE 500 MG/1
500 TABLET, EXTENDED RELEASE ORAL 2 TIMES DAILY WITH MEALS
COMMUNITY

## 2024-12-22 RX ORDER — DEXTROSE MONOHYDRATE 25 G/50ML
25-50 INJECTION, SOLUTION INTRAVENOUS
Status: DISCONTINUED | OUTPATIENT
Start: 2024-12-22 | End: 2024-12-24 | Stop reason: HOSPADM

## 2024-12-22 RX ORDER — IPRATROPIUM BROMIDE AND ALBUTEROL SULFATE 2.5; .5 MG/3ML; MG/3ML
3 SOLUTION RESPIRATORY (INHALATION) ONCE
Status: COMPLETED | OUTPATIENT
Start: 2024-12-22 | End: 2024-12-22

## 2024-12-22 RX ORDER — METHYLPREDNISOLONE SODIUM SUCCINATE 40 MG/ML
40 INJECTION INTRAMUSCULAR; INTRAVENOUS EVERY 12 HOURS
Status: DISCONTINUED | OUTPATIENT
Start: 2024-12-22 | End: 2024-12-23

## 2024-12-22 RX ORDER — LIDOCAINE 40 MG/G
CREAM TOPICAL
Status: DISCONTINUED | OUTPATIENT
Start: 2024-12-22 | End: 2024-12-24 | Stop reason: HOSPADM

## 2024-12-22 RX ORDER — FUROSEMIDE 40 MG/1
40 TABLET ORAL DAILY
Status: DISCONTINUED | OUTPATIENT
Start: 2024-12-23 | End: 2024-12-24 | Stop reason: HOSPADM

## 2024-12-22 RX ORDER — ALBUTEROL SULFATE 0.83 MG/ML
2.5 SOLUTION RESPIRATORY (INHALATION)
Status: DISCONTINUED | OUTPATIENT
Start: 2024-12-22 | End: 2024-12-24 | Stop reason: HOSPADM

## 2024-12-22 RX ORDER — AMOXICILLIN 250 MG
1 CAPSULE ORAL 2 TIMES DAILY PRN
Status: DISCONTINUED | OUTPATIENT
Start: 2024-12-22 | End: 2024-12-24 | Stop reason: HOSPADM

## 2024-12-22 RX ORDER — LOSARTAN POTASSIUM 50 MG/1
100 TABLET ORAL DAILY
Status: DISCONTINUED | OUTPATIENT
Start: 2024-12-23 | End: 2024-12-24 | Stop reason: HOSPADM

## 2024-12-22 RX ORDER — IPRATROPIUM BROMIDE AND ALBUTEROL SULFATE 2.5; .5 MG/3ML; MG/3ML
3 SOLUTION RESPIRATORY (INHALATION) EVERY 4 HOURS PRN
Status: DISCONTINUED | OUTPATIENT
Start: 2024-12-22 | End: 2024-12-24 | Stop reason: HOSPADM

## 2024-12-22 RX ORDER — IOPAMIDOL 755 MG/ML
500 INJECTION, SOLUTION INTRAVASCULAR ONCE
Status: COMPLETED | OUTPATIENT
Start: 2024-12-22 | End: 2024-12-22

## 2024-12-22 RX ORDER — MAGNESIUM SULFATE HEPTAHYDRATE 40 MG/ML
2 INJECTION, SOLUTION INTRAVENOUS ONCE
Status: COMPLETED | OUTPATIENT
Start: 2024-12-22 | End: 2024-12-22

## 2024-12-22 RX ORDER — ACETAMINOPHEN 650 MG/1
650 SUPPOSITORY RECTAL EVERY 4 HOURS PRN
Status: DISCONTINUED | OUTPATIENT
Start: 2024-12-22 | End: 2024-12-24 | Stop reason: HOSPADM

## 2024-12-22 RX ORDER — ACETAMINOPHEN 325 MG/1
650 TABLET ORAL EVERY 4 HOURS PRN
Status: DISCONTINUED | OUTPATIENT
Start: 2024-12-22 | End: 2024-12-24 | Stop reason: HOSPADM

## 2024-12-22 RX ORDER — AMOXICILLIN 250 MG
2 CAPSULE ORAL 2 TIMES DAILY PRN
Status: DISCONTINUED | OUTPATIENT
Start: 2024-12-22 | End: 2024-12-24 | Stop reason: HOSPADM

## 2024-12-22 RX ORDER — METOPROLOL SUCCINATE 50 MG/1
50 TABLET, EXTENDED RELEASE ORAL EVERY EVENING
Status: DISCONTINUED | OUTPATIENT
Start: 2024-12-22 | End: 2024-12-24 | Stop reason: HOSPADM

## 2024-12-22 RX ORDER — AMLODIPINE BESYLATE 5 MG/1
5 TABLET ORAL DAILY
Status: DISCONTINUED | OUTPATIENT
Start: 2024-12-23 | End: 2024-12-24 | Stop reason: HOSPADM

## 2024-12-22 RX ORDER — NICOTINE POLACRILEX 4 MG
15-30 LOZENGE BUCCAL
Status: DISCONTINUED | OUTPATIENT
Start: 2024-12-22 | End: 2024-12-24 | Stop reason: HOSPADM

## 2024-12-22 RX ADMIN — SODIUM CHLORIDE 77 ML: 9 INJECTION, SOLUTION INTRAVENOUS at 13:20

## 2024-12-22 RX ADMIN — IPRATROPIUM BROMIDE AND ALBUTEROL SULFATE 3 ML: .5; 3 SOLUTION RESPIRATORY (INHALATION) at 19:49

## 2024-12-22 RX ADMIN — ALBUTEROL SULFATE 2.5 MG: 2.5 SOLUTION RESPIRATORY (INHALATION) at 22:37

## 2024-12-22 RX ADMIN — IPRATROPIUM BROMIDE AND ALBUTEROL SULFATE 6 ML: .5; 3 SOLUTION RESPIRATORY (INHALATION) at 11:34

## 2024-12-22 RX ADMIN — ATORVASTATIN CALCIUM 40 MG: 40 TABLET, FILM COATED ORAL at 19:46

## 2024-12-22 RX ADMIN — SODIUM CHLORIDE 500 ML: 9 INJECTION, SOLUTION INTRAVENOUS at 12:35

## 2024-12-22 RX ADMIN — MAGNESIUM SULFATE HEPTAHYDRATE 2 G: 40 INJECTION, SOLUTION INTRAVENOUS at 12:42

## 2024-12-22 RX ADMIN — INSULIN ASPART 3 UNITS: 100 INJECTION, SOLUTION INTRAVENOUS; SUBCUTANEOUS at 18:10

## 2024-12-22 RX ADMIN — METOPROLOL SUCCINATE 50 MG: 50 TABLET, EXTENDED RELEASE ORAL at 19:46

## 2024-12-22 RX ADMIN — IOPAMIDOL 57 ML: 755 INJECTION, SOLUTION INTRAVENOUS at 13:20

## 2024-12-22 RX ADMIN — METHYLPREDNISOLONE SODIUM SUCCINATE 125 MG: 125 INJECTION, POWDER, FOR SOLUTION INTRAMUSCULAR; INTRAVENOUS at 11:38

## 2024-12-22 RX ADMIN — IPRATROPIUM BROMIDE AND ALBUTEROL SULFATE 3 ML: .5; 3 SOLUTION RESPIRATORY (INHALATION) at 14:24

## 2024-12-22 RX ADMIN — METHYLPREDNISOLONE SODIUM SUCCINATE 40 MG: 40 INJECTION, POWDER, FOR SOLUTION INTRAMUSCULAR; INTRAVENOUS at 19:46

## 2024-12-22 ASSESSMENT — ACTIVITIES OF DAILY LIVING (ADL)
ADLS_ACUITY_SCORE: 77
ADLS_ACUITY_SCORE: 61
ADLS_ACUITY_SCORE: 61
ADLS_ACUITY_SCORE: 64
ADLS_ACUITY_SCORE: 61
ADLS_ACUITY_SCORE: 64
ADLS_ACUITY_SCORE: 61

## 2024-12-22 NOTE — ED TRIAGE NOTES
Patient brought in with son for shortness of breath. Son noticed patient was short of breath this morning and lower oxygen readings (87%-90%). Patient has history of COPD, did one nebulizer at home. Patient placed patient on 1L of oxygen at home

## 2024-12-22 NOTE — ED PROVIDER NOTES
Emergency Department Note      History of Present Illness     Chief Complaint   Shortness of Breath      HPI   Pilo Quintana is a 74 year old female with a past medical history significant for type II DM, CVA with right sided hemiplegia, HTN, HLD, obesity, MAGALIS, hyperparathyroidism, intracerebral hemorrhage, COPD, and chronic heart failure who presents to the emergency department with her son for evaluation of shortness of breath. Her son translates for her at bedside. She reports that she had a gradual onset of shortness of breath starting about three days ago and her oxygen saturation was reading 87-90% at home, prompting them to put her back on oxygen She does have a history of COPD and has used a nebulizer treatment previously at home, but this did not provide significant relief for her symptoms today. She has used this nebulizer treatment for the past year and a half. Her son reports that her symptoms started with a mild cough and this progressively worsened. She has had increased effort of breathing and also notes wheezing as well. She does endorse mild chest discomfort which is exacerbated with excessive and persistent coughing. She denies any fever. She has had both flu and COVID 19 vaccines this year. Her son and grandchild both have a cough. Her symptoms feel different than when she has had COPD and asthma exacerbations previously.     Independent Historian   Son as detailed above. He translates for her at bedside.     Review of External Notes   I reviewed the discharge summary from 11/30/24, for which the patient was admitted to the hospital admission for an asthma and COPD exacerbation. She presented with cough and wheezing at this time.    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  Multiple pulmonary nodules  Osteoarthritis  Renal oncocytoma of right kidney  Type 2 diabetes mellitus  ICH  Primary hyperparathyroidism  Thyroid nodule  Hypoxia  Lactic acidosis  Steroid-induced hyperglycemia  Community acquired bacterial pneumonia  COPD with acute exacerbation  Acute respiratory failure with hypoxia and hypercapnia  MAGALIS  Chronic heart failure  Thyroid nodule  Hemiplegia of right dominant side following cerebral infarction  CVA  DJD  Tibial plateau fracture  Carpal tunnel syndrome    Medications   albuterol  amlodipine   Aspirin 81 mg  atorvastatin  budesonide-formoterol   furosemide  insulin aspart  ipratropium - albuterol  losartan  metformin  metoprolol succinate  Simvastatin  Estradiol  Doxycycline    Surgical History   D&C  Renal biopsy  Cataract removal surgery bilateral    Physical Exam     Patient Vitals for the past 24 hrs:   BP Temp Temp src Pulse Resp SpO2 Height Weight   12/22/24 1631 127/66 98.8  F (37.1  C) Oral 83 20 98 % -- 70.1 kg (154 lb 8.7 oz)   12/22/24 1554 -- -- -- -- 17 -- -- --   12/22/24 1541 -- -- -- 84 21 96 % -- --   12/22/24 1526 119/78 -- -- 85 22 98 % -- --   12/22/24 1511 -- -- -- 84 25 98 % -- --   12/22/24 1456 119/65 -- -- 82 30 97 % -- --   12/22/24 1441 -- -- -- 80 27 99 % -- --   12/22/24 1426 127/73 -- -- 83 25 -- -- --   12/22/24 1411 -- -- -- 82 20 99 % -- --   12/22/24 1356 (!) 113/97 -- -- 80 (!) 34 96 % -- --   12/22/24 1030 -- -- -- -- -- 97 % -- --   12/22/24 0949 (!) 150/109 98.9  F (37.2  C) -- 110 18 95 % 1.626 m (5' 4\") 70.3 kg (155 lb)     Physical Exam  General:              Well-nourished              Speaking in full sentences  Eyes:              Conjunctiva without injection or scleral icterus  ENT:              Moist mucous membranes              Nares patent              Pinnae normal  Neck:         "      Full ROM              No stiffness appreciated  Resp:              Decreased air movement throughout              Scant expiratory wheezing bilaterally              Accessory muscle use  CV:                    Tachycardic rate, regular rhythm              S1 and S2 present              No murmur, gallop or rub  GI:              BS present              Abdomen soft without distention              Non-tender to light and deep palpation              No guarding or rebound tenderness  Skin:              Warm, dry, well perfused              No rashes or open wounds on exposed skin  MSK:              Moves all extremities              No focal deformities              Trace LE edema bilaterally, no calf tenderness  Neuro:              Alert              Answers questions appropriately              Moves all extremities equally  Psych:              Normal affect, normal mood    Diagnostics     Lab Results   Labs Ordered and Resulted from Time of ED Arrival to Time of ED Departure   D DIMER QUANTITATIVE - Abnormal       Result Value    D-Dimer Quantitative 1.30 (*)    BASIC METABOLIC PANEL - Abnormal    Sodium 141      Potassium 3.9      Chloride 97 (*)     Carbon Dioxide (CO2) 27      Anion Gap 17 (*)     Urea Nitrogen 16.1      Creatinine 0.79      GFR Estimate 78      Calcium 9.8      Glucose 242 (*)    LACTIC ACID WHOLE BLOOD WITH 1X REPEAT IN 2 HR WHEN >2 - Abnormal    Lactic Acid, Initial 2.8 (*)    TROPONIN T, HIGH SENSITIVITY - Abnormal    Troponin T, High Sensitivity 19 (*)    MAGNESIUM - Abnormal    Magnesium 1.3 (*)    BLOOD GAS VENOUS - Abnormal    pH Venous 7.39      pCO2 Venous 51 (*)     pO2 Venous 30      Bicarbonate Venous 31 (*)     Base Excess/Deficit Venous 4.4 (*)     FIO2 21      Oxyhemoglobin Venous 52 (*)     O2 Sat, Venous 52.7 (*)    TROPONIN T, HIGH SENSITIVITY - Abnormal    Troponin T, High Sensitivity 23 (*)    INFLUENZA A/B, RSV AND SARS-COV2 PCR - Normal    Influenza A PCR Negative       Influenza B PCR Negative      RSV PCR Negative      SARS CoV2 PCR Negative     LACTIC ACID WHOLE BLOOD - Normal    Lactic Acid 1.8     CBC WITH PLATELETS AND DIFFERENTIAL    WBC Count 7.6      RBC Count 4.07      Hemoglobin 11.8      Hematocrit 36.5      MCV 90      MCH 29.0      MCHC 32.3      RDW 13.9      Platelet Count 282      % Neutrophils 72      % Lymphocytes 19      % Monocytes 4      % Eosinophils 4      % Basophils 0      % Immature Granulocytes 0      NRBCs per 100 WBC 0      Absolute Neutrophils 5.5      Absolute Lymphocytes 1.4      Absolute Monocytes 0.3      Absolute Eosinophils 0.3      Absolute Basophils 0.0      Absolute Immature Granulocytes 0.0      Absolute NRBCs 0.0         Imaging   CT Chest Pulmonary Embolism w Contrast   Final Result   IMPRESSION:   1.  No evidence for pulmonary embolism but assessment is limited by motion.   2.  Coronary artery calcifications.   3.  Dilated main pulmonary artery root that could be seen with pulmonary artery hypertension.   4.  Suggestion of a pulmonary nodule at the left lung base.      Recommendations for an incidental lung nodule = or > 6mm to 8mm:   Low-Risk Patient: Initial follow-up CT at 6-12 months, then consider CT at 18-24 months if no change.   High-Risk Patient: Initial follow-up CT at 6-12 months, then CT at 18-24 months if no change.      *Low Risk: Minimal or absent history of smoking or other known risk factors.   *Nonsolid (ground-glass) or partly solid nodules may require longer follow-up to exclude indolent adenocarcinoma.   1.  *Recommendations based on Guidelines for the Management of Incidental Pulmonary Nodules Detected at CT: From the Fleischner Society 2017, Radiology 2017.            XR Chest 2 Views   Final Result   IMPRESSION: No acute cardiopulmonary abnormality. Unchanged linear scarring in the left midlung.      Echocardiogram Complete    (Results Pending)       EKG   ECG results from 11/28/24   EKG 12-lead, tracing only      Value    Systolic Blood Pressure     Diastolic Blood Pressure     Ventricular Rate 66    Atrial Rate 66    CA Interval 146    QRS Duration 82        QTc 413    P Axis 22    R AXIS -28    T Axis 14    Interpretation ECG      Sinus rhythm  Normal ECG    Confirmed by - EMERGENCY ROOM, PHYSICIAN ([id number]),  LANDY HART ([id number]) on 11/29/2024 6:48:28 AM         Independent Interpretation   CXR shows no lobar infiltrate    ED Course      Medications Administered   Medications   amLODIPine (NORVASC) tablet 5 mg (has no administration in time range)   aspirin EC tablet 81 mg (has no administration in time range)   atorvastatin (LIPITOR) tablet 40 mg (has no administration in time range)   furosemide (LASIX) tablet 40 mg (has no administration in time range)   losartan (COZAAR) tablet 100 mg (has no administration in time range)   metoprolol succinate ER (TOPROL XL) 24 hr tablet 50 mg (has no administration in time range)   lidocaine 1 % 0.1-1 mL (has no administration in time range)   lidocaine (LMX4) cream (has no administration in time range)   sodium chloride (PF) 0.9% PF flush 3 mL (3 mLs Intracatheter $Given 12/22/24 1809)   sodium chloride (PF) 0.9% PF flush 3 mL (has no administration in time range)   senna-docusate (SENOKOT-S/PERICOLACE) 8.6-50 MG per tablet 1 tablet (has no administration in time range)     Or   senna-docusate (SENOKOT-S/PERICOLACE) 8.6-50 MG per tablet 2 tablet (has no administration in time range)   calcium carbonate (TUMS) chewable tablet 1,000 mg (has no administration in time range)   acetaminophen (TYLENOL) tablet 650 mg (has no administration in time range)     Or   acetaminophen (TYLENOL) Suppository 650 mg (has no administration in time range)   polyethylene glycol (MIRALAX) Packet 17 g (has no administration in time range)   ondansetron (ZOFRAN ODT) ODT tab 4 mg (has no administration in time range)     Or   ondansetron (ZOFRAN) injection 4 mg (has no administration in time  range)   methylPREDNISolone sodium succinate (SOLU-MEDROL) injection 40 mg (has no administration in time range)   ipratropium - albuterol 0.5 mg/2.5 mg/3 mL (DUONEB) neb solution 3 mL (has no administration in time range)   ipratropium - albuterol 0.5 mg/2.5 mg/3 mL (DUONEB) neb solution 3 mL (has no administration in time range)   glucose gel 15-30 g (has no administration in time range)     Or   dextrose 50 % injection 25-50 mL (has no administration in time range)     Or   glucagon injection 1 mg (has no administration in time range)   insulin aspart (NovoLOG) injection (RAPID ACTING) (3 Units Subcutaneous $Given 12/22/24 1810)   insulin aspart (NovoLOG) injection (RAPID ACTING) (has no administration in time range)   ipratropium - albuterol 0.5 mg/2.5 mg/3 mL (DUONEB) neb solution 6 mL (6 mLs Nebulization $Given 12/22/24 1134)   methylPREDNISolone Na Suc (solu-MEDROL) injection 125 mg (125 mg Intravenous $Given 12/22/24 1138)   sodium chloride 0.9% BOLUS 500 mL (500 mLs Intravenous $New Bag 12/22/24 1235)   magnesium sulfate 2 g in 50 mL sterile water intermittent infusion (2 g Intravenous $New Bag 12/22/24 1242)   sodium chloride for CT scan flush use (77 mLs Intravenous $Given 12/22/24 1320)   iopamidol (ISOVUE-370) solution 500 mL (57 mLs Intravenous $Given 12/22/24 1320)   ipratropium - albuterol 0.5 mg/2.5 mg/3 mL (DUONEB) neb solution 3 mL (3 mLs Nebulization $Given 12/22/24 1424)       Procedures   Procedures     Discussion of Management   Admitting Hospitalist    ED Course   ED Course as of 12/22/24 1812   Sun Dec 22, 2024   1021 I obtained history and examined the patient as noted above.     1128 I rechecked the patient.   1241 I rechecked the patient and explained findings. I discussed plan for admission to the hospital.     1416 Patient and son re-evaluated and updated on plan of care.       Additional Documentation  Social Determinants of Health: Supportive son at bedside.     Medical Decision  Making / Diagnosis     CMS DiagnosesThe patient has signs of sepsis        MIPS:   CT for PE was ordered because the patient had an abnormal d-dimer.    ROBBY Quintana is a 74 year old female with a complex past medical history who presents to the emergency department for evaluation of shortness of breath.  VS on presentation notable for elevated BP and HR, which improved during patient's emergency department course.  Based on the above history and evaluation, highly suspect COPD exacerbation and acute hypoxic respiratory failure.  She was provided DuoNeb therapy x 2 as well as IV Solu-Medrol, noting improvements in symptoms and recheck.  Other etiologies were considered.  Initial chest x-ray negative for pneumothorax or lobar pneumonia.  Given relative immobility, presenting tachycardia and hypoxia, PE also considered.  D-dimer did return elevated for which CT PE was pursued.  Fortunately, no evidence of acute pulmonary embolism is identified.  Note made of enlarged pulmonary artery suggesting pulmonary hypertension as well as incidentally noted left lower lobe nodule, which can be monitored as an outpatient.  This was discussed with family.  EKG demonstrate sinus rhythm without findings of acute ischemia.  Initial and repeat troponin are slightly elevated yet roughly within range of previous.  Delta troponin increased slightly from 19-23, though this is less then the change of 7 per protocol, and most likely represents type II demand ischemia in the context of hypoxia.  Patient and son updated at bedside and are in agreement with outlined plan of care.  Questions of answered prior to admission.    Disposition   The patient was admitted to the hospital.     Diagnosis     ICD-10-CM    1. Shortness of breath  R06.02       2. Hypoxia  R09.02       3. Pulmonary nodule  R91.1            Scribe Disclosure:  IPallavi, am serving as a scribe at 10:22 AM on 12/22/2024 to document services personally performed by  Mario Orozco MD based on my observations and the provider's statements to me.        Mario Orozco MD  12/22/24 [id number]

## 2024-12-22 NOTE — PHARMACY-ADMISSION MEDICATION HISTORY
Pharmacy Intern Admission Medication History    Admission medication history is complete. The information provided in this note is only as accurate as the sources available at the time of the update.    Information Source(s): Family member and CareEverywhere/SureScripts via in-person    Pertinent Information: Med list was collected from son. Patient doesn't take insulin any more per son    Changes made to PTA medication list:  Added: None  Deleted: DUONEB, insulin  Changed: None    Allergies reviewed with patient and updates made in EHR: yes    Medication History Completed By: Johnny Agarwal 12/22/2024 2:10 PM    PTA Med List   Medication Sig Last Dose/Taking    acetaminophen (TYLENOL) 500 MG tablet Take 500-1,000 mg by mouth every 8 hours as needed for mild pain Taking As Needed    albuterol (PROAIR HFA/PROVENTIL HFA/VENTOLIN HFA) 108 (90 Base) MCG/ACT inhaler Inhale 2 puffs into the lungs every 4 hours as needed for wheezing Taking As Needed    albuterol (PROVENTIL) (2.5 MG/3ML) 0.083% neb solution Take 2.5 mg by nebulization every 4 hours as needed for wheezing. 12/22/2024 Morning    amLODIPine (NORVASC) 5 MG tablet Take 5 mg by mouth daily 12/22/2024 Morning    aspirin (ASA) 81 MG EC tablet Take 1 tablet (81 mg) by mouth daily 12/22/2024 Morning    atorvastatin (LIPITOR) 40 MG tablet Take 1 tablet (40 mg) by mouth every evening 12/21/2024 Bedtime    budesonide-formoterol (SYMBICORT) 160-4.5 MCG/ACT Inhaler Inhale 2 puffs into the lungs 2 times daily. 12/22/2024 Morning    Cholecalciferol (VITAMIN D3) 50 MCG (2000 UT) CAPS Take 50 mcg by mouth daily 12/22/2024 Morning    furosemide (LASIX) 40 MG tablet Take 40 mg by mouth daily 12/22/2024 Morning    losartan (COZAAR) 100 MG tablet Take 100 mg by mouth daily 12/22/2024 Morning    metFORMIN (GLUCOPHAGE XR) 500 MG 24 hr tablet Take 500 mg by mouth 2 times daily (with meals). 12/22/2024 Morning    metoprolol succinate ER (TOPROL XL) 50 MG 24 hr tablet Take 50 mg by  mouth every evening. 12/21/2024 Evening    nystatin (NYSTOP) 372687 UNIT/GM external powder Apply topically 2 times daily as needed Taking As Needed    triamcinolone (KENALOG) 0.1 % external ointment Apply topically 2 times daily as needed for irritation Taking As Needed

## 2024-12-22 NOTE — H&P
[facility]  History and Physical  Hospitalist - Forrest Lucas DO       Date of Admission:  12/22/2024    Chief Complaint   Shortness of breath, hypoxia    History is obtained from the patient, the patient's son Balwinder at bedside, the emergency department physician, as well as electronic medical record.    History of Present Illness   Pilo Quintana is a 74 year old female with past medical history of COPD with chronic hypercapnic respiratory failure, MAGALIS, pulmonary hypertension, type 2 diabetes mellitus with history of steroid-induced hyperglycemia, dementia, HFpEF, history of CVA, history of pontine ICH with residual right hemiplegia, history of renal oncocytoma who presented on 12/22/2024 with chief complaint of shortness of breath and hypoxia.  The patient presents with her son who is at bedside who assists with the history.  For the last 2 days he has noticed the patient has been more short of breath.  He reports that his worst when she is eating as she gets very short of breath when she is chewing.  He checked her oxygen yesterday and noticed that she was 87% on room air.  He has tried nebulizer several times with some temporary improvement in her hypoxia but the oxygen numbers returned to the 80s.  He had some supplemental oxygen from a previous hospitalization at home and she has been using that for the last 1 day.  He denies any upper respiratory symptoms of runny nose or sore throat.  The patient does speak some small amounts of English.  She ambulates with a walker at baseline.      In the emergency department, the patient was found to have a temperature of 98.9  F, heart rate 110, blood pressure 150/109, respiratory rate 18, SpO2 95% on 1 L nasal cannula.  Initial lab work showed magnesium 1.3, glucose 242, lactic acid 2.8, high-sensitivity troponin 19, CBC within normal limits.  COVID-19, influenza, RSV were negative.  Chest x-ray showed unchanged linear scarring in the left  midlung.  CT chest showed coronary artery calcifications, dilated main pulmonary artery root possibly secondary to pulmonary artery hypertension, pulmonary nodule at the left lung base.  The patient was started on IV Solu-Medrol and scheduled DuoNeb treatments in the setting of COPD exacerbation.    ASSESSMENT/PLAN    Acute Hypoxic Respiratory Failure  Chronic Hypercapnic Respiratory Failure  Acute COPD Exacerbation  MAGALIS  Pulmonary Hypertension  Elevated Troponin  - Solumedrol 40 mg IV BID  - Duonebs QID and prn  - Check echo to assess the pulmonary hypertension and elevated troponin, though suspect this is secondary to the hypoxia  - Wean O2 as able    Lactic Acidosis  - Suspect secondary to hypoxia and nebs  - Improved    Hypomagnesemia  - Electrolyte replacement protocol    Type 2 Diabetes Mellitus  Hx of Steroid Induced Hyperglycemia  - A1C = 7.7 on 7/7/2024  - ISS  - Hold metformin given the lactic acdisos  - Previously on 5 units NPH with prednisone in early 12/2024    LLL Pulmonary Nodule  - Discussed with son in the ED.  Recommend repeat CT chest in 1 year with PCP    Chronic Medical Problems:  Dementia  Hypertension  Hx of CVA  Hx of ICH with Residual R Sided Hemiplegia  Hx of Renal Oncocytoma - on regular monitoring    PLAN: Resume home medications as appropriate once confirmed by pharmacy.     I spent 55 minutes in reviewing this patient's labs, imaging, medications, medical history.  In addition time was spent interviewing the patient, communicating with family, and medical decision making.  Time was also spent reviewing notes of PCP, recent discharge summary.    DVT Prophylaxis: Pneumatic Compression Devices  Code Status: Full Code  Disposition: Medically Ready for Discharge: Anticipated in 2-4 Days    Goals to discharge include: hypoxia resolves    Forrest Lucas DO  Securely message with [employer] (more info)  Text page via [employer] Paging/Directory     Primary Care Physician   Chela Rivera  Nacho    -----------------------------------------------------------------------------------------------------------------------------------------------------------------------------------------------------    Past Medical History    I have reviewed this patient's medical history and updated it with pertinent information if needed.   Past Medical History:   Diagnosis Date    Diabetes (H)     Hypertension     Morbid obesity (H)     Osteoarthritis 6/7/2003    PMB (postmenopausal bleeding) 9/16/2014    Renal mass     Thickened endometrium 9/16/2014    Uncomplicated asthma     Unspecified cerebral artery occlusion with cerebral infarction 1998    was in [place], no residual at present       Past Surgical History   I have reviewed this patient's surgical history and updated it with pertinent information if needed.  Past Surgical History:   Procedure Laterality Date    DILATION AND CURETTAGE, OPERATIVE HYSTEROSCOPY WITH MORCELLATOR, COMBINED N/A 9/25/2014    Procedure: COMBINED DILATION AND CURETTAGE, OPERATIVE HYSTEROSCOPY WITH MORCELLATOR;  Surgeon: Silverio Christy MD;  Location:  OR       Prior to Admission Medications   Prior to Admission Medications   Prescriptions Last Dose Informant Patient Reported? Taking?   Cholecalciferol (VITAMIN D3) 50 MCG (2000 UT) CAPS 12/22/2024 Morning Son Yes Yes   Sig: Take 50 mcg by mouth daily   acetaminophen (TYLENOL) 500 MG tablet  Son Yes Yes   Sig: Take 500-1,000 mg by mouth every 8 hours as needed for mild pain   albuterol (PROAIR HFA/PROVENTIL HFA/VENTOLIN HFA) 108 (90 Base) MCG/ACT inhaler   No Yes   Sig: Inhale 2 puffs into the lungs every 4 hours as needed for wheezing   albuterol (PROVENTIL) (2.5 MG/3ML) 0.083% neb solution 12/22/2024 Morning  Yes Yes   Sig: Take 2.5 mg by nebulization every 4 hours as needed for wheezing.   amLODIPine (NORVASC) 5 MG tablet 12/22/2024 Morning  Yes Yes   Sig: Take 5 mg by mouth daily   aspirin (ASA) 81 MG EC tablet 12/22/2024 Morning  No  Yes   Sig: Take 1 tablet (81 mg) by mouth daily   atorvastatin (LIPITOR) 40 MG tablet 12/21/2024 Bedtime  No Yes   Sig: Take 1 tablet (40 mg) by mouth every evening   budesonide-formoterol (SYMBICORT) 160-4.5 MCG/ACT Inhaler 12/22/2024 Morning  Yes Yes   Sig: Inhale 2 puffs into the lungs 2 times daily.   furosemide (LASIX) 40 MG tablet 12/22/2024 Morning  Yes Yes   Sig: Take 40 mg by mouth daily   losartan (COZAAR) 100 MG tablet 12/22/2024 Morning Son Yes Yes   Sig: Take 100 mg by mouth daily   metFORMIN (GLUCOPHAGE XR) 500 MG 24 hr tablet 12/22/2024 Morning  Yes Yes   Sig: Take 500 mg by mouth 2 times daily (with meals).   metoprolol succinate ER (TOPROL XL) 50 MG 24 hr tablet 12/21/2024 Evening Son Yes Yes   Sig: Take 50 mg by mouth every evening.   nystatin (NYSTOP) 433670 UNIT/GM external powder   Yes Yes   Sig: Apply topically 2 times daily as needed   triamcinolone (KENALOG) 0.1 % external ointment  Son Yes Yes   Sig: Apply topically 2 times daily as needed for irritation      Facility-Administered Medications: None     Allergies   No Known Allergies    Social History   I have reviewed this patient's social history and updated it with pertinent information if needed. Burtsebastian FUNMILAYO Quintana  reports that she has never smoked. She has never used smokeless tobacco. She reports that she does not drink alcohol and does not use drugs.    Family History   I have reviewed this patient's family history and updated it with pertinent information if needed.   No family history on file.    -----------------------------------------------------------------------------------------------------------------------------------------------------------------------------------------------------    Review of Systems   The 10 point Review of Systems is negative other than noted in the HPI or here.     Physical Exam   Temp: 98.9  F (37.2  C)   BP: (!) 150/109 Pulse: 110   Resp: 18 SpO2: 97 % O2 Device: Nasal cannula Oxygen Delivery: 1  LPM  Vital Signs with Ranges  Temp:  [98.9  F (37.2  C)] 98.9  F (37.2  C)  Pulse:  [110] 110  Resp:  [18] 18  BP: (150)/(109) 150/109  SpO2:  [95 %-97 %] 97 %  155 lbs 0 oz    Constitutional: Awake, alert, cooperative, no apparent distress.  Eyes: Conjunctiva and pupils examined and normal.  HEENT: Moist mucous membranes, normal dentition.  Respiratory: Decreased/tight breath sounds bilaterally  Cardiovascular: Regular rate and rhythm, normal S1 and S2, and no murmur noted.  GI: Soft, non-distended, non-tender, normal bowel sounds.  Lymph/Hematologic: No anterior cervical or supraclavicular adenopathy.  Skin: No rashes, no cyanosis, trace BLE edema  Musculoskeletal: No joint swelling, erythema or tenderness.  Neurologic: Cranial nerves 2-12 intact, normal strength and sensation.  Psychiatric: Alert, oriented to person, place and time, no obvious anxiety or depression.     Data     Recent Labs   Lab 12/22/24  1145   WBC 7.6   HGB 11.8   MCV 90         POTASSIUM 3.9   CHLORIDE 97*   CO2 27   BUN 16.1   CR 0.79   ANIONGAP 17*   GLENIS 9.8   *       Recent Results (from the past 24 hours)   XR Chest 2 Views    Narrative    EXAM: XR CHEST 2 VIEWS  LOCATION: [facility]  DATE: 12/22/2024    INDICATION: SOB, cough  COMPARISON: 11/28/2024      Impression    IMPRESSION: No acute cardiopulmonary abnormality. Unchanged linear scarring in the left midlung.   CT Chest Pulmonary Embolism w Contrast    Narrative    EXAM: CT CHEST PULMONARY EMBOLISM WITH CONTRAST  LOCATION: [facility]  DATE: 12/22/2024    INDICATION: Shortness of breath, elevated d-dimer.  COMPARISON: None.  TECHNIQUE: CT chest pulmonary angiogram during arterial phase injection of IV contrast. Multiplanar reformats and MIP reconstructions were performed. Dose reduction techniques were used.   CONTRAST: 57 mL Isovue 370.    FINDINGS:  ANGIOGRAM CHEST: No imaging evidence for pulmonary embolism but  assessment is limited by motion. Dilated main pulmonary artery root measures 4.2 cm. Thoracic aorta is negative for dissection.    LUNGS AND PLEURA: No effusions. No acute lobar consolidation identified. Assessment of the lungs limited by motion. There is moderate atelectasis suggested at the lingula. Suggestion of a possible nodule at the left lower lobe measuring 5 mm series 7   image 136.    MEDIASTINUM/AXILLAE: No adenopathy or acute mediastinal abnormality otherwise identified.    CORONARY ARTERY CALCIFICATION: Moderate.    UPPER ABDOMEN: No significant upper abdominal abnormality.    MUSCULOSKELETAL: No acute abnormality.      Impression    IMPRESSION:  1.  No evidence for pulmonary embolism but assessment is limited by motion.  2.  Coronary artery calcifications.  3.  Dilated main pulmonary artery root that could be seen with pulmonary artery hypertension.  4.  Suggestion of a pulmonary nodule at the left lung base.    Recommendations for an incidental lung nodule = or > 6mm to 8mm:  Low-Risk Patient: Initial follow-up CT at 6-12 months, then consider CT at 18-24 months if no change.  High-Risk Patient: Initial follow-up CT at 6-12 months, then CT at 18-24 months if no change.    *Low Risk: Minimal or absent history of smoking or other known risk factors.  *Nonsolid (ground-glass) or partly solid nodules may require longer follow-up to exclude indolent adenocarcinoma.  1.  *Recommendations based on Guidelines for the Management of Incidental Pulmonary Nodules Detected at CT: From the Fleischner Society 2017, Radiology 2017.

## 2024-12-22 NOTE — ED NOTES
"[facility]  ED Nurse Handoff Report    ED Chief complaint: Shortness of Breath  . ED Diagnosis:   Final diagnoses:   None       Allergies: No Known Allergies    Code Status: Full Code    Activity level - Baseline/Home:  independent.  Activity Level - Current:   assist of 2.   Lift room needed: No.   Bariatric: No   Needed: No   Isolation: No.   Infection: Not Applicable.     Respiratory status: Nasal cannula    Vital Signs (within 30 minutes):   Vitals:    12/22/24 0949   BP: (!) 150/109   Pulse: 110   Resp: 18   Temp: 98.9  F (37.2  C)   SpO2: 95%   Weight: 70.3 kg (155 lb)   Height: 1.626 m (5' 4\")       Cardiac Rhythm:  ,      Pain level:    Patient confused: No.   Patient Falls Risk: nonskid shoes/slippers when out of bed, arm band in place, and patient and family education.   Elimination Status: Has voided     Patient Report - Initial Complaint: SOB.   Focused Assessment: Respiratory WDLRespiratory WDL: rhythm/patternRhythm/Pattern, Respiratory: shortness of breath; tachypneic; labored; pursed lip breathing     Abnormal Results:   Labs Ordered and Resulted from Time of ED Arrival to Time of ED Departure   D DIMER QUANTITATIVE - Abnormal       Result Value    D-Dimer Quantitative 1.30 (*)    BASIC METABOLIC PANEL - Abnormal    Sodium 141      Potassium 3.9      Chloride 97 (*)     Carbon Dioxide (CO2) 27      Anion Gap 17 (*)     Urea Nitrogen 16.1      Creatinine 0.79      GFR Estimate 78      Calcium 9.8      Glucose 242 (*)    LACTIC ACID WHOLE BLOOD WITH 1X REPEAT IN 2 HR WHEN >2 - Abnormal    Lactic Acid, Initial 2.8 (*)    TROPONIN T, HIGH SENSITIVITY - Abnormal    Troponin T, High Sensitivity 19 (*)    MAGNESIUM - Abnormal    Magnesium 1.3 (*)    BLOOD GAS VENOUS - Abnormal    pH Venous 7.39      pCO2 Venous 51 (*)     pO2 Venous 30      Bicarbonate Venous 31 (*)     Base Excess/Deficit Venous 4.4 (*)     FIO2 21      Oxyhemoglobin Venous 52 (*)     O2 Sat, Venous 52.7 " (*)    INFLUENZA A/B, RSV AND SARS-COV2 PCR - Normal    Influenza A PCR Negative      Influenza B PCR Negative      RSV PCR Negative      SARS CoV2 PCR Negative     CBC WITH PLATELETS AND DIFFERENTIAL    WBC Count 7.6      RBC Count 4.07      Hemoglobin 11.8      Hematocrit 36.5      MCV 90      MCH 29.0      MCHC 32.3      RDW 13.9      Platelet Count 282      % Neutrophils 72      % Lymphocytes 19      % Monocytes 4      % Eosinophils 4      % Basophils 0      % Immature Granulocytes 0      NRBCs per 100 WBC 0      Absolute Neutrophils 5.5      Absolute Lymphocytes 1.4      Absolute Monocytes 0.3      Absolute Eosinophils 0.3      Absolute Basophils 0.0      Absolute Immature Granulocytes 0.0      Absolute NRBCs 0.0     LACTIC ACID WHOLE BLOOD   TROPONIN T, HIGH SENSITIVITY        CT Chest Pulmonary Embolism w Contrast   Preliminary Result   IMPRESSION:   1.  No evidence for pulmonary embolism but assessment is limited by motion.   2.  Coronary artery calcifications.   3.  Dilated main pulmonary artery root that could be seen with pulmonary artery hypertension.   4.  Suggestion of a pulmonary nodule at the left lung base.      Recommendations for an incidental lung nodule = or > 6mm to 8mm:   Low-Risk Patient: Initial follow-up CT at 6-12 months, then consider CT at 18-24 months if no change.   High-Risk Patient: Initial follow-up CT at 6-12 months, then CT at 18-24 months if no change.      *Low Risk: Minimal or absent history of smoking or other known risk factors.   *Nonsolid (ground-glass) or partly solid nodules may require longer follow-up to exclude indolent adenocarcinoma.   1.  *Recommendations based on Guidelines for the Management of Incidental Pulmonary Nodules Detected at CT: From the Fleischner Society 2017, Radiology 2017.            XR Chest 2 Views   Final Result   IMPRESSION: No acute cardiopulmonary abnormality. Unchanged linear scarring in the left midlung.          Treatments provided: see  MAR  Family Comments: Son at bedside  OBS brochure/video discussed/provided to patient:  N/A  ED Medications:   Medications   ipratropium - albuterol 0.5 mg/2.5 mg/3 mL (DUONEB) neb solution 6 mL (6 mLs Nebulization $Given 12/22/24 1134)   methylPREDNISolone Na Suc (solu-MEDROL) injection 125 mg (125 mg Intravenous $Given 12/22/24 1138)   sodium chloride 0.9% BOLUS 500 mL (500 mLs Intravenous $New Bag 12/22/24 1235)   magnesium sulfate 2 g in 50 mL sterile water intermittent infusion (2 g Intravenous $New Bag 12/22/24 1242)   sodium chloride for CT scan flush use (77 mLs Intravenous $Given 12/22/24 1320)   iopamidol (ISOVUE-370) solution 500 mL (57 mLs Intravenous $Given 12/22/24 1320)       Drips infusing:  No  For the majority of the shift this patient was Green.   Interventions performed were n/a.    Sepsis treatment initiated: No    Cares/treatment/interventions/medications to be completed following ED care: continue plan of care    ED Nurse Name: Luis Angel Holman RN  2:14 PM               RECEIVING UNIT ED HANDOFF REVIEW    Above ED Nurse Handoff Report was reviewed: Yes  Reviewed by: Sraah Sanchez RN on December 22, 2024 at 3:53 PM   ARMOND Camacho called the ED to inform them the note was read: Yes

## 2024-12-23 LAB
ANION GAP SERPL CALCULATED.3IONS-SCNC: 13 MMOL/L (ref 7–15)
BUN SERPL-MCNC: 21 MG/DL (ref 8–23)
CALCIUM SERPL-MCNC: 9.6 MG/DL (ref 8.8–10.4)
CHLORIDE SERPL-SCNC: 105 MMOL/L (ref 98–107)
CREAT SERPL-MCNC: 0.78 MG/DL (ref 0.51–0.95)
EGFRCR SERPLBLD CKD-EPI 2021: 79 ML/MIN/1.73M2
ERYTHROCYTE [DISTWIDTH] IN BLOOD BY AUTOMATED COUNT: 13.5 % (ref 10–15)
GLUCOSE BLDC GLUCOMTR-MCNC: 268 MG/DL (ref 70–99)
GLUCOSE BLDC GLUCOMTR-MCNC: 288 MG/DL (ref 70–99)
GLUCOSE BLDC GLUCOMTR-MCNC: 360 MG/DL (ref 70–99)
GLUCOSE BLDC GLUCOMTR-MCNC: 404 MG/DL (ref 70–99)
GLUCOSE BLDC GLUCOMTR-MCNC: 540 MG/DL (ref 70–99)
GLUCOSE SERPL-MCNC: 275 MG/DL (ref 70–99)
HCO3 SERPL-SCNC: 26 MMOL/L (ref 22–29)
HCT VFR BLD AUTO: 32.3 % (ref 35–47)
HGB BLD-MCNC: 10.2 G/DL (ref 11.7–15.7)
LVEF ECHO: NORMAL
MAGNESIUM SERPL-MCNC: 2.1 MG/DL (ref 1.7–2.3)
MCH RBC QN AUTO: 28 PG (ref 26.5–33)
MCHC RBC AUTO-ENTMCNC: 31.6 G/DL (ref 31.5–36.5)
MCV RBC AUTO: 89 FL (ref 78–100)
PLATELET # BLD AUTO: 280 10E3/UL (ref 150–450)
POTASSIUM SERPL-SCNC: 4.2 MMOL/L (ref 3.4–5.3)
RBC # BLD AUTO: 3.64 10E6/UL (ref 3.8–5.2)
SODIUM SERPL-SCNC: 144 MMOL/L (ref 135–145)
WBC # BLD AUTO: 5.5 10E3/UL (ref 4–11)

## 2024-12-23 PROCEDURE — 250N000009 HC RX 250: Performed by: INTERNAL MEDICINE

## 2024-12-23 PROCEDURE — 94640 AIRWAY INHALATION TREATMENT: CPT | Mod: 76

## 2024-12-23 PROCEDURE — 250N000012 HC RX MED GY IP 250 OP 636 PS 637: Performed by: INTERNAL MEDICINE

## 2024-12-23 PROCEDURE — 250N000013 HC RX MED GY IP 250 OP 250 PS 637: Performed by: INTERNAL MEDICINE

## 2024-12-23 PROCEDURE — 94640 AIRWAY INHALATION TREATMENT: CPT

## 2024-12-23 PROCEDURE — 80048 BASIC METABOLIC PNL TOTAL CA: CPT | Performed by: INTERNAL MEDICINE

## 2024-12-23 PROCEDURE — 99232 SBSQ HOSP IP/OBS MODERATE 35: CPT | Performed by: HOSPITALIST

## 2024-12-23 PROCEDURE — 36415 COLL VENOUS BLD VENIPUNCTURE: CPT | Performed by: INTERNAL MEDICINE

## 2024-12-23 PROCEDURE — 120N000001 HC R&B MED SURG/OB

## 2024-12-23 PROCEDURE — 250N000011 HC RX IP 250 OP 636: Performed by: INTERNAL MEDICINE

## 2024-12-23 PROCEDURE — 250N000012 HC RX MED GY IP 250 OP 636 PS 637: Performed by: HOSPITALIST

## 2024-12-23 PROCEDURE — 999N000157 HC STATISTIC RCP TIME EA 10 MIN

## 2024-12-23 PROCEDURE — 83735 ASSAY OF MAGNESIUM: CPT | Performed by: INTERNAL MEDICINE

## 2024-12-23 PROCEDURE — 85018 HEMOGLOBIN: CPT | Performed by: INTERNAL MEDICINE

## 2024-12-23 PROCEDURE — 85041 AUTOMATED RBC COUNT: CPT | Performed by: INTERNAL MEDICINE

## 2024-12-23 RX ORDER — PREDNISONE 20 MG/1
40 TABLET ORAL DAILY
Qty: 2 TABLET | Refills: 0 | Status: SHIPPED | OUTPATIENT
Start: 2024-12-25 | End: 2024-12-24

## 2024-12-23 RX ORDER — PREDNISONE 20 MG/1
40 TABLET ORAL DAILY
Status: DISCONTINUED | OUTPATIENT
Start: 2024-12-23 | End: 2024-12-24 | Stop reason: HOSPADM

## 2024-12-23 RX ORDER — ALBUTEROL SULFATE 0.83 MG/ML
2.5 SOLUTION RESPIRATORY (INHALATION) EVERY 4 HOURS PRN
Qty: 90 ML | Refills: 0 | Status: SHIPPED | OUTPATIENT
Start: 2024-12-23 | End: 2024-12-24

## 2024-12-23 RX ADMIN — METHYLPREDNISOLONE SODIUM SUCCINATE 40 MG: 40 INJECTION, POWDER, FOR SOLUTION INTRAMUSCULAR; INTRAVENOUS at 08:50

## 2024-12-23 RX ADMIN — IPRATROPIUM BROMIDE AND ALBUTEROL SULFATE 3 ML: .5; 3 SOLUTION RESPIRATORY (INHALATION) at 15:00

## 2024-12-23 RX ADMIN — INSULIN GLARGINE 10 UNITS: 100 INJECTION, SOLUTION SUBCUTANEOUS at 22:48

## 2024-12-23 RX ADMIN — ASPIRIN 81 MG: 81 TABLET, COATED ORAL at 08:49

## 2024-12-23 RX ADMIN — IPRATROPIUM BROMIDE AND ALBUTEROL SULFATE 3 ML: .5; 3 SOLUTION RESPIRATORY (INHALATION) at 08:34

## 2024-12-23 RX ADMIN — LOSARTAN POTASSIUM 100 MG: 50 TABLET, FILM COATED ORAL at 08:49

## 2024-12-23 RX ADMIN — INSULIN ASPART 5 UNITS: 100 INJECTION, SOLUTION INTRAVENOUS; SUBCUTANEOUS at 11:54

## 2024-12-23 RX ADMIN — FUROSEMIDE 40 MG: 40 TABLET ORAL at 08:49

## 2024-12-23 RX ADMIN — ATORVASTATIN CALCIUM 40 MG: 40 TABLET, FILM COATED ORAL at 19:54

## 2024-12-23 RX ADMIN — INSULIN ASPART 6 UNITS: 100 INJECTION, SOLUTION INTRAVENOUS; SUBCUTANEOUS at 18:33

## 2024-12-23 RX ADMIN — INSULIN ASPART 3 UNITS: 100 INJECTION, SOLUTION INTRAVENOUS; SUBCUTANEOUS at 09:00

## 2024-12-23 RX ADMIN — AMLODIPINE BESYLATE 5 MG: 5 TABLET ORAL at 08:49

## 2024-12-23 RX ADMIN — PREDNISONE 40 MG: 20 TABLET ORAL at 13:27

## 2024-12-23 RX ADMIN — IPRATROPIUM BROMIDE AND ALBUTEROL SULFATE 3 ML: .5; 3 SOLUTION RESPIRATORY (INHALATION) at 19:37

## 2024-12-23 RX ADMIN — METOPROLOL SUCCINATE 50 MG: 50 TABLET, EXTENDED RELEASE ORAL at 19:54

## 2024-12-23 ASSESSMENT — ACTIVITIES OF DAILY LIVING (ADL)
ADLS_ACUITY_SCORE: 65

## 2024-12-23 NOTE — PROGRESS NOTES
Admitted/transferred from:   2 RN full   skin assessment completed by Sarah Sanchez RN and Shelli Sanchez.  Skin assessment finding: Intact  Interventions/actions: None    Will continue to monitor.

## 2024-12-23 NOTE — PLAN OF CARE
"To Do:  End of Shift Summary  For vital signs and complete assessments, please see documentation flowsheets.     Pertinent assessments: Pt is alert and oriented x 4. Pt is on 1L of oxygen. Assist x 2 with gaitbelt and walker. Denies pain, dizziness, SOB. On mag protocol, recheck in the AM. Purewick in place. Son was at bedside during shift.     /89   Pulse 77   Temp 98.8  F (37.1  C) (Oral)   Resp 20   Ht 1.626 m (5' 4\")   Wt 70.1 kg (154 lb 8.7 oz)   LMP  (LMP Unknown)   SpO2 95%   BMI 26.53 kg/m       Major Shift Events Pt reported some wheezing, monitored respirations and RT completed neb treatment.     Treatment Plan: Neb treatments, blood sugar checks, oxygen, monitor resp staus  Bedside Nurse: Sarah Sanchez RN                                       Goal Outcome Evaluation:      Plan of Care Reviewed With: patient        Problem: Adult Inpatient Plan of Care  Goal: Plan of Care Review  Description: The Plan of Care Review/Shift note should be completed every shift.  The Outcome Evaluation is a brief statement about your assessment that the patient is improving, declining, or no change.  This information will be displayed automatically on your shift  note.  12/22/2024 2331 by Sarah Sanchez RN  Outcome: Progressing  Flowsheets (Taken 12/22/2024 2331)  Plan of Care Reviewed With: patient  12/22/2024 2134 by Sarah Sanchez RN  Outcome: Progressing  Goal: Patient-Specific Goal (Individualized)  Description: You can add care plan individualizations to a care plan. Examples of Individualization might be:  \"Parent requests to be called daily at 9am for status\", \"I have a hard time hearing out of my right ear\", or \"Do not touch me to wake me up as it startles  me\".  12/22/2024 2331 by Sarah Sanchez, RN  Outcome: Progressing  12/22/2024 2134 by Sarah Sanchez RN  Outcome: Progressing  Goal: Absence of Hospital-Acquired Illness or Injury  12/22/2024 2331 by Daniel, " Sarah POTTS RN  Outcome: Progressing  12/22/2024 2134 by Sarah Sanchez RN  Outcome: Progressing  Intervention: Identify and Manage Fall Risk  Recent Flowsheet Documentation  Taken 12/22/2024 1751 by Sarah Sanchez RN  Safety Promotion/Fall Prevention: safety round/check completed  Intervention: Prevent Skin Injury  Recent Flowsheet Documentation  Taken 12/22/2024 1751 by Sarah Sanchez RN  Body Position: (repositioned with pillows)   head repositioned, left   other (see comments)  Intervention: Prevent and Manage VTE (Venous Thromboembolism) Risk  Recent Flowsheet Documentation  Taken 12/22/2024 1751 by Sarah Sanchez RN  VTE Prevention/Management: SCDs on (sequential compression devices)  Intervention: Prevent Infection  Recent Flowsheet Documentation  Taken 12/22/2024 1751 by Sarah Sanchez RN  Infection Prevention:   hand hygiene promoted   single patient room provided   rest/sleep promoted  Goal: Optimal Comfort and Wellbeing  12/22/2024 2331 by Sarah Sanchez RN  Outcome: Progressing  12/22/2024 2134 by Sarah Sanchez RN  Outcome: Progressing  Goal: Readiness for Transition of Care  12/22/2024 2331 by Sarah Sanchez RN  Outcome: Progressing  12/22/2024 2134 by Sarah Sanchez RN  Outcome: Progressing  Intervention: Mutually Develop Transition Plan  Recent Flowsheet Documentation  Taken 12/22/2024 1630 by Sarah Sanchez RN  Equipment Currently Used at Home:   shower chair   walker, rolling

## 2024-12-23 NOTE — PROVIDER NOTIFICATION
12/22/24 2000   RCAT Assessment   Reason for Assessment COPD   Pulmonary Status 4   Surgical Status 0   Chest X-ray 1   Respiratory Pattern 1   Mental Status 0   Breath Sounds 4   Cough Effectiveness 0   Level of Activity 1   O2 Required for SpO2>=92% 0   Acuity Level (points) 11   Acuity Level  3   Re-eval Interval Guideline Every 3 days   Re-evaluation Date 12/25/24   Clinical Indications/Symptoms   Aerosol Therapy History of bronchospasm;RCAT protocol   Broncho-pulmonary Hygiene History of mucous producing disease   Volume Expansion Decreased breath sounds;Prevent atelectasis   Aerosol Therapy Plan   RT Treatment Nebulizer   Anticholinergic/Beta-Andrenergic Agonist Duoneb soln (0.5mg/3mg per 3mL) neb Max 6 doses/24h   Aerosol Treatment Frequency Acuity Level 3: QID/PRN @noc-Mod wheezing/Hx asthma/secretion removal   Broncho-Pulmonary Hygiene Plan   Broncho-Pulmonary Hygiene Treatment Coughing techniques   Broncho-Pulm Hygiene Frequency Acuity Level 3: TID-Small amounts secretions/poor cough, hx of secretions   Volume Expansion Plan   Volume Expansion Treatment Incentive Spirometer   Volume Expansion Frequency Acuity Level 3: TID-At risk for developing atelectasis     First RCAT complete. Patient to continue duoneb QID, albuterol Q2PRN. Added coughing techniques and IS. RT to follow QID, reassess in 3 days.    Jon Sofia, RT on 12/22/2024 at 8:56 PM

## 2024-12-23 NOTE — PLAN OF CARE
"Pertinent assessments: Pt is alert and oriented x 4. Up Ax2. VSS. 1LNC. LS dim. Denies pain. Incontinent of urine, purewick in place. BG stable.    Major Shift Events Uneventful    Treatment Plan: Neb treatments, blood sugar checks, oxygen, monitor resp staus  Bedside Nurse: Brandie Dobson RN                    Problem: Adult Inpatient Plan of Care  Goal: Plan of Care Review  Description: The Plan of Care Review/Shift note should be completed every shift.  The Outcome Evaluation is a brief statement about your assessment that the patient is improving, declining, or no change.  This information will be displayed automatically on your shift  note.  Outcome: Progressing  Goal: Patient-Specific Goal (Individualized)  Description: You can add care plan individualizations to a care plan. Examples of Individualization might be:  \"Parent requests to be called daily at 9am for status\", \"I have a hard time hearing out of my right ear\", or \"Do not touch me to wake me up as it startles  me\".  Outcome: Progressing  Goal: Absence of Hospital-Acquired Illness or Injury  Outcome: Progressing  Intervention: Identify and Manage Fall Risk  Recent Flowsheet Documentation  Taken 12/23/2024 0045 by Brandie Dobson RN  Safety Promotion/Fall Prevention: safety round/check completed  Intervention: Prevent Skin Injury  Recent Flowsheet Documentation  Taken 12/23/2024 0045 by Brandie Dobson RN  Body Position: (repositioned with pillows)   head repositioned, left   other (see comments)  Intervention: Prevent and Manage VTE (Venous Thromboembolism) Risk  Recent Flowsheet Documentation  Taken 12/23/2024 0045 by Brandie Dobson RN  VTE Prevention/Management: SCDs on (sequential compression devices)  Intervention: Prevent Infection  Recent Flowsheet Documentation  Taken 12/23/2024 0045 by Brandie Dobson RN  Infection Prevention:   hand hygiene promoted   single patient room provided   rest/sleep promoted  Goal: Optimal Comfort and Wellbeing  Outcome: " Progressing  Goal: Readiness for Transition of Care  Outcome: Progressing   Goal Outcome Evaluation:
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
## 2024-12-24 VITALS
DIASTOLIC BLOOD PRESSURE: 69 MMHG | HEIGHT: 64 IN | TEMPERATURE: 98.2 F | SYSTOLIC BLOOD PRESSURE: 145 MMHG | OXYGEN SATURATION: 96 % | BODY MASS INDEX: 26.38 KG/M2 | WEIGHT: 154.54 LBS | RESPIRATION RATE: 17 BRPM | HEART RATE: 56 BPM

## 2024-12-24 LAB
GLUCOSE BLDC GLUCOMTR-MCNC: 187 MG/DL (ref 70–99)
GLUCOSE BLDC GLUCOMTR-MCNC: 254 MG/DL (ref 70–99)
GLUCOSE BLDC GLUCOMTR-MCNC: 401 MG/DL (ref 70–99)
HOLD SPECIMEN: NORMAL
MAGNESIUM SERPL-MCNC: 2.3 MG/DL (ref 1.7–2.3)

## 2024-12-24 PROCEDURE — 36415 COLL VENOUS BLD VENIPUNCTURE: CPT | Performed by: INTERNAL MEDICINE

## 2024-12-24 PROCEDURE — 94640 AIRWAY INHALATION TREATMENT: CPT | Mod: 76

## 2024-12-24 PROCEDURE — 83735 ASSAY OF MAGNESIUM: CPT | Performed by: INTERNAL MEDICINE

## 2024-12-24 PROCEDURE — 94640 AIRWAY INHALATION TREATMENT: CPT

## 2024-12-24 PROCEDURE — 250N000009 HC RX 250: Performed by: INTERNAL MEDICINE

## 2024-12-24 PROCEDURE — 250N000013 HC RX MED GY IP 250 OP 250 PS 637: Performed by: INTERNAL MEDICINE

## 2024-12-24 PROCEDURE — 999N000157 HC STATISTIC RCP TIME EA 10 MIN

## 2024-12-24 PROCEDURE — 250N000012 HC RX MED GY IP 250 OP 636 PS 637: Performed by: HOSPITALIST

## 2024-12-24 PROCEDURE — 99239 HOSP IP/OBS DSCHRG MGMT >30: CPT | Performed by: INTERNAL MEDICINE

## 2024-12-24 RX ORDER — PREDNISONE 20 MG/1
20 TABLET ORAL DAILY
Qty: 3 TABLET | Refills: 0 | Status: SHIPPED | OUTPATIENT
Start: 2024-12-25 | End: 2024-12-28

## 2024-12-24 RX ORDER — ALBUTEROL SULFATE 0.83 MG/ML
2.5 SOLUTION RESPIRATORY (INHALATION) EVERY 4 HOURS PRN
Qty: 90 ML | Refills: 0 | Status: SHIPPED | OUTPATIENT
Start: 2024-12-24

## 2024-12-24 RX ADMIN — PREDNISONE 40 MG: 20 TABLET ORAL at 09:06

## 2024-12-24 RX ADMIN — LOSARTAN POTASSIUM 100 MG: 50 TABLET, FILM COATED ORAL at 09:06

## 2024-12-24 RX ADMIN — FUROSEMIDE 40 MG: 40 TABLET ORAL at 09:07

## 2024-12-24 RX ADMIN — AMLODIPINE BESYLATE 5 MG: 5 TABLET ORAL at 09:06

## 2024-12-24 RX ADMIN — IPRATROPIUM BROMIDE AND ALBUTEROL SULFATE 3 ML: .5; 3 SOLUTION RESPIRATORY (INHALATION) at 11:21

## 2024-12-24 RX ADMIN — ASPIRIN 81 MG: 81 TABLET, COATED ORAL at 09:06

## 2024-12-24 RX ADMIN — IPRATROPIUM BROMIDE AND ALBUTEROL SULFATE 3 ML: .5; 3 SOLUTION RESPIRATORY (INHALATION) at 07:34

## 2024-12-24 RX ADMIN — INSULIN ASPART 1 UNITS: 100 INJECTION, SOLUTION INTRAVENOUS; SUBCUTANEOUS at 09:05

## 2024-12-24 ASSESSMENT — ACTIVITIES OF DAILY LIVING (ADL)
ADLS_ACUITY_SCORE: 61
ADLS_ACUITY_SCORE: 61
ADLS_ACUITY_SCORE: 65
ADLS_ACUITY_SCORE: 65
ADLS_ACUITY_SCORE: 61
ADLS_ACUITY_SCORE: 65

## 2024-12-24 NOTE — DISCHARGE SUMMARY
"[facility]  Hospitalist Discharge Summary      Date of Admission:  12/22/2024  Date of Discharge:  12/24/2024  Discharging Provider: Galindo Latif MD, MD  Discharge Service: Hospitalist Service    Discharge Diagnoses     Acute hypoxic respiratory failure secondary to COPD exacerbation improving resolving  Diabetes mellitus type 2  Steroid-induced hyperglycemia  Left lower lung pulmonary nodule  History of dementia  History of pulmonary hypertension  Prior history of CVA with residual right hemiplegia    Clinically Significant Risk Factors     # DMII: A1C = 8.7 % (Ref range: <5.7 %) within past 6 months  # Overweight: Estimated body mass index is 26.53 kg/m  as calculated from the following:    Height as of this encounter: 1.626 m (5' 4\").    Weight as of this encounter: 70.1 kg (154 lb 8.7 oz).       Follow-ups Needed After Discharge   Follow-up Appointments       Follow-up and recommended labs and tests       1. As needed with your primary care provider              Follow-up with pulmonary service  Unresulted Labs Ordered in the Past 30 Days of this Admission       No orders found from 11/22/2024 to 12/23/2024.            Discharge Disposition   Discharged to home  Condition at discharge: Stable    Hospital Course     I assumed medicine service care today.  Seen and examined.  Chart reviewed.  Case discussed with nursing service.  Family updated this patient's son present at bedside.  I was reassured by patient's family that she is improving and back to her baseline mental state.  Currently off oxygen support.  Able to tolerate oral diet.  She is endorsing no nausea or vomiting.  Afebrile.  No reports of any diarrhea.  Yesterday she had episodes of severe hyperglycemia likely brought about by corticosteroid use.  Had significant improvement overnight.  I elected to decrease further outpatient home discharge medications of prednisone at 20 mg daily  She would also benefit for pulmonary " service follow-up care as outpatient.  She was not requiring any antibiotics here in the hospital and no antibiotics upon discharge.  Continued on her Lasix upon discharge      Pilo Quintana is a 74 year old female with past medical history of COPD with chronic hypercapnic respiratory failure, MAGALIS, pulmonary hypertension, type 2 diabetes mellitus with history of steroid-induced hyperglycemia, dementia, HFpEF, history of CVA, history of pontine ICH with residual right hemiplegia, history of renal oncocytoma who presented on 12/22/2024 with COPD exacerbation    Acute Hypoxic Respiratory Failure: secondary to COPD exacerbation. Received IV steroids and nebs with improvement, off O2 this afternoon  - transition to PO prednisone for a total of 5 days  - continue nebulizer treatment  - echo stable  2. Type 2 DM: A1c 7/7.. May have some hyperglycemia in the setting of the steroids  - ISS for now  - hold Metformin and can resume on discharge  3. LLL Pulmonary Nodule: needs follow-up in 1 year with repeat CT imaging  4. Dispo: in the am - should be ready for discharge if remains off of O2. Discussed patient's medical issues with her son at the bedside    Consultations This Hospital Stay   CARE MANAGEMENT / SOCIAL WORK IP CONSULT  CARE MANAGEMENT / SOCIAL WORK IP CONSULT    Code Status   Full Code    Time Spent on this Encounter   I, Galindo Latif MD, MD, personally saw the patient today and spent greater than 30 minutes discharging this patient.       Galindo Latif MD, MD  [place] [address] [facility]  [address] [place] [address]  Phone: [phone]  Fax: [phone]  ______________________________________________________________________    Physical Exam   Vital Signs: Temp: 98.2  F (36.8  C) Temp src: Oral BP: (!) 145/69 Pulse: 56   Resp: 17 SpO2: 96 % O2 Device: None (Room air)    Weight: 154 lbs 8.68 oz  HEENT; Atraumatic, normocephalic, pinkish conjuctiva, pupils  bilateral reactive   Skin: warm and moist, no rashes  Lungs: equal chest expansion, clear to auscultation, no wheezes, no stridor, no crackles,   Heart: normal rate, normal rhythm, no rubs or gallops.   Abdomen: normal bowel sounds, no tenderness, no peritoneal signs, no guarding  Extremities: no deformities, no edema   Neuro; follow commands, alert and oriented, at baseline mental state as per family attestation as well spontaneous speech, coherent, moves all extremities spontaneously  Psych; no hallucination, euthymic mood, not agitated         Primary Care Physician   Chela Griffith    Discharge Orders      Med Therapy Management Referral      Adult Pulmonary Medicine  Referral      Reason for your hospital stay    You were hospitalized for breathing trouble and found to have a COPD exarcerbation. You were treated with steroids and nebulizer and your breathing improved and you were ok to be discharged home     Follow-up and recommended labs and tests     1. As needed with your primary care provider     Activity    Your activity upon discharge: activity as tolerated     Diet    Follow this diet upon discharge: Current Diet:Orders Placed This Encounter      Combination Diet Regular Diet Adult       Significant Results and Procedures   Most Recent 3 CBC's:  Recent Labs   Lab Test 12/23/24  0731 12/22/24  1145 11/29/24  0557   WBC 5.5 7.6 8.4   HGB 10.2* 11.8 11.6*   MCV 89 90 90    282 218     Most Recent 3 BMP's:  Recent Labs   Lab Test 12/24/24  0845 12/24/24  0424 12/24/24  0205 12/23/24  0902 12/23/24  0731 12/22/24  1756 12/22/24  1145 11/30/24  0811 11/30/24  0755   NA  --   --   --   --  144  --  141  --  141   POTASSIUM  --   --   --   --  4.2  --  3.9  --  4.0   CHLORIDE  --   --   --   --  105  --  97*  --  103   CO2  --   --   --   --  26  --  27  --  25   BUN  --   --   --   --  21.0  --  16.1  --  28.3*   CR  --   --   --   --  0.78  --  0.79  --  1.02*   ANIONGAP  --   --   --    --  13  --  17*  --  13   GLENIS  --   --   --   --  9.6  --  9.8  --  9.5   * 254* 401*   < > 275*   < > 242*   < > 244*    < > = values in this interval not displayed.     Most Recent 2 LFT's:  Recent Labs   Lab Test 09/21/24  1505 09/14/24  2131   AST 11 17   ALT 14 15   ALKPHOS 82 91   BILITOTAL 0.3 0.2     Most Recent 3 INR's:  Recent Labs   Lab Test 11/28/24  1915 05/22/23  1824 05/11/23  1047   INR 0.99 1.09 1.00     Most Recent 3 Hemoglobins:  Recent Labs   Lab Test 12/23/24  0731 12/22/24  1145 11/29/24  0557   HGB 10.2* 11.8 11.6*     Most Recent 3 Troponin's:  Recent Labs   Lab Test 01/12/17  1728 01/12/17  1720   TROPI  --  <0.015  The 99th percentile for upper reference range is 0.045 ug/L.  Troponin values in   the range of 0.045 - 0.120 ug/L may be associated with risks of adverse   clinical events.     TROPONIN 0.01  --      Most Recent 3 BNP's:  Recent Labs   Lab Test 11/28/24  1915 09/16/24  1228 09/14/24  2131   NTBNPI 81 65 108     Most Recent Cholesterol Panel:  Recent Labs   Lab Test 05/12/23  0512   CHOL 179   *   HDL 52   TRIG 132     7-Day Micro Results       Collected Updated Procedure Result Status      12/22/2024 1149 12/22/2024 1247 Influenza A/B, RSV and SARS-CoV2 PCR (COVID-19) Nasopharyngeal [[id number]]    Swab from Nasopharyngeal    Final result Component Value   Influenza A PCR Negative   Influenza B PCR Negative   RSV PCR Negative   SARS CoV2 PCR Negative   NEGATIVE: SARS-CoV-2 (COVID-19) RNA not detected, presumed negative.                  Most Recent TSH and T4:No lab results found.  Most Recent Hemoglobin A1c:  Recent Labs   Lab Test 12/22/24  1145   A1C 8.7*     Most Recent 6 glucoses:  Recent Labs   Lab Test 12/24/24  0845 12/24/24  0424 12/24/24  0205 12/23/24  2205 12/23/24  1832 12/23/24  1153   * 254* 401* 540* 404* 360*   ,   Results for orders placed or performed during the hospital encounter of 12/22/24   XR Chest 2 Views    Narrative    EXAM: XR  CHEST 2 VIEWS  LOCATION: [facility]  DATE: 12/22/2024    INDICATION: SOB, cough  COMPARISON: 11/28/2024      Impression    IMPRESSION: No acute cardiopulmonary abnormality. Unchanged linear scarring in the left midlung.   CT Chest Pulmonary Embolism w Contrast    Narrative    EXAM: CT CHEST PULMONARY EMBOLISM WITH CONTRAST  LOCATION: [facility]  DATE: 12/22/2024    INDICATION: Shortness of breath, elevated d-dimer.  COMPARISON: None.  TECHNIQUE: CT chest pulmonary angiogram during arterial phase injection of IV contrast. Multiplanar reformats and MIP reconstructions were performed. Dose reduction techniques were used.   CONTRAST: 57 mL Isovue 370.    FINDINGS:  ANGIOGRAM CHEST: No imaging evidence for pulmonary embolism but assessment is limited by motion. Dilated main pulmonary artery root measures 4.2 cm. Thoracic aorta is negative for dissection.    LUNGS AND PLEURA: No effusions. No acute lobar consolidation identified. Assessment of the lungs limited by motion. There is moderate atelectasis suggested at the lingula. Suggestion of a possible nodule at the left lower lobe measuring 5 mm series 7   image 136.    MEDIASTINUM/AXILLAE: No adenopathy or acute mediastinal abnormality otherwise identified.    CORONARY ARTERY CALCIFICATION: Moderate.    UPPER ABDOMEN: No significant upper abdominal abnormality.    MUSCULOSKELETAL: No acute abnormality.      Impression    IMPRESSION:  1.  No evidence for pulmonary embolism but assessment is limited by motion.  2.  Coronary artery calcifications.  3.  Dilated main pulmonary artery root that could be seen with pulmonary artery hypertension.  4.  Suggestion of a pulmonary nodule at the left lung base.    Recommendations for an incidental lung nodule = or > 6mm to 8mm:  Low-Risk Patient: Initial follow-up CT at 6-12 months, then consider CT at 18-24 months if no change.  High-Risk Patient: Initial follow-up CT at 6-12 months, then  CT at 18-24 months if no change.    *Low Risk: Minimal or absent history of smoking or other known risk factors.  *Nonsolid (ground-glass) or partly solid nodules may require longer follow-up to exclude indolent adenocarcinoma.  1.  *Recommendations based on Guidelines for the Management of Incidental Pulmonary Nodules Detected at CT: From the Fleischner Society 2017, Radiology 2017.       Echocardiogram Complete     Value    LVEF  65-70%    Narrative    [id number]  [id number]  [id number]  [id number]^OLVIN^LUÍS^CYDNEY     [facility]  Echocardiography Laboratory  [address] [place] [address]     Name: FABIOLA FREEMAN  MRN: [id number]  : 1950  Study Date: 2024 10:59 AM  Age: 74 yrs  Gender: Female  Patient Location: [facility]  Reason For Study: MI  Ordering Physician: LUÍS BAH     BSA: 1.8 m2  Height: 64 in  Weight: 154 lb  HR: 76  BP: 127/66 mmHg  ______________________________________________________________________________  Procedure  Echocardiogram with two-dimensional, color and spectral Doppler.  ______________________________________________________________________________  Interpretation Summary     Left ventricular systolic function is normal.  The visual ejection fraction is 65-70%.  Regional wall motion abnormalities cannot be excluded due to limited  visualization.  Right ventricular function cannot be assessed due to poor image quality.  The right ventricular systolic pressure is approximated at 31mmHg plus the  right atrial pressure.This appears underestimated due to incomplete TR jet.  Patient refused IV contrast.  Compared to prior study from  , there is likely no change. The study was  technically difficult.  ______________________________________________________________________________  Left Ventricle  The left ventricle is normal in size. There is normal left ventricular wall  thickness. Left ventricular systolic function is normal. The visual  ejection  fraction is 65-70%. Grade I or early diastolic dysfunction. Regional wall  motion abnormalities cannot be excluded due to limited visualization.     Right Ventricle  Right ventricular function cannot be assessed due to poor image quality.     Atria  Normal left atrial size. Right atrial size is normal.     Mitral Valve  There is mild mitral annular calcification.     Tricuspid Valve  There is trace tricuspid regurgitation. IVC diameter <2.1 cm collapsing >50%  with sniff suggests a normal RA pressure of 3 mmHg. The right ventricular  systolic pressure is approximated at 31mmHg plus the right atrial pressure.     Aortic Valve  There is mild trileaflet aortic sclerosis. No aortic stenosis is present.     Pulmonic Valve  The pulmonic valve is not well visualized.     Vessels  The aortic root is normal size.     Pericardium  There is no pericardial effusion.     ______________________________________________________________________________  MMode/2D Measurements & Calculations  IVSd: 0.98 cm  LVIDd: 4.5 cm  LVIDs: 2.5 cm  LVPWd: 0.87 cm  IVC diam: 1.6 cm     FS: 44.7 %  LV mass(C)d: 136.3 grams  LV mass(C)dI: 77.9 grams/m2  Ao root diam: 3.2 cm  LA dimension: 3.7 cm  asc Aorta Diam: 3.8 cm  LA/Ao: 1.2  Ao root diam index Ht(cm/m): 1.9  Ao root diam index BSA (cm/m2): 1.8  Asc Ao diam index BSA (cm/m2): 2.2  Asc Ao diam index Ht(cm/m): 2.3  LA Volume (BP): 46.5 ml     LA Volume Index (BP): 26.6 ml/m2  RWT: 0.39     Doppler Measurements & Calculations  MV E max barry: 75.3 cm/sec  MV A max barry: 132.6 cm/sec  MV E/A: 0.57  MV dec slope: 389.0 cm/sec2  MV dec time: 0.19 sec  Ao V2 max: 178.4 cm/sec  Ao max P.0 mmHg  TR max barry: 279.1 cm/sec  TR max P.2 mmHg  E/E' av.9  Lateral E/e': 7.4  Medial E/e': 8.4     ______________________________________________________________________________  Report approved by: Trae Alexander MD on 2024 12:46 PM             Discharge Medications   Current Discharge  Medication List        START taking these medications    Details   predniSONE (DELTASONE) 20 MG tablet Take 1 tablet (20 mg) by mouth daily for 3 days.  Qty: 3 tablet, Refills: 0    Associated Diagnoses: COPD exacerbation (H)           CONTINUE these medications which have CHANGED    Details   albuterol (PROVENTIL) (2.5 MG/3ML) 0.083% neb solution Take 1 vial (2.5 mg) by nebulization every 4 hours as needed for wheezing.  Qty: 90 mL, Refills: 0    Associated Diagnoses: COPD exacerbation (H)           CONTINUE these medications which have NOT CHANGED    Details   acetaminophen (TYLENOL) 500 MG tablet Take 500-1,000 mg by mouth every 8 hours as needed for mild pain      albuterol (PROAIR HFA/PROVENTIL HFA/VENTOLIN HFA) 108 (90 Base) MCG/ACT inhaler Inhale 2 puffs into the lungs every 4 hours as needed for wheezing  Qty: 18 g, Refills: 0    Comments: Pharmacy may dispense brand covered by insurance (Proair, or proventil or ventolin or generic albuterol inhaler)      amLODIPine (NORVASC) 5 MG tablet Take 5 mg by mouth daily      aspirin (ASA) 81 MG EC tablet Take 1 tablet (81 mg) by mouth daily  Qty: 30 tablet, Refills: 11    Associated Diagnoses: Right-sided nontraumatic intracerebral hemorrhage, unspecified cerebral location (H)      atorvastatin (LIPITOR) 40 MG tablet Take 1 tablet (40 mg) by mouth every evening  Qty: 30 tablet, Refills: 4    Associated Diagnoses: Right-sided nontraumatic intracerebral hemorrhage, unspecified cerebral location (H)      budesonide-formoterol (SYMBICORT) 160-4.5 MCG/ACT Inhaler Inhale 2 puffs into the lungs 2 times daily.      Cholecalciferol (VITAMIN D3) 50 MCG (2000 UT) CAPS Take 50 mcg by mouth daily      furosemide (LASIX) 40 MG tablet Take 40 mg by mouth daily      losartan (COZAAR) 100 MG tablet Take 100 mg by mouth daily      metFORMIN (GLUCOPHAGE XR) 500 MG 24 hr tablet Take 500 mg by mouth 2 times daily (with meals).      metoprolol succinate ER (TOPROL XL) 50 MG 24 hr tablet  Take 50 mg by mouth every evening.      nystatin (NYSTOP) 265331 UNIT/GM external powder Apply topically 2 times daily as needed      triamcinolone (KENALOG) 0.1 % external ointment Apply topically 2 times daily as needed for irritation           Allergies   No Known Allergies

## 2024-12-24 NOTE — PROGRESS NOTES
I was called for rising blood sugar.  Currently blood sugar is 540.  Getting Solu-Medrol for COPD exacerbation.  I have ordered an additional 4 units of NovoLog (received 5 already) and Lantus 10 units each night starting now.  Blood sugar will be checked at 2 AM per protocol.

## 2024-12-24 NOTE — PROVIDER NOTIFICATION
Paged crosscover regarding patient blood sugar.     0215: FYI - 2 am blood sugar 401. Blood sugar at bedtime was 540. Do you want a one time dose of insulin given? Pt is on PO prednisone. Thanks.

## 2024-12-24 NOTE — PLAN OF CARE
"Goal Outcome Evaluation:      Plan of Care Reviewed With: patient    Overall Patient Progress: improving Overall Patient Progress: improving    Outcome Evaluation: weaned to RA, getting sched nebs, denies pain, likely going home tomorrow    To Do:  End of Shift Summary  For vital signs and complete assessments, please see documentation flowsheets.     Pertinent assessments: Assumed care of pt from [id number]. Pt A&O, speaks Mano, son at bedside to interpret. VSS, weaned to RA, sating 94%. LS diminished. Getting sched nebs. Up assist of 2 with gait belt and walker. Denies pain, nausea. Up in chair. , 360, 404. Transitioned to PO steroids. Reg diet, tolerating well. Voiding adequately.     Major Shift Events: Uneventful    Treatment Plan: Neb treatments, blood sugar checks, oxygen, monitor resp status. Potential discharge tomorrow. PO steroids.     Bedside Nurse: Romie Huitron RN          Problem: Adult Inpatient Plan of Care  Goal: Plan of Care Review  Description: The Plan of Care Review/Shift note should be completed every shift.  The Outcome Evaluation is a brief statement about your assessment that the patient is improving, declining, or no change.  This information will be displayed automatically on your shift  note.  Outcome: Progressing  Flowsheets (Taken 12/23/2024 2149)  Outcome Evaluation: weaned to RA, getting sched nebs, denies pain, likely going home tomorrow  Plan of Care Reviewed With: patient  Overall Patient Progress: improving  Goal: Patient-Specific Goal (Individualized)  Description: You can add care plan individualizations to a care plan. Examples of Individualization might be:  \"Parent requests to be called daily at 9am for status\", \"I have a hard time hearing out of my right ear\", or \"Do not touch me to wake me up as it startles  me\".  Outcome: Progressing  Goal: Absence of Hospital-Acquired Illness or Injury  Outcome: Progressing  Intervention: Identify and Manage Fall Risk  Recent " Flowsheet Documentation  Taken 12/23/2024 1700 by Romie Huitron RN  Safety Promotion/Fall Prevention:   activity supervised   safety round/check completed   clutter free environment maintained   lighting adjusted   mobility aid in reach   nonskid shoes/slippers when out of bed   patient and family education   room near nurse's station  Taken 12/23/2024 0900 by Romie Huitron RN  Safety Promotion/Fall Prevention:   activity supervised   safety round/check completed   clutter free environment maintained   lighting adjusted   mobility aid in reach   nonskid shoes/slippers when out of bed   patient and family education   room near nurse's station  Intervention: Prevent Skin Injury  Recent Flowsheet Documentation  Taken 12/23/2024 2148 by Romie Huitron RN  Body Position:   right   turned  Taken 12/23/2024 1954 by Romie Huitron RN  Body Position:   turned   right  Taken 12/23/2024 1705 by Romie Huitron RN  Body Position:   turned   left  Taken 12/23/2024 1540 by Romie Huitron RN  Body Position:   position changed independently   side-lying   right  Taken 12/23/2024 1350 by Romie Huitron RN  Body Position:   position changed independently   side-lying   left  Intervention: Prevent and Manage VTE (Venous Thromboembolism) Risk  Recent Flowsheet Documentation  Taken 12/23/2024 1700 by Romie Huitron RN  VTE Prevention/Management: SCDs off (sequential compression devices)  Taken 12/23/2024 0900 by Romie Huitron RN  VTE Prevention/Management: SCDs on (sequential compression devices)  Intervention: Prevent Infection  Recent Flowsheet Documentation  Taken 12/23/2024 1700 by Romie Huitron RN  Infection Prevention:   cohorting utilized   hand hygiene promoted   rest/sleep promoted   single patient room provided  Taken 12/23/2024 0900 by Romie Huitron RN  Infection Prevention:   cohorting utilized   hand hygiene promoted   rest/sleep promoted   single patient room provided  Goal: Optimal Comfort and  Wellbeing  Outcome: Progressing  Goal: Readiness for Transition of Care  Outcome: Progressing
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
## 2024-12-24 NOTE — PLAN OF CARE
"A&O x4. Speaks Mano. Uses family as . Assist of 2, lift. On Mg protocol, Am recheck. B, 254. On PO prednisone. Incontinent of bowel and bladder. R. PIV SL. Plan to discharge home today if oxygen needs to not increase.     Goal Outcome Evaluation:      Plan of Care Reviewed With: patient    Overall Patient Progress: improvingOverall Patient Progress: improving    Outcome Evaluation: Weened to RA. On PO prednisone. Denies pain. Bg remain elevated, but trending down.      Problem: Adult Inpatient Plan of Care  Goal: Plan of Care Review  Description: The Plan of Care Review/Shift note should be completed every shift.  The Outcome Evaluation is a brief statement about your assessment that the patient is improving, declining, or no change.  This information will be displayed automatically on your shift  note.  2024 by Beth Maldonado RN  Outcome: Progressing  Flowsheets (Taken 2024)  Outcome Evaluation: Weened to RA. On PO prednisone. Denies pain. Bg remain elevated, but trending down.  Plan of Care Reviewed With: patient  Overall Patient Progress: improving  2024 by Beth Maldonado RN  Outcome: Progressing  Flowsheets (Taken 2024)  Outcome Evaluation: Weened to RA. On PO prednisone. Denies pain. Bg remain elevated.  Plan of Care Reviewed With: patient  Overall Patient Progress: improving  Goal: Patient-Specific Goal (Individualized)  Description: You can add care plan individualizations to a care plan. Examples of Individualization might be:  \"Parent requests to be called daily at 9am for status\", \"I have a hard time hearing out of my right ear\", or \"Do not touch me to wake me up as it startles  me\".  2024 by Beth Maldonado RN  Outcome: Progressing  2024 by Beth Maldonado RN  Outcome: Progressing  Goal: Absence of Hospital-Acquired Illness or Injury  2024 by Beth Maldonado, RN  Outcome: Progressing  2024 by Beth Maldonado, " RN  Outcome: Progressing  Intervention: Identify and Manage Fall Risk  Recent Flowsheet Documentation  Taken 12/24/2024 0153 by Beth Maldonado RN  Safety Promotion/Fall Prevention:   safety round/check completed   activity supervised  Intervention: Prevent Skin Injury  Recent Flowsheet Documentation  Taken 12/24/2024 0153 by Beth Maldonado RN  Body Position: weight shifting  Goal: Optimal Comfort and Wellbeing  12/24/2024 0426 by Beth Maldonado RN  Outcome: Progressing  12/24/2024 0424 by Beth Maldonado RN  Outcome: Progressing  Goal: Readiness for Transition of Care  12/24/2024 0426 by Beth Maldonado RN  Outcome: Progressing  12/24/2024 0424 by Beth Maldonado RN  Outcome: Progressing     Problem: Comorbidity Management  Goal: Blood Glucose Levels Within Targeted Range  12/24/2024 0426 by Beth Maldonado RN  Outcome: Progressing  12/24/2024 0424 by Beth Maldonado RN  Outcome: Not Progressing  Intervention: Monitor and Manage Glycemia  Recent Flowsheet Documentation  Taken 12/24/2024 0153 by Beth Maldonado RN  Medication Review/Management: medications reviewed     Problem: Gas Exchange Impaired  Goal: Optimal Gas Exchange  12/24/2024 0426 by Beth Maldonado RN  Outcome: Progressing  12/24/2024 0424 by Beth Maldonado RN  Outcome: Progressing  Intervention: Optimize Oxygenation and Ventilation  Recent Flowsheet Documentation  Taken 12/24/2024 0153 by Beth Maldonado RN  Head of Bed (HOB) Positioning: HOB at 20-30 degrees

## 2024-12-24 NOTE — PROGRESS NOTES
The pt's home BiPAP/CPAP was placed on the pt @ 2324 with 1L of O2 bleed in. No complications noted.     Nessa Davis, RT on 12/23/2024 at 11:24 PM     [place]

## 2024-12-24 NOTE — DISCHARGE INSTRUCTIONS
Your home care referral was sent to [facility] for resumption of Physical Therapy and Occupational Therapy services. If you haven't heard from them within the next 24-48 hours, please call them at [phone].

## 2024-12-24 NOTE — DISCHARGE SUMMARY
Discharge Note    Patient discharged to home via private vehicle  accompanied by son.  IV: Discontinued  Prescriptions printed and given to patient/family.   Belongings reviewed and sent with patient.   Home medications returned to patient: NA  Equipment sent with: N/A.   patient verbalizes understanding of discharge instructions. AVS given to patient.  Additional education completed?  N/A

## 2024-12-26 ENCOUNTER — TELEPHONE (OUTPATIENT)
Dept: PULMONOLOGY | Facility: CLINIC | Age: 74
End: 2024-12-26
Payer: COMMERCIAL

## 2024-12-26 ENCOUNTER — TELEPHONE (OUTPATIENT)
Dept: PHARMACY | Facility: CLINIC | Age: 74
End: 2024-12-26
Payer: COMMERCIAL

## 2024-12-26 NOTE — TELEPHONE ENCOUNTER
MTM referral from: Transitions of Care (recent hospital discharge, TCU discharge, or ED visit)    MTM referral outreach attempt #1 on December 26, 2024 at 10:15 AM      Outcome: Spoke with patient declined MTM    Use no clinic listed,  medicare part D map, SR[facility] [place] for the carrier/Plan on the flowsheet          Romy Hairston [facility]  -[facility]  [phone]

## 2024-12-31 DIAGNOSIS — J44.9 COPD (CHRONIC OBSTRUCTIVE PULMONARY DISEASE) (H): Primary | ICD-10-CM

## 2025-01-13 NOTE — CONFIDENTIAL NOTE
RECORDS RECEIVED FROM: internal    DATE RECEIVED: 2.24.25    NOTES STATUS DETAILS   OFFICE NOTE from referring provider internal    Galindo Latif MD      DISCHARGE REPORT from the ER internal  12.22.24 Lance  11.28.24 Rhonda   7/7/24 Rhoda   4.3.24 Allen   10.23.23 Desiree    MEDICATION LIST internal     IMAGING  (NEED IMAGES AND REPORTS)     CT SCAN internal  12.22.24, 7/7/24, 4/7/24, 10.25.23, 9.12.23, 4.22.23   CHEST XRAY (CXR) internal  12.22.24, 11.28.24, 9.16.24, 9.14.24, 8.10.24, 7.13.24 more in epic    TESTS     PULMONARY FUNCTION TESTING (PFT) internal  Scheduled 2.24.25

## 2025-02-01 ENCOUNTER — OFFICE VISIT (OUTPATIENT)
Dept: URGENT CARE | Facility: URGENT CARE | Age: 75
End: 2025-02-01
Payer: COMMERCIAL

## 2025-02-01 ENCOUNTER — ANCILLARY PROCEDURE (OUTPATIENT)
Dept: GENERAL RADIOLOGY | Facility: CLINIC | Age: 75
End: 2025-02-01
Attending: PHYSICIAN ASSISTANT
Payer: COMMERCIAL

## 2025-02-01 VITALS
DIASTOLIC BLOOD PRESSURE: 83 MMHG | SYSTOLIC BLOOD PRESSURE: 137 MMHG | HEART RATE: 79 BPM | RESPIRATION RATE: 16 BRPM | WEIGHT: 154 LBS | TEMPERATURE: 99.1 F | BODY MASS INDEX: 26.43 KG/M2 | OXYGEN SATURATION: 95 %

## 2025-02-01 DIAGNOSIS — R05.1 ACUTE COUGH: ICD-10-CM

## 2025-02-01 DIAGNOSIS — R06.2 WHEEZING: ICD-10-CM

## 2025-02-01 PROCEDURE — 71046 X-RAY EXAM CHEST 2 VIEWS: CPT | Mod: TC | Performed by: RADIOLOGY

## 2025-02-01 PROCEDURE — 99214 OFFICE O/P EST MOD 30 MIN: CPT | Performed by: PHYSICIAN ASSISTANT

## 2025-02-01 RX ORDER — PREDNISONE 20 MG/1
40 TABLET ORAL DAILY
Qty: 14 TABLET | Refills: 0 | Status: SHIPPED | OUTPATIENT
Start: 2025-02-01 | End: 2025-02-08

## 2025-02-01 RX ORDER — ALBUTEROL SULFATE 0.83 MG/ML
2.5 SOLUTION RESPIRATORY (INHALATION) EVERY 4 HOURS PRN
Qty: 90 ML | Refills: 1 | Status: SHIPPED | OUTPATIENT
Start: 2025-02-01

## 2025-02-01 RX ORDER — DOXYCYCLINE 100 MG/1
100 CAPSULE ORAL 2 TIMES DAILY
Qty: 20 CAPSULE | Refills: 0 | Status: SHIPPED | OUTPATIENT
Start: 2025-02-01 | End: 2025-02-11
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
## 2025-02-08 ENCOUNTER — APPOINTMENT (OUTPATIENT)
Dept: CT IMAGING | Facility: CLINIC | Age: 75
End: 2025-02-08
Attending: EMERGENCY MEDICINE
Payer: COMMERCIAL

## 2025-02-08 ENCOUNTER — HOSPITAL ENCOUNTER (EMERGENCY)
Facility: CLINIC | Age: 75
Discharge: HOME | End: 2025-02-09
Attending: EMERGENCY MEDICINE | Admitting: EMERGENCY MEDICINE
Payer: COMMERCIAL

## 2025-02-08 ENCOUNTER — APPOINTMENT (OUTPATIENT)
Dept: GENERAL RADIOLOGY | Facility: CLINIC | Age: 75
End: 2025-02-08
Attending: EMERGENCY MEDICINE
Payer: COMMERCIAL

## 2025-02-08 DIAGNOSIS — E11.65 TYPE 2 DIABETES MELLITUS WITH HYPERGLYCEMIA, WITHOUT LONG-TERM CURRENT USE OF INSULIN (H): ICD-10-CM

## 2025-02-08 DIAGNOSIS — I10 HYPERTENSION, UNSPECIFIED TYPE: ICD-10-CM

## 2025-02-08 DIAGNOSIS — S16.1XXA ACUTE STRAIN OF NECK MUSCLE, INITIAL ENCOUNTER: ICD-10-CM
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
## 2025-02-08 DIAGNOSIS — S80.00XA CONTUSION OF KNEE, UNSPECIFIED LATERALITY, INITIAL ENCOUNTER: ICD-10-CM
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
## 2025-02-08 LAB
ANION GAP SERPL CALCULATED.3IONS-SCNC: 20 MMOL/L (ref 7–15)
B-OH-BUTYR SERPL-SCNC: <0.18 MMOL/L
BASOPHILS # BLD AUTO: 0 10E3/UL (ref 0–0.2)
BASOPHILS NFR BLD AUTO: 0 %
BUN SERPL-MCNC: 44.7 MG/DL (ref 8–23)
CALCIUM SERPL-MCNC: 11 MG/DL (ref 8.8–10.4)
CHLORIDE SERPL-SCNC: 94 MMOL/L (ref 98–107)
CREAT SERPL-MCNC: 0.95 MG/DL (ref 0.51–0.95)
EGFRCR SERPLBLD CKD-EPI 2021: 62 ML/MIN/1.73M2
EOSINOPHIL # BLD AUTO: 0 10E3/UL (ref 0–0.7)
EOSINOPHIL NFR BLD AUTO: 0 %
ERYTHROCYTE [DISTWIDTH] IN BLOOD BY AUTOMATED COUNT: 14.6 % (ref 10–15)
GLUCOSE SERPL-MCNC: 542 MG/DL (ref 70–99)
HCO3 SERPL-SCNC: 25 MMOL/L (ref 22–29)
HCT VFR BLD AUTO: 42.1 % (ref 35–47)
HGB BLD-MCNC: 13 G/DL (ref 11.7–15.7)
IMM GRANULOCYTES # BLD: 0.1 10E3/UL
IMM GRANULOCYTES NFR BLD: 1 %
LYMPHOCYTES # BLD AUTO: 0.5 10E3/UL (ref 0.8–5.3)
LYMPHOCYTES NFR BLD AUTO: 6 %
MCH RBC QN AUTO: 27.5 PG (ref 26.5–33)
MCHC RBC AUTO-ENTMCNC: 30.9 G/DL (ref 31.5–36.5)
MCV RBC AUTO: 89 FL (ref 78–100)
MONOCYTES # BLD AUTO: 0.2 10E3/UL (ref 0–1.3)
MONOCYTES NFR BLD AUTO: 2 %
NEUTROPHILS # BLD AUTO: 7.3 10E3/UL (ref 1.6–8.3)
NEUTROPHILS NFR BLD AUTO: 91 %
NRBC # BLD AUTO: 0 10E3/UL
NRBC BLD AUTO-RTO: 0 /100
PLATELET # BLD AUTO: 223 10E3/UL (ref 150–450)
POTASSIUM SERPL-SCNC: 4.4 MMOL/L (ref 3.4–5.3)
RBC # BLD AUTO: 4.72 10E6/UL (ref 3.8–5.2)
SODIUM SERPL-SCNC: 139 MMOL/L (ref 135–145)
WBC # BLD AUTO: 8 10E3/UL (ref 4–11)

## 2025-02-08 PROCEDURE — 93005 ELECTROCARDIOGRAM TRACING: CPT

## 2025-02-08 PROCEDURE — 85025 COMPLETE CBC W/AUTO DIFF WBC: CPT | Performed by: EMERGENCY MEDICINE

## 2025-02-08 PROCEDURE — 80048 BASIC METABOLIC PNL TOTAL CA: CPT | Performed by: EMERGENCY MEDICINE

## 2025-02-08 PROCEDURE — 73560 X-RAY EXAM OF KNEE 1 OR 2: CPT | Mod: 50

## 2025-02-08 PROCEDURE — 99285 EMERGENCY DEPT VISIT HI MDM: CPT | Mod: 25

## 2025-02-08 PROCEDURE — 258N000003 HC RX IP 258 OP 636: Performed by: EMERGENCY MEDICINE

## 2025-02-08 PROCEDURE — 250N000013 HC RX MED GY IP 250 OP 250 PS 637: Performed by: EMERGENCY MEDICINE

## 2025-02-08 PROCEDURE — 82010 KETONE BODYS QUAN: CPT | Performed by: EMERGENCY MEDICINE

## 2025-02-08 PROCEDURE — 96360 HYDRATION IV INFUSION INIT: CPT

## 2025-02-08 PROCEDURE — 36415 COLL VENOUS BLD VENIPUNCTURE: CPT | Performed by: EMERGENCY MEDICINE

## 2025-02-08 PROCEDURE — 72125 CT NECK SPINE W/O DYE: CPT

## 2025-02-08 PROCEDURE — 96361 HYDRATE IV INFUSION ADD-ON: CPT

## 2025-02-08 PROCEDURE — 70450 CT HEAD/BRAIN W/O DYE: CPT

## 2025-02-08 PROCEDURE — 82962 GLUCOSE BLOOD TEST: CPT

## 2025-02-08 RX ORDER — ACETAMINOPHEN 500 MG
1000 TABLET ORAL ONCE
Status: COMPLETED | OUTPATIENT
Start: 2025-02-08 | End: 2025-02-08

## 2025-02-08 RX ORDER — METOPROLOL SUCCINATE 25 MG/1
50 TABLET, EXTENDED RELEASE ORAL DAILY
Status: DISCONTINUED | OUTPATIENT
Start: 2025-02-09 | End: 2025-02-08

## 2025-02-08 RX ORDER — METOPROLOL TARTRATE 50 MG
50 TABLET ORAL ONCE
Status: DISCONTINUED | OUTPATIENT
Start: 2025-02-08 | End: 2025-02-08

## 2025-02-08 RX ORDER — METOPROLOL SUCCINATE 25 MG/1
50 TABLET, EXTENDED RELEASE ORAL ONCE
Status: COMPLETED | OUTPATIENT
Start: 2025-02-08 | End: 2025-02-08

## 2025-02-08 RX ADMIN — METFORMIN HYDROCHLORIDE 500 MG: 500 TABLET ORAL at 23:21

## 2025-02-08 RX ADMIN — ACETAMINOPHEN 1000 MG: 500 TABLET, FILM COATED ORAL at 22:10

## 2025-02-08 RX ADMIN — METOPROLOL SUCCINATE 50 MG: 25 TABLET, EXTENDED RELEASE ORAL at 23:54

## 2025-02-08 RX ADMIN — SODIUM CHLORIDE 1000 ML: 9 INJECTION, SOLUTION INTRAVENOUS at 22:14

## 2025-02-08 ASSESSMENT — ACTIVITIES OF DAILY LIVING (ADL)
ADLS_ACUITY_SCORE: 61

## 2025-02-09 VITALS
WEIGHT: 154 LBS | SYSTOLIC BLOOD PRESSURE: 138 MMHG | HEART RATE: 69 BPM | TEMPERATURE: 98.3 F | BODY MASS INDEX: 26.43 KG/M2 | RESPIRATION RATE: 20 BRPM | OXYGEN SATURATION: 98 % | DIASTOLIC BLOOD PRESSURE: 85 MMHG

## 2025-02-09 LAB
ALBUMIN UR-MCNC: NEGATIVE MG/DL
APPEARANCE UR: CLEAR
BILIRUB UR QL STRIP: NEGATIVE
COLOR UR AUTO: ABNORMAL
GLUCOSE BLDC GLUCOMTR-MCNC: 349 MG/DL (ref 70–99)
GLUCOSE UR STRIP-MCNC: >=1000 MG/DL
HGB UR QL STRIP: NEGATIVE
KETONES UR STRIP-MCNC: NEGATIVE MG/DL
LEUKOCYTE ESTERASE UR QL STRIP: NEGATIVE
MUCOUS THREADS #/AREA URNS LPF: PRESENT /LPF
NITRATE UR QL: POSITIVE
PH UR STRIP: 5.5 [PH] (ref 5–7)
RBC URINE: <1 /HPF
SP GR UR STRIP: 1.01 (ref 1–1.03)
SQUAMOUS EPITHELIAL: 2 /HPF
UROBILINOGEN UR STRIP-MCNC: NORMAL MG/DL
WBC URINE: 5 /HPF

## 2025-02-09 PROCEDURE — 96361 HYDRATE IV INFUSION ADD-ON: CPT

## 2025-02-09 PROCEDURE — 81001 URINALYSIS AUTO W/SCOPE: CPT | Performed by: EMERGENCY MEDICINE

## 2025-02-09 PROCEDURE — 87086 URINE CULTURE/COLONY COUNT: CPT | Performed by: EMERGENCY MEDICINE

## 2025-02-09 PROCEDURE — 87186 SC STD MICRODIL/AGAR DIL: CPT | Performed by: EMERGENCY MEDICINE

## 2025-02-09 ASSESSMENT — ACTIVITIES OF DAILY LIVING (ADL): ADLS_ACUITY_SCORE: 61

## 2025-02-09 NOTE — ED TRIAGE NOTES
30 min PTA pt son was outside shoveling snow, came back inside and found the pt on the carpeted floor. Pt son reports he helps her do everything. Pt c/o neck pain, bilateral leg pain. Unknown LOC. C-collar applied in triage. Pt finished prednisone recently and continues to have a cough

## 2025-02-09 NOTE — DISCHARGE INSTRUCTIONS
Discharge Instructions  Neck Strain    You have been seen today for a neck sprain or strain.  Neck strains usually result from an injury to the neck. Car accidents, contact sports, and falls are common causes of neck strain. Sometimes your neck can start to hurt because of increased activity, muscle tension, an abnormal sleeping position, or because of other problems like arthritis in the neck.     Neck pain usually comes from injured muscles and ligaments. Sometimes there is a herniated ( slipped ) disc. We do not usually do MRI scans to look for these right away, since most herniated discs will get better on their own with time. Today, we did not find any evidence that your neck pain was caused by a serious or dangerous condition. However, sometimes symptoms develop over time and cannot be found during an emergency visit, so it is very important that you follow up with your primary provider.    Generally, every Emergency Department visit should have a follow-up clinic visit with either a primary or a specialty clinic/provider. Please follow-up as instructed by your emergency provider today.    Return to the Emergency Department if:  You have increasing pain in your neck.  You develop difficulty swallowing or breathing.  You have numbness, weakness, or trouble moving your arms or legs.  You have severe dizziness and difficulty walking.  You are unable to control your bladder or bowels.  You develop severe headache or ringing in the ears.    What can I do to help myself at home?  If you had an injury, use cold for the first 1-2 days. Cold helps relieve pain and reduce inflammation.  Apply ice packs to the neck or areas of pain every 1-2 hours for 20 minutes at a time. Place a towel or cloth between your skin and the ice pack.  After the first 2 days, using heat can help with neck pain and stiffness. You may use a warm shower or bath, warm towels on the neck, or a heating pad. Do not sleep with a heating pad, as you  can be burned.   Pain medications - You may take a pain medication such as Tylenol  (acetaminophen), Advil  and Motrin  (ibuprofen), or Aleve  (naproxen).  It is usually best to rest the neck for 1-2 days after an injury, then start gentle stretching exercises.   It is helpful to place a small pillow under the nape of your neck to provide proper neutral positioning.   You should stay active and do your usual work as much as you can, unless this involves heavy physical labor. Ask your provider if you need work restrictions.  If you were given a prescription for medicine here today, be sure to read all of the information (including the package insert) that comes with your prescription.  This will include important information about the medicine, its side effects, and any warnings that you need to know about.  The pharmacist who fills the prescription can provide more information and answer questions you may have about the medicine.  If you have questions or concerns that the pharmacist cannot address, please call or return to the Emergency Department.   Remember that you can always come back to the Emergency Department if you are not able to see your regular provider in the amount of time listed above, if you get any new symptoms, or if there is anything that worries you.     Discharge Instructions  Extremity Injury    You were seen today for an injury to an extremity (arm, hand, leg, or foot). You may have a bruise, strain, or fracture (broken bone).  There is no sign of fracture on today's imaging.    Generally, every Emergency Department visit should have a follow-up clinic visit with either a primary or a specialty clinic/provider. Please follow-up as instructed by your emergency provider today.  Return to the Emergency Department right away if:  Your pain seems to change or get worse or there is pain in a new area that wasn t evaluated today.  Your extremity becomes pale, cool, blue, or numb or tingling past the  injury.  You have more drainage, redness or pain in the area of the cut or abrasion.  You have pain that you cannot control with the medicine recommended or prescribed here, or you have pain that seems too much for your injury.  Your child (who is injured) will not stop crying or is much more fussy than normal.  You have new symptoms or anything that worries you.    What to Expect:  Your swelling and pain may be worse the day after your injury, but should not be severe and should start getting better after that. You should not have new symptoms and your pain should not get worse.  You may start to get a bruise over the injured area or below the injured area (bruising can follow gravity).  Your movement and strength should get better with time.  Some injuries may not show up until after you have left the Emergency Department so it is important to follow-up as directed.  Your injury may prevent you from working.  Follow-up with your regular provider to get a work release note.  Pain medications or your injury may make it unsafe to drive or operate machinery.    Home Care:  RICE: Rest, Ice, Compression, Elevation  Rest: Rest your injured area for at least 1-2 days. After that you may start using your extremity again as long as there is not too much pain.   Ice: Apply ice your injured area for 15 minutes at a time, at least 3 times a day. Use a cloth between the ice bag and your skin to prevent frostbite. Do not sleep with an ice pack or heating pad on, since this can cause burns or skin injury.  Compression: You may use an elastic bandage (Ace  Wrap) if it makes you more comfortable. Wrap it just tight enough to provide light compression, like a new pair of socks feels. Loosen the bandage if you have swelling past the bandage.  Elevation: Raise the injured area above the level of your heart as much as possible in the first 1-2 days.    Use Tylenol  (acetaminophen), Motrin (ibuprofen), or Advil  (ibuprofen) for your  pain unless you have an allergy or are told not to use these medications by your provider.  Take the medications as instructed on the package. Tylenol  (acetaminophen) is in many prescription medicines and non-prescription medicines--check all of your medicines to be sure you aren t taking more than 3000 mg per day.  Please follow any other instructions that were discussed with you by your provider.    Stretching/Exercises:  You may have been provided with instructions for stretching or exercises. If your injury was to your arm or shoulder and your provider put you in a sling or an immobilizer, it is important that you take off your immobilizer within 3 days and stretch/move your shoulder, unless your provider specifically tells you to not move your shoulder.  This is to prevent further injury such as a  frozen shoulder .     If you were given a prescription for medicine here today, be sure to read all of the information (including the package insert) that comes with your prescription.  This will include important information about the medicine, its side effects, and any warnings that you need to know about.  The pharmacist who fills the prescription can provide more information and answer questions you may have about the medicine.  If you have questions or concerns that the pharmacist cannot address, please call or return to the Emergency Department.     Remember that you can always come back to the Emergency Department if you are not able to see your regular provider in the amount of time listed above, if you get any new symptoms, or if there is anything that worries you.   Discharge Instructions  Hyperglycemia, High Blood Sugar    Today we found your blood sugar (glucose) was high. This may mean that you have developed diabetes, or if you already know that you have diabetes, it may mean that your diabetes is not as well controlled as it should be. Sometimes blood sugar can be high temporarily and it is not  diabetes. Signs of elevated blood sugar include increased thirst, frequent urination (peeing), blurred vision, fatigue, unexplained weight loss, poor wound healing, and frequent infections.    We sometimes give medicine in the Emergency Department to lower the blood sugar. We may also prescribe medicine for you to use at home, or increase the medicine that you already take. While we do not like to see your blood sugar high, it is much more dangerous to let your blood sugar get too low, so it is reasonable to take time to bring it down, or to wait and watch to see if it comes down on its own.    Generally, every Emergency Department visit should have a follow-up clinic visit with either a primary or a specialty clinic/provider. Please follow-up as instructed by your emergency provider today.     Return to the Emergency Department if you develop:  Vomiting (throwing up).  Confusion, disorientation, or being unable to wake up.  Severe weakness or illness.  Abdominal (belly) pain.    What can I do to help myself?  Check your blood sugar as instructed by your provider.  Take medications prescribed by your provider.  Follow a diabetic diet (low fat, low concentrated sweets, high fiber).  Exercise regularly.  Moderate or eliminate alcohol use.  Stop smoking.    Diabetes: Diabetes mellitus is a disease in which the body cannot regulate the amount of sugar (glucose) in the blood. Insulin allows glucose to move out of the blood into cells throughout the body where it is used for fuel. People with diabetes do not produce enough insulin (type 1 diabetes), or cannot use insulin properly (type 2 diabetes), or both. This starves the cells that need the glucose for fuel, and also harms certain organs and tissues exposed to the high glucose levels.  Over a long period of time, uncontrolled diabetes can lead to heart and blood vessel disease, blindness, kidney failure, foot ulcers and many other problems.          About 17 million  Americans (6.2% of adults) have diabetes. We think that about one third of adults with diabetes do not know they have diabetes.  The incidence of diabetes is increasing rapidly. This increase is due to many factors, but the most significant are our increasing weight and decreased activity levels.     Diabetes can be a very serious and life-threatening illness if not treated.  If you were given a prescription for medicine here today, be sure to read all of the information (including the package insert) that comes with your prescription.  This will include important information about the medicine, its side effects, and any warnings that you need to know about.  The pharmacist who fills the prescription can provide more information and answer questions you may have about the medicine.  If you have questions or concerns that the pharmacist cannot address, please call or return to the Emergency Department.   Remember that you can always come back to the Emergency Department if you are not able to see your regular provider in the amount of time listed above, if you get any new symptoms, or if there is anything that worries you.     Discharge Instructions  Hypertension - High Blood Pressure    During you visit to the Emergency Department, your blood pressure was higher than the recommended blood pressure.  This may be related to stress, pain, medication or other temporary conditions. In these cases, your blood pressure may return to normal on its own. If you have a history of high blood pressure, you may need to have your provider adjust your medications. Sometimes, your high measurement here may indicate that you have developed high blood pressure that will stay high unless it is treated. As a general rule, high blood pressure causes problems over years rather than days, weeks, or months. So, while it is important to treat blood pressure, it is rarely important to treat blood pressure immediately. Occasionally we will  begin a medication in the Emergency Department; more often we will recommend close follow-up for medications with a primary doctor/clinic.    Generally, every Emergency Department visit should have a follow-up clinic visit with either a primary or a specialty clinic/provider. Please follow-up as instructed by your emergency provider today.    Return to the Emergency Department if you start to have:  A severe headache.  Chest pain.  Shortness of breath.  Weakness or numbness that affects one part of the body.  Confusion.  Vision changes.  Significant swelling of legs and/or eyes.  A reaction to any medication started in the Emergency Department.    What can I do to help myself?  Avoid alcohol.  Take any blood pressure medicine that you are prescribed.  Get a good night s sleep.  Lower your salt intake.  Exercise.  Lose weight.  Manage stress.  See your doctor regularly    If blood pressure medication was started in the Emergency Department:  The medicine may not have an immediate effect. The body and brain determine what blood pressure you have. The medicine s job is to retrain the body s  thermostat  to a lower blood pressure.  You will need to follow up with your provider to see how this medicine is working for you.  If you were given a prescription for medicine here today, be sure to read all of the information (including the package insert) that comes with your prescription.  This will include important information about the medicine, its side effects, and any warnings that you need to know about.  The pharmacist who fills the prescription can provide more information and answer questions you may have about the medicine.  If you have questions or concerns that the pharmacist cannot address, please call or return to the Emergency Department.   Remember that you can always come back to the Emergency Department if you are not able to see your regular provider in the amount of time listed above, if you get any new  symptoms, or if there is anything that worries you.

## 2025-02-09 NOTE — ED NOTES
RespiratoryAirway WDL: all (pt comes in with continued cough dispite being on abx and prednisone for last week. pt also found down on carpeted floor by son, + neck pain, unknown LOC.)  Respiratory WDLRespiratory WDL: allRhythm/Pattern, Respiratory: depth regular; pattern regularNailbeds: no discolorationCough Frequency: frequentCough Type: good; productive

## 2025-02-09 NOTE — ED PROVIDER NOTES
"  Emergency Department Note      History of Present Illness     Chief Complaint   Fall    HPI phone  services declined.    Pilo Quintana is a 74 year old female with a history of stroke, type 2 diabetes mellitus, hypertension, and hyperlipidemia presenting to the ED for evaluation following a fall. The patient is accompanied to the ED by her son who assists with the history and acts as an interpretor. Pilo's son reports that he went outside to shovel after feeding his mother dinner earlier this evening. When he returned inside, he heard a loud \"boom\" and found Pilo draped over the bed. She states that she attempted to move on her own from her seat at the dinner table to her bed. It is unknown whether she fell to floor prior to presenting on the bed or whether or not she hit her head. Both Pilo's son and daughter-in-law were in the home but neither witnessed the fall. When they attempted to lift her up, she screamed about her legs being in pain. Upon examination, Pilo more specifically indicates pain in both knees. She also endorses neck pain. Pilo is able to move both of her legs and bend her knees, though she reports knee pain upon doing so. Her son also mentions that she appeared to be dizzier than usual when waking up this morning though denies hearing any complaints from her regarding this throughout the day. The patient denies any pain to her head, arms, shoulders, back, abdomen, or chest. She also denies numbness or tingling, difficulty eating or drinking, urinary symptoms, or diarrhea. Of note, Pilo's son reports that she typically ambulates with a walker and occasional physical assistance. Unrelated to her fall today, the patient has been experiencing a cough which has improved greatly since seeing a doctor and being prescribed prednisone and an antibiotic last Sunday (2/2/25). Her son also mentions that she had new leg swelling starting a few days ago which was also addressed by a " provider. Regarding her diabetes, Pilo's son has observed blood sugar readings in the 300s which is higher than typical since starting her course of prednisone. Regarding blood thinner use, Pilo takes baby aspirin.    Independent Historian   Son as detailed above.    Review of External Notes   This visit reviewed from 2/1/2025.  Patient at that time seen for continued URI related symptoms.  Discharged on prednisone and albuterol nebulizer as well as doxycycline    Past Medical History     Medical History and Problem List   Type 2 diabetes mellitus  Hypertension  Morbid obesity  Osteoarthritis  Taryn mass  Thickened endometrium  Asthma   Stroke  Endometrial polyp  Mild memory loss  Pulmonary nodules  Hyperparathyroidism  ICH  Lactic acidosis  COPD  Acute respiratory failure    Medications   Albuterol  Amlodipine  Aspirin  Atorvastatin  Budesonide  Doxycycline  Furosemide  Losartan  Metformin  Metoprolol  Prednisone    Surgical History   Past Surgical History:   Procedure Laterality Date    DILATION AND CURETTAGE, OPERATIVE HYSTEROSCOPY WITH MORCELLATOR, COMBINED N/A 9/25/2014    Procedure: COMBINED DILATION AND CURETTAGE, OPERATIVE HYSTEROSCOPY WITH MORCELLATOR;  Surgeon: Silverio Christy MD;  Location:  OR     Physical Exam     Physical Exam  General: Elderly adult sitting upright  Eyes: PERRL, Conjunctive within normal limits. EOMI.  HENT: No scalp tenderness or palpable hematoma. Moist mucous membranes, oropharynx clear.   Neck: Maintained in rigid cervical collar. Tender mid cervical spine midline without palpable step off or crepitus.  CV: Normal S1S2, no murmur, rub or gallop. Regular rate and rhythm  Resp: Clear to auscultation bilaterally, no wheezes, rales or rhonchi. Normal respiratory effort.  GI: Abdomen is soft, nontender and nondistended. No palpable masses. No rebound or guarding.  MSK: No edema. Tender bilateral knees anteriorly. No palpable crepitus or bony deformity.  Is seated in a chair, able  to full extend bilateral knees but with mild pain. Nontender over the remainder of the extremities, upper and lower. Ranges upper extremities without difficulty.   Skin: Warm and dry. No rashes or lesions or ecchymoses on visible skin.  Neuro: Alert and oriented. Responds appropriately to all questions and commands. No focal findings appreciated. Normal muscle tone.Sensation intact to light touch over the bilateral lower leg dermatomes.   Psych: Normal mood and affect. Pleasant.     Diagnostics     Lab Results   Labs Ordered and Resulted from Time of ED Arrival to Time of ED Departure   BASIC METABOLIC PANEL - Abnormal       Result Value    Sodium 139      Potassium 4.4      Chloride 94 (*)     Carbon Dioxide (CO2) 25      Anion Gap 20 (*)     Urea Nitrogen 44.7 (*)     Creatinine 0.95      GFR Estimate 62      Calcium 11.0 (*)     Glucose 542 (*)    CBC WITH PLATELETS AND DIFFERENTIAL - Abnormal    WBC Count 8.0      RBC Count 4.72      Hemoglobin 13.0      Hematocrit 42.1      MCV 89      MCH 27.5      MCHC 30.9 (*)     RDW 14.6      Platelet Count 223      % Neutrophils 91      % Lymphocytes 6      % Monocytes 2      % Eosinophils 0      % Basophils 0      % Immature Granulocytes 1      NRBCs per 100 WBC 0      Absolute Neutrophils 7.3      Absolute Lymphocytes 0.5 (*)     Absolute Monocytes 0.2      Absolute Eosinophils 0.0      Absolute Basophils 0.0      Absolute Immature Granulocytes 0.1      Absolute NRBCs 0.0     ROUTINE UA WITH MICROSCOPIC REFLEX TO CULTURE - Abnormal    Color Urine Straw      Appearance Urine Clear      Glucose Urine >=1000 (*)     Bilirubin Urine Negative      Ketones Urine Negative      Specific Gravity Urine 1.011      Blood Urine Negative      pH Urine 5.5      Protein Albumin Urine Negative      Urobilinogen Urine Normal      Nitrite Urine Positive (*)     Leukocyte Esterase Urine Negative      Mucus Urine Present (*)     RBC Urine <1      WBC Urine 5      Squamous Epithelials Urine  2 (*)    KETONE BETA-HYDROXYBUTYRATE QUANTITATIVE, RAPID - Normal    Ketone (Beta-Hydroxybutyrate) Quantitative <0.18     GLUCOSE MONITOR NURSING POCT   URINE CULTURE       Imaging   CT Cervical Spine w/o Contrast   Final Result   IMPRESSION: Mildly limited study due to patient motion. There is normal alignment of the cervical vertebrae; however, there is straightening of normal cervical lordosis. Vertebral body heights of the cervical spine are normal. Craniocervical alignment is    normal. No evidence for fracture.      CT Head w/o Contrast   Final Result   IMPRESSION:   1.  No CT evidence for acute intracranial process.   2.  Brain atrophy and presumed chronic microvascular ischemic changes as above.      XR Knee Bilateral 1/2 Views   Final Result   IMPRESSION:    RIGHT KNEE: Tricompartmental arthritic changes right knee which are advanced in the medial compartment. Curvilinear bone fragment along the medial tibia is well-corticated and chronic in appearance. No definitive evidence of an acute fracture. No joint    effusion.      LEFT KNEE: Tricompartmental degenerative changes left knee which are advanced in the medial compartment. Probable knee joint effusion. No evidence of an acute displaced fracture.      Bones are demineralized. Atherosclerotic vascular calcifications.          EKG   ECG results from 02/08/25   EKG 12 lead     Value    Systolic Blood Pressure     Diastolic Blood Pressure     Ventricular Rate 80    Atrial Rate 80    WY Interval 130    QRS Duration 80        QTc 433    P Axis 43    R AXIS -20    T Axis 30    Interpretation ECG      Sinus rhythm  Nonspecific ST abnormality  Abnormal ECG    ECG performed at 2037, interpreted by me at 2050.     Independent Interpretation   CT Head: No intracranial hemorrhage.    ED Course      Medications Administered   Medications   acetaminophen (TYLENOL) tablet 1,000 mg (1,000 mg Oral $Given 2/8/25 2210)   sodium chloride 0.9% BOLUS 1,000 mL (0 mLs  Intravenous Stopped 2/9/25 0117)   metFORMIN (GLUCOPHAGE) tablet 500 mg (500 mg Oral $Given 2/8/25 2321)   metoprolol succinate ER (TOPROL XL) 24 hr tablet 50 mg (50 mg Oral $Given 2/8/25 2354)     Discussion of Management   None    ED Course   ED Course as of 02/09/25 0025   Sat Feb 08, 2025   1909 I obtained the history and examined the patient as noted above.      2051 I rechecked and updated the patient.      2235 I rechecked and updated the patient.      Sun Feb 09, 2025   0022 I rechecked and updated the patient.      I discussed findings of nitrite positive here with the son who notes she has not had any urinary complaints or symptoms and asks the patient again if this is the case.    Additional Documentation  None    Medical Decision Making / Diagnosis     CMS Diagnoses: None    MIPS       None    MDM   Pilo Quintana is a 74 year old female presents emergency department with concerns for a fall at home.  By history, this sounds mechanical.  There are some limitations as patient is speaking in [place] and language to her son, declining any phone  services, but overall seems reliable.  Was not clear if there is head injury although patient was denying headache or probable trauma, but given the neck pain and unclarity around 8/10, head CT and cervical spine CT were ordered.  She denied any other complaints she had with bilateral knee pain.  Fortunately there is no findings of acute, fracture, or dislocation noted on imaging results.  She was neurologically intact.  She was able to ambulate here at baseline.  Labs were taken due to her history of diabetes and send reports of possible dizziness earlier today.  She is noted to be hyperglycemic.  She is not acidotic or ketotic.  She did not have her metformin tonight and has been on prednisone recently, this is likely the cause of the hyperglycemia.  She was given IV fluids and metformin here in the emergency department.  She had subsequent decline  in her blood sugars.  There are no signs of significant illness related to the hyperglycemia such as DKA hyper osmolar syndrome, there is no indication for admission for further care.  Her son notes she is currently at baseline.  Prior to discharge, I did discuss nitrite positive urine, however as there are no urinary symptoms, it seems unlikely she has a true UTI without other findings to suggest this.  Urine culture is sent however and will be followed.  Patient will be placed on antibiotics if positive.  Son is also consulted if she has any worsening of symptoms or development of urinary symptoms, fever confusion, she should be reassessed here in the emergency department.  Otherwise should follow-up with the primary care provider for reassessment of blood sugars and elevated blood.  Both the patient and her son feel comfortable this plan.  All questions were answered prior to discharge.    Disposition   The patient was discharged.     Diagnosis     ICD-10-CM    1. Acute strain of neck muscle, initial encounter  S16.1XXA       2. Contusion of knee, unspecified laterality, initial encounter  S80.00XA     bilateral      3. Type 2 diabetes mellitus with hyperglycemia, without long-term current use of insulin (H)  E11.65       4. Hypertension, unspecified type  I10       5. Fall at home, initial encounter  W19.XXXA     Y92.009                Scribe Disclosure:  I, Lilliana Vargas, am serving as a scribe at 7:17 PM on 2/8/2025 to document services personally performed by Melia Curiel MD based on my observations and the provider's statements to me.        Melia Curiel MD  02/09/25 [id number]

## 2025-02-10 LAB
ATRIAL RATE - MUSE: 80 BPM
DIASTOLIC BLOOD PRESSURE - MUSE: NORMAL MMHG
INTERPRETATION ECG - MUSE: NORMAL
P AXIS - MUSE: 43 DEGREES
PR INTERVAL - MUSE: 130 MS
QRS DURATION - MUSE: 80 MS
QT - MUSE: 376 MS
QTC - MUSE: 433 MS
R AXIS - MUSE: -20 DEGREES
SYSTOLIC BLOOD PRESSURE - MUSE: NORMAL MMHG
T AXIS - MUSE: 30 DEGREES
VENTRICULAR RATE- MUSE: 80 BPM

## 2025-02-11 LAB
BACTERIA UR CULT: ABNORMAL
BACTERIA UR CULT: ABNORMAL

## 2025-02-12 ENCOUNTER — TELEPHONE (OUTPATIENT)
Dept: NURSING | Facility: CLINIC | Age: 75
End: 2025-02-12
Payer: COMMERCIAL

## 2025-02-12 RX ORDER — NITROFURANTOIN 25; 75 MG/1; MG/1
100 CAPSULE ORAL 2 TIMES DAILY
Qty: 10 CAPSULE | Refills: 0 | Status: SHIPPED | OUTPATIENT
Start: 2025-02-12 | End: 2025-02-17

## 2025-02-12 NOTE — TELEPHONE ENCOUNTER
[facility]    Reason for call: Lab Result Notification     Lab Result (including Rx patient on, if applicable).  If culture, copy of lab report at bottom.  Lab Result: Final urine culture on 2/11/25 shows the presence of bacteria(s): Two strains of >100,000 CFU/ML Escherichia coli ESBL  [facility] Emergency Dept discharge antibiotic: None        Creatinine Level (mg/dl)   Creatinine   Date Value Ref Range Status   02/08/2025 0.95 0.51 - 0.95 mg/dL Final   09/10/2020 0.62 0.52 - 1.04 mg/dL Final    Creatinine clearance (ml/min), if applicable    Serum creatinine: 0.95 mg/dL 02/08/25 2101  Estimated creatinine clearance: 49.1 mL/min     Patient Allergies [Need to ask patient]:  confirmed   No Known Allergies    Taking Coumadin/Warfarin: no    Patient's current Symptoms:   Spoke with son.  Pilo gave verbal permission to speak with her son Balwinder.  He states he has power of .  She is back to normal status  No UTI sx's      RN Recommendations/Instructions per [facility] ED lab result protocol:   [facility] ED lab result protocol utilized: urine culture  Instruct to start antibiotic:   Nitrofurantoin Macrocrystal-Monohydrate (Macrobid) 100 mg PO capsule, 1 capsule (100 mg) by mouth 2 times daily for 5 days     Patient/care giver notified to contact your PCP clinic or return to the Emergency department if your:  Symptoms do not resolve after completing antibiotic.  Symptoms worsen or other concerning symptoms.        Joshua Velázquez RN

## 2025-02-18 ENCOUNTER — TELEPHONE (OUTPATIENT)
Dept: PULMONOLOGY | Facility: CLINIC | Age: 75
End: 2025-02-18
Payer: COMMERCIAL

## 2025-02-18 NOTE — TELEPHONE ENCOUNTER
Patient confirmed scheduled appointment:  Date: 02/19/2025  Time: 2:20PM  Visit type: NPULM REFERRAL  Provider: STEFANIE  Location: [facility]  Testing/imaging: N/A  Additional notes: Patient needed 2/24/25 appt with Dr. Kuhn rescheduled

## 2025-02-18 NOTE — TELEPHONE ENCOUNTER
No Mano  available, no consent to communicate on file for family members     Appointment type: NPULM  Provider: JAQUAN  Return date: NEXT AVAILABLE  Specialty phone number: [phone]  Additional appointment(s) needed: FPFT  Additonal Notes: 2/24 appointment with Dr. Kuhn needs rescheduling, no Mano  staffed at time of call, no consent to communicate on file to speak with family members.

## 2025-02-19 ENCOUNTER — PRE VISIT (OUTPATIENT)
Dept: PULMONOLOGY | Facility: CLINIC | Age: 75
End: 2025-02-19

## 2025-02-19 ENCOUNTER — OFFICE VISIT (OUTPATIENT)
Dept: PULMONOLOGY | Facility: CLINIC | Age: 75
End: 2025-02-19
Attending: STUDENT IN AN ORGANIZED HEALTH CARE EDUCATION/TRAINING PROGRAM
Payer: COMMERCIAL

## 2025-02-19 VITALS — SYSTOLIC BLOOD PRESSURE: 143 MMHG | HEART RATE: 83 BPM | DIASTOLIC BLOOD PRESSURE: 88 MMHG | OXYGEN SATURATION: 97 %

## 2025-02-19 DIAGNOSIS — J44.89 ASTHMA-COPD OVERLAP SYNDROME (H): Primary | ICD-10-CM

## 2025-02-19 DIAGNOSIS — J44.9 COPD (CHRONIC OBSTRUCTIVE PULMONARY DISEASE) (H): ICD-10-CM

## 2025-02-19 DIAGNOSIS — J44.1 COPD EXACERBATION (H): ICD-10-CM

## 2025-02-19 LAB — FIO2-PRE: 21 %

## 2025-02-19 PROCEDURE — 94375 RESPIRATORY FLOW VOLUME LOOP: CPT | Performed by: INTERNAL MEDICINE

## 2025-02-19 PROCEDURE — G0463 HOSPITAL OUTPT CLINIC VISIT: HCPCS | Performed by: STUDENT IN AN ORGANIZED HEALTH CARE EDUCATION/TRAINING PROGRAM

## 2025-02-19 PROCEDURE — 99214 OFFICE O/P EST MOD 30 MIN: CPT | Mod: 25 | Performed by: STUDENT IN AN ORGANIZED HEALTH CARE EDUCATION/TRAINING PROGRAM

## 2025-02-19 RX ORDER — BUDESONIDE 0.5 MG/2ML
0.5 INHALANT ORAL 2 TIMES DAILY
Qty: 120 ML | Refills: 11 | Status: SHIPPED | OUTPATIENT
Start: 2025-02-19 | End: 2026-02-14

## 2025-02-19 RX ORDER — FORMOTEROL FUMARATE 20 UG/2ML
20 SOLUTION RESPIRATORY (INHALATION) EVERY 12 HOURS
Qty: 120 ML | Refills: 11 | Status: SHIPPED | OUTPATIENT
Start: 2025-02-19

## 2025-02-19 ASSESSMENT — PAIN SCALES - GENERAL: PAINLEVEL_OUTOF10: NO PAIN (0)
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
## 2025-02-19 NOTE — PATIENT INSTRUCTIONS
Asthma-COPD plan:   - stop symbicort inhaler  - start budesonide and formoterol nebulizer twice daily (ok to combine into nebulizer cup)  - Continue albuterol nebulizer as needed  - Return to clinic in 3 months, if symptoms not improved we will start an injection called dupilumab (dupixent)  - Fill out the POLST form to address code status to guide what treatments she would or would not want if she had to go to the hospital

## 2025-02-24 ENCOUNTER — PRE VISIT (OUTPATIENT)
Dept: PULMONOLOGY | Facility: CLINIC | Age: 75
End: 2025-02-24

## 2025-03-18 ENCOUNTER — OFFICE VISIT (OUTPATIENT)
Dept: URGENT CARE | Facility: URGENT CARE | Age: 75
End: 2025-03-18
Payer: COMMERCIAL

## 2025-03-18 VITALS
OXYGEN SATURATION: 95 % | SYSTOLIC BLOOD PRESSURE: 152 MMHG | DIASTOLIC BLOOD PRESSURE: 84 MMHG | RESPIRATION RATE: 20 BRPM | TEMPERATURE: 98.2 F | HEART RATE: 66 BPM

## 2025-03-18 DIAGNOSIS — J44.1 COPD EXACERBATION (H): Primary | ICD-10-CM

## 2025-03-18 DIAGNOSIS — E78.5 HYPERLIPIDEMIA, UNSPECIFIED HYPERLIPIDEMIA TYPE: ICD-10-CM

## 2025-03-18 DIAGNOSIS — E11.9 TYPE 2 DIABETES MELLITUS WITHOUT COMPLICATION, WITHOUT LONG-TERM CURRENT USE OF INSULIN (H): ICD-10-CM

## 2025-03-18 DIAGNOSIS — R91.8 MULTIPLE PULMONARY NODULES: ICD-10-CM

## 2025-03-18 DIAGNOSIS — I10 ESSENTIAL HYPERTENSION: ICD-10-CM

## 2025-03-18 PROCEDURE — 99214 OFFICE O/P EST MOD 30 MIN: CPT | Performed by: PHYSICIAN ASSISTANT

## 2025-03-18 PROCEDURE — 3077F SYST BP >= 140 MM HG: CPT | Performed by: PHYSICIAN ASSISTANT

## 2025-03-18 PROCEDURE — 3079F DIAST BP 80-89 MM HG: CPT | Performed by: PHYSICIAN ASSISTANT

## 2025-03-18 RX ORDER — DOXYCYCLINE 100 MG/1
100 CAPSULE ORAL 2 TIMES DAILY
Qty: 20 CAPSULE | Refills: 0 | Status: SHIPPED | OUTPATIENT
Start: 2025-03-18 | End: 2025-03-28

## 2025-03-18 RX ORDER — PREDNISONE 20 MG/1
40 TABLET ORAL DAILY
Qty: 10 TABLET | Refills: 0 | Status: SHIPPED | OUTPATIENT
Start: 2025-03-18 | End: 2025-03-23

## 2025-03-18 NOTE — PATIENT INSTRUCTIONS
March 18, 2025 Urgent  Care Plan:       1. Start the Doxycycline for your COPD exacerbation     2. Start Prednisone for your COPD exacerbation     3. Keep using the breathing medications prescribed by your lung doctor     4. Watch your blood sugars carefully while using Prednisone. Prednisone causes blood sugar to go up. If your blood sugars swing unexpectedly high or low, call your diabetes doctor.     5. Let your lung doctor know you are having a COPD flare up and check to see if they want you to change any other breathing medications.     6. Follow-up with your primary care provider if your symptoms fail to improve after 3 days of treatment provided here today sooner if worsening.     7. If you have any sudden severe worsening of your current symptoms (as we reviewed today) Go to the EMERGENCY ROOM

## 2025-03-18 NOTE — PROGRESS NOTES
ASSESSMENT/PLAN:    (J44.1) COPD exacerbation (H)  (primary encounter diagnosis)      MDM: COPD exacerbation with associated wheezing in a 74 year old female with history of frequent similar flare-ups requiring Doxycycline and Prednisone in the past year.  No  respiratory distress requiring further ER or hospital inpatient management now. Treatment plan as per below. I have advised very low threshold for medical follow-up (to include chest x-ray) if symptoms fail to improve in the next 2-3 days, if symptoms fail to fully resolve in the next 10 days, and  immediately if symptoms worsen. Urgent and emergent follow-up criteria are also reviewed. Please see below patient discharge summary (which I reviewed with patient and son today verbally and provided in printed form for home review).     Plan: doxycycline monohydrate (MONODOX) 100 MG         capsule, predniSONE (DELTASONE) 20 MG tablet                  March 18, 2025 Urgent  Care Plan:       1. Start the Doxycycline for your COPD exacerbation     2. Start Prednisone for your COPD exacerbation     3. Keep using the breathing medications prescribed by your lung doctor     4. Watch your blood sugars carefully while using Prednisone. Prednisone causes blood sugar to go up. If your blood sugars swing unexpectedly high or low, call your diabetes doctor.     5. Let your lung doctor know you are having a COPD flare up and check to see if they want you to change any other breathing medications.     6. Follow-up with your primary care provider if your symptoms fail to improve after 3 days of treatment provided here today sooner if worsening.     7. If you have any sudden severe worsening of your current symptoms (as we reviewed today) Go to the EMERGENCY ROOM       (R91.8) Multiple pulmonary nodules    (E11.9) Type 2 diabetes mellitus without complication, without long-term current use of insulin (H)      (E78.5) Hyperlipidemia, unspecified hyperlipidemia type    (I10)  Essential hypertension        This progress note has been dictated, with use of voice recognition software. Any grammatical, typographical, or context errors are unintentional and inherent to use of voice recognition software.  ------------------      Chief Complaint   Patient presents with    Cough     Ongoing cough x5 days-has been using meds from Pulm but not helping       SUBJECTIVE:    Pilo Quintana is a 74-year-old female, with a past medical history that includes multiple pulmonary nodules, COPD, hyperlipidemia, HTN, prior history of acute respiratory failure, type 2 diabetes (please see below for full past medical history) presenting to urgent care today, accompanied by her son and caregiver, for evaluation of productive cough and wheezing for 5 days duration.     Patient gives history of having frequent COPD exacerbations that have responded best to a combination of prednisone and doxycycline over the past year.  Patient was seen here by another provider on 2/1/2025 (at which time she was prescribed both prednisone and doxycycline).  Her productive cough and wheezing symptoms reportedly resolved without treatment, but have now returned again.  This is described as a frequent, repeat, cycle that has been going on for over a year now (son estimates she has had at least 5 similar episodes in the past year).  Patient was seen by a pulmonologist on 2/19/2025.  She is reportedly taking all medicines prescribed by the pulmonologist.  Current symptoms are not improving with use of daily respiratory prescriptions.  patient and son are hoping she can get a prescription for steroid and antibiotic today.    DIABETES: Son reports her morning blood sugars have been in the 120-130 range before eating.  Son, who helps with all medical cares, tells me primary care provider has provided insulin sliding scale for use when she has needed to take prednisone for COPD exacerbations in the past and tells me they do have insulin  to use if needed.    ROS: No fever or shaking chills. No associated severe coughing up of blood, blue lips/fingers/toes, or severe shortness of breath. No acute fainting, chest pain, racing or irregular heartbeats. No acute onset abdominal pain, nausea, vomiting, or diarrhea. No severe body aches, severe headaches, rashes, hives, joint swelling or other acute illness symptoms.     Past Medical History:   Diagnosis Date    Diabetes (H)     Hypertension     Morbid obesity (H)     Osteoarthritis 6/7/2003    PMB (postmenopausal bleeding) 9/16/2014    Renal mass     Thickened endometrium 9/16/2014    Uncomplicated asthma     Unspecified cerebral artery occlusion with cerebral infarction 1998    was in [place], no residual at present       Patient Active Problem List   Diagnosis    PMB (postmenopausal bleeding)    Thickened endometrium    Endometrial polyp    Renal mass    Morbid obesity (H)    Age-related osteoporosis without current pathological fracture    Cerebral degeneration    Cerebrovascular disease    Essential hypertension    Hyperlipidemia    Mental disorder    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    Multiple pulmonary nodules    Osteoarthritis    Renal oncocytoma of right kidney    Simple renal cyst    Social maladjustment    Type 2 diabetes mellitus (H)    Wheezing    ICH (intracerebral hemorrhage) (H)    Primary hyperparathyroidism    Thyroid nodule    Shortness of breath    Hypoxia    Lactic acidosis    COPD exacerbation (H)    Steroid-induced hyperglycemia    Community acquired bacterial pneumonia    COPD with acute exacerbation (H)    Acute respiratory failure with hypoxia and hypercapnia (H)    Confusion    Weakness    Pulmonary nodule       Current Outpatient Medications   Medication Sig Dispense Refill    acetaminophen (TYLENOL) 500 MG tablet Take 500-1,000 mg by mouth every 8 hours as needed for mild pain      albuterol (PROAIR HFA/PROVENTIL HFA/VENTOLIN HFA) 108 (90 Base) MCG/ACT inhaler  Inhale 2 puffs into the lungs every 4 hours as needed for wheezing 18 g 0    albuterol (PROVENTIL) (2.5 MG/3ML) 0.083% neb solution Take 1 vial (2.5 mg) by nebulization every 4 hours as needed for shortness of breath, wheezing or cough. 90 mL 1    amLODIPine (NORVASC) 5 MG tablet Take 5 mg by mouth daily      aspirin (ASA) 81 MG EC tablet Take 1 tablet (81 mg) by mouth daily 30 tablet 11    atorvastatin (LIPITOR) 40 MG tablet Take 1 tablet (40 mg) by mouth every evening 30 tablet 4    budesonide (PULMICORT) 0.5 MG/2ML neb solution Take 2 mLs (0.5 mg) by nebulization 2 times daily. 120 mL 11    Cholecalciferol (VITAMIN D3) 50 MCG (2000 UT) CAPS Take 50 mcg by mouth daily      formoterol (PERFOROMIST) 20 MCG/2ML neb solution Take 2 mLs (20 mcg) by nebulization every 12 hours. 120 mL 11    furosemide (LASIX) 40 MG tablet Take 40 mg by mouth daily      losartan (COZAAR) 100 MG tablet Take 100 mg by mouth daily      metFORMIN (GLUCOPHAGE XR) 500 MG 24 hr tablet Take 500 mg by mouth 2 times daily (with meals).      metoprolol succinate ER (TOPROL XL) 50 MG 24 hr tablet Take 50 mg by mouth every evening.      nystatin (NYSTOP) 297962 UNIT/GM external powder Apply topically 2 times daily as needed      triamcinolone (KENALOG) 0.1 % external ointment Apply topically 2 times daily as needed for irritation      albuterol (PROVENTIL) (2.5 MG/3ML) 0.083% neb solution Take 1 vial (2.5 mg) by nebulization every 4 hours as needed for wheezing. (Patient not taking: Reported on 3/18/2025) 90 mL 0     No current facility-administered medications for this visit.       No Known Allergies      OBJECTIVE:  BP (!) 152/84 (BP Location: Left arm, Patient Position: Sitting, Cuff Size: Adult Regular)   Pulse 66   Temp 98.2  F (36.8  C) (Tympanic)   Resp 20   LMP  (LMP Unknown)   SpO2 95%       General appearance: alert and no apparent distress  Skin color is uniform in color and without rash.  HEENT:   Conjunctiva not injected.  Sclera  clear.  Left TM is normal: no effusions, no erythema, and normal landmarks.  Right TM is normal: no effusions, no erythema, and normal landmarks.  Nasal mucosa is clear  Oropharyngeal exam is normal: no lesions, erythema, adenopathy or exudate.  NECK: Trachea is midline. No JVD. Neck is supple, FROM with no adenopathy  CARDIAC:NORMAL - regular rate and rhythm without murmur.  RESP: No increased work of breathing at rest--able to speak full sentences without pause. Positive for scattered rhonchi and scattered wheezes.  Still moving air well into all listening areas today.   NEURO: Alert and oriented.  Normal speech and mentation.  CN II/XII grossly intact.  Gait within normal limits.    LE: No LE asymmetry or edema     CHART REVIEW:     Lab Results   Component Value Date    A1C 8.7 12/22/2024    A1C 7.7 07/07/2024    A1C 9.0 10/23/2023    A1C 7.8 05/11/2023       Please see pulmonology note on file in epic dated 2/19/2025 (blue text below) That includes the below assessment and recommendations.       Assessment and Recommendations:      Pulmonary problem list:  Probable asthma-COPD overlap with frequent exacerbations  Severe MAGALIS/OHS  Possible pulmonary nodule  HFpEF  PH  CVA  Dementia  Poorly controlled T2DM     Clinically asthma-COPD overlap supported by steroid responsiveness, peripheral eosinophilia, and family history. However, there are other contributing factors including HFpEF, MAGALIS/OHS. She is unable to perform PFTs due to dementia. CT without obvious emphysematous changes but possible air trapping vs pulmonary edema. She has had 5 exacerbations in the last year requiring hospitalization. I suspect her dementia limits her ability to take symbicort, thus we will switch to nebs and consider a biologic such as dupixent at next visit as she has eosinophilia. Also discussed code status with son and provided POLST, son does not think she would want intubation but wants to discuss more with family. Vaccinations are  [facility].     Recommendations:  Stop Symbicort  Start budesonide and formoterol nebs BID  Continue albuterol nebs PRN  RTC in 3 months  Consider dupilumab    CHEST X-RAY: The option of chest ray is discussed/offered, but patient and son elected to forego x-ray today in favor of treatment and will follow-up if any worsening or non-resolution after treatment provided here today.

## 2025-03-28 ENCOUNTER — OFFICE VISIT (OUTPATIENT)
Dept: PULMONOLOGY | Facility: CLINIC | Age: 75
End: 2025-03-28
Attending: STUDENT IN AN ORGANIZED HEALTH CARE EDUCATION/TRAINING PROGRAM
Payer: COMMERCIAL

## 2025-03-28 VITALS — OXYGEN SATURATION: 96 % | HEART RATE: 80 BPM | SYSTOLIC BLOOD PRESSURE: 113 MMHG | DIASTOLIC BLOOD PRESSURE: 79 MMHG

## 2025-03-28 DIAGNOSIS — J44.89 ASTHMA-COPD OVERLAP SYNDROME (H): Primary | ICD-10-CM

## 2025-03-28 PROCEDURE — 99204 OFFICE O/P NEW MOD 45 MIN: CPT | Mod: GC | Performed by: STUDENT IN AN ORGANIZED HEALTH CARE EDUCATION/TRAINING PROGRAM

## 2025-03-28 PROCEDURE — 3074F SYST BP LT 130 MM HG: CPT | Performed by: STUDENT IN AN ORGANIZED HEALTH CARE EDUCATION/TRAINING PROGRAM

## 2025-03-28 PROCEDURE — 3078F DIAST BP <80 MM HG: CPT | Performed by: STUDENT IN AN ORGANIZED HEALTH CARE EDUCATION/TRAINING PROGRAM

## 2025-03-28 PROCEDURE — 1126F AMNT PAIN NOTED NONE PRSNT: CPT | Performed by: STUDENT IN AN ORGANIZED HEALTH CARE EDUCATION/TRAINING PROGRAM

## 2025-03-28 PROCEDURE — G0463 HOSPITAL OUTPT CLINIC VISIT: HCPCS | Performed by: STUDENT IN AN ORGANIZED HEALTH CARE EDUCATION/TRAINING PROGRAM

## 2025-03-28 RX ORDER — DOXYCYCLINE 100 MG/1
100 CAPSULE ORAL 2 TIMES DAILY
Qty: 14 CAPSULE | Refills: 3 | Status: SHIPPED | OUTPATIENT
Start: 2025-03-28 | End: 2025-04-25

## 2025-03-28 RX ORDER — AZITHROMYCIN 250 MG/1
250 TABLET, FILM COATED ORAL DAILY
Qty: 90 TABLET | Refills: 0 | Status: SHIPPED | OUTPATIENT
Start: 2025-03-28

## 2025-03-28 RX ORDER — PREDNISONE 10 MG/1
TABLET ORAL
Qty: 20 TABLET | Refills: 3 | Status: SHIPPED | OUTPATIENT
Start: 2025-03-28

## 2025-03-28 ASSESSMENT — PAIN SCALES - GENERAL: PAINLEVEL_OUTOF10: NO PAIN (0)
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
## 2025-03-28 NOTE — PATIENT INSTRUCTIONS
Thank you for coming to pulmonary clinic today! We have the following recommendations:    - We will give a prescription for prednisone and doxycycline for if you are having an exacerbation with increased cough or mucus production.  - Keep using your nebulizers as you have been doing  - We will start a new oral antibiotic to take every day called azithromycin. This can help prevent exacerbations.    We will see you back with Dr. Castañeda as previously scheduled.
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
## 2025-03-28 NOTE — NURSING NOTE
Chief Complaint   Patient presents with    return asthma     Medications reviewed and vital signs taken.   Forrest Oliva, EMT

## 2025-04-28 ENCOUNTER — VIRTUAL VISIT (OUTPATIENT)
Dept: PHARMACY | Facility: CLINIC | Age: 75
End: 2025-04-28
Payer: COMMERCIAL

## 2025-04-28 DIAGNOSIS — Z78.9 TAKES DIETARY SUPPLEMENTS: ICD-10-CM

## 2025-04-28 DIAGNOSIS — I10 ESSENTIAL HYPERTENSION: ICD-10-CM

## 2025-04-28 DIAGNOSIS — J44.1 COPD WITH ACUTE EXACERBATION (H): Primary | ICD-10-CM

## 2025-04-28 DIAGNOSIS — I67.9 CEREBROVASCULAR DISEASE: ICD-10-CM

## 2025-04-28 DIAGNOSIS — E11.9 TYPE 2 DIABETES MELLITUS WITHOUT COMPLICATION, WITHOUT LONG-TERM CURRENT USE OF INSULIN (H): ICD-10-CM

## 2025-04-28 PROCEDURE — 99607 MTMS BY PHARM ADDL 15 MIN: CPT | Mod: 93

## 2025-04-28 PROCEDURE — 99605 MTMS BY PHARM NP 15 MIN: CPT | Mod: 93

## 2025-04-28 RX ORDER — PROCHLORPERAZINE 25 MG/1
SUPPOSITORY RECTAL
COMMUNITY
Start: 2025-03-19

## 2025-04-28 RX ORDER — PROCHLORPERAZINE 25 MG/1
SUPPOSITORY RECTAL
COMMUNITY
Start: 2025-03-21

## 2025-04-28 RX ORDER — CARBOXYMETHYLCELLULOSE SODIUM 5 MG/ML
1 SOLUTION/ DROPS OPHTHALMIC DAILY PRN
COMMUNITY
Start: 2025-02-11

## 2025-04-28 NOTE — LETTER
_  Medication List        Prepared on: Apr 28, 2025     Bring your Medication List when you go to the doctor, hospital, or   emergency room. And, share it with your family or caregivers.     Note any changes to how you take your medications.  Cross out medications when you no longer use them.    Medication How I take it Why I use it Prescriber   acetaminophen (TYLENOL) 500 MG tablet Take 500-1,000 mg by mouth every 8 hours as needed for mild pain  Mild Pain  Chela Griffith    albuterol (PROAIR HFA/PROVENTIL HFA/VENTOLIN HFA) 108 (90 Base) MCG/ACT inhaler Inhale 2 puffs into the lungs every 4 hours as needed for wheezing  Shortness of Breath Efe Mckay MD   albuterol (PROVENTIL) (2.5 MG/3ML) 0.083% neb solution Take 1 vial (2.5 mg) by nebulization every 4 hours as needed for shortness of breath, wheezing or cough. Wheezing Kristal Trinidad PA-C   amLODIPine (NORVASC) 5 MG tablet Take 5 mg by mouth daily  Hypertension  Chela Griffith    aspirin (ASA) 81 MG EC tablet Take 1 tablet (81 mg) by mouth daily Right-sided nontraumatic intracerebral hemorrhage, unspecified cerebral location (H) Austin Ferrara MD   atorvastatin (LIPITOR) 40 MG tablet Take 1 tablet (40 mg) by mouth every evening Right-sided nontraumatic intracerebral hemorrhage, unspecified cerebral location (H) Austin Ferrara MD   azithromycin (ZITHROMAX) 250 MG tablet Take 1 tablet (250 mg) by mouth daily. Asthma-COPD overlap syndrome (H) Jermaine Hernandez MD   budesonide (PULMICORT) 0.5 MG/2ML neb solution Take 2 mLs (0.5 mg) by nebulization 2 times daily. Asthma-COPD overlap syndrome (H) Sadiq Castañeda MD   carboxymethylcellulose (REFRESH PLUS) 0.5 % SOLN ophthalmic solution Place 1 drop into both eyes daily as needed for dry eyes.  Dry eye  Chela Griffith    Cholecalciferol (VITAMIN D3) 50 MCG (2000 UT) CAPS Take 50 mcg by mouth daily  General Health  Chela Griffith    Continuous Glucose Sensor  (DEXCOM G6 SENSOR) MISC APPLY 1 SENSOR TO SKIN EVERY 10 DAYS  Diabetes Type II supplies  Chela Nicole Griffith    Continuous Glucose Transmitter (DEXCOM G6 TRANSMITTER) MISC USE AS DIRECTED. CHANGE EVERY 3 MONTHS  Diabetes Type II supplies  Chela Griffith    formoterol (PERFOROMIST) 20 MCG/2ML neb solution Take 2 mLs (20 mcg) by nebulization every 12 hours. Asthma-COPD overlap syndrome (H) Sadiq Castañeda MD   furosemide (LASIX) 40 MG tablet Take 40 mg by mouth daily  Hypertension  Armando Osorio MD   losartan (COZAAR) 100 MG tablet Take 100 mg by mouth daily  Hypertension  Chela Griffith    metFORMIN (GLUCOPHAGE XR) 500 MG 24 hr tablet Take 500 mg by mouth 2 times daily (with meals).  Diabetes Type II  Chela Griffith    metoprolol succinate ER (TOPROL XL) 50 MG 24 hr tablet Take 50 mg by mouth every evening.  Hypertension/CAD Chela Griffith    nystatin (NYSTOP) 782379 UNIT/GM external powder Apply topically 2 times daily as needed  Rash/Itching Chela Griffith    polyethylene glycol-propylene glycol (SYSTANE ULTRA) 0.4-0.3 % SOLN ophthalmic solution Place 1 drop into both eyes nightly as needed for dry eyes.  Dry eye  Jermaine Hernandez MD   predniSONE (DELTASONE) 10 MG tablet 4 tabs for 2 days, 3 tabs for 2 days, 2 tabs for 2 days, 1 tab for 2 days Asthma-COPD overlap syndrome (H) Jermaine Hernandez MD   triamcinolone (KENALOG) 0.1 % external ointment Apply topically 2 times daily as needed for irritation  Rash/Itching  Jermaine Hernandez MD         Add new medications, over-the-counter drugs, herbals, vitamins, or  minerals in the blank rows below.    Medication How I take it Why I use it Prescriber                                      Allergies:      No Known Allergies        Side effects I have had:      No Known Side Effects        Other Information:              My notes and questions:

## 2025-04-28 NOTE — LETTER
April 29, 2025  Pilo Quintana  [address] [place] [address]    Dear Ms. Quintana, [facility]     Thank you for talking with me on Apr 28, 2025 about your health and medications. As a follow-up to our conversation, I have included two documents:      Your Recommended To-Do List has steps you should take to get the best results from your medications.  Your Medication List will help you keep track of your medications and how to take them.    If you want to talk about these documents, please call FELIPE PEDROZA RPH at phone: [phone], Monday-Friday 8-4:30pm.    I look forward to working with you and your doctors to make sure your medications work well for you.    Sincerely,  FELIPE PEDROZA RPH  [facility] [place] Pharmacist, [facility]

## 2025-04-28 NOTE — LETTER
"Recommended To-Do List      Prepared on: Apr 28, 2025       You can get the best results from your medications by completing the items on this \"To-Do List.\"      Bring your To-Do List when you go to your doctor. And, share it with your family or caregivers.    My To-Do List:  What we talked about: What I should do:    What my medicines are for, how to know if my medicines are working, made sure my medicines are safe for me and reviewed how to take my medicines.      Take my medicines every day               "

## 2025-04-28 NOTE — PROGRESS NOTES
Medication Therapy Management (MTM) Encounter    ASSESSMENT:                            Medication Adherence/Access: No issues identified.    COPD with acute exacerbation (H): Patient is following pulmonology at the [facility]. Last year, patient was hospitalized 8 times for asthma/COPD exacerbation; however, this year she has not been hospitalized for the same issue.  Recently, patient has an Urgent care visit for cough/shortness of breath, and was prescribed medications. Per patient current medications are helping. Patient could benefit from using Trelegy, however, MTM can not make medications for patient's PCP is at the [facility]. Advised patient to communicate with PCP/we defer treatment to PCP.      Type 2 diabetes mellitus without complication, without long-term current use of insulin (H): A1C above goal of < 8%; Due to recent COPD/asthma exacerbation patient is taking prednisone, which is increasing his blood sugar. Patient could benefit from other oral, and injectable medications; Unable to make changes considering patient's PCP is with [facility]. Patient will have a visit with PCP soon. We defer treatment to PCP.     Essential hypertension: In clinic and home BP within goal of < 130/80.   Controlled. Home and clinic blood pressure at goal of <140/90 mmHg. Continue taking current medications as prescribed.     Cerebrovascular disease: Stable on aspirin and statin as indicated.    Dietary Supplements:Continue taking current dietary supplements     PLAN:                            Please communicate with your PCP about ordering triamcinolone   Please also communicate with your PCP about ordering diabetes type medications such as insulin, GLP- 1 agonists and other oral medications     Follow-up: Due on when needed by patient, provider or insurance     SUBJECTIVE/OBJECTIVE:                          Pilo Quintana is a 74 year old female seen for a initial visit.  The visit was done with the help  of interpretor, Mr Buchanan     Reason for visit: Medication Review Initial     Allergies/ADRs: Reviewed in chart  Past Medical History: Reviewed in chart  Tobacco: She reports that she has never smoked. She has never used smokeless tobacco.  Alcohol: none  Immunizations: due for RSV vaccine    Medication Adherence/Access: patients son helps with medication administration      COPD/Asthma:   Pulmicort 0.5/2 ml neb solution 0.5 mg 2 times daily  Formoterol 20 mg/2 ml neb solution 20 mcg every 12 hours   Duoneb every 4 hours scheduled currently  Albuterol inhaler as needed  Albuterol nebs as needed  Prednisone taper - on 10 mg twice daily currently - she started it two days ago due to cough - patient has refills for this medication   Azithromycin 250 mg daily  Patient is following with pulmonologist at [facility]  Reportedly unclear if she has COPD versus asthma  Had PFTs in November 2023 that were non-diagnostic due to language barrier and uncertain effort  She has been hospitalized for cough/shortness of breath/COPD exacerbations 8 times in 2024  Patient is having some COPD/Asthma exacerbations, and visited Urgent care, and was ordered azithromycin and prednisone. She is doing better now.      Diabetes Type 2 Diabetes  Metformin  mg twice daily   Novolog before meals based on sliding scale - they got this from the hospital    Aspirin 81 mg daily   Patient's blood sugars increased due to patient started taking prednisone   Current diabetes symptoms: none  Blood sugar monitoring: CGM sensor   Current CGM readings: 250  Eye exam in the last 12 months? No  Foot exam: due    Mild Pain:   Acetaminophen 500 mg and takes 1 - 2 tablets every 8 hours as for mild pain   Patient noted she does not use it that often as she does not have pain     Hypertension   Amlodipine 5 mg daily  Losartan 100 mg daily  Furosemide 40 mg daily  Metoprolol XL 50 mg daily  Patient's son noted she has a little of ankle swelling  Feels  regimen is working well for her  Patient's son monitors her blood pressures at home  Average reading has been:  117- 123/78 - 83  BP Readings from Last 3 Encounters:   03/28/25 113/79   03/18/25 (!) 152/84   02/19/25 (!) 143/88      Prior CVA:  Aspirin 81 mg daily  Atorvastatin 40 mg daily   Patient noted she feels a bit of a muscle pain, but it goes away fast  Pt had CVA in 1992 and 2023  Has hemiplegia from this and some memory loss  Has been on aspirin and statin since then  Denies abnormal bleeding/bruising    Dietary Supplements:   Vitamin D3 50 mcg (2000 international unit(s)) daily        Today's Vitals: LMP  (LMP Unknown)   ----------------      I spent 54 minutes with this patient today. All changes were made via collaborative practice agreement with Chela Griffith MD.     A summary of these recommendations was declined by the patient.      Telemedicine Visit Details  The patient's medications can be safely assessed via a telemedicine encounter.  Type of service:  Telephone visit  Originating Location (pt. Location): Home    Distant Location (provider location):  On-site  Start Time:  2:50 PM   End Time:   3:34 PM     Medication Therapy Recommendations  No medication therapy recommendations to display     Isa Dacosta, PharmD     Medication Therapy Management (MTM) Pharmacist     [phone]     [email or web address]

## 2025-06-03 ENCOUNTER — TELEPHONE (OUTPATIENT)
Dept: PULMONOLOGY | Facility: CLINIC | Age: 75
End: 2025-06-03
Payer: COMMERCIAL

## 2025-06-03 NOTE — TELEPHONE ENCOUNTER
Left Voicemail with Family Member (1st Attempt) for the patient to call back and schedule the following:    Appointment type: Reschedule Return  Provider: Fellow  Return date: 6/25/2025 to next avail  Specialty phone number: [phone]  Additional appointment(s) needed: na  Additonal Notes: Dr Castañeda completing fellowship. RS with another fellow

## 2025-06-04 ENCOUNTER — TELEPHONE (OUTPATIENT)
Dept: PULMONOLOGY | Facility: CLINIC | Age: 75
End: 2025-06-04
Payer: COMMERCIAL

## 2025-06-04 NOTE — TELEPHONE ENCOUNTER
Patient Contacted for the patient to call back and schedule the following:    Appointment type: RTN ASTHMA  Provider: ROSAMARIA  Return date: 7/11/2025  Specialty phone number: [phone] OPT 1  Additional appointment(s) needed: N/A  Additonal Notes: RESCHEDULED FROM HANNA FRIEDMAN

## 2025-06-22 ENCOUNTER — APPOINTMENT (OUTPATIENT)
Dept: CT IMAGING | Facility: CLINIC | Age: 75
End: 2025-06-22
Attending: EMERGENCY MEDICINE
Payer: COMMERCIAL

## 2025-06-22 ENCOUNTER — HOSPITAL ENCOUNTER (EMERGENCY)
Facility: CLINIC | Age: 75
Discharge: HOME | End: 2025-06-22
Attending: EMERGENCY MEDICINE | Admitting: EMERGENCY MEDICINE
Payer: COMMERCIAL

## 2025-06-22 VITALS
RESPIRATION RATE: 16 BRPM | OXYGEN SATURATION: 95 % | SYSTOLIC BLOOD PRESSURE: 144 MMHG | TEMPERATURE: 99.1 F | DIASTOLIC BLOOD PRESSURE: 92 MMHG | HEART RATE: 85 BPM

## 2025-06-22 DIAGNOSIS — M54.50 ACUTE MIDLINE LOW BACK PAIN WITHOUT SCIATICA: ICD-10-CM
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
## 2025-06-22 DIAGNOSIS — S09.90XA INJURY OF HEAD, INITIAL ENCOUNTER: ICD-10-CM

## 2025-06-22 PROCEDURE — 72131 CT LUMBAR SPINE W/O DYE: CPT

## 2025-06-22 PROCEDURE — 72125 CT NECK SPINE W/O DYE: CPT

## 2025-06-22 PROCEDURE — 99291 CRITICAL CARE FIRST HOUR: CPT | Mod: 25

## 2025-06-22 PROCEDURE — 70450 CT HEAD/BRAIN W/O DYE: CPT

## 2025-06-22 ASSESSMENT — ACTIVITIES OF DAILY LIVING (ADL)
ADLS_ACUITY_SCORE: 61
ADLS_ACUITY_SCORE: 61

## 2025-06-22 NOTE — ED PROVIDER NOTES
Emergency Department Note      History of Present Illness     Chief Complaint   Fall    Son translated for patient.   HPI   Pilo Quintana is a 75 year old female with a history of diabetes, COPD, hypertension, hyperparathyroidism, and chronic heart failure who presents with her son to the ED for a fall. The patient's son reports that the patient had a fall while showering. The patient uses a chair that she can sit in the shower with, but while getting out, the patient was using a walker and fell backwards. The patient's son reports that he does not know if patient hit her head. Following the fall, the patient was able to get around and walk using the walker, ate, took her medications, and came in to the ED. The patient's son notes that she's weaker today and not talking as much as normal but is not too out of the norm. The patient's son also notes that the patient took an aspirin this morning. The patient's son denies the patient's endorsing of back pain, buttocks pain, or loss of consciousness.     Independent Historian   Son as detailed above.    Review of External Notes   None    Past Medical History     Medical History and Problem List   Diabetes    Hypertension   Morbid obesity   Osteoarthritis   PMB (postmenopausal bleeding)   Renal mass   Thickened endometrium   Uncomplicated asthma   Unspecified cerebral artery occlusion with cerebral infarction  Hyperlipidemia  Memory loss  Hyperparathyroidism  Chronic heart failure    Medications   Albuterol  Aspirin 81mg  Amlodipine  Lipitor  Azithromycin  Perforomist  Lasix  Losartan  Metformin  Metoprolol  Nystatin  Prednisone  Zocor  Symbicort  Insulin    Surgical History   Dilation and Curettage   Hysteroscopy    Physical Exam     Patient Vitals for the past 24 hrs:   BP Temp Pulse Resp SpO2   06/22/25 1415 (!) 144/92 -- 85 16 95 %   06/22/25 1159 (!) 151/81 99.1  F (37.3  C) 88 16 94 %     Physical Exam  Nursing note and vitals reviewed.  HENT:   Mouth/Throat:  Oropharynx is clear and moist.   Eyes: Conjunctivae and EOM are normal. Pupils are equal, round, and reactive to light.   Cardiovascular: Normal rate, regular rhythm and normal heart sounds.    Pulmonary/Chest: Effort normal and breath sounds normal.   Abdominal: Soft. Bowel sounds are normal.   Musculoskeletal: The patient exhibits no edema. Some mild midline L spine tenderness.   Lymphadenopathy:    The patient has no cervical adenopathy.   Neurological: The patient is alert.        No facial droop or focal extremity weakness.   Skin: Skin is warm and dry. No rash noted.   Psychiatric: The patient has a normal mood and affect. The patient's behavior is normal.      Diagnostics     Lab Results   Labs Ordered and Resulted from Time of ED Arrival to Time of ED Departure - No data to display    Imaging   CT Lumbar Spine w/o Contrast   Final Result   IMPRESSION:   HEAD CT:   1.  No CT evidence for acute intracranial process.   2.  Brain atrophy and presumed chronic microvascular ischemic changes as above.   3.  Mild disproportionate ventriculomegaly with effacement of cerebral sulci at the vertex. This may represent normal pressure hydrocephalus in the appropriate clinical setting.      CERVICAL SPINE CT:   1.  No fracture or posttraumatic subluxation.   2.  No high-grade spinal canal or neural foraminal stenosis.      LUMBAR SPINE CT:   1.  No fracture or posttraumatic subluxation.   2.  No high-grade spinal canal or neural foraminal stenosis.         CT Cervical Spine w/o Contrast   Final Result   IMPRESSION:   HEAD CT:   1.  No CT evidence for acute intracranial process.   2.  Brain atrophy and presumed chronic microvascular ischemic changes as above.   3.  Mild disproportionate ventriculomegaly with effacement of cerebral sulci at the vertex. This may represent normal pressure hydrocephalus in the appropriate clinical setting.      CERVICAL SPINE CT:   1.  No fracture or posttraumatic subluxation.   2.  No  high-grade spinal canal or neural foraminal stenosis.      LUMBAR SPINE CT:   1.  No fracture or posttraumatic subluxation.   2.  No high-grade spinal canal or neural foraminal stenosis.         CT Head w/o Contrast   Final Result   IMPRESSION:   HEAD CT:   1.  No CT evidence for acute intracranial process.   2.  Brain atrophy and presumed chronic microvascular ischemic changes as above.   3.  Mild disproportionate ventriculomegaly with effacement of cerebral sulci at the vertex. This may represent normal pressure hydrocephalus in the appropriate clinical setting.      CERVICAL SPINE CT:   1.  No fracture or posttraumatic subluxation.   2.  No high-grade spinal canal or neural foraminal stenosis.      LUMBAR SPINE CT:   1.  No fracture or posttraumatic subluxation.   2.  No high-grade spinal canal or neural foraminal stenosis.             Independent Interpretation       ED Course      Medications Administered   Medications - No data to display    Procedures   Procedures     Discussion of Management   None    ED Course   ED Course as of 06/22/25 1834   Sun Jun 22, 2025   1217 I obtained history and examined the patient as noted above.        Additional Documentation  None    Medical Decision Making / Diagnosis     CMS Diagnoses: None    MIPS   CT/MRI of the lumbar spine was obtained because of risk factors for fracture (minor trauma in elderly/osteoporosis).         ROBBY Quintana is a 75 year old female presenting to the emergency department after a mechanical fall.  This was witnessed by the patient's son.  Here she is overall well-appearing with no obvious external signs of trauma.  Given her age and the mechanism though CT scan of the head and neck was obtained.  This showed no acute intracranial or cervical findings.  The patient did also have some mild midline lower back tenderness.  Because of this a CT of the lumbar spine was done which fortunately showed no evidence of acute fracture at this time.   Feel at this point that the patient is stable for discharge home with both she and her son are in agreement with.  All their questions were addressed and answered.    Disposition   The patient was discharged.     Diagnosis     ICD-10-CM    1. Fall, initial encounter  W19.XXXA       2. Injury of head, initial encounter  S09.90XA       3. Acute midline low back pain without sciatica  M54.50            Discharge Medications   Discharge Medication List as of 6/22/2025  2:13 PM            Scribe Disclosure:  I, Balwinder Logan, am serving as a scribe at 12:25 PM on 6/22/2025 to document services personally performed by Marcio Dejesus DO based on my observations and the provider's statements to me.        Marcio Dejesus DO  06/22/25 5355

## 2025-06-22 NOTE — DISCHARGE INSTRUCTIONS
Here the CAT scan showed no evidence of any broken bones or bleeding in the brain.  You can continue to take Tylenol for pain.  I recommend that you follow-up with your primary care provider in the next several days to discuss your ER visit.  Return to the emergency department at any point for any new or worsening symptoms or for any other concerns.

## 2025-06-22 NOTE — ED TRIAGE NOTES
Pt presents for complaint of a fall that occurred 1.5 hours ago. Son states he was helping her stand from her chair when she tipped backwards. States she landed on a tile floor on her buttocks and then fell backwards into a wall striking her head. Son states that the patient has not been answering questions appropriate. Alert and eyes open spontaneously. Disoriented x4. Pt does not speak english and son is interpreting for the patient.      Triage Assessment (Adult)       Row Name 06/22/25 1200          Respiratory WDL    Respiratory WDL WDL        Skin Circulation/Temperature WDL    Skin Circulation/Temperature WDL WDL        Cardiac WDL    Cardiac WDL WDL        Peripheral/Neurovascular WDL    Peripheral Neurovascular WDL WDL        Cognitive/Neuro/Behavioral WDL    Cognitive/Neuro/Behavioral WDL X;orientation     Level of Consciousness confused     Orientation disoriented x 4        Pupils (CN II)    Pupil PERRLA yes     Pupil Size Left 3 mm     Pupil Size Right 3 mm        Yee Coma Scale    Best Eye Response 4-->(E4) spontaneous     Best Motor Response 6-->(M6) obeys commands     Best Verbal Response 5-->(V5) oriented     Yee Coma Scale Score 15

## 2025-06-23 ENCOUNTER — APPOINTMENT (OUTPATIENT)
Dept: GENERAL RADIOLOGY | Facility: CLINIC | Age: 75
DRG: 202 | End: 2025-06-23
Attending: EMERGENCY MEDICINE
Payer: COMMERCIAL

## 2025-06-23 ENCOUNTER — HOSPITAL ENCOUNTER (INPATIENT)
Facility: CLINIC | Age: 75
LOS: 3 days | Discharge: HOME | DRG: 202 | End: 2025-06-26
Attending: EMERGENCY MEDICINE | Admitting: INTERNAL MEDICINE
Payer: COMMERCIAL

## 2025-06-23 DIAGNOSIS — J96.01 ACUTE RESPIRATORY FAILURE WITH HYPOXIA AND HYPERCAPNIA (H): Primary | ICD-10-CM

## 2025-06-23 DIAGNOSIS — J96.02 ACUTE RESPIRATORY FAILURE WITH HYPERCAPNIA (H): ICD-10-CM

## 2025-06-23 DIAGNOSIS — J44.1 COPD WITH ACUTE EXACERBATION (H): ICD-10-CM

## 2025-06-23 DIAGNOSIS — R73.9 STEROID-INDUCED HYPERGLYCEMIA: ICD-10-CM

## 2025-06-23 DIAGNOSIS — T38.0X5A STEROID-INDUCED HYPERGLYCEMIA: ICD-10-CM

## 2025-06-23 DIAGNOSIS — J44.1 COPD EXACERBATION (H): ICD-10-CM

## 2025-06-23 DIAGNOSIS — R73.9 HYPERGLYCEMIA: ICD-10-CM

## 2025-06-23 DIAGNOSIS — J96.02 ACUTE RESPIRATORY FAILURE WITH HYPOXIA AND HYPERCAPNIA (H): Primary | ICD-10-CM

## 2025-06-23 LAB
ANION GAP SERPL CALCULATED.3IONS-SCNC: 12 MMOL/L (ref 7–15)
ATRIAL RATE - MUSE: 91 BPM
BASE EXCESS BLDV CALC-SCNC: 4.9 MMOL/L (ref -3–3)
BASOPHILS # BLD AUTO: 0 10E3/UL (ref 0–0.2)
BASOPHILS NFR BLD AUTO: 0 %
BUN SERPL-MCNC: 18.5 MG/DL (ref 8–23)
CALCIUM SERPL-MCNC: 9.8 MG/DL (ref 8.8–10.4)
CHLORIDE SERPL-SCNC: 102 MMOL/L (ref 98–107)
CREAT SERPL-MCNC: 0.77 MG/DL (ref 0.51–0.95)
DIASTOLIC BLOOD PRESSURE - MUSE: NORMAL MMHG
EGFRCR SERPLBLD CKD-EPI 2021: 80 ML/MIN/1.73M2
EOSINOPHIL # BLD AUTO: 0.4 10E3/UL (ref 0–0.7)
EOSINOPHIL NFR BLD AUTO: 5 %
ERYTHROCYTE [DISTWIDTH] IN BLOOD BY AUTOMATED COUNT: 14.3 % (ref 10–15)
EST. AVERAGE GLUCOSE BLD GHB EST-MCNC: 163 MG/DL
FLUAV RNA SPEC QL NAA+PROBE: NEGATIVE
FLUBV RNA RESP QL NAA+PROBE: NEGATIVE
GLUCOSE BLDC GLUCOMTR-MCNC: 236 MG/DL (ref 70–99)
GLUCOSE BLDC GLUCOMTR-MCNC: 293 MG/DL (ref 70–99)
GLUCOSE SERPL-MCNC: 175 MG/DL (ref 70–99)
HBA1C MFR BLD: 7.3 %
HCO3 BLDV-SCNC: 29 MMOL/L (ref 21–28)
HCO3 BLDV-SCNC: 33 MMOL/L (ref 21–28)
HCO3 SERPL-SCNC: 29 MMOL/L (ref 22–29)
HCT VFR BLD AUTO: 37.8 % (ref 35–47)
HGB BLD-MCNC: 12 G/DL (ref 11.7–15.7)
HOLD SPECIMEN: NORMAL
IMM GRANULOCYTES # BLD: 0 10E3/UL
IMM GRANULOCYTES NFR BLD: 0 %
INTERPRETATION ECG - MUSE: NORMAL
LACTATE BLD-SCNC: 1.9 MMOL/L (ref 0.7–2)
LACTATE SERPL-SCNC: 1.9 MMOL/L (ref 0.7–2)
LYMPHOCYTES # BLD AUTO: 1.9 10E3/UL (ref 0.8–5.3)
LYMPHOCYTES NFR BLD AUTO: 24 %
MAGNESIUM SERPL-MCNC: 2.1 MG/DL (ref 1.7–2.3)
MCH RBC QN AUTO: 28.2 PG (ref 26.5–33)
MCHC RBC AUTO-ENTMCNC: 31.7 G/DL (ref 31.5–36.5)
MCV RBC AUTO: 89 FL (ref 78–100)
MONOCYTES # BLD AUTO: 0.3 10E3/UL (ref 0–1.3)
MONOCYTES NFR BLD AUTO: 4 %
NEUTROPHILS # BLD AUTO: 5.1 10E3/UL (ref 1.6–8.3)
NEUTROPHILS NFR BLD AUTO: 66 %
NRBC # BLD AUTO: 0 10E3/UL
NRBC BLD AUTO-RTO: 0 /100
NT-PROBNP SERPL-MCNC: 263 PG/ML (ref 0–624)
O2/TOTAL GAS SETTING VFR VENT: 25 %
OXYHGB MFR BLDV: 89 % (ref 70–75)
P AXIS - MUSE: 37 DEGREES
PCO2 BLDV: 38 MM HG (ref 40–50)
PCO2 BLDV: 60 MM HG (ref 40–50)
PH BLDV: 7.34 [PH] (ref 7.32–7.43)
PH BLDV: 7.49 [PH] (ref 7.32–7.43)
PHOSPHATE SERPL-MCNC: 3.3 MG/DL (ref 2.5–4.5)
PLATELET # BLD AUTO: 251 10E3/UL (ref 150–450)
PO2 BLDV: 27 MM HG (ref 25–47)
PO2 BLDV: 88 MM HG (ref 25–47)
POTASSIUM SERPL-SCNC: 4.6 MMOL/L (ref 3.4–5.3)
PR INTERVAL - MUSE: 140 MS
QRS DURATION - MUSE: 78 MS
QT - MUSE: 356 MS
QTC - MUSE: 437 MS
R AXIS - MUSE: -38 DEGREES
RBC # BLD AUTO: 4.26 10E6/UL (ref 3.8–5.2)
RSV RNA SPEC NAA+PROBE: NEGATIVE
SAO2 % BLDV: 45 % (ref 70–75)
SAO2 % BLDV: 90.5 % (ref 70–75)
SARS-COV-2 RNA RESP QL NAA+PROBE: NEGATIVE
SODIUM SERPL-SCNC: 143 MMOL/L (ref 135–145)
SYSTOLIC BLOOD PRESSURE - MUSE: NORMAL MMHG
T AXIS - MUSE: 29 DEGREES
TROPONIN T SERPL HS-MCNC: 14 NG/L
TROPONIN T SERPL HS-MCNC: 15 NG/L
VENTRICULAR RATE- MUSE: 91 BPM
WBC # BLD AUTO: 7.7 10E3/UL (ref 4–11)

## 2025-06-23 PROCEDURE — 80048 BASIC METABOLIC PNL TOTAL CA: CPT | Performed by: EMERGENCY MEDICINE

## 2025-06-23 PROCEDURE — 85004 AUTOMATED DIFF WBC COUNT: CPT | Performed by: EMERGENCY MEDICINE

## 2025-06-23 PROCEDURE — 36415 COLL VENOUS BLD VENIPUNCTURE: CPT | Performed by: INTERNAL MEDICINE

## 2025-06-23 PROCEDURE — 250N000009 HC RX 250: Performed by: INTERNAL MEDICINE

## 2025-06-23 PROCEDURE — 99292 CRITICAL CARE ADDL 30 MIN: CPT

## 2025-06-23 PROCEDURE — 84100 ASSAY OF PHOSPHORUS: CPT | Performed by: INTERNAL MEDICINE

## 2025-06-23 PROCEDURE — 94660 CPAP INITIATION&MGMT: CPT

## 2025-06-23 PROCEDURE — 36415 COLL VENOUS BLD VENIPUNCTURE: CPT | Performed by: EMERGENCY MEDICINE

## 2025-06-23 PROCEDURE — 250N000011 HC RX IP 250 OP 636: Performed by: EMERGENCY MEDICINE

## 2025-06-23 PROCEDURE — 82803 BLOOD GASES ANY COMBINATION: CPT

## 2025-06-23 PROCEDURE — 71045 X-RAY EXAM CHEST 1 VIEW: CPT

## 2025-06-23 PROCEDURE — 93005 ELECTROCARDIOGRAM TRACING: CPT

## 2025-06-23 PROCEDURE — 999N000157 HC STATISTIC RCP TIME EA 10 MIN

## 2025-06-23 PROCEDURE — 250N000013 HC RX MED GY IP 250 OP 250 PS 637: Performed by: INTERNAL MEDICINE

## 2025-06-23 PROCEDURE — 83605 ASSAY OF LACTIC ACID: CPT | Performed by: EMERGENCY MEDICINE

## 2025-06-23 PROCEDURE — 258N000003 HC RX IP 258 OP 636: Performed by: EMERGENCY MEDICINE

## 2025-06-23 PROCEDURE — 96365 THER/PROPH/DIAG IV INF INIT: CPT

## 2025-06-23 PROCEDURE — 99291 CRITICAL CARE FIRST HOUR: CPT | Mod: 25

## 2025-06-23 PROCEDURE — 82805 BLOOD GASES W/O2 SATURATION: CPT | Performed by: INTERNAL MEDICINE

## 2025-06-23 PROCEDURE — 250N000011 HC RX IP 250 OP 636: Mod: JZ | Performed by: INTERNAL MEDICINE

## 2025-06-23 PROCEDURE — 87637 SARSCOV2&INF A&B&RSV AMP PRB: CPT | Performed by: EMERGENCY MEDICINE

## 2025-06-23 PROCEDURE — 250N000012 HC RX MED GY IP 250 OP 636 PS 637: Performed by: INTERNAL MEDICINE

## 2025-06-23 PROCEDURE — 250N000009 HC RX 250: Performed by: EMERGENCY MEDICINE

## 2025-06-23 PROCEDURE — 200N000001 HC R&B ICU

## 2025-06-23 PROCEDURE — 99222 1ST HOSP IP/OBS MODERATE 55: CPT | Performed by: INTERNAL MEDICINE

## 2025-06-23 PROCEDURE — 5A09357 ASSISTANCE WITH RESPIRATORY VENTILATION, LESS THAN 24 CONSECUTIVE HOURS, CONTINUOUS POSITIVE AIRWAY PRESSURE: ICD-10-PCS | Performed by: EMERGENCY MEDICINE

## 2025-06-23 PROCEDURE — 83880 ASSAY OF NATRIURETIC PEPTIDE: CPT | Performed by: EMERGENCY MEDICINE

## 2025-06-23 PROCEDURE — 96375 TX/PRO/DX INJ NEW DRUG ADDON: CPT

## 2025-06-23 PROCEDURE — 83036 HEMOGLOBIN GLYCOSYLATED A1C: CPT | Performed by: INTERNAL MEDICINE

## 2025-06-23 PROCEDURE — 94640 AIRWAY INHALATION TREATMENT: CPT

## 2025-06-23 PROCEDURE — 84484 ASSAY OF TROPONIN QUANT: CPT | Performed by: EMERGENCY MEDICINE

## 2025-06-23 PROCEDURE — 87040 BLOOD CULTURE FOR BACTERIA: CPT | Performed by: EMERGENCY MEDICINE

## 2025-06-23 PROCEDURE — 83735 ASSAY OF MAGNESIUM: CPT | Performed by: INTERNAL MEDICINE

## 2025-06-23 RX ORDER — DEXTROSE MONOHYDRATE 25 G/50ML
25-50 INJECTION, SOLUTION INTRAVENOUS
Status: DISCONTINUED | OUTPATIENT
Start: 2025-06-23 | End: 2025-06-23

## 2025-06-23 RX ORDER — CEFTRIAXONE 1 G/1
1 INJECTION, POWDER, FOR SOLUTION INTRAMUSCULAR; INTRAVENOUS EVERY 24 HOURS
Status: DISCONTINUED | OUTPATIENT
Start: 2025-06-24 | End: 2025-06-26 | Stop reason: HOSPADM

## 2025-06-23 RX ORDER — ALBUTEROL SULFATE 0.83 MG/ML
2.5 SOLUTION RESPIRATORY (INHALATION) EVERY 4 HOURS PRN
Status: DISCONTINUED | OUTPATIENT
Start: 2025-06-23 | End: 2025-06-26 | Stop reason: HOSPADM

## 2025-06-23 RX ORDER — FUROSEMIDE 40 MG/1
40 TABLET ORAL DAILY
Status: DISCONTINUED | OUTPATIENT
Start: 2025-06-24 | End: 2025-06-26 | Stop reason: HOSPADM

## 2025-06-23 RX ORDER — CARBOXYMETHYLCELLULOSE SODIUM 5 MG/ML
1 SOLUTION/ DROPS OPHTHALMIC DAILY PRN
Status: DISCONTINUED | OUTPATIENT
Start: 2025-06-23 | End: 2025-06-23

## 2025-06-23 RX ORDER — NALOXONE HYDROCHLORIDE 0.4 MG/ML
0.4 INJECTION, SOLUTION INTRAMUSCULAR; INTRAVENOUS; SUBCUTANEOUS
Status: DISCONTINUED | OUTPATIENT
Start: 2025-06-23 | End: 2025-06-26 | Stop reason: HOSPADM

## 2025-06-23 RX ORDER — CARBOXYMETHYLCELLULOSE SODIUM 5 MG/ML
1 SOLUTION/ DROPS OPHTHALMIC
Status: DISCONTINUED | OUTPATIENT
Start: 2025-06-23 | End: 2025-06-26 | Stop reason: HOSPADM

## 2025-06-23 RX ORDER — DEXTROSE MONOHYDRATE 25 G/50ML
25-50 INJECTION, SOLUTION INTRAVENOUS
Status: DISCONTINUED | OUTPATIENT
Start: 2025-06-23 | End: 2025-06-26 | Stop reason: HOSPADM

## 2025-06-23 RX ORDER — CEFTRIAXONE 2 G/1
2 INJECTION, POWDER, FOR SOLUTION INTRAMUSCULAR; INTRAVENOUS ONCE
Status: COMPLETED | OUTPATIENT
Start: 2025-06-23 | End: 2025-06-23

## 2025-06-23 RX ORDER — FORMOTEROL FUMARATE 20 UG/2ML
20 SOLUTION RESPIRATORY (INHALATION) EVERY 12 HOURS
Status: DISCONTINUED | OUTPATIENT
Start: 2025-06-23 | End: 2025-06-26 | Stop reason: HOSPADM

## 2025-06-23 RX ORDER — ENOXAPARIN SODIUM 100 MG/ML
40 INJECTION SUBCUTANEOUS EVERY 24 HOURS
Status: DISCONTINUED | OUTPATIENT
Start: 2025-06-23 | End: 2025-06-26 | Stop reason: HOSPADM

## 2025-06-23 RX ORDER — AMOXICILLIN 250 MG
1 CAPSULE ORAL 2 TIMES DAILY PRN
Status: DISCONTINUED | OUTPATIENT
Start: 2025-06-23 | End: 2025-06-26 | Stop reason: HOSPADM

## 2025-06-23 RX ORDER — ONDANSETRON 4 MG/1
4 TABLET, ORALLY DISINTEGRATING ORAL EVERY 6 HOURS PRN
Status: DISCONTINUED | OUTPATIENT
Start: 2025-06-23 | End: 2025-06-26 | Stop reason: HOSPADM

## 2025-06-23 RX ORDER — BISACODYL 10 MG
10 SUPPOSITORY, RECTAL RECTAL DAILY PRN
Status: DISCONTINUED | OUTPATIENT
Start: 2025-06-23 | End: 2025-06-26 | Stop reason: HOSPADM

## 2025-06-23 RX ORDER — NALOXONE HYDROCHLORIDE 0.4 MG/ML
0.2 INJECTION, SOLUTION INTRAMUSCULAR; INTRAVENOUS; SUBCUTANEOUS
Status: DISCONTINUED | OUTPATIENT
Start: 2025-06-23 | End: 2025-06-26 | Stop reason: HOSPADM

## 2025-06-23 RX ORDER — METHYLPREDNISOLONE SODIUM SUCCINATE 125 MG/2ML
125 INJECTION INTRAMUSCULAR; INTRAVENOUS ONCE
Status: COMPLETED | OUTPATIENT
Start: 2025-06-23 | End: 2025-06-23

## 2025-06-23 RX ORDER — HYDROMORPHONE HCL IN WATER/PF 6 MG/30 ML
0.2 PATIENT CONTROLLED ANALGESIA SYRINGE INTRAVENOUS
Status: DISCONTINUED | OUTPATIENT
Start: 2025-06-23 | End: 2025-06-26 | Stop reason: HOSPADM

## 2025-06-23 RX ORDER — NICOTINE POLACRILEX 4 MG
15-30 LOZENGE BUCCAL
Status: DISCONTINUED | OUTPATIENT
Start: 2025-06-23 | End: 2025-06-23

## 2025-06-23 RX ORDER — IPRATROPIUM BROMIDE AND ALBUTEROL SULFATE 2.5; .5 MG/3ML; MG/3ML
6 SOLUTION RESPIRATORY (INHALATION) ONCE
Status: COMPLETED | OUTPATIENT
Start: 2025-06-23 | End: 2025-06-23

## 2025-06-23 RX ORDER — AZITHROMYCIN 500 MG/5ML
500 INJECTION, POWDER, LYOPHILIZED, FOR SOLUTION INTRAVENOUS ONCE
Status: COMPLETED | OUTPATIENT
Start: 2025-06-23 | End: 2025-06-23

## 2025-06-23 RX ORDER — METFORMIN HYDROCHLORIDE 500 MG/1
500 TABLET, EXTENDED RELEASE ORAL 2 TIMES DAILY WITH MEALS
Status: DISCONTINUED | OUTPATIENT
Start: 2025-06-23 | End: 2025-06-26 | Stop reason: HOSPADM

## 2025-06-23 RX ORDER — AMOXICILLIN 250 MG
2 CAPSULE ORAL 2 TIMES DAILY PRN
Status: DISCONTINUED | OUTPATIENT
Start: 2025-06-23 | End: 2025-06-26 | Stop reason: HOSPADM

## 2025-06-23 RX ORDER — ACETAMINOPHEN 325 MG/10.15ML
650 LIQUID ORAL EVERY 4 HOURS PRN
Status: DISCONTINUED | OUTPATIENT
Start: 2025-06-23 | End: 2025-06-26 | Stop reason: HOSPADM

## 2025-06-23 RX ORDER — BUDESONIDE 0.5 MG/2ML
0.5 INHALANT ORAL 2 TIMES DAILY
Status: DISCONTINUED | OUTPATIENT
Start: 2025-06-23 | End: 2025-06-26 | Stop reason: HOSPADM

## 2025-06-23 RX ORDER — METHYLPREDNISOLONE SODIUM SUCCINATE 40 MG/ML
40 INJECTION INTRAMUSCULAR; INTRAVENOUS EVERY 12 HOURS
Status: DISCONTINUED | OUTPATIENT
Start: 2025-06-23 | End: 2025-06-25

## 2025-06-23 RX ORDER — AMLODIPINE BESYLATE 5 MG/1
5 TABLET ORAL DAILY
Status: DISCONTINUED | OUTPATIENT
Start: 2025-06-24 | End: 2025-06-26 | Stop reason: HOSPADM

## 2025-06-23 RX ORDER — ACETAMINOPHEN 325 MG/1
650 TABLET ORAL EVERY 4 HOURS PRN
Status: DISCONTINUED | OUTPATIENT
Start: 2025-06-23 | End: 2025-06-26 | Stop reason: HOSPADM

## 2025-06-23 RX ORDER — METOPROLOL SUCCINATE 50 MG/1
50 TABLET, EXTENDED RELEASE ORAL EVERY EVENING
Status: DISCONTINUED | OUTPATIENT
Start: 2025-06-23 | End: 2025-06-26 | Stop reason: HOSPADM

## 2025-06-23 RX ORDER — ONDANSETRON 2 MG/ML
4 INJECTION INTRAMUSCULAR; INTRAVENOUS EVERY 6 HOURS PRN
Status: DISCONTINUED | OUTPATIENT
Start: 2025-06-23 | End: 2025-06-26 | Stop reason: HOSPADM

## 2025-06-23 RX ORDER — ALBUTEROL SULFATE 90 UG/1
2 INHALANT RESPIRATORY (INHALATION) EVERY 4 HOURS PRN
Status: DISCONTINUED | OUTPATIENT
Start: 2025-06-23 | End: 2025-06-26 | Stop reason: HOSPADM

## 2025-06-23 RX ORDER — LOSARTAN POTASSIUM 100 MG/1
100 TABLET ORAL DAILY
Status: DISCONTINUED | OUTPATIENT
Start: 2025-06-23 | End: 2025-06-26 | Stop reason: HOSPADM

## 2025-06-23 RX ORDER — NICOTINE POLACRILEX 4 MG
15-30 LOZENGE BUCCAL
Status: DISCONTINUED | OUTPATIENT
Start: 2025-06-23 | End: 2025-06-26 | Stop reason: HOSPADM

## 2025-06-23 RX ORDER — AZITHROMYCIN 250 MG/1
250 TABLET, FILM COATED ORAL DAILY
Status: DISCONTINUED | OUTPATIENT
Start: 2025-06-24 | End: 2025-06-26 | Stop reason: HOSPADM

## 2025-06-23 RX ORDER — ACETAMINOPHEN 500 MG
500-1000 TABLET ORAL EVERY 8 HOURS PRN
Status: DISCONTINUED | OUTPATIENT
Start: 2025-06-23 | End: 2025-06-23

## 2025-06-23 RX ORDER — MAGNESIUM SULFATE HEPTAHYDRATE 40 MG/ML
2 INJECTION, SOLUTION INTRAVENOUS ONCE
Status: COMPLETED | OUTPATIENT
Start: 2025-06-23 | End: 2025-06-23

## 2025-06-23 RX ADMIN — ENOXAPARIN SODIUM 40 MG: 40 INJECTION SUBCUTANEOUS at 18:05

## 2025-06-23 RX ADMIN — CEFTRIAXONE 2 G: 2 INJECTION, POWDER, FOR SOLUTION INTRAMUSCULAR; INTRAVENOUS at 13:22

## 2025-06-23 RX ADMIN — MAGNESIUM SULFATE HEPTAHYDRATE 2 G: 40 INJECTION, SOLUTION INTRAVENOUS at 11:47

## 2025-06-23 RX ADMIN — IPRATROPIUM BROMIDE AND ALBUTEROL SULFATE 6 ML: .5; 3 SOLUTION RESPIRATORY (INHALATION) at 13:24

## 2025-06-23 RX ADMIN — INSULIN ASPART 4 UNITS: 100 INJECTION, SOLUTION INTRAVENOUS; SUBCUTANEOUS at 20:23

## 2025-06-23 RX ADMIN — BUDESONIDE 0.5 MG: 0.5 INHALANT RESPIRATORY (INHALATION) at 20:03

## 2025-06-23 RX ADMIN — INSULIN ASPART 3 UNITS: 100 INJECTION, SOLUTION INTRAVENOUS; SUBCUTANEOUS at 23:59

## 2025-06-23 RX ADMIN — INSULIN ASPART 2 UNITS: 100 INJECTION, SOLUTION INTRAVENOUS; SUBCUTANEOUS at 18:05

## 2025-06-23 RX ADMIN — METHYLPREDNISOLONE SODIUM SUCCINATE 125 MG: 125 INJECTION, POWDER, FOR SOLUTION INTRAMUSCULAR; INTRAVENOUS at 11:47

## 2025-06-23 RX ADMIN — AZITHROMYCIN MONOHYDRATE 500 MG: 500 INJECTION, POWDER, LYOPHILIZED, FOR SOLUTION INTRAVENOUS at 13:59

## 2025-06-23 RX ADMIN — METHYLPREDNISOLONE SODIUM SUCCINATE 40 MG: 40 INJECTION, POWDER, FOR SOLUTION INTRAMUSCULAR; INTRAVENOUS at 18:05

## 2025-06-23 RX ADMIN — METOPROLOL SUCCINATE 50 MG: 50 TABLET, EXTENDED RELEASE ORAL at 20:23

## 2025-06-23 ASSESSMENT — ACTIVITIES OF DAILY LIVING (ADL)
ADLS_ACUITY_SCORE: 52
ADLS_ACUITY_SCORE: 52
ADLS_ACUITY_SCORE: 61
ADLS_ACUITY_SCORE: 62
ADLS_ACUITY_SCORE: 61
ADLS_ACUITY_SCORE: 61
ADLS_ACUITY_SCORE: 62
ADLS_ACUITY_SCORE: 62
ADLS_ACUITY_SCORE: 50
ADLS_ACUITY_SCORE: 62

## 2025-06-23 ASSESSMENT — COLUMBIA-SUICIDE SEVERITY RATING SCALE - C-SSRS: IS THE PATIENT NOT ABLE TO COMPLETE C-SSRS: UNABLE TO VERBALIZE

## 2025-06-23 NOTE — ED TRIAGE NOTES
Pt comes via EMS from home, seen here yesterday for a fall and family states pt began having increased respiratory distress and lethargy. Given prednisone by family, RR in 40's upon EMS arrival, given 1 duoneb, 2 albuterol nebs, and started on CPAP. ABC's intact, alert.      Triage Assessment (Adult)       Row Name 06/23/25 1128          Triage Assessment    Airway WDL WDL        Respiratory WDL    Respiratory WDL X  tachypnea, expiratory wheezes, diminished t/o.        Skin Circulation/Temperature WDL    Skin Circulation/Temperature WDL WDL        Cardiac WDL    Cardiac WDL WDL        Peripheral/Neurovascular WDL    Peripheral Neurovascular WDL WDL        Cognitive/Neuro/Behavioral WDL    Cognitive/Neuro/Behavioral WDL WDL

## 2025-06-23 NOTE — ED PROVIDER NOTES
Emergency Department Note      History of Present Illness     Chief Complaint   Respiratory Distress    HPI     Pilo Quintana is a 75 year old female with a history of chronic heart failure, hypertension, diabetes, hyperlipidemia, asthma, and cerebral artery occlusion with cerebral infarction who presents via EMS in respiratory distress. EMS noted the patient was seen in the ED yesterday for a fall. She was discharged and today began developing respiratory distress. EMS adds her respiratory rate was as high as 40 respirations per minute. EMS denies hypoxia as oxygenation was in the upper 90s.  They placed her on CPAP and her oxygen saturation improved to 100%. EMS administered IM epinephrine, 1 duoneb, and 2 albuterol nebulizers. She denies chest pain.They noted expiratory wheezing. She is from [facility] and speaks [place]. Her son interprets for her.     Her son notes she had a hard time getting from her bathroom to her bedroom. He noted wheezing. She was okay overnight and he placed her CPAP on her overnight. This morning he checked on her at 0700 and she was resting. Then she developed shortness of breath and was increasingly weak. He administered Prednisone as well. He notes she started coughing starting yesterday morning. No fever. He noticed increased bilateral leg swelling yesterday evening.     Independent Historian   Son and EMS as detailed above.    Review of External Notes   I reviewed ED note from 6/22/2025 in which patient had a mechanical fall in the shower.  CT scan of the L-spine, C-spine and head were negative for acute pathology and patient was discharged home.    Past Medical History     Medical History and Problem List   Diabetes  Hypertension  Morbid obesity  Osteoarthritis  PMB  Renal mass  Thickened endometrium   Uncomplicated asthma  Unspecified cerebral artery occlusion with cerebral infarction     Medications   No current outpatient medications on file.    Surgical History   Dilation  Past  Surgical History:   Procedure Laterality Date    DILATION AND CURETTAGE, OPERATIVE HYSTEROSCOPY WITH MORCELLATOR, COMBINED N/A 9/25/2014    Procedure: COMBINED DILATION AND CURETTAGE, OPERATIVE HYSTEROSCOPY WITH MORCELLATOR;  Surgeon: Silverio Christy MD;  Location:  OR     Physical Exam     Patient Vitals for the past 24 hrs:   BP Temp Temp src Pulse Resp SpO2 Weight   06/23/25 1600 (!) 141/77 -- -- 87 22 100 % --   06/23/25 1519 (!) 140/90 -- -- 95 21 100 % --   06/23/25 1500 120/81 -- -- 104 -- 100 % --   06/23/25 1434 (!) 141/92 -- -- 96 -- 100 % --   06/23/25 1419 130/84 -- -- 92 -- 100 % --   06/23/25 1415 130/84 -- -- 90 -- 100 % --   06/23/25 1401 (!) 139/90 -- -- 88 -- 99 % --   06/23/25 1346 (!) 144/87 -- -- 81 -- 100 % --   06/23/25 1330 136/87 -- -- 84 -- 99 % --   06/23/25 1318 -- -- -- 82 -- 100 % --   06/23/25 1315 (!) 145/84 -- -- 76 -- 100 % --   06/23/25 1304 (!) 161/96 -- -- 85 -- 100 % --   06/23/25 1249 (!) 174/96 -- -- 82 -- 99 % --   06/23/25 1234 (!) 151/92 -- -- 84 -- 100 % --   06/23/25 1219 (!) 145/108 -- -- 86 -- 100 % --   06/23/25 1204 (!) 147/90 -- -- 86 -- 100 % --   06/23/25 1149 (!) 165/103 -- -- 101 -- (!) 83 % --   06/23/25 1130 -- 98.5  F (36.9  C) Axillary -- -- -- --   06/23/25 1129 -- -- -- 90 30 100 % --   06/23/25 1127 (!) 178/103 -- -- 90 (!) 36 100 % 74.5 kg (164 lb 4.8 oz)     Physical Exam    General:   Patient in respiratory distress  HEENT:    Oropharynx is moist  Eyes:    Conjunctiva normal  Neck:     Supple, no meningismus.     CV:     Regular rate and rhythm.      No murmurs, rubs or gallops.       2+ radial pulses bilateral.       1+ bilateral lower extremity edema.  PULM:    Mild expiratory wheezing limited by poor aeration      Moderate respiratory distress.      No rales or rhonci.     No stridor.  ABD:    Soft, non-tender, non-distended.       No rebound, guarding or rigidity.  MSK:     No gross deformity to all four extremities.   LYMPH:   No cervical  lymphadenopathy.  NEURO:   Alert     Speech is clear     Good muscle tone  Skin:    Warm, dry and intact.    Psych:    Anxious        Diagnostics     Lab Results   Labs Ordered and Resulted from Time of ED Arrival to Time of ED Departure   BASIC METABOLIC PANEL - Abnormal       Result Value    Sodium 143      Potassium 4.6      Chloride 102      Carbon Dioxide (CO2) 29      Anion Gap 12      Urea Nitrogen 18.5      Creatinine 0.77      GFR Estimate 80      Calcium 9.8      Glucose 175 (*)    ISTAT GASES LACTATE VENOUS POCT - Abnormal    Lactic Acid POCT 1.9      Bicarbonate Venous POCT 33 (*)     O2 Sat, Venous POCT 45 (*)     pCO2 Venous POCT 60 (*)     pH Venous POCT 7.34      pO2 Venous POCT 27     TROPONIN T, HIGH SENSITIVITY - Abnormal    Troponin T, High Sensitivity 15 (*)    TROPONIN T, HIGH SENSITIVITY - Normal    Troponin T, High Sensitivity 14     NT-PROBNP - Normal    NT-proBNP 263     INFLUENZA A/B, RSV AND SARS-COV2 PCR - Normal    Influenza A PCR Negative      Influenza B PCR Negative      RSV PCR Negative      SARS CoV2 PCR Negative     LACTIC ACID WHOLE BLOOD WITH 1X REPEAT IN 2 HR WHEN >2 - Normal    Lactic Acid, Initial 1.9     CBC WITH PLATELETS AND DIFFERENTIAL    WBC Count 7.7      RBC Count 4.26      Hemoglobin 12.0      Hematocrit 37.8      MCV 89      MCH 28.2      MCHC 31.7      RDW 14.3      Platelet Count 251      % Neutrophils 66      % Lymphocytes 24      % Monocytes 4      % Eosinophils 5      % Basophils 0      % Immature Granulocytes 0      NRBCs per 100 WBC 0      Absolute Neutrophils 5.1      Absolute Lymphocytes 1.9      Absolute Monocytes 0.3      Absolute Eosinophils 0.4      Absolute Basophils 0.0      Absolute Immature Granulocytes 0.0      Absolute NRBCs 0.0     BLOOD CULTURE   BLOOD CULTURE     Imaging   XR Chest Port 1 View   Final Result   IMPRESSION: Increased mild bibasilar pulmonary opacities. Increased bilateral vascular prominence. Correlate with any evidence of  pulmonary edema versus pneumonia. Stable mildly enlarged cardiac silhouette.        EKG   ECG taken at 1127, ECG read at 1129  Normal sinus rhythm  Left axis deviation  Nonspecific ST abnormality    Rate 91 bpm. NV interval 140 ms. QRS duration 78 ms. QT/QTc 356/437 ms. P-R-T axes 37 -38 29.    Independent Interpretation   CXR: No pneumothorax or infiltrate.    ED Course      Medications Administered   Medications   magnesium sulfate 2 g in 50 mL sterile water intermittent infusion (0 g Intravenous Stopped 6/23/25 1209)   methylPREDNISolone Na Suc (solu-MEDROL) injection 125 mg (125 mg Intravenous $Given 6/23/25 1147)   cefTRIAXone (ROCEPHIN) 2 g vial to attach to  ml bag for ADULTS or NS 50 ml bag for PEDS (0 g Intravenous Stopped 6/23/25 1359)   azithromycin (ZITHROMAX) 500 mg in  mL intermittent infusion (0 mg Intravenous Stopped 6/23/25 1520)   ipratropium - albuterol 0.5 mg/2.5 mg (3mg)/3 mL (DUONEB) neb solution 6 mL (6 mLs Nebulization $Given 6/23/25 1324)     Discussion of Management   Admitting Hospitalist, Dr. Chacko    ED Course   ED Course as of 06/23/25 1608   Mon Jun 23, 2025   1123 Patient arrived. EMS report given. Initial assessment performed.    1230 I rechecked the patient and explained findings.    1234 I consulted with Dr. Chacko, hospitalist, regarding the patient's history and presentation here in the emergency department who accepted the patient for admission.     Additional Documentation  None    Medical Decision Making / Diagnosis     CMS Diagnoses: IV Antibiotics given and/or elevated Lactate of 1.9 and no sepsis note found - Delete this reminder and enter the sepsis note or '.edcms' before signing chart.>>>None    MIPS   None               MDM   Pilo Quintana is a 75 year old female with a history of COPD presents with shortness of breath and respiratory distress.  Patient was placed on BiPAP upon arrival.  Respiratory distress has resolved with BiPAP.  She was  found to have acute respiratory acidemia and hypercapnia although relatively mild.  She has mild peripheral edema but BNP within normal limits.  Low suspicion for pulmonary edema.  Chest x-ray was reported as potential bibasilar infiltrates.  Due to the severity of her illness, cultures obtained and antibiotics for commune acquired pneumonia provided.  Lactic is reassuring.  Patient will transfer to the intensive care unit due to noninvasive ventilation.    Total critical care time excluding procedures 30 minutes    Disposition   The patient was admitted to the hospital.     Diagnosis     ICD-10-CM    1. COPD exacerbation (H)  J44.1       2. Acute respiratory failure with hypercapnia (H)  J96.02       3. Hyperglycemia  R73.9 Med Therapy Management Referral         Scribe Disclosure:  IConnie, am serving as a scribe at 11:36 AM on 6/23/2025 to document services personally performed by Cirilo Marroquin MD based on my observations and the provider's statements to me.      MD Lopez Haley Jeremiah R, MD  06/23/25 9195

## 2025-06-23 NOTE — H&P
[facility]  Hospitalist Admission Note  Name: Pilo Quintana    MRN: [id number]  YOB: 1950    Age: 75 year old  Date of admission: 6/23/2025  Primary care provider: Chela Griffith    Chief Complaint: Respiratory failure    Pilo Quintana is a 75 year old [place] female who speaks Mano with PMH including type 2 diabetes, hypertension, obesity, asthma, remote history of CVA who presents with respiratory distress via EMS.  She was actually seen in the emergency room yesterday for a fall and was discharged.  She did develop shortness of breath following that.  Per EMS she was breathing at 40/min and she was placed on CPAP.  The patient presents with her son who helps provide some of the history and also interpret.  He has been helping to take care of her and actually placed her on CPAP overnight at home and gave a dose of prednisone.  She has had a cough but no fever.    Here in the ER, evaluation was revealing of hypertension.  She was still breathing at 36 breaths/min but was not hypoxic.  She was felt to have increased work of breathing.  Lab workup showed relatively normal basic metabolic panel, lactic acid of 1.9, VBG showing pH of 7.34 with pCO2 of 60 and high-sensitivity troponin of 14 with a BNP of 263.  CBC was grossly normal.  Influenza A, RSV and COVID testing were negative.  Chest x-ray showed increasing mild bibasilar pulmonary opacities and some increased vascular prominence.    She was started on ceftriaxone, azithromycin, IV Solu-Medrol and given magnesium sulfate as well as multiple nebs.  I am asked to admit her for apparent asthma exacerbation with possible pneumonia.  Could have a component of mild fluid overload as well.    Assessment and Plan:   Acute hypoxemic and hypercapnic respiratory failure: Suspect asthma versus COPD exacerbation.  Wheezing on exam with diminished air movement.  Does have infiltrates on chest x-ray so seems reasonable to cover with  antibiotics.  -- Admit to the ICU for ongoing BiPAP, wean as able  -- Continue ceftriaxone and azithromycin  -- IV Solu-Medrol 40 mg twice daily  -- Scheduled and as needed nebs  -- Supportive cares    2.   Type 2 diabetes with hyperglycemia: Hold metformin for now, treat with Lantus and sliding scale insulin.    3.   History of hypertension: Resume home meds as able.  I believe she is on losartan, Lasix 40 mg and metoprolol    4.   History of asthma: Resuming home inhalers.    5.   Remote history of CVA    DVT Prophylaxis: Enoxaparin (Lovenox) SQ  Code Status: Full Code  Medically Ready for Discharge: Anticipated in 2-4 Days      Clinically Significant Risk Factors Present on Admission                 # Drug Induced Platelet Defect: home medication list includes an antiplatelet medication   # Hypertension: Noted on problem list               # Financial/Environmental Concerns:                 History of Present Illness:  Pilo Quintana is a 75 year old [place] female who speaks Mano with PMH including type 2 diabetes, hypertension, obesity, asthma, remote history of CVA who presents with respiratory distress via EMS.  She was actually seen in the emergency room yesterday for a fall and was discharged.  She did develop shortness of breath following that.  Per EMS she was breathing at 40/min and she was placed on CPAP.  The patient presents with her son who helps provide some of the history and also interpret.  He has been helping to take care of her and actually placed her on CPAP overnight at home and gave a dose of prednisone.  She has had a cough but no fever.    History is limited by the acuity of the situation and her BiPAP mask.         Past Medical History:  Past Medical History:   Diagnosis Date    Diabetes (H)     Hypertension     Morbid obesity (H)     Osteoarthritis 6/7/2003    PMB (postmenopausal bleeding) 9/16/2014    Renal mass     Thickened endometrium 9/16/2014    Uncomplicated asthma      Unspecified cerebral artery occlusion with cerebral infarction 1998    was in [place], no residual at present     Past Surgical History:  Past Surgical History:   Procedure Laterality Date    DILATION AND CURETTAGE, OPERATIVE HYSTEROSCOPY WITH MORCELLATOR, COMBINED N/A 9/25/2014    Procedure: COMBINED DILATION AND CURETTAGE, OPERATIVE HYSTEROSCOPY WITH MORCELLATOR;  Surgeon: Silverio Christy MD;  Location:  OR     Social History:  Social History     Tobacco Use    Smoking status: Never    Smokeless tobacco: Never   Substance Use Topics    Alcohol use: No     Social History     Social History Narrative    Not on file     Family History:  No family history on file.  Allergies:  No Known Allergies  Medications:  Current Facility-Administered Medications   Medication Dose Route Frequency Provider Last Rate Last Admin    azithromycin (ZITHROMAX) 500 mg in  mL intermittent infusion  500 mg Intravenous Once Cirilo Marroquin MD        cefTRIAXone (ROCEPHIN) 2 g vial to attach to  ml bag for ADULTS or NS 50 ml bag for PEDS  2 g Intravenous Once Cirilo Marroquin MD        ipratropium - albuterol 0.5 mg/2.5 mg (3mg)/3 mL (DUONEB) neb solution 6 mL  6 mL Nebulization Once Cirilo Marroquin MD         Current Outpatient Medications   Medication Sig Dispense Refill    acetaminophen (TYLENOL) 500 MG tablet Take 500-1,000 mg by mouth every 8 hours as needed for mild pain      albuterol (PROAIR HFA/PROVENTIL HFA/VENTOLIN HFA) 108 (90 Base) MCG/ACT inhaler Inhale 2 puffs into the lungs every 4 hours as needed for wheezing 18 g 0    albuterol (PROVENTIL) (2.5 MG/3ML) 0.083% neb solution Take 1 vial (2.5 mg) by nebulization every 4 hours as needed for shortness of breath, wheezing or cough. 90 mL 1    amLODIPine (NORVASC) 5 MG tablet Take 5 mg by mouth daily      aspirin (ASA) 81 MG EC tablet Take 1 tablet (81 mg) by mouth daily 30 tablet 11    atorvastatin (LIPITOR) 40 MG tablet Take 1 tablet (40 mg) by  mouth every evening 30 tablet 4    azithromycin (ZITHROMAX) 250 MG tablet Take 1 tablet (250 mg) by mouth daily. 90 tablet 0    budesonide (PULMICORT) 0.5 MG/2ML neb solution Take 2 mLs (0.5 mg) by nebulization 2 times daily. 120 mL 11    carboxymethylcellulose (REFRESH PLUS) 0.5 % SOLN ophthalmic solution Place 1 drop into both eyes daily as needed for dry eyes.      Cholecalciferol (VITAMIN D3) 50 MCG (2000 UT) CAPS Take 50 mcg by mouth daily      Continuous Glucose Sensor (DEXCOM G6 SENSOR) MISC APPLY 1 SENSOR TO SKIN EVERY 10 DAYS      Continuous Glucose Transmitter (DEXCOM G6 TRANSMITTER) MISC USE AS DIRECTED. CHANGE EVERY 3 MONTHS      formoterol (PERFOROMIST) 20 MCG/2ML neb solution Take 2 mLs (20 mcg) by nebulization every 12 hours. 120 mL 11    furosemide (LASIX) 40 MG tablet Take 40 mg by mouth daily      losartan (COZAAR) 100 MG tablet Take 100 mg by mouth daily      metFORMIN (GLUCOPHAGE XR) 500 MG 24 hr tablet Take 500 mg by mouth 2 times daily (with meals).      metoprolol succinate ER (TOPROL XL) 50 MG 24 hr tablet Take 50 mg by mouth every evening.      nystatin (NYSTOP) 118373 UNIT/GM external powder Apply topically 2 times daily as needed      polyethylene glycol-propylene glycol (SYSTANE ULTRA) 0.4-0.3 % SOLN ophthalmic solution Place 1 drop into both eyes nightly as needed for dry eyes.      predniSONE (DELTASONE) 10 MG tablet 4 tabs for 2 days, 3 tabs for 2 days, 2 tabs for 2 days, 1 tab for 2 days 20 tablet 3    triamcinolone (KENALOG) 0.1 % external ointment Apply topically 2 times daily as needed for irritation       Review of Systems:  A Comprehensive greater than 10 system review of systems was carried out.  Pertinent positives and negatives are noted above.  Otherwise negative for contributory information.     Physical Exam:  Blood pressure (!) 178/103, pulse 90, temperature 98.5  F (36.9  C), temperature source Axillary, resp. rate (!) 36, weight 74.5 kg (164 lb 4.8 oz), SpO2 100%.  Wt  Readings from Last 1 Encounters:   06/23/25 74.5 kg (164 lb 4.8 oz)       Exam:  General: Alert, awake, no acute distress.  BiPAP mask in place.  HEENT: NC/AT, eyes anicteric, external occular movements intact, face symmetric.    Cardiac: RRR, S1, S2.  No murmurs appreciated.  Pulmonary: Diminished air movement, expiratory wheezes.    Abdomen: soft, non-tender, non-distended.  Bowel Sounds Present.  No guarding.  Extremities: no deformities.  Warm, well perfused.  Skin: no rashes or lesions noted.  Warm and Dry.  Neuro: No focal deficits noted.  Speech clear.  Coordination and strength grossly normal.  Psych: Appropriate affect.    I have personally reviewed the following data including lab tests and imaging:  Imaging:  Results for orders placed or performed during the hospital encounter of 06/23/25   XR Chest Port 1 View    Narrative    EXAM: XR CHEST PORT 1 VIEW  LOCATION: [facility]  DATE: 6/23/2025    INDICATION: Respiratory distress.  COMPARISON: Chest x-ray 2/1/2025.      Impression    IMPRESSION: Increased mild bibasilar pulmonary opacities. Increased bilateral vascular prominence. Correlate with any evidence of pulmonary edema versus pneumonia. Stable mildly enlarged cardiac silhouette.     Labs:  Recent Labs   Lab 06/23/25  1136   WBC 7.7   HGB 12.0   HCT 37.8   MCV 89             Lab Results   Component Value Date     06/23/2025     02/08/2025     12/23/2024     09/10/2020     01/12/2017     07/13/2016    Lab Results   Component Value Date    CHLORIDE 102 06/23/2025    CHLORIDE 94 02/08/2025    CHLORIDE 105 12/23/2024    CHLORIDE 106 09/10/2020    CHLORIDE 106 01/12/2017    CHLORIDE 101 07/13/2016    Lab Results   Component Value Date    BUN 18.5 06/23/2025    BUN 44.7 02/08/2025    BUN 21.0 12/23/2024    BUN 15 09/10/2020    BUN 19 01/12/2017    BUN 20 07/13/2016      Lab Results   Component Value Date    POTASSIUM 4.6 06/23/2025     "POTASSIUM 4.4 02/08/2025    POTASSIUM 4.2 12/23/2024    POTASSIUM 3.9 09/10/2020    POTASSIUM 4.4 01/12/2017    POTASSIUM 3.6 07/13/2016    Lab Results   Component Value Date    CO2 29 06/23/2025    CO2 25 02/08/2025    CO2 26 12/23/2024    CO2 29 09/10/2020    CO2 27 01/12/2017    CO2 34 07/13/2016    Lab Results   Component Value Date    CR 0.77 06/23/2025    CR 0.95 02/08/2025    CR 0.78 12/23/2024    CR 0.62 09/10/2020    CR 0.80 01/12/2017    CR 0.80 07/13/2016        No results for input(s): \"AST\", \"ALT\", \"GGT\", \"ALKPHOS\", \"BILITOTAL\", \"BILICONJ\", \"BILIDIRECT\", \"MAYRA\" in the last 168 hours.    Invalid input(s): \"BILIRUBININDIRECT\"    Evaluation and management time exclusive of procedures was 60 minutes critical care time including: urgent examination and evaluation of the patient, discussion of the patient's condition with other physicians and members of the care team, reviewing data and chart related to the patient, discussion of patient's condition with the family and time utilizing the EMR for documentation of this patient's care.        Hiram Chacko MD  Hospitalist  [facility]          "

## 2025-06-23 NOTE — PLAN OF CARE
"ICU End of Shift Summary.  For vital signs and complete assessments, please see documentation flowsheets.     Pertinent assessments: A&Ox4, Mano speaking - family at bedside to help interpret, no  available. LS diminished/clear - transitioned off bipap to 2L NC sats in 90s, decreased WOB. BS audible - no BM this shift. External catheter in place. Tele - SR  Major Shift Events:   - Admitted to ICU around 1600  - Off bipap  - Clear liquid diet, advance as tolerated  Plan (Upcoming Events): Continue plan of care  Discharge/Transfer Needs: TBD    Bedside Shift Report Completed   Bedside Safety Check Completed       Problem: Adult Inpatient Plan of Care  Goal: Plan of Care Review  Description: The Plan of Care Review/Shift note should be completed every shift.  The Outcome Evaluation is a brief statement about your assessment that the patient is improving, declining, or no change.  This information will be displayed automatically on your shift  note.  6/23/2025 1851 by Kristal Raines RN  Outcome: Progressing  Flowsheets (Taken 6/23/2025 1851)  Overall Patient Progress: improving  6/23/2025 1849 by Kristal Raines RN  Outcome: Progressing  Flowsheets (Taken 6/23/2025 1849)  Outcome Evaluation: Bipap off - transitioned to 2L NC  Plan of Care Reviewed With:   patient   child   family  Overall Patient Progress: improving  Goal: Patient-Specific Goal (Individualized)  Description: You can add care plan individualizations to a care plan. Examples of Individualization might be:  \"Parent requests to be called daily at 9am for status\", \"I have a hard time hearing out of my right ear\", or \"Do not touch me to wake me up as it startles  me\".  6/23/2025 1851 by Kristal Raines RN  Outcome: Progressing  6/23/2025 1849 by Kristal Raines RN  Outcome: Progressing  Flowsheets (Taken 6/23/2025 1615)  Individualized Care Needs: Pt does not speak english well, son Balwinder present to help interpret -  " unavailable for language  Goal: Absence of Hospital-Acquired Illness or Injury  6/23/2025 1851 by Kristal Raines RN  Outcome: Progressing  6/23/2025 1849 by Kristal Raines RN  Outcome: Progressing  Intervention: Identify and Manage Fall Risk  Recent Flowsheet Documentation  Taken 6/23/2025 1615 by Kristal Raines RN  Safety Promotion/Fall Prevention:   activity supervised   increased rounding and observation   lighting adjusted   patient and family education   nonskid shoes/slippers when out of bed  Intervention: Prevent Skin Injury  Recent Flowsheet Documentation  Taken 6/23/2025 1615 by Kristal Raines RN  Body Position: weight shifting  Intervention: Prevent and Manage VTE (Venous Thromboembolism) Risk  Recent Flowsheet Documentation  Taken 6/23/2025 1615 by Kristal Raines RN  VTE Prevention/Management: SCDs off (sequential compression devices)  Goal: Optimal Comfort and Wellbeing  6/23/2025 1851 by Kristal Raines RN  Outcome: Progressing  6/23/2025 1849 by Kristal Raines RN  Outcome: Progressing  Intervention: Provide Person-Centered Care  Recent Flowsheet Documentation  Taken 6/23/2025 1615 by Kristal Raines RN  Trust Relationship/Rapport: care explained  Goal: Readiness for Transition of Care  6/23/2025 1851 by Kristal Raines RN  Outcome: Progressing  6/23/2025 1849 by Kristal Raines RN  Outcome: Progressing  Intervention: Mutually Develop Transition Plan  Recent Flowsheet Documentation  Taken 6/23/2025 1600 by Kristal Raines RN  Equipment Currently Used at Home:   shower chair   walker, rolling     Problem: Delirium  Goal: Optimal Coping  6/23/2025 1851 by Kristal Raines RN  Outcome: Progressing  6/23/2025 1849 by Kristal Raines RN  Outcome: Progressing  Goal: Improved Behavioral Control  6/23/2025 1851 by Kristal Raines RN  Outcome: Progressing  6/23/2025 1849 by Kristal Raines RN  Outcome: Progressing  Intervention: Minimize Safety  Risk  Recent Flowsheet Documentation  Taken 6/23/2025 1615 by Kristal Raines RN  Enhanced Safety Measures: room near unit station  Trust Relationship/Rapport: care explained  Goal: Improved Attention and Thought Clarity  6/23/2025 1851 by Kristal Raines RN  Outcome: Progressing  6/23/2025 1849 by Kristal Raines RN  Outcome: Progressing  Goal: Improved Sleep  6/23/2025 1851 by Kristal Raines RN  Outcome: Progressing  6/23/2025 1849 by Kristal Raines RN  Outcome: Progressing     Problem: Comorbidity Management  Goal: Blood Glucose Levels Within Targeted Range  Outcome: Progressing  Intervention: Monitor and Manage Glycemia  Recent Flowsheet Documentation  Taken 6/23/2025 1615 by Kristal Raines RN  Medication Review/Management: medications reviewed  Goal: Blood Pressure in Desired Range  Outcome: Progressing  Intervention: Maintain Blood Pressure Management  Recent Flowsheet Documentation  Taken 6/23/2025 1615 by Kristal Raines RN  Medication Review/Management: medications reviewed     Problem: Breathing Pattern Ineffective  Goal: Effective Breathing Pattern  Outcome: Progressing  Intervention: Promote Improved Breathing Pattern  Recent Flowsheet Documentation  Taken 6/23/2025 1615 by Kristal Raines RN  Head of Bed (HOB) Positioning: HOB at 20-30 degrees

## 2025-06-23 NOTE — PROGRESS NOTES
[facility] Progress Note  ED    Responded to patient's room regarding overhead Red Team CODE.    Patient receiving cares.  SHS not needed.    SHS remains available upon request.      Rev. Nina Phipps M.Div.  Staff   Phone  [phone]

## 2025-06-23 NOTE — ED NOTES
Bed: ED02  Expected date:   Expected time:   Means of arrival:   Comments:  [id number], Red Team

## 2025-06-24 LAB
ANION GAP SERPL CALCULATED.3IONS-SCNC: 10 MMOL/L (ref 7–15)
BUN SERPL-MCNC: 22.1 MG/DL (ref 8–23)
CALCIUM SERPL-MCNC: 9.1 MG/DL (ref 8.8–10.4)
CHLORIDE SERPL-SCNC: 104 MMOL/L (ref 98–107)
CREAT SERPL-MCNC: 0.82 MG/DL (ref 0.51–0.95)
EGFRCR SERPLBLD CKD-EPI 2021: 74 ML/MIN/1.73M2
GLUCOSE BLDC GLUCOMTR-MCNC: 215 MG/DL (ref 70–99)
GLUCOSE BLDC GLUCOMTR-MCNC: 221 MG/DL (ref 70–99)
GLUCOSE BLDC GLUCOMTR-MCNC: 242 MG/DL (ref 70–99)
GLUCOSE BLDC GLUCOMTR-MCNC: 247 MG/DL (ref 70–99)
GLUCOSE BLDC GLUCOMTR-MCNC: 281 MG/DL (ref 70–99)
GLUCOSE BLDC GLUCOMTR-MCNC: 357 MG/DL (ref 70–99)
GLUCOSE SERPL-MCNC: 227 MG/DL (ref 70–99)
HCO3 SERPL-SCNC: 28 MMOL/L (ref 22–29)
HOLD SPECIMEN: NORMAL
POTASSIUM SERPL-SCNC: 4.2 MMOL/L (ref 3.4–5.3)
SODIUM SERPL-SCNC: 142 MMOL/L (ref 135–145)

## 2025-06-24 PROCEDURE — 80048 BASIC METABOLIC PNL TOTAL CA: CPT | Performed by: INTERNAL MEDICINE

## 2025-06-24 PROCEDURE — 120N000001 HC R&B MED SURG/OB

## 2025-06-24 PROCEDURE — 250N000013 HC RX MED GY IP 250 OP 250 PS 637: Performed by: INTERNAL MEDICINE

## 2025-06-24 PROCEDURE — 94660 CPAP INITIATION&MGMT: CPT

## 2025-06-24 PROCEDURE — 94640 AIRWAY INHALATION TREATMENT: CPT | Mod: 76

## 2025-06-24 PROCEDURE — 999N000157 HC STATISTIC RCP TIME EA 10 MIN

## 2025-06-24 PROCEDURE — 250N000012 HC RX MED GY IP 250 OP 636 PS 637: Performed by: INTERNAL MEDICINE

## 2025-06-24 PROCEDURE — 36415 COLL VENOUS BLD VENIPUNCTURE: CPT | Performed by: INTERNAL MEDICINE

## 2025-06-24 PROCEDURE — 99233 SBSQ HOSP IP/OBS HIGH 50: CPT | Performed by: INTERNAL MEDICINE

## 2025-06-24 PROCEDURE — 250N000009 HC RX 250: Performed by: INTERNAL MEDICINE

## 2025-06-24 PROCEDURE — 250N000011 HC RX IP 250 OP 636: Performed by: INTERNAL MEDICINE

## 2025-06-24 PROCEDURE — 94640 AIRWAY INHALATION TREATMENT: CPT

## 2025-06-24 RX ORDER — ASPIRIN 81 MG/1
81 TABLET ORAL DAILY
Status: DISCONTINUED | OUTPATIENT
Start: 2025-06-24 | End: 2025-06-26 | Stop reason: HOSPADM

## 2025-06-24 RX ORDER — CARBOXYMETHYLCELLULOSE SODIUM 5 MG/ML
1 SOLUTION/ DROPS OPHTHALMIC
Status: DISCONTINUED | OUTPATIENT
Start: 2025-06-24 | End: 2025-06-26 | Stop reason: HOSPADM

## 2025-06-24 RX ORDER — HYDRALAZINE HYDROCHLORIDE 20 MG/ML
10 INJECTION INTRAMUSCULAR; INTRAVENOUS EVERY 4 HOURS PRN
Status: DISCONTINUED | OUTPATIENT
Start: 2025-06-24 | End: 2025-06-26 | Stop reason: HOSPADM

## 2025-06-24 RX ADMIN — HYDRALAZINE HYDROCHLORIDE 10 MG: 20 INJECTION INTRAMUSCULAR; INTRAVENOUS at 16:36

## 2025-06-24 RX ADMIN — FUROSEMIDE 40 MG: 20 TABLET ORAL at 08:49

## 2025-06-24 RX ADMIN — AZITHROMYCIN DIHYDRATE 250 MG: 250 TABLET ORAL at 08:49

## 2025-06-24 RX ADMIN — FORMOTEROL FUMARATE 20 MCG: 20 SOLUTION RESPIRATORY (INHALATION) at 20:05

## 2025-06-24 RX ADMIN — CEFTRIAXONE 1 G: 1 INJECTION, POWDER, FOR SOLUTION INTRAMUSCULAR; INTRAVENOUS at 09:20

## 2025-06-24 RX ADMIN — METHYLPREDNISOLONE SODIUM SUCCINATE 40 MG: 40 INJECTION, POWDER, FOR SOLUTION INTRAMUSCULAR; INTRAVENOUS at 17:50

## 2025-06-24 RX ADMIN — AMLODIPINE BESYLATE 5 MG: 5 TABLET ORAL at 08:49

## 2025-06-24 RX ADMIN — ENOXAPARIN SODIUM 40 MG: 40 INJECTION SUBCUTANEOUS at 17:50

## 2025-06-24 RX ADMIN — METHYLPREDNISOLONE SODIUM SUCCINATE 40 MG: 40 INJECTION, POWDER, FOR SOLUTION INTRAMUSCULAR; INTRAVENOUS at 05:40

## 2025-06-24 RX ADMIN — INSULIN ASPART 2 UNITS: 100 INJECTION, SOLUTION INTRAVENOUS; SUBCUTANEOUS at 03:31

## 2025-06-24 RX ADMIN — METOPROLOL SUCCINATE 50 MG: 50 TABLET, EXTENDED RELEASE ORAL at 20:16

## 2025-06-24 RX ADMIN — INSULIN GLARGINE 20 UNITS: 100 INJECTION, SOLUTION SUBCUTANEOUS at 10:28

## 2025-06-24 RX ADMIN — INSULIN ASPART 3 UNITS: 100 INJECTION, SOLUTION INTRAVENOUS; SUBCUTANEOUS at 11:52

## 2025-06-24 RX ADMIN — ASPIRIN 81 MG: 81 TABLET, DELAYED RELEASE ORAL at 10:33

## 2025-06-24 RX ADMIN — BUDESONIDE 0.5 MG: 0.5 INHALANT RESPIRATORY (INHALATION) at 08:01

## 2025-06-24 RX ADMIN — BUDESONIDE 0.5 MG: 0.5 INHALANT RESPIRATORY (INHALATION) at 20:01

## 2025-06-24 ASSESSMENT — ACTIVITIES OF DAILY LIVING (ADL)
ADLS_ACUITY_SCORE: 54
ADLS_ACUITY_SCORE: 52
ADLS_ACUITY_SCORE: 52
ADLS_ACUITY_SCORE: 62
ADLS_ACUITY_SCORE: 52
ADLS_ACUITY_SCORE: 54
ADLS_ACUITY_SCORE: 62
ADLS_ACUITY_SCORE: 52
ADLS_ACUITY_SCORE: 62
ADLS_ACUITY_SCORE: 52
ADLS_ACUITY_SCORE: 52
ADLS_ACUITY_SCORE: 62
ADLS_ACUITY_SCORE: 56
ADLS_ACUITY_SCORE: 52
ADLS_ACUITY_SCORE: 62
ADLS_ACUITY_SCORE: 62
ADLS_ACUITY_SCORE: 56
ADLS_ACUITY_SCORE: 52
ADLS_ACUITY_SCORE: 52

## 2025-06-24 NOTE — PROGRESS NOTES
[facility]  Hospitalist Progress Note  Uriel Chacko MD 06/24/2025    Reason for Stay (Diagnosis): asthma         Assessment and Plan:      Summary of Stay:    Pilo Quintana is a 75 year old Cym[place] female who speaks Mano with PMH including type 2 diabetes, hypertension, obesity, asthma, remote history of CVA who presents with respiratory distress via EMS.  She was actually seen in the emergency room the day prior to admission for a fall and was discharged.  She did develop shortness of breath following that.  Per EMS she was breathing at 40/min and she was placed on CPAP.  The patient presents with her son who helps provide some of the history and also interpret.  He has been helping to take care of her and actually placed her on CPAP overnight at home and gave a dose of prednisone.  She has had a cough but no fever.     Here in the ER, evaluation was revealing of hypertension.  She was still breathing at 36 breaths/min but was not hypoxic.  She was felt to have increased work of breathing.  Lab workup showed relatively normal basic metabolic panel, lactic acid of 1.9, VBG showing pH of 7.34 with pCO2 of 60 and high-sensitivity troponin of 14 with a BNP of 263.  CBC was grossly normal.  Influenza A, RSV and COVID testing were negative.  Chest x-ray showed increasing mild bibasilar pulmonary opacities and some increased vascular prominence.     She was started on ceftriaxone, azithromycin, IV Solu-Medrol and given magnesium sulfate as well as multiple nebs.  She was admitted for apparent asthma exacerbation with possible pneumonia.  Could have a component of mild fluid overload as well.    Not long after admission we were able to remove BiPAP and weaned her to 2 L/min.  Stable to transfer out of the ICU.     Assessment and Plan:   Acute hypoxemic and hypercapnic respiratory failure: Suspect asthma exacerbation.  Apparently has not been able to cooperate with formal PFTs due to dementia  and language barrier.  Wheezing on exam with diminished air movement patient.  Does have infiltrates on chest x-ray so seems reasonable to cover with antibiotics.  -- Continue ceftriaxone and azithromycin  -- IV Solu-Medrol 40 mg twice daily  -- Scheduled and as needed nebs  -- Supportive cares  --At home I believe she is on nebs due to difficulty taking inhalers in the setting of dementia etc.  --Follows with pulmonary medicine, would consider outpatient follow-up.     2.   Type 2 diabetes with hyperglycemia: Hold metformin for now, treat with Lantus and sliding scale insulin.  Lantus decreased to 20 units.     3.   History of hypertension: Resume home losartan, Lasix 40 mg and metoprolol     4.   History of asthma: Resuming home nebs.     5.   Remote history of CVA: continue ASA    6.  History of dementia: Resides with her son who provides cares.  He also interprets.     Medically Ready for Discharge: Anticipated in 2-4 Days      Clinically Significant Risk Factors Present on Admission                 # Drug Induced Platelet Defect: home medication list includes an antiplatelet medication   # Hypertension: Noted on problem list   # Non-Invasive mechanical ventilation: current O2 Device: None (Room air)       # DMII: A1C = 7.3 % (Ref range: <5.7 %) within past 6 months        # Financial/Environmental Concerns:                 Interval History (Subjective):      Did well after admission, weaned to 2 L/min  Transfer out of ICU  Resume home losartan                    Physical Exam:      Last Vital Signs:  BP (!) 164/79 (BP Location: Right arm)   Pulse 66   Temp 98.7  F (37.1  C) (Oral)   Resp 20   Wt 72.6 kg (160 lb 0.9 oz)   LMP  (LMP Unknown)   SpO2 93%   BMI 27.47 kg/m      I/O last 3 completed shifts:  In: 650 [P.O.:650]  Out: 600 [Urine:600]    General: Alert, awake, no acute distress.  Nasal cannula.  HEENT: NC/AT, eyes anicteric, external occular movements intact, face symmetric.  Cardiac: RRR, S1, S2.   No murmurs appreciated.  Pulmonary: Expiratory wheezing.  Normal chest rise, normal work of breathing.   Abdomen: soft, non-tender, non-distended.  Bowel Sounds Present.  No guarding.  Extremities: no deformities.  Warm, well perfused.  Skin: no rashes or lesions noted.  Warm and Dry.  Neuro: No focal deficits noted.  Speech clear.  Coordination and strength grossly normal.  Psych: Appropriate affect.         Medications:      All current medications were reviewed with changes reflected in problem list.         Data:      All new lab and imaging data was reviewed.   Labs:  Recent Labs   Lab 06/24/25  0844 06/24/25  0531   NA  --  142   POTASSIUM  --  4.2   CHLORIDE  --  104   CO2  --  28   ANIONGAP  --  10   * 227*   BUN  --  22.1   CR  --  0.82   GFRESTIMATED  --  74   GLENIS  --  9.1     Recent Labs   Lab 06/23/25  1136   WBC 7.7   HGB 12.0   HCT 37.8   MCV 89         Imaging:   Recent Results (from the past 48 hours)   CT Head w/o Contrast    Narrative    EXAM: CT HEAD W/O CONTRAST, CT LUMBAR SPINE W/O CONTRAST, CT CERVICAL SPINE W/O CONTRAST  LOCATION: [facility]  DATE: 6/22/2025    INDICATION: Status post fall with head injury  COMPARISON: Head CT 2/8/2025.  TECHNIQUE:   1) Routine CT Head without IV contrast. Multiplanar reformats. Dose reduction techniques were used.   2) Routine CT Cervical Spine without IV contrast. Multiplanar reformats. Dose reduction techniques were used.   3) Routine CT Lumbar Spine without IV contrast. Multiplanar reformats. Dose reduction techniques were used.     FINDINGS:   HEAD CT:   INTRACRANIAL CONTENTS: No intracranial hemorrhage, extraaxial collection, or mass effect.  No CT evidence of acute infarct. Severe presumed chronic small vessel ischemic changes. Mild to moderate generalized cerebral volume loss. Mild disproportionate   ventriculomegaly with effacement of cerebral sulci at the vertex.     VISUALIZED ORBITS/SINUSES/MASTOIDS: No  intraorbital abnormality. No paranasal sinus mucosal disease. Complete/near complete opacification of the left mastoid air cells. No apparent mass in the posterior nasopharynx or skull base.    BONES/SOFT TISSUES: No acute abnormality.    CERVICAL SPINE CT:  VERTEBRA: Normal vertebral body heights. Straightening of normal cervical lordosis. No fracture or posttraumatic subluxation.     CANAL/FORAMINA: No canal or neural foraminal stenosis.    PARASPINAL: No extraspinal abnormality. Visualized lung fields are clear.    LUMBAR SPINE CT:  VERTEBRA: Normal vertebral body heights. Mild anterolisthesis at L5-S1. No fracture or posttraumatic subluxation.     CANAL/FORAMINA: Central calcified protrusion at L1-L2. No canal or neural foraminal stenosis.    PARASPINAL: No extraspinal abnormality.      Impression    IMPRESSION:  HEAD CT:  1.  No CT evidence for acute intracranial process.  2.  Brain atrophy and presumed chronic microvascular ischemic changes as above.  3.  Mild disproportionate ventriculomegaly with effacement of cerebral sulci at the vertex. This may represent normal pressure hydrocephalus in the appropriate clinical setting.    CERVICAL SPINE CT:  1.  No fracture or posttraumatic subluxation.  2.  No high-grade spinal canal or neural foraminal stenosis.    LUMBAR SPINE CT:  1.  No fracture or posttraumatic subluxation.  2.  No high-grade spinal canal or neural foraminal stenosis.     CT Cervical Spine w/o Contrast    Narrative    EXAM: CT HEAD W/O CONTRAST, CT LUMBAR SPINE W/O CONTRAST, CT CERVICAL SPINE W/O CONTRAST  LOCATION: [facility]  DATE: 6/22/2025    INDICATION: Status post fall with head injury  COMPARISON: Head CT 2/8/2025.  TECHNIQUE:   1) Routine CT Head without IV contrast. Multiplanar reformats. Dose reduction techniques were used.   2) Routine CT Cervical Spine without IV contrast. Multiplanar reformats. Dose reduction techniques were used.   3) Routine CT Lumbar Spine  without IV contrast. Multiplanar reformats. Dose reduction techniques were used.     FINDINGS:   HEAD CT:   INTRACRANIAL CONTENTS: No intracranial hemorrhage, extraaxial collection, or mass effect.  No CT evidence of acute infarct. Severe presumed chronic small vessel ischemic changes. Mild to moderate generalized cerebral volume loss. Mild disproportionate   ventriculomegaly with effacement of cerebral sulci at the vertex.     VISUALIZED ORBITS/SINUSES/MASTOIDS: No intraorbital abnormality. No paranasal sinus mucosal disease. Complete/near complete opacification of the left mastoid air cells. No apparent mass in the posterior nasopharynx or skull base.    BONES/SOFT TISSUES: No acute abnormality.    CERVICAL SPINE CT:  VERTEBRA: Normal vertebral body heights. Straightening of normal cervical lordosis. No fracture or posttraumatic subluxation.     CANAL/FORAMINA: No canal or neural foraminal stenosis.    PARASPINAL: No extraspinal abnormality. Visualized lung fields are clear.    LUMBAR SPINE CT:  VERTEBRA: Normal vertebral body heights. Mild anterolisthesis at L5-S1. No fracture or posttraumatic subluxation.     CANAL/FORAMINA: Central calcified protrusion at L1-L2. No canal or neural foraminal stenosis.    PARASPINAL: No extraspinal abnormality.      Impression    IMPRESSION:  HEAD CT:  1.  No CT evidence for acute intracranial process.  2.  Brain atrophy and presumed chronic microvascular ischemic changes as above.  3.  Mild disproportionate ventriculomegaly with effacement of cerebral sulci at the vertex. This may represent normal pressure hydrocephalus in the appropriate clinical setting.    CERVICAL SPINE CT:  1.  No fracture or posttraumatic subluxation.  2.  No high-grade spinal canal or neural foraminal stenosis.    LUMBAR SPINE CT:  1.  No fracture or posttraumatic subluxation.  2.  No high-grade spinal canal or neural foraminal stenosis.     CT Lumbar Spine w/o Contrast    Narrative    EXAM: CT HEAD W/O  CONTRAST, CT LUMBAR SPINE W/O CONTRAST, CT CERVICAL SPINE W/O CONTRAST  LOCATION: [facility]  DATE: 6/22/2025    INDICATION: Status post fall with head injury  COMPARISON: Head CT 2/8/2025.  TECHNIQUE:   1) Routine CT Head without IV contrast. Multiplanar reformats. Dose reduction techniques were used.   2) Routine CT Cervical Spine without IV contrast. Multiplanar reformats. Dose reduction techniques were used.   3) Routine CT Lumbar Spine without IV contrast. Multiplanar reformats. Dose reduction techniques were used.     FINDINGS:   HEAD CT:   INTRACRANIAL CONTENTS: No intracranial hemorrhage, extraaxial collection, or mass effect.  No CT evidence of acute infarct. Severe presumed chronic small vessel ischemic changes. Mild to moderate generalized cerebral volume loss. Mild disproportionate   ventriculomegaly with effacement of cerebral sulci at the vertex.     VISUALIZED ORBITS/SINUSES/MASTOIDS: No intraorbital abnormality. No paranasal sinus mucosal disease. Complete/near complete opacification of the left mastoid air cells. No apparent mass in the posterior nasopharynx or skull base.    BONES/SOFT TISSUES: No acute abnormality.    CERVICAL SPINE CT:  VERTEBRA: Normal vertebral body heights. Straightening of normal cervical lordosis. No fracture or posttraumatic subluxation.     CANAL/FORAMINA: No canal or neural foraminal stenosis.    PARASPINAL: No extraspinal abnormality. Visualized lung fields are clear.    LUMBAR SPINE CT:  VERTEBRA: Normal vertebral body heights. Mild anterolisthesis at L5-S1. No fracture or posttraumatic subluxation.     CANAL/FORAMINA: Central calcified protrusion at L1-L2. No canal or neural foraminal stenosis.    PARASPINAL: No extraspinal abnormality.      Impression    IMPRESSION:  HEAD CT:  1.  No CT evidence for acute intracranial process.  2.  Brain atrophy and presumed chronic microvascular ischemic changes as above.  3.  Mild disproportionate  ventriculomegaly with effacement of cerebral sulci at the vertex. This may represent normal pressure hydrocephalus in the appropriate clinical setting.    CERVICAL SPINE CT:  1.  No fracture or posttraumatic subluxation.  2.  No high-grade spinal canal or neural foraminal stenosis.    LUMBAR SPINE CT:  1.  No fracture or posttraumatic subluxation.  2.  No high-grade spinal canal or neural foraminal stenosis.     XR Chest Port 1 View    Narrative    EXAM: XR CHEST PORT 1 VIEW  LOCATION: [facility]  DATE: 6/23/2025    INDICATION: Respiratory distress.  COMPARISON: Chest x-ray 2/1/2025.      Impression    IMPRESSION: Increased mild bibasilar pulmonary opacities. Increased bilateral vascular prominence. Correlate with any evidence of pulmonary edema versus pneumonia. Stable mildly enlarged cardiac silhouette.         Uriel Chacko MD     I've spent 50 minutes in chart review, ordering medications and tests, obtaining additional history as needed, evaluating the patient and in documentation for this encounter.

## 2025-06-24 NOTE — PLAN OF CARE
"Confused. Up to chair with max assist x 3. IV SL between use.  BG changed to AC/HS. Diet advanced to regular. On RA- continuous pulse ox on.   PT/OT ordered   Mano speaking                    Goal Outcome Evaluation:      Plan of Care Reviewed With: patient    Overall Patient Progress: improvingOverall Patient Progress: improving    Outcome Evaluation: [place] [facility] [place]          Problem: Adult Inpatient Plan of Care  Goal: Plan of Care Review  Description: The Plan of Care Review/Shift note should be completed every shift.  The Outcome Evaluation is a brief statement about your assessment that the patient is improving, declining, or no change.  This information will be displayed automatically on your shift  note.  6/24/2025 1427 by Kate Gonzalez, RN  Outcome: Progressing  Flowsheets (Taken 6/24/2025 1427)  Outcome Evaluation: pt transferred [place] [facility] [place]  Plan of Care Reviewed With: patient  Overall Patient Progress: improving  6/24/2025 1407 by Kate Gonzalez RN  Outcome: Progressing  Flowsheets (Taken 6/24/2025 1407)  Outcome Evaluation: pt [place] [facility]  Plan of Care Reviewed With: patient  Overall Patient Progress: improving  Goal: Patient-Specific Goal (Individualized)  Description: You can add care plan individualizations to a care plan. Examples of Individualization might be:  \"Parent requests to be called daily at 9am for status\", \"I have a hard time hearing out of my right ear\", or \"Do not touch me to wake me up as it startles  me\".  6/24/2025 1427 by Kate Gonzalez, RN  Outcome: Progressing  6/24/2025 1407 by Kate Gonzalez, RN  Outcome: Progressing  Goal: Absence of Hospital-Acquired Illness or Injury  6/24/2025 1427 by Kate Gonzalez, RN  Outcome: Progressing  6/24/2025 1407 by Kate Gonzalez, RN  Outcome: Progressing  Intervention: Identify and Manage Fall Risk  Recent Flowsheet Documentation  Taken 6/24/2025 0950 by Kate Gonzalez, RN  Safety Promotion/Fall " Prevention:   activity supervised   increased rounding and observation   increase visualization of patient   lighting adjusted   mobility aid in reach   nonskid shoes/slippers when out of bed   room near nurse's station   room organization consistent   safety round/check completed   supervised activity  Intervention: Prevent Skin Injury  Recent Flowsheet Documentation  Taken 6/24/2025 1100 by Kate Gonzalez RN  Body Position:   turned   right  Intervention: Prevent Infection  Recent Flowsheet Documentation  Taken 6/24/2025 0950 by Kate Gonzalez RN  Infection Prevention:   hand hygiene promoted   rest/sleep promoted   single patient room provided  Goal: Optimal Comfort and Wellbeing  6/24/2025 1427 by Kate Gonzalez RN  Outcome: Progressing  6/24/2025 1407 by Kate Gonzalez RN  Outcome: Progressing  Goal: Readiness for Transition of Care  6/24/2025 1427 by Kate Gonzalez RN  Outcome: Progressing  6/24/2025 1407 by Kate Gonzalez RN  Outcome: Progressing     Problem: Delirium  Goal: Optimal Coping  6/24/2025 1427 by Kate Gonzalez RN  Outcome: Progressing  6/24/2025 1407 by Kate Gonzalez RN  Outcome: Progressing  Goal: Improved Behavioral Control  6/24/2025 1427 by Kate Gonzalez RN  Outcome: Progressing  6/24/2025 1407 by Kate Gonzalez RN  Outcome: Progressing  Intervention: Minimize Safety Risk  Recent Flowsheet Documentation  Taken 6/24/2025 0950 by Kate Gonzalez RN  Enhanced Safety Measures: pain management  Goal: Improved Attention and Thought Clarity  6/24/2025 1427 by Kate Gonzalez RN  Outcome: Progressing  6/24/2025 1407 by Kate Gonzalez RN  Outcome: Progressing  Goal: Improved Sleep  6/24/2025 1427 by Kate Gonzalez RN  Outcome: Progressing  6/24/2025 1407 by Kate Gonzalez RN  Outcome: Progressing     Problem: Comorbidity Management  Goal: Blood Glucose Levels Within Targeted Range  6/24/2025 1427 by Kate Gonzalez  RN  Outcome: Progressing  6/24/2025 1407 by Kate Gonzalez RN  Outcome: Progressing  Intervention: Monitor and Manage Glycemia  Recent Flowsheet Documentation  Taken 6/24/2025 0950 by Kate Gonzalez RN  Medication Review/Management: medications reviewed  Goal: Blood Pressure in Desired Range  6/24/2025 1427 by Kate Gonzalez RN  Outcome: Progressing  6/24/2025 1407 by Kate Gonzalez RN  Outcome: Progressing  Intervention: Maintain Blood Pressure Management  Recent Flowsheet Documentation  Taken 6/24/2025 0950 by Kate Gonzalez RN  Medication Review/Management: medications reviewed     Problem: Breathing Pattern Ineffective  Goal: Effective Breathing Pattern  6/24/2025 1427 by Kate Gonzalez RN  Outcome: Progressing  6/24/2025 1407 by Kate Gonzalez RN  Outcome: Progressing

## 2025-06-24 NOTE — PHARMACY-ADMISSION MEDICATION HISTORY
Pharmacist Admission Medication History    Admission medication history is complete. The information provided in this note is only as accurate as the sources available at the time of the update.    Information Source(s): Patient, Family member, and CareEverywhere/SureScripts via in-person    Pertinent Information: son helps manage her home medications.    Changes made to PTA medication list:  Added: None  Deleted: prednisone course  Changed: None    Allergies reviewed with patient and updates made in EHR: yes    Medication History Completed By: Gm Brewer RP 6/23/2025 8:39 PM    PTA Med List   Medication Sig Last Dose/Taking    acetaminophen (TYLENOL) 500 MG tablet Take 500-1,000 mg by mouth every 8 hours as needed for mild pain Taking As Needed    albuterol (PROAIR HFA/PROVENTIL HFA/VENTOLIN HFA) 108 (90 Base) MCG/ACT inhaler Inhale 2 puffs into the lungs every 4 hours as needed for wheezing Taking As Needed    albuterol (PROVENTIL) (2.5 MG/3ML) 0.083% neb solution Take 1 vial (2.5 mg) by nebulization every 4 hours as needed for shortness of breath, wheezing or cough. Taking As Needed    amLODIPine (NORVASC) 5 MG tablet Take 5 mg by mouth daily 6/23/2025 Morning    aspirin (ASA) 81 MG EC tablet Take 1 tablet (81 mg) by mouth daily 6/23/2025 Morning    atorvastatin (LIPITOR) 40 MG tablet Take 1 tablet (40 mg) by mouth every evening 6/22/2025 Evening    azithromycin (ZITHROMAX) 250 MG tablet Take 1 tablet (250 mg) by mouth daily. 6/23/2025 Morning    budesonide (PULMICORT) 0.5 MG/2ML neb solution Take 2 mLs (0.5 mg) by nebulization 2 times daily. 6/23/2025 Morning    carboxymethylcellulose (REFRESH PLUS) 0.5 % SOLN ophthalmic solution Place 1 drop into both eyes daily as needed for dry eyes. Taking As Needed    Cholecalciferol (VITAMIN D3) 50 MCG (2000 UT) CAPS Take 50 mcg by mouth daily 6/23/2025 Morning    Continuous Glucose Sensor (DEXCOM G6 SENSOR) MISC APPLY 1 SENSOR TO SKIN EVERY 10 DAYS Taking     Continuous Glucose Transmitter (DEXCOM G6 TRANSMITTER) MISC USE AS DIRECTED. CHANGE EVERY 3 MONTHS Taking    formoterol (PERFOROMIST) 20 MCG/2ML neb solution Take 2 mLs (20 mcg) by nebulization every 12 hours. 6/23/2025 Morning    furosemide (LASIX) 40 MG tablet Take 40 mg by mouth daily 6/23/2025 Morning    losartan (COZAAR) 100 MG tablet Take 100 mg by mouth daily 6/23/2025 Morning    metFORMIN (GLUCOPHAGE XR) 500 MG 24 hr tablet Take 500 mg by mouth 2 times daily (with meals). 6/23/2025 Morning    metoprolol succinate ER (TOPROL XL) 50 MG 24 hr tablet Take 50 mg by mouth daily. 6/23/2025 Morning    nystatin (NYSTOP) 359456 UNIT/GM external powder Apply topically 2 times daily as needed Taking As Needed    polyethylene glycol-propylene glycol (SYSTANE ULTRA) 0.4-0.3 % SOLN ophthalmic solution Place 1 drop into both eyes nightly as needed for dry eyes. Taking As Needed    triamcinolone (KENALOG) 0.1 % external ointment Apply topically 2 times daily as needed for irritation Taking As Needed

## 2025-06-24 NOTE — PLAN OF CARE
"ICU End of Shift Summary.  For vital signs and complete assessments, please see documentation flowsheets.     Pertinent assessments: Pt does not speak english. Pts son stayed the night to translate for pt.   Major Shift Events: Pt reported that breathing is more comfortable. Tolerating 2 L NC. Placed CPAP on during sleep.   Plan (Upcoming Events): Wean 02 as able.   Discharge/Transfer Needs: tbd    Bedside Shift Report Completed : y  Bedside Safety Check Completed:y       Problem: Adult Inpatient Plan of Care  Goal: Plan of Care Review  Description: The Plan of Care Review/Shift note should be completed every shift.  The Outcome Evaluation is a brief statement about your assessment that the patient is improving, declining, or no change.  This information will be displayed automatically on your shift  note.  Outcome: Progressing  Flowsheets (Taken 6/24/2025 0518)  Outcome Evaluation: Cpap during sleep. Pt reports breathing has improved.  Plan of Care Reviewed With:   patient   child  Overall Patient Progress: improving  Goal: Patient-Specific Goal (Individualized)  Description: You can add care plan individualizations to a care plan. Examples of Individualization might be:  \"Parent requests to be called daily at 9am for status\", \"I have a hard time hearing out of my right ear\", or \"Do not touch me to wake me up as it startles  me\".  Outcome: Progressing  Goal: Absence of Hospital-Acquired Illness or Injury  Outcome: Progressing  Intervention: Identify and Manage Fall Risk  Recent Flowsheet Documentation  Taken 6/24/2025 0400 by Fang Lomas, RN  Safety Promotion/Fall Prevention:   activity supervised   lighting adjusted   increase visualization of patient  Taken 6/24/2025 0000 by Fang Lomas, RN  Safety Promotion/Fall Prevention:   activity supervised   lighting adjusted   increase visualization of patient  Taken 6/23/2025 2000 by Fang Lomas, RN  Safety Promotion/Fall Prevention:   activity supervised   " lighting adjusted   increase visualization of patient  Intervention: Prevent Skin Injury  Recent Flowsheet Documentation  Taken 6/24/2025 0400 by Fang Lomas RN  Body Position:   weight shifting   turned   left  Taken 6/24/2025 0200 by Fang Lomas RN  Body Position: weight shifting  Taken 6/24/2025 0000 by Fang Lomas RN  Body Position:   weight shifting   turned   left  Taken 6/23/2025 2200 by Fang Lomas RN  Body Position:   turned   right  Taken 6/23/2025 2000 by Fang Lomas RN  Body Position: weight shifting  Goal: Optimal Comfort and Wellbeing  Outcome: Progressing  Intervention: Monitor Pain and Promote Comfort  Recent Flowsheet Documentation  Taken 6/23/2025 2000 by Fang Lomas RN  Pain Management Interventions:   repositioned   rest  Intervention: Provide Person-Centered Care  Recent Flowsheet Documentation  Taken 6/24/2025 0400 by Fang Lomas RN  Trust Relationship/Rapport:   care explained   questions answered   questions encouraged   reassurance provided  Taken 6/24/2025 0000 by Fang Lomas RN  Trust Relationship/Rapport:   care explained   questions answered   questions encouraged   reassurance provided  Taken 6/23/2025 2000 by Fang Lomas RN  Trust Relationship/Rapport:   care explained   questions answered   questions encouraged   reassurance provided  Goal: Readiness for Transition of Care  Outcome: Progressing   Goal Outcome Evaluation:      Plan of Care Reviewed With: patient, child    Overall Patient Progress: improvingOverall Patient Progress: improving    Outcome Evaluation: Cpap during sleep. Pt reports breathing has improved.

## 2025-06-25 ENCOUNTER — APPOINTMENT (OUTPATIENT)
Dept: OCCUPATIONAL THERAPY | Facility: CLINIC | Age: 75
DRG: 202 | End: 2025-06-25
Attending: INTERNAL MEDICINE
Payer: COMMERCIAL

## 2025-06-25 LAB
BASE EXCESS BLDV CALC-SCNC: 5.9 MMOL/L (ref -3–3)
GLUCOSE BLDC GLUCOMTR-MCNC: 198 MG/DL (ref 70–99)
GLUCOSE BLDC GLUCOMTR-MCNC: 263 MG/DL (ref 70–99)
GLUCOSE BLDC GLUCOMTR-MCNC: 288 MG/DL (ref 70–99)
GLUCOSE BLDC GLUCOMTR-MCNC: 315 MG/DL (ref 70–99)
GLUCOSE BLDC GLUCOMTR-MCNC: 336 MG/DL (ref 70–99)
HCO3 BLDV-SCNC: 33 MMOL/L (ref 21–28)
O2/TOTAL GAS SETTING VFR VENT: 0 %
OXYHGB MFR BLDV: 35 % (ref 70–75)
PCO2 BLDV: 59 MM HG (ref 40–50)
PH BLDV: 7.36 [PH] (ref 7.32–7.43)
PO2 BLDV: 25 MM HG (ref 25–47)
SAO2 % BLDV: 35.6 % (ref 70–75)

## 2025-06-25 PROCEDURE — 94640 AIRWAY INHALATION TREATMENT: CPT

## 2025-06-25 PROCEDURE — 250N000009 HC RX 250: Performed by: INTERNAL MEDICINE

## 2025-06-25 PROCEDURE — 94640 AIRWAY INHALATION TREATMENT: CPT | Mod: 76

## 2025-06-25 PROCEDURE — 999N000157 HC STATISTIC RCP TIME EA 10 MIN

## 2025-06-25 PROCEDURE — 120N000001 HC R&B MED SURG/OB

## 2025-06-25 PROCEDURE — 99232 SBSQ HOSP IP/OBS MODERATE 35: CPT | Performed by: INTERNAL MEDICINE

## 2025-06-25 PROCEDURE — 36415 COLL VENOUS BLD VENIPUNCTURE: CPT | Performed by: INTERNAL MEDICINE

## 2025-06-25 PROCEDURE — 97535 SELF CARE MNGMENT TRAINING: CPT | Mod: GO | Performed by: OCCUPATIONAL THERAPIST

## 2025-06-25 PROCEDURE — 97166 OT EVAL MOD COMPLEX 45 MIN: CPT | Mod: GO | Performed by: OCCUPATIONAL THERAPIST

## 2025-06-25 PROCEDURE — 250N000011 HC RX IP 250 OP 636: Performed by: INTERNAL MEDICINE

## 2025-06-25 PROCEDURE — 82805 BLOOD GASES W/O2 SATURATION: CPT | Performed by: INTERNAL MEDICINE

## 2025-06-25 PROCEDURE — 250N000013 HC RX MED GY IP 250 OP 250 PS 637: Performed by: INTERNAL MEDICINE

## 2025-06-25 RX ORDER — PREDNISONE 20 MG/1
40 TABLET ORAL DAILY
Status: DISCONTINUED | OUTPATIENT
Start: 2025-06-26 | End: 2025-06-26

## 2025-06-25 RX ADMIN — INSULIN GLARGINE 20 UNITS: 100 INJECTION, SOLUTION SUBCUTANEOUS at 09:06

## 2025-06-25 RX ADMIN — ENOXAPARIN SODIUM 40 MG: 40 INJECTION SUBCUTANEOUS at 18:03

## 2025-06-25 RX ADMIN — AZITHROMYCIN DIHYDRATE 250 MG: 250 TABLET ORAL at 08:52

## 2025-06-25 RX ADMIN — BUDESONIDE 0.5 MG: 0.5 INHALANT RESPIRATORY (INHALATION) at 07:56

## 2025-06-25 RX ADMIN — METHYLPREDNISOLONE SODIUM SUCCINATE 40 MG: 40 INJECTION, POWDER, FOR SOLUTION INTRAMUSCULAR; INTRAVENOUS at 05:40

## 2025-06-25 RX ADMIN — FORMOTEROL FUMARATE 20 MCG: 20 SOLUTION RESPIRATORY (INHALATION) at 21:00

## 2025-06-25 RX ADMIN — CEFTRIAXONE 1 G: 1 INJECTION, POWDER, FOR SOLUTION INTRAMUSCULAR; INTRAVENOUS at 08:53

## 2025-06-25 RX ADMIN — FORMOTEROL FUMARATE 20 MCG: 20 SOLUTION RESPIRATORY (INHALATION) at 07:56

## 2025-06-25 RX ADMIN — ASPIRIN 81 MG: 81 TABLET, DELAYED RELEASE ORAL at 08:53

## 2025-06-25 RX ADMIN — BUDESONIDE 0.5 MG: 0.5 INHALANT RESPIRATORY (INHALATION) at 21:00

## 2025-06-25 RX ADMIN — LOSARTAN POTASSIUM 100 MG: 100 TABLET, FILM COATED ORAL at 08:53

## 2025-06-25 RX ADMIN — AMLODIPINE BESYLATE 5 MG: 5 TABLET ORAL at 08:53

## 2025-06-25 RX ADMIN — METOPROLOL SUCCINATE 50 MG: 50 TABLET, EXTENDED RELEASE ORAL at 20:29

## 2025-06-25 RX ADMIN — FUROSEMIDE 40 MG: 20 TABLET ORAL at 08:53

## 2025-06-25 RX ADMIN — ALBUTEROL SULFATE 2.5 MG: 2.5 SOLUTION RESPIRATORY (INHALATION) at 20:54

## 2025-06-25 ASSESSMENT — ACTIVITIES OF DAILY LIVING (ADL)
ADLS_ACUITY_SCORE: 70
ADLS_ACUITY_SCORE: 66
ADLS_ACUITY_SCORE: 62
ADLS_ACUITY_SCORE: 66
ADLS_ACUITY_SCORE: 66
ADLS_ACUITY_SCORE: 70
ADLS_ACUITY_SCORE: 66
ADLS_ACUITY_SCORE: 66
ADLS_ACUITY_SCORE: 70
ADLS_ACUITY_SCORE: 66
ADLS_ACUITY_SCORE: 66
ADLS_ACUITY_SCORE: 70
ADLS_ACUITY_SCORE: 66
ADLS_ACUITY_SCORE: 70
DEPENDENT_IADLS:: CLEANING;COOKING;LAUNDRY;SHOPPING;MEAL PREPARATION;MEDICATION MANAGEMENT;MONEY MANAGEMENT;TRANSPORTATION;INCONTINENCE
ADLS_ACUITY_SCORE: 66
ADLS_ACUITY_SCORE: 70
ADLS_ACUITY_SCORE: 66
ADLS_ACUITY_SCORE: 66
ADLS_ACUITY_SCORE: 70
ADLS_ACUITY_SCORE: 66
ADLS_ACUITY_SCORE: 70

## 2025-06-25 NOTE — PLAN OF CARE
"Aox4, VSS, daughter at bedside to translate, on RA with home CPAP at night.  PT worked with OT today, PT to work with PT as well.  Continuing on rocephin and azithromycin.  Steroids are now PO as well as lasix.  Lantus started for PT.  PT has new  phone set up at bedside with set appt time if family is not at bedside.  Discharge TBD.    Goal Outcome Evaluation:      Plan of Care Reviewed With: patient    Overall Patient Progress: improvingOverall Patient Progress: improving    Outcome Evaluation: PT worked with OT today, steroids now PO along with lasix, lantus started, cont on abx    Problem: Adult Inpatient Plan of Care  Goal: Plan of Care Review  Description: The Plan of Care Review/Shift note should be completed every shift.  The Outcome Evaluation is a brief statement about your assessment that the patient is improving, declining, or no change.  This information will be displayed automatically on your shift  note.  Outcome: Progressing  Flowsheets (Taken 6/25/2025 1725)  Outcome Evaluation: PT worked with OT today, steroids now PO along with lasix, lantus started, cont on abx  Plan of Care Reviewed With: patient  Overall Patient Progress: improving  Goal: Patient-Specific Goal (Individualized)  Description: You can add care plan individualizations to a care plan. Examples of Individualization might be:  \"Parent requests to be called daily at 9am for status\", \"I have a hard time hearing out of my right ear\", or \"Do not touch me to wake me up as it startles  me\".  Outcome: Progressing  Goal: Absence of Hospital-Acquired Illness or Injury  Outcome: Progressing  Intervention: Identify and Manage Fall Risk  Recent Flowsheet Documentation  Taken 6/25/2025 1558 by Libertad Harper, RN  Safety Promotion/Fall Prevention: activity supervised  Intervention: Prevent Skin Injury  Recent Flowsheet Documentation  Taken 6/25/2025 1558 by Libertad Harper, RN  Body Position:   position maintained   heels " Lab reporting that Pt's urine was never received. Unable to process. Pt d/c'd prior to urine results.   elevated  Goal: Optimal Comfort and Wellbeing  Outcome: Progressing  Goal: Readiness for Transition of Care  Outcome: Progressing         Problem: Comorbidity Management  Goal: Blood Glucose Levels Within Targeted Range  Outcome: Progressing  Goal: Blood Pressure in Desired Range  Outcome: Progressing       Problem: Breathing Pattern Ineffective  Goal: Effective Breathing Pattern  Outcome: Progressing

## 2025-06-25 NOTE — PLAN OF CARE
"VS slightly elevated at times. On RA. On scheduled Nebs, IV Rocephin,po Zithromax and IV steroids changed to PO. On PO Lasix, purwick in place.  Monitoring Bg's, On Sliding scale and Lantus. PT/OT following, Lift at times, walker and gait belt at bedside. CPAP at night. Need to order food for pt. Pt requires  services if son not here.         Goal Outcome Evaluation:      Plan of Care Reviewed With: patient    Overall Patient Progress: improvingOverall Patient Progress: improving    Outcome Evaluation: VS slightly elevated at times. On RA. On scheduled Nebs, IV Rocephin,po Zithromax and IV steroids changed to PO. Monitoring Bg's, On Sliding scale and Lantus. PT/OT following, Lift at times, walker and gait belt at bedside. CPAP at night.          Problem: Adult Inpatient Plan of Care  Goal: Plan of Care Review  Description: The Plan of Care Review/Shift note should be completed every shift.  The Outcome Evaluation is a brief statement about your assessment that the patient is improving, declining, or no change.  This information will be displayed automatically on your shift  note.  Outcome: Progressing  Flowsheets (Taken 6/25/2025 1310)  Outcome Evaluation: VS slightly elevated at times. On RA. On scheduled Nebs, IV Rocephin,po Zithromax and IV steroids changed to PO. Monitoring Bg's, On Sliding scale and Lantus. PT/OT following, Lift at times, walker and gait belt at bedside. CPAP at night.  Plan of Care Reviewed With: patient  Overall Patient Progress: improving  Goal: Patient-Specific Goal (Individualized)  Description: You can add care plan individualizations to a care plan. Examples of Individualization might be:  \"Parent requests to be called daily at 9am for status\", \"I have a hard time hearing out of my right ear\", or \"Do not touch me to wake me up as it startles  me\".  Outcome: Progressing  Goal: Absence of Hospital-Acquired Illness or Injury  Outcome: Progressing  Intervention: Identify and " Manage Fall Risk  Recent Flowsheet Documentation  Taken 6/25/2025 0910 by Love Stevenson RN  Safety Promotion/Fall Prevention: activity supervised  Goal: Optimal Comfort and Wellbeing  Outcome: Progressing  Goal: Readiness for Transition of Care  Outcome: Progressing     Problem: Delirium  Goal: Optimal Coping  Outcome: Progressing  Goal: Improved Behavioral Control  Outcome: Progressing  Goal: Improved Attention and Thought Clarity  Outcome: Progressing  Goal: Improved Sleep  Outcome: Progressing     Problem: Comorbidity Management  Goal: Blood Glucose Levels Within Targeted Range  Outcome: Progressing  Intervention: Monitor and Manage Glycemia  Recent Flowsheet Documentation  Taken 6/25/2025 0910 by Love Stevenson RN  Medication Review/Management: medications reviewed  Goal: Blood Pressure in Desired Range  Outcome: Progressing  Intervention: Maintain Blood Pressure Management  Recent Flowsheet Documentation  Taken 6/25/2025 0910 by Love Stevenson RN  Medication Review/Management: medications reviewed     Problem: Breathing Pattern Ineffective  Goal: Effective Breathing Pattern  Outcome: Progressing

## 2025-06-25 NOTE — PROGRESS NOTES
[facility]  Hospitalist Progress Note  Servando Lemon MD 06/25/2025    Reason for Stay (Diagnosis): asthma         Assessment and Plan:      Summary of Stay:  Pilo Quintana is a 75 year old [place]ian female who speaks Mano.  She was admitted to the ICU on 6/23/25 from the ED due to respiratory distress with acute hypoxic and hypercapnic resp failure due to apparent asthma exacerbation. She did well in the ICU and was able to wean down on her need for intermittent BiPAP and at the time of transfer was stable on 2 LPM by NC.     PMH including type 2 diabetes, hypertension, obesity, asthma, remote history of CVA who presents with respiratory distress via EMS.  She also has some degree of dementia, but in any case does not speak English and we do not have access to a Mano  other than her son.     She was actually seen in the emergency room the day prior to admission for a fall and was discharged.  She did develop shortness of breath following that.  Per EMS she was breathing at 40/min and she was placed on CPAP.  The patient presented with her son who helped provide the history and also interpret.  He takes care of her and actually placed her on CPAP overnight at home and gave a dose of prednisone.  She has had a cough but no fever.     In the ER, evaluation was revealing of hypertension.  She was still breathing at 36 breaths/min but was not hypoxic but with increased WOB.    Lab workup showed relatively normal basic metabolic panel, lactic acid of 1.9, VBG pH 7.34, pCO2 60 and high-sensitivity troponin of 14 with a BNP of 263.  CBC was normal.  Influenza A/B, RSV and COVID testing were negative.  Chest x-ray showed increasing mild bibasilar pulmonary opacities and some increased vascular prominence.  She was started on ceftriaxone, azithromycin, IV Solu-Medrol and given magnesium sulfate as well as multiple nebs.  She was admitted for apparent asthma exacerbation with possible pneumonia.  A  component of mild fluid overload was considered as well, though she has not been actively diuresed during this hospital stay.     Assessment and Plan:   Acute hypoxemic and hypercapnic respiratory failure: Suspect asthma exacerbation.  Apparently has not been able to cooperate with formal PFTs due to dementia and language barrier.  Wheezing on exam with diminished air movement patient.  Does have infiltrates on chest x-ray so seems reasonable to cover with antibiotics.  -- less likely CHF with normal BNP and normal echo in 12/2024.  -- Continue ceftriaxone and azithromycin  -- discontinue IV solumedrol in favor of Prednisone 40 mg daily.   -- Scheduled and as needed albuterol nebs  -- At home, she is on nebs due to difficulty taking inhalers in the setting of dementia etc.  -- Follows with pulmonary medicine, and son indicates she has a follow up appointment on July 11.     2.   NIDDM with steroid-induced hyperglycemia. Baseline control is quite good with HbA1c of 7.3.  -- Hold metformin for now, treat with Lantus and sliding scale insulin.    -- decreased Lantus to 20 units  -- taper steroids     3.   History of hypertension: Resume home losartan, Lasix 40 mg and metoprolol     4.   History of asthma: Resuming home nebs.     5.   Remote history of CVA: continue ASA    6.  MAGALIS on CPAP at night.    7.  History of dementia: Resides with her son who provides cares.  He also interprets.     Medically Ready for Discharge: Anticipated Tomorrow      Clinically Significant Risk Factors                   # Hypertension: Noted on problem list           # DMII: A1C = 7.3 % (Ref range: <5.7 %) within past 6 months, PRESENT ON ADMISSION        # Financial/Environmental Concerns:                 Interval History (Subjective):      Transferred from ICU last night.     Met with pt today while her son was at the bedside today. He translates for her. She overall looks comfortable and he reports she is much improved from admission.      Discussed meds and plans for follow up.   Plan to observe overnight and discharge tomorrow if she has a good night.                     Physical Exam:      Last Vital Signs:  BP (!) 150/81 (BP Location: Left arm)   Pulse 68   Temp 98.5  F (36.9  C) (Axillary)   Resp 16   Wt 72.6 kg (160 lb 0.9 oz)   LMP  (LMP Unknown)   SpO2 100%   BMI 27.47 kg/m      I/O last 3 completed shifts:  In: -   Out: 1100 [Urine:1100]    General: Alert, awake, no acute distress.  Nasal cannula.  HEENT: NC/AT, eyes anicteric  Cardiac: RRR, S1, S2.  No murmurs appreciated.  Pulmonary: Expiratory wheezing.  Normal chest rise.  Does not take good deep breaths for my exam.  Abdomen: soft, non-tender, non-distended.  Bowel Sounds Present.  No guarding.  Extremities: no deformities.  Warm, well perfused.  Skin: no rashes or lesions noted.  Warm and Dry.         Medications:      All current medications were reviewed with changes reflected in problem list.         Data:      All new lab and imaging data was reviewed.   Labs:  Recent Results (from the past 24 hours)   Glucose by meter   Result Value Ref Range    GLUCOSE BY METER POCT 357 (H) 70 - 99 mg/dL   Glucose by meter   Result Value Ref Range    GLUCOSE BY METER POCT 281 (H) 70 - 99 mg/dL   Glucose by meter   Result Value Ref Range    GLUCOSE BY METER POCT 288 (H) 70 - 99 mg/dL   Glucose by meter   Result Value Ref Range    GLUCOSE BY METER POCT 198 (H) 70 - 99 mg/dL   Blood gas venous   Result Value Ref Range    pH Venous 7.36 7.32 - 7.43    pCO2 Venous 59 (H) 40 - 50 mm Hg    pO2 Venous 25 25 - 47 mm Hg    Bicarbonate Venous 33 (H) 21 - 28 mmol/L    Base Excess/Deficit Venous 5.9 (H) -3.0 - 3.0 mmol/L    FIO2 0     Oxyhemoglobin Venous 35 (L) 70 - 75 %    O2 Sat, Venous 35.6 (L) 70.0 - 75.0 %    Narrative    In healthy individuals, oxyhemoglobin (O2Hb) and oxygen saturation (SO2) are approximately equal. In the presence of dyshemoglobins, oxyhemoglobin can be considerably lower  than oxygen saturation.   Glucose by meter   Result Value Ref Range    GLUCOSE BY METER POCT 336 (H) 70 - 99 mg/dL

## 2025-06-25 NOTE — PROGRESS NOTES
06/25/25 1500   Appointment Info   Signing Clinician's Name / Credentials (OT) WALTER Canela       Present no   Living Environment   People in Home child(mahi), adult  (son)   Living Environment Comments Pt son not present for session, Ipad  does not have pt language. Limited home set up information via chart review. Pt chart, pt lives with son and has Ax1 with all mobility and ADLs. Pt has a walker and w/c and walks short distances.   Self-Care   Usual Activity Tolerance moderate   Current Activity Tolerance poor   Regular Exercise Yes   Activity/Exercise Type other (see comments)  (does exercises with son daily)   Exercise Amount/Frequency daily   Equipment Currently Used at Home walker, rolling;wheelchair, manual;grab bar, toilet;grab bar, tub/shower;shower chair   Fall history within last six months yes   Number of times patient has fallen within last six months 1   Activity/Exercise/Self-Care Comment Pt is a limited historian due to language barrier, all prior level of function gathered via set up   Instrumental Activities of Daily Living (IADL)   IADL Comments Appears pt family assist with all IADLS at baseline   General Information   Onset of Illness/Injury or Date of Surgery 06/23/25   Referring Physician Uriel Chacko MD   Patient/Family Therapy Goal Statement (OT) Pt did not state this date   Additional Occupational Profile Info/Pertinent History of Current Problem 75 year old [place] female who speaks Mano.  She was admitted to the ICU on 6/23/25 from the ED due to respiratory distress with acute hypoxic and hypercapnic resp failure due to apparent asthma exacerbation. She did well in the ICU and was able to wean down on her need for intermittent BiPAP and at the time of transfer was stable on 2 LPM by NC.      PMH including type 2 diabetes, hypertension, obesity, asthma, remote history of CVA who presents with respiratory distress via EMS.  She  also has some degree of dementia, but in any case does not speak English and we do not have access to a Man  other than her son.      She was actually seen in the emergency room the day prior to admission for a fall and was discharged.  She did develop shortness of breath following that.  Per EMS she was breathing at 40/min and she was placed on CPAP.  The patient presented with her son who helped provide the history and also interpret.  He takes care of her and actually placed her on CPAP overnight at home and gave a dose of prednisone.  She has had a cough but no fever.     In the ER, evaluation was revealing of hypertension.  She was still breathing at 36 breaths/min but was not hypoxic but with increased WOB.    Lab workup showed relatively normal basic metabolic panel, lactic acid of 1.9, VBG pH 7.34, pCO2 60 and high-sensitivity troponin of 14 with a BNP of 263.  CBC was normal.  Influenza A/B, RSV and COVID testing were negative.  Chest x-ray showed increasing mild bibasilar pulmonary opacities and some increased vascular prominence.  She was started on ceftriaxone, azithromycin, IV Solu-Medrol and given magnesium sulfate as well as multiple nebs.  She was admitted for apparent asthma exacerbation with possible pneumonia.  A component of mild fluid overload was considered as well, though she has not been actively diuresed during this hospital stay.   Existing Precautions/Restrictions fall   Cognitive Status Examination   Cognitive Status Comments Cognition is difficult to assess due to language barrier   Visual Perception   Visual Impairment/Limitations WFL   Sensory   Sensory Comments Pt reports intact   Pain Assessment   Patient Currently in Pain No   Posture   Posture forward head position;protracted shoulders   Range of Motion Comprehensive   Comment, General Range of Motion Impaired BUE shoulder flexion   Strength Comprehensive (MMT)   Comment, General Manual Muscle Testing (MMT) Assessment  Global weakness, poor tolerance for activity   Bed Mobility   Comment (Bed Mobility) Max assist for LE   Transfers   Transfer Comments Min-mod assist   Balance   Balance Comments Impaired in standing   Activities of Daily Living   BADL Assessment/Intervention bathing;lower body dressing;grooming;toileting   Bathing Assessment/Intervention   Comment, (Bathing) Max assist per clinical reasoning   Lower Body Dressing Assessment/Training   Comment, (Lower Body Dressing) Max assist   Grooming Assessment/Training   Comment, (Grooming) Max assist per clinical reasoning with poor tolerance for activity   Toileting   Comment, (Toileting) Dependent via lift   Clinical Impression   Criteria for Skilled Therapeutic Interventions Met (OT) Yes, treatment indicated   OT Diagnosis Decline in ADL independence and safety.   Influenced by the following impairments Respiratory failure   OT Problem List-Impairments impacting ADL problems related to;activity tolerance impaired;balance;mobility;range of motion (ROM);strength   Assessment of Occupational Performance 5 or more Performance Deficits   Identified Performance Deficits Imparied dressing bathing toileting and groomign tolerance   Planned Therapy Interventions (OT) ADL retraining;progressive activity/exercise;home program guidelines   Clinical Decision Making Complexity (OT) detailed assessment/moderate complexity   Risk & Benefits of therapy have been explained evaluation/treatment results reviewed;care plan/treatment goals reviewed;risks/benefits reviewed;current/potential barriers reviewed;participants voiced agreement with care plan;participants included;patient   OT Total Evaluation Time   OT Eval, Moderate Complexity Minutes (64120) 8   OT Goals   Therapy Frequency (OT) Daily   OT Predicted Duration/Target Date for Goal Attainment 07/04/25   OT Goals Hygiene/Grooming;Upper Body Dressing;Lower Body Dressing;Lower Body Bathing;Toilet Transfer/Toileting   OT: Hygiene/Grooming  modified independent;using adaptive equipment;while standing   OT: Upper Body Dressing Modified independent   OT: Lower Body Dressing Modified independent   OT: Lower Body Bathing Modified independent;using adaptive equipment   OT: Toilet Transfer/Toileting Modified independent;toilet transfer;cleaning and garment management;using adaptive equipment   Self-Care/Home Management   Self-Care/Home Mgmt/ADL, Compensatory, Meal Prep Minutes (01623) 32   Symptoms Noted During/After Treatment (Meal Preparation/Planning Training) fatigue   Treatment Detail/Skilled Intervention OT: Pt agreeable to therapy. Limited ability to communicate du eto language barrier but pt appears able to understand some English. Pt agreeable to transfer to bed. Pt scooted forward in chair with mod assist and cues. Pt completed sit tostand with mod assist due to chair height and walker. Once standing, appears pt stooled. Pt completed steated rest break. PT completed sit to stand with mod assist, tolerated 1 minutes in standing rewuiting max assist to complete roxie and posterior cares. PT requierd seated rest break x2 during clean up, max assist to manage clothing. Pt completed stand pviot to chair with max assist to swing her hips to EOB. PT has limited foot clearance even with walker and cues. Pt completed sit to supine wiht max assist for LE. Pt adjusted for skin and joint integrity in bed. Pt in bed with alarm onand call light upon TO departure   OT Discharge Planning   OT Plan Ask son for PLOF, stand pivot with Ax2, g/h seated EOB, due to pt height, questionable whether she would be able to stand pivot to the chair as it is too high   OT Discharge Recommendation (DC Rec) Transitional Care Facility;home with assist;home with home care occupational therapy   OT Rationale for DC Rec If pt is able to ambulate with CGA at baseline, she iwll require TCU due to increased assist wit htransfers and ADLS. If pt has mod-max assist from son at baseline,  she could return home with ocntinued assist and HHOT to progress ADL tolerance.,   OT Brief overview of current status Mod assist sit to stand   OT Total Distance Amb During Session (feet) 1   Total Session Time   Timed Code Treatment Minutes 32   Total Session Time (sum of timed and untimed services) 40

## 2025-06-25 NOTE — CONSULTS
Care Management Initial Consult    General Information  Assessment completed with: Children,    Type of CM/SW Visit: Initial Assessment    Primary Care Provider verified and updated as needed: Yes   Readmission within the last 30 days: no previous admission in last 30 days      Reason for Consult: care coordination/care conference, discharge planning     Communication Assessment  Patient's communication style: spoken language (non-English) (Rae, son speaks english)    Hearing Difficulty or Deaf: yes   Wear Glasses or Blind: no    Cognitive  Cognitive/Neuro/Behavioral:  (unsure son not at bedside and no )  Level of Consciousness: alert  Arousal Level: opens eyes spontaneously  Orientation: oriented x 4 (son helped with orientation questions)  Mood/Behavior: calm  Best Language: 0 - No aphasia  Speech: garbled    Living Environment:   People in home: child(mahi), adult     Current living Arrangements: house      Able to return to prior arrangements: yes       Family/Social Support:  Care provided by: child(mahi)  Provides care for: no one, unable/limited ability to care for self  Marital Status:   Support system: Children, PCA          Description of Support System: Supportive, Involved    Support Assessment: Adequate family and caregiver support    Current Resources:   Patient receiving home care services: No        Community Resources: PCA  Equipment currently used at home: walker, rolling, wheelchair, manual, grab bar, toilet, grab bar, tub/shower, shower chair  Supplies currently used at home: Incontinence Supplies      Lifestyle & Psychosocial Needs:  Social Drivers of Health     Food Insecurity: Low Risk  (6/23/2025)    Food Insecurity     Within the past 12 months, did you worry that your food would run out before you got money to buy more?: No     Within the past 12 months, did the food you bought just not last and you didn t have money to get more?: No   Depression: Not at risk (1/29/2024)     Received from Replaced by [facility]    PHQ-2     PHQ-2 Score: 0   Housing Stability: Low Risk  (6/23/2025)    Housing Stability     Do you have housing? : Yes     Are you worried about losing your housing?: No   Tobacco Use: Low Risk  (4/29/2025)    Patient History     Smoking Tobacco Use: Never     Smokeless Tobacco Use: Never     Passive Exposure: Not on file   Financial Resource Strain: Low Risk  (6/23/2025)    Financial Resource Strain     Within the past 12 months, have you or your family members you live with been unable to get utilities (heat, electricity) when it was really needed?: No   Alcohol Use: Not on file   Transportation Needs: Low Risk  (6/23/2025)    Transportation Needs     Within the past 12 months, has lack of transportation kept you from medical appointments, getting your medicines, non-medical meetings or appointments, work, or from getting things that you need?: No   Physical Activity: Not on file   Interpersonal Safety: Low Risk  (6/23/2025)    Interpersonal Safety     Do you feel physically and emotionally safe where you currently live?: Yes     Within the past 12 months, have you been hit, slapped, kicked or otherwise physically hurt by someone?: No     Within the past 12 months, have you been humiliated or emotionally abused in other ways by your partner or ex-partner?: No   Stress: Not on file   Social Connections: Not on file   Health Literacy: Not on file       Functional Status:  Prior to admission patient needed assistance:   Dependent ADLs:: Ambulation-walker, Bathing, Dressing, Eating, Grooming, Incontinence, Positioning, Transfers, Wheelchair-with assist, Toileting  Dependent IADLs:: Cleaning, Cooking, Laundry, Shopping, Meal Preparation, Medication Management, Money Management, Transportation, Incontinence    Values/Beliefs:  Spiritual, Cultural Beliefs, Restoration Practices, Values that affect care:    Description of Beliefs that Will Affect Care: Lutheran            Discussed   Partnership in Safe Discharge Planning  document with patient/family: No    Additional Information:  CM consulted for elevated risk score 22%. Patient was admitted respiratory failure and weakness. She was admitted to ICU and transferred to the medical unit. She has consults for Physical Therapy/Occupational Therapy to see for recommendations.     Spoke to son Balwinder via phone to discuss plan of care and home services. He verified that patient lives at home with him and his family. Balwinder provides total cares for patient. He assists with all ADLs and IADLs. He feeds her and manages her medications. He does UE/LE exercises daily with her. Patient has a walker and a wheelchair at home. She is able to amb short distances with her son. He states the bathroom is set up for her and that they have all of the equipment that they need at home. He along with another person provide PCA services for patient through [facility]. She gets assistance all day until her son puts her to bed. He states they have had HC therapy in the past. He is not opposed to HC but feels he already does daily exercises for patient and has previous HC recommendations so does not feel they would need it again. Explained CM role and that we would be available for any questions or concerns they may have. He states he is planning to take patient home hopefully tomorrow with same services. He does not anticipate any discharge needs. He will transport patient home.     Next Steps: No needs identified.             Jazzy Petersen RN BSN CM  Inpatient Care Coordination  [facility]  [phone]

## 2025-06-25 NOTE — PLAN OF CARE
"Goal Outcome Evaluation:      Plan of Care Reviewed With: patient    Overall Patient Progress: improvingOverall Patient Progress: improving    Outcome Evaluation: .    Vitals VSS except BP slightly elevated  Neuro A&Ox4 with some forgetfulness. Mano speaking, son at bedside to interpret   Respiratory 98% RA. CPAP at night   Cardiac/Tele WDL  GI/ Incontinent. Purewick in place. Small bm x1   Skin Intact, scab at right perineum   LDAs PIV SL  Labs   Diet Regular  Activity A2 lift, Q2hr repo's  Plan Continue with plan of care.       Problem: Adult Inpatient Plan of Care  Goal: Plan of Care Review  Description: The Plan of Care Review/Shift note should be completed every shift.  The Outcome Evaluation is a brief statement about your assessment that the patient is improving, declining, or no change.  This information will be displayed automatically on your shift  note.  Outcome: Progressing  Flowsheets (Taken 6/25/2025 0242)  Outcome Evaluation: .  Plan of Care Reviewed With: patient  Overall Patient Progress: improving  Goal: Patient-Specific Goal (Individualized)  Description: You can add care plan individualizations to a care plan. Examples of Individualization might be:  \"Parent requests to be called daily at 9am for status\", \"I have a hard time hearing out of my right ear\", or \"Do not touch me to wake me up as it startles  me\".  Outcome: Progressing  Goal: Absence of Hospital-Acquired Illness or Injury  Outcome: Progressing  Intervention: Identify and Manage Fall Risk  Recent Flowsheet Documentation  Taken 6/25/2025 0118 by Kassie Lock, RN  Safety Promotion/Fall Prevention:   activity supervised   assistive device/personal items within reach   clutter free environment maintained   safety round/check completed  Intervention: Prevent Skin Injury  Recent Flowsheet Documentation  Taken 6/25/2025 0106 by Kassie Lock, RN  Body Position:   log-rolled   turned   side-lying   left  Intervention: Prevent " and Manage VTE (Venous Thromboembolism) Risk  Recent Flowsheet Documentation  Taken 6/25/2025 0118 by Kassie Lock, RN  VTE Prevention/Management: SCDs off (sequential compression devices)  Intervention: Prevent Infection  Recent Flowsheet Documentation  Taken 6/25/2025 0118 by Kassie Lock, RN  Infection Prevention:   hand hygiene promoted   rest/sleep promoted   single patient room provided  Goal: Optimal Comfort and Wellbeing  Outcome: Progressing  Goal: Readiness for Transition of Care  Outcome: Progressing

## 2025-06-25 NOTE — PLAN OF CARE
"Aox4, had /96 at shift change given IV hydralazine to lower BP, no tele, on RA.  PT stable off bipap, son will bring PT home CPAP machine for the night.  No complaints of pain.  Continue on azithro and rocephin.  PT/OT consulted.  PT up in chair today by scott.  Discharge TBD.    Goal Outcome Evaluation:      Plan of Care Reviewed With: patient    Overall Patient Progress: improvingOverall Patient Progress: improving    Outcome Evaluation: PT up in chair today, no pain, tolerating RA, gave prn hydralazine for elavated BP      Problem: Adult Inpatient Plan of Care  Goal: Plan of Care Review  Description: The Plan of Care Review/Shift note should be completed every shift.  The Outcome Evaluation is a brief statement about your assessment that the patient is improving, declining, or no change.  This information will be displayed automatically on your shift  note.  Outcome: Progressing  Flowsheets (Taken 6/24/2025 2038)  Outcome Evaluation: PT up in chair today, no pain, tolerating RA, gave prn hydralazine for elavated BP  Plan of Care Reviewed With: patient  Overall Patient Progress: improving  Goal: Patient-Specific Goal (Individualized)  Description: You can add care plan individualizations to a care plan. Examples of Individualization might be:  \"Parent requests to be called daily at 9am for status\", \"I have a hard time hearing out of my right ear\", or \"Do not touch me to wake me up as it startles  me\".  Outcome: Progressing  Goal: Absence of Hospital-Acquired Illness or Injury  Outcome: Progressing  Intervention: Identify and Manage Fall Risk  Recent Flowsheet Documentation  Taken 6/24/2025 1556 by Libertad Harper, RN  Safety Promotion/Fall Prevention:   activity supervised   increased rounding and observation   increase visualization of patient   lighting adjusted   mobility aid in reach   nonskid shoes/slippers when out of bed   room near nurse's station   room organization consistent   safety " round/check completed   supervised activity  Intervention: Prevent Skin Injury  Recent Flowsheet Documentation  Taken 6/24/2025 1746 by Libertad Harper RN  Body Position:   legs elevated   heels elevated  Goal: Optimal Comfort and Wellbeing  Outcome: Progressing  Goal: Readiness for Transition of Care  Outcome: Progressing       Problem: Delirium  Goal: Optimal Coping  Outcome: Progressing  Goal: Improved Behavioral Control  Outcome: Progressing  Goal: Improved Attention and Thought Clarity  Outcome: Progressing  Goal: Improved Sleep  Outcome: Progressing       Problem: Comorbidity Management  Goal: Blood Glucose Levels Within Targeted Range  Outcome: Progressing  Goal: Blood Pressure in Desired Range  Outcome: Progressing

## 2025-06-25 NOTE — PLAN OF CARE
Goal Outcome Evaluation:      Plan of Care Reviewed With: child    Overall Patient Progress: improvingOverall Patient Progress: improving    Outcome Evaluation: Patient lives at home with son and family. Son provides all cares for pt. They do not anticipate any d/c needs. Son will transport.

## 2025-06-26 ENCOUNTER — APPOINTMENT (OUTPATIENT)
Dept: OCCUPATIONAL THERAPY | Facility: CLINIC | Age: 75
DRG: 202 | End: 2025-06-26
Payer: COMMERCIAL

## 2025-06-26 VITALS
OXYGEN SATURATION: 97 % | HEART RATE: 57 BPM | BODY MASS INDEX: 27.47 KG/M2 | DIASTOLIC BLOOD PRESSURE: 82 MMHG | SYSTOLIC BLOOD PRESSURE: 176 MMHG | RESPIRATION RATE: 16 BRPM | WEIGHT: 160.05 LBS | TEMPERATURE: 98.6 F

## 2025-06-26 LAB
ANION GAP SERPL CALCULATED.3IONS-SCNC: 13 MMOL/L (ref 7–15)
BACTERIA SPEC CULT: NORMAL
BACTERIA SPEC CULT: NORMAL
BUN SERPL-MCNC: 32.9 MG/DL (ref 8–23)
CALCIUM SERPL-MCNC: 9.7 MG/DL (ref 8.8–10.4)
CHLORIDE SERPL-SCNC: 106 MMOL/L (ref 98–107)
CREAT SERPL-MCNC: 0.87 MG/DL (ref 0.51–0.95)
EGFRCR SERPLBLD CKD-EPI 2021: 69 ML/MIN/1.73M2
GLUCOSE BLDC GLUCOMTR-MCNC: 104 MG/DL (ref 70–99)
GLUCOSE BLDC GLUCOMTR-MCNC: 125 MG/DL (ref 70–99)
GLUCOSE BLDC GLUCOMTR-MCNC: 176 MG/DL (ref 70–99)
GLUCOSE SERPL-MCNC: 110 MG/DL (ref 70–99)
HCO3 SERPL-SCNC: 26 MMOL/L (ref 22–29)
MCV RBC AUTO: 86 FL (ref 78–100)
PLATELET # BLD AUTO: 256 10E3/UL (ref 150–450)
POTASSIUM SERPL-SCNC: 4.9 MMOL/L (ref 3.4–5.3)
SODIUM SERPL-SCNC: 145 MMOL/L (ref 135–145)

## 2025-06-26 PROCEDURE — 999N000157 HC STATISTIC RCP TIME EA 10 MIN

## 2025-06-26 PROCEDURE — 250N000009 HC RX 250: Performed by: INTERNAL MEDICINE

## 2025-06-26 PROCEDURE — 36415 COLL VENOUS BLD VENIPUNCTURE: CPT | Performed by: INTERNAL MEDICINE

## 2025-06-26 PROCEDURE — 250N000012 HC RX MED GY IP 250 OP 636 PS 637: Performed by: INTERNAL MEDICINE

## 2025-06-26 PROCEDURE — 250N000013 HC RX MED GY IP 250 OP 250 PS 637: Performed by: INTERNAL MEDICINE

## 2025-06-26 PROCEDURE — 85049 AUTOMATED PLATELET COUNT: CPT | Performed by: INTERNAL MEDICINE

## 2025-06-26 PROCEDURE — 999N000147 HC STATISTIC PT IP EVAL DEFER: Performed by: PHYSICAL THERAPIST

## 2025-06-26 PROCEDURE — 80048 BASIC METABOLIC PNL TOTAL CA: CPT | Performed by: INTERNAL MEDICINE

## 2025-06-26 PROCEDURE — 94640 AIRWAY INHALATION TREATMENT: CPT

## 2025-06-26 PROCEDURE — 97535 SELF CARE MNGMENT TRAINING: CPT | Mod: GO

## 2025-06-26 PROCEDURE — 250N000011 HC RX IP 250 OP 636: Performed by: INTERNAL MEDICINE

## 2025-06-26 PROCEDURE — 99238 HOSP IP/OBS DSCHRG MGMT 30/<: CPT | Performed by: INTERNAL MEDICINE

## 2025-06-26 RX ORDER — AZITHROMYCIN 250 MG/1
250 TABLET, FILM COATED ORAL DAILY
Qty: 1 TABLET | Refills: 0 | Status: SHIPPED | OUTPATIENT
Start: 2025-06-26 | End: 2025-06-27

## 2025-06-26 RX ORDER — PREDNISONE 20 MG/1
20 TABLET ORAL DAILY
Qty: 3 TABLET | Refills: 0 | Status: SHIPPED | OUTPATIENT
Start: 2025-06-27 | End: 2025-06-30

## 2025-06-26 RX ORDER — CEFUROXIME AXETIL 500 MG/1
500 TABLET ORAL 2 TIMES DAILY
Qty: 6 TABLET | Refills: 0 | Status: SHIPPED | OUTPATIENT
Start: 2025-06-26 | End: 2025-06-29

## 2025-06-26 RX ORDER — PREDNISONE 20 MG/1
20 TABLET ORAL DAILY
Status: DISCONTINUED | OUTPATIENT
Start: 2025-06-26 | End: 2025-06-26 | Stop reason: HOSPADM

## 2025-06-26 RX ADMIN — AMLODIPINE BESYLATE 5 MG: 5 TABLET ORAL at 09:18

## 2025-06-26 RX ADMIN — LOSARTAN POTASSIUM 100 MG: 100 TABLET, FILM COATED ORAL at 09:18

## 2025-06-26 RX ADMIN — FUROSEMIDE 40 MG: 20 TABLET ORAL at 09:18

## 2025-06-26 RX ADMIN — AZITHROMYCIN DIHYDRATE 250 MG: 250 TABLET ORAL at 09:18

## 2025-06-26 RX ADMIN — PREDNISONE 20 MG: 20 TABLET ORAL at 09:18

## 2025-06-26 RX ADMIN — INSULIN GLARGINE 20 UNITS: 100 INJECTION, SOLUTION SUBCUTANEOUS at 09:26

## 2025-06-26 RX ADMIN — CEFTRIAXONE 1 G: 1 INJECTION, POWDER, FOR SOLUTION INTRAMUSCULAR; INTRAVENOUS at 09:18

## 2025-06-26 RX ADMIN — ASPIRIN 81 MG: 81 TABLET, DELAYED RELEASE ORAL at 09:18

## 2025-06-26 RX ADMIN — FORMOTEROL FUMARATE 20 MCG: 20 SOLUTION RESPIRATORY (INHALATION) at 07:47

## 2025-06-26 RX ADMIN — BUDESONIDE 0.5 MG: 0.5 INHALANT RESPIRATORY (INHALATION) at 07:47

## 2025-06-26 ASSESSMENT — ACTIVITIES OF DAILY LIVING (ADL)
ADLS_ACUITY_SCORE: 68
ADLS_ACUITY_SCORE: 70
ADLS_ACUITY_SCORE: 70
ADLS_ACUITY_SCORE: 68
ADLS_ACUITY_SCORE: 70
ADLS_ACUITY_SCORE: 68
ADLS_ACUITY_SCORE: 70

## 2025-06-26 NOTE — PLAN OF CARE
Occupational Therapy Discharge Summary    Reason for therapy discharge:    Discharged to home.    Progress towards therapy goal(s). See goals on Care Plan in [facility] electronic health record for goal details.  Goals not met.  Barriers to achieving goals:   discharge from facility.    Therapy recommendation(s):    No further therapy is recommended.Per discussion with son present in room, feels comfortable taking ptient home seeing her ambulate. Pts family members assist with all A/IADLs and Ax1 for mobility. Pts family decline any therapy needs or home care due to having it in the past and pts family completes exercsies with patient already. If family feels comfortable, pt near baseline and anticiapte return home with assist once medically ready.

## 2025-06-26 NOTE — DISCHARGE SUMMARY
[facility] Discharge Summary    Pilo Quintana MRN# [id number]   Age: 75 year old YOB: 1950     Date of Admission:  6/23/2025  Date of Discharge::  6/26/2025  Admitting Physician:  Uriel Chacko MD  Discharge Physician:  Servando Lemon MD     Home clinic: [facility]           Admission Diagnoses:   Hyperglycemia [R73.9]  COPD exacerbation (H) [J44.1]  Acute respiratory failure with hypercapnia (H) [J96.02]          Discharge Diagnosis:     Principal Problem:    Acute respiratory failure with hypercapnia (H)  Active Problems:    COPD exacerbation (H)    Hyperglycemia           Procedures:   BiPAP          Medications Prior to Admission:     Medications Prior to Admission   Medication Sig Dispense Refill Last Dose/Taking    acetaminophen (TYLENOL) 500 MG tablet Take 500-1,000 mg by mouth every 8 hours as needed for mild pain   Taking As Needed    albuterol (PROAIR HFA/PROVENTIL HFA/VENTOLIN HFA) 108 (90 Base) MCG/ACT inhaler Inhale 2 puffs into the lungs every 4 hours as needed for wheezing 18 g 0 Taking As Needed    albuterol (PROVENTIL) (2.5 MG/3ML) 0.083% neb solution Take 1 vial (2.5 mg) by nebulization every 4 hours as needed for shortness of breath, wheezing or cough. 90 mL 1 Taking As Needed    amLODIPine (NORVASC) 5 MG tablet Take 5 mg by mouth daily   6/23/2025 Morning    aspirin (ASA) 81 MG EC tablet Take 1 tablet (81 mg) by mouth daily 30 tablet 11 6/23/2025 Morning    atorvastatin (LIPITOR) 40 MG tablet Take 1 tablet (40 mg) by mouth every evening 30 tablet 4 6/22/2025 Evening    azithromycin (ZITHROMAX) 250 MG tablet Take 1 tablet (250 mg) by mouth daily. 90 tablet 0 6/23/2025 Morning    budesonide (PULMICORT) 0.5 MG/2ML neb solution Take 2 mLs (0.5 mg) by nebulization 2 times daily. 120 mL 11 6/23/2025 Morning    carboxymethylcellulose (REFRESH PLUS) 0.5 % SOLN ophthalmic solution Place 1 drop into both eyes daily as needed for dry eyes.    Taking As Needed    Cholecalciferol (VITAMIN D3) 50 MCG (2000 UT) CAPS Take 50 mcg by mouth daily   6/23/2025 Morning    Continuous Glucose Sensor (DEXCOM G6 SENSOR) MISC APPLY 1 SENSOR TO SKIN EVERY 10 DAYS   Taking    Continuous Glucose Transmitter (DEXCOM G6 TRANSMITTER) MISC USE AS DIRECTED. CHANGE EVERY 3 MONTHS   Taking    formoterol (PERFOROMIST) 20 MCG/2ML neb solution Take 2 mLs (20 mcg) by nebulization every 12 hours. 120 mL 11 6/23/2025 Morning    furosemide (LASIX) 40 MG tablet Take 40 mg by mouth daily   6/23/2025 Morning    losartan (COZAAR) 100 MG tablet Take 100 mg by mouth daily   6/23/2025 Morning    metFORMIN (GLUCOPHAGE XR) 500 MG 24 hr tablet Take 500 mg by mouth 2 times daily (with meals).   6/23/2025 Morning    metoprolol succinate ER (TOPROL XL) 50 MG 24 hr tablet Take 50 mg by mouth daily.   6/23/2025 Morning    nystatin (NYSTOP) 836436 UNIT/GM external powder Apply topically 2 times daily as needed   Taking As Needed    polyethylene glycol-propylene glycol (SYSTANE ULTRA) 0.4-0.3 % SOLN ophthalmic solution Place 1 drop into both eyes nightly as needed for dry eyes.   Taking As Needed    triamcinolone (KENALOG) 0.1 % external ointment Apply topically 2 times daily as needed for irritation   Taking As Needed             Discharge Medications:   {                  :[id number]}          Consultations:   No consultations were requested during this admission          Brief History of Illness:   ***          Hospital Course:   ***    BP (!) 176/82 (BP Location: Right arm)   Pulse 57   Temp 98.6  F (37  C) (Oral)   Resp 16   Wt 72.6 kg (160 lb 0.9 oz)   LMP  (LMP Unknown)   SpO2 97%   BMI 27.47 kg/m    ***          Discharge Instructions and Follow-Up:     Discharge diet: Regular   Discharge activity: Activity as tolerated   Discharge follow-up: ***           Discharge Disposition:     Discharged to home      Attestation:  {   Physician attestation                                                                               :[id number]}    Servando Lemon MD      Take 50 mcg by mouth daily, Historical      Continuous Glucose Sensor (DEXCOM G6 SENSOR) MISC APPLY 1 SENSOR TO SKIN EVERY 10 DAYS, Historical      Continuous Glucose Transmitter (DEXCOM G6 TRANSMITTER) MISC USE AS DIRECTED. CHANGE EVERY 3 MONTHS, Historical      formoterol (PERFOROMIST) 20 MCG/2ML neb solution Take 2 mLs (20 mcg) by nebulization every 12 hours., Disp-120 mL, R-11, E-Prescribe      furosemide (LASIX) 40 MG tablet Take 40 mg by mouth daily, Historical      losartan (COZAAR) 100 MG tablet Take 100 mg by mouth daily, Historical      metFORMIN (GLUCOPHAGE XR) 500 MG 24 hr tablet Take 500 mg by mouth 2 times daily (with meals)., Historical      metoprolol succinate ER (TOPROL XL) 50 MG 24 hr tablet Take 50 mg by mouth daily., Historical      nystatin (NYSTOP) 141098 UNIT/GM external powder Apply topically 2 times daily as neededHistorical      polyethylene glycol-propylene glycol (SYSTANE ULTRA) 0.4-0.3 % SOLN ophthalmic solution Place 1 drop into both eyes nightly as needed for dry eyes., Historical      triamcinolone (KENALOG) 0.1 % external ointment Apply topically 2 times daily as needed for irritationHistorical       !! - Potential duplicate medications found. Please discuss with provider.                Consultations:   No consultations were requested during this admission          Brief History of Illness:   Pilo Quintana is a 75 year old Malagasy female who speaks Mano.  She was admitted to the ICU on 6/23/25 from the ED due to respiratory distress with acute hypoxic and hypercapnic resp failure due to apparent asthma exacerbation. She did well in the ICU and was able to wean down on her need for intermittent BiPAP and at the time of transfer was stable on 2 LPM by NC.      PMH including type 2 diabetes, hypertension, obesity, asthma, remote history of CVA who presents with respiratory distress via EMS.  She also has some degree of dementia, but in any case does not speak English and we do not  "have access to a Mano  other than her son.      She was actually seen in the emergency room the day prior to admission for a fall and was discharged.  She did develop shortness of breath following that.  Per EMS she was breathing at 40/min and she was placed on CPAP.  The patient presented with her son who helped provide the history and also interpret.  He takes care of her and actually placed her on CPAP overnight at home and gave a dose of prednisone.  She has had a cough but no fever.     In the ER, evaluation was revealing of hypertension.  She was still breathing at 36 breaths/min but was not hypoxic but with increased WOB.    Lab workup showed relatively normal basic metabolic panel, lactic acid of 1.9, VBG pH 7.34, pCO2 60 and high-sensitivity troponin of 14 with a BNP of 263.  CBC was normal.  Influenza A/B, RSV and COVID testing were negative.  Chest x-ray showed increasing mild bibasilar pulmonary opacities and some increased vascular prominence.  She was started on ceftriaxone, azithromycin, IV Solu-Medrol and given magnesium sulfate as well as multiple nebs.  She was admitted for apparent asthma exacerbation with possible pneumonia.  A component of mild fluid overload was considered as well, though she has not been actively diuresed during this hospital stay.          Hospital Course:   Pilo Quintana was admitted to the ICU on BiPAP (on an \"Intermediate care\" status) and did well. She was treated with glucocorticoids, antibiotics and bronchodilators with good results. Her son and daughter-in-law, with whom she lives, were present at least daily to help the pt with translation from her native Mano language. They appear very engaged with her care and advocate for her appropriately.     Assessment and Plan:   Acute hypoxemic and hypercapnic respiratory failure: Suspect asthma exacerbation.  Apparently has not been able to cooperate with formal PFTs due to dementia and language barrier.  " Wheezing on exam with diminished air movement patient.  Does have infiltrates on chest x-ray so seems reasonable to cover with antibiotics.  -- less likely CHF with normal BNP and normal echo in 12/2024.  -- Continue ceftriaxone and azithromycin  -- discontinue IV solumedrol in favor of Prednisone 40 mg daily.   -- Scheduled and as needed albuterol nebs  -- At home, she is on nebs due to difficulty taking inhalers in the setting of dementia etc.  -- Follows with pulmonary medicine, and son indicates she has a follow up appointment on July 11.     2.   NIDDM with steroid-induced hyperglycemia. Baseline control is quite good with HbA1c of 7.3.  -- Held metformin while inpatient, treated with Lantus and sliding scale insulin.    -- taper steroids     3.   History of hypertension: Resume home losartan, Lasix 40 mg and metoprolol     4.   History of asthma: Resuming home nebs.     5.   Remote history of CVA: continue ASA     6.  MAGALIS on CPAP at night.     7.  History of dementia: Resides with her son who provides cares.  He also interprets.    BP (!) 176/82 (BP Location: Right arm)   Pulse 57   Temp 98.6  F (37  C) (Oral)   Resp 16   Wt 72.6 kg (160 lb 0.9 oz)   LMP  (LMP Unknown)   SpO2 97%   BMI 27.47 kg/m    Ms. Quintana is alert and pleasantly interactive. Her son interprets. It is not clear to me that she is able or willing to follow directions. She does not take a deep breath even when I ask her to do so and it is translated by her son.   HEENT: No facial muscular asymmetry.   Chest: No increased WOB.  On-going wheeze, but with good air entry, no rales.  COR: RRR, distant heart sounds.   Exrem: trace edema          Discharge Instructions and Follow-Up:     Discharge diet: Regular   Discharge activity: Activity as tolerated   Discharge follow-up: With  pulmonary medicine and pmd as planned.            Discharge Disposition:     Discharged to home      Attestation:  I have reviewed today's vital signs, notes,  medications, labs and imaging.    Servando Lemon MD

## 2025-06-26 NOTE — PROGRESS NOTES
Care Management Discharge Note    Discharge Date: 06/26/2025       Discharge Disposition: Home  Discharge Services: PCA  Discharge DME: None  Discharge Transportation: family or friend will provide      Education Provided on the Discharge Plan:  yes  Persons Notified of Discharge Plans: son, bedside/charge RN, hospitalist  Patient/Family in Agreement with the Plan: yes    Handoff Referral Completed: No, handoff not indicated or clinically appropriate    Additional Information:  Discharge order in place and signed today.     OT recs home w assist. Patient lives at home w her son who provides total cares for her w assist from another PCA as well. Family declined home care services. An order for a nebulizer machine was placed for discharge, [facility] plans to deliver it to patient's bedside prior to discharge.     Son will provide discharge transport. No additional discharge needs were identified.     Caroline Villa RN, BSN  Inpatient Care Coordination  [facility]  [phone]

## 2025-06-26 NOTE — PLAN OF CARE
PT: Orders received. Chart reviewed and discussed with care team.  PT not indicated due to working with OT on mobilization, defer due to duplication of services.  Defer discharge recommendations to OT.  Will complete orders.

## 2025-06-26 NOTE — PLAN OF CARE
VS slightly elevated at times. On RA. On scheduled Nebs, IV Rocephin,po Zithromax and  PO steroids. On PO Lasix, purwick in place.  Monitoring Bg's, On Sliding scale and Lantus. PT/OT following, Lift at times, walker and gait belt at bedside. CPAP at night. Need to order food for pt. Pt requires  services if son not here.   phone at bedside. JAKY went over discharge instructions with pt's son. Received Neb machine, plan for pt to  meds at own pharmacy, pt's son aware. Pt has all belongings and paperwork. Dc'd at 1335 via .      Goal Outcome Evaluation:      Plan of Care Reviewed With: patient, family    Overall Patient Progress: improvingOverall Patient Progress: improving    Outcome Evaluation: VS slightly elevated at times. On RA. On scheduled Nebs, IV Rocephin,po Zithromax and  PO steroids. On PO Lasix, purwick in place.  Monitoring Bg's, On Sliding scale and Lantus. PT/OT following, Lift at times, walker and gait belt at bedside. CPAP at night. Need to order food for pt. Pt requires  services if son not here.   phone at bedside.          Problem: Adult Inpatient Plan of Care  Goal: Plan of Care Review  Description: The Plan of Care Review/Shift note should be completed every shift.  The Outcome Evaluation is a brief statement about your assessment that the patient is improving, declining, or no change.  This information will be displayed automatically on your shift  note.  Outcome: Progressing  Flowsheets (Taken 6/26/2025 1101)  Outcome Evaluation: VS slightly elevated at times. On RA. On scheduled Nebs, IV Rocephin,po Zithromax and  PO steroids. On PO Lasix, purwick in place.  Monitoring Bg's, On Sliding scale and Lantus. PT/OT following, Lift at times, walker and gait belt at bedside. CPAP at night. Need to order food for pt. Pt requires  services if son not here.   phone at bedside.  Plan of Care Reviewed With:   patient    "family  Overall Patient Progress: improving  Goal: Patient-Specific Goal (Individualized)  Description: You can add care plan individualizations to a care plan. Examples of Individualization might be:  \"Parent requests to be called daily at 9am for status\", \"I have a hard time hearing out of my right ear\", or \"Do not touch me to wake me up as it startles  me\".  Outcome: Progressing  Goal: Absence of Hospital-Acquired Illness or Injury  Outcome: Progressing  Intervention: Identify and Manage Fall Risk  Recent Flowsheet Documentation  Taken 6/26/2025 0915 by Love Stevenson RN  Safety Promotion/Fall Prevention:   activity supervised   lighting adjusted   clutter free environment maintained  Intervention: Prevent Skin Injury  Recent Flowsheet Documentation  Taken 6/26/2025 0915 by Love Stevenson RN  Body Position: position changed independently  Goal: Optimal Comfort and Wellbeing  Outcome: Progressing  Goal: Readiness for Transition of Care  Outcome: Progressing     Problem: Delirium  Goal: Optimal Coping  Outcome: Progressing  Goal: Improved Behavioral Control  Outcome: Progressing  Goal: Improved Attention and Thought Clarity  Outcome: Progressing  Goal: Improved Sleep  Outcome: Progressing     Problem: Comorbidity Management  Goal: Blood Glucose Levels Within Targeted Range  Outcome: Progressing  Intervention: Monitor and Manage Glycemia  Recent Flowsheet Documentation  Taken 6/26/2025 0915 by Love Stevenson RN  Medication Review/Management: medications reviewed  Goal: Blood Pressure in Desired Range  Outcome: Progressing  Intervention: Maintain Blood Pressure Management  Recent Flowsheet Documentation  Taken 6/26/2025 0915 by Love Stevenson RN  Medication Review/Management: medications reviewed     Problem: Breathing Pattern Ineffective  Goal: Effective Breathing Pattern  Outcome: Progressing     "

## 2025-06-26 NOTE — PLAN OF CARE
"Pt AOx4. Wheezes heard before neb treatment, clear lung sounds after. RA in the evening. Pt slept throughout the night using CPAP, satting in the high 90s. Purewick on 700 ml output. Continuous pulse ox. Discharge expected today.  Goal Outcome Evaluation:      Plan of Care Reviewed With: patient, family    Overall Patient Progress: improvingOverall Patient Progress: improving    Outcome Evaluation: Wheezing before neb treatment. Clear lung sounds after neb treatment. CPAP throughout the night satting in the high 90s.    Problem: Adult Inpatient Plan of Care  Goal: Plan of Care Review  Description: The Plan of Care Review/Shift note should be completed every shift.  The Outcome Evaluation is a brief statement about your assessment that the patient is improving, declining, or no change.  This information will be displayed automatically on your shift  note.  Outcome: Progressing  Flowsheets (Taken 6/26/2025 0700)  Outcome Evaluation: Wheezing before neb treatment. Clear lung sounds after neb treatment. CPAP throughout the night satting in the high 90s.  Plan of Care Reviewed With:   patient   family  Overall Patient Progress: improving  Goal: Patient-Specific Goal (Individualized)  Description: You can add care plan individualizations to a care plan. Examples of Individualization might be:  \"Parent requests to be called daily at 9am for status\", \"I have a hard time hearing out of my right ear\", or \"Do not touch me to wake me up as it startles  me\".  Outcome: Progressing  Goal: Absence of Hospital-Acquired Illness or Injury  Outcome: Progressing  Intervention: Identify and Manage Fall Risk  Recent Flowsheet Documentation  Taken 6/25/2025 2054 by Balwinder Wilkerson RN  Safety Promotion/Fall Prevention: safety round/check completed  Taken 6/25/2025 2019 by Balwinder Wilkerson RN  Safety Promotion/Fall Prevention: safety round/check completed  Intervention: Prevent Skin Injury  Recent Flowsheet Documentation  Taken " 6/26/2025 0639 by Balwinder Wilkerson RN  Body Position:   position maintained   neutral head position   turned   right  Taken 6/25/2025 2019 by Balwinder Wilkerson RN  Body Position:   position maintained   neutral head position   turned   left  Goal: Optimal Comfort and Wellbeing  Outcome: Progressing  Goal: Readiness for Transition of Care  Outcome: Progressing     Problem: Delirium  Goal: Optimal Coping  Outcome: Progressing  Goal: Improved Behavioral Control  Outcome: Progressing  Goal: Improved Attention and Thought Clarity  Outcome: Progressing  Goal: Improved Sleep  Outcome: Progressing     Problem: Comorbidity Management  Goal: Blood Glucose Levels Within Targeted Range  Outcome: Progressing  Goal: Blood Pressure in Desired Range  Outcome: Progressing     Problem: Breathing Pattern Ineffective  Goal: Effective Breathing Pattern  Outcome: Progressing

## 2025-06-28 LAB
BACTERIA SPEC CULT: NO GROWTH
BACTERIA SPEC CULT: NO GROWTH

## 2025-07-07 DIAGNOSIS — J44.9 COPD (CHRONIC OBSTRUCTIVE PULMONARY DISEASE) (H): Primary | ICD-10-CM

## 2025-07-11 ENCOUNTER — TELEPHONE (OUTPATIENT)
Dept: PULMONOLOGY | Facility: CLINIC | Age: 75
End: 2025-07-11

## 2025-07-11 NOTE — TELEPHONE ENCOUNTER
PA Initiation    Medication: DUPIXENT 300 MG/2ML SC SOAJ  Insurance Company: [employer] - Phone [phone] Fax [phone]  Pharmacy Filling the Rx:    Filling Pharmacy Phone:    Filling Pharmacy Fax:    Start Date: 7/11/2025    Key: [id number] - waiting on question set

## 2025-07-14 NOTE — TELEPHONE ENCOUNTER
PA Initiation    Medication: DUPIXENT 300 MG/2ML SC SOAJ  Insurance Company: [employer] - Phone [phone] Fax [phone]  Pharmacy Filling the Rx:    Filling Pharmacy Phone:    Filling Pharmacy Fax:    Start Date: 7/11/2025    Key: [id number]

## 2025-07-16 NOTE — TELEPHONE ENCOUNTER
Prior Authorization Approval    Medication: DUPIXENT 300 MG/2ML SC SOAJ  Authorization Effective Date: 6/15/2025  Authorization Expiration Date: 7/15/2026  Approved Dose/Quantity: 4ml for 28 ds   Reference #: Key: [id number]   Insurance Company: "[employer] - Phone [phone] Fax [phone]  Expected CoPay: $  0  CoPay Card Available:      Financial Assistance Needed:   Which Pharmacy is filling the prescription: [facility] - [place] [address]   Pharmacy Notified: PA approval information updated in [facility] Rx system  Patient Notified:     Left message for patient to call back, gabriella pending     Faxed Dupixent myway paperwork

## 2025-08-06 DIAGNOSIS — J44.89 ASTHMA-COPD OVERLAP SYNDROME (H): ICD-10-CM

## 2025-08-07 ENCOUNTER — PHARMACY VISIT (OUTPATIENT)
Dept: ADMINISTRATIVE | Facility: CLINIC | Age: 75
End: 2025-08-07
Payer: COMMERCIAL

## 2025-08-11 RX ORDER — PREDNISONE 20 MG/1
TABLET ORAL
Qty: 10 TABLET | Refills: 0 | OUTPATIENT
Start: 2025-08-11

## 2025-08-11 RX ORDER — DOXYCYCLINE HYCLATE 50 MG/1
CAPSULE ORAL
Qty: 20 CAPSULE | Refills: 0 | OUTPATIENT
Start: 2025-08-11

## 2025-08-12 ENCOUNTER — TELEPHONE (OUTPATIENT)
Dept: PULMONOLOGY | Facility: CLINIC | Age: 75
End: 2025-08-12
Payer: COMMERCIAL

## 2025-08-12 DIAGNOSIS — J44.1 COPD EXACERBATION (H): Primary | ICD-10-CM

## 2025-08-12 RX ORDER — PREDNISONE 20 MG/1
40 TABLET ORAL DAILY
Qty: 10 TABLET | Refills: 0 | Status: SHIPPED | OUTPATIENT
Start: 2025-08-12 | End: 2025-08-17

## 2025-08-12 RX ORDER — DOXYCYCLINE HYCLATE 50 MG/1
50 CAPSULE ORAL 2 TIMES DAILY
Qty: 10 CAPSULE | Refills: 0 | Status: SHIPPED | OUTPATIENT
Start: 2025-08-12 | End: 2025-08-17

## 2025-08-20 ENCOUNTER — TELEPHONE (OUTPATIENT)
Dept: URGENT CARE | Facility: CLINIC | Age: 75
End: 2025-08-20
Payer: COMMERCIAL

## 2026-06-24 ENCOUNTER — TELEPHONE (OUTPATIENT)
Dept: PULMONOLOGY | Facility: CLINIC | Age: 76
End: 2026-06-24
Payer: COMMERCIAL